# Patient Record
Sex: MALE | Race: WHITE
[De-identification: names, ages, dates, MRNs, and addresses within clinical notes are randomized per-mention and may not be internally consistent; named-entity substitution may affect disease eponyms.]

---

## 2021-03-02 DIAGNOSIS — Z78.9 OTHER SPECIFIED HEALTH STATUS: ICD-10-CM

## 2021-03-02 DIAGNOSIS — Z87.898 PERSONAL HISTORY OF OTHER SPECIFIED CONDITIONS: ICD-10-CM

## 2021-03-02 DIAGNOSIS — Z86.79 PERSONAL HISTORY OF OTHER DISEASES OF THE CIRCULATORY SYSTEM: ICD-10-CM

## 2021-03-02 DIAGNOSIS — F17.200 NICOTINE DEPENDENCE, UNSPECIFIED, UNCOMPLICATED: ICD-10-CM

## 2021-03-02 RX ORDER — ATORVASTATIN CALCIUM 20 MG/1
20 TABLET, FILM COATED ORAL
Qty: 90 | Refills: 3 | Status: ACTIVE | COMMUNITY

## 2021-03-02 RX ORDER — MECLIZINE HYDROCHLORIDE 25 MG/1
25 TABLET ORAL DAILY
Refills: 0 | Status: ACTIVE | COMMUNITY

## 2021-03-02 RX ORDER — FENOFIBRATE 200 MG/1
200 CAPSULE ORAL
Refills: 0 | Status: ACTIVE | COMMUNITY

## 2021-03-04 ENCOUNTER — NON-APPOINTMENT (OUTPATIENT)
Age: 61
End: 2021-03-04

## 2021-03-04 ENCOUNTER — APPOINTMENT (OUTPATIENT)
Dept: CARDIOLOGY | Facility: CLINIC | Age: 61
End: 2021-03-04
Payer: COMMERCIAL

## 2021-03-04 VITALS
WEIGHT: 144 LBS | BODY MASS INDEX: 21.33 KG/M2 | OXYGEN SATURATION: 97 % | HEART RATE: 74 BPM | SYSTOLIC BLOOD PRESSURE: 148 MMHG | DIASTOLIC BLOOD PRESSURE: 99 MMHG | HEIGHT: 69 IN

## 2021-03-04 DIAGNOSIS — Z82.3 FAMILY HISTORY OF STROKE: ICD-10-CM

## 2021-03-04 DIAGNOSIS — E78.5 HYPERLIPIDEMIA, UNSPECIFIED: ICD-10-CM

## 2021-03-04 DIAGNOSIS — Z82.49 FAMILY HISTORY OF ISCHEMIC HEART DISEASE AND OTHER DISEASES OF THE CIRCULATORY SYSTEM: ICD-10-CM

## 2021-03-04 DIAGNOSIS — R42 DIZZINESS AND GIDDINESS: ICD-10-CM

## 2021-03-04 DIAGNOSIS — I10 ESSENTIAL (PRIMARY) HYPERTENSION: ICD-10-CM

## 2021-03-04 PROCEDURE — 99072 ADDL SUPL MATRL&STAF TM PHE: CPT

## 2021-03-04 PROCEDURE — 99204 OFFICE O/P NEW MOD 45 MIN: CPT | Mod: 25

## 2021-03-04 PROCEDURE — 93000 ELECTROCARDIOGRAM COMPLETE: CPT

## 2021-03-04 RX ORDER — HYDROCHLOROTHIAZIDE 12.5 MG/1
12.5 TABLET ORAL
Refills: 0 | Status: DISCONTINUED | COMMUNITY
End: 2021-03-04

## 2021-03-04 RX ORDER — LISINOPRIL 40 MG/1
40 TABLET ORAL DAILY
Refills: 0 | Status: ACTIVE | COMMUNITY

## 2021-04-05 ENCOUNTER — APPOINTMENT (OUTPATIENT)
Dept: CARDIOLOGY | Facility: CLINIC | Age: 61
End: 2021-04-05

## 2021-07-08 ENCOUNTER — APPOINTMENT (OUTPATIENT)
Dept: UROLOGY | Facility: CLINIC | Age: 61
End: 2021-07-08
Payer: COMMERCIAL

## 2021-07-08 VITALS
DIASTOLIC BLOOD PRESSURE: 107 MMHG | TEMPERATURE: 98.5 F | SYSTOLIC BLOOD PRESSURE: 187 MMHG | HEART RATE: 80 BPM | OXYGEN SATURATION: 98 %

## 2021-07-08 PROCEDURE — 99072 ADDL SUPL MATRL&STAF TM PHE: CPT

## 2021-07-08 PROCEDURE — 99203 OFFICE O/P NEW LOW 30 MIN: CPT

## 2021-07-08 NOTE — HISTORY OF PRESENT ILLNESS
[FreeTextEntry1] : CC: history of prostate cancer\par \par HPI: \par Patient with history of RALP\par JAZ \par Has not been seen in ~10 years\par Basically dry; no pads and dry underwear today.  He gives a history of occasional leak with stress/strain/lifting. \par He has severe ED since surgery\par \par He would like a letter to document his current status; we will await his PSA and then document, specifically his cancer status, the fact that he has mild intermittent stress incontinence without the need for pads, and severe ED. \par \par \par

## 2021-07-21 ENCOUNTER — NON-APPOINTMENT (OUTPATIENT)
Age: 61
End: 2021-07-21

## 2021-07-21 DIAGNOSIS — C61 MALIGNANT NEOPLASM OF PROSTATE: ICD-10-CM

## 2021-07-21 LAB — PSA SERPL-MCNC: 0.94 NG/ML

## 2021-10-06 PROBLEM — I10 ESSENTIAL HYPERTENSION: Status: ACTIVE | Noted: 2021-03-04

## 2022-01-03 ENCOUNTER — APPOINTMENT (OUTPATIENT)
Dept: SURGERY | Facility: CLINIC | Age: 62
End: 2022-01-03
Payer: COMMERCIAL

## 2022-01-03 VITALS
TEMPERATURE: 97.7 F | OXYGEN SATURATION: 97 % | DIASTOLIC BLOOD PRESSURE: 108 MMHG | HEART RATE: 83 BPM | HEIGHT: 69 IN | SYSTOLIC BLOOD PRESSURE: 159 MMHG | BODY MASS INDEX: 28.14 KG/M2 | WEIGHT: 190 LBS

## 2022-01-03 DIAGNOSIS — K59.00 CONSTIPATION, UNSPECIFIED: ICD-10-CM

## 2022-01-03 PROCEDURE — 99204 OFFICE O/P NEW MOD 45 MIN: CPT

## 2022-01-31 ENCOUNTER — TRANSCRIPTION ENCOUNTER (OUTPATIENT)
Age: 62
End: 2022-01-31

## 2022-02-01 ENCOUNTER — OUTPATIENT (OUTPATIENT)
Dept: OUTPATIENT SERVICES | Facility: HOSPITAL | Age: 62
LOS: 1 days | End: 2022-02-01
Payer: COMMERCIAL

## 2022-02-01 ENCOUNTER — APPOINTMENT (OUTPATIENT)
Dept: SURGERY | Facility: HOSPITAL | Age: 62
End: 2022-02-01
Payer: COMMERCIAL

## 2022-02-01 ENCOUNTER — RESULT REVIEW (OUTPATIENT)
Age: 62
End: 2022-02-01

## 2022-02-01 DIAGNOSIS — Z86.010 PERSONAL HISTORY OF COLONIC POLYPS: ICD-10-CM

## 2022-02-01 PROCEDURE — 45380 COLONOSCOPY AND BIOPSY: CPT

## 2022-02-01 PROCEDURE — 88305 TISSUE EXAM BY PATHOLOGIST: CPT

## 2022-02-01 PROCEDURE — 88305 TISSUE EXAM BY PATHOLOGIST: CPT | Mod: 26

## 2022-02-01 DEVICE — SYR ORISE GEL SNGL PACK: Type: IMPLANTABLE DEVICE | Status: FUNCTIONAL

## 2022-02-01 DEVICE — HEMOSPRAY HEMOSTAT ENDO 7F: Type: IMPLANTABLE DEVICE | Status: FUNCTIONAL

## 2022-02-01 DEVICE — CLIP RESOLUTION 360 235CM: Type: IMPLANTABLE DEVICE | Status: FUNCTIONAL

## 2022-02-02 LAB — SURGICAL PATHOLOGY STUDY: SIGNIFICANT CHANGE UP

## 2022-03-17 ENCOUNTER — APPOINTMENT (OUTPATIENT)
Dept: SURGERY | Facility: CLINIC | Age: 62
End: 2022-03-17
Payer: COMMERCIAL

## 2022-03-17 VITALS
SYSTOLIC BLOOD PRESSURE: 184 MMHG | BODY MASS INDEX: 27.4 KG/M2 | OXYGEN SATURATION: 98 % | HEART RATE: 85 BPM | WEIGHT: 185 LBS | HEIGHT: 69 IN | DIASTOLIC BLOOD PRESSURE: 119 MMHG | TEMPERATURE: 97.3 F

## 2022-03-17 DIAGNOSIS — K92.1 MELENA: ICD-10-CM

## 2022-03-17 DIAGNOSIS — K63.5 POLYP OF COLON: ICD-10-CM

## 2022-03-17 DIAGNOSIS — Z86.010 PERSONAL HISTORY OF COLONIC POLYPS: ICD-10-CM

## 2022-03-17 PROCEDURE — 99213 OFFICE O/P EST LOW 20 MIN: CPT

## 2023-01-06 ENCOUNTER — EMERGENCY (EMERGENCY)
Facility: HOSPITAL | Age: 63
LOS: 1 days | Discharge: LEFT BEFORE DISCHARGE | End: 2023-01-06
Admitting: EMERGENCY MEDICINE
Payer: COMMERCIAL

## 2023-01-06 VITALS
OXYGEN SATURATION: 97 % | DIASTOLIC BLOOD PRESSURE: 92 MMHG | RESPIRATION RATE: 16 BRPM | SYSTOLIC BLOOD PRESSURE: 177 MMHG | TEMPERATURE: 98 F | HEART RATE: 68 BPM | WEIGHT: 179.9 LBS

## 2023-01-06 PROCEDURE — L9992: CPT

## 2023-01-06 NOTE — ED ADULT NURSE NOTE - EXPLANATION OF PATIENT'S REASON FOR LEAVING
"I feel better. My stomach pain went away. I know it was from gas. It is a waste of my time and yours for me to stay."

## 2023-01-17 ENCOUNTER — NON-APPOINTMENT (OUTPATIENT)
Age: 63
End: 2023-01-17

## 2023-01-19 ENCOUNTER — APPOINTMENT (OUTPATIENT)
Dept: SURGERY | Facility: CLINIC | Age: 63
End: 2023-01-19
Payer: COMMERCIAL

## 2023-01-19 VITALS
DIASTOLIC BLOOD PRESSURE: 101 MMHG | HEART RATE: 79 BPM | SYSTOLIC BLOOD PRESSURE: 156 MMHG | TEMPERATURE: 98.1 F | WEIGHT: 180 LBS | OXYGEN SATURATION: 97 % | HEIGHT: 69 IN | BODY MASS INDEX: 26.66 KG/M2

## 2023-01-19 DIAGNOSIS — R10.11 RIGHT UPPER QUADRANT PAIN: ICD-10-CM

## 2023-01-19 DIAGNOSIS — R10.13 EPIGASTRIC PAIN: ICD-10-CM

## 2023-01-19 DIAGNOSIS — K81.1 CHRONIC CHOLECYSTITIS: ICD-10-CM

## 2023-01-19 PROCEDURE — 99214 OFFICE O/P EST MOD 30 MIN: CPT

## 2023-02-08 ENCOUNTER — OUTPATIENT (OUTPATIENT)
Dept: OUTPATIENT SERVICES | Facility: HOSPITAL | Age: 63
LOS: 1 days | End: 2023-02-08
Payer: COMMERCIAL

## 2023-02-08 VITALS
HEIGHT: 69 IN | TEMPERATURE: 98 F | WEIGHT: 171.08 LBS | SYSTOLIC BLOOD PRESSURE: 138 MMHG | DIASTOLIC BLOOD PRESSURE: 86 MMHG | RESPIRATION RATE: 16 BRPM | OXYGEN SATURATION: 98 % | HEART RATE: 70 BPM

## 2023-02-08 DIAGNOSIS — K81.1 CHRONIC CHOLECYSTITIS: ICD-10-CM

## 2023-02-08 DIAGNOSIS — Z98.890 OTHER SPECIFIED POSTPROCEDURAL STATES: Chronic | ICD-10-CM

## 2023-02-08 DIAGNOSIS — I10 ESSENTIAL (PRIMARY) HYPERTENSION: ICD-10-CM

## 2023-02-08 DIAGNOSIS — K81.9 CHOLECYSTITIS, UNSPECIFIED: ICD-10-CM

## 2023-02-08 DIAGNOSIS — E78.5 HYPERLIPIDEMIA, UNSPECIFIED: ICD-10-CM

## 2023-02-08 DIAGNOSIS — Z01.818 ENCOUNTER FOR OTHER PREPROCEDURAL EXAMINATION: ICD-10-CM

## 2023-02-08 LAB
ALBUMIN SERPL ELPH-MCNC: 4.3 G/DL — SIGNIFICANT CHANGE UP (ref 3.3–5)
ALP SERPL-CCNC: 42 U/L — SIGNIFICANT CHANGE UP (ref 40–120)
ALT FLD-CCNC: 25 U/L — SIGNIFICANT CHANGE UP (ref 12–78)
ANION GAP SERPL CALC-SCNC: 4 MMOL/L — LOW (ref 5–17)
AST SERPL-CCNC: 26 U/L — SIGNIFICANT CHANGE UP (ref 15–37)
BILIRUB SERPL-MCNC: 0.8 MG/DL — SIGNIFICANT CHANGE UP (ref 0.2–1.2)
BUN SERPL-MCNC: 18 MG/DL — SIGNIFICANT CHANGE UP (ref 7–23)
CALCIUM SERPL-MCNC: 9.3 MG/DL — SIGNIFICANT CHANGE UP (ref 8.5–10.1)
CHLORIDE SERPL-SCNC: 111 MMOL/L — HIGH (ref 96–108)
CO2 SERPL-SCNC: 27 MMOL/L — SIGNIFICANT CHANGE UP (ref 22–31)
CREAT SERPL-MCNC: 0.9 MG/DL — SIGNIFICANT CHANGE UP (ref 0.5–1.3)
EGFR: 95 ML/MIN/1.73M2 — SIGNIFICANT CHANGE UP
GLUCOSE SERPL-MCNC: 82 MG/DL — SIGNIFICANT CHANGE UP (ref 70–99)
HCT VFR BLD CALC: 48.5 % — SIGNIFICANT CHANGE UP (ref 39–50)
HGB BLD-MCNC: 16.6 G/DL — SIGNIFICANT CHANGE UP (ref 13–17)
LIDOCAIN IGE QN: 277 U/L — SIGNIFICANT CHANGE UP (ref 73–393)
MCHC RBC-ENTMCNC: 31.3 PG — SIGNIFICANT CHANGE UP (ref 27–34)
MCHC RBC-ENTMCNC: 34.2 GM/DL — SIGNIFICANT CHANGE UP (ref 32–36)
MCV RBC AUTO: 91.3 FL — SIGNIFICANT CHANGE UP (ref 80–100)
NRBC # BLD: 0 /100 WBCS — SIGNIFICANT CHANGE UP (ref 0–0)
PLATELET # BLD AUTO: 211 K/UL — SIGNIFICANT CHANGE UP (ref 150–400)
POTASSIUM SERPL-MCNC: 4.1 MMOL/L — SIGNIFICANT CHANGE UP (ref 3.5–5.3)
POTASSIUM SERPL-SCNC: 4.1 MMOL/L — SIGNIFICANT CHANGE UP (ref 3.5–5.3)
PROT SERPL-MCNC: 7.8 G/DL — SIGNIFICANT CHANGE UP (ref 6–8.3)
RBC # BLD: 5.31 M/UL — SIGNIFICANT CHANGE UP (ref 4.2–5.8)
RBC # FLD: 12.9 % — SIGNIFICANT CHANGE UP (ref 10.3–14.5)
SODIUM SERPL-SCNC: 142 MMOL/L — SIGNIFICANT CHANGE UP (ref 135–145)
WBC # BLD: 5.38 K/UL — SIGNIFICANT CHANGE UP (ref 3.8–10.5)
WBC # FLD AUTO: 5.38 K/UL — SIGNIFICANT CHANGE UP (ref 3.8–10.5)

## 2023-02-08 PROCEDURE — 86850 RBC ANTIBODY SCREEN: CPT

## 2023-02-08 PROCEDURE — 36415 COLL VENOUS BLD VENIPUNCTURE: CPT

## 2023-02-08 PROCEDURE — 86901 BLOOD TYPING SEROLOGIC RH(D): CPT

## 2023-02-08 PROCEDURE — 83690 ASSAY OF LIPASE: CPT

## 2023-02-08 PROCEDURE — 93010 ELECTROCARDIOGRAM REPORT: CPT

## 2023-02-08 PROCEDURE — 86900 BLOOD TYPING SEROLOGIC ABO: CPT

## 2023-02-08 PROCEDURE — 93005 ELECTROCARDIOGRAM TRACING: CPT

## 2023-02-08 PROCEDURE — 80053 COMPREHEN METABOLIC PANEL: CPT

## 2023-02-08 PROCEDURE — 85027 COMPLETE CBC AUTOMATED: CPT

## 2023-02-08 PROCEDURE — G0463: CPT

## 2023-02-08 NOTE — H&P PST ADULT - PROBLEM SELECTOR PLAN 1
Laparoscopic cholecystectomy , possible open  Labs- CBC, CMP, Amylase, EKG  Pre op instructions discussed, all questions answered   Pre op PCP evaluation

## 2023-02-08 NOTE — H&P PST ADULT - NSICDXFAMILYHX_GEN_ALL_CORE_FT
FAMILY HISTORY:  Sibling  Still living? Yes, Estimated age: 61-70  FH: HTN (hypertension), Age at diagnosis: Age Unknown

## 2023-02-08 NOTE — H&P PST ADULT - HISTORY OF PRESENT ILLNESS
64 yo M with h/o HTN, HLD, prostate cancer(2009) c/o mid epigastric pain in 1/06/2023- had surgical consult- s/p abdominal sonogram revealed cholecystitis. Pt presents to PST for pre op evaluation for laparoscopic cholecystectomy , possible open on 2/22/23  **Denies any fever, chills, N/V, jaundice or sick contacts  **Covid 19 PCR to be scheduled

## 2023-02-08 NOTE — H&P PST ADULT - NSICDXPASTMEDICALHX_GEN_ALL_CORE_FT
PAST MEDICAL HISTORY:  Basal cell carcinoma     Benign essential HTN     CA of prostate     HLD (hyperlipidemia)

## 2023-02-08 NOTE — H&P PST ADULT - NSICDXPASTSURGICALHX_GEN_ALL_CORE_FT
PAST SURGICAL HISTORY:  History of basal cell carcinoma (BCC) excision     History of transurethral prostatectomy     S/P inguinal hernia repair

## 2023-02-16 PROBLEM — E78.5 HYPERLIPIDEMIA, UNSPECIFIED: Chronic | Status: ACTIVE | Noted: 2023-02-08

## 2023-02-16 PROBLEM — C44.91 BASAL CELL CARCINOMA OF SKIN, UNSPECIFIED: Chronic | Status: ACTIVE | Noted: 2023-02-08

## 2023-02-16 PROBLEM — I10 ESSENTIAL (PRIMARY) HYPERTENSION: Chronic | Status: ACTIVE | Noted: 2023-02-08

## 2023-02-16 PROBLEM — C61 MALIGNANT NEOPLASM OF PROSTATE: Chronic | Status: ACTIVE | Noted: 2023-02-08

## 2023-02-19 ENCOUNTER — OUTPATIENT (OUTPATIENT)
Dept: OUTPATIENT SERVICES | Facility: HOSPITAL | Age: 63
LOS: 1 days | End: 2023-02-19
Payer: COMMERCIAL

## 2023-02-19 DIAGNOSIS — Z20.828 CONTACT WITH AND (SUSPECTED) EXPOSURE TO OTHER VIRAL COMMUNICABLE DISEASES: ICD-10-CM

## 2023-02-19 DIAGNOSIS — Z98.890 OTHER SPECIFIED POSTPROCEDURAL STATES: Chronic | ICD-10-CM

## 2023-02-19 LAB — SARS-COV-2 RNA SPEC QL NAA+PROBE: SIGNIFICANT CHANGE UP

## 2023-02-19 PROCEDURE — U0005: CPT

## 2023-02-19 PROCEDURE — U0003: CPT

## 2023-02-21 ENCOUNTER — TRANSCRIPTION ENCOUNTER (OUTPATIENT)
Age: 63
End: 2023-02-21

## 2023-02-22 ENCOUNTER — RESULT REVIEW (OUTPATIENT)
Age: 63
End: 2023-02-22

## 2023-02-22 ENCOUNTER — APPOINTMENT (OUTPATIENT)
Dept: SURGERY | Facility: HOSPITAL | Age: 63
End: 2023-02-22

## 2023-02-22 ENCOUNTER — OUTPATIENT (OUTPATIENT)
Dept: OUTPATIENT SERVICES | Facility: HOSPITAL | Age: 63
LOS: 1 days | End: 2023-02-22
Payer: COMMERCIAL

## 2023-02-22 ENCOUNTER — TRANSCRIPTION ENCOUNTER (OUTPATIENT)
Age: 63
End: 2023-02-22

## 2023-02-22 VITALS
SYSTOLIC BLOOD PRESSURE: 167 MMHG | RESPIRATION RATE: 13 BRPM | DIASTOLIC BLOOD PRESSURE: 98 MMHG | HEART RATE: 78 BPM | OXYGEN SATURATION: 95 %

## 2023-02-22 VITALS
TEMPERATURE: 98 F | OXYGEN SATURATION: 98 % | HEIGHT: 69 IN | WEIGHT: 171.08 LBS | RESPIRATION RATE: 14 BRPM | HEART RATE: 88 BPM | DIASTOLIC BLOOD PRESSURE: 88 MMHG | SYSTOLIC BLOOD PRESSURE: 130 MMHG

## 2023-02-22 DIAGNOSIS — K81.1 CHRONIC CHOLECYSTITIS: ICD-10-CM

## 2023-02-22 DIAGNOSIS — Z01.818 ENCOUNTER FOR OTHER PREPROCEDURAL EXAMINATION: ICD-10-CM

## 2023-02-22 DIAGNOSIS — Z98.890 OTHER SPECIFIED POSTPROCEDURAL STATES: Chronic | ICD-10-CM

## 2023-02-22 LAB — ABO RH CONFIRMATION: SIGNIFICANT CHANGE UP

## 2023-02-22 PROCEDURE — 47562 LAPAROSCOPIC CHOLECYSTECTOMY: CPT

## 2023-02-22 PROCEDURE — 36415 COLL VENOUS BLD VENIPUNCTURE: CPT

## 2023-02-22 PROCEDURE — C1889: CPT

## 2023-02-22 PROCEDURE — 47562 LAPAROSCOPIC CHOLECYSTECTOMY: CPT | Mod: AS

## 2023-02-22 DEVICE — STAPLER COVIDIEN TRI-STAPLE CURVED 60MM TAN RELOAD: Type: IMPLANTABLE DEVICE | Status: FUNCTIONAL

## 2023-02-22 DEVICE — CLIP APPLIER COVIDIEN ENDOCLIP 10MM LARGE: Type: IMPLANTABLE DEVICE | Status: FUNCTIONAL

## 2023-02-22 DEVICE — CLIP APPLIER COVIDIEN ENDOCLIP II 10MM MED/LG: Type: IMPLANTABLE DEVICE | Status: FUNCTIONAL

## 2023-02-22 RX ORDER — LISINOPRIL 2.5 MG/1
1 TABLET ORAL
Qty: 0 | Refills: 0 | DISCHARGE

## 2023-02-22 RX ORDER — SODIUM CHLORIDE 9 MG/ML
1000 INJECTION, SOLUTION INTRAVENOUS
Refills: 0 | Status: DISCONTINUED | OUTPATIENT
Start: 2023-02-22 | End: 2023-02-22

## 2023-02-22 RX ORDER — ONDANSETRON 8 MG/1
4 TABLET, FILM COATED ORAL ONCE
Refills: 0 | Status: DISCONTINUED | OUTPATIENT
Start: 2023-02-22 | End: 2023-02-22

## 2023-02-22 RX ORDER — FENOFIBRATE,MICRONIZED 130 MG
1 CAPSULE ORAL
Qty: 0 | Refills: 0 | DISCHARGE

## 2023-02-22 RX ORDER — ATORVASTATIN CALCIUM 80 MG/1
1 TABLET, FILM COATED ORAL
Qty: 0 | Refills: 0 | DISCHARGE

## 2023-02-22 RX ORDER — OXYCODONE HYDROCHLORIDE 5 MG/1
5 TABLET ORAL ONCE
Refills: 0 | Status: DISCONTINUED | OUTPATIENT
Start: 2023-02-22 | End: 2023-02-22

## 2023-02-22 RX ORDER — CEFAZOLIN SODIUM 1 G
1000 VIAL (EA) INJECTION ONCE
Refills: 0 | Status: COMPLETED | OUTPATIENT
Start: 2023-02-22 | End: 2023-02-22

## 2023-02-22 RX ORDER — HYDROMORPHONE HYDROCHLORIDE 2 MG/ML
0.5 INJECTION INTRAMUSCULAR; INTRAVENOUS; SUBCUTANEOUS
Refills: 0 | Status: DISCONTINUED | OUTPATIENT
Start: 2023-02-22 | End: 2023-02-22

## 2023-02-22 RX ORDER — OXYCODONE HYDROCHLORIDE 5 MG/1
1 TABLET ORAL
Qty: 10 | Refills: 0
Start: 2023-02-22

## 2023-02-22 RX ADMIN — SODIUM CHLORIDE 75 MILLILITER(S): 9 INJECTION, SOLUTION INTRAVENOUS at 09:25

## 2023-02-22 NOTE — ASU DISCHARGE PLAN (ADULT/PEDIATRIC) - NS MD DC FALL RISK RISK
For information on Fall & Injury Prevention, visit: [email or web address] OR  [email or web address] OR  https://www.cdc.gov/steadi/patient.html

## 2023-02-22 NOTE — ASU DISCHARGE PLAN (ADULT/PEDIATRIC) - ASU DC SPECIAL INSTRUCTIONSFT
please take Motrin and or Tylenol  for pain control , can apply cold compress to the incision site, a narcotic prescription is send for additional pain control

## 2023-02-22 NOTE — ASU DISCHARGE PLAN (ADULT/PEDIATRIC) - CALL YOUR DOCTOR IF YOU HAVE ANY OF THE FOLLOWING:
100.4/Bleeding that does not stop/Fever greater than (need to indicate Fahrenheit or Celsius)/Nausea and vomiting that does not stop/Unable to urinate

## 2023-02-22 NOTE — ASU PREOP CHECKLIST - NS PREOP CHK HIBICLENS NA
#1: normal... Well appearing, awake, alert, oriented to person, place, time/situation and in no apparent distress.

## 2023-02-22 NOTE — ASU DISCHARGE PLAN (ADULT/PEDIATRIC) - CARE PROVIDER_API CALL
Ron Dougherty (DO)  Surgery  [address] [place] [address]  Phone: [phone]  Fax: [phone]  Follow Up Time: 1 week

## 2023-02-23 LAB — SURGICAL PATHOLOGY STUDY: SIGNIFICANT CHANGE UP

## 2023-03-02 ENCOUNTER — APPOINTMENT (OUTPATIENT)
Dept: SURGERY | Facility: CLINIC | Age: 63
End: 2023-03-02
Payer: COMMERCIAL

## 2023-03-02 DIAGNOSIS — Z90.49 ACQUIRED ABSENCE OF OTHER SPECIFIED PARTS OF DIGESTIVE TRACT: ICD-10-CM

## 2023-03-02 PROCEDURE — 99024 POSTOP FOLLOW-UP VISIT: CPT

## 2024-05-16 ENCOUNTER — APPOINTMENT (OUTPATIENT)
Dept: SURGERY | Facility: CLINIC | Age: 64
End: 2024-05-16
Payer: MEDICARE

## 2024-05-16 VITALS
DIASTOLIC BLOOD PRESSURE: 99 MMHG | TEMPERATURE: 98 F | SYSTOLIC BLOOD PRESSURE: 173 MMHG | HEIGHT: 69 IN | OXYGEN SATURATION: 98 % | RESPIRATION RATE: 16 BRPM | WEIGHT: 175 LBS | HEART RATE: 62 BPM | BODY MASS INDEX: 25.92 KG/M2

## 2024-05-16 DIAGNOSIS — K59.00 CONSTIPATION, UNSPECIFIED: ICD-10-CM

## 2024-05-16 DIAGNOSIS — R19.8 OTHER SPECIFIED SYMPTOMS AND SIGNS INVOLVING THE DIGESTIVE SYSTEM AND ABDOMEN: ICD-10-CM

## 2024-05-16 DIAGNOSIS — R19.4 CHANGE IN BOWEL HABIT: ICD-10-CM

## 2024-05-16 DIAGNOSIS — K64.5 PERIANAL VENOUS THROMBOSIS: ICD-10-CM

## 2024-05-16 PROCEDURE — 46600 DIAGNOSTIC ANOSCOPY SPX: CPT

## 2024-05-16 PROCEDURE — 99214 OFFICE O/P EST MOD 30 MIN: CPT | Mod: 25

## 2024-05-16 RX ORDER — HYDROCORTISONE ACETATE AND PRAMOXINE HYDROCHLORIDE 25; 10 MG/G; MG/G
2.5-1 CREAM TOPICAL
Qty: 1 | Refills: 0 | Status: ACTIVE | COMMUNITY
Start: 2024-05-16 | End: 1900-01-01

## 2024-06-13 ENCOUNTER — APPOINTMENT (OUTPATIENT)
Dept: SURGERY | Facility: CLINIC | Age: 64
End: 2024-06-13
Payer: MEDICARE

## 2024-06-13 VITALS
WEIGHT: 175 LBS | BODY MASS INDEX: 25.92 KG/M2 | HEIGHT: 69 IN | RESPIRATION RATE: 14 BRPM | HEART RATE: 66 BPM | TEMPERATURE: 98.4 F | OXYGEN SATURATION: 97 % | SYSTOLIC BLOOD PRESSURE: 143 MMHG | DIASTOLIC BLOOD PRESSURE: 89 MMHG

## 2024-06-13 DIAGNOSIS — Z86.010 PERSONAL HISTORY OF COLONIC POLYPS: ICD-10-CM

## 2024-06-13 DIAGNOSIS — K64.4 RESIDUAL HEMORRHOIDAL SKIN TAGS: ICD-10-CM

## 2024-06-13 PROCEDURE — 99213 OFFICE O/P EST LOW 20 MIN: CPT

## 2024-09-27 ENCOUNTER — NON-APPOINTMENT (OUTPATIENT)
Age: 64
End: 2024-09-27

## 2024-10-01 ENCOUNTER — APPOINTMENT (OUTPATIENT)
Dept: SURGERY | Facility: HOSPITAL | Age: 64
End: 2024-10-01

## 2024-10-09 ENCOUNTER — APPOINTMENT (OUTPATIENT)
Dept: UROLOGY | Facility: CLINIC | Age: 64
End: 2024-10-09

## 2024-10-12 ENCOUNTER — INPATIENT (INPATIENT)
Facility: HOSPITAL | Age: 64
LOS: 36 days | Discharge: NURSING, REHAB OR LONG-TERM CARE | End: 2024-11-18
Attending: INTERNAL MEDICINE | Admitting: INTERNAL MEDICINE
Payer: MEDICARE

## 2024-10-12 VITALS
HEIGHT: 68 IN | HEART RATE: 60 BPM | DIASTOLIC BLOOD PRESSURE: 89 MMHG | TEMPERATURE: 98 F | WEIGHT: 153 LBS | SYSTOLIC BLOOD PRESSURE: 180 MMHG | OXYGEN SATURATION: 95 % | RESPIRATION RATE: 16 BRPM

## 2024-10-12 DIAGNOSIS — R11.2 NAUSEA WITH VOMITING, UNSPECIFIED: ICD-10-CM

## 2024-10-12 DIAGNOSIS — I10 ESSENTIAL (PRIMARY) HYPERTENSION: ICD-10-CM

## 2024-10-12 DIAGNOSIS — Z98.890 OTHER SPECIFIED POSTPROCEDURAL STATES: Chronic | ICD-10-CM

## 2024-10-12 DIAGNOSIS — Z29.9 ENCOUNTER FOR PROPHYLACTIC MEASURES, UNSPECIFIED: ICD-10-CM

## 2024-10-12 DIAGNOSIS — D69.6 THROMBOCYTOPENIA, UNSPECIFIED: ICD-10-CM

## 2024-10-12 DIAGNOSIS — G89.3 NEOPLASM RELATED PAIN (ACUTE) (CHRONIC): ICD-10-CM

## 2024-10-12 DIAGNOSIS — C61 MALIGNANT NEOPLASM OF PROSTATE: ICD-10-CM

## 2024-10-12 DIAGNOSIS — E78.5 HYPERLIPIDEMIA, UNSPECIFIED: ICD-10-CM

## 2024-10-12 LAB
ALBUMIN SERPL ELPH-MCNC: 4.4 G/DL — SIGNIFICANT CHANGE UP (ref 3.3–5)
ALP SERPL-CCNC: 106 U/L — SIGNIFICANT CHANGE UP (ref 40–120)
ALT FLD-CCNC: 9 U/L — SIGNIFICANT CHANGE UP (ref 4–41)
ANION GAP SERPL CALC-SCNC: 13 MMOL/L — SIGNIFICANT CHANGE UP (ref 7–14)
APPEARANCE UR: ABNORMAL
APTT BLD: 29.2 SEC — SIGNIFICANT CHANGE UP (ref 24.5–35.6)
AST SERPL-CCNC: 59 U/L — HIGH (ref 4–40)
BACTERIA # UR AUTO: NEGATIVE /HPF — SIGNIFICANT CHANGE UP
BASOPHILS # BLD AUTO: 0 K/UL — SIGNIFICANT CHANGE UP (ref 0–0.2)
BASOPHILS NFR BLD AUTO: 0 % — SIGNIFICANT CHANGE UP (ref 0–2)
BILIRUB SERPL-MCNC: 1 MG/DL — SIGNIFICANT CHANGE UP (ref 0.2–1.2)
BILIRUB UR-MCNC: NEGATIVE — SIGNIFICANT CHANGE UP
BLD GP AB SCN SERPL QL: NEGATIVE — SIGNIFICANT CHANGE UP
BLOOD GAS VENOUS COMPREHENSIVE RESULT: SIGNIFICANT CHANGE UP
BUN SERPL-MCNC: 26 MG/DL — HIGH (ref 7–23)
CALCIUM SERPL-MCNC: 9.3 MG/DL — SIGNIFICANT CHANGE UP (ref 8.4–10.5)
CAST: 1 /LPF — SIGNIFICANT CHANGE UP (ref 0–4)
CHLORIDE SERPL-SCNC: 104 MMOL/L — SIGNIFICANT CHANGE UP (ref 98–107)
CO2 SERPL-SCNC: 24 MMOL/L — SIGNIFICANT CHANGE UP (ref 22–31)
COLOR SPEC: YELLOW — SIGNIFICANT CHANGE UP
CREAT SERPL-MCNC: 1.03 MG/DL — SIGNIFICANT CHANGE UP (ref 0.5–1.3)
DIFF PNL FLD: NEGATIVE — SIGNIFICANT CHANGE UP
EGFR: 81 ML/MIN/1.73M2 — SIGNIFICANT CHANGE UP
EOSINOPHIL # BLD AUTO: 0 K/UL — SIGNIFICANT CHANGE UP (ref 0–0.5)
EOSINOPHIL NFR BLD AUTO: 0 % — SIGNIFICANT CHANGE UP (ref 0–6)
GLUCOSE SERPL-MCNC: 106 MG/DL — HIGH (ref 70–99)
GLUCOSE UR QL: NEGATIVE MG/DL — SIGNIFICANT CHANGE UP
HCT VFR BLD CALC: 39 % — SIGNIFICANT CHANGE UP (ref 39–50)
HGB BLD-MCNC: 13.4 G/DL — SIGNIFICANT CHANGE UP (ref 13–17)
IANC: 4.74 K/UL — SIGNIFICANT CHANGE UP (ref 1.8–7.4)
INR BLD: 1.37 RATIO — HIGH (ref 0.85–1.16)
KETONES UR-MCNC: NEGATIVE MG/DL — SIGNIFICANT CHANGE UP
LEUKOCYTE ESTERASE UR-ACNC: NEGATIVE — SIGNIFICANT CHANGE UP
LIDOCAIN IGE QN: 44 U/L — SIGNIFICANT CHANGE UP (ref 7–60)
LYMPHOCYTES # BLD AUTO: 1.63 K/UL — SIGNIFICANT CHANGE UP (ref 1–3.3)
LYMPHOCYTES # BLD AUTO: 22.8 % — SIGNIFICANT CHANGE UP (ref 13–44)
MAGNESIUM SERPL-MCNC: 2.2 MG/DL — SIGNIFICANT CHANGE UP (ref 1.6–2.6)
MANUAL SMEAR VERIFICATION: SIGNIFICANT CHANGE UP
MCHC RBC-ENTMCNC: 30.7 PG — SIGNIFICANT CHANGE UP (ref 27–34)
MCHC RBC-ENTMCNC: 34.4 GM/DL — SIGNIFICANT CHANGE UP (ref 32–36)
MCV RBC AUTO: 89.2 FL — SIGNIFICANT CHANGE UP (ref 80–100)
MONOCYTES # BLD AUTO: 0.5 K/UL — SIGNIFICANT CHANGE UP (ref 0–0.9)
MONOCYTES NFR BLD AUTO: 7 % — SIGNIFICANT CHANGE UP (ref 2–14)
MYELOCYTES NFR BLD: 0.9 % — HIGH (ref 0–0)
NEUTROPHILS # BLD AUTO: 4.7 K/UL — SIGNIFICANT CHANGE UP (ref 1.8–7.4)
NEUTROPHILS NFR BLD AUTO: 65.8 % — SIGNIFICANT CHANGE UP (ref 43–77)
NITRITE UR-MCNC: NEGATIVE — SIGNIFICANT CHANGE UP
NRBC # BLD: 1 /100 WBCS — HIGH (ref 0–0)
OVALOCYTES BLD QL SMEAR: SLIGHT — SIGNIFICANT CHANGE UP
PH UR: 6.5 — SIGNIFICANT CHANGE UP (ref 5–8)
PHOSPHATE SERPL-MCNC: 3.1 MG/DL — SIGNIFICANT CHANGE UP (ref 2.5–4.5)
PLAT MORPH BLD: NORMAL — SIGNIFICANT CHANGE UP
PLATELET # BLD AUTO: 71 K/UL — LOW (ref 150–400)
PLATELET COUNT - ESTIMATE: ABNORMAL
POIKILOCYTOSIS BLD QL AUTO: SLIGHT — SIGNIFICANT CHANGE UP
POLYCHROMASIA BLD QL SMEAR: SLIGHT — SIGNIFICANT CHANGE UP
POTASSIUM SERPL-MCNC: 3.4 MMOL/L — LOW (ref 3.5–5.3)
POTASSIUM SERPL-SCNC: 3.4 MMOL/L — LOW (ref 3.5–5.3)
PROT SERPL-MCNC: 6.8 G/DL — SIGNIFICANT CHANGE UP (ref 6–8.3)
PROT UR-MCNC: NEGATIVE MG/DL — SIGNIFICANT CHANGE UP
PROTHROM AB SERPL-ACNC: 15.8 SEC — HIGH (ref 9.9–13.4)
RBC # BLD: 4.37 M/UL — SIGNIFICANT CHANGE UP (ref 4.2–5.8)
RBC # FLD: 14.2 % — SIGNIFICANT CHANGE UP (ref 10.3–14.5)
RBC BLD AUTO: ABNORMAL
RBC CASTS # UR COMP ASSIST: 2 /HPF — SIGNIFICANT CHANGE UP (ref 0–4)
REVIEW: SIGNIFICANT CHANGE UP
RH IG SCN BLD-IMP: POSITIVE — SIGNIFICANT CHANGE UP
SMUDGE CELLS # BLD: PRESENT — SIGNIFICANT CHANGE UP
SODIUM SERPL-SCNC: 141 MMOL/L — SIGNIFICANT CHANGE UP (ref 135–145)
SP GR SPEC: 1.01 — SIGNIFICANT CHANGE UP (ref 1–1.03)
SQUAMOUS # UR AUTO: 0 /HPF — SIGNIFICANT CHANGE UP (ref 0–5)
TROPONIN T, HIGH SENSITIVITY RESULT: 12 NG/L — SIGNIFICANT CHANGE UP
UROBILINOGEN FLD QL: 1 MG/DL — SIGNIFICANT CHANGE UP (ref 0.2–1)
VARIANT LYMPHS # BLD: 3.5 % — SIGNIFICANT CHANGE UP (ref 0–6)
WBC # BLD: 7.14 K/UL — SIGNIFICANT CHANGE UP (ref 3.8–10.5)
WBC # FLD AUTO: 7.14 K/UL — SIGNIFICANT CHANGE UP (ref 3.8–10.5)
WBC UR QL: 0 /HPF — SIGNIFICANT CHANGE UP (ref 0–5)

## 2024-10-12 PROCEDURE — 99223 1ST HOSP IP/OBS HIGH 75: CPT

## 2024-10-12 PROCEDURE — 72148 MRI LUMBAR SPINE W/O DYE: CPT | Mod: 26

## 2024-10-12 PROCEDURE — 71045 X-RAY EXAM CHEST 1 VIEW: CPT | Mod: 26

## 2024-10-12 PROCEDURE — 99285 EMERGENCY DEPT VISIT HI MDM: CPT

## 2024-10-12 RX ORDER — SENNOSIDES 8.6 MG
2 TABLET ORAL AT BEDTIME
Refills: 0 | Status: DISCONTINUED | OUTPATIENT
Start: 2024-10-12 | End: 2024-10-12

## 2024-10-12 RX ORDER — DEXAMETHASONE 1.5 MG/1
8 TABLET ORAL
Refills: 0 | Status: COMPLETED | OUTPATIENT
Start: 2024-10-12 | End: 2024-10-15

## 2024-10-12 RX ORDER — ENOXAPARIN SODIUM 30 MG/.3ML
40 INJECTION SUBCUTANEOUS EVERY 24 HOURS
Refills: 0 | Status: DISCONTINUED | OUTPATIENT
Start: 2024-10-12 | End: 2024-10-19

## 2024-10-12 RX ORDER — SODIUM CHLORIDE 9 MG/ML
2000 INJECTION, SOLUTION INTRAMUSCULAR; INTRAVENOUS; SUBCUTANEOUS ONCE
Refills: 0 | Status: DISCONTINUED | OUTPATIENT
Start: 2024-10-12 | End: 2024-10-14

## 2024-10-12 RX ORDER — ACETAMINOPHEN 500MG 500 MG/1
650 TABLET, COATED ORAL EVERY 6 HOURS
Refills: 0 | Status: DISCONTINUED | OUTPATIENT
Start: 2024-10-12 | End: 2024-11-18

## 2024-10-12 RX ORDER — SODIUM CHLORIDE 9 MG/ML
2000 INJECTION, SOLUTION INTRAMUSCULAR; INTRAVENOUS; SUBCUTANEOUS ONCE
Refills: 0 | Status: COMPLETED | OUTPATIENT
Start: 2024-10-12 | End: 2024-10-12

## 2024-10-12 RX ORDER — LISINOPRIL 20 MG/1
40 TABLET ORAL DAILY
Refills: 0 | Status: DISCONTINUED | OUTPATIENT
Start: 2024-10-12 | End: 2024-10-12

## 2024-10-12 RX ORDER — OXYCODONE HYDROCHLORIDE 30 MG/1
5 TABLET ORAL EVERY 8 HOURS
Refills: 0 | Status: DISCONTINUED | OUTPATIENT
Start: 2024-10-12 | End: 2024-10-13

## 2024-10-12 RX ORDER — ONDANSETRON HYDROCHLORIDE 4 MG/1
8 TABLET, FILM COATED ORAL ONCE
Refills: 0 | Status: COMPLETED | OUTPATIENT
Start: 2024-10-12 | End: 2024-10-12

## 2024-10-12 RX ORDER — HYDROMORPHONE HYDROCHLORIDE 2 MG/1
1 TABLET ORAL ONCE
Refills: 0 | Status: DISCONTINUED | OUTPATIENT
Start: 2024-10-12 | End: 2024-10-12

## 2024-10-12 RX ORDER — ONDANSETRON HYDROCHLORIDE 4 MG/1
4 TABLET, FILM COATED ORAL EVERY 8 HOURS
Refills: 0 | Status: DISCONTINUED | OUTPATIENT
Start: 2024-10-12 | End: 2024-10-12

## 2024-10-12 RX ORDER — FENOFIBRATE,MICRONIZED 134 MG
145 CAPSULE ORAL AT BEDTIME
Refills: 0 | Status: DISCONTINUED | OUTPATIENT
Start: 2024-10-12 | End: 2024-10-13

## 2024-10-12 RX ORDER — ONDANSETRON HYDROCHLORIDE 4 MG/1
4 TABLET, FILM COATED ORAL EVERY 8 HOURS
Refills: 0 | Status: DISCONTINUED | OUTPATIENT
Start: 2024-10-12 | End: 2024-10-26

## 2024-10-12 RX ORDER — POTASSIUM CHLORIDE 600 MG/1
40 TABLET, EXTENDED RELEASE ORAL ONCE
Refills: 0 | Status: COMPLETED | OUTPATIENT
Start: 2024-10-12 | End: 2024-10-12

## 2024-10-12 RX ORDER — HYDROMORPHONE HYDROCHLORIDE 2 MG/1
1 TABLET ORAL EVERY 8 HOURS
Refills: 0 | Status: DISCONTINUED | OUTPATIENT
Start: 2024-10-12 | End: 2024-10-12

## 2024-10-12 RX ORDER — ABIRATERONE ACETATE 250 MG/1
1000 TABLET ORAL DAILY
Refills: 0 | Status: DISCONTINUED | OUTPATIENT
Start: 2024-10-12 | End: 2024-10-12

## 2024-10-12 RX ORDER — FAMOTIDINE 20 MG/1
20 TABLET, FILM COATED ORAL ONCE
Refills: 0 | Status: COMPLETED | OUTPATIENT
Start: 2024-10-12 | End: 2024-10-12

## 2024-10-12 RX ORDER — POLYETHYLENE GLYCOL 3350 17 G/17G
17 POWDER, FOR SOLUTION ORAL DAILY
Refills: 0 | Status: DISCONTINUED | OUTPATIENT
Start: 2024-10-12 | End: 2024-10-18

## 2024-10-12 RX ORDER — LIDOCAINE 40 MG/G
1 CREAM TOPICAL EVERY 24 HOURS
Refills: 0 | Status: DISCONTINUED | OUTPATIENT
Start: 2024-10-12 | End: 2024-11-18

## 2024-10-12 RX ORDER — FENOFIBRATE,MICRONIZED 134 MG
1 CAPSULE ORAL
Refills: 0 | DISCHARGE

## 2024-10-12 RX ORDER — SENNOSIDES 8.6 MG
2 TABLET ORAL
Refills: 0 | Status: COMPLETED | OUTPATIENT
Start: 2024-10-12 | End: 2024-10-14

## 2024-10-12 RX ORDER — OXYCODONE AND ACETAMINOPHEN 5; 325 MG/1; MG/1
2 TABLET ORAL EVERY 6 HOURS
Refills: 0 | Status: DISCONTINUED | OUTPATIENT
Start: 2024-10-12 | End: 2024-10-12

## 2024-10-12 RX ORDER — MAGNESIUM, ALUMINUM HYDROXIDE 200-225/5
30 SUSPENSION, ORAL (FINAL DOSE FORM) ORAL EVERY 4 HOURS
Refills: 0 | Status: DISCONTINUED | OUTPATIENT
Start: 2024-10-12 | End: 2024-11-18

## 2024-10-12 RX ORDER — LISINOPRIL 20 MG/1
40 TABLET ORAL DAILY
Refills: 0 | Status: DISCONTINUED | OUTPATIENT
Start: 2024-10-12 | End: 2024-10-19

## 2024-10-12 RX ORDER — METOCLOPRAMIDE HYDROCHLORIDE 10 MG/1
10 TABLET ORAL ONCE
Refills: 0 | Status: COMPLETED | OUTPATIENT
Start: 2024-10-12 | End: 2024-10-12

## 2024-10-12 RX ADMIN — Medication 4 MILLIGRAM(S): at 10:24

## 2024-10-12 RX ADMIN — Medication 4 MILLIGRAM(S): at 15:07

## 2024-10-12 RX ADMIN — METOCLOPRAMIDE HYDROCHLORIDE 10 MILLIGRAM(S): 10 TABLET ORAL at 10:24

## 2024-10-12 RX ADMIN — DEXAMETHASONE 8 MILLIGRAM(S): 1.5 TABLET ORAL at 12:43

## 2024-10-12 RX ADMIN — ONDANSETRON HYDROCHLORIDE 8 MILLIGRAM(S): 4 TABLET, FILM COATED ORAL at 12:48

## 2024-10-12 RX ADMIN — HYDROMORPHONE HYDROCHLORIDE 1 MILLIGRAM(S): 2 TABLET ORAL at 12:11

## 2024-10-12 RX ADMIN — LISINOPRIL 40 MILLIGRAM(S): 20 TABLET ORAL at 15:59

## 2024-10-12 RX ADMIN — FAMOTIDINE 20 MILLIGRAM(S): 20 TABLET, FILM COATED ORAL at 10:23

## 2024-10-12 RX ADMIN — Medication 2 TABLET(S): at 18:36

## 2024-10-12 RX ADMIN — Medication 4 MILLIGRAM(S): at 18:36

## 2024-10-12 RX ADMIN — POTASSIUM CHLORIDE 40 MILLIEQUIVALENT(S): 600 TABLET, EXTENDED RELEASE ORAL at 15:59

## 2024-10-12 RX ADMIN — Medication 4 MILLIGRAM(S): at 11:25

## 2024-10-12 RX ADMIN — Medication 4 MILLIGRAM(S): at 19:06

## 2024-10-12 RX ADMIN — SODIUM CHLORIDE 2000 MILLILITER(S): 9 INJECTION, SOLUTION INTRAMUSCULAR; INTRAVENOUS; SUBCUTANEOUS at 10:29

## 2024-10-12 NOTE — ED PROVIDER NOTE - OBJECTIVE STATEMENT
64-year-old male with metastatic prostate cancer, sent in by hematologist from [facility] for uncontrolled pain, nausea, vomiting.   Patient reports diffuse pain starting 6 months ago significantly worsening for the past 2 weeks.  Currently the worst pain is located around the lower back.   No loss of bladder and bowel function, no saddle paresthesia.  Able to walk.  patient is oxycodone 5 every 4-6 hour.  Yesterday they started adding OxyContin 10.  However patient continued to have pain.  Family also reports significant nausea and vomiting, inability to tolerate p.o. for the last 2 days.  Last dose of Zofran at 7 AM this morning.   Last bowel movement 2 days ago.   No known allergy.

## 2024-10-12 NOTE — ED PROVIDER NOTE - PROGRESS NOTE DETAILS
Mckenna Goff) [facility] PGY-3: Patient vomited after receiving Dilaudid 1.  Patient tolerate morphine better, consider sticking to morphine for pain management for the rest of the hospitalization

## 2024-10-12 NOTE — H&P ADULT - NSCORESITESY/N_GEN_A_CORE_RD
Fulton State Director of Nursing to inquire if mother-in-law NEGRITO spoke to Child Life specialist, Shruti, who will be writing up suggestions and provide to NEGRITO.     ASSESSMENT:     Kim is receptive and appreciative of  phone call and support offered.     PLAN:      to connect with Kim again with suggestions provided by Child Life.       Kjerstin Rydeen, [facility]   Social Worker  Maternal Child Health   Direct: [phone]  Pager: [phone]     No

## 2024-10-12 NOTE — ED PROVIDER NOTE - ATTENDING CONTRIBUTION TO CARE
The patient is a 64y Male who has a past medical and surgery history of HTN HLD Basal cell CA/sp excision  Prostate CA/TURP S/P inguinal hernia repair PTED with  diffuse pain that started 6 months ago has significantly worsening over the past 2 weeks now localizing to lower back No "red flags".  Patient is  Able to walk.  patient is oxycodone 5 every 4-6 hour.  Yesterday they started adding OxyContin 10.    Vital Signs Last 24 Hrs  T(F): 97.8 HR: 60 BP: 180/89 RR: 16 SpO2: 95% (12 Oct 2024 09:21) (95% - 95%)  PE: as described; my additions and exceptions are noted in the chart    DATA:  EKG: pending at time of evaluation  LAB: Pending at time of evaluation    IMPRESSION/RISK:  Dx=     Consideration include  Plan The patient is a 64y Male who has a past medical and surgery history of HTN HLD Basal cell CA/sp excision  Prostate CA/TURP S/P inguinal hernia repair PTED with  diffuse pain that started 6 months ago has significantly worsening over the past 2 weeks now localizing to lower back No "red flags".  Patient is  Able to walk.  patient is oxycodone 5 every 4-6 hour.  Yesterday they started adding OxyContin 10 with little effect and patient developed N,V, inability to tolerate PO and No BM x 2 days    Vital Signs Last 24 Hrs  T(F): 97.8 HR: 60 BP: 180/89 RR: 16 SpO2: 95% (12 Oct 2024 09:21) (95% - 95%)  PE: as described; my additions and exceptions are noted in the chart    DATA:  EKG: pending at time of evaluation  LAB: Pending at time of evaluation    IMPRESSION/RISK:  Dx= Cancer pain     Consideration include Patient with recent imaging chief concern is pain control and to initiate therapy also concern is the side effects of opioids (constipation)  Plan  As above will  reach out to onc and admit

## 2024-10-12 NOTE — H&P ADULT - PROBLEM SELECTOR PLAN 3
Metastatic prostate cancer  - f/u heme/onc recommendations  - cont. Dex 8mg BID today, tomorrow and following day   - MRI L-spine to evaluate if any area for possible palliative RT. L5 lesions seen on imaging.   - Rad Onc eval   - Palliative for symptom control

## 2024-10-12 NOTE — H&P ADULT - PROBLEM SELECTOR PLAN 4
-platelet baseline ~70  -monitor, will need transfused for plt <10   - f/u heme/onc for further recommendations

## 2024-10-12 NOTE — H&P ADULT - ASSESSMENT
64-year-old male with metastatic prostate cancer, sent in by hematologist from [place] [facility] for uncontrolled pain, nausea, vomiting.

## 2024-10-12 NOTE — ED ADULT TRIAGE NOTE - CHIEF COMPLAINT QUOTE
sent by oncologist for pain medication ,nausea ,vomiting. . was to start chemo 10/14.  hx  prostate ca dx 15 yrsago- states  with stage 4 mets

## 2024-10-12 NOTE — ED PROVIDER NOTE - NS ED ROS FT
CONSTITUTIONAL: No fever or chill  HEENT: Denies changes in vision and hearing.  RESPIRATORY: Denies SOB and cough.  CV: Deneis CP.   GI: Denies abdominal pain, diarrhea. + N/V  : Denies dysuria and urinary frequency.  MSK: lower back pain  SKIN: Denies rash   NEUROLOGICAL: Denies headache and syncope.

## 2024-10-12 NOTE — ED PROVIDER NOTE - PHYSICAL EXAMINATION
GENERAL: Alert. No acute distress.   EYES: EOMI grossly normal. Anicteric.   HENT: Moist mucous membranes.   RESP: No conversation dyspnea, no resp distress, CTAB   CARDIOVASCULAR: RRR, no m/g/r. DP 2+  ABDOMEN: soft, non distended, nttp. hepatomegaly.   MSK: ROM grossly normal in all 4 extremities. No deformities  SKIN: warm and dry  NEUROLOGIC: Alert and oriented x3.   Sensation grossly intact of bilateral lower extremity.5/5   Dorsiflexion and plantarflexion.   PSYCHIATRIC: Cooperative. Appropriate mood and affect

## 2024-10-12 NOTE — CHART NOTE - NSCHARTNOTEFT_GEN_A_CORE
Paged by primary team regarding this Pt. for pain management.     Pt is a 63 YO male w/  HTN, HLD, prostate cancer(2009) w/ mets to spine, and liver. Pt follows w/ [place] [facility]. Send in by hematologist for uncontrolled lower back pain, iso nausea, vomiting resulting in inability to tolerate PO, and weakness resulting in inability to walk no FNDs.   I-stop reviewed ref # as below but no medications found. Per primary Pt is on PO oxycodone 5 every 4-6 hour. Yesterday they started adding OxyContin 10mg PO, did not experience any pain relief.  Last bowel movement 2 days ago.     I-STOP reviewed - Reference #: [id number]  Vitals/labs/Rad reviewed     Home pain regimen:   Oxycodone PO 5mg prn every 4-6 hrs.   OxyContin 10mg PO    Recommendations:     - pls hold for sedation, SBP <100, RR <10  - last BM per team was 10/10, standing bowel regimen while on opiates, senna and miralax bid, if no BM in 2 days 10mg dulcolax suppository     Thank you for allowing us to participate in your patient's care. We will continue to follow with you. In the event of worsening symptoms, please contact the Palliative Medicine team via pager (if the patient is at [facility] #[phone] or if the patient is at [facility] #[phone]) The Geriatric and Palliative Medicine service has coverage 24 hours a day/ 7 days a week to provide medical recommendations regarding symptom management needs via telephone.     Luisa Sullivan M.D.  Geriatrics and Palliative Care Fellow  Available via TEAMS Paged by primary team regarding this Pt. for pain management.     Pt is a 63 YO male w/  HTN, HLD, prostate cancer(2009) w/ mets to spine, and liver. Pt follows w/ [place] [facility]. Send in by hematologist for uncontrolled lower back pain, iso nausea, vomiting resulting in inability to tolerate PO, and weakness resulting in inability to walk no FNDs.   I-stop reviewed ref # as below but no medications found. Per primary Pt is on PO oxycodone 5 every 4-6 hour. Yesterday they started adding OxyContin 10mg PO, did not experience any pain relief.  Last bowel movement 2 days ago.     I-STOP reviewed - Reference #: [id number]  Vitals/labs/Rad reviewed     Home pain regimen:   Oxycodone PO 5mg prn every 4-6 hrs.   OxyContin 10mg PO    Recommendations:   - start IV morphine 3mg IV q4hrs prn for moderate pain, and 4mg q4hrs prn for severe pain   - Narcan PRN   - pls hold for sedation, SBP <100, RR <10  - last BM per team was 10/10, standing bowel regimen while on opiates, senna and miralax bid, if no BM in 2 days 10mg dulcolax suppository     Thank you for allowing us to participate in your patient's care. We will continue to follow with you. In the event of worsening symptoms, please contact the Palliative Medicine team via pager (if the patient is at [facility] #[phone] or if the patient is at [facility] #[phone]) The Geriatric and Palliative Medicine service has coverage 24 hours a day/ 7 days a week to provide medical recommendations regarding symptom management needs via telephone.     Luisa Sullivan M.D.  Geriatrics and Palliative Care Fellow  Available via TEAMS Paged by primary team regarding this Pt. for pain management.     Pt is a 65 YO male w/  HTN, HLD, prostate cancer(2009) w/ mets to spine, and liver. Pt follows w/ [place] [facility]. Send in by hematologist for uncontrolled lower back pain, iso nausea, vomiting resulting in inability to tolerate PO, and weakness resulting in inability to walk no FNDs.   I-stop reviewed ref # as below but no medications found. Per primary Pt is on PO oxycodone 5 every 4-6 hour. Yesterday they started adding OxyContin 10mg PO, did not experience any pain relief.  Last bowel movement 2 days ago.     I-STOP reviewed - Reference #: [id number]  Vitals/labs/Rad reviewed     Home pain regimen:   Oxycodone PO 5mg prn every 4-6 hrs.   OxyContin 10mg PO    Recommendations:   - start IV morphine 3mg IV q4hrs prn for moderate pain, and 4mg q4hrs prn for severe pain   - Narcan PRN   - pls hold for sedation, SBP <100, RR <10  - last BM per team was 10/10, standing bowel regimen while on opiates, senna and miralax bid, if no BM in 2 days 10mg dulcolax suppository     Thank you for allowing us to participate in your patient's care. We will continue to follow with you. In the event of worsening symptoms, please contact the Palliative Medicine team via pager (if the patient is at [facility] #[phone] or if the patient is at [facility] #[phone]) The Geriatric and Palliative Medicine service has coverage 24 hours a day/ 7 days a week to provide medical recommendations regarding symptom management needs via telephone.     Luisa Sullivan M.D.  Geriatrics and Palliative Care Fellow  Available via TEAMS    Note incomplete until attested by attending. Paged by primary team regarding this Pt. for pain management.     Pt is a 63 YO male w/  HTN, HLD, prostate cancer(2009) w/ mets to spine, and liver. Pt follows w/ Dr. Andrew Mendoza at [place] [facility]. Send in by hematologist for uncontrolled lower back pain, iso nausea, vomiting resulting in inability to tolerate PO, and weakness resulting in inability to walk no FNDs.   I-stop reviewed ref # as below but no medications found. Per primary Pt is on PO oxycodone 5 every 4-6 hour. Yesterday they started adding OxyContin 10mg PO, did not experience any pain relief.  Last bowel movement 2 days ago. Seen by house heme/onc recs appreciated, start steroids, chemo tomorrow, MRI spine and rad/onc referral.     I-STOP reviewed - Reference #: [id number]  Vitals/labs/Rad reviewed     #acute on chronic pain d/t metastatic malignancy   Home pain regimen:   Oxycodone PO 5mg prn every 4-6 hrs.   OxyContin 10mg PO    Recommendations:   - steroids per heme/onc  - start IV morphine 3mg IV q4hrs prn for moderate pain, and 4mg q4hrs prn for severe pain   - Narcan PRN   - pls hold for sedation, SBP <100, RR <10  - last BM per team was 10/10, standing bowel regimen while on opiates, senna and miralax bid, if no BM in 2 days 10mg dulcolax suppository     Thank you for allowing us to participate in your patient's care. We will continue to follow with you. In the event of worsening symptoms, please contact the Palliative Medicine team via pager (if the patient is at [facility] #[phone] or if the patient is at [facility] #[phone]) The Geriatric and Palliative Medicine service has coverage 24 hours a day/ 7 days a week to provide medical recommendations regarding symptom management needs via telephone.     Luisa Sullivan M.D.  Geriatrics and Palliative Care Fellow  Available via TEAMS    Note incomplete until attested by attending. Paged by primary team regarding this Pt. for pain management.     Pt is a 63 YO male w/  HTN, HLD, prostate cancer(2009) w/ mets to spine, and liver. Pt follows w/ Dr. Andrew Mendoza at [place] [facility]. Send in by hematologist for uncontrolled lower back pain, iso nausea, vomiting resulting in inability to tolerate PO, and weakness resulting in inability to walk no FNDs.   I-stop reviewed ref # as below no prescriptions found. Per primary Pt is on PO oxycodone 5mg every 4-6 hour. Yesterday started OxyContin 10mg PO, did not experience any pain relief.  Last bowel movement 2 days ago. Seen by house heme/onc recs appreciated, start steroids, chemo tomorrow, MRI spine and rad/onc referral.     I-STOP reviewed - Reference #: [id number]  Vitals/labs/Rad reviewed     #acute on chronic pain d/t metastatic malignancy   #Nausea/Vomiting    Recommendations:   - steroids per heme/onc 8mg dexamethasone bid   - start IV morphine 3mg IV q3hrs prn for moderate pain, and 4mg q3hrs prn for severe pain   - Narcan PRN   - pls hold for sedation, SBP <100, RR <10  - last BM per team was 10/10, standing bowel regimen while on opiates, senna and miralax bid if able to tolerate PO, if not 10mg dulcolax suppository prn daily   - obtain ekg if qtc <500ms ok to start zofran 4mg IV ATC q 8hrs     Thank you for allowing us to participate in your patient's care. We will continue to follow with you. In the event of worsening symptoms, please contact the Palliative Medicine team via pager (if the patient is at [facility] #[phone] or if the patient is at [facility] #[phone]) The Geriatric and Palliative Medicine service has coverage 24 hours a day/ 7 days a week to provide medical recommendations regarding symptom management needs via telephone.     Luisa Sullivan M.D.  Geriatrics and Palliative Care Fellow  Available via TEAMS    Case d/w Attending Dr. Encarnacion

## 2024-10-12 NOTE — ED ADULT NURSE NOTE - OBJECTIVE STATEMENT
pt has hx prostrate ca c/o pain allover and nausea iv started in left ac fluids hung as ordered/ meds given as ordered/ family at bedside

## 2024-10-12 NOTE — ED ADULT NURSE REASSESSMENT NOTE - NS ED NURSE REASSESS COMMENT FT1
report given to essu 1 potassium given to essu nurse  pt medicated for nausea pt transferred to essu

## 2024-10-12 NOTE — H&P ADULT - NSHPLABSRESULTS_GEN_ALL_CORE
LABS:                        13.4   7.14  )-----------( 71       ( 12 Oct 2024 10:45 )             39.0     10-12    141  |  104  |  26[H]  ----------------------------<  106[H]  3.4[L]   |  24  |  1.03    Ca    9.3      12 Oct 2024 10:45  Phos  3.1     10-12  Mg     2.20     10-12    TPro  6.8  /  Alb  4.4  /  TBili  1.0  /  DBili  x   /  AST  59[H]  /  ALT  9   /  AlkPhos  106  10-12    PT/INR - ( 12 Oct 2024 10:45 )   PT: 15.8 sec;   INR: 1.37 ratio         PTT - ( 12 Oct 2024 10:45 )  PTT:29.2 sec

## 2024-10-12 NOTE — CONSULT NOTE ADULT - SUBJECTIVE AND OBJECTIVE BOX
HPI:      PAST MEDICAL & SURGICAL HISTORY:  Benign essential HTN      HLD (hyperlipidemia)      CA of prostate      Basal cell carcinoma      History of transurethral prostatectomy      S/P inguinal hernia repair      History of basal cell carcinoma (BCC) excision          Allergies    No Known Allergies    Intolerances        MEDICATIONS  (STANDING):    MEDICATIONS  (PRN):      FAMILY HISTORY:  FH: HTN (hypertension) (Sibling)        SOCIAL HISTORY: No EtOH, no tobacco    REVIEW OF SYSTEMS:    CONSTITUTIONAL: No weakness, fevers or chills  EYES/ENT: No visual changes;  No vertigo or throat pain   NECK: No pain or stiffness  RESPIRATORY: No cough, wheezing, hemoptysis; No shortness of breath  CARDIOVASCULAR: No chest pain or palpitations  GASTROINTESTINAL: No abdominal or epigastric pain. No nausea, vomiting, or hematemesis; No diarrhea or constipation. No melena or hematochezia.  GENITOURINARY: No dysuria, frequency or hematuria  NEUROLOGICAL: No numbness or weakness  SKIN: No itching, burning, rashes, or lesions   All other review of systems is negative unless indicated above.    Height (cm): 172.7 (10-12 @ 09:21)  Weight (kg): 69.4 (10-12 @ 09:21)  BMI (kg/m2): 23.3 (10-12 @ 09:21)  BSA (m2): 1.82 (10-12 @ 09:21)    T(F): 97.8 (10-12-24 @ 09:21), Max: 97.8 (10-12-24 @ 09:21)  HR: 60 (10-12-24 @ 09:21)  BP: 180/89 (10-12-24 @ 09:21)  RR: 16 (10-12-24 @ 09:21)  SpO2: 95% (10-12-24 @ 09:21)  Wt(kg): --    GENERAL: NAD, well-developed  HEAD:  Atraumatic, Normocephalic  EYES: EOMI, PERRLA, conjunctiva and sclera clear  NECK: Supple, No JVD  CHEST/LUNG: Clear to auscultation bilaterally; No wheeze  HEART: Regular rate and rhythm; No murmurs, rubs, or gallops  ABDOMEN: Soft, Nontender, Nondistended; Bowel sounds present  EXTREMITIES:  2+ Peripheral Pulses, No clubbing, cyanosis, or edema  NEUROLOGY: non-focal  SKIN: No rashes or lesions                          13.4   x     )-----------( x        ( 12 Oct 2024 10:45 )             39.0       10-12    141  |  104  |  26[H]  ----------------------------<  106[H]  3.4[L]   |  24  |  1.03    Ca    9.3      12 Oct 2024 10:45  Phos  3.1     10-12  Mg     2.20     10-12    TPro  6.8  /  Alb  4.4  /  TBili  1.0  /  DBili  x   /  AST  59[H]  /  ALT  9   /  AlkPhos  106  10-12      Magnesium: 2.20 mg/dL (10-12 @ 10:45)  Phosphorus: 3.1 mg/dL (10-12 @ 10:45)      PT/INR - ( 12 Oct 2024 10:45 )   PT: 15.8 sec;   INR: 1.37 ratio         PTT - ( 12 Oct 2024 10:45 )  PTT:29.2 sec     HPI:  64-year-old male with metastatic prostate cancer, sent in by hematologist from [facility] for uncontrolled pain, nausea, vomiting.   Patient reports diffuse pain starting 6 months ago significantly worsening for the past 2 weeks.  Currently the worst pain is located around the lower back.   No loss of bladder and bowel function, no saddle paresthesia.  Able to walk.  patient is oxycodone 5 every 4-6 hour.  Yesterday they started adding OxyContin 10.  However patient continued to have pain.  Family also reports significant nausea and vomiting, inability to tolerate p.o. for the last 2 days.  Last dose of Zofran at 7 AM this morning.   Last bowel movement 2 days ago.   No known allergy.    Diagnosed with high risk high volume metastatic prostate cancer with bone, liver lymph node mets and presacral mass. Liver biopsy confirmed disease with . Started lupron, loly/pred with plans to initiate taxotere.     PAST MEDICAL & SURGICAL HISTORY:  Benign essential HTN      HLD (hyperlipidemia)      CA of prostate      Basal cell carcinoma      History of transurethral prostatectomy      S/P inguinal hernia repair      History of basal cell carcinoma (BCC) excision          Allergies    No Known Allergies    Intolerances        MEDICATIONS  (STANDING):    MEDICATIONS  (PRN):      FAMILY HISTORY:  FH: HTN (hypertension) (Sibling)        SOCIAL HISTORY: No EtOH, no tobacco    REVIEW OF SYSTEMS:    CONSTITUTIONAL: No weakness, fevers or chills  EYES/ENT: No visual changes;  No vertigo or throat pain   NECK: No pain or stiffness  RESPIRATORY: No cough, wheezing, hemoptysis; No shortness of breath  CARDIOVASCULAR: No chest pain or palpitations  GASTROINTESTINAL: No abdominal or epigastric pain. No nausea, vomiting, or hematemesis; No diarrhea or constipation. No melena or hematochezia.  GENITOURINARY: No dysuria, frequency or hematuria  NEUROLOGICAL: No numbness or weakness  SKIN: No itching, burning, rashes, or lesions   All other review of systems is negative unless indicated above.    Height (cm): 172.7 (10-12 @ 09:21)  Weight (kg): 69.4 (10-12 @ 09:21)  BMI (kg/m2): 23.3 (10-12 @ 09:21)  BSA (m2): 1.82 (10-12 @ 09:21)    T(F): 97.8 (10-12-24 @ 09:21), Max: 97.8 (10-12-24 @ 09:21)  HR: 60 (10-12-24 @ 09:21)  BP: 180/89 (10-12-24 @ 09:21)  RR: 16 (10-12-24 @ 09:21)  SpO2: 95% (10-12-24 @ 09:21)  Wt(kg): --    GENERAL: NAD, well-developed  HEAD:  Atraumatic, Normocephalic  EYES: EOMI, PERRLA, conjunctiva and sclera clear  NECK: Supple, No JVD  CHEST/LUNG: Clear to auscultation bilaterally; No wheeze  HEART: Regular rate and rhythm; No murmurs, rubs, or gallops  ABDOMEN: Soft, Nontender, Nondistended; Bowel sounds present  EXTREMITIES:  2+ Peripheral Pulses, No clubbing, cyanosis, or edema  NEUROLOGY: non-focal  SKIN: No rashes or lesions                          13.4   x     )-----------( x        ( 12 Oct 2024 10:45 )             39.0       10-12    141  |  104  |  26[H]  ----------------------------<  106[H]  3.4[L]   |  24  |  1.03    Ca    9.3      12 Oct 2024 10:45  Phos  3.1     10-12  Mg     2.20     10-12    TPro  6.8  /  Alb  4.4  /  TBili  1.0  /  DBili  x   /  AST  59[H]  /  ALT  9   /  AlkPhos  106  10-12      Magnesium: 2.20 mg/dL (10-12 @ 10:45)  Phosphorus: 3.1 mg/dL (10-12 @ 10:45)      PT/INR - ( 12 Oct 2024 10:45 )   PT: 15.8 sec;   INR: 1.37 ratio         PTT - ( 12 Oct 2024 10:45 )  PTT:29.2 sec

## 2024-10-12 NOTE — H&P ADULT - HISTORY OF PRESENT ILLNESS
64-year-old male with metastatic prostate cancer, sent in by hematologist from [facility] for uncontrolled pain, nausea, vomiting.   Patient reports diffuse pain starting 6 months ago significantly worsening for the past 2 weeks.  Currently the worst pain is located around the lower back.   No loss of bladder and bowel function, no saddle paresthesia.  Able to walk.  patient is oxycodone 5 every 4-6 hour.  Yesterday they started adding OxyContin 10.  However patient continued to have pain.  Family also reports significant nausea and vomiting, inability to tolerate p.o. for the last 2 days.  Last bowel movement 2 days ago.   No known allergy.    Diagnosed with high risk high volume metastatic prostate cancer with bone, liver lymph node mets and presacral mass. Liver biopsy confirmed disease with . Started lupron, loly/pred with plans to initiate taxotere.

## 2024-10-12 NOTE — ED PROVIDER NOTE - CLINICAL SUMMARY MEDICAL DECISION MAKING FREE TEXT BOX
64-year-old male with metastatic prostate cancer, sent in by hematologist from [facility] for uncontrolled pain. No recent scans show metastasis in the spine liver multiple other places.  Suspect today presentation is likely from metastatic cancer.  Patient is scheduled to start chemo on Monday.  Will sx management. will call [facility].  patient have recent scan, does not feel necessary to repeat scan at this juncture.  If symptom continues to be uncontrolled, patient would likely be admitted.

## 2024-10-12 NOTE — CONSULT NOTE ADULT - ASSESSMENT
1. Metastatic prostate cancer   - plan for taxotere 60mg/m2 to start tomorrow with emend, aloxi, benadryl and dex premeds    - please give Dex 8mg BID today, tomorrow and following day   - MRI L-spine   - Rad Onc eval   - Palliative for symptom control         Please admit to Dr Gio Valle     NOTE INCOMPLETE 1. Metastatic prostate cancer  - initially diagnosed 15 years ago with low risk prostate cancer s/p radical prostatectomy, negative LNs and did not require RT or adjuvant ADT.   - presented outpatient with imaging showing a multiloculated presacral soft tissue mass 5.1 x 3.9 x 4.1cm, RP LAD, numeour low density liver lesions c/w met disease, lucencies in several vertebral bodies, manubrium.   - PSMA 10/11/24 showing Hypermetabolic vera foci above and below the diaphragm, foci in the liver and extensive foci involving the  axial and appendicular skeleton compatible with metastatic disease  - --> 374--> 426 on 10/8/24   - Liver biopsy consistent with adenocarcinoma favoring prostate  -  he has high risk high volume disease and plan to start triplet therapy with ADT/lupron, abiaterone/prednisone with taxotere. He had back pain after Lupron likely tumor flare and instructed to take steroids. Abby/Pred should have started and can continue Abiaterone 1000mg daily with prednisone 5mg BID. While receiving Decadron for chemo as below can hold the prednisone but should resume after the 3 days of decadron    - plan for taxotere 60mg/m2 to start tomorrow with emend, aloxi, benadryl and dex premeds    - please give Dex 8mg BID today, tomorrow and following day   - MRI L-spine to evaluate if any area for possible palliative RT. L5 lesions seen on imaging.   - Rad Onc eval   - Palliative for symptom control     2. Thrombocytopenia   - possible ITP component   - baseline in the 70s  - dose reduced taxotere to 60mg/m2 given baseline thrombocytopenia   - monitor, will need transfused for plt <10   - expect further decline with chemo. Steroids may help with ITP component but also may need high dose. Nplate can also be considered       Please admit to Dr Gio Tate MD   Hematology/Oncology   [facility]   [phone] 1. Metastatic prostate cancer  - follows with Dr Andrew Mendoza at [facility]   - initially diagnosed 15 years ago with low risk prostate cancer s/p radical prostatectomy, negative LNs and did not require RT or adjuvant ADT.   - presented outpatient with imaging showing a multiloculated presacral soft tissue mass 5.1 x 3.9 x 4.1cm, RP LAD, numeour low density liver lesions c/w met disease, lucencies in several vertebral bodies, manubrium.   - PSMA 10/11/24 showing Hypermetabolic vera foci above and below the diaphragm, foci in the liver and extensive foci involving the  axial and appendicular skeleton compatible with metastatic disease  - --> 374--> 426 on 10/8/24   - Liver biopsy consistent with adenocarcinoma favoring prostate  -  he has high risk high volume disease and plan to start triplet therapy with ADT/lupron, abiaterone/prednisone with taxotere. He had back pain after Lupron likely tumor flare and instructed to take steroids. Abby/Pred should have started and can continue Abiaterone 1000mg daily with prednisone 5mg BID. While receiving Decadron for chemo as below can hold the prednisone but should resume after the 3 days of decadron    - plan for taxotere 60mg/m2 to start tomorrow with emend, aloxi, benadryl and dex premeds    - please give Dex 8mg BID today, tomorrow and following day   - MRI L-spine to evaluate if any area for possible palliative RT. L5 lesions seen on imaging.   - Rad Onc eval   - Palliative for symptom control     2. Thrombocytopenia   - possible ITP component   - baseline in the 70s  - dose reduced taxotere to 60mg/m2 given baseline thrombocytopenia   - monitor, will need transfused for plt <10   - expect further decline with chemo. Steroids may help with ITP component but also may need high dose. Nplate can also be considered       Please admit to Dr Gio Tate MD   Hematology/Oncology   [facility]   [phone]

## 2024-10-12 NOTE — PHARMACOTHERAPY INTERVENTION NOTE - COMMENTS
Medication history is incomplete. Unable to verify patient's medication list with two sources. Discrepancies noted in provider handoff. Please call spectra [phone] if you have any questions.

## 2024-10-12 NOTE — H&P ADULT - NSHPPHYSICALEXAM_GEN_ALL_CORE
Vital Signs Last 24 Hrs  T(C): 36.7 (12 Oct 2024 15:00), Max: 36.7 (12 Oct 2024 15:00)  T(F): 98 (12 Oct 2024 15:00), Max: 98 (12 Oct 2024 15:00)  HR: 72 (12 Oct 2024 15:00) (59 - 72)  BP: 168/102 (12 Oct 2024 15:00) (168/102 - 180/89)  BP(mean): --  RR: 19 (12 Oct 2024 15:00) (16 - 19)  SpO2: 98% (12 Oct 2024 15:00) (95% - 98%)    Parameters below as of 12 Oct 2024 15:00  Patient On (Oxygen Delivery Method): room air    CONSTITUTIONAL: NAD, well-groomed  EYES: PERRLA; conjunctiva and sclera clear  ENMT: Moist oral mucosa, no pharyngeal injection or exudates; normal dentition  NECK: Supple, no palpable masses; no thyromegaly  RESPIRATORY: Normal respiratory effort; lungs are clear to auscultation bilaterally  CARDIOVASCULAR: Regular rate and rhythm, normal S1 and S2, no murmur/rub/gallop; No lower extremity edema; Peripheral pulses are 2+ bilaterally  ABDOMEN: Nontender to palpation, normoactive bowel sounds, no rebound/guarding; No hepatosplenomegaly  MUSCULOSKELETAL:  Normal gait; no clubbing or cyanosis of digits; no joint swelling  PSYCH: A+O to person, place, and time; affect appropriate  NEUROLOGY: CN 2-12 are intact and symmetric; no gross sensory deficits   SKIN: No rashes; no palpable lesions

## 2024-10-12 NOTE — H&P ADULT - PROBLEM SELECTOR PLAN 1
- [facility] care recs - appreciate pall care recommendations:  - start morphine 3mg IV q3hrs prn for moderate pain  - start morphine 4mg IV q3hrs prn for severe pain

## 2024-10-12 NOTE — H&P ADULT - PROBLEM SELECTOR PLAN 2
- zofran 4mg IV q 4hrs prn  - maintenance fluids while unable to tolerate po - zofran 4mg IV q 8hrs standing  - obtain ECG, ensure QTc <500 prior to administering zofran  - maintenance fluids while unable to tolerate po

## 2024-10-13 LAB
ALBUMIN SERPL ELPH-MCNC: 4.4 G/DL — SIGNIFICANT CHANGE UP (ref 3.3–5)
ALP SERPL-CCNC: 121 U/L — HIGH (ref 40–120)
ALT FLD-CCNC: 12 U/L — SIGNIFICANT CHANGE UP (ref 4–41)
ANION GAP SERPL CALC-SCNC: 19 MMOL/L — HIGH (ref 7–14)
AST SERPL-CCNC: 67 U/L — HIGH (ref 4–40)
BASOPHILS # BLD AUTO: 0.06 K/UL — SIGNIFICANT CHANGE UP (ref 0–0.2)
BASOPHILS NFR BLD AUTO: 0.7 % — SIGNIFICANT CHANGE UP (ref 0–2)
BILIRUB SERPL-MCNC: 1.5 MG/DL — HIGH (ref 0.2–1.2)
BUN SERPL-MCNC: 23 MG/DL — SIGNIFICANT CHANGE UP (ref 7–23)
CALCIUM SERPL-MCNC: 9.2 MG/DL — SIGNIFICANT CHANGE UP (ref 8.4–10.5)
CHLORIDE SERPL-SCNC: 101 MMOL/L — SIGNIFICANT CHANGE UP (ref 98–107)
CHOLEST SERPL-MCNC: 135 MG/DL — SIGNIFICANT CHANGE UP
CO2 SERPL-SCNC: 20 MMOL/L — LOW (ref 22–31)
CREAT SERPL-MCNC: 0.89 MG/DL — SIGNIFICANT CHANGE UP (ref 0.5–1.3)
EGFR: 95 ML/MIN/1.73M2 — SIGNIFICANT CHANGE UP
EOSINOPHIL # BLD AUTO: 0 K/UL — SIGNIFICANT CHANGE UP (ref 0–0.5)
EOSINOPHIL NFR BLD AUTO: 0 % — SIGNIFICANT CHANGE UP (ref 0–6)
GLUCOSE BLDC GLUCOMTR-MCNC: 142 MG/DL — HIGH (ref 70–99)
GLUCOSE BLDC GLUCOMTR-MCNC: 168 MG/DL — HIGH (ref 70–99)
GLUCOSE SERPL-MCNC: 119 MG/DL — HIGH (ref 70–99)
HCT VFR BLD CALC: 39.8 % — SIGNIFICANT CHANGE UP (ref 39–50)
HDLC SERPL-MCNC: 39 MG/DL — LOW
HGB BLD-MCNC: 14.1 G/DL — SIGNIFICANT CHANGE UP (ref 13–17)
IANC: 5.6 K/UL — SIGNIFICANT CHANGE UP (ref 1.8–7.4)
IMM GRANULOCYTES NFR BLD AUTO: 4.4 % — HIGH (ref 0–0.9)
LIPID PNL WITH DIRECT LDL SERPL: 53 MG/DL — SIGNIFICANT CHANGE UP
LYMPHOCYTES # BLD AUTO: 1.47 K/UL — SIGNIFICANT CHANGE UP (ref 1–3.3)
LYMPHOCYTES # BLD AUTO: 18.1 % — SIGNIFICANT CHANGE UP (ref 13–44)
MAGNESIUM SERPL-MCNC: 2.1 MG/DL — SIGNIFICANT CHANGE UP (ref 1.6–2.6)
MCHC RBC-ENTMCNC: 31.2 PG — SIGNIFICANT CHANGE UP (ref 27–34)
MCHC RBC-ENTMCNC: 35.4 GM/DL — SIGNIFICANT CHANGE UP (ref 32–36)
MCV RBC AUTO: 88.1 FL — SIGNIFICANT CHANGE UP (ref 80–100)
MONOCYTES # BLD AUTO: 0.61 K/UL — SIGNIFICANT CHANGE UP (ref 0–0.9)
MONOCYTES NFR BLD AUTO: 7.5 % — SIGNIFICANT CHANGE UP (ref 2–14)
NEUTROPHILS # BLD AUTO: 5.6 K/UL — SIGNIFICANT CHANGE UP (ref 1.8–7.4)
NEUTROPHILS NFR BLD AUTO: 69.3 % — SIGNIFICANT CHANGE UP (ref 43–77)
NON HDL CHOLESTEROL: 96 MG/DL — SIGNIFICANT CHANGE UP
NRBC # BLD: 2 /100 WBCS — HIGH (ref 0–0)
NRBC # FLD: 0.14 K/UL — HIGH (ref 0–0)
PHOSPHATE SERPL-MCNC: 3.6 MG/DL — SIGNIFICANT CHANGE UP (ref 2.5–4.5)
PLATELET # BLD AUTO: 78 K/UL — LOW (ref 150–400)
POTASSIUM SERPL-MCNC: 3.5 MMOL/L — SIGNIFICANT CHANGE UP (ref 3.5–5.3)
POTASSIUM SERPL-SCNC: 3.5 MMOL/L — SIGNIFICANT CHANGE UP (ref 3.5–5.3)
PROT SERPL-MCNC: 7.2 G/DL — SIGNIFICANT CHANGE UP (ref 6–8.3)
RBC # BLD: 4.52 M/UL — SIGNIFICANT CHANGE UP (ref 4.2–5.8)
RBC # FLD: 13.8 % — SIGNIFICANT CHANGE UP (ref 10.3–14.5)
SODIUM SERPL-SCNC: 140 MMOL/L — SIGNIFICANT CHANGE UP (ref 135–145)
TRIGL SERPL-MCNC: 217 MG/DL — HIGH
TSH SERPL-MCNC: 2.62 UIU/ML — SIGNIFICANT CHANGE UP (ref 0.27–4.2)
WBC # BLD: 8.1 K/UL — SIGNIFICANT CHANGE UP (ref 3.8–10.5)
WBC # FLD AUTO: 8.1 K/UL — SIGNIFICANT CHANGE UP (ref 3.8–10.5)

## 2024-10-13 RX ORDER — HYDROMORPHONE HYDROCHLORIDE 2 MG/1
0.5 TABLET ORAL
Refills: 0 | Status: DISCONTINUED | OUTPATIENT
Start: 2024-10-13 | End: 2024-10-14

## 2024-10-13 RX ORDER — DOCETAXEL 160 MG/8ML
110 INJECTION, SOLUTION INTRAVENOUS ONCE
Refills: 0 | Status: COMPLETED | OUTPATIENT
Start: 2024-10-13 | End: 2024-10-13

## 2024-10-13 RX ORDER — OXYCODONE HYDROCHLORIDE 30 MG/1
20 TABLET ORAL ONCE
Refills: 0 | Status: DISCONTINUED | OUTPATIENT
Start: 2024-10-13 | End: 2024-10-13

## 2024-10-13 RX ORDER — HYDROMORPHONE HYDROCHLORIDE 2 MG/1
2 TABLET ORAL ONCE
Refills: 0 | Status: DISCONTINUED | OUTPATIENT
Start: 2024-10-13 | End: 2024-10-13

## 2024-10-13 RX ORDER — CHLORHEXIDINE GLUCONATE 1.2 MG/ML
1 RINSE ORAL DAILY
Refills: 0 | Status: DISCONTINUED | OUTPATIENT
Start: 2024-10-13 | End: 2024-11-18

## 2024-10-13 RX ORDER — HYDROMORPHONE HYDROCHLORIDE 2 MG/1
1 TABLET ORAL
Refills: 0 | Status: DISCONTINUED | OUTPATIENT
Start: 2024-10-13 | End: 2024-10-14

## 2024-10-13 RX ORDER — OXYCODONE HYDROCHLORIDE 30 MG/1
20 TABLET ORAL EVERY 12 HOURS
Refills: 0 | Status: DISCONTINUED | OUTPATIENT
Start: 2024-10-13 | End: 2024-10-14

## 2024-10-13 RX ORDER — PALONOSETRON HYDROCHLORIDE 0.25 MG/5ML
0.25 INJECTION INTRAVENOUS ONCE
Refills: 0 | Status: COMPLETED | OUTPATIENT
Start: 2024-10-13 | End: 2024-10-13

## 2024-10-13 RX ORDER — FOSAPREPITANT 150 MG/5ML
150 INJECTION, POWDER, LYOPHILIZED, FOR SOLUTION INTRAVENOUS ONCE
Refills: 0 | Status: COMPLETED | OUTPATIENT
Start: 2024-10-13 | End: 2024-10-13

## 2024-10-13 RX ORDER — FENOFIBRATE,MICRONIZED 134 MG
145 CAPSULE ORAL DAILY
Refills: 0 | Status: DISCONTINUED | OUTPATIENT
Start: 2024-10-14 | End: 2024-11-18

## 2024-10-13 RX ORDER — DIPHENHYDRAMINE HCL 25 MG
25 CAPSULE ORAL ONCE
Refills: 0 | Status: COMPLETED | OUTPATIENT
Start: 2024-10-13 | End: 2024-10-13

## 2024-10-13 RX ORDER — ACETAMINOPHEN, DIPHENHYDRAMINE HCL, PHENYLEPHRINE HCL 325; 25; 5 MG/1; MG/1; MG/1
3 TABLET ORAL ONCE
Refills: 0 | Status: COMPLETED | OUTPATIENT
Start: 2024-10-13 | End: 2024-10-13

## 2024-10-13 RX ORDER — DEXAMETHASONE 1.5 MG/1
20 TABLET ORAL ONCE
Refills: 0 | Status: COMPLETED | OUTPATIENT
Start: 2024-10-13 | End: 2024-10-13

## 2024-10-13 RX ADMIN — OXYCODONE HYDROCHLORIDE 20 MILLIGRAM(S): 30 TABLET ORAL at 12:03

## 2024-10-13 RX ADMIN — HYDROMORPHONE HYDROCHLORIDE 1 MILLIGRAM(S): 2 TABLET ORAL at 17:51

## 2024-10-13 RX ADMIN — Medication 2 TABLET(S): at 17:57

## 2024-10-13 RX ADMIN — ONDANSETRON HYDROCHLORIDE 4 MILLIGRAM(S): 4 TABLET, FILM COATED ORAL at 05:42

## 2024-10-13 RX ADMIN — Medication 145 MILLIGRAM(S): at 05:41

## 2024-10-13 RX ADMIN — DEXAMETHASONE 8 MILLIGRAM(S): 1.5 TABLET ORAL at 17:55

## 2024-10-13 RX ADMIN — FOSAPREPITANT 300 MILLIGRAM(S): 150 INJECTION, POWDER, LYOPHILIZED, FOR SOLUTION INTRAVENOUS at 11:51

## 2024-10-13 RX ADMIN — OXYCODONE HYDROCHLORIDE 20 MILLIGRAM(S): 30 TABLET ORAL at 22:24

## 2024-10-13 RX ADMIN — Medication 2 TABLET(S): at 05:41

## 2024-10-13 RX ADMIN — Medication 20 MILLIGRAM(S): at 05:40

## 2024-10-13 RX ADMIN — Medication 4 MILLIGRAM(S): at 10:13

## 2024-10-13 RX ADMIN — ONDANSETRON HYDROCHLORIDE 4 MILLIGRAM(S): 4 TABLET, FILM COATED ORAL at 22:24

## 2024-10-13 RX ADMIN — HYDROMORPHONE HYDROCHLORIDE 1 MILLIGRAM(S): 2 TABLET ORAL at 14:16

## 2024-10-13 RX ADMIN — Medication 25 MILLIGRAM(S): at 11:19

## 2024-10-13 RX ADMIN — DOCETAXEL 110 MILLIGRAM(S): 160 INJECTION, SOLUTION INTRAVENOUS at 12:46

## 2024-10-13 RX ADMIN — PALONOSETRON HYDROCHLORIDE 0.25 MILLIGRAM(S): 0.25 INJECTION INTRAVENOUS at 11:19

## 2024-10-13 RX ADMIN — HYDROMORPHONE HYDROCHLORIDE 1 MILLIGRAM(S): 2 TABLET ORAL at 18:21

## 2024-10-13 RX ADMIN — OXYCODONE HYDROCHLORIDE 5 MILLIGRAM(S): 30 TABLET ORAL at 05:40

## 2024-10-13 RX ADMIN — Medication 4 MILLIGRAM(S): at 03:52

## 2024-10-13 RX ADMIN — Medication 4 MILLIGRAM(S): at 04:22

## 2024-10-13 RX ADMIN — HYDROMORPHONE HYDROCHLORIDE 1 MILLIGRAM(S): 2 TABLET ORAL at 14:46

## 2024-10-13 RX ADMIN — DEXAMETHASONE 8 MILLIGRAM(S): 1.5 TABLET ORAL at 05:59

## 2024-10-13 RX ADMIN — ONDANSETRON HYDROCHLORIDE 4 MILLIGRAM(S): 4 TABLET, FILM COATED ORAL at 14:19

## 2024-10-13 RX ADMIN — OXYCODONE HYDROCHLORIDE 20 MILLIGRAM(S): 30 TABLET ORAL at 11:33

## 2024-10-13 RX ADMIN — LISINOPRIL 40 MILLIGRAM(S): 20 TABLET ORAL at 05:41

## 2024-10-13 RX ADMIN — LIDOCAINE 1 PATCH: 40 CREAM TOPICAL at 19:23

## 2024-10-13 RX ADMIN — DEXAMETHASONE 110 MILLIGRAM(S): 1.5 TABLET ORAL at 11:19

## 2024-10-13 RX ADMIN — OXYCODONE HYDROCHLORIDE 5 MILLIGRAM(S): 30 TABLET ORAL at 06:10

## 2024-10-13 RX ADMIN — CHLORHEXIDINE GLUCONATE 1 APPLICATION(S): 1.2 RINSE ORAL at 14:29

## 2024-10-13 RX ADMIN — ENOXAPARIN SODIUM 40 MILLIGRAM(S): 30 INJECTION SUBCUTANEOUS at 14:24

## 2024-10-13 NOTE — PATIENT PROFILE ADULT - NSPROHMSYMPCOND_GEN_A_NUR
[Routine On-Treatment] : a routine on-treatment visit for [Lung Cancer] : lung cancer [Family Member] : family member cancer/hematologic

## 2024-10-13 NOTE — PROVIDER CONTACT NOTE (OTHER) - SITUATION
Patient noted disoriented to location, when asked where he is states "[facility]", self aware, reports feeling confused

## 2024-10-13 NOTE — CHART NOTE - NSCHARTNOTEFT_GEN_A_CORE
Pt with uncontrolled pain with current regimen  Would recommend:  - Continue with Oxycontin 20mg PO q12hrs (increased today by primary team, appropriate increase based on PRN usage - 24mg of additional oxy based on PRNs)  - D/c all PO PRN oxycodone  - D/c IV morphine  - Rotate Morphine to IV Dilaudid: 0.5mg IV q3hrs PRN for moderate pain, 1mg IV q3hrs PRN for severe pain  - Bowel regimen to prevent opioid induced constipation  - IF pain not managed with these recommendations please page back, would consider starting PCA. (pager [phone])

## 2024-10-13 NOTE — PATIENT PROFILE ADULT - NSPROMEDSDISPOSITION_GEN_A_NUR
bedside/sent to pharmacy for id and relabeling
lab results/return to ED if symptoms worsen, persist or questions arise/need for outpatient follow-up/radiology results

## 2024-10-13 NOTE — PROGRESS NOTE ADULT - ASSESSMENT
{\rtf1\nnvlyh65595\ansi\cdvomfw3104\ftnbj\uc1\deff0  {\fonttbl{\f0 \fnil Segoe UI;}{\f1 \fnil \fcharset0 Segoe UI;}{\f2 \fnil Times New Nikhil;}}  {\colortbl ;\imm825\cehjd286\agym239 ;\red0\green0\blue0 ;\red0\green0\bgoy375 ;\red0\green0\blue0 ;}  {\stylesheet{\f0\fs20 Normal;}{\cs1 Default Paragraph Font;}{\cs2\f0\fs16 Line Number;}{\cs3\f2\fs24\ul\cf3 Hyperlink;}}  {\*\revtbl{Unknown;}}  \vjzrfc56744\audwkf40613\ouwmg5657\vvhev0047\bsnrk1898\ctflz5330\nchphnu983\nskjnur166\nogrowautofit\ltchkl902\formshade\nofeaturethrottle1\dntblnsbdb\fet4\aendnotes\aftnnrlc\pgbrdrhead\pgbrdrfoot  \sectd\qauiwz26703\yeisig12603\guttersxn0\fmhumccv3171\sminodck3625\hjfbaglp3505\anuzibsb6881\owtcsaw313\xpsibxs022\sbkpage\pgncont\pgndec  \plain\plain\f0\fs24\ql\plain\f0\fs24\plain\f1\fs16\gwpe5314\hich\f1\dbch\f1\loch\f1\cf2\fs16 64-year-old male with metastatic prostate cancer, sent in by hematologist from [facility] for uncontrolled pain, nausea, vomiting. \par  \par  \par  \plain\f1\fs16\sjhi5100\hich\f1\dbch\f1\loch\f1\cf2\fs16\b\ul{\field{\*\fldinst HYPERLINK [id number] }{\fldrslt Problem/Plan - 1:}}\plain\f1\fs16\nteg0744\hich\f1\dbch\f1\loch\f1\cf2\fs16\ql\par  \'b7  {\*\bkmkstart qt06714731021}{\*\bkmkend th99503578685}Problem: {\*\bkmkstart lz34964991864}{\*\bkmkend da91089190256}Cancer related pain. \par  \'b7  {\*\bkmkstart zd67390972180}{\*\bkmkend zg93410569322}Plan: {\*\bkmkstart yt09180325661}{\*\bkmkend lm85817448855}- appreciate pall care recommendations:\par  - pain control\par  - pain consult in am \par  - cw decadron \par  \par  \plain\f1\fs16\liuu2531\hich\f1\dbch\f1\loch\f1\cf2\fs16\b\ul{\field{\*\fldinst HYPERLINK [id number] }{\fldrslt Problem/Plan - 2:}}\plain\f1\fs16\isul9801\hich\f1\dbch\f1\loch\f1\cf2\fs16\ql\par  \'b7  {\*\bkmkstart so45048450260}{\*\bkmkend qp81573589350}Problem: {\*\bkmkstart lx85785136079}{\*\bkmkend cf88388561475}Nausea & vomiting. \par  \'b7  {\*\bkmkstart cl50482109560}{\*\bkmkend ta07266275991}Plan: {\*\bkmkstart wn56355197262}{\*\bkmkend do77506198160}- zofran 4mg IV q 8hrs standing\par  - obtain ECG, ensure QTc <500 prior to administering zofran\par  - maintenance fluids while unable to tolerate po.\plain\f1\fs16\rpct2946\hich\f1\dbch\f1\loch\f1\cf2\fs16\strike\plain\f1\fs16\rkih5098\hich\f1\dbch\f1\loch\f1\cf2\fs16\par  \par  \plain\f1\fs16\qozk0544\hich\f1\dbch\f1\loch\f1\cf2\fs16\b\ul{\field{\*\fldinst HYPERLINK [id number] }{\fldrslt Problem/Plan - 3:}}\plain\f1\fs16\ojqs9376\hich\f1\dbch\f1\loch\f1\cf2\fs16\ql\par  \'b7  {\*\bkmkstart yj68746728100}{\*\bkmkend fv48013291108}Problem: {\*\bkmkstart fb17557607187}{\*\bkmkend ei32305832787}CA of prostate. \par  \'b7  {\*\bkmkstart hc10817746852}{\*\bkmkend vo10051496800}Plan: {\*\bkmkstart wy33489245728}{\*\bkmkend jd33254863517}Metastatic prostate cancer\par  - f/u heme/onc recommendations\par  - cont. Dex 8mg BID today, tomorrow and following day \par  - MRI L-spine to evaluate if any area for possible palliative RT. L5 lesions seen on imaging. \par  - Rad Onc eval \par  - Palliative for symptom control.\par  \par  \plain\f1\fs16\dpzd7421\hich\f1\dbch\f1\loch\f1\cf2\fs16\b\ul{\field{\*\fldinst HYPERLINK [id number] }{\fldrslt Problem/Plan - 4:}}\plain\f1\fs16\wcqc4826\hich\f1\dbch\f1\loch\f1\cf2\fs16\ql\par  \'b7  {\*\bkmkstart to13152136818}{\*\bkmkend vq14496522992}Problem: {\*\bkmkstart zc95814369088}{\*\bkmkend wb27741875086}Thrombocytopenia, unspecified. \par  \'b7  {\*\bkmkstart co32925808457}{\*\bkmkend mh82843944365}Plan: {\*\bkmkstart wx69164145606}{\*\bkmkend wg70093076672}-platelet baseline ~70\par  -monitor, will need transfused for plt <10 \par  - f/u heme/onc for further recommendations.\par  \par  \plain\f1\fs16\vuew5254\hich\f1\dbch\f1\loch\f1\cf2\fs16\b\ul{\field{\*\fldinst HYPERLINK [id number] }{\fldrslt Problem/Plan - 5:}}\plain\f1\fs16\lzdl2735\hich\f1\dbch\f1\loch\f1\cf2\fs16\ql\par  \'b7  {\*\bkmkstart di94535895251}{\*\bkmkend zn29109757616}Problem: {\*\bkmkstart ew23573935963}{\*\bkmkend tb26350973801}Benign essential HTN. \par  \'b7  {\*\bkmkstart kg92473146226}{\*\bkmkend ak91419204310}Plan: {\*\bkmkstart wp32511479284}{\*\bkmkend fa71400134386}- cont. home lisinopril 40mg.\par  \par  \plain\f1\fs16\poos6341\hich\f1\dbch\f1\loch\f1\cf2\fs16\b\ul{\field{\*\fldinst HYPERLINK [id number] }{\fldrslt Problem/Plan - 6:}}\plain\f1\fs16\bzxk1201\hich\f1\dbch\f1\loch\f1\cf2\fs16\ql\par  \'b7  {\*\bkmkstart rb27787802685}{\*\bkmkend fj94412965013}Problem: {\*\bkmkstart ib39063444565}{\*\bkmkend cn72038190392}Hyperlipidemia. \par  \'b7  {\*\bkmkstart cf71027015494}{\*\bkmkend gx02393579920}Plan: {\*\bkmkstart lu77717117395}{\*\bkmkend dg50431189650}- cont. home atorvastatin 20 and fenofibrate.\par  \par  \plain\f1\fs16\qvgi3888\hich\f1\dbch\f1\loch\f1\cf2\fs16\b\ul{\field{\*\fldinst HYPERLINK [id number] }{\fldrslt Problem/Plan - 7:}}\plain\f1\fs16\ekmj3145\hich\f1\dbch\f1\loch\f1\cf2\fs16\ql\par  \'b7  {\*\bkmkstart jx79236664741}{\*\bkmkend ui69584384718}Problem: {\*\bkmkstart ny44906395186}{\*\bkmkend jf17183216716}Need for prophylactic measure. \par  \'b7  {\*\bkmkstart kz74094664413}{\*\bkmkend jr13430985416}Plan: {\*\bkmkstart pj84213529501}{\*\bkmkend cc64357910334}lovenox.\par  \plain\f0\fs20\tydp6972\hich\f0\dbch\f0\loch\f0\fs20\par  }

## 2024-10-13 NOTE — PATIENT PROFILE ADULT - FALL HARM RISK - HARM RISK INTERVENTIONS
Assistance with ambulation/Assistance OOB with selected safe patient handling equipment/Communicate Risk of Fall with Harm to all staff/Discuss with provider need for PT consult/Monitor gait and stability/Reinforce activity limits and safety measures with patient and family/Tailored Fall Risk Interventions/Visual Cue: Yellow wristband and red socks/Bed in lowest position, wheels locked, appropriate side rails in place/Call bell, personal items and telephone in reach/Instruct patient to call for assistance before getting out of bed or chair/Non-slip footwear when patient is out of bed/[place] to call system/Physically safe environment - no spills, clutter or unnecessary equipment/Purposeful Proactive Rounding/Room/bathroom lighting operational, light cord in reach

## 2024-10-13 NOTE — PROGRESS NOTE ADULT - SUBJECTIVE AND OBJECTIVE BOX
INTERVAL HPI/OVERNIGHT EVENTS:  Patient seen and examined at bedside. He has severe pain. Received morphine 4mg around 10pm last night and around 10am this morning. Pain regimen adjusted, now received oxycodone with some relief but still unable to get up or walk. Patient agitated and frustrated.     VITAL SIGNS:  T(F): 98.9 (10-13-24 @ 12:40)  HR: 86 (10-13-24 @ 12:40)  BP: 163/84 (10-13-24 @ 12:40)  RR: 18 (10-13-24 @ 12:40)  SpO2: 96% (10-13-24 @ 12:40)  Wt(kg): --    PHYSICAL EXAM:    Constitutional: agitated   Eyes: EOMI, sclera non-icteric  Neck: supple, no masses, no JVD  Respiratory: CTA b/l, good air entry b/l  Cardiovascular: RRR, no M/R/G  Gastrointestinal: soft, NTND  Extremities: no c/c/e  Neurological: AAOx3      MEDICATIONS  (STANDING):  abiraterone 500 mg tablet 2 Tablet(s) 2 Tablet(s) Oral daily  atorvastatin 20 milliGRAM(s) Oral at bedtime  chlorhexidine 2% Cloths 1 Application(s) Topical daily  dexAMETHasone  Injectable 8 milliGRAM(s) IV Push two times a day  enoxaparin Injectable 40 milliGRAM(s) SubCutaneous every 24 hours  fenofibrate Tablet 145 milliGRAM(s) Oral at bedtime  lidocaine   4% Patch 1 Patch Transdermal every 24 hours  lisinopril 40 milliGRAM(s) Oral daily  ondansetron Injectable 4 milliGRAM(s) IV Push every 8 hours  oxyCODONE  ER Tablet 20 milliGRAM(s) Oral every 12 hours  polyethylene glycol 3350 17 Gram(s) Oral daily  senna 2 Tablet(s) Oral two times a day  sodium chloride 0.9% Bolus 2000 milliLiter(s) IV Bolus once    MEDICATIONS  (PRN):  acetaminophen     Tablet .. 650 milliGRAM(s) Oral every 6 hours PRN Temp greater or equal to 38C (100.4F), Mild Pain (1 - 3), Moderate Pain (4 - 6)  aluminum hydroxide/magnesium hydroxide/simethicone Suspension 30 milliLiter(s) Oral every 4 hours PRN Dyspepsia  HYDROmorphone  Injectable 0.5 milliGRAM(s) IV Push every 3 hours PRN Moderate Pain (4 - 6)  HYDROmorphone  Injectable 1 milliGRAM(s) IV Push every 3 hours PRN Severe Pain (7 - 10)      Allergies  No Known Allergies  Intolerances        LABS:                        14.1   8.10  )-----------( 78       ( 13 Oct 2024 05:30 )             39.8     10-13    140  |  101  |  23  ----------------------------<  119[H]  3.5   |  20[L]  |  0.89    Ca    9.2      13 Oct 2024 05:30  Phos  3.6     10-13  Mg     2.10     10-13    TPro  7.2  /  Alb  4.4  /  TBili  1.5[H]  /  DBili  x   /  AST  67[H]  /  ALT  12  /  AlkPhos  121[H]  10-13    PT/INR - ( 12 Oct 2024 10:45 )   PT: 15.8 sec;   INR: 1.37 ratio         PTT - ( 12 Oct 2024 10:45 )  PTT:29.2 sec  Urinalysis Basic - ( 13 Oct 2024 05:30 )    Color: x / Appearance: x / SG: x / pH: x  Gluc: 119 mg/dL / Ketone: x  / Bili: x / Urobili: x   Blood: x / Protein: x / Nitrite: x   Leuk Esterase: x / RBC: x / WBC x   Sq Epi: x / Non Sq Epi: x / Bacteria: x      RADIOLOGY & ADDITIONAL TESTS:  Studies reviewed.

## 2024-10-13 NOTE — PROGRESS NOTE ADULT - ASSESSMENT
65 y/o M with a history of localized prostate cancer treated with radical prostatectomy (did not require ADT or RT, no LN involvement), now recently found to have recurrence with high volume metastatic disease. Here with intractable pain and fracture resulting from diffuse osseous involvement in the spine. Starting chemotherapy.     1. Metastatic prostate adenocarcinoma   - Follows with Dr Andrew Mendoza at [facility]   - [facility]MA PET 10/11/24 reviewed, shows hypermetabolic vera foci above and below the diaphragm, foci in the liver and extensive foci involving the axial and appendicular skeleton compatible with metastatic disease, including a presacral mass 4.8x2.9cm  - MRI spine here shows diffuse osseous metastases with infiltration of the spine, acute pathologic fracture at L5, possible fracture at T11  - --> 374--> 426 on 10/8/24   - High risk high volume disease, will start triplet therapy with ADT(lupron) + abiraterone/prednisone + taxotere.   - He had back pain after Lupron, likely tumor flare, and instructed to take steroids  - Continue Abiraterone 1000mg daily with prednisone 5mg BID. While receiving Decadron for chemo as below can hold the prednisone but should resume after the 3 days of decadron   - To receive taxotere 60mg/m2 today with emend, aloxi, benadryl and dex premeds    - Give Dex 8mg BID today and tomorrow    - Please obtain [facility] consult d/t MRI findings    - Recommend palliative care consult for symptom control   - Pain regimen adjusted, currently: lidocaine patch, prn dilaudid, tylenol, and oxycodone ER 20mg q12h     2. Thrombocytopenia   - Possible ITP component, but so far not improving with steroids, may be secondary to marrow infiltration from the prostate cancer   - Taxotere dose reduced to 60mg/m2 for thrombocytopenia   - Continue to monitor, will need to transfuse for plt <10   - Expect further decline with chemo. Steroids may help if ITP component but also may need high dose. Nplate can also be considered       Meera Trotter MD  Hematology/Oncology   [facility]   [phone]

## 2024-10-13 NOTE — PROGRESS NOTE ADULT - SUBJECTIVE AND OBJECTIVE BOX
Patient is a 64y old  Male who presents with a chief complaint of Pain (13 Oct 2024 13:15)    Date of servie : 10-13-24 @ 18:59  INTERVAL HPI/OVERNIGHT EVENTS:  T(C): 36.7 (10-13-24 @ 17:24), Max: 37.2 (10-13-24 @ 12:40)  HR: 64 (10-13-24 @ 17:24) (64 - 86)  BP: 172/111 (10-13-24 @ 17:24) (140/84 - 174/92)  RR: 18 (10-13-24 @ 17:24) (18 - 18)  SpO2: 95% (10-13-24 @ 17:24) (94% - 99%)  Wt(kg): --  I&O's Summary      LABS:                        14.1   8.10  )-----------( 78       ( 13 Oct 2024 05:30 )             39.8     10-13    140  |  101  |  23  ----------------------------<  119[H]  3.5   |  20[L]  |  0.89    Ca    9.2      13 Oct 2024 05:30  Phos  3.6     10-13  Mg     2.10     10-13    TPro  7.2  /  Alb  4.4  /  TBili  1.5[H]  /  DBili  x   /  AST  67[H]  /  ALT  12  /  AlkPhos  121[H]  10-13    PT/INR - ( 12 Oct 2024 10:45 )   PT: 15.8 sec;   INR: 1.37 ratio         PTT - ( 12 Oct 2024 10:45 )  PTT:29.2 sec  Urinalysis Basic - ( 13 Oct 2024 05:30 )    Color: x / Appearance: x / SG: x / pH: x  Gluc: 119 mg/dL / Ketone: x  / Bili: x / Urobili: x   Blood: x / Protein: x / Nitrite: x   Leuk Esterase: x / RBC: x / WBC x   Sq Epi: x / Non Sq Epi: x / Bacteria: x      CAPILLARY BLOOD GLUCOSE      POCT Blood Glucose.: 142 mg/dL (13 Oct 2024 18:04)        Urinalysis Basic - ( 13 Oct 2024 05:30 )    Color: x / Appearance: x / SG: x / pH: x  Gluc: 119 mg/dL / Ketone: x  / Bili: x / Urobili: x   Blood: x / Protein: x / Nitrite: x   Leuk Esterase: x / RBC: x / WBC x   Sq Epi: x / Non Sq Epi: x / Bacteria: x        MEDICATIONS  (STANDING):  abiraterone 500 mg tablet 2 Tablet(s) 2 Tablet(s) Oral daily  atorvastatin 20 milliGRAM(s) Oral at bedtime  chlorhexidine 2% Cloths 1 Application(s) Topical daily  dexAMETHasone  Injectable 8 milliGRAM(s) IV Push two times a day  enoxaparin Injectable 40 milliGRAM(s) SubCutaneous every 24 hours  fenofibrate Tablet 145 milliGRAM(s) Oral at bedtime  lidocaine   4% Patch 1 Patch Transdermal every 24 hours  lisinopril 40 milliGRAM(s) Oral daily  ondansetron Injectable 4 milliGRAM(s) IV Push every 8 hours  oxyCODONE  ER Tablet 20 milliGRAM(s) Oral every 12 hours  polyethylene glycol 3350 17 Gram(s) Oral daily  senna 2 Tablet(s) Oral two times a day  sodium chloride 0.9% Bolus 2000 milliLiter(s) IV Bolus once    MEDICATIONS  (PRN):  acetaminophen     Tablet .. 650 milliGRAM(s) Oral every 6 hours PRN Temp greater or equal to 38C (100.4F), Mild Pain (1 - 3), Moderate Pain (4 - 6)  aluminum hydroxide/magnesium hydroxide/simethicone Suspension 30 milliLiter(s) Oral every 4 hours PRN Dyspepsia  HYDROmorphone  Injectable 1 milliGRAM(s) IV Push every 3 hours PRN Severe Pain (7 - 10)  HYDROmorphone  Injectable 0.5 milliGRAM(s) IV Push every 3 hours PRN Moderate Pain (4 - 6)          PHYSICAL EXAM:  GENERAL: NAD, well-groomed, well-developed  HEAD:  Atraumatic, Normocephalic  CHEST/LUNG: Clear to percussion bilaterally; No rales, rhonchi, wheezing, or rubs  HEART: Regular rate and rhythm; No murmurs, rubs, or gallops  ABDOMEN: Soft, Nontender, Nondistended; Bowel sounds present  EXTREMITIES:  2+ Peripheral Pulses, No clubbing, cyanosis, or edema  LYMPH: No lymphadenopathy noted  SKIN: No rashes or lesions    Care Discussed with Consultants/Other Providers [ ] YES  [ ] NO

## 2024-10-13 NOTE — CONSULT NOTE ADULT - ASSESSMENT
ASSESSMENT & PLAN  64yMale w/ metastatic prostate ca, w MRI L spine non con showing T11, L1, L5 path fx.    Plan:  -WBAT  -obtain MRI C, T, L spine with and without contrast  -rec rad onc consult for possible RT  -pain control  -incentive spirometry  -PT/OT      Jayla Miles, PGY-2  Orthopedic Surgery  [id number]

## 2024-10-13 NOTE — CHART NOTE - NSCHARTNOTEFT_GEN_A_CORE
ACP Medicine Night Coverage     Notified by AM RN, patient more awake now than earlier in the day however momentarily not oriented to place.   As per PM RN, patient AOx4 at baseline as assessed 10/12 PM. Patient seen and assessed at bedside with colleague, patient sitting up in bed in NAD. AOx4. As per patient, attributes deep sleep to confusion earlier. No complaints at this time.     Will continue to monitor.     Rosa Elena Hwang PA-C  Department of Medicine   [id number]

## 2024-10-13 NOTE — CONSULT NOTE ADULT - SUBJECTIVE AND OBJECTIVE BOX
HPI  64yMale w/ metastatic prostate cx p/w 6mo lower back pain after injuring himself while knocking down a shed. Notes that that pain has acutely worsened over the past 2 weeks, and it is very painful when he tries to walk. Denies any recent trauma/injury. Notes that he has lost 40 lbs over past 8 months, and pt has was recently diagnosed with metastatic prostate ca, confirmed by liver biopsy. Notes that his lower back pain is exacerbated by movement. Does not radiate. Denies focal weakness, numbness/tingling, or radicular sxs. Denies fevers/chills, acute changes in bowel/bladder function, or saddle anesthesia. Community ambulator w/o assistive devices at baseline.    Pt is starting chemo today per primary and heme/onc teams. Has not received RT.    ROS  Negative unless otherwise specified in HPI.    PAST MEDICAL & SURGICAL Hx  PAST MEDICAL & SURGICAL HISTORY:  Benign essential HTN      HLD (hyperlipidemia)      CA of prostate      Basal cell carcinoma      History of transurethral prostatectomy      S/P inguinal hernia repair      History of basal cell carcinoma (BCC) excision          MEDICATIONS  Home Medications:  atorvastatin 20 mg oral tablet: 1 tab(s) orally once a day (12 Oct 2024 15:56)  lisinopril 40 mg oral tablet: 1 tab(s) orally once a day (12 Oct 2024 15:56)  OxyCONTIN 10 mg oral tablet, extended release: 1 tab(s) orally 2 times a day (12 Oct 2024 15:56)  TriCor 200 mg oral capsule: 1 cap(s) orally once a day (12 Oct 2024 15:56)      ALLERGIES  No Known Allergies      FAMILY Hx  FAMILY HISTORY:  FH: HTN (hypertension) (Sibling)        SOCIAL Hx  Social History:      VITALS  Vital Signs Last 24 Hrs  T(C): 36.9 (13 Oct 2024 11:12), Max: 37.1 (13 Oct 2024 05:37)  T(F): 98.5 (13 Oct 2024 11:12), Max: 98.7 (13 Oct 2024 05:37)  HR: 74 (13 Oct 2024 11:12) (59 - 78)  BP: 166/100 (13 Oct 2024 11:12) (160/87 - 178/91)  BP(mean): --  RR: 18 (13 Oct 2024 11:12) (16 - 19)  SpO2: 94% (13 Oct 2024 11:12) (94% - 99%)    Parameters below as of 13 Oct 2024 11:12  Patient On (Oxygen Delivery Method): room air        PHYSICAL EXAM  Gen: Lying in bed, NAD  Resp: No increased WOB  Spine:  Skin intact  No bony TTP or step-offs along c-, t-, l-spine, or sacrum    Motor:                   C5                C6              C7               C8           T1   R            5/5                5/5            5/5             5/5          5/5  L             5/5               5/5             5/5             5/5          5/5                L2             L3             L4               L5            S1  R         5/5           5/5          5/5             5/5           5/5  L          5/5          5/5           5/5             5/5           5/5    Sensory:            C5         C6         C7      C8       T1        (0=absent, 1=impaired, 2=normal, NT=not testable)  R         2            2           2        2         2  L          2            2           2        2         2               L2          L3         L4      L5       S1         (0=absent, 1=impaired, 2=normal, NT=not testable)  R         2            2            2        2        2  L          2            2           2        2         2    (-) Smallwood test b/l  (-) Straight leg raise test b/l  (-) Babinski sign b/l  (-) Ankle clonus b/l      LABS                        14.1   8.10  )-----------( 78       ( 13 Oct 2024 05:30 )             39.8     10-13    140  |  101  |  23  ----------------------------<  119[H]  3.5   |  20[L]  |  0.89    Ca    9.2      13 Oct 2024 05:30  Phos  3.6     10-13  Mg     2.10     10-13    TPro  7.2  /  Alb  4.4  /  TBili  1.5[H]  /  DBili  x   /  AST  67[H]  /  ALT  12  /  AlkPhos  121[H]  10-13    PT/INR - ( 12 Oct 2024 10:45 )   PT: 15.8 sec;   INR: 1.37 ratio         PTT - ( 12 Oct 2024 10:45 )  PTT:29.2 sec    IMAGING  < from: MR Lumbar Spine No Cont (10.12.24 @ 21:31) >  IMPRESSION:    Diffuse osseous metastases with infiltration of spine.Acute pathologic   fracture involves the superior endplate of L5 with loss of 20% of   vertebral body height and minimal bony retropulsion. Compression   deformity of L1, indeterminate in age with loss of 20% of vertebral body   height and minimal bony retropulsion. Decreased T1/T2 signal through the   inferior T11 vertebral body which may reflect an acute fracture. Moderate   diffuse degenerative disc disease with loss of disc height and signal   within the nucleus pulposus. Disc bulges are noted at T11-T12, L1-2   through L5-S1 which flatten the ventral thecal sac and narrow the   BILATERAL neural foramina. Mild central stenosis at L1-2. Mild to   moderate facet osteophytic hypertrophyat L5-S1.    < end of copied text >

## 2024-10-14 DIAGNOSIS — R53.81 OTHER MALAISE: ICD-10-CM

## 2024-10-14 DIAGNOSIS — Z71.89 OTHER SPECIFIED COUNSELING: ICD-10-CM

## 2024-10-14 DIAGNOSIS — Z51.5 ENCOUNTER FOR PALLIATIVE CARE: ICD-10-CM

## 2024-10-14 LAB
ALBUMIN SERPL ELPH-MCNC: 4.2 G/DL — SIGNIFICANT CHANGE UP (ref 3.3–5)
ALP SERPL-CCNC: 102 U/L — SIGNIFICANT CHANGE UP (ref 40–120)
ALT FLD-CCNC: 12 U/L — SIGNIFICANT CHANGE UP (ref 4–41)
ANION GAP SERPL CALC-SCNC: 14 MMOL/L — SIGNIFICANT CHANGE UP (ref 7–14)
AST SERPL-CCNC: 62 U/L — HIGH (ref 4–40)
BASOPHILS # BLD AUTO: 0.04 K/UL — SIGNIFICANT CHANGE UP (ref 0–0.2)
BASOPHILS NFR BLD AUTO: 0.7 % — SIGNIFICANT CHANGE UP (ref 0–2)
BILIRUB SERPL-MCNC: 1.1 MG/DL — SIGNIFICANT CHANGE UP (ref 0.2–1.2)
BUN SERPL-MCNC: 32 MG/DL — HIGH (ref 7–23)
CALCIUM SERPL-MCNC: 8.5 MG/DL — SIGNIFICANT CHANGE UP (ref 8.4–10.5)
CHLORIDE SERPL-SCNC: 104 MMOL/L — SIGNIFICANT CHANGE UP (ref 98–107)
CO2 SERPL-SCNC: 20 MMOL/L — LOW (ref 22–31)
CREAT SERPL-MCNC: 0.91 MG/DL — SIGNIFICANT CHANGE UP (ref 0.5–1.3)
EGFR: 94 ML/MIN/1.73M2 — SIGNIFICANT CHANGE UP
EOSINOPHIL # BLD AUTO: 0 K/UL — SIGNIFICANT CHANGE UP (ref 0–0.5)
EOSINOPHIL NFR BLD AUTO: 0 % — SIGNIFICANT CHANGE UP (ref 0–6)
GLUCOSE SERPL-MCNC: 142 MG/DL — HIGH (ref 70–99)
HCT VFR BLD CALC: 40.1 % — SIGNIFICANT CHANGE UP (ref 39–50)
HGB BLD-MCNC: 14.1 G/DL — SIGNIFICANT CHANGE UP (ref 13–17)
IANC: 4.4 K/UL — SIGNIFICANT CHANGE UP (ref 1.8–7.4)
IMM GRANULOCYTES NFR BLD AUTO: 5.9 % — HIGH (ref 0–0.9)
LYMPHOCYTES # BLD AUTO: 0.88 K/UL — LOW (ref 1–3.3)
LYMPHOCYTES # BLD AUTO: 15.1 % — SIGNIFICANT CHANGE UP (ref 13–44)
MAGNESIUM SERPL-MCNC: 2.5 MG/DL — SIGNIFICANT CHANGE UP (ref 1.6–2.6)
MCHC RBC-ENTMCNC: 30.8 PG — SIGNIFICANT CHANGE UP (ref 27–34)
MCHC RBC-ENTMCNC: 35.2 GM/DL — SIGNIFICANT CHANGE UP (ref 32–36)
MCV RBC AUTO: 87.6 FL — SIGNIFICANT CHANGE UP (ref 80–100)
MONOCYTES # BLD AUTO: 0.15 K/UL — SIGNIFICANT CHANGE UP (ref 0–0.9)
MONOCYTES NFR BLD AUTO: 2.6 % — SIGNIFICANT CHANGE UP (ref 2–14)
NEUTROPHILS # BLD AUTO: 4.4 K/UL — SIGNIFICANT CHANGE UP (ref 1.8–7.4)
NEUTROPHILS NFR BLD AUTO: 75.7 % — SIGNIFICANT CHANGE UP (ref 43–77)
NRBC # BLD: 1 /100 WBCS — HIGH (ref 0–0)
NRBC # FLD: 0.07 K/UL — HIGH (ref 0–0)
PHOSPHATE SERPL-MCNC: 3.5 MG/DL — SIGNIFICANT CHANGE UP (ref 2.5–4.5)
PLATELET # BLD AUTO: 89 K/UL — LOW (ref 150–400)
POTASSIUM SERPL-MCNC: 3.9 MMOL/L — SIGNIFICANT CHANGE UP (ref 3.5–5.3)
POTASSIUM SERPL-SCNC: 3.9 MMOL/L — SIGNIFICANT CHANGE UP (ref 3.5–5.3)
PROT SERPL-MCNC: 6.6 G/DL — SIGNIFICANT CHANGE UP (ref 6–8.3)
RBC # BLD: 4.58 M/UL — SIGNIFICANT CHANGE UP (ref 4.2–5.8)
RBC # FLD: 14 % — SIGNIFICANT CHANGE UP (ref 10.3–14.5)
SODIUM SERPL-SCNC: 138 MMOL/L — SIGNIFICANT CHANGE UP (ref 135–145)
WBC # BLD: 5.81 K/UL — SIGNIFICANT CHANGE UP (ref 3.8–10.5)
WBC # FLD AUTO: 5.81 K/UL — SIGNIFICANT CHANGE UP (ref 3.8–10.5)

## 2024-10-14 PROCEDURE — 99223 1ST HOSP IP/OBS HIGH 75: CPT

## 2024-10-14 PROCEDURE — 99497 ADVNCD CARE PLAN 30 MIN: CPT | Mod: 25

## 2024-10-14 RX ORDER — OXYCODONE HYDROCHLORIDE 30 MG/1
30 TABLET ORAL EVERY 12 HOURS
Refills: 0 | Status: DISCONTINUED | OUTPATIENT
Start: 2024-10-14 | End: 2024-10-16

## 2024-10-14 RX ORDER — NALOXONE HCL 0.4 MG/ML
0.1 AMPUL (ML) INJECTION
Refills: 0 | Status: DISCONTINUED | OUTPATIENT
Start: 2024-10-14 | End: 2024-11-18

## 2024-10-14 RX ORDER — HYDROMORPHONE HYDROCHLORIDE 2 MG/1
2 TABLET ORAL
Refills: 0 | Status: DISCONTINUED | OUTPATIENT
Start: 2024-10-14 | End: 2024-10-15

## 2024-10-14 RX ORDER — ACETAMINOPHEN, DIPHENHYDRAMINE HCL, PHENYLEPHRINE HCL 325; 25; 5 MG/1; MG/1; MG/1
3 TABLET ORAL ONCE
Refills: 0 | Status: COMPLETED | OUTPATIENT
Start: 2024-10-14 | End: 2024-10-14

## 2024-10-14 RX ORDER — HYDROMORPHONE HYDROCHLORIDE 2 MG/1
1 TABLET ORAL
Refills: 0 | Status: DISCONTINUED | OUTPATIENT
Start: 2024-10-14 | End: 2024-10-14

## 2024-10-14 RX ORDER — HYDROMORPHONE HYDROCHLORIDE 2 MG/1
0.5 TABLET ORAL
Refills: 0 | Status: DISCONTINUED | OUTPATIENT
Start: 2024-10-14 | End: 2024-10-14

## 2024-10-14 RX ORDER — OXYCODONE HYDROCHLORIDE 30 MG/1
10 TABLET ORAL ONCE
Refills: 0 | Status: DISCONTINUED | OUTPATIENT
Start: 2024-10-14 | End: 2024-10-14

## 2024-10-14 RX ORDER — HYDROMORPHONE HYDROCHLORIDE 2 MG/1
30 TABLET ORAL
Refills: 0 | Status: DISCONTINUED | OUTPATIENT
Start: 2024-10-14 | End: 2024-10-15

## 2024-10-14 RX ADMIN — OXYCODONE HYDROCHLORIDE 10 MILLIGRAM(S): 30 TABLET ORAL at 11:01

## 2024-10-14 RX ADMIN — LISINOPRIL 40 MILLIGRAM(S): 20 TABLET ORAL at 06:34

## 2024-10-14 RX ADMIN — HYDROMORPHONE HYDROCHLORIDE 0.5 MILLIGRAM(S): 2 TABLET ORAL at 12:53

## 2024-10-14 RX ADMIN — ONDANSETRON HYDROCHLORIDE 4 MILLIGRAM(S): 4 TABLET, FILM COATED ORAL at 13:43

## 2024-10-14 RX ADMIN — ENOXAPARIN SODIUM 40 MILLIGRAM(S): 30 INJECTION SUBCUTANEOUS at 13:43

## 2024-10-14 RX ADMIN — DEXAMETHASONE 8 MILLIGRAM(S): 1.5 TABLET ORAL at 16:40

## 2024-10-14 RX ADMIN — HYDROMORPHONE HYDROCHLORIDE 0.5 MILLIGRAM(S): 2 TABLET ORAL at 12:42

## 2024-10-14 RX ADMIN — HYDROMORPHONE HYDROCHLORIDE 30 MILLILITER(S): 2 TABLET ORAL at 16:29

## 2024-10-14 RX ADMIN — HYDROMORPHONE HYDROCHLORIDE 1 MILLIGRAM(S): 2 TABLET ORAL at 09:35

## 2024-10-14 RX ADMIN — Medication 2 TABLET(S): at 06:34

## 2024-10-14 RX ADMIN — DEXAMETHASONE 8 MILLIGRAM(S): 1.5 TABLET ORAL at 06:34

## 2024-10-14 RX ADMIN — CHLORHEXIDINE GLUCONATE 1 APPLICATION(S): 1.2 RINSE ORAL at 12:57

## 2024-10-14 RX ADMIN — POLYETHYLENE GLYCOL 3350 17 GRAM(S): 17 POWDER, FOR SOLUTION ORAL at 12:55

## 2024-10-14 RX ADMIN — ONDANSETRON HYDROCHLORIDE 4 MILLIGRAM(S): 4 TABLET, FILM COATED ORAL at 06:39

## 2024-10-14 RX ADMIN — HYDROMORPHONE HYDROCHLORIDE 30 MILLILITER(S): 2 TABLET ORAL at 20:26

## 2024-10-14 RX ADMIN — Medication 145 MILLIGRAM(S): at 12:55

## 2024-10-14 RX ADMIN — HYDROMORPHONE HYDROCHLORIDE 1 MILLIGRAM(S): 2 TABLET ORAL at 09:28

## 2024-10-14 RX ADMIN — Medication 20 MILLIGRAM(S): at 06:34

## 2024-10-14 RX ADMIN — LIDOCAINE 1 PATCH: 40 CREAM TOPICAL at 07:59

## 2024-10-14 NOTE — PROGRESS NOTE ADULT - NS ATTEND AMEND GEN_ALL_CORE FT
Agree with above  Appears more comfortable than yesterday, although still with severe pain with minimal movement   Appreciate palliative care recs, starting dilaudid PCA  Pending radonc consult

## 2024-10-14 NOTE — CONSULT NOTE ADULT - PROBLEM SELECTOR RECOMMENDATION 2
- Metastatic prostate adenocarcinoma, follows with Dr. Andrew Mendoza  - Oncology recs appreciated: High risk high volume disease, started triplet therapy with ADT(lupron) + abiraterone/prednisone + taxotere  - MRI Head, Cervical Spine, Thoracic Spine, Lumbar Spine pending  - manage per oncology team

## 2024-10-14 NOTE — CONSULT NOTE ADULT - PROBLEM SELECTOR RECOMMENDATION 6
- Symptom management as above  - Currently undergoing chemotherapy for metastatic prostate cancer  - Discussed with patient, family, primary team  - GaP team will continue to follow for symptom management

## 2024-10-14 NOTE — PROGRESS NOTE ADULT - SUBJECTIVE AND OBJECTIVE BOX
Patient is a 64y old  Male who presents with a chief complaint of Pain (14 Oct 2024 13:13)    Date of servie : 10-14-24 @ 13:43  INTERVAL HPI/OVERNIGHT EVENTS:  T(C): 36.3 (10-14-24 @ 12:35), Max: 37.2 (10-13-24 @ 13:50)  HR: 80 (10-14-24 @ 12:35) (62 - 80)  BP: 166/96 (10-14-24 @ 12:35) (138/98 - 175/86)  RR: 18 (10-14-24 @ 12:35) (17 - 18)  SpO2: 95% (10-14-24 @ 12:35) (90% - 100%)  Wt(kg): --  I&O's Summary    13 Oct 2024 07:01  -  14 Oct 2024 07:00  --------------------------------------------------------  IN: 0 mL / OUT: 0 mL / NET: 0 mL    14 Oct 2024 07:01  -  14 Oct 2024 13:43  --------------------------------------------------------  IN: 275 mL / OUT: 0 mL / NET: 275 mL        LABS:                        14.1   5.81  )-----------( 89       ( 14 Oct 2024 06:19 )             40.1     10-14    138  |  104  |  32[H]  ----------------------------<  142[H]  3.9   |  20[L]  |  0.91    Ca    8.5      14 Oct 2024 06:19  Phos  3.5     10-14  Mg     2.50     10-14    TPro  6.6  /  Alb  4.2  /  TBili  1.1  /  DBili  x   /  AST  62[H]  /  ALT  12  /  AlkPhos  102  10-14      Urinalysis Basic - ( 14 Oct 2024 06:19 )    Color: x / Appearance: x / SG: x / pH: x  Gluc: 142 mg/dL / Ketone: x  / Bili: x / Urobili: x   Blood: x / Protein: x / Nitrite: x   Leuk Esterase: x / RBC: x / WBC x   Sq Epi: x / Non Sq Epi: x / Bacteria: x      CAPILLARY BLOOD GLUCOSE      POCT Blood Glucose.: 168 mg/dL (13 Oct 2024 22:25)  POCT Blood Glucose.: 142 mg/dL (13 Oct 2024 18:04)        Urinalysis Basic - ( 14 Oct 2024 06:19 )    Color: x / Appearance: x / SG: x / pH: x  Gluc: 142 mg/dL / Ketone: x  / Bili: x / Urobili: x   Blood: x / Protein: x / Nitrite: x   Leuk Esterase: x / RBC: x / WBC x   Sq Epi: x / Non Sq Epi: x / Bacteria: x        MEDICATIONS  (STANDING):  abiraterone 500 mg tablet 2 Tablet(s) 2 Tablet(s) Oral daily  atorvastatin 20 milliGRAM(s) Oral at bedtime  chlorhexidine 2% Cloths 1 Application(s) Topical daily  dexAMETHasone  Injectable 8 milliGRAM(s) IV Push two times a day  enoxaparin Injectable 40 milliGRAM(s) SubCutaneous every 24 hours  fenofibrate Tablet 145 milliGRAM(s) Oral daily  HYDROmorphone PCA (1 mG/mL) 30 milliLiter(s) PCA Continuous PCA Continuous  lidocaine   4% Patch 1 Patch Transdermal every 24 hours  lisinopril 40 milliGRAM(s) Oral daily  ondansetron Injectable 4 milliGRAM(s) IV Push every 8 hours  oxyCODONE  ER Tablet 30 milliGRAM(s) Oral every 12 hours  polyethylene glycol 3350 17 Gram(s) Oral daily  sodium chloride 0.9% Bolus 2000 milliLiter(s) IV Bolus once    MEDICATIONS  (PRN):  acetaminophen     Tablet .. 650 milliGRAM(s) Oral every 6 hours PRN Temp greater or equal to 38C (100.4F), Mild Pain (1 - 3), Moderate Pain (4 - 6)  aluminum hydroxide/magnesium hydroxide/simethicone Suspension 30 milliLiter(s) Oral every 4 hours PRN Dyspepsia  HYDROmorphone PCA (1 mG/mL) Rescue Clinician Bolus 2 milliGRAM(s) IV Push every 15 minutes PRN Breakthrough pain  naloxone Injectable 0.1 milliGRAM(s) IV Push every 3 minutes PRN For ANY of the following changes in patient status:  A. RR LESS THAN 10 breaths per minute, B. Oxygen saturation LESS THAN 90%, C. Sedation score of 6          PHYSICAL EXAM:  GENERAL: NAD, well-groomed, well-developed  HEAD:  Atraumatic, Normocephalic  CHEST/LUNG: Clear to percussion bilaterally; No rales, rhonchi, wheezing, or rubs  HEART: Regular rate and rhythm; No murmurs, rubs, or gallops  ABDOMEN: Soft, Nontender, Nondistended; Bowel sounds present  EXTREMITIES:  2+ Peripheral Pulses, No clubbing, cyanosis, or edema  LYMPH: No lymphadenopathy noted  SKIN: No rashes or lesions    Care Discussed with Consultants/Other Providers [ ] YES  [ ] NO

## 2024-10-14 NOTE — PROGRESS NOTE ADULT - ASSESSMENT
64-year-old male with metastatic prostate cancer, sent in by hematologist from [facility] and cancer for uncontrolled pain, nausea, vomiting.       Problem/Plan - 1:  ·  Problem: Cancer related pain.   ·  Plan: - appreciate pall care recommendations:  - pain control  - pain consult   - cw decadron   - RT consult     Problem/Plan - 2:·  Problem: Nausea & vomiting.   ·  Plan: - zofran 4mg IV q 8hrs standing  - obtain ECG, ensure QTc <500 prior to administering zofran  - maintenance fluids while unable to tolerate po.    Problem/Plan - 3:  ·  Problem: CA of prostate.   ·  Plan: Metastatic prostate cancer  - f/u heme/onc recommendations  - cont. Dex 8mg BID today, tomorrow and following day   - MRI L-spine to evaluate if any area for possible palliative RT. L5 lesions seen on imaging.   - Rad Onc eval   - Palliative for symptom control.    Problem/Plan - 4:  ·  Problem: Thrombocytopenia, unspecified.   ·  Plan: -platelet baseline ~70  -monitor, will need transfused for plt <10   - f/u heme/onc for further recommendations.    Problem/Plan - 5:  ·  Problem: Benign essential HTN.   ·  Plan: - cont. home lisinopril 40mg.    Problem/Plan - 6:  ·  Problem: Hyperlipidemia.   ·  Plan: - cont. home atorvastatin 20 and fenofibrate.    Problem/Plan - 7:  ·  Problem: Need for prophylactic measure.   ·  Plan: lovenox.

## 2024-10-14 NOTE — PROGRESS NOTE ADULT - ASSESSMENT
65 y/o M with a history of localized prostate cancer treated with radical prostatectomy (did not require ADT or RT, no LN involvement), now recently found to have recurrence with high volume metastatic disease. Here with intractable pain and fracture resulting from diffuse osseous involvement in the spine. Starting chemotherapy.     1. Metastatic prostate adenocarcinoma   - Follows with Dr Andrew Mendoza at [facility]   - PSMA PET 10/11/24 reviewed, shows hypermetabolic vera foci above and below the diaphragm, foci in the liver and extensive foci involving the axial and appendicular skeleton compatible with metastatic disease, including a presacral mass 4.8x2.9cm  - MRI spine here shows diffuse osseous metastases with infiltration of the spine, acute pathologic fracture at L5, possible fracture at T11  - --> 374--> 426 on 10/8/24   - High risk high volume disease, started triplet therapy with ADT(lupron) + abiraterone/prednisone + taxotere   - He had back pain after Lupron, likely tumor flare, and instructed to take steroids  - Continue Abiraterone 1000mg daily with prednisone 5mg BID. While receiving Decadron for chemo as below can hold the prednisone but should resume after the 3 days of decadron   - s/p taxotere 60mg/m2 10/13 with emend, aloxi, benadryl and dex premeds    - Give Dex 8mg BID 10/13 and 10/14   - Rad onc consult pending for MRI findings  -  palliative care consult for symptom control is pending  - Pain regimen adjusted, currently: lidocaine patch, prn dilaudid, tylenol, and oxycodone ER 20mg q12h     2. Thrombocytopenia   - Possible ITP component, but so far not improving with steroids, may be secondary to marrow infiltration from the prostate cancer   - Taxotere dose reduced to 60mg/m2 for thrombocytopenia   - Continue to monitor, will need to transfuse for plt <10   - Expect further decline with chemo. Steroids may help if ITP component but also may need high dose. Nplate can also be considered     Umm Aponte NP  Hematology/Oncology  [facility]  [phone] (Office)  [phone] (Alt office)  Evenings and weekends please call MD on call or office   63 y/o M with a history of localized prostate cancer treated with radical prostatectomy (did not require ADT or RT, no LN involvement), now recently found to have recurrence with high volume metastatic disease. Here with intractable pain and fracture resulting from diffuse osseous involvement in the spine. Starting chemotherapy.     1. Metastatic prostate adenocarcinoma   - Follows with Dr. Andrew Mendoza at [facility]   - PSMA PET 10/11/24 reviewed, shows hypermetabolic vera foci above and below the diaphragm, foci in the liver and extensive foci involving the axial and appendicular skeleton compatible with metastatic disease, including a presacral mass 4.8x2.9cm  - MRI spine here shows diffuse osseous metastases with infiltration of the spine, acute pathologic fracture at L5, possible fracture at T11  - --> 374--> 426 on 10/8/24   - High risk high volume disease, started triplet therapy with ADT (lupron) + abiraterone/prednisone + taxotere   - He had back pain after Lupron, likely tumor flare, and instructed to take steroids  - Continue Abiraterone 1000mg daily with prednisone 5mg BID. While receiving Decadron for chemo as below can hold the prednisone but should resume after the 3 days of decadron   - s/p taxotere 60mg/m2 10/13 with emend, aloxi, benadryl and dex premeds    - Give Dex 8mg BID 10/13 and 10/14   - Rad onc consult pending for MRI findings  - Pain regimen adjusted, currently: lidocaine patch, prn dilaudid, tylenol, and oxycodone ER 20mg q12h   - Appreciate palliative care recs, starting dilaudid PCA     2. Thrombocytopenia   - Possible ITP component, but so far not improving with steroids, may be secondary to marrow infiltration from the prostate cancer   - Taxotere dose reduced to 60mg/m2 for thrombocytopenia   - Continue to monitor, will need to transfuse for plt <10   - Expect further decline with chemo. Steroids may help if ITP component but also may need high dose. Nplate can also be considered     Umm Aponte NP  Hematology/Oncology  [facility]  [phone] (Office)  [phone] (Alt office)  Evenings and weekends please call MD on call or office

## 2024-10-14 NOTE — CONSULT NOTE ADULT - NSCONSULTADDITIONALINFOA_GEN_ALL_CORE
Mikie Buenrostro MD  Hospice and Palliative Care Fellow  Geriatrics and Palliative Care Team  This note and recommendations are not finalized until signed by attending physician.

## 2024-10-14 NOTE — CONSULT NOTE ADULT - CONVERSATION DETAILS
Referral for complex decision making and symptom management in setting of advanced malignancy. Introduced role of GaP team to patient and his family at bedside. Reviewed patient's clinical course including recent diagnosis of metastatic cancer. Family seems to be aware that cancer is metastatic, however patient is asking providers "why are we talking about the pain when the cancer is causing the pain? doesn't that mean the cancer needs to be treated?". He is also asking provider "why can't the cancer be zapped or surgically removed?" Provider explained that once cancer has spread to multiple areas in the body, it is not safe to surgically resect multiple places. We also explained that palliative care works in conjunction with cancer directed treatment ordered by the oncologists and the radiation doctors. Wife states that he started chemotherapy recently. We explained that symptom medications along with the cancer treatment can be done so that he can tolerate all the interventions and imaging that he needs during hospital course. He is clearly treatment oriented. Ongoing goc discussions pending hospital course.

## 2024-10-14 NOTE — CONSULT NOTE ADULT - PROBLEM SELECTOR RECOMMENDATION 4
The patient requires nursing assistance with ADLs  - Supportive care  - Turn and position  - Continue with good skin care The patient requires nursing assistance with ADLs  - Supportive care  - Turn and position  - Continue with good skin care  - TLSO brace in place

## 2024-10-14 NOTE — PROVIDER CONTACT NOTE (OTHER) - SITUATION
Patient blood pressure is 166/96. Patient afebrile. Patient blood pressure is 166/96. Patient afebrile. Patient has no signs of SOB. Patient denies chest pain.

## 2024-10-14 NOTE — CONSULT NOTE ADULT - PROBLEM SELECTOR RECOMMENDATION 5
- Symptom management as above  - Currently undergoing chemotherapy for metastatic prostate cancer  - Discussed with patient, family, primary team  - GaP team will continue to follow for symptom management Patient is supported by his wife- Vijaya [phone]) and 3 adult sons. See [facility] note   > Patient is recently dx with metastatic prostate cancer just 1.5 weeks ago and he is treatment oriented.

## 2024-10-14 NOTE — CONSULT NOTE ADULT - SUBJECTIVE AND OBJECTIVE BOX
HPI:  64-year-old male with metastatic prostate cancer, sent in by hematologist from [facility] for uncontrolled pain, nausea, vomiting.   Patient reports diffuse pain starting 6 months ago significantly worsening for the past 2 weeks.  Currently the worst pain is located around the lower back.   No loss of bladder and bowel function, no saddle paresthesia.  Able to walk.  patient is oxycodone 5 every 4-6 hour.  Yesterday they started adding OxyContin 10.  However patient continued to have pain.  Family also reports significant nausea and vomiting, inability to tolerate p.o. for the last 2 days.  Last bowel movement 2 days ago.   No known allergy.    Diagnosed with high risk high volume metastatic prostate cancer with bone, liver lymph node mets and presacral mass. Liver biopsy confirmed disease with . Started lupron, loly/pred with plans to initiate taxotere.    (12 Oct 2024 15:40)    SUBJECTIVE:  Patient seen and examined at bedside this morning, son and wife present.  Patient appears agitated and uncomfortable, states that he has extreme, over 10/10 pain since he's been here when he tries to sit up or standing up. His pain is controlled at rest when he is lying down. Pain is located in lower back, non-radiating, sharp in nature, intermittent, improves with Dilaudid.  Prior to today patient was on oxycontin 20mg BID, decadron 8mg BID x3 days, lidocaine patch. Chart review, in 24hrs 7AM-7AM, patient used Dilaudid 1mg IV x2.    PERTINENT PM/SXH:   Benign essential HTN    HLD (hyperlipidemia)    CA of prostate    Basal cell carcinoma      History of transurethral prostatectomy    S/P inguinal hernia repair    History of basal cell carcinoma (BCC) excision      FAMILY HISTORY:  FH: HTN (hypertension) (Sibling)      Family Hx substance abuse [ ]yes [ ]no  ITEMS NOT CHECKED ARE NOT PRESENT    SOCIAL HISTORY:   Significant other/partner[x]  Children[x]  Faith/Spirituality:  Substance hx:  [ ]   Tobacco hx:  [ ]   Alcohol hx: [ ]   Home Opioid hx:  [x] (see I-STOP note)  Living Situation: [x]Home  [ ]Long term care  [ ]Rehab [ ]Other    ADVANCE DIRECTIVES:    DNR/MOLST  [ ]  Living Will  [ ]   DECISION MAKER(s):  [ ] Health Care Proxy(s)  [ ] Surrogate(s)  [ ] Guardian           Name(s): Phone Number(s):    BASELINE (I)ADL(s) (prior to admission):  [place]: [x] Total  [ ] Moderate [ ]Dependent    Allergies    No Known Allergies    Intolerances    MEDICATIONS  (STANDING):  abiraterone 500 mg tablet 2 Tablet(s) 2 Tablet(s) Oral daily  atorvastatin 20 milliGRAM(s) Oral at bedtime  chlorhexidine 2% Cloths 1 Application(s) Topical daily  dexAMETHasone  Injectable 8 milliGRAM(s) IV Push two times a day  enoxaparin Injectable 40 milliGRAM(s) SubCutaneous every 24 hours  fenofibrate Tablet 145 milliGRAM(s) Oral daily  HYDROmorphone PCA (1 mG/mL) 30 milliLiter(s) PCA Continuous PCA Continuous  lidocaine   4% Patch 1 Patch Transdermal every 24 hours  lisinopril 40 milliGRAM(s) Oral daily  ondansetron Injectable 4 milliGRAM(s) IV Push every 8 hours  oxyCODONE  ER Tablet 30 milliGRAM(s) Oral every 12 hours  polyethylene glycol 3350 17 Gram(s) Oral daily  sodium chloride 0.9% Bolus 2000 milliLiter(s) IV Bolus once    MEDICATIONS  (PRN):  acetaminophen     Tablet .. 650 milliGRAM(s) Oral every 6 hours PRN Temp greater or equal to 38C (100.4F), Mild Pain (1 - 3), Moderate Pain (4 - 6)  aluminum hydroxide/magnesium hydroxide/simethicone Suspension 30 milliLiter(s) Oral every 4 hours PRN Dyspepsia  HYDROmorphone PCA (1 mG/mL) Rescue Clinician Bolus 2 milliGRAM(s) IV Push every 15 minutes PRN Breakthrough pain  naloxone Injectable 0.1 milliGRAM(s) IV Push every 3 minutes PRN For ANY of the following changes in patient status:  A. RR LESS THAN 10 breaths per minute, B. Oxygen saturation LESS THAN 90%, C. Sedation score of 6    PRESENT SYMPTOMS: [ ]Unable to self-report see CPOT, PAINADs, RDOS  Source if other than patient:  [ ]Family   [ ]Team     Pain: [x]yes [ ]no  QOL impact - Limits mobility  Location - Lower back  Aggravating factors - Movement  Quality - Sharp  Radiation - non-radiating  Timing- Intermittent  Severity (0-10 scale): 10  Minimal acceptable level (0-10 scale): 0    PCSSQ [Palliative Care Spiritual Screening Question]   Severity (0-10):  Score of 4 or > indicate consideration of Chaplaincy referral.  Chaplaincy Referral: [ ] yes [ ] refused [ ] following    Caregiver [place]? : [ ] yes [ ] no [x] deferred:  Social work referral [ ] Patient & Family Centered Care Referral [ ]     Anticipatory Grief Present?: [ ] yes [ ] no  [x] deferred: Social work referral [ ]  Patient & Family Centered Care Referral [ ]       Other Symptoms:  [x]All other review of systems negative     PHYSICAL EXAM:  Vital Signs Last 24 Hrs  T(C): 36.3 (14 Oct 2024 12:35), Max: 37.2 (13 Oct 2024 13:50)  T(F): 97.4 (14 Oct 2024 12:35), Max: 98.9 (13 Oct 2024 13:50)  HR: 80 (14 Oct 2024 12:35) (62 - 80)  BP: 166/96 (14 Oct 2024 12:35) (138/98 - 175/86)  BP(mean): --  RR: 18 (14 Oct 2024 12:35) (17 - 18)  SpO2: 95% (14 Oct 2024 12:35) (90% - 100%)    Parameters below as of 14 Oct 2024 12:35  Patient On (Oxygen Delivery Method): room air     I&O's Summary    13 Oct 2024 07:01  -  14 Oct 2024 07:00  --------------------------------------------------------  IN: 0 mL / OUT: 0 mL / NET: 0 mL    14 Oct 2024 07:01  -  14 Oct 2024 13:14  --------------------------------------------------------  IN: 275 mL / OUT: 0 mL / NET: 275 mL      GENERAL: [ ]Cachexia    [x]Alert  [ ]Oriented x   [ ]Lethargic  [ ]Unarousable  [x]Verbal  [ ]Non-Verbal  Behavioral:   [ ] Anxiety  [ ] Delirium [x] Agitation [ ] Other  HEENT:  [ ]Normal   [ ]Dry mouth   [ ]ET Tube/Trach  [ ]Oral lesions  PULMONARY:   [x ]Clear [ ]Tachypnea  [ ]Audible excessive secretions   [ ]Rhonchi        [ ]Right [ ]Left [ ]Bilateral  [ ]Crackles        [ ]Right [ ]Left [ ]Bilateral  [ ]Wheezing     [ ]Right [ ]Left [ ]Bilateral  [ ]Diminished breath sounds [ ]right [ ]left [ ]bilateral  CARDIOVASCULAR:    [x ]Regular [ ]Irregular [ ]Tachy  [ ]Rodrick [ ]Murmur [ ]Other  GASTROINTESTINAL:  [x ]Soft  [ ]Distended   [ ]+BS  [x ]Non tender [ ]Tender  [ ]Other [ ]PEG [ ]OGT/ NGT  Last BM:  GENITOURINARY:  [x ]Normal [ ] Incontinent   [ ]Oliguria/Anuria   [ ]Farah  MUSCULOSKELETAL:   [x ]Normal   [ ]Weakness  [ ]Bed/Wheelchair bound [ ]Edema  NEUROLOGIC:   [x ]No focal deficits  [ ]Cognitive impairment  [ ]Dysphagia [ ]Dysarthria [ ]Paresis [ ]Other   SKIN:   [ ]Normal  [ ]Rash  [ ]Other  [ ]Pressure ulcer(s)       Present on admission [ ]y [ ]n    CRITICAL CARE:  [ ] Shock Present  [ ]Septic [ ]Cardiogenic [ ]Neurologic [ ]Hypovolemic  [ ]  Vasopressors [ ]  Inotropes   [ ]Respiratory failure present [ ]Mechanical ventilation [ ]Non-invasive ventilatory support [ ]High flow    [ ]Acute  [ ]Chronic [ ]Hypoxic  [ ]Hypercarbic [ ]Other  [ ]Other organ failure     LABS:                        14.1   5.81  )-----------( 89       ( 14 Oct 2024 06:19 )             40.1   10-14    138  |  104  |  32[H]  ----------------------------<  142[H]  3.9   |  20[L]  |  0.91    Ca    8.5      14 Oct 2024 06:19  Phos  3.5     10-14  Mg     2.50     10-14    TPro  6.6  /  Alb  4.2  /  TBili  1.1  /  DBili  x   /  AST  62[H]  /  ALT  12  /  AlkPhos  102  10-14      Urinalysis Basic - ( 14 Oct 2024 06:19 )    Color: x / Appearance: x / SG: x / pH: x  Gluc: 142 mg/dL / Ketone: x  / Bili: x / Urobili: x   Blood: x / Protein: x / Nitrite: x   Leuk Esterase: x / RBC: x / WBC x   Sq Epi: x / Non Sq Epi: x / Bacteria: x      RADIOLOGY & ADDITIONAL STUDIES:    < from: MR Lumbar Spine No Cont (10.12.24 @ 21:31) >  IMPRESSION:    Diffuse osseous metastases with infiltration of spine.Acute pathologic   fracture involves the superior endplate of L5 with loss of 20% of   vertebral body height and minimal bony retropulsion. Compression   deformity of L1, indeterminate in age with loss of 20% of vertebral body   height and minimal bony retropulsion. Decreased T1/T2 signal through the   inferior T11 vertebral body which may reflect an acute fracture. Moderate   diffuse degenerative disc disease with loss of disc height and signal   within the nucleus pulposus. Disc bulges are noted at T11-T12, L1-2   through L5-S1 which flatten the ventral thecal sac and narrow the   BILATERAL neural foramina. Mild central stenosis at L1-2. Mild to   moderate facet osteophytic hypertrophyat L5-S1.    --- End of Report ---    < end of copied text >    < from: Xray Chest 1 View- PORTABLE-Urgent (10.12.24 @ 10:58) >  FINDINGS:  The heart is normal in size.  The lungs are clear.  There is no pneumothorax or pleural effusion.  No free air under the diaphragm.  No acute osseous abnormalities.    IMPRESSION:  1.  Clear lungs.  2.  No pneumoperitoneum.    < end of copied text >  PROTEIN CALORIE MALNUTRITION PRESENT: [ ]mild [ ]moderate [ ]severe [ ]underweight [ ]morbid obesity  https://www.andeal.org/vault/2440/web/files/ONC/Table_Clinical%20Characteristics%20to%20Document%20Malnutrition-White%20JV%20et%20al%202012.pdf    Height (cm): 172.7 (10-12-24 @ 09:21)  Weight (kg): 69.4 (10-12-24 @ 09:21)  BMI (kg/m2): 23.3 (10-12-24 @ 09:21)    [ ]PPSV2 < or = to 30% [ ]significant weight loss  [ ]poor nutritional intake  [ ]anasarca[ ]Artificial Nutrition      Other REFERRALS:  [ ]Hospice  [ ]Child Life  [ ]Social Work  [ ]Case management [ ]Holistic Therapy     Care Coordination Assessment 201 [C. Provider] (10-14-24 @ 11:52)      Palliative Performance Scale:  http://npcrc.org/files/news/palliative_performance_scale_ppsv2.pdf  (Ctrl +  left click to view)  Respiratory Distress Observation Tool:  https://homecareinformation.net/handouts/hen/Respiratory_Distress_Observation_Scale.pdf (Ctrl +  left click to view)  PAINAD Score:  http://geriatrictoolkit.[place].[facility]/cog/painad.pdf (Ctrl +  left click to view)   HPI: 64-year-old male with metastatic prostate cancer, sent in by hematologist from [facility] for uncontrolled pain, nausea, vomiting.   Patient reports diffuse pain starting 6 months ago significantly worsening for the past 2 weeks.  Currently the worst pain is located around the lower back.   No loss of bladder and bowel function, no saddle paresthesia.  Able to walk.  patient is oxycodone 5 every 4-6 hour.  Yesterday they started adding OxyContin 10.  However patient continued to have pain.  Family also reports significant nausea and vomiting, inability to tolerate p.o. for the last 2 days.  Last bowel movement 2 days ago.   No known allergy.    Diagnosed with high risk high volume metastatic prostate cancer with bone, liver lymph node mets and presacral mass. Liver biopsy confirmed disease with . Started lupron, loly/pred with plans to initiate taxotere.    (12 Oct 2024 15:40)    SUBJECTIVE:  Patient seen and examined at bedside this morning, son and wife present.  Patient appears agitated and uncomfortable, states that he has extreme, over 10/10 pain since he's been here when he tries to sit up or standing up. His pain is controlled at rest when he is lying down. Pain is located in lower back, non-radiating, sharp in nature, intermittent, improves with Dilaudid.  Prior to today patient was on oxycontin 20mg BID, decadron 8mg BID x3 days, lidocaine patch. Chart review, in 24hrs 7AM-7AM, patient used Dilaudid 1mg IV x2.    PERTINENT PM/SXH:   Benign essential HTN  HLD (hyperlipidemia)  CA of prostate  Basal cell carcinoma  History of transurethral prostatectomy  S/P inguinal hernia repair  History of basal cell carcinoma (BCC) excision    FAMILY HISTORY:  FH: HTN (hypertension) (Sibling)    Family Hx substance abuse [ ]yes [ ]no  ITEMS NOT CHECKED ARE NOT PRESENT    SOCIAL HISTORY:   Significant other/partner[x- for 40+years]  Children[x-3 sons and 5 grandchildren]  Congregational/Spirituality:  Substance hx:  [ ]   Tobacco hx:  [ ]   Alcohol hx: [ ]   Home Opioid hx:  [x] (see I-STOP note)  Living Situation: [x]Home  [ ]Long term care  [ ]Rehab [ ]Other    ADVANCE DIRECTIVES:    DNR/MOLST  [ ]  Living Will  [ ]   DECISION MAKER(s): Patient   [ ] Health Care Proxy(s)  [ ] Surrogate(s)  [ ] Guardian           Name(s): Phone Number(s):    BASELINE (I)ADL(s) (prior to admission):  [place]: [x] Total  [ ] Moderate [ ]Dependent    Allergies    No Known Allergies    Intolerances    MEDICATIONS  (STANDING):  abiraterone 500 mg tablet 2 Tablet(s) 2 Tablet(s) Oral daily  atorvastatin 20 milliGRAM(s) Oral at bedtime  chlorhexidine 2% Cloths 1 Application(s) Topical daily  dexAMETHasone  Injectable 8 milliGRAM(s) IV Push two times a day  enoxaparin Injectable 40 milliGRAM(s) SubCutaneous every 24 hours  fenofibrate Tablet 145 milliGRAM(s) Oral daily  HYDROmorphone PCA (1 mG/mL) 30 milliLiter(s) PCA Continuous PCA Continuous  lidocaine   4% Patch 1 Patch Transdermal every 24 hours  lisinopril 40 milliGRAM(s) Oral daily  ondansetron Injectable 4 milliGRAM(s) IV Push every 8 hours  oxyCODONE  ER Tablet 30 milliGRAM(s) Oral every 12 hours  polyethylene glycol 3350 17 Gram(s) Oral daily  sodium chloride 0.9% Bolus 2000 milliLiter(s) IV Bolus once    MEDICATIONS  (PRN):  acetaminophen     Tablet .. 650 milliGRAM(s) Oral every 6 hours PRN Temp greater or equal to 38C (100.4F), Mild Pain (1 - 3), Moderate Pain (4 - 6)  aluminum hydroxide/magnesium hydroxide/simethicone Suspension 30 milliLiter(s) Oral every 4 hours PRN Dyspepsia  HYDROmorphone PCA (1 mG/mL) Rescue Clinician Bolus 2 milliGRAM(s) IV Push every 15 minutes PRN Breakthrough pain  naloxone Injectable 0.1 milliGRAM(s) IV Push every 3 minutes PRN For ANY of the following changes in patient status:  A. RR LESS THAN 10 breaths per minute, B. Oxygen saturation LESS THAN 90%, C. Sedation score of 6    PRESENT SYMPTOMS: [ ]Unable to self-report see CPOT, PAINADs, RDOS  Source if other than patient:  [ ]Family   [ ]Team     Pain: [x]yes [ ]no  QOL impact - Limits mobility  Location - Lower back  Aggravating factors - Movement  Quality - Sharp  Radiation - non-radiating  Timing- Intermittent  Severity (0-10 scale): 10  Minimal acceptable level (0-10 scale): 0    PCSSQ [Palliative Care Spiritual Screening Question]   Severity (0-10):  Score of 4 or > indicate consideration of Chaplaincy referral.  Chaplaincy Referral: [ ] yes [ ] refused [ ] following [x] deferred     Caregiver [place]? : [ ] yes [ ] no [x] deferred:  Social work referral [ ] Patient & Family Centered Care Referral [ ]     Anticipatory Grief Present?: [ ] yes [ ] no  [x] deferred: Social work referral [ ]  Patient & Family Centered Care Referral [ ]       Other Symptoms:  [x]All other review of systems negative     PHYSICAL EXAM:  Vital Signs Last 24 Hrs  T(C): 36.3 (14 Oct 2024 12:35), Max: 37.2 (13 Oct 2024 13:50)  T(F): 97.4 (14 Oct 2024 12:35), Max: 98.9 (13 Oct 2024 13:50)  HR: 80 (14 Oct 2024 12:35) (62 - 80)  BP: 166/96 (14 Oct 2024 12:35) (138/98 - 175/86)  BP(mean): --  RR: 18 (14 Oct 2024 12:35) (17 - 18)  SpO2: 95% (14 Oct 2024 12:35) (90% - 100%)    Parameters below as of 14 Oct 2024 12:35  Patient On (Oxygen Delivery Method): room air     I&O's Summary    13 Oct 2024 07:01  -  14 Oct 2024 07:00  --------------------------------------------------------  IN: 0 mL / OUT: 0 mL / NET: 0 mL    14 Oct 2024 07:01  -  14 Oct 2024 13:14  --------------------------------------------------------  IN: 275 mL / OUT: 0 mL / NET: 275 mL      GENERAL: [ ]Cachexia    [x]Alert  [ ]Oriented x   [ ]Lethargic  [ ]Unarousable  [x]Verbal  [ ]Non-Verbal  Behavioral:   [ ] Anxiety  [ ] Delirium [x] Agitation [ ] Other  HEENT:  [ ]Normal   [ ]Dry mouth   [ ]ET Tube/Trach  [ ]Oral lesions  PULMONARY:   [x ]Clear [ ]Tachypnea  [ ]Audible excessive secretions   [ ]Rhonchi        [ ]Right [ ]Left [ ]Bilateral  [ ]Crackles        [ ]Right [ ]Left [ ]Bilateral  [ ]Wheezing     [ ]Right [ ]Left [ ]Bilateral  [ ]Diminished breath sounds [ ]right [ ]left [ ]bilateral  CARDIOVASCULAR:    [x ]Regular [ ]Irregular [ ]Tachy  [ ]Rodrick [ ]Murmur [ ]Other  GASTROINTESTINAL:  [x ]Soft  [ ]Distended   [ ]+BS  [x ]Non tender [ ]Tender  [ ]Other [ ]PEG [ ]OGT/ NGT  Last BM: ?  GENITOURINARY:  [x ]Normal [ ] Incontinent   [ ]Oliguria/Anuria   [ ]Farah  MUSCULOSKELETAL: TLSO brace  [x ]Normal   [ ]Weakness  [ ]Bed/Wheelchair bound [ ]Edema  NEUROLOGIC:   [x ]No focal deficits  [ ]Cognitive impairment  [ ]Dysphagia [ ]Dysarthria [ ]Paresis [ ]Other   SKIN:   [ ]Normal  [ ]Rash  [x ]Other  [ ]Pressure ulcer(s)       Present on admission [ ]y [ ]n    CRITICAL CARE:  [ ] Shock Present  [ ]Septic [ ]Cardiogenic [ ]Neurologic [ ]Hypovolemic  [ ]  Vasopressors [ ]  Inotropes   [ ]Respiratory failure present [ ]Mechanical ventilation [ ]Non-invasive ventilatory support [ ]High flow    [ ]Acute  [ ]Chronic [ ]Hypoxic  [ ]Hypercarbic [ ]Other  [ ]Other organ failure     LABS:                        14.1   5.81  )-----------( 89       ( 14 Oct 2024 06:19 )             40.1   10-14    138  |  104  |  32[H]  ----------------------------<  142[H]  3.9   |  20[L]  |  0.91    Ca    8.5      14 Oct 2024 06:19  Phos  3.5     10-14  Mg     2.50     10-14    TPro  6.6  /  Alb  4.2  /  TBili  1.1  /  DBili  x   /  AST  62[H]  /  ALT  12  /  AlkPhos  102  10-14      Urinalysis Basic - ( 14 Oct 2024 06:19 )    Color: x / Appearance: x / SG: x / pH: x  Gluc: 142 mg/dL / Ketone: x  / Bili: x / Urobili: x   Blood: x / Protein: x / Nitrite: x   Leuk Esterase: x / RBC: x / WBC x   Sq Epi: x / Non Sq Epi: x / Bacteria: x      RADIOLOGY & ADDITIONAL STUDIES:    < from: MR Lumbar Spine No Cont (10.12.24 @ 21:31) >  IMPRESSION:    Diffuse osseous metastases with infiltration of spine.Acute pathologic   fracture involves the superior endplate of L5 with loss of 20% of   vertebral body height and minimal bony retropulsion. Compression   deformity of L1, indeterminate in age with loss of 20% of vertebral body   height and minimal bony retropulsion. Decreased T1/T2 signal through the   inferior T11 vertebral body which may reflect an acute fracture. Moderate   diffuse degenerative disc disease with loss of disc height and signal   within the nucleus pulposus. Disc bulges are noted at T11-T12, L1-2   through L5-S1 which flatten the ventral thecal sac and narrow the   BILATERAL neural foramina. Mild central stenosis at L1-2. Mild to   moderate facet osteophytic hypertrophyat L5-S1.    --- End of Report ---    < end of copied text >    < from: Xray Chest 1 View- PORTABLE-Urgent (10.12.24 @ 10:58) >  FINDINGS:  The heart is normal in size.  The lungs are clear.  There is no pneumothorax or pleural effusion.  No free air under the diaphragm.  No acute osseous abnormalities.    IMPRESSION:  1.  Clear lungs.  2.  No pneumoperitoneum.    < end of copied text >  PROTEIN CALORIE MALNUTRITION PRESENT: [ ]mild [ ]moderate [ ]severe [ ]underweight [ ]morbid obesity  https://www.andeal.org/vault/2440/web/files/ONC/Table_Clinical%20Characteristics%20to%20Document%20Malnutrition-White%20JV%20et%20al%202012.pdf    Height (cm): 172.7 (10-12-24 @ 09:21)  Weight (kg): 69.4 (10-12-24 @ 09:21)  BMI (kg/m2): 23.3 (10-12-24 @ 09:21)    [ ]PPSV2 < or = to 30% [ ]significant weight loss  [ ]poor nutritional intake  [ ]anasarca[ ]Artificial Nutrition      Other REFERRALS:  [ ]Hospice  [ ]Child Life  [ ]Social Work  [ ]Case management [ ]Holistic Therapy     Care Coordination Assessment 201 [C. Provider] (10-14-24 @ 11:52)      Palliative Performance Scale:  http://npcrc.org/files/news/palliative_performance_scale_ppsv2.pdf  (Ctrl +  left click to view)  Respiratory Distress Observation Tool:  https://homecareinformation.net/handouts/hen/Respiratory_Distress_Observation_Scale.pdf (Ctrl +  left click to view)  PAINAD Score:  http://geriatrictoolkit.[place].[facility]/cog/painad.pdf (Ctrl +  left click to view)

## 2024-10-14 NOTE — PROGRESS NOTE ADULT - SUBJECTIVE AND OBJECTIVE BOX
no new events      MEDICATIONS  (STANDING):  abiraterone 500 mg tablet 2 Tablet(s) 2 Tablet(s) Oral daily  atorvastatin 20 milliGRAM(s) Oral at bedtime  chlorhexidine 2% Cloths 1 Application(s) Topical daily  dexAMETHasone  Injectable 8 milliGRAM(s) IV Push two times a day  enoxaparin Injectable 40 milliGRAM(s) SubCutaneous every 24 hours  fenofibrate Tablet 145 milliGRAM(s) Oral daily  lidocaine   4% Patch 1 Patch Transdermal every 24 hours  lisinopril 40 milliGRAM(s) Oral daily  ondansetron Injectable 4 milliGRAM(s) IV Push every 8 hours  oxyCODONE  ER Tablet 30 milliGRAM(s) Oral every 12 hours  oxyCODONE  ER Tablet 10 milliGRAM(s) Oral once  polyethylene glycol 3350 17 Gram(s) Oral daily  sodium chloride 0.9% Bolus 2000 milliLiter(s) IV Bolus once    MEDICATIONS  (PRN):  acetaminophen     Tablet .. 650 milliGRAM(s) Oral every 6 hours PRN Temp greater or equal to 38C (100.4F), Mild Pain (1 - 3), Moderate Pain (4 - 6)  aluminum hydroxide/magnesium hydroxide/simethicone Suspension 30 milliLiter(s) Oral every 4 hours PRN Dyspepsia  HYDROmorphone  Injectable 1 milliGRAM(s) IV Push every 3 hours PRN Severe Pain (7 - 10)  HYDROmorphone  Injectable 0.5 milliGRAM(s) IV Push every 3 hours PRN Moderate Pain (4 - 6)    Vital Signs Last 24 Hrs  T(C): 36.6 (14 Oct 2024 09:21), Max: 37.2 (13 Oct 2024 12:40)  T(F): 97.8 (14 Oct 2024 09:21), Max: 98.9 (13 Oct 2024 12:40)  HR: 71 (14 Oct 2024 09:21) (62 - 86)  BP: 162/71 (14 Oct 2024 09:21) (138/98 - 172/111)  BP(mean): --  RR: 17 (14 Oct 2024 09:21) (17 - 18)  SpO2: 94% (14 Oct 2024 09:21) (94% - 100%)    Parameters below as of 14 Oct 2024 09:21  Patient On (Oxygen Delivery Method): room air        PE  NAD  Awake, alert  Anicteric, MMM  RRR  CTAB  Abd soft, NT, ND  No c/c/e  No rash grossly                            14.1   5.81  )-----------( 89       ( 14 Oct 2024 06:19 )             40.1       10-14    138  |  104  |  32[H]  ----------------------------<  142[H]  3.9   |  20[L]  |  0.91    Ca    8.5      14 Oct 2024 06:19  Phos  3.5     10-14  Mg     2.50     10-14    TPro  6.6  /  Alb  4.2  /  TBili  1.1  /  DBili  x   /  AST  62[H]  /  ALT  12  /  AlkPhos  102  10-14

## 2024-10-14 NOTE — CONSULT NOTE ADULT - PROBLEM SELECTOR RECOMMENDATION 9
MRI Lumbar Spine: (10/12)  Diffuse osseous metastases with infiltration of spine.Acute pathologic   fracture involves the superior endplate of L5 with loss of 20% of   vertebral body height and minimal bony retropulsion.    - Continue Oxycontin 30mg BID (increased 10/14)  - Continue Decadron 8mg BID for total of 3 days  - Radiation oncology consult pending  - D/c Dilaudid IV PRN  - Start Dilaudid PCA (1mg/1mL): DD 1mg, LO 30 min, 4-hr limit 8mg, CAB 2mg  - Narcan PRN  - Zofran ATC (ordered for nausea/vomiting)  - Bowel regimen while on opioids  - Continue to monitor MRI Lumbar Spine: (10/12) Diffuse osseous metastases with infiltration of spine. Acute pathologic fracture involves the superior endplate of L5 with loss of 20% of vertebral body height and minimal bony retropulsion.  - Continue Oxycontin 30mg BID (increased 10/14 by primary medical team)   - Continue Decadron 8mg BID for total of 3 days  - Radiation oncology consult pending  - D/c Dilaudid IV PRNs once PCA is started   - Start Dilaudid PCA (1mg/1mL): DD 1mg, LO 30 min, 4-hr limit 8mg, CAB 2mg  - Narcan PRN  - multimodal pain control: lidocaine patch, warm/cold packs   - Bowel regimen while on opioids  - Appreciate ortho recs- pending further MRI  - would RECOMMEND OUTPATIENT PALLIATIVE CARE REFERRAL WITH [facility] palliative care team

## 2024-10-14 NOTE — CONSULT NOTE ADULT - ASSESSMENT
64-year-old male with metastatic prostate cancer, admitted for uncontrolled lower back pain, MRI of lumbar spine sig for diffuse osseous mets with pathologic fractures. GaP team consulted for symptom management.

## 2024-10-14 NOTE — CHART NOTE - NSCHARTNOTEFT_GEN_A_CORE
Patient Demographic Information (PDI)       PDI	First Name	Last Name	Birth Date	Gender	Street Address	[facility]	Zip Code  EMMETT Martinez	1960	Male	[address] [facility]	[address]    Prescription Information      PDI Filter:    PDI	Current Rx	Drug Type	Rx Written	Rx Dispensed	Drug	Quantity	Days Supply	Prescriber Name	Prescriber ZABRINA #  A	Y	O	10/11/2024	10/11/2024	oxycontin er 10 mg tablet	60	30	Andrew Mendoza	[id number]  Payment Method Cash  Dispenser [id number]  A	Y	O	10/09/2024	10/10/2024	oxycodone hcl (ir) 5 mg tablet	30	7	Andrew Mendoza	[id number]  Payment Method Medicare  Dispenser [id number]  A	N	O	10/10/2024	10/10/2024	hydromorphone 4 mg tablet	15	3	Angeles Downey	[id number]  Payment Method Medicare  Dispenser [id number]

## 2024-10-15 PROCEDURE — 70553 MRI BRAIN STEM W/O & W/DYE: CPT | Mod: 26

## 2024-10-15 PROCEDURE — 72157 MRI CHEST SPINE W/O & W/DYE: CPT | Mod: 26

## 2024-10-15 PROCEDURE — 99223 1ST HOSP IP/OBS HIGH 75: CPT

## 2024-10-15 PROCEDURE — 99233 SBSQ HOSP IP/OBS HIGH 50: CPT

## 2024-10-15 PROCEDURE — 72156 MRI NECK SPINE W/O & W/DYE: CPT | Mod: 26

## 2024-10-15 PROCEDURE — 72158 MRI LUMBAR SPINE W/O & W/DYE: CPT | Mod: 26

## 2024-10-15 RX ORDER — DEXAMETHASONE 1.5 MG/1
8 TABLET ORAL
Refills: 0 | Status: DISCONTINUED | OUTPATIENT
Start: 2024-10-15 | End: 2024-10-15

## 2024-10-15 RX ORDER — DEXAMETHASONE 1.5 MG/1
8 TABLET ORAL
Refills: 0 | Status: COMPLETED | OUTPATIENT
Start: 2024-10-15 | End: 2024-10-18

## 2024-10-15 RX ORDER — SODIUM CHLORIDE 9 MG/ML
1000 INJECTION, SOLUTION INTRAMUSCULAR; INTRAVENOUS; SUBCUTANEOUS
Refills: 0 | Status: DISCONTINUED | OUTPATIENT
Start: 2024-10-15 | End: 2024-10-17

## 2024-10-15 RX ORDER — 0.9 % SODIUM CHLORIDE 0.9 %
1000 INTRAVENOUS SOLUTION INTRAVENOUS
Refills: 0 | Status: DISCONTINUED | OUTPATIENT
Start: 2024-10-15 | End: 2024-10-15

## 2024-10-15 RX ORDER — PANTOPRAZOLE SODIUM 40 MG/1
40 TABLET, DELAYED RELEASE ORAL DAILY
Refills: 0 | Status: DISCONTINUED | OUTPATIENT
Start: 2024-10-15 | End: 2024-11-18

## 2024-10-15 RX ORDER — ACETAMINOPHEN 500MG 500 MG/1
1000 TABLET, COATED ORAL ONCE
Refills: 0 | Status: COMPLETED | OUTPATIENT
Start: 2024-10-15 | End: 2024-10-15

## 2024-10-15 RX ADMIN — SODIUM CHLORIDE 80 MILLILITER(S): 9 INJECTION, SOLUTION INTRAMUSCULAR; INTRAVENOUS; SUBCUTANEOUS at 22:24

## 2024-10-15 RX ADMIN — LIDOCAINE 1 PATCH: 40 CREAM TOPICAL at 18:29

## 2024-10-15 RX ADMIN — Medication 145 MILLIGRAM(S): at 11:32

## 2024-10-15 RX ADMIN — Medication 30 MILLILITER(S): at 12:14

## 2024-10-15 RX ADMIN — ONDANSETRON HYDROCHLORIDE 4 MILLIGRAM(S): 4 TABLET, FILM COATED ORAL at 18:23

## 2024-10-15 RX ADMIN — ONDANSETRON HYDROCHLORIDE 4 MILLIGRAM(S): 4 TABLET, FILM COATED ORAL at 00:19

## 2024-10-15 RX ADMIN — ACETAMINOPHEN 500MG 400 MILLIGRAM(S): 500 TABLET, COATED ORAL at 00:40

## 2024-10-15 RX ADMIN — CHLORHEXIDINE GLUCONATE 1 APPLICATION(S): 1.2 RINSE ORAL at 11:29

## 2024-10-15 RX ADMIN — DEXAMETHASONE 8 MILLIGRAM(S): 1.5 TABLET ORAL at 05:41

## 2024-10-15 RX ADMIN — OXYCODONE HYDROCHLORIDE 30 MILLIGRAM(S): 30 TABLET ORAL at 18:27

## 2024-10-15 RX ADMIN — ACETAMINOPHEN, DIPHENHYDRAMINE HCL, PHENYLEPHRINE HCL 3 MILLIGRAM(S): 325; 25; 5 TABLET ORAL at 00:23

## 2024-10-15 RX ADMIN — HYDROMORPHONE HYDROCHLORIDE 30 MILLILITER(S): 2 TABLET ORAL at 09:05

## 2024-10-15 RX ADMIN — OXYCODONE HYDROCHLORIDE 30 MILLIGRAM(S): 30 TABLET ORAL at 18:57

## 2024-10-15 RX ADMIN — ONDANSETRON HYDROCHLORIDE 4 MILLIGRAM(S): 4 TABLET, FILM COATED ORAL at 09:06

## 2024-10-15 RX ADMIN — Medication 30 MILLILITER(S): at 20:47

## 2024-10-15 RX ADMIN — POLYETHYLENE GLYCOL 3350 17 GRAM(S): 17 POWDER, FOR SOLUTION ORAL at 11:29

## 2024-10-15 RX ADMIN — LIDOCAINE 1 PATCH: 40 CREAM TOPICAL at 19:09

## 2024-10-15 RX ADMIN — ENOXAPARIN SODIUM 40 MILLIGRAM(S): 30 INJECTION SUBCUTANEOUS at 11:33

## 2024-10-15 RX ADMIN — PANTOPRAZOLE SODIUM 40 MILLIGRAM(S): 40 TABLET, DELAYED RELEASE ORAL at 05:40

## 2024-10-15 RX ADMIN — Medication 50 MILLILITER(S): at 03:48

## 2024-10-15 RX ADMIN — ACETAMINOPHEN 500MG 1000 MILLIGRAM(S): 500 TABLET, COATED ORAL at 01:40

## 2024-10-15 NOTE — PROGRESS NOTE ADULT - ASSESSMENT
64-year-old male with metastatic prostate cancer, sent in by hematologist from [facility] and cancer for uncontrolled pain, nausea, vomiting.       Problem/Plan - 1:  ·  Problem: Cancer related pain.   ·  Plan: - appreciate pall care recommendations:  - pain control  - pain consult   - cw decadron   - RT consult     Problem/Plan - 2:·  Problem: Nausea & vomiting.   ·  Plan: - zofran 4mg IV q 8hrs standing  - obtain ECG, ensure QTc <500 prior to administering zofran  - maintenance fluids while unable to tolerate po.    Problem/Plan - 3:  ·  Problem: CA of prostate.   ·  Plan: Metastatic prostate cancer  - f/u heme/onc recommendations  - cont. Dex 8mg BID today, tomorrow and following day   - MRI L-spine to evaluate if any area for possible palliative RT. L5 lesions seen on imaging.   - Rad Onc eval   - Palliative for symptom control.    Problem/Plan - 4:  ·  Problem: Thrombocytopenia, unspecified.   ·  Plan: -platelet baseline ~70  -monitor, will need transfused for plt <10   - f/u heme/onc for further recommendations.    Problem/Plan - 5:  ·  Problem: Benign essential HTN.   ·  Plan: - cont. home lisinopril 40mg.    Problem/Plan - 6:  ·  Problem: Hyperlipidemia.   ·  Plan: - cont. home atorvastatin 20 and fenofibrate.    Problem/Plan - 7:  ·  Problem: Need for prophylactic measure.   ·  Plan: lovenox.

## 2024-10-15 NOTE — PROGRESS NOTE ADULT - PROBLEM SELECTOR PLAN 5
- Symptom management as above  - Currently undergoing chemotherapy for metastatic prostate cancer  - Discussed with patient, family, primary team  - [facility] team will continue to follow for symptom management. Patient is supported by his wife- Vijaya [phone]) and 3 adult sons. See [facility] note   > Patient is recently dx with metastatic prostate cancer just 1.5 weeks ago and he is treatment oriented.

## 2024-10-15 NOTE — PROGRESS NOTE ADULT - PROBLEM SELECTOR PLAN 1
MRI Lumbar Spine: (10/12) Diffuse osseous metastases with infiltration of spine. Acute pathologic fracture involves the superior endplate of L5 with loss of 20% of vertebral body height and minimal bony retropulsion.    - Continue Oxycontin 30mg BID  - completed Decadron 8mg BID for total of 3 days  - D/C Dilaudid PCA  - start Morphine PCA DD 4mg; LO 30 min; 4 hour limit: 32mg; CAB 4mg   - Narcan PRN  - multimodal pain control: lidocaine patch, warm/cold packs   - Bowel regimen while on opioids  - Appreciate ortho recs- pending further MRI  - Radiation oncology recs:  CT of T/L  spine to see fractured sites, MRI's of full spine,  biopsy to obtain pathology as prostate ca diagnosis is 15 years old, none in chart.   MRI brain, c/o headaches for weeks.   - would RECOMMEND OUTPATIENT PALLIATIVE CARE REFERRAL WITH [facility] palliative care team. MRI Lumbar Spine: (10/12) Diffuse osseous metastases with infiltration of spine. Acute pathologic fracture involves the superior endplate of L5 with loss of 20% of vertebral body height and minimal bony retropulsion.    - Continue Oxycontin 30mg BID (however patient refused last 2 doses d/t n/v)  - completed Decadron 8mg BID for total of 3 days  - D/C Dilaudid PCA as family reports patient "more agitated" with dilaudid and pain better controlled with morphine  - start Morphine PCA DD 4mg; LO 30 min; 4 hour limit: 32mg; CAB 4mg   - Narcan PRN  - multimodal pain control: lidocaine patch, warm/cold packs   - Bowel regimen while on opioids  - Appreciate ortho recs- pending further MRI  - Radiation oncology recs:  CT of T/L  spine to see fractured sites, MRI's of full spine, biopsy to obtain pathology as prostate ca diagnosis is 15 years old, none in chart. MRI brain, c/o headaches for weeks.   - would RECOMMEND OUTPATIENT PALLIATIVE CARE REFERRAL WITH [facility] palliative care team.

## 2024-10-15 NOTE — PROGRESS NOTE ADULT - PROBLEM SELECTOR PLAN 4
The patient requires nursing assistance with ADLs  - Supportive care  - Turn and position  - Continue with good skin care

## 2024-10-15 NOTE — CONSULT NOTE ADULT - SUBJECTIVE AND OBJECTIVE BOX
HPI:  64-year-old male with metastatic prostate cancer, NO path in [place], elevated PSA,  sent in by hematologist from [place] [facility] and [facility] for uncontrolled back pain, nausea, vomiting.       Patient reports diffuse pain starting 6 months ago significantly worsening for the past 2 weeks.  Currently the worst pain is located around the lower back.   No loss of bladder and bowel function, no saddle paresthesia.  Able to walk.  patient is oxycodone 5 every 4-6 hour.  Yesterday they started adding OxyContin 10.  However patient continued to have pain.  Family also reports significant nausea and vomiting, inability to tolerate p.o. for the last 2 days.  Last bowel movement 2 days ago.   No known allergy.    Diagnosed with high risk high volume metastatic prostate cancer with bone, liver lymph node mets and presacral mass. Liver biopsy confirmed disease with . Started lupron, loly/pred with plans to initiate taxotere.    (12 Oct 2024 15:40)    ORTHO:   ASSESSMENT & PLAN  64yMale w/ metastatic prostate ca, w MRI L spine non con showing T11, L1, L5 path fx.    Plan:  -WBAT  -obtain MRI C, T, L spine with and without contrast  -rec rad onc consult for possible RT  -pain control      Heme onc note;  1. Metastatic prostate adenocarcinoma   - Follows with Dr. Andrew Mendoza at [facility]   - PSMA PET 10/11/24 reviewed, shows hypermetabolic vera foci above and below the diaphragm, foci in the liver and extensive foci involving the axial and appendicular skeleton compatible with metastatic disease, including a presacral mass 4.8x2.9cm  - MRI spine here shows diffuse osseous metastases with infiltration of the spine, acute pathologic fracture at L5, possible fracture at T11  - --> 374--> 426 on 10/8/24   - High risk high volume disease, started triplet therapy with ADT (lupron) + abiraterone/prednisone + taxotere   - He had back pain after Lupron, likely tumor flare, and instructed to take steroids  - Continue Abiraterone 1000mg daily with prednisone 5mg BID. While receiving Decadron for chemo as below can hold the prednisone but should resume after the 3 days of decadron   - s/p taxotere 60mg/m2 10/13 with emend, aloxi, benadryl and dex premeds    - Give Dex 8mg BID 10/13 and 10/14   - Rad onc consult pending for MRI findings  - Pain regimen adjusted, currently: lidocaine patch, prn dilaudid, tylenol, and oxycodone ER 20mg q12h   - Appreciate palliative care recs, starting dilaudid PCA         IMPRESSION:    Diffuse osseous metastases with infiltration of spine.Acute pathologic   fracture involves the superior endplate of L5 with loss of 20% of   vertebral body height and minimal bony retropulsion. Compression   deformity of L1, indeterminate in age with loss of 20% of vertebral body   height and minimal bony retropulsion. Decreased T1/T2 signal through the   inferior T11 vertebral body which may reflect an acute fracture. Moderate   diffuse degenerative disc disease with loss of disc height and signal   within the nucleus pulposus. Disc bulges are noted at T11-T12, L1-2   through L5-S1 which flatten the ventral thecal sac and narrow the   BILATERAL neural foramina. Mild central stenosis at L1-2. Mild to   moderate facet osteophytic hypertrophy at L5-S1.        Allergies    No Known Allergies    Intolerances        ROS: [  ] Fever  [  ] Chills  [  ]Chest Pain [  ] SOB  [  ]Cough [  ] N/V  [  ] Diarrhea [  ]Constipation [  ]Other ROS:  [  ] ROS otherwise negative    PAST MEDICAL & SURGICAL HISTORY:  Benign essential HTN    HLD (hyperlipidemia)    CA of prostate    Basal cell carcinoma    History of transurethral prostatectomy    S/P inguinal hernia repair    History of basal cell carcinoma (BCC) excision        FAMILY HISTORY:  FH: HTN (hypertension) (Sibling)        MEDICATIONS  (STANDING):  abiraterone 500 mg tablet 2 Tablet(s) 2 Tablet(s) Oral daily  atorvastatin 20 milliGRAM(s) Oral at bedtime  chlorhexidine 2% Cloths 1 Application(s) Topical daily  enoxaparin Injectable 40 milliGRAM(s) SubCutaneous every 24 hours  fenofibrate Tablet 145 milliGRAM(s) Oral daily  HYDROmorphone PCA (1 mG/mL) 30 milliLiter(s) PCA Continuous PCA Continuous  lidocaine   4% Patch 1 Patch Transdermal every 24 hours  lisinopril 40 milliGRAM(s) Oral daily  ondansetron Injectable 4 milliGRAM(s) IV Push every 8 hours  oxyCODONE  ER Tablet 30 milliGRAM(s) Oral every 12 hours  pantoprazole  Injectable 40 milliGRAM(s) IV Push daily  polyethylene glycol 3350 17 Gram(s) Oral daily  sodium chloride 0.9% 1000 milliLiter(s) IV Continuous     MEDICATIONS  (PRN):  acetaminophen     Tablet .. 650 milliGRAM(s) Oral every 6 hours PRN Temp greater or equal to 38C (100.4F), Mild Pain (1 - 3), Moderate Pain (4 - 6)  aluminum hydroxide/magnesium hydroxide/simethicone Suspension 30 milliLiter(s) Oral every 4 hours PRN Dyspepsia  HYDROmorphone PCA (1 mG/mL) Rescue Clinician Bolus 2 milliGRAM(s) IV Push every 15 minutes PRN Breakthrough pain  naloxone Injectable 0.1 milliGRAM(s) IV Push every 3 minutes PRN For ANY of the following changes in patient status:  A. RR LESS THAN 10 breaths per minute, B. Oxygen saturation LESS THAN 90%, C. Sedation score of 6      PHYSICAL EXAM  Vital Signs Last 24 Hrs  T(C): 36.6 (15 Oct 2024 05:40), Max: 36.9 (14 Oct 2024 17:34)  T(F): 97.9 (15 Oct 2024 05:40), Max: 98.4 (14 Oct 2024 17:34)  HR: 89 (15 Oct 2024 05:40) (75 - 89)  BP: 147/86 (15 Oct 2024 05:40) (147/86 - 175/86)  BP(mean): --  RR: 18 (15 Oct 2024 05:40) (16 - 18)  SpO2: 98% (15 Oct 2024 05:40) (90% - 98%)    Parameters below as of 15 Oct 2024 05:40  Patient On (Oxygen Delivery Method): room air        General: appears stated age,  no acute distress  HEENT: NC/AT; EOMI, PERRL, sclera nonicteric; external ears normal; no rhinorrhea or epistaxis; mucous membranes moist; oropharynx clear and without erythema  CV: NR, RR; no appreciable r/m/g  Lungs: CTAB, no increased work of breathing  Abdomen: Bowel sounds present; soft, NTND  MSK: Vertebral spine non-tender to palpation  Neuro: AAOx3; cranial nerves II-XII intact; strength 5/5 in upper and lower extremities; sensation to light touch in tact bilaterally.  Psych: Full affect; mood congruent  Skin: no visible rashes on limited examination    IMAGING/LABS/PATHOLOGY: I have personally reviewed the relevant labs, pathology, and imaging as noted in the HPI.  In addition,                          14.1   5.81  )-----------( 89       ( 14 Oct 2024 06:19 )             40.1     10-14    138  |  104  |  32[H]  ----------------------------<  142[H]  3.9   |  20[L]  |  0.91    Ca    8.5      14 Oct 2024 06:19  Phos  3.5     10-14  Mg     2.50     10-14    TPro  6.6  /  Alb  4.2  /  TBili  1.1  /  DBili  x   /  AST  62[H]  /  ALT  12  /  AlkPhos  102  10-14        ASSESSMENT/PLAN    RORY PEACE is a 64y man with h/o prostate cancer 15 years ago, no pathology in [place] (which rad onc requires to consider any treatments), outside oncologist, plans to start chemotherapy,  came to [facility] for N/V, and lower back pains.  MRI found L5 fracture with minimal retropulsed bone. L1 compression deformity, also minimal retropulsed bone. T11 "may reflect an acute fracture."   Rad Onc asked about palliative RT for "pain control."    Will d/w Dr. Bruno. HPI:  64-year-old male with metastatic prostate cancer, NO path in [place], elevated PSA,  sent in by hematologist from [place] [facility] and [facility] for uncontrolled back pain, nausea, vomiting.       Patient reports diffuse pain starting 6 months ago significantly worsening for the past 2 weeks.  Currently the worst pain is located around the lower back.   No loss of bladder and bowel function, no saddle paresthesia.  Able to walk.  patient is oxycodone 5 every 4-6 hour.  Yesterday they started adding OxyContin 10.  However patient continued to have pain.  Family also reports significant nausea and vomiting, inability to tolerate p.o. for the last 2 days.  Last bowel movement 2 days ago.   No known allergy.    Diagnosed with high risk high volume metastatic prostate cancer with bone, liver lymph node mets and presacral mass. Liver biopsy confirmed disease with . Started lupron, loly/pred with plans to initiate taxotere.    (12 Oct 2024 15:40)    ORTHO:   ASSESSMENT & PLAN  64yMale w/ metastatic prostate ca, w MRI L spine non con showing T11, L1, L5 path fx.    Plan:  -WBAT  -obtain MRI C, T, L spine with and without contrast  -rec rad onc consult for possible RT  -pain control      Heme onc note;  1. Metastatic prostate adenocarcinoma   - Follows with Dr. Andrew Mendoza at [facility]   - PSMA PET 10/11/24 reviewed, shows hypermetabolic vera foci above and below the diaphragm, foci in the liver and extensive foci involving the axial and appendicular skeleton compatible with metastatic disease, including a presacral mass 4.8x2.9cm  - MRI spine here shows diffuse osseous metastases with infiltration of the spine, acute pathologic fracture at L5, possible fracture at T11  - --> 374--> 426 on 10/8/24   - High risk high volume disease, started triplet therapy with ADT (lupron) + abiraterone/prednisone + taxotere   - He had back pain after Lupron, likely tumor flare, and instructed to take steroids  - Continue Abiraterone 1000mg daily with prednisone 5mg BID. While receiving Decadron for chemo as below can hold the prednisone but should resume after the 3 days of decadron   - s/p taxotere 60mg/m2 10/13 with emend, aloxi, benadryl and dex premeds    - Give Dex 8mg BID 10/13 and 10/14   - Rad onc consult pending for MRI findings  - Pain regimen adjusted, currently: lidocaine patch, prn dilaudid, tylenol, and oxycodone ER 20mg q12h   - Appreciate palliative care recs, starting dilaudid PCA         MRI IMPRESSION:    Diffuse osseous metastases with infiltration of spine.Acute pathologic   fracture involves the superior endplate of L5 with loss of 20% of   vertebral body height and minimal bony retropulsion. Compression   deformity of L1, indeterminate in age with loss of 20% of vertebral body   height and minimal bony retropulsion. Decreased T1/T2 signal through the   inferior T11 vertebral body which may reflect an acute fracture. Moderate   diffuse degenerative disc disease with loss of disc height and signal   within the nucleus pulposus. Disc bulges are noted at T11-T12, L1-2   through L5-S1 which flatten the ventral thecal sac and narrow the   BILATERAL neural foramina. Mild central stenosis at L1-2. Mild to   moderate facet osteophytic hypertrophy at L5-S1.        Allergies    No Known Allergies    Intolerances        ROS: [  ] Fever  [  ] Chills  [  ]Chest Pain [  ] SOB  [  ]Cough [  ] N/V  [  ] Diarrhea [  ]Constipation [  ]Other ROS:  [  ] ROS otherwise negative    PAST MEDICAL & SURGICAL HISTORY:  Benign essential HTN    HLD (hyperlipidemia)    CA of prostate    Basal cell carcinoma    History of transurethral prostatectomy    S/P inguinal hernia repair    History of basal cell carcinoma (BCC) excision        FAMILY HISTORY:  FH: HTN (hypertension) (Sibling)        MEDICATIONS  (STANDING):  abiraterone 500 mg tablet 2 Tablet(s) 2 Tablet(s) Oral daily  atorvastatin 20 milliGRAM(s) Oral at bedtime  chlorhexidine 2% Cloths 1 Application(s) Topical daily  enoxaparin Injectable 40 milliGRAM(s) SubCutaneous every 24 hours  fenofibrate Tablet 145 milliGRAM(s) Oral daily  HYDROmorphone PCA (1 mG/mL) 30 milliLiter(s) PCA Continuous PCA Continuous  lidocaine   4% Patch 1 Patch Transdermal every 24 hours  lisinopril 40 milliGRAM(s) Oral daily  ondansetron Injectable 4 milliGRAM(s) IV Push every 8 hours  oxyCODONE  ER Tablet 30 milliGRAM(s) Oral every 12 hours  pantoprazole  Injectable 40 milliGRAM(s) IV Push daily  polyethylene glycol 3350 17 Gram(s) Oral daily  sodium chloride 0.9% 1000 milliLiter(s) IV Continuous     MEDICATIONS  (PRN):  acetaminophen     Tablet .. 650 milliGRAM(s) Oral every 6 hours PRN Temp greater or equal to 38C (100.4F), Mild Pain (1 - 3), Moderate Pain (4 - 6)  aluminum hydroxide/magnesium hydroxide/simethicone Suspension 30 milliLiter(s) Oral every 4 hours PRN Dyspepsia  HYDROmorphone PCA (1 mG/mL) Rescue Clinician Bolus 2 milliGRAM(s) IV Push every 15 minutes PRN Breakthrough pain  naloxone Injectable 0.1 milliGRAM(s) IV Push every 3 minutes PRN For ANY of the following changes in patient status:  A. RR LESS THAN 10 breaths per minute, B. Oxygen saturation LESS THAN 90%, C. Sedation score of 6      PHYSICAL EXAM  Vital Signs Last 24 Hrs  T(C): 36.6 (15 Oct 2024 05:40), Max: 36.9 (14 Oct 2024 17:34)  T(F): 97.9 (15 Oct 2024 05:40), Max: 98.4 (14 Oct 2024 17:34)  HR: 89 (15 Oct 2024 05:40) (75 - 89)  BP: 147/86 (15 Oct 2024 05:40) (147/86 - 175/86)  BP(mean): --  RR: 18 (15 Oct 2024 05:40) (16 - 18)  SpO2: 98% (15 Oct 2024 05:40) (90% - 98%)    Parameters below as of 15 Oct 2024 05:40  Patient On (Oxygen Delivery Method): room air      kps 50-  General: appears stated age,  no acute distress,  in moderate to severe pain, supine and in bed, wife/g/f bedside.   HEENT: NC/AT; EOMI, PERRL, sclera nonicteric; external ears normal; no rhinorrhea or epistaxis; mucous membranes moist; oropharynx clear and without erythema  CV: NR, RR; no appreciable r/m/g  Lungs: CTAB, no increased work of breathing  Abdomen: Bowel sounds present; soft, NTND  MSK: Vertebral spine non-tender to palpation  Neuro: AAOx3; cranial nerves II-XII intact; strength 5/5 in upper and lower extremities; sensation to light touch in tact bilaterally.  limited motions by pain    IMAGING/LABS/PATHOLOGY: I have personally reviewed the relevant labs, pathology, and imaging as noted in the HPI.  In addition,                          14.1   5.81  )-----------( 89       ( 14 Oct 2024 06:19 )             40.1     10-14    138  |  104  |  32[H]  ----------------------------<  142[H]  3.9   |  20[L]  |  0.91    Ca    8.5      14 Oct 2024 06:19  Phos  3.5     10-14  Mg     2.50     10-14    TPro  6.6  /  Alb  4.2  /  TBili  1.1  /  DBili  x   /  AST  62[H]  /  ALT  12  /  AlkPhos  102  10-14        ASSESSMENT/PLAN    RORY PEACE is a 64y man with h/o prostate cancer 15 years ago, no pathology in [place] (which rad onc requires to consider any treatments), outside oncologist, plans to start chemotherapy,  came to [facility] for N/V, and lower back pains.  MRI found L5 fracture with minimal retropulsed bone. L1 compression deformity, also minimal retropulsed bone. T11 "may reflect an acute fracture."   Rad Onc asked about palliative RT for "pain control."    Dr. Bruno requests: CT of T/L  spine to see fractured sites, MRI's of full spine,  biopsy to obtain pathology as prostate ca diagnosis is 15 years old, none in chart.   MRI brain, c/o headaches for weeks.    d/w Dr. Bruno. HPI:  64-year-old male with metastatic prostate cancer, NO path in [place], elevated PSA,  sent in by hematologist from [place] [facility] and [facility] for uncontrolled back pain, nausea, vomiting.       Patient reports diffuse pain starting 6 months ago significantly worsening for the past 2 weeks.  Currently the worst pain is located around the lower back.   No loss of bladder and bowel function, no saddle paresthesia.  Able to walk.  patient is oxycodone 5 every 4-6 hour.  Yesterday they started adding OxyContin 10.  However patient continued to have pain.  Family also reports significant nausea and vomiting, inability to tolerate p.o. for the last 2 days.  Last bowel movement 2 days ago.   No known allergy.    Diagnosed with high risk high volume metastatic prostate cancer with bone, liver lymph node mets and presacral mass. Liver biopsy confirmed disease with . Started lupron, loly/pred with plans to initiate taxotere.    (12 Oct 2024 15:40)    ORTHO:   ASSESSMENT & PLAN  64yMale w/ metastatic prostate ca, w MRI L spine non con showing T11, L1, L5 path fx.    Plan:  -WBAT  -obtain MRI C, T, L spine with and without contrast  -rec rad onc consult for possible RT  -pain control      Heme onc note;  1. Metastatic prostate adenocarcinoma   - Follows with Dr. Andrew Mendoza at [facility]   - PSMA PET 10/11/24 reviewed, shows hypermetabolic vera foci above and below the diaphragm, foci in the liver and extensive foci involving the axial and appendicular skeleton compatible with metastatic disease, including a presacral mass 4.8x2.9cm  - MRI spine here shows diffuse osseous metastases with infiltration of the spine, acute pathologic fracture at L5, possible fracture at T11  - --> 374--> 426 on 10/8/24   - High risk high volume disease, started triplet therapy with ADT (lupron) + abiraterone/prednisone + taxotere   - He had back pain after Lupron, likely tumor flare, and instructed to take steroids  - Continue Abiraterone 1000mg daily with prednisone 5mg BID. While receiving Decadron for chemo as below can hold the prednisone but should resume after the 3 days of decadron   - s/p taxotere 60mg/m2 10/13 with emend, aloxi, benadryl and dex premeds    - Give Dex 8mg BID 10/13 and 10/14   - Rad onc consult pending for MRI findings  - Pain regimen adjusted, currently: lidocaine patch, prn dilaudid, tylenol, and oxycodone ER 20mg q12h   - Appreciate palliative care recs, starting dilaudid PCA         MRI IMPRESSION:    Diffuse osseous metastases with infiltration of spine.Acute pathologic   fracture involves the superior endplate of L5 with loss of 20% of   vertebral body height and minimal bony retropulsion. Compression   deformity of L1, indeterminate in age with loss of 20% of vertebral body   height and minimal bony retropulsion. Decreased T1/T2 signal through the   inferior T11 vertebral body which may reflect an acute fracture. Moderate   diffuse degenerative disc disease with loss of disc height and signal   within the nucleus pulposus. Disc bulges are noted at T11-T12, L1-2   through L5-S1 which flatten the ventral thecal sac and narrow the   BILATERAL neural foramina. Mild central stenosis at L1-2. Mild to   moderate facet osteophytic hypertrophy at L5-S1.        Allergies    No Known Allergies    Intolerances        ROS: [  ] Fever  [  ] Chills  [  ]Chest Pain [  ] SOB  [  ]Cough [  ] N/V  [  ] Diarrhea [  ]Constipation [  ]Other ROS:  [  ] ROS otherwise negative    PAST MEDICAL & SURGICAL HISTORY:  Benign essential HTN    HLD (hyperlipidemia)    CA of prostate    Basal cell carcinoma    History of transurethral prostatectomy    S/P inguinal hernia repair    History of basal cell carcinoma (BCC) excision        FAMILY HISTORY:  FH: HTN (hypertension) (Sibling)        MEDICATIONS  (STANDING):  abiraterone 500 mg tablet 2 Tablet(s) 2 Tablet(s) Oral daily  atorvastatin 20 milliGRAM(s) Oral at bedtime  chlorhexidine 2% Cloths 1 Application(s) Topical daily  enoxaparin Injectable 40 milliGRAM(s) SubCutaneous every 24 hours  fenofibrate Tablet 145 milliGRAM(s) Oral daily  HYDROmorphone PCA (1 mG/mL) 30 milliLiter(s) PCA Continuous PCA Continuous  lidocaine   4% Patch 1 Patch Transdermal every 24 hours  lisinopril 40 milliGRAM(s) Oral daily  ondansetron Injectable 4 milliGRAM(s) IV Push every 8 hours  oxyCODONE  ER Tablet 30 milliGRAM(s) Oral every 12 hours  pantoprazole  Injectable 40 milliGRAM(s) IV Push daily  polyethylene glycol 3350 17 Gram(s) Oral daily  sodium chloride 0.9% 1000 milliLiter(s) IV Continuous     MEDICATIONS  (PRN):  acetaminophen     Tablet .. 650 milliGRAM(s) Oral every 6 hours PRN Temp greater or equal to 38C (100.4F), Mild Pain (1 - 3), Moderate Pain (4 - 6)  aluminum hydroxide/magnesium hydroxide/simethicone Suspension 30 milliLiter(s) Oral every 4 hours PRN Dyspepsia  HYDROmorphone PCA (1 mG/mL) Rescue Clinician Bolus 2 milliGRAM(s) IV Push every 15 minutes PRN Breakthrough pain  naloxone Injectable 0.1 milliGRAM(s) IV Push every 3 minutes PRN For ANY of the following changes in patient status:  A. RR LESS THAN 10 breaths per minute, B. Oxygen saturation LESS THAN 90%, C. Sedation score of 6      PHYSICAL EXAM  Vital Signs Last 24 Hrs  T(C): 36.6 (15 Oct 2024 05:40), Max: 36.9 (14 Oct 2024 17:34)  T(F): 97.9 (15 Oct 2024 05:40), Max: 98.4 (14 Oct 2024 17:34)  HR: 89 (15 Oct 2024 05:40) (75 - 89)  BP: 147/86 (15 Oct 2024 05:40) (147/86 - 175/86)  BP(mean): --  RR: 18 (15 Oct 2024 05:40) (16 - 18)  SpO2: 98% (15 Oct 2024 05:40) (90% - 98%)    Parameters below as of 15 Oct 2024 05:40  Patient On (Oxygen Delivery Method): room air      kps 50-  General: appears stated age,  no acute distress,  in moderate to severe pain, supine and in bed, wife/g/f bedside.   HEENT: NC/AT; EOMI, PERRL, sclera nonicteric; external ears normal; no rhinorrhea or epistaxis; mucous membranes moist; oropharynx clear and without erythema  CV: NR, RR; no appreciable r/m/g  Lungs: CTAB, no increased work of breathing  Abdomen: Bowel sounds present; soft, NTND  MSK: Vertebral spine non-tender to palpation  Neuro: AAOx3; cranial nerves II-XII intact; strength 5/5 in upper and lower extremities; sensation to light touch in tact bilaterally.  limited motions by pain    IMAGING/LABS/PATHOLOGY: I have personally reviewed the relevant labs, pathology, and imaging as noted in the HPI.  In addition,                          14.1   5.81  )-----------( 89       ( 14 Oct 2024 06:19 )             40.1     10-14    138  |  104  |  32[H]  ----------------------------<  142[H]  3.9   |  20[L]  |  0.91    Ca    8.5      14 Oct 2024 06:19  Phos  3.5     10-14  Mg     2.50     10-14    TPro  6.6  /  Alb  4.2  /  TBili  1.1  /  DBili  x   /  AST  62[H]  /  ALT  12  /  AlkPhos  102  10-14        ASSESSMENT/PLAN    RORY PEACE is a 64y man with h/o prostate cancer 15 years ago, no pathology in [place] (which rad onc requires to consider any treatments), outside oncologist, plans to start chemotherapy,  came to [facility] for N/V, and lower back pains.  MRI found L5 fracture with minimal retropulsed bone. L1 compression deformity, also minimal retropulsed bone. T11 "may reflect an acute fracture."   Rad Onc asked about palliative RT for "pain control."    Dr. Bruno requests: CT of T/L  spine to see fractured sites, MRI's of full spine; he may be a candidate for high dose RT to the L5 site,  and we can consider if kyphoplasty is an option for T spine sites after reviewing the CT scans we need.   Per onc: he had a liver biopsy one month ago consistent with mets prostate ca.   MRI brain, c/o headaches for weeks.    d/w Dr. Bruno and will follow after imaging is done.

## 2024-10-15 NOTE — PROGRESS NOTE ADULT - PROBLEM SELECTOR PLAN 6
- Symptom management as above  - Currently undergoing chemotherapy for metastatic prostate cancer  - Discussed with patient, family, primary team  - [facility] team will continue to follow for symptom management.

## 2024-10-15 NOTE — PROGRESS NOTE ADULT - PROBLEM SELECTOR PLAN 2
- Metastatic prostate adenocarcinoma, follows with Dr. Andrew Mendoza  - Oncology recs appreciated: High risk high volume disease, started triplet therapy with ADT(lupron) + abiraterone/prednisone + taxotere  - MRI Head, Cervical Spine, Thoracic Spine, Lumbar Spine pending  - manage per oncology team.

## 2024-10-15 NOTE — PROGRESS NOTE ADULT - SUBJECTIVE AND OBJECTIVE BOX
Patient is a 64y old  Male who presents with a chief complaint of Pain (15 Oct 2024 13:13)    Date of servie : 10-15-24 @ 14:43  INTERVAL HPI/OVERNIGHT EVENTS:  T(C): 36.7 (10-15-24 @ 10:23), Max: 36.9 (10-14-24 @ 17:34)  HR: 75 (10-15-24 @ 10:23) (75 - 89)  BP: 159/109 (10-15-24 @ 10:23) (147/86 - 173/102)  RR: 18 (10-15-24 @ 10:23) (16 - 18)  SpO2: 96% (10-15-24 @ 10:23) (96% - 98%)  Wt(kg): --  I&O's Summary    14 Oct 2024 07:01  -  15 Oct 2024 07:00  --------------------------------------------------------  IN: 815 mL / OUT: 0 mL / NET: 815 mL        LABS:                        14.1   5.81  )-----------( 89       ( 14 Oct 2024 06:19 )             40.1     10-14    138  |  104  |  32[H]  ----------------------------<  142[H]  3.9   |  20[L]  |  0.91    Ca    8.5      14 Oct 2024 06:19  Phos  3.5     10-14  Mg     2.50     10-14    TPro  6.6  /  Alb  4.2  /  TBili  1.1  /  DBili  x   /  AST  62[H]  /  ALT  12  /  AlkPhos  102  10-14      Urinalysis Basic - ( 14 Oct 2024 06:19 )    Color: x / Appearance: x / SG: x / pH: x  Gluc: 142 mg/dL / Ketone: x  / Bili: x / Urobili: x   Blood: x / Protein: x / Nitrite: x   Leuk Esterase: x / RBC: x / WBC x   Sq Epi: x / Non Sq Epi: x / Bacteria: x      CAPILLARY BLOOD GLUCOSE            Urinalysis Basic - ( 14 Oct 2024 06:19 )    Color: x / Appearance: x / SG: x / pH: x  Gluc: 142 mg/dL / Ketone: x  / Bili: x / Urobili: x   Blood: x / Protein: x / Nitrite: x   Leuk Esterase: x / RBC: x / WBC x   Sq Epi: x / Non Sq Epi: x / Bacteria: x        MEDICATIONS  (STANDING):  abiraterone 500 mg tablet 2 Tablet(s) 2 Tablet(s) Oral daily  atorvastatin 20 milliGRAM(s) Oral at bedtime  chlorhexidine 2% Cloths 1 Application(s) Topical daily  enoxaparin Injectable 40 milliGRAM(s) SubCutaneous every 24 hours  fenofibrate Tablet 145 milliGRAM(s) Oral daily  lidocaine   4% Patch 1 Patch Transdermal every 24 hours  lisinopril 40 milliGRAM(s) Oral daily  morphine PCA (5 mG/mL) 30 milliLiter(s) PCA Continuous PCA Continuous  ondansetron Injectable 4 milliGRAM(s) IV Push every 8 hours  oxyCODONE  ER Tablet 30 milliGRAM(s) Oral every 12 hours  pantoprazole  Injectable 40 milliGRAM(s) IV Push daily  polyethylene glycol 3350 17 Gram(s) Oral daily  sodium chloride 0.9%. 1000 milliLiter(s) (80 mL/Hr) IV Continuous <Continuous>    MEDICATIONS  (PRN):  acetaminophen     Tablet .. 650 milliGRAM(s) Oral every 6 hours PRN Temp greater or equal to 38C (100.4F), Mild Pain (1 - 3), Moderate Pain (4 - 6)  aluminum hydroxide/magnesium hydroxide/simethicone Suspension 30 milliLiter(s) Oral every 4 hours PRN Dyspepsia  morphine  - Injectable 4 milliGRAM(s) IV Push once PRN severe breakthrough pain while off PCA  morphine PCA (5 mG/mL) Rescue Clinician Bolus 4 milliGRAM(s) IV Push every 15 minutes PRN Breakthrough severe pain  naloxone Injectable 0.1 milliGRAM(s) IV Push every 3 minutes PRN For ANY of the following changes in patient status:  A. RR LESS THAN 10 breaths per minute, B. Oxygen saturation LESS THAN 90%, C. Sedation score of 6          PHYSICAL EXAM:  GENERAL: NAD, well-groomed, well-developed  HEAD:  Atraumatic, Normocephalic  CHEST/LUNG: Clear to percussion bilaterally; No rales, rhonchi, wheezing, or rubs  HEART: Regular rate and rhythm; No murmurs, rubs, or gallops  ABDOMEN: Soft, Nontender, Nondistended; Bowel sounds present  EXTREMITIES:  2+ Peripheral Pulses, No clubbing, cyanosis, or edema  LYMPH: No lymphadenopathy noted  SKIN: No rashes or lesions    Care Discussed with Consultants/Other Providers [ ] YES  [ ] NO

## 2024-10-15 NOTE — PROGRESS NOTE ADULT - SUBJECTIVE AND OBJECTIVE BOX
SUBJECTIVE AND OBJECTIVE:  Indication for Geriatrics and Palliative Care Services/INTERVAL HPI:  Patient seen and examined at bedside, wife and son at bedside. Pt reports that the Dilaudid PCA was not working in the evening and went multiple hours without pain medication. Dilaudid provided temporary relief when he was available, however he noticed dizziness and increased nausea with medication. Family is concerned that the Dilaudid is affecting his mood and making him more agitated.   Has not gotten MRI yet.    OVERNIGHT EVENTS:  Since start of Dilaudid PCA 1mg/mL, DD: 1mg, LO: 30min: 4 hr limit: 8mg; pt used 5 injections on 5 attempts.     DNR on chart:  Allergies    No Known Allergies    Intolerances    MEDICATIONS  (STANDING):  abiraterone 500 mg tablet 2 Tablet(s) 2 Tablet(s) Oral daily  atorvastatin 20 milliGRAM(s) Oral at bedtime  chlorhexidine 2% Cloths 1 Application(s) Topical daily  enoxaparin Injectable 40 milliGRAM(s) SubCutaneous every 24 hours  fenofibrate Tablet 145 milliGRAM(s) Oral daily  lidocaine   4% Patch 1 Patch Transdermal every 24 hours  lisinopril 40 milliGRAM(s) Oral daily  morphine PCA (5 mG/mL) 30 milliLiter(s) PCA Continuous PCA Continuous  ondansetron Injectable 4 milliGRAM(s) IV Push every 8 hours  oxyCODONE  ER Tablet 30 milliGRAM(s) Oral every 12 hours  pantoprazole  Injectable 40 milliGRAM(s) IV Push daily  polyethylene glycol 3350 17 Gram(s) Oral daily  sodium chloride 0.9%. 1000 milliLiter(s) (80 mL/Hr) IV Continuous <Continuous>    MEDICATIONS  (PRN):  acetaminophen     Tablet .. 650 milliGRAM(s) Oral every 6 hours PRN Temp greater or equal to 38C (100.4F), Mild Pain (1 - 3), Moderate Pain (4 - 6)  aluminum hydroxide/magnesium hydroxide/simethicone Suspension 30 milliLiter(s) Oral every 4 hours PRN Dyspepsia  naloxone Injectable 0.1 milliGRAM(s) IV Push every 3 minutes PRN For ANY of the following changes in patient status:  A. RR LESS THAN 10 breaths per minute, B. Oxygen saturation LESS THAN 90%, C. Sedation score of 6      ITEMS UNCHECKED ARE NOT PRESENT    PRESENT SYMPTOMS: [ ]Unable to self-report - see  CPOT, PAINADS, RDOS below  Source if other than patient:  [ ]Family   [ ]Team     Pain: [x]yes [ ]no  QOL impact - Limits mobility  Location - Lower back  Aggravating factors - Movement  Quality - Sharp  Radiation - non-radiating  Timing- Intermittent  Severity (0-10 scale): 8  Minimal acceptable level (0-10 scale): 0      PCSSQ[Palliative Care Spiritual Screening Question]   Severity (0-10):  Score of 4 or > indicate consideration of Chaplaincy referral.  Chaplaincy Referral: [ ] yes [ ] refused [ ] following    Caregiver [place]? : [ ] yes [ ] no  [x ] deferred:  Social work referral [ ] Patient & Family Centered Care Referral [ ]     Anticipatory Grief present?:  [ ] yes [ ] no  [x ] deferred: Social work referral [ ] Patient & Family Centered Care Referral [ ]      Other Symptoms:  [x ]All other review of systems negative     PHYSICAL EXAM:  Vital Signs Last 24 Hrs  T(C): 36.7 (15 Oct 2024 10:23), Max: 36.9 (14 Oct 2024 17:34)  T(F): 98 (15 Oct 2024 10:23), Max: 98.4 (14 Oct 2024 17:34)  HR: 75 (15 Oct 2024 10:23) (75 - 89)  BP: 159/109 (15 Oct 2024 10:23) (147/86 - 173/102)  BP(mean): --  RR: 18 (15 Oct 2024 10:23) (16 - 18)  SpO2: 96% (15 Oct 2024 10:23) (95% - 98%)    Parameters below as of 15 Oct 2024 10:23  Patient On (Oxygen Delivery Method): room air     I&O's Summary    14 Oct 2024 07:01  -  15 Oct 2024 07:00  --------------------------------------------------------  IN: 815 mL / OUT: 0 mL / NET: 815 mL       GENERAL: [ ]Cachexia    [x ]Alert  [ ]Oriented x   [ ]Lethargic  [ ]Unarousable  [x ]Verbal  [ ]Non-Verbal  Behavioral:   [ ]Anxiety  [ ]Delirium [ ]Agitation [ ]Other  HEENT:  [ ]Normal   [ ]Dry mouth   [ ]ET Tube/Trach  [ ]Oral lesions  PULMONARY:   [x ]Clear [ ]Tachypnea  [ ]Audible excessive secretions   [ ]Rhonchi        [ ]Right [ ]Left [ ]Bilateral  [ ]Crackles        [ ]Right [ ]Left [ ]Bilateral  [ ]Wheezing     [ ]Right [ ]Left [ ]Bilateral  [ ]Diminished BS [ ] Right [ ]Left [ ]Bilateral  CARDIOVASCULAR:    [x ]Regular [ ]Irregular [ ]Tachy  [ ]Rodrick [ ]Murmur [ ]Other  GASTROINTESTINAL:  [x ]Soft  [ ]Distended   [ ]+BS  [ ]Non tender [ ]Tender  [ ]Other [ ]PEG [ ]OGT/ NGT   Last BM:   GENITOURINARY:  [x ]Normal [ ]Incontinent   [ ]Oliguria/Anuria   [ ]Farah  MUSCULOSKELETAL:   [x ]Normal   [ ]Weakness  [ ]Bed/Wheelchair bound [ ]Edema  NEUROLOGIC:   [x ]No focal deficits  [ ] Cognitive impairment  [ ] Dysphagia [ ]Dysarthria [ ] Paresis [ ]Other   SKIN:   [ ]Normal  [ ]Rash  [ ]Other  [ ]Pressure ulcer(s) [ ]y [ ]n present on admission    CRITICAL CARE:  [ ]Shock Present  [ ]Septic [ ]Cardiogenic [ ]Neurologic [ ]Hypovolemic  [ ]Vasopressors [ ]Inotropes  [ ]Respiratory failure present [ ]Mechanical Ventilation [ ]Non-invasive ventilatory support [ ]High-Flow   [ ]Acute  [ ]Chronic [ ]Hypoxic  [ ]Hypercarbic [ ]Other  [ ]Other organ failure     LABS:                        14.1   5.81  )-----------( 89       ( 14 Oct 2024 06:19 )             40.1   10-14    138  |  104  |  32[H]  ----------------------------<  142[H]  3.9   |  20[L]  |  0.91    Ca    8.5      14 Oct 2024 06:19  Phos  3.5     10-14  Mg     2.50     10-14    TPro  6.6  /  Alb  4.2  /  TBili  1.1  /  DBili  x   /  AST  62[H]  /  ALT  12  /  AlkPhos  102  10-14      Urinalysis Basic - ( 14 Oct 2024 06:19 )    Color: x / Appearance: x / SG: x / pH: x  Gluc: 142 mg/dL / Ketone: x  / Bili: x / Urobili: x   Blood: x / Protein: x / Nitrite: x   Leuk Esterase: x / RBC: x / WBC x   Sq Epi: x / Non Sq Epi: x / Bacteria: x      RADIOLOGY & ADDITIONAL STUDIES:    Protein Calorie Malnutrition Present: [ ]mild [ ]moderate [ ]severe [ ]underweight [ ]morbid obesity  https://www.andeal.org/vault/2440/web/files/ONC/Table_Clinical%20Characteristics%20to%20Document%20Malnutrition-White%20JV%20et%20al%202012.pdf    Height (cm): 172.7 (10-12-24 @ 09:21)  Weight (kg): 69.4 (10-12-24 @ 09:21)  BMI (kg/m2): 23.3 (10-12-24 @ 09:21)    [ ]PPSV2 < or = 30%  [ ]significant weight loss [ ]poor nutritional intake [ ]anasarca[ ]Artificial Nutrition    Other REFERRALS:  [ ]Hospice  [ ]Child Life  [ ]Social Work  [ ]Case management [ ]Holistic Therapy     Palliative Performance Scale:  http://npcrc.org/files/news/palliative_performance_scale_ppsv2.pdf  (Ctrl +  left click to view)  Respiratory Distress Observation Tool:  https://homecareinformation.net/handouts/hen/Respiratory_Distress_Observation_Scale.pdf (Ctrl +  left click to view)  PAINAD Score:  http://geriatrictoolkit.[email or web address]/cog/painad.pdf (Ctrl +  left click to view)       SUBJECTIVE AND OBJECTIVE:  Indication for Geriatrics and Palliative Care Services/INTERVAL HPI:  Patient seen and examined at bedside, wife and son at bedside. Pt reports that the Dilaudid PCA was not working in the evening and went multiple hours without pain medication. Dilaudid provided temporary relief when he was available, however he noticed dizziness and increased nausea with medication. Family is concerned that the Dilaudid is affecting his mood and making him more agitated.   Has not gotten MRI yet.    OVERNIGHT EVENTS:  Since start of Dilaudid PCA 1mg/mL, DD: 1mg, LO: 30min: 4 hr limit: 8mg; pt used 5 injections on 5 attempts.     DNR on chart:  Allergies    No Known Allergies    Intolerances    MEDICATIONS  (STANDING):  abiraterone 500 mg tablet 2 Tablet(s) 2 Tablet(s) Oral daily  atorvastatin 20 milliGRAM(s) Oral at bedtime  chlorhexidine 2% Cloths 1 Application(s) Topical daily  enoxaparin Injectable 40 milliGRAM(s) SubCutaneous every 24 hours  fenofibrate Tablet 145 milliGRAM(s) Oral daily  lidocaine   4% Patch 1 Patch Transdermal every 24 hours  lisinopril 40 milliGRAM(s) Oral daily  morphine PCA (5 mG/mL) 30 milliLiter(s) PCA Continuous PCA Continuous  ondansetron Injectable 4 milliGRAM(s) IV Push every 8 hours  oxyCODONE  ER Tablet 30 milliGRAM(s) Oral every 12 hours  pantoprazole  Injectable 40 milliGRAM(s) IV Push daily  polyethylene glycol 3350 17 Gram(s) Oral daily  sodium chloride 0.9%. 1000 milliLiter(s) (80 mL/Hr) IV Continuous <Continuous>    MEDICATIONS  (PRN):  acetaminophen     Tablet .. 650 milliGRAM(s) Oral every 6 hours PRN Temp greater or equal to 38C (100.4F), Mild Pain (1 - 3), Moderate Pain (4 - 6)  aluminum hydroxide/magnesium hydroxide/simethicone Suspension 30 milliLiter(s) Oral every 4 hours PRN Dyspepsia  naloxone Injectable 0.1 milliGRAM(s) IV Push every 3 minutes PRN For ANY of the following changes in patient status:  A. RR LESS THAN 10 breaths per minute, B. Oxygen saturation LESS THAN 90%, C. Sedation score of 6      ITEMS UNCHECKED ARE NOT PRESENT    PRESENT SYMPTOMS: [ ]Unable to self-report - see  CPOT, PAINADS, RDOS below  Source if other than patient:  [ ]Family   [ ]Team     Pain: [x]yes [ ]no  QOL impact - Limits mobility  Location - Lower back  Aggravating factors - Movement  Quality - Sharp  Radiation - non-radiating  Timing- Intermittent  Severity (0-10 scale): 8  Minimal acceptable level (0-10 scale): 0      PCSSQ[Palliative Care Spiritual Screening Question]   Severity (0-10):  Score of 4 or > indicate consideration of Chaplaincy referral.  Chaplaincy Referral: [ ] yes [ ] refused [ ] following    Caregiver [place]? : [ ] yes [ ] no  [x ] deferred:  Social work referral [ ] Patient & Family Centered Care Referral [ ]     Anticipatory Grief present?:  [ ] yes [ ] no  [x ] deferred: Social work referral [ ] Patient & Family Centered Care Referral [ ]      Other Symptoms:  [x ]All other review of systems negative     PHYSICAL EXAM:  Vital Signs Last 24 Hrs  T(C): 36.7 (15 Oct 2024 10:23), Max: 36.9 (14 Oct 2024 17:34)  T(F): 98 (15 Oct 2024 10:23), Max: 98.4 (14 Oct 2024 17:34)  HR: 75 (15 Oct 2024 10:23) (75 - 89)  BP: 159/109 (15 Oct 2024 10:23) (147/86 - 173/102)  BP(mean): --  RR: 18 (15 Oct 2024 10:23) (16 - 18)  SpO2: 96% (15 Oct 2024 10:23) (95% - 98%)    Parameters below as of 15 Oct 2024 10:23  Patient On (Oxygen Delivery Method): room air     I&O's Summary    14 Oct 2024 07:01  -  15 Oct 2024 07:00  --------------------------------------------------------  IN: 815 mL / OUT: 0 mL / NET: 815 mL       GENERAL: [ ]Cachexia    [x ]Alert  [ ]Oriented x   [ ]Lethargic  [ ]Unarousable  [x ]Verbal  [ ]Non-Verbal  Behavioral:   [ ]Anxiety  [ ]Delirium [ ]Agitation [ ]Other  HEENT:  [ ]Normal   [ ]Dry mouth   [ ]ET Tube/Trach  [ ]Oral lesions  PULMONARY:   [x ]Clear [ ]Tachypnea  [ ]Audible excessive secretions   [ ]Rhonchi        [ ]Right [ ]Left [ ]Bilateral  [ ]Crackles        [ ]Right [ ]Left [ ]Bilateral  [ ]Wheezing     [ ]Right [ ]Left [ ]Bilateral  [ ]Diminished BS [ ] Right [ ]Left [ ]Bilateral  CARDIOVASCULAR:    [x ]Regular [ ]Irregular [ ]Tachy  [ ]Rodrick [ ]Murmur [ ]Other  GASTROINTESTINAL:  [x ]Soft  [ ]Distended   [ ]+BS  [ ]Non tender [ ]Tender  [ ]Other [ ]PEG [ ]OGT/ NGT   Last BM: 10/14  GENITOURINARY:  [x ]Normal [ ]Incontinent   [ ]Oliguria/Anuria   [ ]Farah  MUSCULOSKELETAL:   [x ]Normal   [ ]Weakness  [ ]Bed/Wheelchair bound [ ]Edema  NEUROLOGIC:   [x ]No focal deficits  [ ] Cognitive impairment  [ ] Dysphagia [ ]Dysarthria [ ] Paresis [ ]Other   SKIN:   [ ]Normal  [ ]Rash  [ ]Other  [ ]Pressure ulcer(s) [ ]y [ ]n present on admission    CRITICAL CARE:  [ ]Shock Present  [ ]Septic [ ]Cardiogenic [ ]Neurologic [ ]Hypovolemic  [ ]Vasopressors [ ]Inotropes  [ ]Respiratory failure present [ ]Mechanical Ventilation [ ]Non-invasive ventilatory support [ ]High-Flow   [ ]Acute  [ ]Chronic [ ]Hypoxic  [ ]Hypercarbic [ ]Other  [ ]Other organ failure     LABS:                        14.1   5.81  )-----------( 89       ( 14 Oct 2024 06:19 )             40.1   10-14    138  |  104  |  32[H]  ----------------------------<  142[H]  3.9   |  20[L]  |  0.91    Ca    8.5      14 Oct 2024 06:19  Phos  3.5     10-14  Mg     2.50     10-14    TPro  6.6  /  Alb  4.2  /  TBili  1.1  /  DBili  x   /  AST  62[H]  /  ALT  12  /  AlkPhos  102  10-14      Urinalysis Basic - ( 14 Oct 2024 06:19 )    Color: x / Appearance: x / SG: x / pH: x  Gluc: 142 mg/dL / Ketone: x  / Bili: x / Urobili: x   Blood: x / Protein: x / Nitrite: x   Leuk Esterase: x / RBC: x / WBC x   Sq Epi: x / Non Sq Epi: x / Bacteria: x      RADIOLOGY & ADDITIONAL STUDIES: no new    Protein Calorie Malnutrition Present: [ ]mild [ ]moderate [ ]severe [ ]underweight [ ]morbid obesity  https://www.andeal.org/vault/2440/web/files/ONC/Table_Clinical%20Characteristics%20to%20Document%20Malnutrition-White%20JV%20et%20al%202012.pdf    Height (cm): 172.7 (10-12-24 @ 09:21)  Weight (kg): 69.4 (10-12-24 @ 09:21)  BMI (kg/m2): 23.3 (10-12-24 @ 09:21)    [ ]PPSV2 < or = 30%  [ ]significant weight loss [ ]poor nutritional intake [ ]anasarca[ ]Artificial Nutrition    Other REFERRALS:  [ ]Hospice  [ ]Child Life  [ ]Social Work  [ ]Case management [ ]Holistic Therapy     Palliative Performance Scale:  http://npcrc.org/files/news/palliative_performance_scale_ppsv2.pdf  (Ctrl +  left click to view)  Respiratory Distress Observation Tool:  https://homecareinformation.net/handouts/hen/Respiratory_Distress_Observation_Scale.pdf (Ctrl +  left click to view)  PAINAD Score:  http://[email or web address].[email or web address]/cog/painad.pdf (Ctrl +  left click to view)

## 2024-10-15 NOTE — PROGRESS NOTE ADULT - NS ATTEND AMEND GEN_ALL_CORE FT
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

## 2024-10-15 NOTE — PROGRESS NOTE ADULT - SUBJECTIVE AND OBJECTIVE BOX
PATIENT IN SEVERE PAIN  seen by Rad onc and palliative for symptom mgmt, family at bedside      MEDICATIONS  (STANDING):  abiraterone 500 mg tablet 2 Tablet(s) 2 Tablet(s) Oral daily  atorvastatin 20 milliGRAM(s) Oral at bedtime  chlorhexidine 2% Cloths 1 Application(s) Topical daily  enoxaparin Injectable 40 milliGRAM(s) SubCutaneous every 24 hours  fenofibrate Tablet 145 milliGRAM(s) Oral daily  lidocaine   4% Patch 1 Patch Transdermal every 24 hours  lisinopril 40 milliGRAM(s) Oral daily  morphine PCA (5 mG/mL) 30 milliLiter(s) PCA Continuous PCA Continuous  ondansetron Injectable 4 milliGRAM(s) IV Push every 8 hours  oxyCODONE  ER Tablet 30 milliGRAM(s) Oral every 12 hours  pantoprazole  Injectable 40 milliGRAM(s) IV Push daily  polyethylene glycol 3350 17 Gram(s) Oral daily  sodium chloride 0.9%. 1000 milliLiter(s) (80 mL/Hr) IV Continuous <Continuous>    MEDICATIONS  (PRN):  acetaminophen     Tablet .. 650 milliGRAM(s) Oral every 6 hours PRN Temp greater or equal to 38C (100.4F), Mild Pain (1 - 3), Moderate Pain (4 - 6)  aluminum hydroxide/magnesium hydroxide/simethicone Suspension 30 milliLiter(s) Oral every 4 hours PRN Dyspepsia  morphine  - Injectable 4 milliGRAM(s) IV Push once PRN severe breakthrough pain while off PCA  morphine PCA (5 mG/mL) Rescue Clinician Bolus 4 milliGRAM(s) IV Push every 15 minutes PRN Breakthrough severe pain  naloxone Injectable 0.1 milliGRAM(s) IV Push every 3 minutes PRN For ANY of the following changes in patient status:  A. RR LESS THAN 10 breaths per minute, B. Oxygen saturation LESS THAN 90%, C. Sedation score of 6        Vital Signs Last 24 Hrs  T(C): 36.7 (15 Oct 2024 10:23), Max: 36.9 (14 Oct 2024 17:34)  T(F): 98 (15 Oct 2024 10:23), Max: 98.4 (14 Oct 2024 17:34)  HR: 75 (15 Oct 2024 10:23) (75 - 89)  BP: 159/109 (15 Oct 2024 10:23) (147/86 - 173/102)  BP(mean): --  RR: 18 (15 Oct 2024 10:23) (16 - 18)  SpO2: 96% (15 Oct 2024 10:23) (96% - 98%)    Parameters below as of 15 Oct 2024 10:23  Patient On (Oxygen Delivery Method): room air        PE  NAD  Awake, alert  Anicteric, MMM  RRR  CTAB  Abd soft, NT, ND  No c/c/e  No rash grossly  decreased ROM                          14.1   5.81  )-----------( 89       ( 14 Oct 2024 06:19 )             40.1       10-14    138  |  104  |  32[H]  ----------------------------<  142[H]  3.9   |  20[L]  |  0.91    Ca    8.5      14 Oct 2024 06:19  Phos  3.5     10-14  Mg     2.50     10-14    TPro  6.6  /  Alb  4.2  /  TBili  1.1  /  DBili  x   /  AST  62[H]  /  ALT  12  /  AlkPhos  102  10-14

## 2024-10-15 NOTE — PROGRESS NOTE ADULT - ASSESSMENT
1. Metastatic prostate cancer  - follows with Dr Andrew Mendoza at [facility]   - initially diagnosed 15 years ago with low risk prostate cancer s/p radical prostatectomy, negative LNs and did not require RT or adjuvant ADT.   - presented outpatient with imaging showing a multiloculated presacral soft tissue mass 5.1 x 3.9 x 4.1cm, RP LAD, numeour low density liver lesions c/w met disease, lucencies in several vertebral bodies, manubrium.   - PSMA 10/11/24 showing Hypermetabolic vera foci above and below the diaphragm, foci in the liver and extensive foci involving the  axial and appendicular skeleton compatible with metastatic disease  - --> 374--> 426 on 10/8/24   - Liver biopsy consistent with adenocarcinoma favoring prostate    outpatient liver biopsy 9/30  DIAGNOSIS:  LIVER (FINE NEEDLE ASPIRATION):  POSITIVE FOR MALIGNANT CELLS. ADEQUATE CELLULARITY FOR EVALUATION.  CONSISTENT WITH METASTATIC ADENOCARCINOMA FAVOR PROSTATE PRIMARY, SEE  COMMENTS.  ELECTRONICALLY SIGNED      -  he has high risk high volume disease and plan to start triplet therapy with ADT/lupron, abiaterone/prednisone with taxotere. He had back pain after Lupron likely tumor flare and instructed to take steroids. Abby/Pred should have started and can continue Abiaterone 1000mg daily with prednisone 5mg BID. While receiving Decadron for chemo as below can hold the prednisone but should resume after the 3 days of decadron    - plan for taxotere 60mg/m2 to start tomorrow with emend, aloxi, benadryl and dex premeds    - please give Dex 8mg BID today, tomorrow and following day   - MRI L-spine .L5 lesions seen on imaging. MRI Thoracic and brain pending  - Rad Onc eval   - Palliative for symptom control     2. Thrombocytopenia   - possible ITP component   - baseline in the 70s  - dose reduced taxotere to 60mg/m2 given baseline thrombocytopenia   - monitor, will need transfused for plt <10   - expect further decline with chemo. Steroids may help with ITP component but also may need high dose. Nplate can also be considered     Rad onc and palliative recs appreciated    Umm Aponte NP  Hematology/Oncology  [facility]  [phone] (Office)  [phone] (Alt office)  Evenings and weekends please call MD on call or office

## 2024-10-16 DIAGNOSIS — R13.10 DYSPHAGIA, UNSPECIFIED: ICD-10-CM

## 2024-10-16 LAB
ALBUMIN SERPL ELPH-MCNC: 3.8 G/DL — SIGNIFICANT CHANGE UP (ref 3.3–5)
ALP SERPL-CCNC: 82 U/L — SIGNIFICANT CHANGE UP (ref 40–120)
ALT FLD-CCNC: 9 U/L — SIGNIFICANT CHANGE UP (ref 4–41)
ANION GAP SERPL CALC-SCNC: 14 MMOL/L — SIGNIFICANT CHANGE UP (ref 7–14)
ANISOCYTOSIS BLD QL: SLIGHT — SIGNIFICANT CHANGE UP
ANISOCYTOSIS BLD QL: SLIGHT — SIGNIFICANT CHANGE UP
AST SERPL-CCNC: 68 U/L — HIGH (ref 4–40)
BASOPHILS # BLD AUTO: 0 K/UL — SIGNIFICANT CHANGE UP (ref 0–0.2)
BASOPHILS # BLD AUTO: 0 K/UL — SIGNIFICANT CHANGE UP (ref 0–0.2)
BASOPHILS # BLD AUTO: 0.01 K/UL — SIGNIFICANT CHANGE UP (ref 0–0.2)
BASOPHILS NFR BLD AUTO: 0 % — SIGNIFICANT CHANGE UP (ref 0–2)
BASOPHILS NFR BLD AUTO: 0 % — SIGNIFICANT CHANGE UP (ref 0–2)
BASOPHILS NFR BLD AUTO: 1.3 % — SIGNIFICANT CHANGE UP (ref 0–2)
BILIRUB SERPL-MCNC: 1.3 MG/DL — HIGH (ref 0.2–1.2)
BUN SERPL-MCNC: 43 MG/DL — HIGH (ref 7–23)
CALCIUM SERPL-MCNC: 7.9 MG/DL — LOW (ref 8.4–10.5)
CHLORIDE SERPL-SCNC: 107 MMOL/L — SIGNIFICANT CHANGE UP (ref 98–107)
CO2 SERPL-SCNC: 20 MMOL/L — LOW (ref 22–31)
CREAT SERPL-MCNC: 0.84 MG/DL — SIGNIFICANT CHANGE UP (ref 0.5–1.3)
EGFR: 97 ML/MIN/1.73M2 — SIGNIFICANT CHANGE UP
EOSINOPHIL # BLD AUTO: 0 K/UL — SIGNIFICANT CHANGE UP (ref 0–0.5)
EOSINOPHIL NFR BLD AUTO: 0 % — SIGNIFICANT CHANGE UP (ref 0–6)
GLUCOSE SERPL-MCNC: 152 MG/DL — HIGH (ref 70–99)
HCT VFR BLD CALC: 38.1 % — LOW (ref 39–50)
HCT VFR BLD CALC: 38.2 % — LOW (ref 39–50)
HGB BLD-MCNC: 13.5 G/DL — SIGNIFICANT CHANGE UP (ref 13–17)
HGB BLD-MCNC: 13.5 G/DL — SIGNIFICANT CHANGE UP (ref 13–17)
IANC: 0.39 K/UL — LOW (ref 1.8–7.4)
IANC: 0.55 K/UL — LOW (ref 1.8–7.4)
IMM GRANULOCYTES NFR BLD AUTO: 0 % — SIGNIFICANT CHANGE UP (ref 0–0.9)
LYMPHOCYTES # BLD AUTO: 0.02 K/UL — LOW (ref 1–3.3)
LYMPHOCYTES # BLD AUTO: 0.02 K/UL — LOW (ref 1–3.3)
LYMPHOCYTES # BLD AUTO: 0.21 K/UL — LOW (ref 1–3.3)
LYMPHOCYTES # BLD AUTO: 26.6 % — SIGNIFICANT CHANGE UP (ref 13–44)
LYMPHOCYTES # BLD AUTO: 4.4 % — LOW (ref 13–44)
LYMPHOCYTES # BLD AUTO: 4.4 % — LOW (ref 13–44)
MACROCYTES BLD QL: SLIGHT — SIGNIFICANT CHANGE UP
MACROCYTES BLD QL: SLIGHT — SIGNIFICANT CHANGE UP
MAGNESIUM SERPL-MCNC: 2.6 MG/DL — SIGNIFICANT CHANGE UP (ref 1.6–2.6)
MANUAL SMEAR VERIFICATION: SIGNIFICANT CHANGE UP
MANUAL SMEAR VERIFICATION: SIGNIFICANT CHANGE UP
MCHC RBC-ENTMCNC: 30.8 PG — SIGNIFICANT CHANGE UP (ref 27–34)
MCHC RBC-ENTMCNC: 30.8 PG — SIGNIFICANT CHANGE UP (ref 27–34)
MCHC RBC-ENTMCNC: 35.3 GM/DL — SIGNIFICANT CHANGE UP (ref 32–36)
MCHC RBC-ENTMCNC: 35.4 GM/DL — SIGNIFICANT CHANGE UP (ref 32–36)
MCV RBC AUTO: 87 FL — SIGNIFICANT CHANGE UP (ref 80–100)
MCV RBC AUTO: 87.2 FL — SIGNIFICANT CHANGE UP (ref 80–100)
METAMYELOCYTES # FLD: 5.3 % — HIGH (ref 0–1)
METAMYELOCYTES # FLD: 5.3 % — HIGH (ref 0–1)
MICROCYTES BLD QL: SIGNIFICANT CHANGE UP
MICROCYTES BLD QL: SIGNIFICANT CHANGE UP
MONOCYTES # BLD AUTO: 0.02 K/UL — SIGNIFICANT CHANGE UP (ref 0–0.9)
MONOCYTES # BLD AUTO: 0.03 K/UL — SIGNIFICANT CHANGE UP (ref 0–0.9)
MONOCYTES # BLD AUTO: 0.03 K/UL — SIGNIFICANT CHANGE UP (ref 0–0.9)
MONOCYTES NFR BLD AUTO: 2.5 % — SIGNIFICANT CHANGE UP (ref 2–14)
MONOCYTES NFR BLD AUTO: 5.3 % — SIGNIFICANT CHANGE UP (ref 2–14)
MONOCYTES NFR BLD AUTO: 5.3 % — SIGNIFICANT CHANGE UP (ref 2–14)
MYELOCYTES NFR BLD: 5.3 % — HIGH (ref 0–0)
MYELOCYTES NFR BLD: 5.3 % — HIGH (ref 0–0)
NEUTROPHILS # BLD AUTO: 0.35 K/UL — LOW (ref 1.8–7.4)
NEUTROPHILS # BLD AUTO: 0.35 K/UL — LOW (ref 1.8–7.4)
NEUTROPHILS # BLD AUTO: 0.55 K/UL — LOW (ref 1.8–7.4)
NEUTROPHILS NFR BLD AUTO: 41.6 % — LOW (ref 43–77)
NEUTROPHILS NFR BLD AUTO: 41.6 % — LOW (ref 43–77)
NEUTROPHILS NFR BLD AUTO: 69.6 % — SIGNIFICANT CHANGE UP (ref 43–77)
NEUTS BAND # BLD: 21.3 % — CRITICAL HIGH (ref 0–6)
NEUTS BAND # BLD: 21.3 % — CRITICAL HIGH (ref 0–6)
NRBC # BLD: 0 /100 WBCS — SIGNIFICANT CHANGE UP (ref 0–0)
NRBC # FLD: 0.02 K/UL — HIGH (ref 0–0)
OVALOCYTES BLD QL SMEAR: SLIGHT — SIGNIFICANT CHANGE UP
OVALOCYTES BLD QL SMEAR: SLIGHT — SIGNIFICANT CHANGE UP
PHOSPHATE SERPL-MCNC: 3.2 MG/DL — SIGNIFICANT CHANGE UP (ref 2.5–4.5)
PLAT MORPH BLD: NORMAL — SIGNIFICANT CHANGE UP
PLAT MORPH BLD: NORMAL — SIGNIFICANT CHANGE UP
PLATELET # BLD AUTO: 39 K/UL — LOW (ref 150–400)
PLATELET # BLD AUTO: 45 K/UL — LOW (ref 150–400)
PLATELET COUNT - ESTIMATE: ABNORMAL
PLATELET COUNT - ESTIMATE: ABNORMAL
POIKILOCYTOSIS BLD QL AUTO: SLIGHT — SIGNIFICANT CHANGE UP
POIKILOCYTOSIS BLD QL AUTO: SLIGHT — SIGNIFICANT CHANGE UP
POTASSIUM SERPL-MCNC: 4.1 MMOL/L — SIGNIFICANT CHANGE UP (ref 3.5–5.3)
POTASSIUM SERPL-SCNC: 4.1 MMOL/L — SIGNIFICANT CHANGE UP (ref 3.5–5.3)
PROT SERPL-MCNC: 5.6 G/DL — LOW (ref 6–8.3)
RBC # BLD: 4.38 M/UL — SIGNIFICANT CHANGE UP (ref 4.2–5.8)
RBC # BLD: 4.38 M/UL — SIGNIFICANT CHANGE UP (ref 4.2–5.8)
RBC # FLD: 14.1 % — SIGNIFICANT CHANGE UP (ref 10.3–14.5)
RBC # FLD: 14.1 % — SIGNIFICANT CHANGE UP (ref 10.3–14.5)
RBC BLD AUTO: NORMAL — SIGNIFICANT CHANGE UP
RBC BLD AUTO: NORMAL — SIGNIFICANT CHANGE UP
SODIUM SERPL-SCNC: 141 MMOL/L — SIGNIFICANT CHANGE UP (ref 135–145)
SPHEROCYTES BLD QL SMEAR: SLIGHT — SIGNIFICANT CHANGE UP
SPHEROCYTES BLD QL SMEAR: SLIGHT — SIGNIFICANT CHANGE UP
VARIANT LYMPHS # BLD: 16.8 % — HIGH (ref 0–6)
VARIANT LYMPHS # BLD: 16.8 % — HIGH (ref 0–6)
WBC # BLD: 0.55 K/UL — CRITICAL LOW (ref 3.8–10.5)
WBC # BLD: 0.79 K/UL — CRITICAL LOW (ref 3.8–10.5)
WBC # FLD AUTO: 0.55 K/UL — CRITICAL LOW (ref 3.8–10.5)
WBC # FLD AUTO: 0.79 K/UL — CRITICAL LOW (ref 3.8–10.5)

## 2024-10-16 PROCEDURE — 99233 SBSQ HOSP IP/OBS HIGH 50: CPT

## 2024-10-16 RX ORDER — FENTANYL 12 UG/H
1 PATCH, EXTENDED RELEASE TRANSDERMAL
Refills: 0 | Status: DISCONTINUED | OUTPATIENT
Start: 2024-10-16 | End: 2024-10-18

## 2024-10-16 RX ADMIN — ENOXAPARIN SODIUM 40 MILLIGRAM(S): 30 INJECTION SUBCUTANEOUS at 12:39

## 2024-10-16 RX ADMIN — LISINOPRIL 40 MILLIGRAM(S): 20 TABLET ORAL at 10:00

## 2024-10-16 RX ADMIN — Medication 30 MILLILITER(S): at 23:15

## 2024-10-16 RX ADMIN — DEXAMETHASONE 101.6 MILLIGRAM(S): 1.5 TABLET ORAL at 18:59

## 2024-10-16 RX ADMIN — LIDOCAINE 1 PATCH: 40 CREAM TOPICAL at 19:40

## 2024-10-16 RX ADMIN — OXYCODONE HYDROCHLORIDE 30 MILLIGRAM(S): 30 TABLET ORAL at 10:00

## 2024-10-16 RX ADMIN — FENTANYL 1 PATCH: 12 PATCH, EXTENDED RELEASE TRANSDERMAL at 19:39

## 2024-10-16 RX ADMIN — PANTOPRAZOLE SODIUM 40 MILLIGRAM(S): 40 TABLET, DELAYED RELEASE ORAL at 12:39

## 2024-10-16 RX ADMIN — DEXAMETHASONE 101.6 MILLIGRAM(S): 1.5 TABLET ORAL at 06:43

## 2024-10-16 RX ADMIN — LIDOCAINE 1 PATCH: 40 CREAM TOPICAL at 17:54

## 2024-10-16 RX ADMIN — Medication 30 MILLILITER(S): at 20:25

## 2024-10-16 RX ADMIN — ONDANSETRON HYDROCHLORIDE 4 MILLIGRAM(S): 4 TABLET, FILM COATED ORAL at 17:55

## 2024-10-16 RX ADMIN — Medication 100 GRAM(S): at 08:54

## 2024-10-16 RX ADMIN — CHLORHEXIDINE GLUCONATE 1 APPLICATION(S): 1.2 RINSE ORAL at 12:38

## 2024-10-16 RX ADMIN — ONDANSETRON HYDROCHLORIDE 4 MILLIGRAM(S): 4 TABLET, FILM COATED ORAL at 10:00

## 2024-10-16 RX ADMIN — Medication 30 MILLILITER(S): at 08:38

## 2024-10-16 RX ADMIN — FENTANYL 1 PATCH: 12 PATCH, EXTENDED RELEASE TRANSDERMAL at 19:00

## 2024-10-16 NOTE — PROGRESS NOTE ADULT - SUBJECTIVE AND OBJECTIVE BOX
patient seen today, remains in pain, educated on how to use PCA pump  Son and wife at bedside, CT TLS STAT ordered, possible RT vs Kyphoplasty      MEDICATIONS  (STANDING):  abiraterone 500 mg tablet 2 Tablet(s) 2 Tablet(s) Oral daily  atorvastatin 20 milliGRAM(s) Oral at bedtime  chlorhexidine 2% Cloths 1 Application(s) Topical daily  dexAMETHasone  IVPB 8 milliGRAM(s) IV Intermittent two times a day  enoxaparin Injectable 40 milliGRAM(s) SubCutaneous every 24 hours  fenofibrate Tablet 145 milliGRAM(s) Oral daily  fentaNYL   Patch  75 MICROgram(s)/Hr 1 Patch Transdermal every 72 hours  lidocaine   4% Patch 1 Patch Transdermal every 24 hours  lisinopril 40 milliGRAM(s) Oral daily  morphine PCA (5 mG/mL) 30 milliLiter(s) PCA Continuous PCA Continuous  ondansetron Injectable 4 milliGRAM(s) IV Push every 8 hours  pantoprazole  Injectable 40 milliGRAM(s) IV Push daily  polyethylene glycol 3350 17 Gram(s) Oral daily  sodium chloride 0.9%. 1000 milliLiter(s) (80 mL/Hr) IV Continuous <Continuous>    MEDICATIONS  (PRN):  acetaminophen     Tablet .. 650 milliGRAM(s) Oral every 6 hours PRN Temp greater or equal to 38C (100.4F), Mild Pain (1 - 3), Moderate Pain (4 - 6)  aluminum hydroxide/magnesium hydroxide/simethicone Suspension 30 milliLiter(s) Oral every 4 hours PRN Dyspepsia  morphine  - Injectable 4 milliGRAM(s) IV Push once PRN severe breakthrough pain while off PCA  morphine PCA (5 mG/mL) Rescue Clinician Bolus 4 milliGRAM(s) IV Push every 15 minutes PRN Breakthrough severe pain  naloxone Injectable 0.1 milliGRAM(s) IV Push every 3 minutes PRN For ANY of the following changes in patient status:  A. RR LESS THAN 10 breaths per minute, B. Oxygen saturation LESS THAN 90%, C. Sedation score of 6        Vital Signs Last 24 Hrs  T(C): 36.4 (16 Oct 2024 14:11), Max: 36.8 (16 Oct 2024 06:42)  T(F): 97.5 (16 Oct 2024 14:11), Max: 98.3 (16 Oct 2024 09:50)  HR: 94 (16 Oct 2024 14:11) (76 - 98)  BP: 145/96 (16 Oct 2024 14:11) (136/86 - 168/98)  BP(mean): --  RR: 17 (16 Oct 2024 14:11) (17 - 19)  SpO2: 94% (16 Oct 2024 14:11) (94% - 100%)    Parameters below as of 16 Oct 2024 14:11  Patient On (Oxygen Delivery Method): room air        PE  NAD, diffuse pain  Awake, alert  Anicteric, MMM  RRR  CTAB  Abd soft, NT, ND  No c/c/e  No rash grossly                            13.5   0.55  )-----------( 39       ( 16 Oct 2024 10:14 )             38.1       10-16    141  |  107  |  43[H]  ----------------------------<  152[H]  4.1   |  20[L]  |  0.84    Ca    7.9[L]      16 Oct 2024 06:14  Phos  3.2     10-16  Mg     2.60     10-16    TPro  5.6[L]  /  Alb  3.8  /  TBili  1.3[H]  /  DBili  x   /  AST  68[H]  /  ALT  9   /  AlkPhos  82  10-16

## 2024-10-16 NOTE — PROGRESS NOTE ADULT - ASSESSMENT
64-year-old male with metastatic prostate cancer, sent in by hematologist from [facility] and cancer for uncontrolled pain, nausea, vomiting.       Problem/Plan - 1:  ·  Problem: Cancer related pain.   ·  Plan: - appreciate pall care recommendations:  - pain control  - pain consult   - cw decadron   - RT consult     Problem/Plan - 2:·  Problem: Nausea & vomiting.   ·  Plan: - zofran 4mg IV q 8hrs standing  - obtain ECG, ensure QTc <500 prior to administering zofran  - maintenance fluids while unable to tolerate po.    Problem/Plan - 3:  ·  Problem: CA of prostate.   ·  Plan: Metastatic prostate cancer- f/u heme/onc recommendations  - cont. Dex 8mg BID today, tomorrow and following day   - MRI L-spine to evaluate if any area for possible palliative RT. L5 lesions seen on imaging.   - Rad Onc eval   - Palliative for symptom control.    Problem/Plan - 4:  ·  Problem: Thrombocytopenia, unspecified.   ·  Plan: -platelet baseline ~70  -monitor, will need transfused for plt <10   - f/u heme/onc for further recommendations.    Problem/Plan - 5:  ·  Problem: Benign essential HTN.   ·  Plan: - cont. home lisinopril 40mg.    Problem/Plan - 6:  ·  Problem: Hyperlipidemia.   ·  Plan: - cont. home atorvastatin 20 and fenofibrate.    Problem/Plan - 7:  ·  Problem: Need for prophylactic measure.   ·  Plan: lovenox.

## 2024-10-16 NOTE — PROGRESS NOTE ADULT - NS ATTEND AMEND GEN_ALL_CORE FT
Patient seen and examined with the NP during rounds  I agree with her assessment and plan     Remains with uncontrolled pain, it seems he was not pressing the PCP button at times  on dex, PCA pump, can consider muscle relaxant as well  s/p docetaxel, neutropenic , would start zarxio, monitor plts , hold lovenox if plt <35  appreciate radiation oncology input if they feel there is a benefit from radiation therapy  Will follow

## 2024-10-16 NOTE — PROGRESS NOTE ADULT - ASSESSMENT
1. Metastatic prostate cancer  - follows with Dr Andrew Mendoza at [facility]   - initially diagnosed 15 years ago with low risk prostate cancer s/p radical prostatectomy, negative LNs and did not require RT or adjuvant ADT.   - presented outpatient with imaging showing a multiloculated presacral soft tissue mass 5.1 x 3.9 x 4.1cm, RP LAD, numeour low density liver lesions c/w met disease, lucencies in several vertebral bodies, manubrium.   - PSMA 10/11/24 showing Hypermetabolic vera foci above and below the diaphragm, foci in the liver and extensive foci involving the  axial and appendicular skeleton compatible with metastatic disease  - --> 374--> 426 on 10/8/24   - Liver biopsy consistent with adenocarcinoma favoring prostate    outpatient liver biopsy 9/30  DIAGNOSIS:  LIVER (FINE NEEDLE ASPIRATION):  POSITIVE FOR MALIGNANT CELLS. ADEQUATE CELLULARITY FOR EVALUATION.  CONSISTENT WITH METASTATIC ADENOCARCINOMA FAVOR PROSTATE PRIMARY, SEE  COMMENTS.  ELECTRONICALLY SIGNED      -  he has high risk high volume disease and plan to start triplet therapy with ADT/lupron, abiaterone/prednisone with taxotere. He had back pain after Lupron likely tumor flare and instructed to take steroids. Abby/Pred should have started and can continue Abiaterone 1000mg daily with prednisone 5mg BID. While receiving Decadron for chemo as below can hold the prednisone but should resume after the 3 days of decadron    - plan for taxotere 60mg/m2 to start tomorrow with emend, aloxi, benadryl and dex premeds    - please give Dex 8mg BID today, tomorrow and following day   - MRI L-spine .L5 lesions seen on imaging.     - Rad Onc appreciated, awaiting CT Thoracic, Lumbar, sacral in order to determine if RT is appropriate  - Palliative for symptom control     2. Thrombocytopenia   - possible ITP component   - baseline in the 70s  - dose reduced taxotere to 60mg/m2 given baseline thrombocytopenia   - monitor, will need transfused for plt <10   - expect further decline with chemo. Steroids may help with ITP component but also may need high dose. Nplate can also be considered     3. Neutropenia  -s/p Docetaxol 10/13, chemotherapy Fred  -anc 400, start zarxio daily  -if fever develops, start broad spectrum antibiotics      Rad onc and palliative recs appreciated    Umm Aponte NP  Hematology/Oncology  [facility]  [phone] (Office)  [phone] (Alt office)  Evenings and weekends please call MD on call or office

## 2024-10-16 NOTE — PROGRESS NOTE ADULT - PROBLEM SELECTOR PLAN 7
Patient is supported by his wife- Vijaya [phone]) and 3 adult sons. See [facility] note   > Patient is recently dx with metastatic prostate cancer just 1.5 weeks ago and he is treatment oriented. Patient is supported by his wife- Vijaya [phone]) and 3 adult sons. See [facility] note   > Patient is recently dx with metastatic prostate cancer just 1.5 weeks ago and he is treatment oriented.  > Offered caregiver Sw referral to patient's wife- DECLINED     In the event of newly developing, evolving, or worsening symptoms, please contact the Palliative Medicine team via pager (if the patient is at [facility] #[phone] or if the patient is at [facility] #[phone]) The Geriatric and Palliative Medicine service has coverage 24 hours a day/ 7 days a week to provide medical recommendations regarding symptom management needs via telephone.

## 2024-10-16 NOTE — PROGRESS NOTE ADULT - PROBLEM SELECTOR PLAN 2
- Metastatic prostate adenocarcinoma, follows with Dr. Andrew Mendoza  - Oncology recs appreciated: High risk high volume disease, started triplet therapy with ADT(lupron) + abiraterone/prednisone + taxotere  - Per oncology notes:  outpatient liver biopsy 9/30  DIAGNOSIS:  LIVER (FINE NEEDLE ASPIRATION):  POSITIVE FOR MALIGNANT CELLS. ADEQUATE CELLULARITY FOR EVALUATION.  CONSISTENT WITH METASTATIC ADENOCARCINOMA FAVOR PROSTATE PRIMARY, SEE  COMMENTS.  - management per oncology team

## 2024-10-16 NOTE — PROGRESS NOTE ADULT - PROBLEM SELECTOR PLAN 1
MRI Lumbar Spine: (10/15) Diffuse osseous metastases. L1 and L5 pathologic fractures are associated with moderate epidural disease at L1, more mild at L5. At least mild epidural disease at the left S1-S2 neural foramen. No significant lumbar degenerative disc disease.    - Discontinue Oxycontin  - Start Fentanyl patch 75mcg/hr  - continue Decadron 8mg BID for total of 3 days  - continue Morphine PCA DD 4mg; LO 30 min; 4 hour limit: 32mg; CAB 4mg   - Narcan PRN  - multimodal pain control: lidocaine patch, warm/cold packs   - Bowel regimen while on opioids  - Appreciate ortho recs pending after yesterdays MRI results  - Radiation oncology recs pending after yesterdays MRI results, family was told they may be candidate for RT but would need CT prior to.  - would RECOMMEND OUTPATIENT PALLIATIVE CARE REFERRAL WITH [facility] palliative care team. MRI Lumbar Spine: (10/15) Diffuse osseous metastases. L1 and L5 pathologic fractures are associated with moderate epidural disease at L1, more mild at L5. At least mild epidural disease at the left S1-S2 neural foramen. No significant lumbar degenerative disc disease.  - MRI from 10/15 shows extensive metastasis in cervical, thoracic, lumbar, skull base and calvarium. Also demonstrated small cortical subacute infarctions.    - Discontinue Oxycontin  - Start Fentanyl patch 75mcg/hr  - continue Decadron 8mg BID for total of 3 days  - continue Morphine PCA DD 4mg; LO 30 min; 4 hour limit: 32mg; CAB 4mg   - Narcan PRN  - multimodal pain control: lidocaine patch, warm/cold packs   - Bowel regimen while on opioids  - Appreciate ortho recs pending after yesterdays MRI results  - Radiation oncology recs pending after yesterdays MRI results, family was told they may be candidate for RT but would need CT prior to.  - would RECOMMEND OUTPATIENT PALLIATIVE CARE REFERRAL WITH [facility] palliative care team. MRI Lumbar Spine: (10/15) Diffuse osseous metastases. L1 and L5 pathologic fractures are associated with moderate epidural disease at L1, more mild at L5. At least mild epidural disease at the left S1-S2 neural foramen. No significant lumbar degenerative disc disease.  - MRI from 10/15 shows extensive metastasis in cervical, thoracic, lumbar, skull base and calvarium. Also demonstrated small cortical subacute infarctions.    - Discontinue Oxycontin in setting of patient c/o dysphagia.   - Start Fentanyl patch 75mcg/hr (10/16)   - continue Decadron 8mg BID for total of 3 days  - continue Morphine PCA DD 4mg; LO 30 min; 4 hour limit: 32mg; CAB 4mg   - Narcan PRN  - multimodal pain control: lidocaine patch, warm/cold packs   - Bowel regimen while on opioids  - Appreciate ortho recs pending after yesterdays MRI results  - Radiation oncology recs pending after yesterdays MRI results, family was told they may be candidate for RT but would need CT prior to.  - would RECOMMEND OUTPATIENT PALLIATIVE CARE REFERRAL WITH [facility] palliative care team.

## 2024-10-16 NOTE — PROGRESS NOTE ADULT - SUBJECTIVE AND OBJECTIVE BOX
SUBJECTIVE AND OBJECTIVE:  Indication for Geriatrics and Palliative Care Services/INTERVAL HPI:  Pt seen and examined at bedside today, wife present at bedside. Pt c/o continued pain with movement, alleviated with morphine PCA pump but still feels that pain is limiting his ROM. He also c/o feeling food getting stuck in his throat and having difficult time burping to clear it.    OVERNIGHT EVENTS:  Chart review, in 24hrs 7AM-7AM, patient used Dilaudid 1mg x6 injections/9 attempts, Morphine 4mg x11 injections/27 attempts, along with standing oxycontin 30mg once (total OME: 385mg)    DNR on chart:  Allergies    No Known Allergies    Intolerances    MEDICATIONS  (STANDING):  abiraterone 500 mg tablet 2 Tablet(s) 2 Tablet(s) Oral daily  atorvastatin 20 milliGRAM(s) Oral at bedtime  chlorhexidine 2% Cloths 1 Application(s) Topical daily  dexAMETHasone  IVPB 8 milliGRAM(s) IV Intermittent two times a day  enoxaparin Injectable 40 milliGRAM(s) SubCutaneous every 24 hours  fenofibrate Tablet 145 milliGRAM(s) Oral daily  lidocaine   4% Patch 1 Patch Transdermal every 24 hours  lisinopril 40 milliGRAM(s) Oral daily  morphine PCA (5 mG/mL) 30 milliLiter(s) PCA Continuous PCA Continuous  ondansetron Injectable 4 milliGRAM(s) IV Push every 8 hours  oxyCODONE  ER Tablet 30 milliGRAM(s) Oral every 12 hours  pantoprazole  Injectable 40 milliGRAM(s) IV Push daily  polyethylene glycol 3350 17 Gram(s) Oral daily  sodium chloride 0.9%. 1000 milliLiter(s) (80 mL/Hr) IV Continuous <Continuous>    MEDICATIONS  (PRN):  acetaminophen     Tablet .. 650 milliGRAM(s) Oral every 6 hours PRN Temp greater or equal to 38C (100.4F), Mild Pain (1 - 3), Moderate Pain (4 - 6)  aluminum hydroxide/magnesium hydroxide/simethicone Suspension 30 milliLiter(s) Oral every 4 hours PRN Dyspepsia  morphine  - Injectable 4 milliGRAM(s) IV Push once PRN severe breakthrough pain while off PCA  morphine PCA (5 mG/mL) Rescue Clinician Bolus 4 milliGRAM(s) IV Push every 15 minutes PRN Breakthrough severe pain  naloxone Injectable 0.1 milliGRAM(s) IV Push every 3 minutes PRN For ANY of the following changes in patient status:  A. RR LESS THAN 10 breaths per minute, B. Oxygen saturation LESS THAN 90%, C. Sedation score of 6      ITEMS UNCHECKED ARE NOT PRESENT    PRESENT SYMPTOMS: [ ]Unable to self-report - see  CPOT, PAINADS, RDOS below  Source if other than patient:  [ ]Family   [ ]Team     Pain: [x]yes [ ]no  QOL impact - Limits mobility  Location - Lower back  Aggravating factors - Movement  Quality - Sharp  Radiation - non-radiating  Timing- Intermittent  Severity (0-10 scale): 8  Minimal acceptable level (0-10 scale): 0      PCSSQ[Palliative Care Spiritual Screening Question]   Severity (0-10):  Score of 4 or > indicate consideration of Chaplaincy referral.  Chaplaincy Referral: [ ] yes [ ] refused [ ] following    Caregiver [place]? : [ ] yes [ ] no  [x ] deferred:  Social work referral [ ] Patient & Family Centered Care Referral [ ]     Anticipatory Grief present?:  [ ] yes [ ] no  [x ] deferred: Social work referral [ ] Patient & Family Centered Care Referral [ ]      Other Symptoms:  [x ]All other review of systems negative     PHYSICAL EXAM:  Vital Signs Last 24 Hrs  T(C): 36.8 (16 Oct 2024 09:50), Max: 36.8 (16 Oct 2024 06:42)  T(F): 98.3 (16 Oct 2024 09:50), Max: 98.3 (16 Oct 2024 09:50)  HR: 94 (16 Oct 2024 09:50) (72 - 98)  BP: 168/98 (16 Oct 2024 09:50) (136/86 - 168/98)  BP(mean): --  RR: 19 (16 Oct 2024 09:50) (17 - 19)  SpO2: 96% (16 Oct 2024 09:50) (95% - 100%)    Parameters below as of 16 Oct 2024 09:50  Patient On (Oxygen Delivery Method): room air     I&O's Summary    15 Oct 2024 07:01  -  16 Oct 2024 07:00  --------------------------------------------------------  IN: 720 mL / OUT: 0 mL / NET: 720 mL       GENERAL: [ ]Cachexia    [x ]Alert  [ ]Oriented x   [ ]Lethargic  [ ]Unarousable  [x ]Verbal  [ ]Non-Verbal  Behavioral:   [ ]Anxiety  [ ]Delirium [ ]Agitation [ ]Other  HEENT:  [x ]Normal   [ ]Dry mouth   [ ]ET Tube/Trach  [ ]Oral lesions  PULMONARY:   [x ]Clear [ ]Tachypnea  [ ]Audible excessive secretions   [ ]Rhonchi        [ ]Right [ ]Left [ ]Bilateral  [ ]Crackles        [ ]Right [ ]Left [ ]Bilateral  [ ]Wheezing     [ ]Right [ ]Left [ ]Bilateral  [ ]Diminished BS [ ] Right [ ]Left [ ]Bilateral  CARDIOVASCULAR:    [x ]Regular [ ]Irregular [ ]Tachy  [ ]Rodrick [ ]Murmur [ ]Other  GASTROINTESTINAL:  [x ]Soft  [ ]Distended   [ x]+BS  [x ]Non tender [ ]Tender  [ ]Other [ ]PEG [ ]OGT/ NGT   Last BM:   GENITOURINARY:  [x ]Normal [ ]Incontinent   [ ]Oliguria/Anuria   [ ]Farah  MUSCULOSKELETAL:   [x ]Normal   [ ]Weakness  [ ]Bed/Wheelchair bound [ ]Edema  NEUROLOGIC:   [x ]No focal deficits  [ ] Cognitive impairment  [ ] Dysphagia [ ]Dysarthria [ ] Paresis [ ]Other   SKIN:   [ ]Normal  [ ]Rash  [ ]Other  [ ]Pressure ulcer(s) [ ]y [ ]n present on admission    CRITICAL CARE:  [ ]Shock Present  [ ]Septic [ ]Cardiogenic [ ]Neurologic [ ]Hypovolemic  [ ]Vasopressors [ ]Inotropes  [ ]Respiratory failure present [ ]Mechanical Ventilation [ ]Non-invasive ventilatory support [ ]High-Flow   [ ]Acute  [ ]Chronic [ ]Hypoxic  [ ]Hypercarbic [ ]Other  [ ]Other organ failure     LABS:                        13.5   0.55  )-----------( 39       ( 16 Oct 2024 10:14 )             38.1   10-16    141  |  107  |  43[H]  ----------------------------<  152[H]  4.1   |  20[L]  |  0.84    Ca    7.9[L]      16 Oct 2024 06:14  Phos  3.2     10-16  Mg     2.60     10-16    TPro  5.6[L]  /  Alb  3.8  /  TBili  1.3[H]  /  DBili  x   /  AST  68[H]  /  ALT  9   /  AlkPhos  82  10-16      Urinalysis Basic - ( 16 Oct 2024 06:14 )    Color: x / Appearance: x / SG: x / pH: x  Gluc: 152 mg/dL / Ketone: x  / Bili: x / Urobili: x   Blood: x / Protein: x / Nitrite: x   Leuk Esterase: x / RBC: x / WBC x   Sq Epi: x / Non Sq Epi: x / Bacteria: x      RADIOLOGY & ADDITIONAL STUDIES:    < from: MR Lumbar Spine w/wo IV Cont (10.15.24 @ 16:03) >  FINDINGS:    OSSEOUS STRUCTURES    Generalized marrow replacement is consistent with diffuse osseous   metastases. This process is hypointense on the T1-weighted images,   variably hyperintense on STIR images and variably enhancing with   gadolinium administration. This involves each vertebral body including   each posterior element. There is also involvement of the visualized ribs   sacrum and pelvic bones. In the cervical spine no pathologic fracture or   significant epidural disease is recognized.    In the lower thoracic spine there are mild superior and inferior endplate   deformities at multiple locations that do not result in significant   vertebral body height loss or vertebral malalignment. However, advanced   epidural disease is noted at T3 involving the left ventral canal and   deforming the left ventral cord surface. At T4 there is slightly   low-grade left ventral epidural disease, appearing to contact but not   deform the ventral cord surface. At T6 focal right ventral epidural   disease contacts but does not deform the right ventral lateral corner of   the cord. At T7 bilobed ventral canal epidural disease mildly deforms the   ventral cord surface. At T8 T9 and T11 more mild ventral epidural disease   causes deformity of the ventral thecal sac without contact of the   thoracic cord.    In the lumbar spine there are pathologic fractures of L1 and L5. These   are associated with moderate epidural disease. At L1 disc flattens the  ventral thecal sac and occupies approximately the ventral 40% of the   canal. Osseous expansion at the L1 pedicles contributes to foraminal   compromise at the dorsal anterior margin of each L1-L2 neural foramen. At   L5 this effaces ventral epidural fat and mildly deforms the ventral   thecal sac to the right of midline does not result in significant canal   stenosis. At L5 mild osseous expansion at the pedicles contributes mildly   to the foraminal compromise. At the remaining lumbar intervertebral disc   levels no significant epidural disease is found. In the sacrum there is   at least mild epidural disease involving the left S1-S2 neural foramen.    Cervical vertebral alignment is intact.  Thoracic and lumbar vertebral   alignment is preserved.    Paraspinal soft tissues appear intact.    INTERVERTEBRAL DISCS    Cervical thoracic and lumbar intervertebral discs demonstrate variable   disc degeneration with signal intensity loss on the long TR images.   Degenerative disc height loss is greatest in the mid thoracic spine.     There is no abnormal disc space enhancement.    In the cervical spine at C4-C5 right foraminal disc protrusion and   osteophytes narrow the right neural foramen. At C6-C7 shallow left   central disc protrusion mildly deforms the left ventral cord surface. At   the remaining cervical intervertebral disc levels are high-grade   degenerative central canal or foraminal compromise is noted.    In the thoracic spine no high-grade degenerative central canal or   foraminal compromise is noted.    In the lumbar spine diffuse disc bulging results in deformity of the   ventral thecal sac. Facet arthropathy and ligamentum flavum hypertrophy   mildly deform the dorsolateral thecal sac. However, no significant   degenerative central canal compromise is found. Foraminal compromise is   greatest at L5-S1 predominantly degenerative from disc bulging and facet   arthropathy with a lesser contribution by the L5 osseous expansion.    CNS STRUCTURES    The cervicomedullary junction is intact.  The cervical demonstrates   extrinsic deformity from disc pathology as noted above. Thoracic cord   maintains intact morphology.  No cord signal intensity change is seen.    No intrinsic cord lesion is found. No cord expansion or cord volume loss   is found.  No abnormal enhancement occurs within the cervical or thoracic   cord.  The conus normally positioned at T12.   Nerve roots of the cauda   equina appear intact. No pathologic enhancement occurs within the distal   thecal sac.   No nerve root nodularity, clumping or dural adherence is   recognized.    ADDITIONAL FINDINGS:   Right lower lobe pulmonary lesions are present.   Right renal cystic lesion is noted.      IMPRESSION:    1. CERVICAL SPINE:   Diffuse osseous metastases. No cervical pathologic   fracture. No significant cervical epidural disease. Mild to moderate   cervical degenerative disc disease.    2. THORACIC SPINE:   Diffuse osseous metastases. Multiple mild endplate   deformities in the lower thoracic spine are likely pathologic. Widespread   epidural disease is predominantly low grade, high-grade at T3. No   significant thoracic degenerative disc disease.    3. LUMBAR SPINE: Diffuse osseous metastases. L1 and L5 pathologic   fractures are associated with moderate epidural disease at L1, more mild   at L5. At least mild epidural disease at the left S1-S2 neural foramen.   No significant lumbar degenerative disc disease.    4. Consider body CT evaluation of body imaging findings incompletely   evaluated by the MR technique    < end of copied text >  Protein Calorie Malnutrition Present: [ ]mild [ ]moderate [ ]severe [ ]underweight [ ]morbid obesity  https://www.andeal.org/vault/2440/web/files/ONC/Table_Clinical%20Characteristics%20to%20Document%20Malnutrition-White%20JV%20et%20al%202012.pdf    Height (cm): 172.7 (10-12-24 @ 09:21)  Weight (kg): 69.4 (10-12-24 @ 09:21)  BMI (kg/m2): 23.3 (10-12-24 @ 09:21)    [ ]PPSV2 < or = 30%  [ ]significant weight loss [ ]poor nutritional intake [ ]anasarca[ ]Artificial Nutrition    Other REFERRALS:  [ ]Hospice  [ ]Child Life  [ ]Social Work  [ ]Case management [ ]Holistic Therapy     Palliative Performance Scale:  http://npcrc.org/files/news/palliative_performance_scale_ppsv2.pdf  (Ctrl +  left click to view)  Respiratory Distress Observation Tool:  https://homecareinformation.net/handouts/hen/Respiratory_Distress_Observation_Scale.pdf (Ctrl +  left click to view)  PAINAD Score:  http://geriatrictoolkit.[place].[facility]/cog/painad.pdf (Ctrl +  left click to view)       SUBJECTIVE AND OBJECTIVE:  Indication for Geriatrics and Palliative Care Services/INTERVAL HPI:  Pt seen and examined at bedside today, wife present at bedside. Pt c/o continued pain with movement, alleviated with morphine PCA pump but still feels that pain is limiting his ROM. He also c/o feeling food getting stuck in his throat and having difficult time burping to clear it.    OVERNIGHT EVENTS:  Chart review, in 24hrs 7AM-7AM, patient used Dilaudid 1mg x6 injections/9 attempts, Morphine 4mg x11 injections/27 attempts, along with standing oxycontin 30mg once (total OME: 385mg)    DNR on chart:  Allergies    No Known Allergies    Intolerances    MEDICATIONS  (STANDING):  abiraterone 500 mg tablet 2 Tablet(s) 2 Tablet(s) Oral daily  atorvastatin 20 milliGRAM(s) Oral at bedtime  chlorhexidine 2% Cloths 1 Application(s) Topical daily  dexAMETHasone  IVPB 8 milliGRAM(s) IV Intermittent two times a day  enoxaparin Injectable 40 milliGRAM(s) SubCutaneous every 24 hours  fenofibrate Tablet 145 milliGRAM(s) Oral daily  lidocaine   4% Patch 1 Patch Transdermal every 24 hours  lisinopril 40 milliGRAM(s) Oral daily  morphine PCA (5 mG/mL) 30 milliLiter(s) PCA Continuous PCA Continuous  ondansetron Injectable 4 milliGRAM(s) IV Push every 8 hours  oxyCODONE  ER Tablet 30 milliGRAM(s) Oral every 12 hours  pantoprazole  Injectable 40 milliGRAM(s) IV Push daily  polyethylene glycol 3350 17 Gram(s) Oral daily  sodium chloride 0.9%. 1000 milliLiter(s) (80 mL/Hr) IV Continuous <Continuous>    MEDICATIONS  (PRN):  acetaminophen     Tablet .. 650 milliGRAM(s) Oral every 6 hours PRN Temp greater or equal to 38C (100.4F), Mild Pain (1 - 3), Moderate Pain (4 - 6)  aluminum hydroxide/magnesium hydroxide/simethicone Suspension 30 milliLiter(s) Oral every 4 hours PRN Dyspepsia  morphine  - Injectable 4 milliGRAM(s) IV Push once PRN severe breakthrough pain while off PCA  morphine PCA (5 mG/mL) Rescue Clinician Bolus 4 milliGRAM(s) IV Push every 15 minutes PRN Breakthrough severe pain  naloxone Injectable 0.1 milliGRAM(s) IV Push every 3 minutes PRN For ANY of the following changes in patient status:  A. RR LESS THAN 10 breaths per minute, B. Oxygen saturation LESS THAN 90%, C. Sedation score of 6      ITEMS UNCHECKED ARE NOT PRESENT    PRESENT SYMPTOMS: [ ]Unable to self-report - see  CPOT, PAINADS, RDOS below  Source if other than patient:  [ ]Family   [ ]Team     Pain: [x]yes [ ]no  QOL impact - Limits mobility  Location - Lower back  Aggravating factors - Movement  Quality - Sharp  Radiation - non-radiating  Timing- Intermittent  Severity (0-10 scale): 8  Minimal acceptable level (0-10 scale): 0      PCSSQ[Palliative Care Spiritual Screening Question]   Severity (0-10):  Score of 4 or > indicate consideration of Chaplaincy referral.  Chaplaincy Referral: [ ] yes [ ] refused [ ] following    Caregiver [place]? : [ ] yes [ ] no  [x ] deferred:  Social work referral [ ] Patient & Family Centered Care Referral [ ]     Anticipatory Grief present?:  [ ] yes [ ] no  [x ] deferred: Social work referral [ ] Patient & Family Centered Care Referral [ ]      Other Symptoms:  [x ]All other review of systems negative     PHYSICAL EXAM:  Vital Signs Last 24 Hrs  T(C): 36.8 (16 Oct 2024 09:50), Max: 36.8 (16 Oct 2024 06:42)  T(F): 98.3 (16 Oct 2024 09:50), Max: 98.3 (16 Oct 2024 09:50)  HR: 94 (16 Oct 2024 09:50) (72 - 98)  BP: 168/98 (16 Oct 2024 09:50) (136/86 - 168/98)  BP(mean): --  RR: 19 (16 Oct 2024 09:50) (17 - 19)  SpO2: 96% (16 Oct 2024 09:50) (95% - 100%)    Parameters below as of 16 Oct 2024 09:50  Patient On (Oxygen Delivery Method): room air     I&O's Summary    15 Oct 2024 07:01  -  16 Oct 2024 07:00  --------------------------------------------------------  IN: 720 mL / OUT: 0 mL / NET: 720 mL       GENERAL: [ ]Cachexia    [x ]Alert  [ ]Oriented x   [ ]Lethargic  [ ]Unarousable  [x ]Verbal  [ ]Non-Verbal  Behavioral:   [ ]Anxiety  [ ]Delirium [ ]Agitation [ ]Other  HEENT:  [x ]Normal   [ ]Dry mouth   [ ]ET Tube/Trach  [ ]Oral lesions  PULMONARY:   [x ]Clear [ ]Tachypnea  [ ]Audible excessive secretions   [ ]Rhonchi        [ ]Right [ ]Left [ ]Bilateral  [ ]Crackles        [ ]Right [ ]Left [ ]Bilateral  [ ]Wheezing     [ ]Right [ ]Left [ ]Bilateral  [ ]Diminished BS [ ] Right [ ]Left [ ]Bilateral  CARDIOVASCULAR:    [x ]Regular [ ]Irregular [ ]Tachy  [ ]Rodrick [ ]Murmur [ ]Other  GASTROINTESTINAL:  [x ]Soft  [ ]Distended   [ x]+BS  [x ]Non tender [ ]Tender  [ ]Other [ ]PEG [ ]OGT/ NGT   Last BM:   GENITOURINARY:  [x ]Normal [ ]Incontinent   [ ]Oliguria/Anuria   [ ]Farah  MUSCULOSKELETAL:   [x ]Normal   [ ]Weakness  [ ]Bed/Wheelchair bound [ ]Edema  NEUROLOGIC:   [x ]No focal deficits  [ ] Cognitive impairment  [ ] Dysphagia [ ]Dysarthria [ ] Paresis [ ]Other   SKIN:   [ ]Normal  [ ]Rash  [ ]Other  [ ]Pressure ulcer(s) [ ]y [ ]n present on admission    CRITICAL CARE:  [ ]Shock Present  [ ]Septic [ ]Cardiogenic [ ]Neurologic [ ]Hypovolemic  [ ]Vasopressors [ ]Inotropes  [ ]Respiratory failure present [ ]Mechanical Ventilation [ ]Non-invasive ventilatory support [ ]High-Flow   [ ]Acute  [ ]Chronic [ ]Hypoxic  [ ]Hypercarbic [ ]Other  [ ]Other organ failure     LABS:                        13.5   0.55  )-----------( 39       ( 16 Oct 2024 10:14 )             38.1   10-16    141  |  107  |  43[H]  ----------------------------<  152[H]  4.1   |  20[L]  |  0.84    Ca    7.9[L]      16 Oct 2024 06:14  Phos  3.2     10-16  Mg     2.60     10-16    TPro  5.6[L]  /  Alb  3.8  /  TBili  1.3[H]  /  DBili  x   /  AST  68[H]  /  ALT  9   /  AlkPhos  82  10-16      Urinalysis Basic - ( 16 Oct 2024 06:14 )    Color: x / Appearance: x / SG: x / pH: x  Gluc: 152 mg/dL / Ketone: x  / Bili: x / Urobili: x   Blood: x / Protein: x / Nitrite: x   Leuk Esterase: x / RBC: x / WBC x   Sq Epi: x / Non Sq Epi: x / Bacteria: x      RADIOLOGY & ADDITIONAL STUDIES:    < from: MR Head w/wo IV Cont (10.15.24 @ 16:01) >  IMPRESSION:    1. 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 Ischemic white matter disease and atrophy typical for age    3. Skull base and calvarial osseous metastases    < end of copied text >    < from: MR Lumbar Spine w/wo IV Cont (10.15.24 @ 16:03) >  IMPRESSION:    1. CERVICAL SPINE:   Diffuse osseous metastases. No cervical pathologic   fracture. No significant cervical epidural disease. Mild to moderate   cervical degenerative disc disease.    2. THORACIC SPINE:   Diffuse osseous metastases. Multiple mild endplate   deformities in the lower thoracic spine are likely pathologic. Widespread   epidural disease is predominantly low grade, high-grade at T3. No   significant thoracic degenerative disc disease.    3. LUMBAR SPINE: Diffuse osseous metastases. L1 and L5 pathologic   fractures are associated with moderate epidural disease at L1, more mild   at L5. At least mild epidural disease at the left S1-S2 neural foramen.   No significant lumbar degenerative disc disease.    4. Consider body CT evaluation of body imaging findings incompletely   evaluated by the MR technique    --- End of Report ---    < end of copied text >    Protein Calorie Malnutrition Present: [ ]mild [ ]moderate [ ]severe [ ]underweight [ ]morbid obesity  https://www.andeal.org/vault/8010/web/files/ONC/Table_Clinical%20Characteristics%20to%20Document%20Malnutrition-White%20JV%20et%20al%202012.pdf    Height (cm): 172.7 (10-12-24 @ 09:21)  Weight (kg): 69.4 (10-12-24 @ 09:21)  BMI (kg/m2): 23.3 (10-12-24 @ 09:21)    [ ]PPSV2 < or = 30%  [ ]significant weight loss [ ]poor nutritional intake [ ]anasarca[ ]Artificial Nutrition    Other REFERRALS:  [ ]Hospice  [ ]Child Life  [ ]Social Work  [ ]Case management [ ]Holistic Therapy     Palliative Performance Scale:  http://[facility].org/files/news/palliative_performance_scale_ppsv2.pdf  (Ctrl +  left click to view)  Respiratory Distress Observation Tool:  https://homecareinformation.net/handouts/hen/Respiratory_Distress_Observation_Scale.pdf (Ctrl +  left click to view)  PAINAD Score:  http://geriatrictoolkit.[place].[facility]/cog/painad.pdf (Ctrl +  left click to view)       SUBJECTIVE AND OBJECTIVE:  Indication for Geriatrics and Palliative Care Services/INTERVAL HPI:  Pt seen and examined at bedside today, wife present at bedside. Pt c/o continued pain with movement, alleviated with morphine PCA pump but still feels that pain is limiting his ROM. He also c/o feeling food getting stuck in his throat and having difficult time burping to clear it.    OVERNIGHT EVENTS:  Chart review, in 24hrs 7AM-7AM, patient used Dilaudid 1mg x6 injections/9 attempts, Morphine 4mg x11 injections/27 attempts, along with standing oxycontin 30mg once (total OME: 385mg)    DNR on chart:  Allergies    No Known Allergies    Intolerances    MEDICATIONS  (STANDING):  abiraterone 500 mg tablet 2 Tablet(s) 2 Tablet(s) Oral daily  atorvastatin 20 milliGRAM(s) Oral at bedtime  chlorhexidine 2% Cloths 1 Application(s) Topical daily  dexAMETHasone  IVPB 8 milliGRAM(s) IV Intermittent two times a day  enoxaparin Injectable 40 milliGRAM(s) SubCutaneous every 24 hours  fenofibrate Tablet 145 milliGRAM(s) Oral daily  lidocaine   4% Patch 1 Patch Transdermal every 24 hours  lisinopril 40 milliGRAM(s) Oral daily  morphine PCA (5 mG/mL) 30 milliLiter(s) PCA Continuous PCA Continuous  ondansetron Injectable 4 milliGRAM(s) IV Push every 8 hours  oxyCODONE  ER Tablet 30 milliGRAM(s) Oral every 12 hours  pantoprazole  Injectable 40 milliGRAM(s) IV Push daily  polyethylene glycol 3350 17 Gram(s) Oral daily  sodium chloride 0.9%. 1000 milliLiter(s) (80 mL/Hr) IV Continuous <Continuous>    MEDICATIONS  (PRN):  acetaminophen     Tablet .. 650 milliGRAM(s) Oral every 6 hours PRN Temp greater or equal to 38C (100.4F), Mild Pain (1 - 3), Moderate Pain (4 - 6)  aluminum hydroxide/magnesium hydroxide/simethicone Suspension 30 milliLiter(s) Oral every 4 hours PRN Dyspepsia  morphine  - Injectable 4 milliGRAM(s) IV Push once PRN severe breakthrough pain while off PCA  morphine PCA (5 mG/mL) Rescue Clinician Bolus 4 milliGRAM(s) IV Push every 15 minutes PRN Breakthrough severe pain  naloxone Injectable 0.1 milliGRAM(s) IV Push every 3 minutes PRN For ANY of the following changes in patient status:  A. RR LESS THAN 10 breaths per minute, B. Oxygen saturation LESS THAN 90%, C. Sedation score of 6      ITEMS UNCHECKED ARE NOT PRESENT    PRESENT SYMPTOMS: [ ]Unable to self-report - see  CPOT, PAINADS, RDOS below  Source if other than patient:  [ ]Family   [ ]Team     Pain: [x]yes [ ]no  QOL impact - Limits mobility  Location - Lower back  Aggravating factors - Movement  Quality - Sharp  Radiation - non-radiating  Timing- Intermittent  Severity (0-10 scale): 8  Minimal acceptable level (0-10 scale): 0      PCSSQ[Palliative Care Spiritual Screening Question]   Severity (0-10):  Score of 4 or > indicate consideration of Chaplaincy referral.  Chaplaincy Referral: [ ] yes [ ] refused [ ] following    Caregiver [place]? : [ ] yes [ ] no  [x ] deferred:  Social work referral [ ] Patient & Family Centered Care Referral [ ]     Anticipatory Grief present?:  [ ] yes [ ] no  [x ] deferred: Social work referral [ ] Patient & Family Centered Care Referral [ ]      Other Symptoms:  [x ]All other review of systems negative     PHYSICAL EXAM:  Vital Signs Last 24 Hrs  T(C): 36.8 (16 Oct 2024 09:50), Max: 36.8 (16 Oct 2024 06:42)  T(F): 98.3 (16 Oct 2024 09:50), Max: 98.3 (16 Oct 2024 09:50)  HR: 94 (16 Oct 2024 09:50) (72 - 98)  BP: 168/98 (16 Oct 2024 09:50) (136/86 - 168/98)  BP(mean): --  RR: 19 (16 Oct 2024 09:50) (17 - 19)  SpO2: 96% (16 Oct 2024 09:50) (95% - 100%)    Parameters below as of 16 Oct 2024 09:50  Patient On (Oxygen Delivery Method): room air     I&O's Summary    15 Oct 2024 07:01  -  16 Oct 2024 07:00  --------------------------------------------------------  IN: 720 mL / OUT: 0 mL / NET: 720 mL       GENERAL: [ ]Cachexia    [x ]Alert  [ ]Oriented x   [ ]Lethargic  [ ]Unarousable  [x ]Verbal  [ ]Non-Verbal  Behavioral:   [ ]Anxiety  [ ]Delirium [ ]Agitation [ ]Other  HEENT:  [x ]Normal   [ ]Dry mouth   [ ]ET Tube/Trach  [ ]Oral lesions  PULMONARY:   [x ]Clear [ ]Tachypnea  [ ]Audible excessive secretions   [ ]Rhonchi        [ ]Right [ ]Left [ ]Bilateral  [ ]Crackles        [ ]Right [ ]Left [ ]Bilateral  [ ]Wheezing     [ ]Right [ ]Left [ ]Bilateral  [ ]Diminished BS [ ] Right [ ]Left [ ]Bilateral  CARDIOVASCULAR:    [x ]Regular [ ]Irregular [ ]Tachy  [ ]Rodrick [ ]Murmur [ ]Other  GASTROINTESTINAL:  [x ]Soft  [ ]Distended   [ x]+BS  [x ]Non tender [ ]Tender  [ ]Other [ ]PEG [ ]OGT/ NGT   Last BM: 10/14  GENITOURINARY:  [x ]Normal [ ]Incontinent   [ ]Oliguria/Anuria   [ ]Farah  MUSCULOSKELETAL:   [x ]Normal   [ ]Weakness  [ ]Bed/Wheelchair bound [ ]Edema  NEUROLOGIC:   [x ]No focal deficits  [ ] Cognitive impairment  [ ] Dysphagia [ ]Dysarthria [ ] Paresis [ ]Other   SKIN:   [ ]Normal  [ ]Rash  [ ]Other  [ ]Pressure ulcer(s) [ ]y [ ]n present on admission    CRITICAL CARE:  [ ]Shock Present  [ ]Septic [ ]Cardiogenic [ ]Neurologic [ ]Hypovolemic  [ ]Vasopressors [ ]Inotropes  [ ]Respiratory failure present [ ]Mechanical Ventilation [ ]Non-invasive ventilatory support [ ]High-Flow   [ ]Acute  [ ]Chronic [ ]Hypoxic  [ ]Hypercarbic [ ]Other  [ ]Other organ failure     LABS:                        13.5   0.55  )-----------( 39       ( 16 Oct 2024 10:14 )             38.1   10-16    141  |  107  |  43[H]  ----------------------------<  152[H]  4.1   |  20[L]  |  0.84    Ca    7.9[L]      16 Oct 2024 06:14  Phos  3.2     10-16  Mg     2.60     10-16    TPro  5.6[L]  /  Alb  3.8  /  TBili  1.3[H]  /  DBili  x   /  AST  68[H]  /  ALT  9   /  AlkPhos  82  10-16      Urinalysis Basic - ( 16 Oct 2024 06:14 )    Color: x / Appearance: x / SG: x / pH: x  Gluc: 152 mg/dL / Ketone: x  / Bili: x / Urobili: x   Blood: x / Protein: x / Nitrite: x   Leuk Esterase: x / RBC: x / WBC x   Sq Epi: x / Non Sq Epi: x / Bacteria: x      RADIOLOGY & ADDITIONAL STUDIES:    < from: MR Head w/wo IV Cont (10.15.24 @ 16:01) >  IMPRESSION:    1. 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 Ischemic white matter disease and atrophy typical for age    3. Skull base and calvarial osseous metastases    < end of copied text >    < from: MR Lumbar Spine w/wo IV Cont (10.15.24 @ 16:03) >  IMPRESSION:    1. CERVICAL SPINE:   Diffuse osseous metastases. No cervical pathologic   fracture. No significant cervical epidural disease. Mild to moderate   cervical degenerative disc disease.    2. THORACIC SPINE:   Diffuse osseous metastases. Multiple mild endplate   deformities in the lower thoracic spine are likely pathologic. Widespread   epidural disease is predominantly low grade, high-grade at T3. No   significant thoracic degenerative disc disease.    3. LUMBAR SPINE: Diffuse osseous metastases. L1 and L5 pathologic   fractures are associated with moderate epidural disease at L1, more mild   at L5. At least mild epidural disease at the left S1-S2 neural foramen.   No significant lumbar degenerative disc disease.    4. Consider body CT evaluation of body imaging findings incompletely   evaluated by the MR technique    --- End of Report ---    < end of copied text >    Protein Calorie Malnutrition Present: [ ]mild [ ]moderate [ ]severe [ ]underweight [ ]morbid obesity  https://www.andeal.org/vault/1310/web/files/ONC/Table_Clinical%20Characteristics%20to%20Document%20Malnutrition-White%20JV%20et%20al%202012.pdf    Height (cm): 172.7 (10-12-24 @ 09:21)  Weight (kg): 69.4 (10-12-24 @ 09:21)  BMI (kg/m2): 23.3 (10-12-24 @ 09:21)    [ ]PPSV2 < or = 30%  [ ]significant weight loss [ ]poor nutritional intake [ ]anasarca[ ]Artificial Nutrition    Other REFERRALS:  [ ]Hospice  [ ]Child Life  [ ]Social Work  [ ]Case management [ ]Holistic Therapy     Palliative Performance Scale:  http://[facility].org/files/news/palliative_performance_scale_ppsv2.pdf  (Ctrl +  left click to view)  Respiratory Distress Observation Tool:  https://homecareinformation.net/handouts/hen/Respiratory_Distress_Observation_Scale.pdf (Ctrl +  left click to view)  PAINAD Score:  http://geriatrictoolkit.[place].[facility]/cog/painad.pdf (Ctrl +  left click to view)

## 2024-10-16 NOTE — PROGRESS NOTE ADULT - SUBJECTIVE AND OBJECTIVE BOX
Patient is a 64y old  Male who presents with a chief complaint of Pain (16 Oct 2024 16:28)    Date of servie : 10-16-24 @ 16:52  INTERVAL HPI/OVERNIGHT EVENTS:  T(C): 36.4 (10-16-24 @ 14:11), Max: 36.8 (10-16-24 @ 06:42)  HR: 94 (10-16-24 @ 14:11) (76 - 98)  BP: 145/96 (10-16-24 @ 14:11) (136/86 - 168/98)  RR: 17 (10-16-24 @ 14:11) (17 - 19)  SpO2: 94% (10-16-24 @ 14:11) (94% - 100%)  Wt(kg): --  I&O's Summary    15 Oct 2024 07:01  -  16 Oct 2024 07:00  --------------------------------------------------------  IN: 720 mL / OUT: 0 mL / NET: 720 mL    16 Oct 2024 07:01  -  16 Oct 2024 16:52  --------------------------------------------------------  IN: 980 mL / OUT: 0 mL / NET: 980 mL        LABS:                        13.5   0.55  )-----------( 39       ( 16 Oct 2024 10:14 )             38.1     10-16    141  |  107  |  43[H]  ----------------------------<  152[H]  4.1   |  20[L]  |  0.84    Ca    7.9[L]      16 Oct 2024 06:14  Phos  3.2     10-16  Mg     2.60     10-16    TPro  5.6[L]  /  Alb  3.8  /  TBili  1.3[H]  /  DBili  x   /  AST  68[H]  /  ALT  9   /  AlkPhos  82  10-16      Urinalysis Basic - ( 16 Oct 2024 06:14 )    Color: x / Appearance: x / SG: x / pH: x  Gluc: 152 mg/dL / Ketone: x  / Bili: x / Urobili: x   Blood: x / Protein: x / Nitrite: x   Leuk Esterase: x / RBC: x / WBC x   Sq Epi: x / Non Sq Epi: x / Bacteria: x      CAPILLARY BLOOD GLUCOSE            Urinalysis Basic - ( 16 Oct 2024 06:14 )    Color: x / Appearance: x / SG: x / pH: x  Gluc: 152 mg/dL / Ketone: x  / Bili: x / Urobili: x   Blood: x / Protein: x / Nitrite: x   Leuk Esterase: x / RBC: x / WBC x   Sq Epi: x / Non Sq Epi: x / Bacteria: x        MEDICATIONS  (STANDING):  abiraterone 500 mg tablet 2 Tablet(s) 2 Tablet(s) Oral daily  atorvastatin 20 milliGRAM(s) Oral at bedtime  chlorhexidine 2% Cloths 1 Application(s) Topical daily  dexAMETHasone  IVPB 8 milliGRAM(s) IV Intermittent two times a day  enoxaparin Injectable 40 milliGRAM(s) SubCutaneous every 24 hours  fenofibrate Tablet 145 milliGRAM(s) Oral daily  fentaNYL   Patch  75 MICROgram(s)/Hr 1 Patch Transdermal every 72 hours  lidocaine   4% Patch 1 Patch Transdermal every 24 hours  lisinopril 40 milliGRAM(s) Oral daily  morphine PCA (5 mG/mL) 30 milliLiter(s) PCA Continuous PCA Continuous  ondansetron Injectable 4 milliGRAM(s) IV Push every 8 hours  pantoprazole  Injectable 40 milliGRAM(s) IV Push daily  polyethylene glycol 3350 17 Gram(s) Oral daily  sodium chloride 0.9%. 1000 milliLiter(s) (80 mL/Hr) IV Continuous <Continuous>    MEDICATIONS  (PRN):  acetaminophen     Tablet .. 650 milliGRAM(s) Oral every 6 hours PRN Temp greater or equal to 38C (100.4F), Mild Pain (1 - 3), Moderate Pain (4 - 6)  aluminum hydroxide/magnesium hydroxide/simethicone Suspension 30 milliLiter(s) Oral every 4 hours PRN Dyspepsia  morphine  - Injectable 4 milliGRAM(s) IV Push once PRN severe breakthrough pain while off PCA  morphine PCA (5 mG/mL) Rescue Clinician Bolus 4 milliGRAM(s) IV Push every 15 minutes PRN Breakthrough severe pain  naloxone Injectable 0.1 milliGRAM(s) IV Push every 3 minutes PRN For ANY of the following changes in patient status:  A. RR LESS THAN 10 breaths per minute, B. Oxygen saturation LESS THAN 90%, C. Sedation score of 6          PHYSICAL EXAM:  GENERAL: NAD, well-groomed, well-developed  HEAD:  Atraumatic, Normocephalic  CHEST/LUNG: Clear to percussion bilaterally; No rales, rhonchi, wheezing, or rubs  HEART: Regular rate and rhythm; No murmurs, rubs, or gallops  ABDOMEN: Soft, Nontender, Nondistended; Bowel sounds present  EXTREMITIES:  2+ Peripheral Pulses, No clubbing, cyanosis, or edema  LYMPH: No lymphadenopathy noted  SKIN: No rashes or lesions    Care Discussed with Consultants/Other Providers [ ] YES  [ ] NO

## 2024-10-17 ENCOUNTER — APPOINTMENT (OUTPATIENT)
Dept: SURGERY | Facility: CLINIC | Age: 64
End: 2024-10-17

## 2024-10-17 DIAGNOSIS — D61.818 OTHER PANCYTOPENIA: ICD-10-CM

## 2024-10-17 DIAGNOSIS — R00.0 TACHYCARDIA, UNSPECIFIED: ICD-10-CM

## 2024-10-17 LAB
ALBUMIN SERPL ELPH-MCNC: 3 G/DL — LOW (ref 3.3–5)
ALBUMIN SERPL ELPH-MCNC: 3.4 G/DL — SIGNIFICANT CHANGE UP (ref 3.3–5)
ALP SERPL-CCNC: 53 U/L — SIGNIFICANT CHANGE UP (ref 40–120)
ALP SERPL-CCNC: 64 U/L — SIGNIFICANT CHANGE UP (ref 40–120)
ALT FLD-CCNC: 10 U/L — SIGNIFICANT CHANGE UP (ref 4–41)
ALT FLD-CCNC: 9 U/L — SIGNIFICANT CHANGE UP (ref 4–41)
ANION GAP SERPL CALC-SCNC: 12 MMOL/L — SIGNIFICANT CHANGE UP (ref 7–14)
ANION GAP SERPL CALC-SCNC: 12 MMOL/L — SIGNIFICANT CHANGE UP (ref 7–14)
APTT BLD: 38.3 SEC — HIGH (ref 24.5–35.6)
AST SERPL-CCNC: 52 U/L — HIGH (ref 4–40)
AST SERPL-CCNC: 60 U/L — HIGH (ref 4–40)
BASOPHILS # BLD AUTO: 0.02 K/UL — SIGNIFICANT CHANGE UP (ref 0–0.2)
BASOPHILS NFR BLD AUTO: 6.5 % — HIGH (ref 0–2)
BILIRUB SERPL-MCNC: 1.1 MG/DL — SIGNIFICANT CHANGE UP (ref 0.2–1.2)
BILIRUB SERPL-MCNC: 1.3 MG/DL — HIGH (ref 0.2–1.2)
BLD GP AB SCN SERPL QL: NEGATIVE — SIGNIFICANT CHANGE UP
BUN SERPL-MCNC: 29 MG/DL — HIGH (ref 7–23)
BUN SERPL-MCNC: 35 MG/DL — HIGH (ref 7–23)
CALCIUM SERPL-MCNC: 7.9 MG/DL — LOW (ref 8.4–10.5)
CALCIUM SERPL-MCNC: 8 MG/DL — LOW (ref 8.4–10.5)
CHLORIDE SERPL-SCNC: 107 MMOL/L — SIGNIFICANT CHANGE UP (ref 98–107)
CHLORIDE SERPL-SCNC: 110 MMOL/L — HIGH (ref 98–107)
CO2 SERPL-SCNC: 22 MMOL/L — SIGNIFICANT CHANGE UP (ref 22–31)
CO2 SERPL-SCNC: 22 MMOL/L — SIGNIFICANT CHANGE UP (ref 22–31)
CREAT SERPL-MCNC: 0.74 MG/DL — SIGNIFICANT CHANGE UP (ref 0.5–1.3)
CREAT SERPL-MCNC: 0.75 MG/DL — SIGNIFICANT CHANGE UP (ref 0.5–1.3)
EGFR: 100 ML/MIN/1.73M2 — SIGNIFICANT CHANGE UP
EGFR: 101 ML/MIN/1.73M2 — SIGNIFICANT CHANGE UP
EOSINOPHIL # BLD AUTO: 0 K/UL — SIGNIFICANT CHANGE UP (ref 0–0.5)
EOSINOPHIL NFR BLD AUTO: 0 % — SIGNIFICANT CHANGE UP (ref 0–6)
GAS PNL BLDV: SIGNIFICANT CHANGE UP
GLUCOSE SERPL-MCNC: 157 MG/DL — HIGH (ref 70–99)
GLUCOSE SERPL-MCNC: 167 MG/DL — HIGH (ref 70–99)
HCT VFR BLD CALC: 35 % — LOW (ref 39–50)
HCT VFR BLD CALC: 35.5 % — LOW (ref 39–50)
HGB BLD-MCNC: 12.1 G/DL — LOW (ref 13–17)
HGB BLD-MCNC: 12.2 G/DL — LOW (ref 13–17)
IANC: 0.08 K/UL — LOW (ref 1.8–7.4)
IMM GRANULOCYTES NFR BLD AUTO: 35.5 % — HIGH (ref 0–0.9)
INR BLD: 1.74 RATIO — HIGH (ref 0.85–1.16)
LYMPHOCYTES # BLD AUTO: 0.09 K/UL — LOW (ref 1–3.3)
LYMPHOCYTES # BLD AUTO: 29 % — SIGNIFICANT CHANGE UP (ref 13–44)
MAGNESIUM SERPL-MCNC: 2.5 MG/DL — SIGNIFICANT CHANGE UP (ref 1.6–2.6)
MCHC RBC-ENTMCNC: 30.8 PG — SIGNIFICANT CHANGE UP (ref 27–34)
MCHC RBC-ENTMCNC: 31 PG — SIGNIFICANT CHANGE UP (ref 27–34)
MCHC RBC-ENTMCNC: 34.1 GM/DL — SIGNIFICANT CHANGE UP (ref 32–36)
MCHC RBC-ENTMCNC: 34.9 GM/DL — SIGNIFICANT CHANGE UP (ref 32–36)
MCV RBC AUTO: 88.8 FL — SIGNIFICANT CHANGE UP (ref 80–100)
MCV RBC AUTO: 90.3 FL — SIGNIFICANT CHANGE UP (ref 80–100)
MONOCYTES # BLD AUTO: 0.01 K/UL — SIGNIFICANT CHANGE UP (ref 0–0.9)
MONOCYTES NFR BLD AUTO: 3.2 % — SIGNIFICANT CHANGE UP (ref 2–14)
NEUTROPHILS # BLD AUTO: 0.08 K/UL — LOW (ref 1.8–7.4)
NEUTROPHILS NFR BLD AUTO: 25.8 % — LOW (ref 43–77)
NRBC # BLD: 0 /100 WBCS — SIGNIFICANT CHANGE UP (ref 0–0)
NRBC # BLD: 0 /100 WBCS — SIGNIFICANT CHANGE UP (ref 0–0)
NRBC # FLD: 0 K/UL — SIGNIFICANT CHANGE UP (ref 0–0)
NRBC # FLD: 0 K/UL — SIGNIFICANT CHANGE UP (ref 0–0)
PHOSPHATE SERPL-MCNC: 3 MG/DL — SIGNIFICANT CHANGE UP (ref 2.5–4.5)
PLATELET # BLD AUTO: 37 K/UL — LOW (ref 150–400)
PLATELET # BLD AUTO: 38 K/UL — LOW (ref 150–400)
POTASSIUM SERPL-MCNC: 4.4 MMOL/L — SIGNIFICANT CHANGE UP (ref 3.5–5.3)
POTASSIUM SERPL-MCNC: 4.4 MMOL/L — SIGNIFICANT CHANGE UP (ref 3.5–5.3)
POTASSIUM SERPL-SCNC: 4.4 MMOL/L — SIGNIFICANT CHANGE UP (ref 3.5–5.3)
POTASSIUM SERPL-SCNC: 4.4 MMOL/L — SIGNIFICANT CHANGE UP (ref 3.5–5.3)
PROT SERPL-MCNC: 5.2 G/DL — LOW (ref 6–8.3)
PROT SERPL-MCNC: 5.4 G/DL — LOW (ref 6–8.3)
PROTHROM AB SERPL-ACNC: 20.6 SEC — HIGH (ref 9.9–13.4)
RBC # BLD: 3.93 M/UL — LOW (ref 4.2–5.8)
RBC # BLD: 3.94 M/UL — LOW (ref 4.2–5.8)
RBC # FLD: 14.2 % — SIGNIFICANT CHANGE UP (ref 10.3–14.5)
RBC # FLD: 14.3 % — SIGNIFICANT CHANGE UP (ref 10.3–14.5)
RH IG SCN BLD-IMP: POSITIVE — SIGNIFICANT CHANGE UP
SODIUM SERPL-SCNC: 141 MMOL/L — SIGNIFICANT CHANGE UP (ref 135–145)
SODIUM SERPL-SCNC: 144 MMOL/L — SIGNIFICANT CHANGE UP (ref 135–145)
WBC # BLD: 0.22 K/UL — CRITICAL LOW (ref 3.8–10.5)
WBC # BLD: 0.31 K/UL — CRITICAL LOW (ref 3.8–10.5)
WBC # FLD AUTO: 0.22 K/UL — CRITICAL LOW (ref 3.8–10.5)
WBC # FLD AUTO: 0.31 K/UL — CRITICAL LOW (ref 3.8–10.5)

## 2024-10-17 PROCEDURE — 72132 CT LUMBAR SPINE W/DYE: CPT | Mod: 26

## 2024-10-17 PROCEDURE — 71045 X-RAY EXAM CHEST 1 VIEW: CPT | Mod: 26

## 2024-10-17 PROCEDURE — 99233 SBSQ HOSP IP/OBS HIGH 50: CPT

## 2024-10-17 PROCEDURE — 72129 CT CHEST SPINE W/DYE: CPT | Mod: 26

## 2024-10-17 PROCEDURE — 90792 PSYCH DIAG EVAL W/MED SRVCS: CPT

## 2024-10-17 PROCEDURE — 93010 ELECTROCARDIOGRAM REPORT: CPT

## 2024-10-17 RX ORDER — FILGRASTIM-AAFI 300 UG/.5ML
300 INJECTION, SOLUTION SUBCUTANEOUS DAILY
Refills: 0 | Status: DISCONTINUED | OUTPATIENT
Start: 2024-10-17 | End: 2024-10-18

## 2024-10-17 RX ORDER — SODIUM CHLORIDE 9 MG/ML
500 INJECTION, SOLUTION INTRAMUSCULAR; INTRAVENOUS; SUBCUTANEOUS ONCE
Refills: 0 | Status: COMPLETED | OUTPATIENT
Start: 2024-10-17 | End: 2024-10-17

## 2024-10-17 RX ORDER — SODIUM CHLORIDE 9 MG/ML
1000 INJECTION, SOLUTION INTRAMUSCULAR; INTRAVENOUS; SUBCUTANEOUS
Refills: 0 | Status: DISCONTINUED | OUTPATIENT
Start: 2024-10-17 | End: 2024-10-22

## 2024-10-17 RX ADMIN — DEXAMETHASONE 101.6 MILLIGRAM(S): 1.5 TABLET ORAL at 06:06

## 2024-10-17 RX ADMIN — LISINOPRIL 40 MILLIGRAM(S): 20 TABLET ORAL at 06:06

## 2024-10-17 RX ADMIN — DEXAMETHASONE 101.6 MILLIGRAM(S): 1.5 TABLET ORAL at 20:24

## 2024-10-17 RX ADMIN — FENTANYL 1 PATCH: 12 PATCH, EXTENDED RELEASE TRANSDERMAL at 20:30

## 2024-10-17 RX ADMIN — ONDANSETRON HYDROCHLORIDE 4 MILLIGRAM(S): 4 TABLET, FILM COATED ORAL at 01:50

## 2024-10-17 RX ADMIN — ONDANSETRON HYDROCHLORIDE 4 MILLIGRAM(S): 4 TABLET, FILM COATED ORAL at 11:16

## 2024-10-17 RX ADMIN — Medication 30 MILLILITER(S): at 08:37

## 2024-10-17 RX ADMIN — LIDOCAINE 1 PATCH: 40 CREAM TOPICAL at 07:25

## 2024-10-17 RX ADMIN — SODIUM CHLORIDE 100 MILLILITER(S): 9 INJECTION, SOLUTION INTRAMUSCULAR; INTRAVENOUS; SUBCUTANEOUS at 19:27

## 2024-10-17 RX ADMIN — Medication 30 MILLILITER(S): at 20:21

## 2024-10-17 RX ADMIN — CHLORHEXIDINE GLUCONATE 1 APPLICATION(S): 1.2 RINSE ORAL at 19:24

## 2024-10-17 RX ADMIN — PANTOPRAZOLE SODIUM 40 MILLIGRAM(S): 40 TABLET, DELAYED RELEASE ORAL at 13:00

## 2024-10-17 RX ADMIN — Medication 30 MILLILITER(S): at 13:07

## 2024-10-17 RX ADMIN — ONDANSETRON HYDROCHLORIDE 4 MILLIGRAM(S): 4 TABLET, FILM COATED ORAL at 19:23

## 2024-10-17 RX ADMIN — SODIUM CHLORIDE 500 MILLILITER(S): 9 INJECTION, SOLUTION INTRAMUSCULAR; INTRAVENOUS; SUBCUTANEOUS at 18:30

## 2024-10-17 RX ADMIN — Medication 20 MILLIGRAM(S): at 06:06

## 2024-10-17 RX ADMIN — ENOXAPARIN SODIUM 40 MILLIGRAM(S): 30 INJECTION SUBCUTANEOUS at 17:49

## 2024-10-17 RX ADMIN — FENTANYL 1 PATCH: 12 PATCH, EXTENDED RELEASE TRANSDERMAL at 07:25

## 2024-10-17 RX ADMIN — FILGRASTIM-AAFI 300 MICROGRAM(S): 300 INJECTION, SOLUTION SUBCUTANEOUS at 11:16

## 2024-10-17 NOTE — BH CONSULTATION LIAISON ASSESSMENT NOTE - CURRENT MEDICATION
MEDICATIONS  (STANDING):  abiraterone 500 mg tablet 2 Tablet(s) 2 Tablet(s) Oral daily  atorvastatin 20 milliGRAM(s) Oral at bedtime  chlorhexidine 2% Cloths 1 Application(s) Topical daily  dexAMETHasone  IVPB 8 milliGRAM(s) IV Intermittent two times a day  enoxaparin Injectable 40 milliGRAM(s) SubCutaneous every 24 hours  fenofibrate Tablet 145 milliGRAM(s) Oral daily  fentaNYL   Patch  75 MICROgram(s)/Hr 1 Patch Transdermal every 72 hours  filgrastim-sndz (ZARXIO) Injectable 300 MICROGram(s) SubCutaneous daily  lidocaine   4% Patch 1 Patch Transdermal every 24 hours  lisinopril 40 milliGRAM(s) Oral daily  morphine PCA (5 mG/mL) 30 milliLiter(s) PCA Continuous PCA Continuous  ondansetron Injectable 4 milliGRAM(s) IV Push every 8 hours  pantoprazole  Injectable 40 milliGRAM(s) IV Push daily  polyethylene glycol 3350 17 Gram(s) Oral daily  sodium chloride 0.9%. 1000 milliLiter(s) (80 mL/Hr) IV Continuous <Continuous>    MEDICATIONS  (PRN):  acetaminophen     Tablet .. 650 milliGRAM(s) Oral every 6 hours PRN Temp greater or equal to 38C (100.4F), Mild Pain (1 - 3), Moderate Pain (4 - 6)  aluminum hydroxide/magnesium hydroxide/simethicone Suspension 30 milliLiter(s) Oral every 4 hours PRN Dyspepsia  morphine  - Injectable 4 milliGRAM(s) IV Push once PRN severe breakthrough pain while off PCA  morphine PCA (5 mG/mL) Rescue Clinician Bolus 4 milliGRAM(s) IV Push every 15 minutes PRN Breakthrough severe pain  naloxone Injectable 0.1 milliGRAM(s) IV Push every 3 minutes PRN For ANY of the following changes in patient status:  A. RR LESS THAN 10 breaths per minute, B. Oxygen saturation LESS THAN 90%, C. Sedation score of 6

## 2024-10-17 NOTE — PROGRESS NOTE ADULT - SUBJECTIVE AND OBJECTIVE BOX
Patient is a 64y old  Male who presents with a chief complaint of Pain (17 Oct 2024 11:56)    Date of servie : 10-17-24 @ 16:29  INTERVAL HPI/OVERNIGHT EVENTS:  T(C): 36.6 (10-17-24 @ 15:40), Max: 37.2 (10-17-24 @ 10:23)  HR: 116 (10-17-24 @ 15:40) (80 - 116)  BP: 142/92 (10-17-24 @ 15:40) (130/88 - 150/80)  RR: 19 (10-17-24 @ 15:40) (12 - 19)  SpO2: 98% (10-17-24 @ 15:40) (93% - 98%)  Wt(kg): --  I&O's Summary    16 Oct 2024 07:01  -  17 Oct 2024 07:00  --------------------------------------------------------  IN: 1780 mL / OUT: 1000 mL / NET: 780 mL    17 Oct 2024 07:01  -  17 Oct 2024 16:29  --------------------------------------------------------  IN: 100 mL / OUT: 0 mL / NET: 100 mL        LABS:                        12.2   0.31  )-----------( 37       ( 17 Oct 2024 02:51 )             35.0     10-17    141  |  107  |  35[H]  ----------------------------<  157[H]  4.4   |  22  |  0.75    Ca    7.9[L]      17 Oct 2024 02:51  Phos  3.0     10-17  Mg     2.50     10-17    TPro  5.4[L]  /  Alb  3.4  /  TBili  1.1  /  DBili  x   /  AST  60[H]  /  ALT  9   /  AlkPhos  64  10-17      Urinalysis Basic - ( 17 Oct 2024 02:51 )    Color: x / Appearance: x / SG: x / pH: x  Gluc: 157 mg/dL / Ketone: x  / Bili: x / Urobili: x   Blood: x / Protein: x / Nitrite: x   Leuk Esterase: x / RBC: x / WBC x   Sq Epi: x / Non Sq Epi: x / Bacteria: x      CAPILLARY BLOOD GLUCOSE            Urinalysis Basic - ( 17 Oct 2024 02:51 )    Color: x / Appearance: x / SG: x / pH: x  Gluc: 157 mg/dL / Ketone: x  / Bili: x / Urobili: x   Blood: x / Protein: x / Nitrite: x   Leuk Esterase: x / RBC: x / WBC x   Sq Epi: x / Non Sq Epi: x / Bacteria: x        MEDICATIONS  (STANDING):  abiraterone 500 mg tablet 2 Tablet(s) 2 Tablet(s) Oral daily  atorvastatin 20 milliGRAM(s) Oral at bedtime  chlorhexidine 2% Cloths 1 Application(s) Topical daily  dexAMETHasone  IVPB 8 milliGRAM(s) IV Intermittent two times a day  enoxaparin Injectable 40 milliGRAM(s) SubCutaneous every 24 hours  fenofibrate Tablet 145 milliGRAM(s) Oral daily  fentaNYL   Patch  75 MICROgram(s)/Hr 1 Patch Transdermal every 72 hours  filgrastim-sndz (ZARXIO) Injectable 300 MICROGram(s) SubCutaneous daily  lidocaine   4% Patch 1 Patch Transdermal every 24 hours  lisinopril 40 milliGRAM(s) Oral daily  morphine PCA (5 mG/mL) 30 milliLiter(s) PCA Continuous PCA Continuous  ondansetron Injectable 4 milliGRAM(s) IV Push every 8 hours  pantoprazole  Injectable 40 milliGRAM(s) IV Push daily  polyethylene glycol 3350 17 Gram(s) Oral daily  sodium chloride 0.9%. 1000 milliLiter(s) (80 mL/Hr) IV Continuous <Continuous>    MEDICATIONS  (PRN):  acetaminophen     Tablet .. 650 milliGRAM(s) Oral every 6 hours PRN Temp greater or equal to 38C (100.4F), Mild Pain (1 - 3), Moderate Pain (4 - 6)  aluminum hydroxide/magnesium hydroxide/simethicone Suspension 30 milliLiter(s) Oral every 4 hours PRN Dyspepsia  morphine  - Injectable 4 milliGRAM(s) IV Push once PRN severe breakthrough pain while off PCA  morphine PCA (5 mG/mL) Rescue Clinician Bolus 4 milliGRAM(s) IV Push every 15 minutes PRN Breakthrough severe pain  naloxone Injectable 0.1 milliGRAM(s) IV Push every 3 minutes PRN For ANY of the following changes in patient status:  A. RR LESS THAN 10 breaths per minute, B. Oxygen saturation LESS THAN 90%, C. Sedation score of 6          PHYSICAL EXAM:  GENERAL: NAD, well-groomed, well-developed  HEAD:  Atraumatic, Normocephalic  CHEST/LUNG: Clear to percussion bilaterally; No rales, rhonchi, wheezing, or rubs  HEART: Regular rate and rhythm; No murmurs, rubs, or gallops  ABDOMEN: Soft, Nontender, Nondistended; Bowel sounds present  EXTREMITIES:  2+ Peripheral Pulses, No clubbing, cyanosis, or edema  LYMPH: No lymphadenopathy noted  SKIN: No rashes or lesions    Care Discussed with Consultants/Other Providers [ ] YES  [ ] NO

## 2024-10-17 NOTE — CHART NOTE - NSCHARTNOTEFT_GEN_A_CORE
[facility] MEDICINE NIGHT COVERAGE - Medicine Subsequent Hospital Care Note    CC: Hypoxia and tachycardia   HPI/Subjective: Patient is a 63 y/o male, with a PmHx of Metastatic Prostate Cancer (s/p radical prostatectomy), HLD, HTN, sent in by hematologist from [facility] and cancer for uncontrolled pain, nausea, vomiting.   Patient noted to be tachycardic and hypoxic all day. Patient seen and assessed at bedside. On exam patient appears to be in pain. Son at bedside reports that patient's pain has been uncontrolled since admission.     ROS:  Denies fever, chills, diaphoresis, malaise, night sweats, generalized weakness, headache, changes in vision, dizziness, cough, sputum production, wheezing, hemoptysis, chest pain, palpitations, shortness of breath, dyspnea on exertion, PND, and any other complaints.    Vital Signs Last 24 Hrs  T(C): 37.4 (10-18-24 @ 01:48), Max: 37.4 (10-18-24 @ 01:48)  T(F): 99.3 (10-18-24 @ 01:48), Max: 99.3 (10-18-24 @ 01:48)  HR: 120 (10-18-24 @ 01:48) (104 - 121)  BP: 119/80 (10-18-24 @ 01:48) (119/80 - 150/80)  RR: 18 (10-18-24 @ 01:48) (12 - 19)  SpO2: 93% (10-18-24 @ 01:48) (90% - 98%) on (O2)    PHYSICAL EXAM:  Constitutional: Appears to be in pain  Respiratory: Normal respiratory effort  Cardiovascular: Increased heart rate, regular rhythm  Gastrointestinal: soft, nontender, nondistended  Psychiatric: A&Ox3, appropriate mood, affect    LABS:                        12.1   0.22  )-----------( 38       ( 17 Oct 2024 22:45 )             35.5     10-17    144  |  110[H]  |  29[H]  ----------------------------<  167[H]  4.4   |  22  |  0.74    Ca    8.0[L]      17 Oct 2024 22:45  Phos  3.0     10-17  Mg     2.50     10-17    TPro  5.2[L]  /  Alb  3.0[L]  /  TBili  1.3[H]  /  DBili  x   /  AST  52[H]  /  ALT  10  /  AlkPhos  53  10-17    Serum Pro-Brain Natriuretic Peptide:   D-Dimer Assay:     Urinanalysis Basic (10-18-24 @ 02:21):  Color: -- / Appearance: -- / SG: -- / pH: --  Gluc: 167 / Ketone: -- / Bili: -- / Urobili: --  Blood: -- / Protein: -- / Nitrite: --  Leuk Esterase: -- / RBC: -- / WBC: --  Sq Epi: -- / Non Sq Epi: -- / Bacteria: --      Blood Gas Venous (10-18-24 @ 02:21):  pH: 7.38 | HCO3: 25 | pCO2: 42 | pO2: 69 | Lactate: 1.2  pH: 7.41 | HCO3: 28 | pCO2: 45 | pO2: 29 | Lactate: 1.6      I reviewed the above labs, radiology, medications, tests, telemetry, and EKG interpretation.    ASSESSMENT/PLAN: 63 y/o male, with a PmHx of Metastatic Prostate Cancer (s/p radical prostatectomy), HLD, HTN, sent in by hematologist from [facility] for uncontrolled pain, nausea, vomiting. Overnight patient tachycardic and hypoxic    Plan  -STAT EKG performed  -CBC, CMP, Type and Screen, PTT collected  -Urgent CTA ordered to r/o PE-> EXPEDITED  -    Case discussed with Dr. Valle    - Clinical findings, labs, tests, telemetry, and ekg reviewed with Dr. Valle. Will monitor patient closely.     Berna Stephens PA-C  Medicine [id number]

## 2024-10-17 NOTE — CONSULT NOTE ADULT - SUBJECTIVE AND OBJECTIVE BOX
Vascular & Interventional Radiology Consult Note    Evaluate for Procedure: Kyphoplasty    HPI: 64-year-old male with metastatic prostate cancer, admitted for uncontrolled lower back pain, MRI of lumbar spine sig for diffuse osseous mets with pathologic fractures. GaP team consulted for symptom management.    Allergies: No Known Allergies    Medications (Abx/Cardiac/Anticoagulation/Blood Products)  enoxaparin Injectable: 40 milliGRAM(s) SubCutaneous (10-16 @ 12:39)  lisinopril: 40 milliGRAM(s) Oral (10-17 @ 06:06)    Data:    T(C): 36.6  HR: 116  BP: 142/92  RR: 19  SpO2: 98%    -WBC 0.31 / HgB 12.2 / Hct 35.0 / Plt 37  -Na 141 / Cl 107 / BUN 35 / Glucose 157  -K 4.4 / CO2 22 / Cr 0.75  -ALT 9 / Alk Phos 64 / T.Bili 1.1  -INR 1.37 / PTT 29.2

## 2024-10-17 NOTE — BH CONSULTATION LIAISON ASSESSMENT NOTE - NSBHCHARTREVIEWLAB_PSY_A_CORE FT
10-17    141  |  107  |  35[H]  ----------------------------<  157[H]  4.4   |  22  |  0.75    Ca    7.9[L]      17 Oct 2024 02:51  Phos  3.0     10-17  Mg     2.50     10-17    TPro  5.4[L]  /  Alb  3.4  /  TBili  1.1  /  DBili  x   /  AST  60[H]  /  ALT  9   /  AlkPhos  64  10-17

## 2024-10-17 NOTE — PROGRESS NOTE ADULT - PROBLEM SELECTOR PLAN 2
- Metastatic prostate adenocarcinoma, follows with Dr. Andrew Mendoza  - Oncology recs appreciated: High risk high volume disease, started triplet therapy with ADT(lupron) + abiraterone/prednisone + taxotere  - Per oncology notes:  outpatient liver biopsy 9/30  DIAGNOSIS:  LIVER (FINE NEEDLE ASPIRATION):  POSITIVE FOR MALIGNANT CELLS. ADEQUATE CELLULARITY FOR EVALUATION.  CONSISTENT WITH METASTATIC ADENOCARCINOMA FAVOR PROSTATE PRIMARY, SEE  COMMENTS.  - management per oncology team - Metastatic prostate adenocarcinoma, follows with Dr. Andrew Mendoza  - Oncology recs appreciated: High risk high volume disease, started triplet therapy with ADT(lupron) + abiraterone/prednisone + taxotere  - Per oncology notes:  outpatient liver biopsy 9/30  POSITIVE FOR MALIGNANT CELLS CONSISTENT WITH METASTATIC ADENOCARCINOMA FAVOR PROSTATE PRIMARY  - management per oncology team  - Pt started on zarxio

## 2024-10-17 NOTE — BH CONSULTATION LIAISON ASSESSMENT NOTE - NSBHCHARTREVIEWVS_PSY_A_CORE FT
Vital Signs Last 24 Hrs  T(C): 37.2 (17 Oct 2024 10:23), Max: 37.2 (17 Oct 2024 10:23)  T(F): 98.9 (17 Oct 2024 10:23), Max: 98.9 (17 Oct 2024 10:23)  HR: 112 (17 Oct 2024 10:23) (80 - 112)  BP: 130/88 (17 Oct 2024 10:23) (130/88 - 150/80)  BP(mean): --  RR: 12 (17 Oct 2024 10:23) (12 - 18)  SpO2: 94% (17 Oct 2024 10:23) (93% - 97%)    Parameters below as of 17 Oct 2024 10:23  Patient On (Oxygen Delivery Method): nasal cannula  O2 Flow (L/min): 1.5

## 2024-10-17 NOTE — CONSULT NOTE ADULT - ASSESSMENT
Assessment: 64M with metastatic prostate cancer presenting with back pain.    IR consulted for kyphoplasty pre-radiation therapy.    Imaging reviewed: Diffuse osseous heterogeneity is redemonstrated, consistent with diffuse osseous metastases. Mild multilevel loss of vertebral body height may be due to pathologic fractures. Multilevel epidural metastases are noted primarily resulting in mild multilevel spinal canal stenosis. Soft tissue mass within the presacral region at the S2 and S3 levels.      Plan:   -Tentative plan for kyphoplasty with IR on 10/22/24 pending case review with Dr. Inocente Case on 10/21/24.    -Please place order for IR Procedure, approving attending Dr. Savage Torres  -Discussed with primary team  -IR will follow up on 10/21/24      Roman Chavez MD  PGY-III, Diagnostic Radiology Resident    -Available on [employer] TEAMS  -Emergent issues: [facility]-p.[phone]; [facility]-p.[phone] ([phone])  -Non-emergent consults: Please place a [place] order "IR Consult" with an appropriate callback number  -Scheduling questions: [facility]: [phone]; [facility]: [phone]  -Clinic/Outpatient booking: [facility]: [phone]; [facility]: [phone] Assessment: 64M with metastatic prostate cancer presenting with back pain.    IR consulted for kyphoplasty pre-radiation therapy.    Imaging reviewed: Diffuse osseous heterogeneity is re demonstrated, consistent with diffuse osseous metastases. Mild multilevel loss of vertebral body height may be due to pathologic fractures. Multilevel epidural metastases are noted primarily resulting in mild multilevel spinal canal stenosis. Soft tissue mass within the presacral region at the S2 and S3 levels.      Plan:   -Pending case review with Dr. Inocente Case on 10/21/24.  -Discussed with primary team  -IR will follow up on 10/21/24      Roman Chavez MD  PGY-III, Diagnostic Radiology Resident    -Available on [employer] TEAMS  -Emergent issues: [facility]-p.[phone]; [facility]-p.[phone] ([phone])  -Non-emergent consults: Please place a [place] order "IR Consult" with an appropriate callback number  -Scheduling questions: [facility]: [phone]; [facility]: [phone]  -Clinic/Outpatient booking: [facility]: [phone]; [facility]: [phone]

## 2024-10-17 NOTE — BH CONSULTATION LIAISON ASSESSMENT NOTE - HPI (INCLUDE ILLNESS QUALITY, SEVERITY, DURATION, TIMING, CONTEXT, MODIFYING FACTORS, ASSOCIATED SIGNS AND SYMPTOMS)
Mr Alyssa is a 63 yo M w history of pancreatic cancer with mets who presents on recommendation by hematologist due to uncontrolled pain, nausea, and vomiting that has significantly worsened over the past 2 weeks. Pt diagnosed with high risk, high volume metastatic prostate cancer with bone, liver, lymph mets and a presacral mass. Pt was started on chemotherapy; pending further work up for evaluation for radiation therapy. Pt was seen by palliative care team for pain management and goals of care discussions. Team recommended psychiatric evaluation for anxiety as pt has been having difficulty adjusting to progression of cancer.    Pt was seen at bedside with spouse and son present. Pt was with limited engagement in the interview, stating that he does not know what is going on. Received morphine shortly prior to my arrival. Spouse reports that pt has just started chemotherapy and is being evaluated for radiation treatment. States that their understanding of his condition and treatment is that he will get worse with the chemo but he will get better eventually to go home. States that he may have years to live. Pt has been having a difficult time adjusting to diagnosis and feels like the team needs to just treat the cancer and get rid of it. Pt has normally been an active person and gets things done; having difficulty with the change in pace that comes with treatment.    Reporting that pt has been having nausea. Difficulty swallowing; unable to keep down food and has challenges with liquids but able to hold down. Reporting low energy. Pain relatively controlled with medications. Family concerned for over medication and the abruptness of his change in condition. Wanting to avoid adding more medications that aren't going to help treat underlying issue.    Pt denies SI/HI.

## 2024-10-17 NOTE — BH CONSULTATION LIAISON ASSESSMENT NOTE - NSBHMSEGROOM_PSY_A_CORE
Can you ask Magdaleno if he can come in today at 500 for a follow up?  He should bring his contact lenses with.  Ideally he should wear his contact lenses to the appointment.   Fair

## 2024-10-17 NOTE — PROGRESS NOTE ADULT - NS ATTEND AMEND GEN_ALL_CORE FT
Agree with above assessment and plan.   Pallitive- pain control  CT imaging done- Rad Onc follow up   MRIs completed with path fx- need ortho follow up   s/p taxotere this past saturday- expected low counts- continue to support with GCSF

## 2024-10-17 NOTE — PROGRESS NOTE ADULT - PROBLEM SELECTOR PLAN 5
- Behavioral health recs appreciated: Consider Zyprexa 1.25 mg po q8h prn for anxiety if family and pt are agreeable to medication. hold med if QTC > 500  - D/w nurse to obtain new EKG  - d/w primary team

## 2024-10-17 NOTE — BH CONSULTATION LIAISON ASSESSMENT NOTE - NSBHCONSULTFOLLOWAFTERCARE_PSY_A_CORE FT
[place] [facility]  [address] [place] [address] [phone] If oncology treatments are being planned for at [facility], oncology team to refer patient to be seen by outpatient psycho-oncologist Dr Hatfield there.     [place] [facility]  [address] [place] [address] [phone]

## 2024-10-17 NOTE — PROGRESS NOTE ADULT - ASSESSMENT
64-year-old male with metastatic prostate cancer, sent in by hematologist from [facility] and cancer for uncontrolled pain, nausea, vomiting.       Problem/Plan - 1:  ·  Problem: Cancer related pain.   ·  Plan: - appreciate pall care recommendations:  - pain control as per palliative crae   - cw decadron   - RT consult     Problem/Plan - 2:·  Problem: Nausea & vomiting.   ·  Plan: - zofran 4mg IV q 8hrs standing  - maintenance fluids while unable to tolerate po.    Problem/Plan - 3:  ·  Problem: CA of prostate.   ·  Plan: Metastatic prostate cancer- f/u heme/onc recommendations  - cont. Dex 8mg BID today, tomorrow and following day   - MRI L-spine to evaluate if any area for possible palliative RT. L5 lesions seen on imaging.   - Rad Onc eval   - Palliative for symptom control.    Problem/Plan - 4:  ·  Problem: Thrombocytopenia, unspecified.   ·  Plan: -platelet baseline ~70  -monitor, will need transfused for plt <10   - f/u heme/onc for further recommendations.    Problem/Plan - 5:  ·  Problem: Benign essential HTN.   ·  Plan: - cont. home lisinopril 40mg.    Problem/Plan - 6:  ·  Problem: Hyperlipidemia. ·  Plan: - cont. home atorvastatin 20 and fenofibrate.    Problem/Plan - 7:  ·  Problem: Need for prophylactic measure.   ·  Plan: lovenox.

## 2024-10-17 NOTE — PROGRESS NOTE ADULT - PROBLEM SELECTOR PLAN 7
- possibly related to chemotherapy treatment  - hematology-oncology following: started on daily zarxio, advised to monitor for fever and start abx if necessary

## 2024-10-17 NOTE — BH CONSULTATION LIAISON ASSESSMENT NOTE - SUMMARY
Mr Alyssa is a 65 yo M w history of pancreatic cancer with mets who presents on recommendation by hematologist due to uncontrolled pain, nausea, and vomiting that has significantly worsened over the past 2 weeks. Pt diagnosed with high risk, high volume metastatic prostate cancer with bone, liver, lymph mets and a presacral mass. Pt was started on chemotherapy; pending further work up for evaluation for radiation therapy. Pt was seen by palliative care team for pain management and goals of care discussions. Team recommended psychiatric evaluation for anxiety as pt has been having difficulty adjusting to progression of cancer.  10/17: Pt difficulty accepting and adjusting to cancer progression and treatment. Reviewed anxiety and management goals regarding anxiety. Reviewed medications used to treat anxiety. Antidepressants not recommended due to low platelets and requiring long time before effect; BZDs not recommended due to concurrent use of pain medications increasing risk for oversedation, respiratory depression. Similarly, hydroxyzine not recommended due to worsening confusion. Recommended Zyprexa for treatment of anxiety with added benefit for potential assistance with nausea, sleep, and appetite. Reviewed risks, benefits, side effects of medication. Reviewed black box warning of antipsychotic medications in elderly. Family hesitant to start additional medication. Would like to talk to palliative care team regarding starting additional med. Answered questions and concerns.    Recs:  - Consider Zyprexa 1.25 mg po q8h prn for anxiety if family and pt are agreeable to medication  	- hold med if QTC > 500    - Obs: routine  - Dispo: TBD, no indication for inpt psychiatry Mr Alyssa is a 65 yo M w history of pancreatic cancer with mets who presents on recommendation by hematologist due to uncontrolled pain, nausea, and vomiting that has significantly worsened over the past 2 weeks. Pt diagnosed with high risk, high volume metastatic prostate cancer with bone, liver, lymph mets and a presacral mass. Pt was started on chemotherapy; pending further work up for evaluation for radiation therapy. Pt was seen by palliative care team for pain management and goals of care discussions. Team recommended psychiatric evaluation for anxiety as pt has been having difficulty adjusting to progression of cancer.    10/17: Pt difficulty accepting and adjusting to cancer progression and treatment. Reviewed anxiety and management goals regarding anxiety. Reviewed medications used to treat anxiety. Antidepressants not recommended due to low platelets and requiring long time before effect; BZDs not recommended due to concurrent use of pain medications increasing risk for oversedation, respiratory depression. Similarly, hydroxyzine not recommended due to worsening confusion. Recommended Zyprexa for treatment of anxiety with added benefit for potential assistance with nausea, sleep, and appetite. Reviewed risks, benefits, side effects of medication. Reviewed black box warning of antipsychotic medications in elderly. Family hesitant to start additional medication. Would like to talk to palliative care team regarding starting additional med. Answered questions and concerns.  Will need to monitor for delirium    Recs:  - Consider Zyprexa 1.25 mg PO/IM q8h prn for anxiety if family and pt are agreeable to medication  	- hold med if QTC > 500    - Obs: routine  - Dispo: TBD as per medical team, no indication for inpt psychiatry

## 2024-10-17 NOTE — PROGRESS NOTE ADULT - PROBLEM SELECTOR PLAN 9
Patient is supported by his wife- Vijaya [phone]) and 3 adult sons. See [facility] note   > Patient is recently dx with metastatic prostate cancer just 1.5 weeks ago and he is treatment oriented.  > Offered caregiver Sw referral to patient's wife- DECLINED     In the event of newly developing, evolving, or worsening symptoms, please contact the Palliative Medicine team via pager (if the patient is at [facility] #[phone] or if the patient is at [facility] #[phone]) The Geriatric and Palliative Medicine service has coverage 24 hours a day/ 7 days a week to provide medical recommendations regarding symptom management needs via telephone.

## 2024-10-17 NOTE — PROGRESS NOTE ADULT - ASSESSMENT
1. Metastatic prostate cancer  - follows with Dr Andrew Mendoza at [facility]   - initially diagnosed 15 years ago with low risk prostate cancer s/p radical prostatectomy, negative LNs and did not require RT or adjuvant ADT.   - presented outpatient with imaging showing a multiloculated presacral soft tissue mass 5.1 x 3.9 x 4.1cm, RP LAD, numeour low density liver lesions c/w met disease, lucencies in several vertebral bodies, manubrium.   - PSMA 10/11/24 showing Hypermetabolic vera foci above and below the diaphragm, foci in the liver and extensive foci involving the  axial and appendicular skeleton compatible with metastatic disease  - --> 374--> 426 on 10/8/24   - Liver biopsy consistent with adenocarcinoma favoring prostate    outpatient liver biopsy 9/30  DIAGNOSIS:  LIVER (FINE NEEDLE ASPIRATION):  POSITIVE FOR MALIGNANT CELLS. ADEQUATE CELLULARITY FOR EVALUATION.  CONSISTENT WITH METASTATIC ADENOCARCINOMA FAVOR PROSTATE PRIMARY, SEE  COMMENTS.  ELECTRONICALLY SIGNED      -  he has high risk high volume disease and plan to start triplet therapy with ADT/lupron, abiaterone/prednisone with taxotere. He had back pain after Lupron likely tumor flare and instructed to take steroids. Abby/Pred should have started and can continue Abiaterone 1000mg daily with prednisone 5mg BID. While receiving Decadron for chemo as below can hold the prednisone but should resume after the 3 days of decadron    - plan for taxotere 60mg/m2 to start tomorrow with emend, aloxi, benadryl and dex premeds    - please give Dex 8mg BID today, tomorrow and following day   - MRI L-spine .L5 lesions seen on imaging.     - Rad Onc appreciated, CT Thoracic, Lumbar, sacral completed, awaiting read,   in order to determine if RT is appropriate  - Palliative for symptom control     2. Thrombocytopenia   - possible ITP component   - baseline in the 70s  - dose reduced taxotere to 60mg/m2 given baseline thrombocytopenia   - monitor, will need transfused for plt <10   - expect further decline with chemo. Steroids may help with ITP component but also may need high dose. Nplate can also be considered     3. Neutropenia  -s/p Docetaxol 10/13, chemotherapy Fred  -daily Zarxio  -if fever develops, start broad spectrum antibiotics      Rad onc and palliative recs appreciated    Umm Aponte NP  Hematology/Oncology  [facility]  [phone] (Office)  [phone] (Alt office)  Evenings and weekends please call MD on call or office

## 2024-10-17 NOTE — PROGRESS NOTE ADULT - SUBJECTIVE AND OBJECTIVE BOX
SUBJECTIVE AND OBJECTIVE:  Indication for Geriatrics and Palliative Care Services/INTERVAL HPI:  Patient seen and examined at bedside, patient appears uncomfortable, has his eyes closed and wants us to speak to his son, who is also at bedside. He also continues to be nauseous and not tolerating PO.    OVERNIGHT EVENTS:  VS: Low SO2, tachycardic; started on 2L NC  Chart review, in 24hrs 10AM-10AM, Morphine PCA 5mg/mL DD 4mg, LO 30 minutes, 4-hr limit 32m injections, 33 attempts.  Started on Fentanyl 75mcg yesterday.  Last BM: 10/17/24    DNR on chart:  Allergies    No Known Allergies    Intolerances    MEDICATIONS  (STANDING):  abiraterone 500 mg tablet 2 Tablet(s) 2 Tablet(s) Oral daily  atorvastatin 20 milliGRAM(s) Oral at bedtime  chlorhexidine 2% Cloths 1 Application(s) Topical daily  dexAMETHasone  IVPB 8 milliGRAM(s) IV Intermittent two times a day  enoxaparin Injectable 40 milliGRAM(s) SubCutaneous every 24 hours  fenofibrate Tablet 145 milliGRAM(s) Oral daily  fentaNYL   Patch  75 MICROgram(s)/Hr 1 Patch Transdermal every 72 hours  filgrastim-sndz (ZARXIO) Injectable 300 MICROGram(s) SubCutaneous daily  lidocaine   4% Patch 1 Patch Transdermal every 24 hours  lisinopril 40 milliGRAM(s) Oral daily  morphine PCA (5 mG/mL) 30 milliLiter(s) PCA Continuous PCA Continuous  ondansetron Injectable 4 milliGRAM(s) IV Push every 8 hours  pantoprazole  Injectable 40 milliGRAM(s) IV Push daily  polyethylene glycol 3350 17 Gram(s) Oral daily  sodium chloride 0.9%. 1000 milliLiter(s) (80 mL/Hr) IV Continuous <Continuous>    MEDICATIONS  (PRN):  acetaminophen     Tablet .. 650 milliGRAM(s) Oral every 6 hours PRN Temp greater or equal to 38C (100.4F), Mild Pain (1 - 3), Moderate Pain (4 - 6)  aluminum hydroxide/magnesium hydroxide/simethicone Suspension 30 milliLiter(s) Oral every 4 hours PRN Dyspepsia  morphine  - Injectable 4 milliGRAM(s) IV Push once PRN severe breakthrough pain while off PCA  morphine PCA (5 mG/mL) Rescue Clinician Bolus 4 milliGRAM(s) IV Push every 15 minutes PRN Breakthrough severe pain  naloxone Injectable 0.1 milliGRAM(s) IV Push every 3 minutes PRN For ANY of the following changes in patient status:  A. RR LESS THAN 10 breaths per minute, B. Oxygen saturation LESS THAN 90%, C. Sedation score of 6      ITEMS UNCHECKED ARE NOT PRESENT    PRESENT SYMPTOMS: [ ]Unable to self-report - see  CPOT, PAINADS, RDOS below  Source if other than patient:  [ ]Family   [ ]Team     Pain: [x]yes [ ]no  QOL impact - Limits mobility  Location - Lower back  Aggravating factors - Movement  Quality - Sharp  Radiation - non-radiating  Timing- Intermittent  Severity (0-10 scale): 8  Minimal acceptable level (0-10 scale): 0    PCSSQ[Palliative Care Spiritual Screening Question]   Severity (0-10):  Score of 4 or > indicate consideration of Chaplaincy referral.  Chaplaincy Referral: [ ] yes [ ] refused [ ] following    Caregiver [place]? : [ ] yes [ ] no  [x] deferred:  Social work referral [ ] Patient & Family Centered Care Referral [ ]     Anticipatory Grief present?:  [ ] yes [ ] no  [x] deferred: Social work referral [ ] Patient & Family Centered Care Referral [ ]      Other Symptoms:  [x ]All other review of systems negative     PHYSICAL EXAM:  Vital Signs Last 24 Hrs  T(C): 37.2 (17 Oct 2024 10:23), Max: 37.2 (17 Oct 2024 10:23)  T(F): 98.9 (17 Oct 2024 10:23), Max: 98.9 (17 Oct 2024 10:23)  HR: 112 (17 Oct 2024 10:23) (80 - 112)  BP: 130/88 (17 Oct 2024 10:23) (130/88 - 150/80)  BP(mean): --  RR: 12 (17 Oct 2024 10:23) (12 - 18)  SpO2: 94% (17 Oct 2024 10:23) (93% - 97%)    Parameters below as of 17 Oct 2024 10:23  Patient On (Oxygen Delivery Method): nasal cannula  O2 Flow (L/min): 1.5   I&O's Summary    16 Oct 2024 07:01  -  17 Oct 2024 07:00  --------------------------------------------------------  IN: 1780 mL / OUT: 1000 mL / NET: 780 mL       GENERAL: [ ]Cachexia    [x]Alert  [ ]Oriented x   [ ]Lethargic  [ ]Unarousable  [x]Verbal  [ ]Non-Verbal  Behavioral:   [ ]Anxiety  [ ]Delirium [ ]Agitation [ ]Other  HEENT:  [ ]Normal   [ ]Dry mouth   [ ]ET Tube/Trach  [ ]Oral lesions  PULMONARY:   [x ]Clear [ ]Tachypnea  [ ]Audible excessive secretions   [ ]Rhonchi        [ ]Right [ ]Left [ ]Bilateral  [ ]Crackles        [ ]Right [ ]Left [ ]Bilateral  [ ]Wheezing     [ ]Right [ ]Left [ ]Bilateral  [ ]Diminished BS [ ] Right [ ]Left [ ]Bilateral  CARDIOVASCULAR:    [ ]Regular [ ]Irregular [x ]Tachy  [ ]Rodrick [ ]Murmur [ ]Other  GASTROINTESTINAL:  [x ]Soft  [ ]Distended   [x ]+BS  [ ]Non tender [ ]Tender  [ ]Other [ ]PEG [ ]OGT/ NGT   Last BM:   GENITOURINARY:  [x ]Normal [ ]Incontinent   [ ]Oliguria/Anuria   [ ]Farah  MUSCULOSKELETAL:   [ ]Normal   [x ]Weakness  [ ]Bed/Wheelchair bound [ ]Edema  NEUROLOGIC:   [x ]No focal deficits  [ ] Cognitive impairment  [ ] Dysphagia [ ]Dysarthria [ ] Paresis [ ]Other   SKIN:   [ ]Normal  [ ]Rash  [ ]Other  [ ]Pressure ulcer(s) [ ]y [ ]n present on admission    CRITICAL CARE:  [ ]Shock Present  [ ]Septic [ ]Cardiogenic [ ]Neurologic [ ]Hypovolemic  [ ]Vasopressors [ ]Inotropes  [ ]Respiratory failure present [ ]Mechanical Ventilation [ ]Non-invasive ventilatory support [ ]High-Flow   [ ]Acute  [ ]Chronic [ ]Hypoxic  [ ]Hypercarbic [ ]Other  [ ]Other organ failure     LABS:                        12.2   0.31  )-----------( 37       ( 17 Oct 2024 02:51 )             35.0   10-    141  |  107  |  35[H]  ----------------------------<  157[H]  4.4   |  22  |  0.75    Ca    7.9[L]      17 Oct 2024 02:51  Phos  3.0     10-17  Mg     2.50     10-17    TPro  5.4[L]  /  Alb  3.4  /  TBili  1.1  /  DBili  x   /  AST  60[H]  /  ALT  9   /  AlkPhos  64  10-17      Urinalysis Basic - ( 17 Oct 2024 02:51 )    Color: x / Appearance: x / SG: x / pH: x  Gluc: 157 mg/dL / Ketone: x  / Bili: x / Urobili: x   Blood: x / Protein: x / Nitrite: x   Leuk Esterase: x / RBC: x / WBC x   Sq Epi: x / Non Sq Epi: x / Bacteria: x      RADIOLOGY & ADDITIONAL STUDIES:    Protein Calorie Malnutrition Present: [ ]mild [ ]moderate [ ]severe [ ]underweight [ ]morbid obesity  https://www.andeal.org/vault/2440/web/files/ONC/Table_Clinical%20Characteristics%20to%20Document%20Malnutrition-White%20JV%20et%20al%2020.pdf    Height (cm): 172.7 (10-12-24 @ 09:21)  Weight (kg): 69.4 (10-12-24 @ 09:21)  BMI (kg/m2): 23.3 (10-12-24 @ 09:21)    [ ]PPSV2 < or = 30%  [ ]significant weight loss [ ]poor nutritional intake [ ]anasarca[ ]Artificial Nutrition    Other REFERRALS:  [ ]Hospice  [ ]Child Life  [ ]Social Work  [ ]Case management [ ]Holistic Therapy     Palliative Performance Scale:  http://npcrc.org/files/news/palliative_performance_scale_ppsv2.pdf  (Ctrl +  left click to view)  Respiratory Distress Observation Tool:  https://homecareinformation.net/handouts/hen/Respiratory_Distress_Observation_Scale.pdf (Ctrl +  left click to view)  PAINAD Score:  http://geriatrictoolkit.[facility]/cog/painad.pdf (Ctrl +  left click to view)       SUBJECTIVE AND OBJECTIVE:  Indication for Geriatrics and Palliative Care Services/INTERVAL HPI:  Patient seen and examined at bedside, patient appears uncomfortable, has his eyes closed and wants us to speak to his son, who is also at bedside. He also continues to be nauseous and not tolerating PO.    OVERNIGHT EVENTS:  VS: Low SO2, tachycardic; started on 2L NC  Chart review, in 24hrs 10AM-10AM, Morphine PCA 5mg/mL DD 4mg, LO 30 minutes, 4-hr limit 32m injections, 33 attempts.  Started on Fentanyl 75mcg yesterday.  Last BM: 10/17/24    DNR on chart:  Allergies    No Known Allergies    Intolerances    MEDICATIONS  (STANDING):  abiraterone 500 mg tablet 2 Tablet(s) 2 Tablet(s) Oral daily  atorvastatin 20 milliGRAM(s) Oral at bedtime  chlorhexidine 2% Cloths 1 Application(s) Topical daily  dexAMETHasone  IVPB 8 milliGRAM(s) IV Intermittent two times a day  enoxaparin Injectable 40 milliGRAM(s) SubCutaneous every 24 hours  fenofibrate Tablet 145 milliGRAM(s) Oral daily  fentaNYL   Patch  75 MICROgram(s)/Hr 1 Patch Transdermal every 72 hours  filgrastim-sndz (ZARXIO) Injectable 300 MICROGram(s) SubCutaneous daily  lidocaine   4% Patch 1 Patch Transdermal every 24 hours  lisinopril 40 milliGRAM(s) Oral daily  morphine PCA (5 mG/mL) 30 milliLiter(s) PCA Continuous PCA Continuous  ondansetron Injectable 4 milliGRAM(s) IV Push every 8 hours  pantoprazole  Injectable 40 milliGRAM(s) IV Push daily  polyethylene glycol 3350 17 Gram(s) Oral daily  sodium chloride 0.9%. 1000 milliLiter(s) (80 mL/Hr) IV Continuous <Continuous>    MEDICATIONS  (PRN):  acetaminophen     Tablet .. 650 milliGRAM(s) Oral every 6 hours PRN Temp greater or equal to 38C (100.4F), Mild Pain (1 - 3), Moderate Pain (4 - 6)  aluminum hydroxide/magnesium hydroxide/simethicone Suspension 30 milliLiter(s) Oral every 4 hours PRN Dyspepsia  morphine  - Injectable 4 milliGRAM(s) IV Push once PRN severe breakthrough pain while off PCA  morphine PCA (5 mG/mL) Rescue Clinician Bolus 4 milliGRAM(s) IV Push every 15 minutes PRN Breakthrough severe pain  naloxone Injectable 0.1 milliGRAM(s) IV Push every 3 minutes PRN For ANY of the following changes in patient status:  A. RR LESS THAN 10 breaths per minute, B. Oxygen saturation LESS THAN 90%, C. Sedation score of 6      ITEMS UNCHECKED ARE NOT PRESENT    PRESENT SYMPTOMS: [ ]Unable to self-report - see  CPOT, PAINADS, RDOS below  Source if other than patient:  [ ]Family   [ ]Team     Pain: [x]yes [ ]no  QOL impact - Limits mobility  Location - Lower back  Aggravating factors - Movement  Quality - Sharp  Radiation - non-radiating  Timing- Intermittent  Severity (0-10 scale): 8  Minimal acceptable level (0-10 scale): 0    PCSSQ[Palliative Care Spiritual Screening Question]   Severity (0-10):  Score of 4 or > indicate consideration of Chaplaincy referral.  Chaplaincy Referral: [ ] yes [ ] refused [ ] following    Caregiver [place]? : [ ] yes [ ] no  [x] deferred:  Social work referral [ ] Patient & Family Centered Care Referral [ ]     Anticipatory Grief present?:  [ ] yes [ ] no  [x] deferred: Social work referral [ ] Patient & Family Centered Care Referral [ ]      Other Symptoms:  [x ]All other review of systems negative     PHYSICAL EXAM:  Vital Signs Last 24 Hrs  T(C): 37.2 (17 Oct 2024 10:23), Max: 37.2 (17 Oct 2024 10:23)  T(F): 98.9 (17 Oct 2024 10:23), Max: 98.9 (17 Oct 2024 10:23)  HR: 112 (17 Oct 2024 10:23) (80 - 112)  BP: 130/88 (17 Oct 2024 10:23) (130/88 - 150/80)  BP(mean): --  RR: 12 (17 Oct 2024 10:23) (12 - 18)  SpO2: 94% (17 Oct 2024 10:23) (93% - 97%)    Parameters below as of 17 Oct 2024 10:23  Patient On (Oxygen Delivery Method): nasal cannula  O2 Flow (L/min): 1.5   I&O's Summary    16 Oct 2024 07:01  -  17 Oct 2024 07:00  --------------------------------------------------------  IN: 1780 mL / OUT: 1000 mL / NET: 780 mL       GENERAL: [ ]Cachexia    [x]Alert  [ ]Oriented x   [ ]Lethargic  [ ]Unarousable  [x]Verbal  [ ]Non-Verbal  Behavioral:   [ ]Anxiety  [ ]Delirium [ ]Agitation [ ]Other  HEENT:  [ ]Normal   [ ]Dry mouth   [ ]ET Tube/Trach  [ ]Oral lesions  PULMONARY:   [x ]Clear [ ]Tachypnea  [ ]Audible excessive secretions   [ ]Rhonchi        [ ]Right [ ]Left [ ]Bilateral  [ ]Crackles        [ ]Right [ ]Left [ ]Bilateral  [ ]Wheezing     [ ]Right [ ]Left [ ]Bilateral  [ ]Diminished BS [ ] Right [ ]Left [ ]Bilateral  CARDIOVASCULAR:    [ ]Regular [ ]Irregular [x ]Tachy  [ ]Rodrick [ ]Murmur [ ]Other  GASTROINTESTINAL:  [x ]Soft  [ ]Distended   [x ]+BS  [ ]Non tender [ ]Tender  [ ]Other [ ]PEG [ ]OGT/ NGT   Last BM: 10/17  GENITOURINARY:  [x ]Normal [ ]Incontinent   [ ]Oliguria/Anuria   [ ]Farah  MUSCULOSKELETAL:   [ ]Normal   [x ]Weakness  [ ]Bed/Wheelchair bound [ ]Edema  NEUROLOGIC:   [x ]No focal deficits  [ ] Cognitive impairment  [ ] Dysphagia [ ]Dysarthria [ ] Paresis [ ]Other   SKIN:   [ ]Normal  [ ]Rash  [ ]Other  [ ]Pressure ulcer(s) [ ]y [ ]n present on admission    CRITICAL CARE:  [ ]Shock Present  [ ]Septic [ ]Cardiogenic [ ]Neurologic [ ]Hypovolemic  [ ]Vasopressors [ ]Inotropes  [ ]Respiratory failure present [ ]Mechanical Ventilation [ ]Non-invasive ventilatory support [ ]High-Flow   [ ]Acute  [ ]Chronic [ ]Hypoxic  [ ]Hypercarbic [ ]Other  [ ]Other organ failure     LABS:                        12.2   0.31  )-----------( 37       ( 17 Oct 2024 02:51 )             35.0   10-17    141  |  107  |  35[H]  ----------------------------<  157[H]  4.4   |  22  |  0.75    Ca    7.9[L]      17 Oct 2024 02:51  Phos  3.0     10-17  Mg     2.50     10-17    TPro  5.4[L]  /  Alb  3.4  /  TBili  1.1  /  DBili  x   /  AST  60[H]  /  ALT  9   /  AlkPhos  64  10-17      Urinalysis Basic - ( 17 Oct 2024 02:51 )    Color: x / Appearance: x / SG: x / pH: x  Gluc: 157 mg/dL / Ketone: x  / Bili: x / Urobili: x   Blood: x / Protein: x / Nitrite: x   Leuk Esterase: x / RBC: x / WBC x   Sq Epi: x / Non Sq Epi: x / Bacteria: x      RADIOLOGY & ADDITIONAL STUDIES: f/u Ct spine     Protein Calorie Malnutrition Present: [ ]mild [ ]moderate [ ]severe [ ]underweight [ ]morbid obesity  https://www.andeal.org/vault/2440/web/files/ONC/Table_Clinical%20Characteristics%20to%20Document%20Malnutrition-White%20JV%20et%20al%2020.pdf    Height (cm): 172.7 (10-12-24 @ 09:21)  Weight (kg): 69.4 (10-12-24 @ 09:21)  BMI (kg/m2): 23.3 (10-12-24 @ 09:21)    [ ]PPSV2 < or = 30%  [ ]significant weight loss [ ]poor nutritional intake [ ]anasarca[ ]Artificial Nutrition    Other REFERRALS:  [ ]Hospice  [ ]Child Life  [ ]Social Work  [ ]Case management [ ]Holistic Therapy     Palliative Performance Scale:  http://npcrc.org/files/news/palliative_performance_scale_ppsv2.pdf  (Ctrl +  left click to view)  Respiratory Distress Observation Tool:  https://homecareinformation.net/handouts/hen/Respiratory_Distress_Observation_Scale.pdf (Ctrl +  left click to view)  PAINAD Score:  http://geriatrictoolkit.[facility]/cog/painad.pdf (Ctrl +  left click to view)

## 2024-10-17 NOTE — CHART NOTE - NSCHARTNOTEFT_GEN_A_CORE
ACP MEDICINE NIGHT COVERAGE       RN notified ACP patient oxygen saturation ranging between 84 and 90% on room air. Placed on 2L NC with improvement to 94-95%. Patient seen and evaluated at bedside. Denied any SOB, difficulty breathing, N/V. Normal respiratory effort ACP MEDICINE NIGHT COVERAGE       RN notified ACP patient oxygen saturation ranging between 84 and 90% on room air. Placed on 2L NC with improvement to 94-95%. Patient seen and evaluated at bedside. Denied any SOB, difficulty breathing, N/V, fevers, chills or chest pain.  Denied any history of sleep apnea. Patient A&Ox4 and appeared to have normal respiratory effort. Patient on IV Morphine PCA, and fentanyl patch as of yesterday. Lungs sounded clear bilaterally. CXR, RVP and VBG ordered. Continue to monitor on CP. Care ongoing.     BONNIE Bernal-BC   Medicine ACP   [id number]

## 2024-10-17 NOTE — CHART NOTE - NSCHARTNOTEFT_GEN_A_CORE
IR Pre-Procedure Note    Patient Age:   64y    Patient Gender:   Male    Procedure (including site / side if known): T3, L1 Kyphoplasty    Diagnosis / Indication: Patient is a 64y old  Male who presents with a chief complaint of Pain (17 Oct 2024 16:29) secondary to Osseous Mets & Acute pathologic fractures       Interventional Radiology Attending Physician: Dr Torres    Ordering Attending Physician: Dr Valle    PAST MEDICAL & SURGICAL HISTORY:  Benign essential HTN      HLD (hyperlipidemia)      CA of prostate      Basal cell carcinoma      History of transurethral prostatectomy      S/P inguinal hernia repair      History of basal cell carcinoma (BCC) excision           Pertinent Labs:   CBC Full  -  ( 17 Oct 2024 02:51 )  WBC Count : 0.31 K/uL  RBC Count : 3.94 M/uL  Hemoglobin : 12.2 g/dL  Hematocrit : 35.0 %  Platelet Count - Automated : 37 K/uL  Mean Cell Volume : 88.8 fL  Mean Cell Hemoglobin : 31.0 pg  Mean Cell Hemoglobin Concentration : 34.9 gm/dL  Auto Neutrophil # : 0.08 K/uL  Auto Lymphocyte # : 0.09 K/uL  Auto Monocyte # : 0.01 K/uL  Auto Eosinophil # : 0.00 K/uL  Auto Basophil # : 0.02 K/uL  Auto Neutrophil % : 25.8 %  Auto Lymphocyte % : 29.0 %  Auto Monocyte % : 3.2 %  Auto Eosinophil % : 0.0 %  Auto Basophil % : 6.5 %    10-17    141  |  107  |  35[H]  ----------------------------<  157[H]  4.4   |  22  |  0.75    Ca    7.9[L]      17 Oct 2024 02:51  Phos  3.0     10-17  Mg     2.50     10-17    TPro  5.4[L]  /  Alb  3.4  /  TBili  1.1  /  DBili  x   /  AST  60[H]  /  ALT  9   /  AlkPhos  64  10-17        Patient / Family aware of procedure:   [  ] Y   [  ] N

## 2024-10-17 NOTE — PROGRESS NOTE ADULT - SUBJECTIVE AND OBJECTIVE BOX
CT pending, remains in pain      MEDICATIONS  (STANDING):  abiraterone 500 mg tablet 2 Tablet(s) 2 Tablet(s) Oral daily  atorvastatin 20 milliGRAM(s) Oral at bedtime  chlorhexidine 2% Cloths 1 Application(s) Topical daily  dexAMETHasone  IVPB 8 milliGRAM(s) IV Intermittent two times a day  enoxaparin Injectable 40 milliGRAM(s) SubCutaneous every 24 hours  fenofibrate Tablet 145 milliGRAM(s) Oral daily  fentaNYL   Patch  75 MICROgram(s)/Hr 1 Patch Transdermal every 72 hours  filgrastim-sndz (ZARXIO) Injectable 300 MICROGram(s) SubCutaneous daily  lidocaine   4% Patch 1 Patch Transdermal every 24 hours  lisinopril 40 milliGRAM(s) Oral daily  morphine PCA (5 mG/mL) 30 milliLiter(s) PCA Continuous PCA Continuous  ondansetron Injectable 4 milliGRAM(s) IV Push every 8 hours  pantoprazole  Injectable 40 milliGRAM(s) IV Push daily  polyethylene glycol 3350 17 Gram(s) Oral daily  sodium chloride 0.9%. 1000 milliLiter(s) (80 mL/Hr) IV Continuous <Continuous>    MEDICATIONS  (PRN):  acetaminophen     Tablet .. 650 milliGRAM(s) Oral every 6 hours PRN Temp greater or equal to 38C (100.4F), Mild Pain (1 - 3), Moderate Pain (4 - 6)  aluminum hydroxide/magnesium hydroxide/simethicone Suspension 30 milliLiter(s) Oral every 4 hours PRN Dyspepsia  morphine  - Injectable 4 milliGRAM(s) IV Push once PRN severe breakthrough pain while off PCA  morphine PCA (5 mG/mL) Rescue Clinician Bolus 4 milliGRAM(s) IV Push every 15 minutes PRN Breakthrough severe pain  naloxone Injectable 0.1 milliGRAM(s) IV Push every 3 minutes PRN For ANY of the following changes in patient status:  A. RR LESS THAN 10 breaths per minute, B. Oxygen saturation LESS THAN 90%, C. Sedation score of 6        Vital Signs Last 24 Hrs  T(C): 37.2 (17 Oct 2024 10:23), Max: 37.2 (17 Oct 2024 10:23)  T(F): 98.9 (17 Oct 2024 10:23), Max: 98.9 (17 Oct 2024 10:23)  HR: 112 (17 Oct 2024 10:23) (80 - 112)  BP: 130/88 (17 Oct 2024 10:23) (130/88 - 150/80)  BP(mean): --  RR: 12 (17 Oct 2024 10:23) (12 - 18)  SpO2: 94% (17 Oct 2024 10:23) (93% - 97%)    Parameters below as of 17 Oct 2024 10:23  Patient On (Oxygen Delivery Method): nasal cannula  O2 Flow (L/min): 1.5      PE  NAD  Awake, alert  Anicteric, MMM  RRR  CTAB  Abd soft, NT, ND  No c/c/e  No rash grossly                            12.2   0.31  )-----------( 37       ( 17 Oct 2024 02:51 )             35.0       10-17    141  |  107  |  35[H]  ----------------------------<  157[H]  4.4   |  22  |  0.75    Ca    7.9[L]      17 Oct 2024 02:51  Phos  3.0     10-17  Mg     2.50     10-17    TPro  5.4[L]  /  Alb  3.4  /  TBili  1.1  /  DBili  x   /  AST  60[H]  /  ALT  9   /  AlkPhos  64  10-17

## 2024-10-17 NOTE — CHART NOTE - NSCHARTNOTEFT_GEN_A_CORE
CT scans/imaging reviewed of T/ L spine.       Dr. Bruno recommends IR consult for Kyphoplasty to T3 and L1 lesions.  Request consult for today if possible.     If there is no role for kyphoplasty, will offer RT but , we do recommend kyphoplasty  first to establish stability to the spine prior to RT which alone, may weaken it.               < from: CT Thoracic Spine w/ IV Cont (10.17.24 @ 11:18) >    4.4 x 4.9 x 3.2 cm (maximal AP x TV x CC dimensions) heterogeneously   enhancing soft tissue mass within the presacral region at the S2 and S3   levels.    IMPRESSION:    Diffuse osseous heterogeneity is redemonstrated, consistent with diffuse   osseous metastases.    Mild multilevel loss of vertebral body height may be due to pathologic   fractures.    Evaluation of the spinal canal contents is limited by CT modality.    Multilevel epidural metastases are noted primarily resulting in mild   multilevel spinal canal stenosis.    Exception is made at L1 where there is at least moderate spinal canal   stenosis.    4.4 x 4.9 x 3.2 cm (maximal AP x TV x CC dimensions) heterogeneously   enhancing soft tissue mass within the presacral region at the S2 and S3   levels, suspicious for metastatic disease.    < end of copied text >

## 2024-10-17 NOTE — PROGRESS NOTE ADULT - PROBLEM SELECTOR PLAN 3
- req NC O2 support and mildly tachycardic  - monitor resp status, consider evaluation for PE if necessary  - D/w primary team

## 2024-10-17 NOTE — PROGRESS NOTE ADULT - PROBLEM SELECTOR PLAN 1
MRI Lumbar Spine: (10/15) Diffuse osseous metastases. L1 and L5 pathologic fractures are associated with moderate epidural disease at L1, more mild at L5. At least mild epidural disease at the left S1-S2 neural foramen. No significant lumbar degenerative disc disease.  - MRI from 10/15 shows extensive metastasis in cervical, thoracic, lumbar, skull base and calvarium. Also demonstrated small cortical subacute infarctions.    - continue Morphine PCA DD 4mg; LO 30 min; 4 hour limit: 32mg; CAB 4mg  - will also have continuous 1mg/hr in Morphine PCA    - Discontinued Oxycontin in setting of patient c/o dysphagia.   - Started on Fentanyl patch 75mcg/hr (10/16)   - continue Decadron 8mg BID for total of 3 days  - Narcan PRN  - multimodal pain control: lidocaine patch, warm/cold packs   - Bowel regimen while on opioids  - Radiation oncology recs pending after yesterdays MRI results, family was told they may be candidate for RT but would need CT prior to.  - would RECOMMEND OUTPATIENT PALLIATIVE CARE REFERRAL WITH [facility] palliative care team. MRI Lumbar Spine: (10/15) Diffuse osseous metastases. L1 and L5 pathologic fractures are associated with moderate epidural disease at L1, more mild at L5. At least mild epidural disease at the left S1-S2 neural foramen. No significant lumbar degenerative disc disease.  - MRI from 10/15 shows extensive metastasis in cervical, thoracic, lumbar, skull base and calvarium. Also small cortical subacute infarctions.  - Adjust Morphine PCA: 1mg CR; DD 4mg; LO 30 min; 4 hour limit: 32mg; CAB 4mg  - Started on Fentanyl patch 75mcg/hr (10/16)   - continue Decadron 8mg BID for total of 3 days  - Narcan PRN  - multimodal pain control: lidocaine patch, warm/cold packs   - Bowel regimen while on opioids  - Radiation oncology recs  appreciated- f/u CT L-spine   - would RECOMMEND OUTPATIENT PALLIATIVE CARE REFERRAL WITH [facility] palliative care team.

## 2024-10-17 NOTE — BH CONSULTATION LIAISON ASSESSMENT NOTE - RISK ASSESSMENT
low risk for suicide, has family, goal oriented low risk for suicide, no prior psych history, cancer, pain, but has supportive family, goal oriented

## 2024-10-17 NOTE — BH CONSULTATION LIAISON ASSESSMENT NOTE - NSBHATTESTCOMMENTATTENDFT_PSY_A_CORE
Met with the patient today. Sons and spouse were not in the room. He was anxious, easily frustrated vita when orientation questions were asked ' not those questions again'. Can hold on to md hands in desperation. No si or hi, no evident psychosis. Denies any pain or physical discomfort during this evaluation.     Agree with plan.     Will f/u again  Coordinated with palliative md Dr Davies

## 2024-10-18 LAB
ANION GAP SERPL CALC-SCNC: 13 MMOL/L — SIGNIFICANT CHANGE UP (ref 7–14)
BASOPHILS # BLD AUTO: 0.01 K/UL — SIGNIFICANT CHANGE UP (ref 0–0.2)
BASOPHILS NFR BLD AUTO: 4.3 % — HIGH (ref 0–2)
BUN SERPL-MCNC: 30 MG/DL — HIGH (ref 7–23)
CALCIUM SERPL-MCNC: 8 MG/DL — LOW (ref 8.4–10.5)
CHLORIDE SERPL-SCNC: 110 MMOL/L — HIGH (ref 98–107)
CO2 SERPL-SCNC: 21 MMOL/L — LOW (ref 22–31)
CREAT SERPL-MCNC: 0.76 MG/DL — SIGNIFICANT CHANGE UP (ref 0.5–1.3)
EGFR: 100 ML/MIN/1.73M2 — SIGNIFICANT CHANGE UP
EOSINOPHIL # BLD AUTO: 0 K/UL — SIGNIFICANT CHANGE UP (ref 0–0.5)
EOSINOPHIL NFR BLD AUTO: 0 % — SIGNIFICANT CHANGE UP (ref 0–6)
GLUCOSE SERPL-MCNC: 166 MG/DL — HIGH (ref 70–99)
HCT VFR BLD CALC: 35.5 % — LOW (ref 39–50)
HGB BLD-MCNC: 12.3 G/DL — LOW (ref 13–17)
IANC: 0.02 K/UL — LOW (ref 1.8–7.4)
IMM GRANULOCYTES NFR BLD AUTO: 30.4 % — HIGH (ref 0–0.9)
LYMPHOCYTES # BLD AUTO: 0.11 K/UL — LOW (ref 1–3.3)
LYMPHOCYTES # BLD AUTO: 47.8 % — HIGH (ref 13–44)
MAGNESIUM SERPL-MCNC: 2.4 MG/DL — SIGNIFICANT CHANGE UP (ref 1.6–2.6)
MCHC RBC-ENTMCNC: 31 PG — SIGNIFICANT CHANGE UP (ref 27–34)
MCHC RBC-ENTMCNC: 34.6 GM/DL — SIGNIFICANT CHANGE UP (ref 32–36)
MCV RBC AUTO: 89.4 FL — SIGNIFICANT CHANGE UP (ref 80–100)
MONOCYTES # BLD AUTO: 0.02 K/UL — SIGNIFICANT CHANGE UP (ref 0–0.9)
MONOCYTES NFR BLD AUTO: 8.7 % — SIGNIFICANT CHANGE UP (ref 2–14)
NEUTROPHILS # BLD AUTO: 0.02 K/UL — LOW (ref 1.8–7.4)
NEUTROPHILS NFR BLD AUTO: 8.8 % — LOW (ref 43–77)
NRBC # BLD: 0 /100 WBCS — SIGNIFICANT CHANGE UP (ref 0–0)
NRBC # FLD: 0 K/UL — SIGNIFICANT CHANGE UP (ref 0–0)
PHOSPHATE SERPL-MCNC: 2.5 MG/DL — SIGNIFICANT CHANGE UP (ref 2.5–4.5)
PLATELET # BLD AUTO: 45 K/UL — LOW (ref 150–400)
POTASSIUM SERPL-MCNC: 4.5 MMOL/L — SIGNIFICANT CHANGE UP (ref 3.5–5.3)
POTASSIUM SERPL-SCNC: 4.5 MMOL/L — SIGNIFICANT CHANGE UP (ref 3.5–5.3)
RBC # BLD: 3.97 M/UL — LOW (ref 4.2–5.8)
RBC # FLD: 14.6 % — HIGH (ref 10.3–14.5)
SODIUM SERPL-SCNC: 144 MMOL/L — SIGNIFICANT CHANGE UP (ref 135–145)
WBC # BLD: 0.23 K/UL — CRITICAL LOW (ref 3.8–10.5)
WBC # FLD AUTO: 0.23 K/UL — CRITICAL LOW (ref 3.8–10.5)

## 2024-10-18 PROCEDURE — 71275 CT ANGIOGRAPHY CHEST: CPT | Mod: 26

## 2024-10-18 PROCEDURE — 74177 CT ABD & PELVIS W/CONTRAST: CPT | Mod: 26

## 2024-10-18 PROCEDURE — 99233 SBSQ HOSP IP/OBS HIGH 50: CPT

## 2024-10-18 RX ORDER — FILGRASTIM-AAFI 300 UG/.5ML
480 INJECTION, SOLUTION SUBCUTANEOUS DAILY
Refills: 0 | Status: COMPLETED | OUTPATIENT
Start: 2024-10-18 | End: 2024-10-22

## 2024-10-18 RX ORDER — OLANZAPINE 20 MG/1
1.25 TABLET ORAL EVERY 6 HOURS
Refills: 0 | Status: DISCONTINUED | OUTPATIENT
Start: 2024-10-18 | End: 2024-10-21

## 2024-10-18 RX ORDER — POLYETHYLENE GLYCOL 3350 17 G/17G
17 POWDER, FOR SOLUTION ORAL DAILY
Refills: 0 | Status: DISCONTINUED | OUTPATIENT
Start: 2024-10-18 | End: 2024-11-18

## 2024-10-18 RX ORDER — ACETAMINOPHEN 500MG 500 MG/1
1000 TABLET, COATED ORAL ONCE
Refills: 0 | Status: COMPLETED | OUTPATIENT
Start: 2024-10-18 | End: 2024-10-18

## 2024-10-18 RX ADMIN — FENTANYL 1 PATCH: 12 PATCH, EXTENDED RELEASE TRANSDERMAL at 10:30

## 2024-10-18 RX ADMIN — SODIUM CHLORIDE 100 MILLILITER(S): 9 INJECTION, SOLUTION INTRAMUSCULAR; INTRAVENOUS; SUBCUTANEOUS at 16:48

## 2024-10-18 RX ADMIN — FILGRASTIM-AAFI 480 MICROGRAM(S): 300 INJECTION, SOLUTION SUBCUTANEOUS at 13:38

## 2024-10-18 RX ADMIN — ACETAMINOPHEN 500MG 400 MILLIGRAM(S): 500 TABLET, COATED ORAL at 06:08

## 2024-10-18 RX ADMIN — Medication 30 MILLILITER(S): at 20:39

## 2024-10-18 RX ADMIN — ONDANSETRON HYDROCHLORIDE 4 MILLIGRAM(S): 4 TABLET, FILM COATED ORAL at 11:05

## 2024-10-18 RX ADMIN — ENOXAPARIN SODIUM 40 MILLIGRAM(S): 30 INJECTION SUBCUTANEOUS at 13:01

## 2024-10-18 RX ADMIN — Medication 145 MILLIGRAM(S): at 23:16

## 2024-10-18 RX ADMIN — PANTOPRAZOLE SODIUM 40 MILLIGRAM(S): 40 TABLET, DELAYED RELEASE ORAL at 13:01

## 2024-10-18 RX ADMIN — DEXAMETHASONE 101.6 MILLIGRAM(S): 1.5 TABLET ORAL at 18:49

## 2024-10-18 RX ADMIN — FENTANYL 1 PATCH: 12 PATCH, EXTENDED RELEASE TRANSDERMAL at 07:50

## 2024-10-18 RX ADMIN — CHLORHEXIDINE GLUCONATE 1 APPLICATION(S): 1.2 RINSE ORAL at 13:04

## 2024-10-18 RX ADMIN — ONDANSETRON HYDROCHLORIDE 4 MILLIGRAM(S): 4 TABLET, FILM COATED ORAL at 02:00

## 2024-10-18 RX ADMIN — Medication 30 MILLILITER(S): at 08:25

## 2024-10-18 RX ADMIN — Medication 30 MILLILITER(S): at 18:46

## 2024-10-18 RX ADMIN — ACETAMINOPHEN 500MG 1000 MILLIGRAM(S): 500 TABLET, COATED ORAL at 07:50

## 2024-10-18 RX ADMIN — DEXAMETHASONE 101.6 MILLIGRAM(S): 1.5 TABLET ORAL at 05:28

## 2024-10-18 NOTE — PROGRESS NOTE ADULT - PROBLEM SELECTOR PLAN 6
- speech and swallow evaluated recommended soft and bite sized and mildly thick liquids with aspiration precautions

## 2024-10-18 NOTE — PROGRESS NOTE ADULT - PROBLEM SELECTOR PLAN 5
- Behavioral health recs appreciated: Consider Zyprexa 1.25 mg po q8h prn for anxiety if family and pt are agreeable to medication. hold med if QTC > 500  - EKG performed QTc 436 on 1017  - Family is considering zyprexa, d/w them about risks and benefits

## 2024-10-18 NOTE — CONSULT NOTE ADULT - SUBJECTIVE AND OBJECTIVE BOX
Colorectal Surgery Consult Note  (pg [facility]: [id number])    HPI:  64-year-old male with metastatic prostate cancer, sent in by hematologist from [facility] for uncontrolled pain, nausea, vomiting.   Patient reports diffuse pain starting 6 months ago significantly worsening for the past 2 weeks.  Currently the worst pain is located around the lower back. Diagnosed with high risk high volume metastatic prostate cancer with bone, liver lymph node mets and presacral mass. Liver biopsy confirmed disease with .     Surgery was consulted at 5:00pm for diarrhea and concerns for malignant LBO. As of today the patient had large volume liquid stool concerning for C. Diff. Primary team sent a C. Diff panel. Results still pending. Today the patient also had a CTA of the chest. On the CTA of the chest showed very dilated transverse colon concerning for LBO.  Patient was seen and examined by general surgery at the bedside. Patient was found to be tachycardiac to 130s. Patient has WBC 0. Patient found to be diffusely tender, but not peritoneal. Patient rushed urgently to CT for A&P.       PAST MEDICAL & SURGICAL HISTORY:  Benign essential HTN      HLD (hyperlipidemia)      CA of prostate      Basal cell carcinoma      History of transurethral prostatectomy      S/P inguinal hernia repair      History of basal cell carcinoma (BCC) excision        Allergies    No Known Allergies    Intolerances      Home Medications:  atorvastatin 20 mg oral tablet: 1 tab(s) orally once a day (12 Oct 2024 15:56)  lisinopril 40 mg oral tablet: 1 tab(s) orally once a day (12 Oct 2024 15:56)  OxyCONTIN 10 mg oral tablet, extended release: 1 tab(s) orally 2 times a day (12 Oct 2024 15:56)  TriCor 200 mg oral capsule: 1 cap(s) orally once a day (12 Oct 2024 15:56)    MEDICATIONS  (STANDING):  abiraterone 500 mg tablet 2 Tablet(s) 2 Tablet(s) Oral daily  atorvastatin 20 milliGRAM(s) Oral at bedtime  chlorhexidine 2% Cloths 1 Application(s) Topical daily  enoxaparin Injectable 40 milliGRAM(s) SubCutaneous every 24 hours  fenofibrate Tablet 145 milliGRAM(s) Oral daily  filgrastim-sndz (ZARXIO) Injectable 480 MICROGram(s) SubCutaneous daily  lidocaine   4% Patch 1 Patch Transdermal every 24 hours  lisinopril 40 milliGRAM(s) Oral daily  morphine PCA (5 mG/mL) 30 milliLiter(s) PCA Continuous PCA Continuous  ondansetron Injectable 4 milliGRAM(s) IV Push every 8 hours  pantoprazole  Injectable 40 milliGRAM(s) IV Push daily  sodium chloride 0.9%. 1000 milliLiter(s) (100 mL/Hr) IV Continuous <Continuous>      SOCIAL HISTORY:  FAMILY HISTORY:  FH: HTN (hypertension) (Sibling)        ___________________________________________  OBJECTIVE:  Vital Signs Last 24 Hrs  T(C): 37.2 (18 Oct 2024 17:30), Max: 37.4 (18 Oct 2024 01:48)  T(F): 99 (18 Oct 2024 17:30), Max: 99.3 (18 Oct 2024 01:48)  HR: 127 (18 Oct 2024 17:30) (111 - 127)  BP: 100/61 (18 Oct 2024 17:30) (97/65 - 137/84)  BP(mean): --  RR: 19 (18 Oct 2024 17:30) (15 - 19)  SpO2: 92% (18 Oct 2024 17:30) (90% - 93%)    Parameters below as of 18 Oct 2024 17:30  Patient On (Oxygen Delivery Method): nasal cannula  O2 Flow (L/min): 4  CAPILLARY BLOOD GLUCOSE        I&O's Detail    17 Oct 2024 07:01  -  18 Oct 2024 07:00  --------------------------------------------------------  IN:    IV PiggyBack: 50 mL    Oral Fluid: 200 mL    sodium chloride 0.9%: 400 mL  Total IN: 650 mL    OUT:    Blood Loss (mL): 0 mL    Voided (mL): 650 mL  Total OUT: 650 mL    Total NET: 0 mL      18 Oct 2024 07:01  -  18 Oct 2024 19:45  --------------------------------------------------------  IN:    Oral Fluid: 100 mL  Total IN: 100 mL    OUT:    Blood Loss (mL): 0 mL    Voided (mL): 100 mL  Total OUT: 100 mL    Total NET: 0 mL        General: Well developed, well nourished, NAD  Neuro: Alert and oriented, no focal deficits, moves all extremities spontaneously  HEENT: NCAT, EOMI, anicteric, mucosa moist  Respiratory: Airway patent, respirations unlabored  CVS: Regular rate and rhythm  Abdomen: Soft, nondistended, tender to palpation in all 4 quadrants   Extremities: No edema, sensation and movement grossly intact  Skin: Warm, dry, appropriate color  ____________________________________________  LABS:  CBC Full  -  ( 18 Oct 2024 06:20 )  WBC Count : 0.23 K/uL  RBC Count : 3.97 M/uL  Hemoglobin : 12.3 g/dL  Hematocrit : 35.5 %  Platelet Count - Automated : 45 K/uL  Mean Cell Volume : 89.4 fL  Mean Cell Hemoglobin : 31.0 pg  Mean Cell Hemoglobin Concentration : 34.6 gm/dL  Auto Neutrophil # : 0.02 K/uL  Auto Lymphocyte # : 0.11 K/uL  Auto Monocyte # : 0.02 K/uL  Auto Eosinophil # : 0.00 K/uL  Auto Basophil # : 0.01 K/uL  Auto Neutrophil % : 8.8 %  Auto Lymphocyte % : 47.8 %  Auto Monocyte % : 8.7 %  Auto Eosinophil % : 0.0 %  Auto Basophil % : 4.3 %    10-18    144  |  110[H]  |  30[H]  ----------------------------<  166[H]  4.5   |  21[L]  |  0.76    Ca    8.0[L]      18 Oct 2024 06:20  Phos  2.5     10-18  Mg     2.40     10-18    TPro  5.2[L]  /  Alb  3.0[L]  /  TBili  1.3[H]  /  DBili  x   /  AST  52[H]  /  ALT  10  /  AlkPhos  53  10-17    LIVER FUNCTIONS - ( 17 Oct 2024 22:45 )  Alb: 3.0 g/dL / Pro: 5.2 g/dL / ALK PHOS: 53 U/L / ALT: 10 U/L / AST: 52 U/L / GGT: x           PT/INR - ( 17 Oct 2024 22:45 )   PT: 20.6 sec;   INR: 1.74 ratio         PTT - ( 17 Oct 2024 22:45 )  PTT:38.3 sec  Urinalysis Basic - ( 18 Oct 2024 06:20 )    Color: x / Appearance: x / SG: x / pH: x  Gluc: 166 mg/dL / Ketone: x  / Bili: x / Urobili: x   Blood: x / Protein: x / Nitrite: x   Leuk Esterase: x / RBC: x / WBC x   Sq Epi: x / Non Sq Epi: x / Bacteria: x  ____________________________________________  RADIOLOGY:  < from: CT Angio Chest PE Protocol w/ IV Cont (10.18.24 @ 03:06) >  IMPRESSION:    No pulmonary embolism to the level of the segmental branches bilaterally.   Limited evaluation of the subsegmental branches secondary to incomplete   contrast opacification.    Multilevel vertebral body lytic lesions and loss of vertebral body   height, better evaluated on CT thoracic spine 10/17/2024.        --- End of Report ---    < end of copied text >

## 2024-10-18 NOTE — CONSULT NOTE ADULT - ASSESSMENT
64-year-old male with metastatic prostate cancer, sent in by hematologist from [facility] for uncontrolled pain, nausea, vomiting.   Patient reports diffuse pain starting 6 months ago significantly worsening for the past 2 weeks.  Currently the worst pain is located around the lower back.   No loss of bladder and bowel function, no saddle paresthesia.  Able to walk.  patient is oxycodone 5 every 4-6 hour.  Yesterday they started adding OxyContin 10.  However patient continued to have pain.  Family also reports significant nausea and vomiting, inability to tolerate p.o. for the last 2 days.  Last bowel movement 2 days ago.   No known allergy.  Diagnosed with high risk high volume metastatic prostate cancer with bone, liver lymph node mets and presacral mass. Liver biopsy confirmed disease with . Started lupron, loly/pred with plans to initiate taxotere.    (12 Oct 2024 15:40)  pt seems very frustrated:   he is on 2 L of oxygen : he has no underlying lung disease:  but he is requiring 2 L of oxygen      Hypoxic resp failure  Metastatic prostate cancer  Back pain     Hypoxic resp failure  -He has no underlying lung disease:  but he is requiring 2 L of oxygen :   -CTA showed; No pulmonary embolism to the level of the segmental branches bilaterally. Limited evaluation of the subsegmental branches secondary to incomplete contrast opacification.  Multilevel vertebral body lytic lesions and loss of vertebral body height, better evaluated on CT thoracic spine 10/17/2024. Metastatic prostate cancer  -Patent central airways. Bibasilar atelectasis. No pleural effusion. MEDIASTINUM AND KATTY: No lymphadenopathy.  PULMONARY ANGIOGRAM: No pulmonary embolism to the level of the segmental   branches bilaterally. Limited evaluation of the subsegmental branches secondary to incomplete contrast opacification.    Back pain   -likely due to metastatic disease:  adm here for sever pain :  pain control per primary team   -venous ph is ok     PAGED ACP

## 2024-10-18 NOTE — SWALLOW BEDSIDE ASSESSMENT ADULT - SWALLOW EVAL: CRITERIA FOR SKILLED INTERVENTION MET
From: Lorenza Rodriguez  To: Mendoza Cardona MD  Sent: 5/6/2021 4:16 PM CDT  Subject: Non-Urgent Medical Question    Hello! I just got a message that the infusions have been authorized. I was wondering if I still need to take the prednisone.  Im kind of demonstrates skilled criteria for swallowing intervention

## 2024-10-18 NOTE — CONSULT NOTE ADULT - ASSESSMENT
64M metastatic prostate cancer, sent in by hematologist from [facility] for uncontrolled pain, nausea, vomiting, Surgery was consulted at 5:00pm for diarrhea and concerns for malignant LBO. As of today the patient had large volume liquid stool concerning for C. Diff. Primary team sent a C. Diff panel. Results still pending. Today the patient also had a CTA of the chest. On the CTA of the chest showed very dilated transverse colon concerning for LBO.  Patient was seen and examined by general surgery at the bedside. Patient was found to be tachycardiac to 130s. Patient has WBC 0. Patient found to be diffusely tender, but not peritoneal. Patient rushed urgently to CT for A&P.     P:  - Stat CT A&P    Discussed with Dr. Raleigh CHRISTINE Team Surgery

## 2024-10-18 NOTE — CHART NOTE - NSCHARTNOTEFT_GEN_A_CORE
MR reviewed with Dr. Dillon and demonstrates extensive metastatic disease in C/T/L spine including the followin. CERVICAL SPINE: Diffuse osseous metastases. No cervical pathologic fracture. No significant cervical epidural disease. Mild to moderate cervical degenerative disc disease.    2. THORACIC SPINE: Diffuse osseous metastases. Multiple mild endplate deformities in the lower thoracic spine are likely pathologic. Widespread epidural disease is predominantly low grade, high-grade at T3. No significant thoracic degenerative disc disease.    3. LUMBAR SPINE: Diffuse osseous metastases. L1 and L5 pathologic fractures are associated with moderate epidural disease at L1, more mild at L5. At least mild epidural disease at the left S1-S2 neural foramen. No significant lumbar degenerative disc disease.    Plan:  - Agree with IR consult for kyphoplasty consideration  - Appreciate radiation oncology consult  - Appreciate palliative consult  - PT/OT/OOB as tolerated  - Patient may follow up with Dr. Dillon one week following discharge. Please call [phone] to schedule an appointment  - Please page ortho [phone] with any questions

## 2024-10-18 NOTE — CHART NOTE - NSCHARTNOTEFT_GEN_A_CORE
Called by rn patient with > 10 episodes of malodorous loose stool   Patient laying in bed in NAD , wife at bedside. States patient with 10 episodes of diarrhea today with varying amounts each time . no solid stool   On Exam patient with generalized tenderness no guarding . tender to light palpation in all 4 quadrants , hyperactive bowel sounds.  97,4     111      97/65      93% on 4L   - dedicated CT abdomen and pelvis with IV contrast ordered   - GI and surgery consult called  - vitals Q 4 hrs   - will hold miralax for now ( LAst dose 10/15 )   - stool count   - C diff ordered  - repeat BP , Vitals , Temp STAT - discussed with RN   - NPO , will start fluids for now   - above plan discussed with attending Called by rn patient with > 10 episodes of malodorous loose stool   Patient laying in bed in NAD , wife at bedside. States patient with 10 episodes of diarrhea today with varying amounts each time . no solid stool   On Exam patient with generalized tenderness no guarding . tender to light palpation in all 4 quadrants , hyperactive bowel sounds.  97,4     111      97/65      93% on 4L   - dedicated CT abdomen and pelvis with IV contrast ordered   - GI and surgery consult called  - vitals Q 4 hrs   - will hold miralax for now ( LAst dose 10/15 )   - stool count   - C diff ordered  - repeat BP , Vitals , Temp STAT - discussed with RN   - NPO , will continue with IV    - above plan discussed with attending

## 2024-10-18 NOTE — PROGRESS NOTE ADULT - ASSESSMENT
64-year-old male with metastatic prostate cancer, sent in by hematologist from [facility] and cancer for uncontrolled pain, nausea, vomiting.       Problem/Plan - 1:  ·  Problem: Cancer related pain.   ·  Plan: - appreciate pall care recommendations:  - pain control as per palliative crae   - cw decadron   - RT consult     Problem/Plan - 2:·  Problem: Nausea & vomiting., Diarrhea  ·  Plan: -  CT lumbar spine reviewed  - GI and surgery consult  - will do dedicated CT abdomen     Problem/Plan - 3:  ·  Problem: CA of prostate.   ·  Plan: Metastatic prostate cancer- f/u heme/onc recommendations  - Rad Onc fu   - Palliative for symptom control.    Problem/Plan - 4:  ·  Problem: Thrombocytopenia, unspecified.   ·  Plan: -platelet baseline ~70  -monitor, will need transfused for plt <10   - f/u heme/onc for further recommendations.    Problem/Plan - 5:  ·  Problem: Benign essential HTN.   ·  Plan: - cont. home lisinopril 40mg.    Problem/Plan - 6:·  Problem: Hyperlipidemia. ·  Plan: - cont. home atorvastatin 20 and fenofibrate.    Problem/Plan - 7:  ·  Problem: Need for prophylactic measure.   ·  Plan: lovenox.

## 2024-10-18 NOTE — BH CONSULTATION LIAISON PROGRESS NOTE - NSBHASSESSMENTFT_PSY_ALL_CORE
Mr Alyssa is a 65 yo M w history of pancreatic cancer with mets who presents on recommendation by hematologist due to uncontrolled pain, nausea, and vomiting that has significantly worsened over the past 2 weeks. Pt diagnosed with high risk, high volume metastatic prostate cancer with bone, liver, lymph mets and a presacral mass. Pt was started on chemotherapy; pending further work up for evaluation for radiation therapy. Pt was seen by palliative care team for pain management and goals of care discussions. Team recommended psychiatric evaluation for anxiety as pt has been having difficulty adjusting to progression of cancer.    10/17: Pt difficulty accepting and adjusting to cancer progression and treatment. Reviewed anxiety and management goals regarding anxiety. Reviewed medications used to treat anxiety. Antidepressants not recommended due to low platelets and requiring long time before effect; BZDs not recommended due to concurrent use of pain medications increasing risk for oversedation, respiratory depression. Similarly, hydroxyzine not recommended due to worsening confusion. Recommended Zyprexa for treatment of anxiety with added benefit for potential assistance with nausea, sleep, and appetite. Reviewed risks, benefits, side effects of medication. Reviewed black box warning of antipsychotic medications in elderly. Family hesitant to start additional medication. Would like to talk to palliative care team regarding starting additional med. Answered questions and concerns. Monitor for delirium.  10/18: Spoke with family; now in agreement to start prn Zyprexa.     Recs:  - Start Zyprexa 1.25 mg PO/IM q6h prn for anxiety  	- hold med if QTC > 500    - Obs: routine  - Dispo: TBD as per medical team, no indication for inpt psychiatry

## 2024-10-18 NOTE — PROGRESS NOTE ADULT - ASSESSMENT
1. Metastatic prostate cancer  - follows with Dr Andrew Mendoza at [facility]   - initially diagnosed 15 years ago with low risk prostate cancer s/p radical prostatectomy, negative LNs and did not require RT or adjuvant ADT.   - presented outpatient with imaging showing a multiloculated presacral soft tissue mass 5.1 x 3.9 x 4.1cm, RP LAD, numeour low density liver lesions c/w met disease, lucencies in several vertebral bodies, manubrium.   - PSMA 10/11/24 showing Hypermetabolic vera foci above and below the diaphragm, foci in the liver and extensive foci involving the  axial and appendicular skeleton compatible with metastatic disease  - --> 374--> 426 on 10/8/24   - Liver biopsy consistent with adenocarcinoma favoring prostate    outpatient liver biopsy 9/30  DIAGNOSIS:  LIVER (FINE NEEDLE ASPIRATION):  POSITIVE FOR MALIGNANT CELLS. ADEQUATE CELLULARITY FOR EVALUATION.  CONSISTENT WITH METASTATIC ADENOCARCINOMA FAVOR PROSTATE PRIMARY, SEE  COMMENTS.  ELECTRONICALLY SIGNED      -  he has high risk high volume disease and plan to start triplet therapy with ADT/lupron, abiaterone/prednisone with taxotere. He had back pain after Lupron likely tumor flare and instructed to take steroids. Abby/Pred should have started and can continue Abiaterone 1000mg daily with prednisone 5mg BID. While receiving Decadron for chemo as below can hold the prednisone but should resume after the 3 days of decadron    - plan for taxotere 60mg/m2 to start tomorrow with emend, aloxi, benadryl and dex premeds    - please give Dex 8mg BID today, tomorrow and following day   - MRI L-spine .L5 lesions seen on imaging.     - Rad Onc appreciated, CT Thoracic, Lumbar, sacral completed, awaiting read,   in order to determine if RT is appropriate  - Palliative for symptom control     2. Thrombocytopenia   - possible ITP component   - baseline in the 70s  - dose reduced taxotere to 60mg/m2 given baseline thrombocytopenia   - monitor, will need transfused for plt <10   - expect further decline with chemo. Steroids may help with ITP component but also may need high dose. Nplate can also be considered     3. Neutropenia  -s/p Docetaxol 10/13, chemotherapy Fred  -daily Zarxio  -if fever develops, start broad spectrum antibiotics    CTA reviewed and shows no pulmonary embolism to the level of the segmental branches bilaterally.   Limited evaluation of the subsegmental branches secondary to incomplete   contrast opacification.    Multilevel vertebral body lytic lesions and loss of vertebral body   height, better evaluated on CT thoracic spine 10/17/2024      Rad onc and palliative recs appreciated    Umm Aponte NP  Hematology/Oncology  [facility]  [phone] (Office)  [phone] (Alt office)  Evenings and weekends please call MD on call or office

## 2024-10-18 NOTE — PROGRESS NOTE ADULT - SUBJECTIVE AND OBJECTIVE BOX
Patient is a 64y old  Male who presents with a chief complaint of Pain (18 Oct 2024 12:54)    Date of servie : 10-18-24 @ 17:41  INTERVAL HPI/OVERNIGHT EVENTS:  T(C): 37.2 (10-18-24 @ 17:30), Max: 37.4 (10-18-24 @ 01:48)  HR: 127 (10-18-24 @ 17:30) (111 - 127)  BP: 100/61 (10-18-24 @ 17:30) (97/65 - 137/84)  RR: 19 (10-18-24 @ 17:30) (15 - 19)  SpO2: 92% (10-18-24 @ 17:30) (90% - 93%)  Wt(kg): --  I&O's Summary    17 Oct 2024 07:01  -  18 Oct 2024 07:00  --------------------------------------------------------  IN: 650 mL / OUT: 650 mL / NET: 0 mL    18 Oct 2024 07:01  -  18 Oct 2024 17:41  --------------------------------------------------------  IN: 100 mL / OUT: 100 mL / NET: 0 mL        LABS:                        12.3   0.23  )-----------( 45       ( 18 Oct 2024 06:20 )             35.5     10-18    144  |  110[H]  |  30[H]  ----------------------------<  166[H]  4.5   |  21[L]  |  0.76    Ca    8.0[L]      18 Oct 2024 06:20  Phos  2.5     10-18  Mg     2.40     10-18    TPro  5.2[L]  /  Alb  3.0[L]  /  TBili  1.3[H]  /  DBili  x   /  AST  52[H]  /  ALT  10  /  AlkPhos  53  10-17    PT/INR - ( 17 Oct 2024 22:45 )   PT: 20.6 sec;   INR: 1.74 ratio         PTT - ( 17 Oct 2024 22:45 )  PTT:38.3 sec  Urinalysis Basic - ( 18 Oct 2024 06:20 )    Color: x / Appearance: x / SG: x / pH: x  Gluc: 166 mg/dL / Ketone: x  / Bili: x / Urobili: x   Blood: x / Protein: x / Nitrite: x   Leuk Esterase: x / RBC: x / WBC x   Sq Epi: x / Non Sq Epi: x / Bacteria: x      CAPILLARY BLOOD GLUCOSE            Urinalysis Basic - ( 18 Oct 2024 06:20 )    Color: x / Appearance: x / SG: x / pH: x  Gluc: 166 mg/dL / Ketone: x  / Bili: x / Urobili: x   Blood: x / Protein: x / Nitrite: x   Leuk Esterase: x / RBC: x / WBC x   Sq Epi: x / Non Sq Epi: x / Bacteria: x        MEDICATIONS  (STANDING):  abiraterone 500 mg tablet 2 Tablet(s) 2 Tablet(s) Oral daily  atorvastatin 20 milliGRAM(s) Oral at bedtime  chlorhexidine 2% Cloths 1 Application(s) Topical daily  dexAMETHasone  IVPB 8 milliGRAM(s) IV Intermittent two times a day  enoxaparin Injectable 40 milliGRAM(s) SubCutaneous every 24 hours  fenofibrate Tablet 145 milliGRAM(s) Oral daily  filgrastim-sndz (ZARXIO) Injectable 480 MICROGram(s) SubCutaneous daily  lidocaine   4% Patch 1 Patch Transdermal every 24 hours  lisinopril 40 milliGRAM(s) Oral daily  morphine PCA (5 mG/mL) 30 milliLiter(s) PCA Continuous PCA Continuous  ondansetron Injectable 4 milliGRAM(s) IV Push every 8 hours  pantoprazole  Injectable 40 milliGRAM(s) IV Push daily  sodium chloride 0.9%. 1000 milliLiter(s) (100 mL/Hr) IV Continuous <Continuous>    MEDICATIONS  (PRN):  acetaminophen     Tablet .. 650 milliGRAM(s) Oral every 6 hours PRN Temp greater or equal to 38C (100.4F), Mild Pain (1 - 3), Moderate Pain (4 - 6)  aluminum hydroxide/magnesium hydroxide/simethicone Suspension 30 milliLiter(s) Oral every 4 hours PRN Dyspepsia  morphine  - Injectable 4 milliGRAM(s) IV Push once PRN severe breakthrough pain while off PCA  morphine PCA (5 mG/mL) Rescue Clinician Bolus 4 milliGRAM(s) IV Push every 15 minutes PRN Breakthrough severe pain  naloxone Injectable 0.1 milliGRAM(s) IV Push every 3 minutes PRN For ANY of the following changes in patient status:  A. RR LESS THAN 10 breaths per minute, B. Oxygen saturation LESS THAN 90%, C. Sedation score of 6  OLANZapine 1.25 milliGRAM(s) Oral every 6 hours PRN anxiety  polyethylene glycol 3350 17 Gram(s) Oral daily PRN Constipation          PHYSICAL EXAM:  GENERAL: NAD, well-groomed, well-developed  HEAD:  Atraumatic, Normocephalic  CHEST/LUNG: Clear to percussion bilaterally; No rales, rhonchi, wheezing, or rubs  HEART: Regular rate and rhythm; No murmurs, rubs, or gallops  ABDOMEN: Soft, Nontender, Nondistended; Bowel sounds present  EXTREMITIES:  2+ Peripheral Pulses, No clubbing, cyanosis, or edema  LYMPH: No lymphadenopathy noted  SKIN: No rashes or lesions    Care Discussed with Consultants/Other Providers [ ] YES  [ ] NO

## 2024-10-18 NOTE — PROGRESS NOTE ADULT - SUBJECTIVE AND OBJECTIVE BOX
MEDICATIONS  (STANDING):  abiraterone 500 mg tablet 2 Tablet(s) 2 Tablet(s) Oral daily  atorvastatin 20 milliGRAM(s) Oral at bedtime  chlorhexidine 2% Cloths 1 Application(s) Topical daily  dexAMETHasone  IVPB 8 milliGRAM(s) IV Intermittent two times a day  enoxaparin Injectable 40 milliGRAM(s) SubCutaneous every 24 hours  fenofibrate Tablet 145 milliGRAM(s) Oral daily  filgrastim-sndz (ZARXIO) Injectable 300 MICROGram(s) SubCutaneous daily  lidocaine   4% Patch 1 Patch Transdermal every 24 hours  lisinopril 40 milliGRAM(s) Oral daily  morphine PCA (5 mG/mL) 30 milliLiter(s) PCA Continuous PCA Continuous  ondansetron Injectable 4 milliGRAM(s) IV Push every 8 hours  pantoprazole  Injectable 40 milliGRAM(s) IV Push daily  polyethylene glycol 3350 17 Gram(s) Oral daily  sodium chloride 0.9%. 1000 milliLiter(s) (100 mL/Hr) IV Continuous <Continuous>    MEDICATIONS  (PRN):  acetaminophen     Tablet .. 650 milliGRAM(s) Oral every 6 hours PRN Temp greater or equal to 38C (100.4F), Mild Pain (1 - 3), Moderate Pain (4 - 6)  aluminum hydroxide/magnesium hydroxide/simethicone Suspension 30 milliLiter(s) Oral every 4 hours PRN Dyspepsia  morphine  - Injectable 4 milliGRAM(s) IV Push once PRN severe breakthrough pain while off PCA  morphine PCA (5 mG/mL) Rescue Clinician Bolus 4 milliGRAM(s) IV Push every 15 minutes PRN Breakthrough severe pain  naloxone Injectable 0.1 milliGRAM(s) IV Push every 3 minutes PRN For ANY of the following changes in patient status:  A. RR LESS THAN 10 breaths per minute, B. Oxygen saturation LESS THAN 90%, C. Sedation score of 6        Vital Signs Last 24 Hrs  T(C): 36.7 (18 Oct 2024 05:35), Max: 37.4 (18 Oct 2024 01:48)  T(F): 98.1 (18 Oct 2024 05:35), Max: 99.3 (18 Oct 2024 01:48)  HR: 127 (18 Oct 2024 05:35) (112 - 127)  BP: 131/78 (18 Oct 2024 05:35) (119/80 - 142/92)  BP(mean): --  RR: 15 (18 Oct 2024 05:35) (12 - 19)  SpO2: 92% (18 Oct 2024 05:35) (90% - 98%)    Parameters below as of 18 Oct 2024 05:35  Patient On (Oxygen Delivery Method): nasal cannula  O2 Flow (L/min): 4      PE  NAD  Lethargic  Anicteric, MMM  RRR  CTAB  Abd soft, NT, ND  No c/c/e  No rash grossly                            12.3   0.23  )-----------( 45       ( 18 Oct 2024 06:20 )             35.5       10-18    144  |  110[H]  |  30[H]  ----------------------------<  166[H]  4.5   |  21[L]  |  0.76    Ca    8.0[L]      18 Oct 2024 06:20  Phos  2.5     10-18  Mg     2.40     10-18    TPro  5.2[L]  /  Alb  3.0[L]  /  TBili  1.3[H]  /  DBili  x   /  AST  52[H]  /  ALT  10  /  AlkPhos  53  10-17

## 2024-10-18 NOTE — BH CONSULTATION LIAISON PROGRESS NOTE - NSBHFUPINTERVALHXFT_PSY_A_CORE
Pt seen at bedside with spouse present. Pt states he's tired and not particularly interested in talking with me. Family discussed as a group the Zyprexa and they are in agreement with starting the Zyprexa as needed for anxiety. Pt and family without additional questions regarding medication. Pt requesting me to leave, so request was honored.

## 2024-10-18 NOTE — PROGRESS NOTE ADULT - PROBLEM SELECTOR PLAN 2
- Metastatic prostate adenocarcinoma, follows with Dr. Andrew Mendoza    - Oncology recs appreciated: Metastatic prostate cancer to liver now s/p C1 taxotere, continue with abiraterone. Should be on steroid with abiraterone, on dex 8mg BID (was for chemo however now per palliative to assist with pain control)- consider tapering to 4mg BID and further from there. If dexamethasone tapered off needs to be on prednisone 5mg daily, c/w zarxio 480mcg until ANC > 1500.  - management per oncology team  - Pt started on zarxio

## 2024-10-18 NOTE — PROGRESS NOTE ADULT - PROBLEM SELECTOR PLAN 1
MRI Lumbar Spine: (10/15) Diffuse osseous metastases. L1 and L5 pathologic fractures are associated with moderate epidural disease at L1, more mild at L5. At least mild epidural disease at the left S1-S2 neural foramen. No significant lumbar degenerative disc disease.  - MRI from 10/15 shows extensive metastasis in cervical, thoracic, lumbar, skull base and calvarium. Also small cortical subacute infarctions.    - Continue Morphine PCA: 1mg CR; DD 4mg; LO 30 min; 4 hour limit: 32mg; CAB 4mg  - Discontinue Fentanyl patch d/t lethargy, confusion and possible hallucinations  - Planned for kyphoplasty on 10/22 with IR  - continue Decadron 8mg BID for total of 3 days  - Narcan PRN  - multimodal pain control: lidocaine patch, warm/cold packs   - recommend changing bowel regimen to PRN  - would RECOMMEND OUTPATIENT PALLIATIVE CARE REFERRAL WITH [facility] palliative care team. MRI Lumbar Spine: (10/15) Diffuse osseous metastases. L1 and L5 pathologic fractures are associated with moderate epidural disease at L1, more mild at L5. At least mild epidural disease at the left S1-S2 neural foramen. No significant lumbar degenerative disc disease.  - MRI from 10/15 shows extensive metastasis in cervical, thoracic, lumbar, skull base and calvarium. Also small cortical subacute infarctions.  - Continue Morphine PCA: 1mg CR; DD 4mg; LO 30 min; 4 hour limit: 32mg; CAB 4mg  - Discontinue Fentanyl patch d/t lethargy, confusion and possible hallucinations  - Planned for kyphoplasty on 10/22 with IR  - continue Decadron 8mg BID for total of 3 days  - Narcan PRN  - multimodal pain control: lidocaine patch, warm/cold packs   - recommend changing bowel regimen to PRN  - would RECOMMEND OUTPATIENT PALLIATIVE CARE REFERRAL WITH [facility] palliative care team.

## 2024-10-18 NOTE — PROGRESS NOTE ADULT - PROBLEM SELECTOR PLAN 4
- Continue with Zofran 4mg q8h ATC  - speech and swallow evaluated recommended soft and bite sized and mildly thick liquids with aspiration precautions

## 2024-10-18 NOTE — SWALLOW BEDSIDE ASSESSMENT ADULT - COMMENTS
Consult received and chart reviewed. Patient seen at bedside this AM for initial assessment of swallow function, at which time he was alert, oriented x3 however some confusion noted, cooperative, and endorsed chronic pain consistent with admission findings; patient agreeable to assessment. Patient on supplemental O2 via NC, 5L.  Patient demonstrates ability to follow simple commands with some prompting. Vocal quality and speech production WFL.    Patient left as received, NAD. FULL REPORT/RECOMMENDATIONS TO FOLLOW. Consult received and chart reviewed. Patient seen at bedside this AM for initial assessment of swallow function, at which time he was alert, oriented x3 however some confusion/agitation noted and endorsed chronic pain consistent with admission findings; patient agreeable to assessment with encouragement. Patient on supplemental O2 via NC, 5L.  Patient demonstrates ability to follow simple commands with some prompting. Vocal quality and speech production WFL. Patient and patient's spouse report swallowing difficulty marked by increase mastication, swallowing feeling effortful, and notes constant "belching" or feeling of needing to belch with liquids.     Per charting, the patient is a "64-year-old male with metastatic prostate cancer, sent in by hematologist from [place] [facility] for uncontrolled pain, nausea, vomiting. "    WBC reduced.   CXR 10/18/24: "No pulmonary embolism to the level of the segmental branches bilaterally. Limited evaluation of the subsegmental branches secondary to incomplete contrast opacification."  CXR 10/17/24: "Clear lungs."  MRH 10/15/24: "1. Left occipital clustered small cortical subacute infarctions  2. Ischemic white matter disease and atrophy typical for age  3. Skull base and calvarial osseous "      Discussed results with patient/family, RN, and call out to PA.

## 2024-10-18 NOTE — CONSULT NOTE ADULT - SUBJECTIVE AND OBJECTIVE BOX
10-18-24 @ 12:55    Patient is a 64y old  Male who presents with a chief complaint of Pain (18 Oct 2024 11:17)      HPI:      64-year-old male with metastatic prostate cancer, sent in by hematologist from [facility] for uncontrolled pain, nausea, vomiting.   Patient reports diffuse pain starting 6 months ago significantly worsening for the past 2 weeks.  Currently the worst pain is located around the lower back.   No loss of bladder and bowel function, no saddle paresthesia.  Able to walk.  patient is oxycodone 5 every 4-6 hour.  Yesterday they started adding OxyContin 10.  However patient continued to have pain.  Family also reports significant nausea and vomiting, inability to tolerate p.o. for the last 2 days.  Last bowel movement 2 days ago.   No known allergy.    Diagnosed with high risk high volume metastatic prostate cancer with bone, liver lymph node mets and presacral mass. Liver biopsy confirmed disease with . Started lupron, loly/pred with plans to initiate taxotere.    (12 Oct 2024 15:40)    pt seems very frustrated:   he is on 2 L of oxygen : he has no underlying lung disease:  but he is requiring 2 L of oxygen        ?FOLLOWING PRESENT  [x ] Hx of PE/DVT, [ xHx COPD, [ x] Hx of Asthma, [y ] Hx of Hospitalization, [ x]  Hx of BiPAP/CPAP use, [x ] Hx of CHRISTINE    Allergies    No Known Allergies    Intolerances        PAST MEDICAL & SURGICAL HISTORY:  Benign essential HTN      HLD (hyperlipidemia)      CA of prostate      Basal cell carcinoma      History of transurethral prostatectomy      S/P inguinal hernia repair      History of basal cell carcinoma (BCC) excision          FAMILY HISTORY:  FH: HTN (hypertension) (Sibling)        Social History: [x  ] TOBACCO                  [ x ] ETOH                                 [  x] IVDA/DRUGS    REVIEW OF SYSTEMS      General:	x    Skin/Breast:x  	  Ophthalmologic:x  	  ENMT:	x    Respiratory and Thorax: no sob, no cough : no phlegm    	  Cardiovascular:	x    Gastrointestinal:	x    Genitourinary:	x    Musculoskeletal:	x    Neurological:	x    Psychiatric:	x    Hematology/Lymphatics:	x    Endocrine:	x    Allergic/Immunologic:	x    MEDICATIONS  (STANDING):  abiraterone 500 mg tablet 2 Tablet(s) 2 Tablet(s) Oral daily  atorvastatin 20 milliGRAM(s) Oral at bedtime  chlorhexidine 2% Cloths 1 Application(s) Topical daily  dexAMETHasone  IVPB 8 milliGRAM(s) IV Intermittent two times a day  enoxaparin Injectable 40 milliGRAM(s) SubCutaneous every 24 hours  fenofibrate Tablet 145 milliGRAM(s) Oral daily  filgrastim-sndz (ZARXIO) Injectable 480 MICROGram(s) SubCutaneous daily  lidocaine   4% Patch 1 Patch Transdermal every 24 hours  lisinopril 40 milliGRAM(s) Oral daily  morphine PCA (5 mG/mL) 30 milliLiter(s) PCA Continuous PCA Continuous  ondansetron Injectable 4 milliGRAM(s) IV Push every 8 hours  pantoprazole  Injectable 40 milliGRAM(s) IV Push daily  polyethylene glycol 3350 17 Gram(s) Oral daily  sodium chloride 0.9%. 1000 milliLiter(s) (100 mL/Hr) IV Continuous <Continuous>    MEDICATIONS  (PRN):  acetaminophen     Tablet .. 650 milliGRAM(s) Oral every 6 hours PRN Temp greater or equal to 38C (100.4F), Mild Pain (1 - 3), Moderate Pain (4 - 6)  aluminum hydroxide/magnesium hydroxide/simethicone Suspension 30 milliLiter(s) Oral every 4 hours PRN Dyspepsia  morphine  - Injectable 4 milliGRAM(s) IV Push once PRN severe breakthrough pain while off PCA  morphine PCA (5 mG/mL) Rescue Clinician Bolus 4 milliGRAM(s) IV Push every 15 minutes PRN Breakthrough severe pain  naloxone Injectable 0.1 milliGRAM(s) IV Push every 3 minutes PRN For ANY of the following changes in patient status:  A. RR LESS THAN 10 breaths per minute, B. Oxygen saturation LESS THAN 90%, C. Sedation score of 6  OLANZapine 1.25 milliGRAM(s) Oral every 6 hours PRN anxiety       Vital Signs Last 24 Hrs  T(C): 36.4 (18 Oct 2024 10:37), Max: 37.4 (18 Oct 2024 01:48)  T(F): 97.6 (18 Oct 2024 10:37), Max: 99.3 (18 Oct 2024 01:48)  HR: 111 (18 Oct 2024 10:37) (111 - 127)  BP: 105/69 (18 Oct 2024 10:37) (105/69 - 142/92)  BP(mean): --  RR: 18 (18 Oct 2024 10:37) (15 - 19)  SpO2: 93% (18 Oct 2024 10:37) (90% - 98%)    Parameters below as of 18 Oct 2024 10:37  Patient On (Oxygen Delivery Method): nasal cannula    Orthostatic VS          I&O's Summary    17 Oct 2024 07:01  -  18 Oct 2024 07:00  --------------------------------------------------------  IN: 650 mL / OUT: 650 mL / NET: 0 mL    18 Oct 2024 07:01  -  18 Oct 2024 12:55  --------------------------------------------------------  IN: 50 mL / OUT: 0 mL / NET: 50 mL        Physical Exam:   GENERAL: NAD, well-groomed, well-developed  HEENT: CHAVA/   Atraumatic, Normocephalic  ENMT: No tonsillar erythema, exudates, or enlargement; Moist mucous membranes, Good dentition, No lesions  NECK: Supple, No JVD, Normal thyroid  CHEST/LUNG: Clear to auscultation bilaterally  CVS: Regular rate and rhythm; No murmurs, rubs, or gallops  GI: : Soft, Nontender, Nondistended; Bowel sounds present  NERVOUS SYSTEM:  Alert & Oriented X3,  EXTREMITIES: - edema  LYMPH: No lymphadenopathy noted  SKIN: No rashes or lesions  ENDOCRINOLOGY: No Thyromegaly  PSYCH: Appropriate    Labs:  -0.4<42<4>>69<<7.385>>-0.4<<3><<4><<5<<699>>, 3.2<45<4>>29<<7.415>>3.2<<3><<4><<5<<299>>                            12.3   0.23  )-----------( 45       ( 18 Oct 2024 06:20 )             35.5                         12.1   0.22  )-----------( 38       ( 17 Oct 2024 22:45 )             35.5                         12.2   0.31  )-----------( 37       ( 17 Oct 2024 02:51 )             35.0                         13.5   0.55  )-----------( 39       ( 16 Oct 2024 10:14 )             38.1                         13.5   0.79  )-----------( 45       ( 16 Oct 2024 06:14 )             38.2     10-18    144  |  110[H]  |  30[H]  ----------------------------<  166[H]  4.5   |  21[L]  |  0.76  10-17    144  |  110[H]  |  29[H]  ----------------------------<  167[H]  4.4   |  22  |  0.74  10-17    141  |  107  |  35[H]  ----------------------------<  157[H]  4.4   |  22  |  0.75  10-16    141  |  107  |  43[H]  ----------------------------<  152[H]  4.1   |  20[L]  |  0.84    Ca    8.0[L]      18 Oct 2024 06:20  Ca    8.0[L]      17 Oct 2024 22:45  Ca    7.9[L]      17 Oct 2024 02:51  Phos  2.5     10-18  Phos  3.0     10-17  Mg     2.40     10-18  Mg     2.50     10-17    TPro  5.2[L]  /  Alb  3.0[L]  /  TBili  1.3[H]  /  DBili  x   /  AST  52[H]  /  ALT  10  /  AlkPhos  53  10-17  TPro  5.4[L]  /  Alb  3.4  /  TBili  1.1  /  DBili  x   /  AST  60[H]  /  ALT  9   /  AlkPhos  64  10-17  TPro  5.6[L]  /  Alb  3.8  /  TBili  1.3[H]  /  DBili  x   /  AST  68[H]  /  ALT  9   /  AlkPhos  82  10-16    CAPILLARY BLOOD GLUCOSE        LIVER FUNCTIONS - ( 17 Oct 2024 22:45 )  Alb: 3.0 g/dL / Pro: 5.2 g/dL / ALK PHOS: 53 U/L / ALT: 10 U/L / AST: 52 U/L / GGT: x           PT/INR - ( 17 Oct 2024 22:45 )   PT: 20.6 sec;   INR: 1.74 ratio         PTT - ( 17 Oct 2024 22:45 )  PTT:38.3 sec  Urinalysis Basic - ( 18 Oct 2024 06:20 )    Color: x / Appearance: x / SG: x / pH: x  Gluc: 166 mg/dL / Ketone: x  / Bili: x / Urobili: x   Blood: x / Protein: x / Nitrite: x   Leuk Esterase: x / RBC: x / WBC x   Sq Epi: x / Non Sq Epi: x / Bacteria: x      D DImer      Studies  Chest X-RAY  CT SCAN Chest   CT Abdomen  Venous Dopplers: LE:   Others  rad< from: CT Angio Chest PE Protocol w/ IV Cont (10.18.24 @ 03:06) >    CHEST WALL AND BONES: Multilevel vertebral body lytic lesions, consistent   with diffuse osseous metastasis. Redemonstrated mild multilevel loss of   vertebral body height.    IMPRESSION:    No pulmonary embolism to the level of the segmental branches bilaterally.   Limited evaluation of the subsegmental branches secondary to incomplete   contrast opacification.    Multilevel vertebral body lytic lesions and loss of vertebral body   height, better evaluated on CT thoracic spine 10/17/2024.    < end of copied text >    ct< from: CT Angio Chest PE Protocol w/ IV Cont (10.18.24 @ 03:06) >  bowel. Cholecystectomy. Ascites is noted.    CHEST WALL AND BONES: Multilevel vertebral body lytic lesions, consistent   with diffuse osseous metastasis. Redemonstrated mild multilevel loss of   vertebral body height.    IMPRESSION:    No pulmonary embolism to the level of the segmental branches bilaterally.   Limited evaluation of the subsegmental branches secondary to incomplete   contrast opacification.    Multilevel vertebral body lytic lesions and loss of vertebral body   height, better evaluated on CT thoracic spine 10/17/2024.        --- End of Report ---      < end of copied text >      ct< from: CT Angio Chest PE Protocol w/ IV Cont (10.18.24 @ 03:06) >  AIRWAYS/LUNGS/PLEURA: Patent central airways. Bibasilar atelectasis. No   pleural effusion.    MEDIASTINUM AND KATTY: No lymphadenopathy.    PULMONARY ANGIOGRAM: No pulmonary embolism to the level of the segmental   branches bilaterally. Limited evaluation of the subsegmental branches   secondary to incomplete contrast opacification.    VESSELS: Within normal limits.    HEART: Heart size is normal. No pericardial effusion.    VISUALIZED UPPER ABDOMEN: Partially visualized dilated air-filled large   bowel. Cholecystectomy. Ascites is noted.    < end of copied text >

## 2024-10-18 NOTE — SWALLOW BEDSIDE ASSESSMENT ADULT - ASR SWALLOW REFERRAL
Consider GI consult d/t belching s/p thin liquid trials GI consult d/t belching s/p thin liquid trials

## 2024-10-18 NOTE — PROGRESS NOTE ADULT - PROBLEM SELECTOR PLAN 9
Patient is supported by his wife- Vijaya [phone]) and 3 adult sons.   > Patient is recently dx with metastatic prostate cancer just 1.5 weeks ago and he is treatment oriented.  > Offered caregiver Sw referral to patient's wife- DECLINED     In the event of newly developing, evolving, or worsening symptoms, please contact the Palliative Medicine team via pager (if the patient is at [facility] #[phone] or if the patient is at [facility] #[phone]) The Geriatric and Palliative Medicine service has coverage 24 hours a day/ 7 days a week to provide medical recommendations regarding symptom management needs via telephone.

## 2024-10-18 NOTE — PROGRESS NOTE ADULT - NS ATTEND AMEND GEN_ALL_CORE FT
Agree with above assessment and plan.     Metastatic prostate cancer to liver now s/p C1 taxotere, continue with abiraterone. Should be on steroid with abiraterone, on dex 8mg BID (was for chemo however now per palliative to assist with pain control)- consider tapering to 4mg BID and further from there. If dexamethasone tapered off needs to be on prednisone 5mg daily with his loly     I would prefer to taper the decadron if palliative has no objection     CTA chest with no PE. Dilated large bowel noted- likely constipation from opiates. Consider CT A/P if pain or concerning symptoms, can send lactate.   Continue with zarxio 480mcg until ANC >1500  Kypho planned for Tuesday   Appreciate ortho, IR, rad onc and palliative involvement     Extensively discussed with family and patient

## 2024-10-18 NOTE — BH CONSULTATION LIAISON PROGRESS NOTE - NSBHCONSULTFOLLOWAFTERCARE_PSY_A_CORE FT
[place] walk in [facility] clinic  [address] [place] [address] [phone] If oncology treatments are being planned for at [facility], oncology team to refer patient to be seen by outpatient psycho-oncologist Dr Franklin Hatfield there.       [place] walk in [facility] clinic  [phone] [place] [address] [phone]

## 2024-10-18 NOTE — PROGRESS NOTE ADULT - SUBJECTIVE AND OBJECTIVE BOX
SUBJECTIVE AND OBJECTIVE:  Indication for Geriatrics and Palliative Care Services/INTERVAL HPI:  Pt seen and examined at bedside, patient appears more comfortable today but has eyes closed most of encounter. Able to answer some questions appropriately.  Family at bedside this morning, including wife and two sons. States he appears better today than prev days. However they did note that patient saw some objects in the evening that were not there. Family concerned that patient has to have bowel movements in bed, happened this morning, asking if there is alternative.    OVERNIGHT EVENTS:  CTA negative for pulmonary embolism  V/S still sig for tachycardia and req 5L NC  Review of PCA pump from 10AM-10AM: pt used 22 injections on 22 attempts: Morphine 5mg/mL: DD 4mg, LO 30 min, CR 1mg/hr, 4hr LO 32mg, CAB 4mg.    DNR on chart:  Allergies    No Known Allergies    Intolerances    MEDICATIONS  (STANDING):  abiraterone 500 mg tablet 2 Tablet(s) 2 Tablet(s) Oral daily  atorvastatin 20 milliGRAM(s) Oral at bedtime  chlorhexidine 2% Cloths 1 Application(s) Topical daily  dexAMETHasone  IVPB 8 milliGRAM(s) IV Intermittent two times a day  enoxaparin Injectable 40 milliGRAM(s) SubCutaneous every 24 hours  fenofibrate Tablet 145 milliGRAM(s) Oral daily  filgrastim-sndz (ZARXIO) Injectable 480 MICROGram(s) SubCutaneous daily  lidocaine   4% Patch 1 Patch Transdermal every 24 hours  lisinopril 40 milliGRAM(s) Oral daily  morphine PCA (5 mG/mL) 30 milliLiter(s) PCA Continuous PCA Continuous  ondansetron Injectable 4 milliGRAM(s) IV Push every 8 hours  pantoprazole  Injectable 40 milliGRAM(s) IV Push daily  polyethylene glycol 3350 17 Gram(s) Oral daily  sodium chloride 0.9%. 1000 milliLiter(s) (100 mL/Hr) IV Continuous <Continuous>    MEDICATIONS  (PRN):  acetaminophen     Tablet .. 650 milliGRAM(s) Oral every 6 hours PRN Temp greater or equal to 38C (100.4F), Mild Pain (1 - 3), Moderate Pain (4 - 6)  aluminum hydroxide/magnesium hydroxide/simethicone Suspension 30 milliLiter(s) Oral every 4 hours PRN Dyspepsia  morphine  - Injectable 4 milliGRAM(s) IV Push once PRN severe breakthrough pain while off PCA  morphine PCA (5 mG/mL) Rescue Clinician Bolus 4 milliGRAM(s) IV Push every 15 minutes PRN Breakthrough severe pain  naloxone Injectable 0.1 milliGRAM(s) IV Push every 3 minutes PRN For ANY of the following changes in patient status:  A. RR LESS THAN 10 breaths per minute, B. Oxygen saturation LESS THAN 90%, C. Sedation score of 6      ITEMS UNCHECKED ARE NOT PRESENT    PRESENT SYMPTOMS: [ ]Unable to self-report - see  CPOT, PAINADS, RDOS below  Source if other than patient:  [ ]Family   [ ]Team     Pain: [x]yes [ ]no  QOL impact - Limits mobility  Location - Lower back  Aggravating factors - Movement  Quality - Sharp  Radiation - non-radiating  Timing- Intermittent  Severity (0-10 scale): 8  Minimal acceptable level (0-10 scale): 0      PCSSQ[Palliative Care Spiritual Screening Question]   Severity (0-10):  Score of 4 or > indicate consideration of Chaplaincy referral.  Chaplaincy Referral: [ ] yes [ ] refused [ ] following    Caregiver [place]? : [ ] yes [ ] no  [x ] deferred:  Social work referral [ ] Patient & Family Centered Care Referral [ ]     Anticipatory Grief present?:  [ ] yes [ ] no  [x ] deferred: Social work referral [ ] Patient & Family Centered Care Referral [ ]      Other Symptoms:  [x ]All other review of systems negative     PHYSICAL EXAM:  Vital Signs Last 24 Hrs  T(C): 36.4 (18 Oct 2024 10:37), Max: 37.4 (18 Oct 2024 01:48)  T(F): 97.6 (18 Oct 2024 10:37), Max: 99.3 (18 Oct 2024 01:48)  HR: 111 (18 Oct 2024 10:37) (111 - 127)  BP: 105/69 (18 Oct 2024 10:37) (105/69 - 142/92)  BP(mean): --  RR: 18 (18 Oct 2024 10:37) (15 - 19)  SpO2: 93% (18 Oct 2024 10:37) (90% - 98%)    Parameters below as of 18 Oct 2024 10:37  Patient On (Oxygen Delivery Method): nasal cannula     I&O's Summary    17 Oct 2024 07:01  -  18 Oct 2024 07:00  --------------------------------------------------------  IN: 650 mL / OUT: 650 mL / NET: 0 mL       GENERAL: [ ]Cachexia    [x ]Alert  [ ]Oriented x   [ ]Lethargic  [ ]Unarousable  [x ]Verbal  [ ]Non-Verbal  Behavioral:   [ ]Anxiety  [ ]Delirium [ ]Agitation [ ]Other  HEENT:  [x ]Normal   [ ]Dry mouth   [ ]ET Tube/Trach  [ ]Oral lesions  PULMONARY:   [x ]Clear [ ]Tachypnea  [ ]Audible excessive secretions   [ ]Rhonchi        [ ]Right [ ]Left [ ]Bilateral  [ ]Crackles        [ ]Right [ ]Left [ ]Bilateral  [ ]Wheezing     [ ]Right [ ]Left [ ]Bilateral  [ ]Diminished BS [ ] Right [ ]Left [ ]Bilateral  CARDIOVASCULAR:    [ ]Regular [ ]Irregular [x ]Tachy  [ ]Rodrick [ ]Murmur [ ]Other  GASTROINTESTINAL:  [x ]Soft  [ ]Distended   [x ]+BS  [ ]Non tender [ ]Tender  [ ]Other [ ]PEG [ ]OGT/ NGT   Last BM:   GENITOURINARY:  [x ]Normal [ ]Incontinent   [ ]Oliguria/Anuria   [ ]Farah  MUSCULOSKELETAL:   [x ]Normal   [ ]Weakness  [ ]Bed/Wheelchair bound [ ]Edema  NEUROLOGIC:   [ x]No focal deficits  [ ] Cognitive impairment  [ ] Dysphagia [ ]Dysarthria [ ] Paresis [ ]Other   SKIN:   [ ]Normal  [ ]Rash  [ ]Other  [ ]Pressure ulcer(s) [ ]y [ ]n present on admission    CRITICAL CARE:  [ ]Shock Present  [ ]Septic [ ]Cardiogenic [ ]Neurologic [ ]Hypovolemic  [ ]Vasopressors [ ]Inotropes  [ ]Respiratory failure present [ ]Mechanical Ventilation [ ]Non-invasive ventilatory support [ ]High-Flow   [ ]Acute  [ ]Chronic [ ]Hypoxic  [ ]Hypercarbic [ ]Other  [ ]Other organ failure     LABS:                        12.3   0.23  )-----------( 45       ( 18 Oct 2024 06:20 )             35.5   10-18    144  |  110[H]  |  30[H]  ----------------------------<  166[H]  4.5   |  21[L]  |  0.76    Ca    8.0[L]      18 Oct 2024 06:20  Phos  2.5     10-18  Mg     2.40     10-18    TPro  5.2[L]  /  Alb  3.0[L]  /  TBili  1.3[H]  /  DBili  x   /  AST  52[H]  /  ALT  10  /  AlkPhos  53  10-17  PT/INR - ( 17 Oct 2024 22:45 )   PT: 20.6 sec;   INR: 1.74 ratio         PTT - ( 17 Oct 2024 22:45 )  PTT:38.3 sec    Urinalysis Basic - ( 18 Oct 2024 06:20 )    Color: x / Appearance: x / SG: x / pH: x  Gluc: 166 mg/dL / Ketone: x  / Bili: x / Urobili: x   Blood: x / Protein: x / Nitrite: x   Leuk Esterase: x / RBC: x / WBC x   Sq Epi: x / Non Sq Epi: x / Bacteria: x      RADIOLOGY & ADDITIONAL STUDIES:    Protein Calorie Malnutrition Present: [ ]mild [ ]moderate [ ]severe [ ]underweight [ ]morbid obesity  https://www.andeal.org/vault/2440/web/files/ONC/Table_Clinical%20Characteristics%20to%20Document%20Malnutrition-White%20JV%20et%20al%202012.pdf    Height (cm): 172.7 (10-12-24 @ 09:21)  Weight (kg): 69.4 (10-12-24 @ 09:21)  BMI (kg/m2): 23.3 (10-12-24 @ 09:21)    [ ]PPSV2 < or = 30%  [ ]significant weight loss [ ]poor nutritional intake [ ]anasarca[ ]Artificial Nutrition    Other REFERRALS:  [ ]Hospice  [ ]Child Life  [ ]Social Work  [ ]Case management [ ]Holistic Therapy     Palliative Performance Scale:  http://npcrc.org/files/news/palliative_performance_scale_ppsv2.pdf  (Ctrl +  left click to view)  Respiratory Distress Observation Tool:  https://homecareinformation.net/handouts/hen/Respiratory_Distress_Observation_Scale.pdf (Ctrl +  left click to view)  PAINAD Score:  http://geriatrictoolkit.[facility]/cog/painad.pdf (Ctrl +  left click to view)       SUBJECTIVE AND OBJECTIVE:  Indication for Geriatrics and Palliative Care Services/INTERVAL HPI:  Pt seen and examined at bedside, patient appears more comfortable today but has eyes closed most of encounter. Able to answer some questions appropriately.  Family at bedside this morning, including wife and two sons. States he appears better today than prev days. However they did note that patient saw some objects in the evening that were not there. Family concerned that patient has to have bowel movements in bed, happened this morning, asking if there is alternative.    OVERNIGHT EVENTS:  CTA negative for pulmonary embolism  V/S still sig for tachycardia and req 5L NC  Review of PCA pump from 10AM-10AM: pt used 22 injections on 22 attempts: Morphine 5mg/mL: DD 4mg, LO 30 min, CR 1mg/hr, 4hr LO 32mg, CAB 4mg.    DNR on chart:  Allergies    No Known Allergies    Intolerances    MEDICATIONS  (STANDING):  abiraterone 500 mg tablet 2 Tablet(s) 2 Tablet(s) Oral daily  atorvastatin 20 milliGRAM(s) Oral at bedtime  chlorhexidine 2% Cloths 1 Application(s) Topical daily  dexAMETHasone  IVPB 8 milliGRAM(s) IV Intermittent two times a day  enoxaparin Injectable 40 milliGRAM(s) SubCutaneous every 24 hours  fenofibrate Tablet 145 milliGRAM(s) Oral daily  filgrastim-sndz (ZARXIO) Injectable 480 MICROGram(s) SubCutaneous daily  lidocaine   4% Patch 1 Patch Transdermal every 24 hours  lisinopril 40 milliGRAM(s) Oral daily  morphine PCA (5 mG/mL) 30 milliLiter(s) PCA Continuous PCA Continuous  ondansetron Injectable 4 milliGRAM(s) IV Push every 8 hours  pantoprazole  Injectable 40 milliGRAM(s) IV Push daily  polyethylene glycol 3350 17 Gram(s) Oral daily  sodium chloride 0.9%. 1000 milliLiter(s) (100 mL/Hr) IV Continuous <Continuous>    MEDICATIONS  (PRN):  acetaminophen     Tablet .. 650 milliGRAM(s) Oral every 6 hours PRN Temp greater or equal to 38C (100.4F), Mild Pain (1 - 3), Moderate Pain (4 - 6)  aluminum hydroxide/magnesium hydroxide/simethicone Suspension 30 milliLiter(s) Oral every 4 hours PRN Dyspepsia  morphine  - Injectable 4 milliGRAM(s) IV Push once PRN severe breakthrough pain while off PCA  morphine PCA (5 mG/mL) Rescue Clinician Bolus 4 milliGRAM(s) IV Push every 15 minutes PRN Breakthrough severe pain  naloxone Injectable 0.1 milliGRAM(s) IV Push every 3 minutes PRN For ANY of the following changes in patient status:  A. RR LESS THAN 10 breaths per minute, B. Oxygen saturation LESS THAN 90%, C. Sedation score of 6      ITEMS UNCHECKED ARE NOT PRESENT    PRESENT SYMPTOMS: [ ]Unable to self-report - see  CPOT, PAINADS, RDOS below  Source if other than patient:  [ ]Family   [ ]Team     Pain: [x]yes [ ]no  QOL impact - Limits mobility  Location - Lower back  Aggravating factors - Movement  Quality - Sharp  Radiation - non-radiating  Timing- Intermittent  Severity (0-10 scale): 8  Minimal acceptable level (0-10 scale): 0      PCSSQ[Palliative Care Spiritual Screening Question]   Severity (0-10):  Score of 4 or > indicate consideration of Chaplaincy referral.  Chaplaincy Referral: [ ] yes [ ] refused [ ] following    Caregiver [place]? : [ ] yes [ ] no  [x ] deferred:  Social work referral [ ] Patient & Family Centered Care Referral [ ]     Anticipatory Grief present?:  [ ] yes [ ] no  [x ] deferred: Social work referral [ ] Patient & Family Centered Care Referral [ ]      Other Symptoms:  [x ]All other review of systems negative     PHYSICAL EXAM:  Vital Signs Last 24 Hrs  T(C): 36.4 (18 Oct 2024 10:37), Max: 37.4 (18 Oct 2024 01:48)  T(F): 97.6 (18 Oct 2024 10:37), Max: 99.3 (18 Oct 2024 01:48)  HR: 111 (18 Oct 2024 10:37) (111 - 127)  BP: 105/69 (18 Oct 2024 10:37) (105/69 - 142/92)  BP(mean): --  RR: 18 (18 Oct 2024 10:37) (15 - 19)  SpO2: 93% (18 Oct 2024 10:37) (90% - 98%)    Parameters below as of 18 Oct 2024 10:37  Patient On (Oxygen Delivery Method): nasal cannula     I&O's Summary    17 Oct 2024 07:01  -  18 Oct 2024 07:00  --------------------------------------------------------  IN: 650 mL / OUT: 650 mL / NET: 0 mL       GENERAL: [ ]Cachexia    [x ]Alert  [ ]Oriented x   [ ]Lethargic  [ ]Unarousable  [x ]Verbal  [ ]Non-Verbal  Behavioral:   [ ]Anxiety  [ ]Delirium [ ]Agitation [ ]Other  HEENT:  [x ]Normal   [ ]Dry mouth   [ ]ET Tube/Trach  [ ]Oral lesions  PULMONARY:   [x ]Clear [ ]Tachypnea  [ ]Audible excessive secretions   [ ]Rhonchi        [ ]Right [ ]Left [ ]Bilateral  [ ]Crackles        [ ]Right [ ]Left [ ]Bilateral  [ ]Wheezing     [ ]Right [ ]Left [ ]Bilateral  [ ]Diminished BS [ ] Right [ ]Left [ ]Bilateral  CARDIOVASCULAR:    [ ]Regular [ ]Irregular [x ]Tachy  [ ]Rodrick [ ]Murmur [ ]Other  GASTROINTESTINAL:  [x ]Soft  [ ]Distended   [x ]+BS  [ ]Non tender [ ]Tender  [ ]Other [ ]PEG [ ]OGT/ NGT   Last BM: 10/18  GENITOURINARY:  [x ]Normal [ ]Incontinent   [ ]Oliguria/Anuria   [ ]Farah  MUSCULOSKELETAL:   [x ]Normal   [ ]Weakness  [ ]Bed/Wheelchair bound [ ]Edema  NEUROLOGIC:   [ x]No focal deficits  [ ] Cognitive impairment  [ ] Dysphagia [ ]Dysarthria [ ] Paresis [ ]Other   SKIN:   [ ]Normal  [ ]Rash  [ ]Other  [ ]Pressure ulcer(s) [ ]y [ ]n present on admission    CRITICAL CARE:  [ ]Shock Present  [ ]Septic [ ]Cardiogenic [ ]Neurologic [ ]Hypovolemic  [ ]Vasopressors [ ]Inotropes  [ ]Respiratory failure present [ ]Mechanical Ventilation [ ]Non-invasive ventilatory support [ ]High-Flow   [ ]Acute  [ ]Chronic [ ]Hypoxic  [ ]Hypercarbic [ ]Other  [ ]Other organ failure     LABS:                        12.3   0.23  )-----------( 45       ( 18 Oct 2024 06:20 )             35.5   10-18    144  |  110[H]  |  30[H]  ----------------------------<  166[H]  4.5   |  21[L]  |  0.76    Ca    8.0[L]      18 Oct 2024 06:20  Phos  2.5     10-18  Mg     2.40     10-18    TPro  5.2[L]  /  Alb  3.0[L]  /  TBili  1.3[H]  /  DBili  x   /  AST  52[H]  /  ALT  10  /  AlkPhos  53  10-17  PT/INR - ( 17 Oct 2024 22:45 )   PT: 20.6 sec;   INR: 1.74 ratio         PTT - ( 17 Oct 2024 22:45 )  PTT:38.3 sec    Urinalysis Basic - ( 18 Oct 2024 06:20 )    Color: x / Appearance: x / SG: x / pH: x  Gluc: 166 mg/dL / Ketone: x  / Bili: x / Urobili: x   Blood: x / Protein: x / Nitrite: x   Leuk Esterase: x / RBC: x / WBC x   Sq Epi: x / Non Sq Epi: x / Bacteria: x      RADIOLOGY & ADDITIONAL STUDIES: < from: CT Angio Chest PE Protocol w/ IV Cont (10.18.24 @ 03:06) >  IMPRESSION:    No pulmonary embolism to the level of the segmental branches bilaterally.   Limited evaluation of the subsegmental branches secondary to incomplete   contrast opacification.    Multilevel vertebral body lytic lesions and loss of vertebral body   height, better evaluated on CT thoracic spine 10/17/2024.    < end of copied text >      Protein Calorie Malnutrition Present: [ ]mild [ ]moderate [ ]severe [ ]underweight [ ]morbid obesity  https://www.andeal.org/vault/2440/web/files/ONC/Table_Clinical%20Characteristics%20to%20Document%20Malnutrition-White%20JV%20et%20al%202012.pdf    Height (cm): 172.7 (10-12-24 @ 09:21)  Weight (kg): 69.4 (10-12-24 @ 09:21)  BMI (kg/m2): 23.3 (10-12-24 @ 09:21)    [ ]PPSV2 < or = 30%  [ ]significant weight loss [ ]poor nutritional intake [ ]anasarca[ ]Artificial Nutrition    Other REFERRALS:  [ ]Hospice  [ ]Child Life  [ ]Social Work  [ ]Case management [ ]Holistic Therapy     Palliative Performance Scale:  http://npcrc.org/files/news/palliative_performance_scale_ppsv2.pdf  (Ctrl +  left click to view)  Respiratory Distress Observation Tool:  https://NetScalercareinformation.net/handouts/hen/Respiratory_Distress_Observation_Scale.pdf (Ctrl +  left click to view)  PAINAD Score:  http://geriatrictoolkit.[facility]/cog/painad.pdf (Ctrl +  left click to view)

## 2024-10-19 LAB
ALBUMIN SERPL ELPH-MCNC: 2.2 G/DL — LOW (ref 3.3–5)
ALP SERPL-CCNC: 52 U/L — SIGNIFICANT CHANGE UP (ref 40–120)
ALT FLD-CCNC: 11 U/L — SIGNIFICANT CHANGE UP (ref 4–41)
ANION GAP SERPL CALC-SCNC: 14 MMOL/L — SIGNIFICANT CHANGE UP (ref 7–14)
ANION GAP SERPL CALC-SCNC: 14 MMOL/L — SIGNIFICANT CHANGE UP (ref 7–14)
ANISOCYTOSIS BLD QL: SLIGHT — SIGNIFICANT CHANGE UP
APPEARANCE UR: ABNORMAL
APTT BLD: 47.8 SEC — HIGH (ref 24.5–35.6)
APTT BLD: 51.2 SEC — HIGH (ref 24.5–35.6)
AST SERPL-CCNC: 63 U/L — HIGH (ref 4–40)
BASOPHILS # BLD AUTO: 0 K/UL — SIGNIFICANT CHANGE UP (ref 0–0.2)
BASOPHILS # BLD AUTO: 0.01 K/UL — SIGNIFICANT CHANGE UP (ref 0–0.2)
BASOPHILS NFR BLD AUTO: 0 % — SIGNIFICANT CHANGE UP (ref 0–2)
BASOPHILS NFR BLD AUTO: 0.8 % — SIGNIFICANT CHANGE UP (ref 0–2)
BILIRUB SERPL-MCNC: 2.2 MG/DL — HIGH (ref 0.2–1.2)
BILIRUB UR-MCNC: ABNORMAL
BLD GP AB SCN SERPL QL: NEGATIVE — SIGNIFICANT CHANGE UP
BUN SERPL-MCNC: 57 MG/DL — HIGH (ref 7–23)
BUN SERPL-MCNC: 57 MG/DL — HIGH (ref 7–23)
C DIFF GDH STL QL: NEGATIVE — SIGNIFICANT CHANGE UP
C DIFF GDH STL QL: SIGNIFICANT CHANGE UP
CALCIUM SERPL-MCNC: 7.7 MG/DL — LOW (ref 8.4–10.5)
CALCIUM SERPL-MCNC: 7.7 MG/DL — LOW (ref 8.4–10.5)
CHLORIDE SERPL-SCNC: 115 MMOL/L — HIGH (ref 98–107)
CHLORIDE SERPL-SCNC: 115 MMOL/L — HIGH (ref 98–107)
CO2 SERPL-SCNC: 18 MMOL/L — LOW (ref 22–31)
CO2 SERPL-SCNC: 18 MMOL/L — LOW (ref 22–31)
COLOR SPEC: ABNORMAL
CREAT SERPL-MCNC: 1.15 MG/DL — SIGNIFICANT CHANGE UP (ref 0.5–1.3)
CREAT SERPL-MCNC: 1.15 MG/DL — SIGNIFICANT CHANGE UP (ref 0.5–1.3)
DACRYOCYTES BLD QL SMEAR: SLIGHT — SIGNIFICANT CHANGE UP
DIFF PNL FLD: ABNORMAL
E COLI DNA BLD POS QL NAA+NON-PROBE: SIGNIFICANT CHANGE UP
EGFR: 71 ML/MIN/1.73M2 — SIGNIFICANT CHANGE UP
EGFR: 71 ML/MIN/1.73M2 — SIGNIFICANT CHANGE UP
EOSINOPHIL # BLD AUTO: 0 K/UL — SIGNIFICANT CHANGE UP (ref 0–0.5)
EOSINOPHIL # BLD AUTO: 0 K/UL — SIGNIFICANT CHANGE UP (ref 0–0.5)
EOSINOPHIL NFR BLD AUTO: 0 % — SIGNIFICANT CHANGE UP (ref 0–6)
EOSINOPHIL NFR BLD AUTO: 0 % — SIGNIFICANT CHANGE UP (ref 0–6)
FLUAV AG NPH QL: SIGNIFICANT CHANGE UP
FLUBV AG NPH QL: SIGNIFICANT CHANGE UP
GAS PNL BLDV: SIGNIFICANT CHANGE UP
GIANT PLATELETS BLD QL SMEAR: PRESENT — SIGNIFICANT CHANGE UP
GLUCOSE SERPL-MCNC: 114 MG/DL — HIGH (ref 70–99)
GLUCOSE SERPL-MCNC: 114 MG/DL — HIGH (ref 70–99)
GLUCOSE UR QL: NEGATIVE MG/DL — SIGNIFICANT CHANGE UP
GRAM STN FLD: ABNORMAL
HAEM INFLU DNA BLD POS QL NAA+NON-PROBE: SIGNIFICANT CHANGE UP
HCT VFR BLD CALC: 28.3 % — LOW (ref 39–50)
HCT VFR BLD CALC: 29.3 % — LOW (ref 39–50)
HCT VFR BLD CALC: 31.7 % — LOW (ref 39–50)
HCT VFR BLD CALC: 31.7 % — LOW (ref 39–50)
HGB BLD-MCNC: 10.2 G/DL — LOW (ref 13–17)
HGB BLD-MCNC: 11 G/DL — LOW (ref 13–17)
HGB BLD-MCNC: 11 G/DL — LOW (ref 13–17)
HGB BLD-MCNC: 9.6 G/DL — LOW (ref 13–17)
IANC: 0.33 K/UL — LOW (ref 1.8–7.4)
IANC: 0.92 K/UL — LOW (ref 1.8–7.4)
IMM GRANULOCYTES NFR BLD AUTO: 1.7 % — HIGH (ref 0–0.9)
INR BLD: 2.61 RATIO — HIGH (ref 0.85–1.16)
INR BLD: 2.66 RATIO — HIGH (ref 0.85–1.16)
KETONES UR-MCNC: NEGATIVE MG/DL — SIGNIFICANT CHANGE UP
LACTATE BLDV-MCNC: 2.1 MMOL/L — HIGH (ref 0.5–2)
LACTATE SERPL-SCNC: 1.9 MMOL/L — SIGNIFICANT CHANGE UP (ref 0.5–2)
LACTATE SERPL-SCNC: 2.5 MMOL/L — HIGH (ref 0.5–2)
LEUKOCYTE ESTERASE UR-ACNC: ABNORMAL
LYMPHOCYTES # BLD AUTO: 0.14 K/UL — LOW (ref 1–3.3)
LYMPHOCYTES # BLD AUTO: 0.16 K/UL — LOW (ref 1–3.3)
LYMPHOCYTES # BLD AUTO: 11.9 % — LOW (ref 13–44)
LYMPHOCYTES # BLD AUTO: 29.5 % — SIGNIFICANT CHANGE UP (ref 13–44)
MACROCYTES BLD QL: SLIGHT — SIGNIFICANT CHANGE UP
MAGNESIUM SERPL-MCNC: 2.5 MG/DL — SIGNIFICANT CHANGE UP (ref 1.6–2.6)
MANUAL SMEAR VERIFICATION: SIGNIFICANT CHANGE UP
MCHC RBC-ENTMCNC: 30.5 PG — SIGNIFICANT CHANGE UP (ref 27–34)
MCHC RBC-ENTMCNC: 30.8 PG — SIGNIFICANT CHANGE UP (ref 27–34)
MCHC RBC-ENTMCNC: 30.8 PG — SIGNIFICANT CHANGE UP (ref 27–34)
MCHC RBC-ENTMCNC: 31.3 PG — SIGNIFICANT CHANGE UP (ref 27–34)
MCHC RBC-ENTMCNC: 33.9 GM/DL — SIGNIFICANT CHANGE UP (ref 32–36)
MCHC RBC-ENTMCNC: 34.7 GM/DL — SIGNIFICANT CHANGE UP (ref 32–36)
MCHC RBC-ENTMCNC: 34.7 GM/DL — SIGNIFICANT CHANGE UP (ref 32–36)
MCHC RBC-ENTMCNC: 34.8 GM/DL — SIGNIFICANT CHANGE UP (ref 32–36)
MCV RBC AUTO: 88.8 FL — SIGNIFICANT CHANGE UP (ref 80–100)
MCV RBC AUTO: 88.8 FL — SIGNIFICANT CHANGE UP (ref 80–100)
MCV RBC AUTO: 89.8 FL — SIGNIFICANT CHANGE UP (ref 80–100)
MCV RBC AUTO: 89.9 FL — SIGNIFICANT CHANGE UP (ref 80–100)
METHOD TYPE: SIGNIFICANT CHANGE UP
MONOCYTES # BLD AUTO: 0.09 K/UL — SIGNIFICANT CHANGE UP (ref 0–0.9)
MONOCYTES # BLD AUTO: 0.25 K/UL — SIGNIFICANT CHANGE UP (ref 0–0.9)
MONOCYTES NFR BLD AUTO: 45.5 % — HIGH (ref 2–14)
MONOCYTES NFR BLD AUTO: 7.6 % — SIGNIFICANT CHANGE UP (ref 2–14)
MYELOCYTES NFR BLD: 4.5 % — HIGH (ref 0–0)
NEUTROPHILS # BLD AUTO: 0.09 K/UL — LOW (ref 1.8–7.4)
NEUTROPHILS # BLD AUTO: 0.92 K/UL — LOW (ref 1.8–7.4)
NEUTROPHILS NFR BLD AUTO: 78 % — HIGH (ref 43–77)
NEUTROPHILS NFR BLD AUTO: 9.1 % — LOW (ref 43–77)
NEUTS BAND # BLD: 6.8 % — HIGH (ref 0–6)
NITRITE UR-MCNC: POSITIVE
NRBC # BLD: 1 /100 WBCS — HIGH (ref 0–0)
NRBC # BLD: 2 /100 WBCS — HIGH (ref 0–0)
NRBC # FLD: 0.02 K/UL — HIGH (ref 0–0)
NRBC # FLD: 0.03 K/UL — HIGH (ref 0–0)
OVALOCYTES BLD QL SMEAR: SLIGHT — SIGNIFICANT CHANGE UP
PH UR: 5.5 — SIGNIFICANT CHANGE UP (ref 5–8)
PHOSPHATE SERPL-MCNC: 3.3 MG/DL — SIGNIFICANT CHANGE UP (ref 2.5–4.5)
PLAT MORPH BLD: NORMAL — SIGNIFICANT CHANGE UP
PLATELET # BLD AUTO: 25 K/UL — LOW (ref 150–400)
PLATELET # BLD AUTO: 32 K/UL — LOW (ref 150–400)
PLATELET # BLD AUTO: 32 K/UL — LOW (ref 150–400)
PLATELET COUNT - ESTIMATE: ABNORMAL
POIKILOCYTOSIS BLD QL AUTO: SLIGHT — SIGNIFICANT CHANGE UP
POLYCHROMASIA BLD QL SMEAR: SLIGHT — SIGNIFICANT CHANGE UP
POTASSIUM SERPL-MCNC: 4.5 MMOL/L — SIGNIFICANT CHANGE UP (ref 3.5–5.3)
POTASSIUM SERPL-MCNC: 4.5 MMOL/L — SIGNIFICANT CHANGE UP (ref 3.5–5.3)
POTASSIUM SERPL-SCNC: 4.5 MMOL/L — SIGNIFICANT CHANGE UP (ref 3.5–5.3)
POTASSIUM SERPL-SCNC: 4.5 MMOL/L — SIGNIFICANT CHANGE UP (ref 3.5–5.3)
PROCALCITONIN SERPL-MCNC: 14.25 NG/ML — HIGH (ref 0.02–0.1)
PROT SERPL-MCNC: 4.7 G/DL — LOW (ref 6–8.3)
PROT UR-MCNC: 100 MG/DL
PROTHROM AB SERPL-ACNC: 30.8 SEC — HIGH (ref 9.9–13.4)
PROTHROM AB SERPL-ACNC: 31.4 SEC — HIGH (ref 9.9–13.4)
RBC # BLD: 3.15 M/UL — LOW (ref 4.2–5.8)
RBC # BLD: 3.26 M/UL — LOW (ref 4.2–5.8)
RBC # BLD: 3.57 M/UL — LOW (ref 4.2–5.8)
RBC # BLD: 3.57 M/UL — LOW (ref 4.2–5.8)
RBC # FLD: 14.9 % — HIGH (ref 10.3–14.5)
RBC # FLD: 14.9 % — HIGH (ref 10.3–14.5)
RBC # FLD: 15.3 % — HIGH (ref 10.3–14.5)
RBC # FLD: 16.1 % — HIGH (ref 10.3–14.5)
RBC BLD AUTO: ABNORMAL
RH IG SCN BLD-IMP: POSITIVE — SIGNIFICANT CHANGE UP
RSV RNA NPH QL NAA+NON-PROBE: SIGNIFICANT CHANGE UP
SARS-COV-2 RNA SPEC QL NAA+PROBE: SIGNIFICANT CHANGE UP
SMUDGE CELLS # BLD: PRESENT — SIGNIFICANT CHANGE UP
SODIUM SERPL-SCNC: 147 MMOL/L — HIGH (ref 135–145)
SODIUM SERPL-SCNC: 147 MMOL/L — HIGH (ref 135–145)
SP GR SPEC: 1.06 — HIGH (ref 1–1.03)
SPECIMEN SOURCE: SIGNIFICANT CHANGE UP
SPECIMEN SOURCE: SIGNIFICANT CHANGE UP
UROBILINOGEN FLD QL: 2 MG/DL (ref 0.2–1)
VARIANT LYMPHS # BLD: 4.6 % — SIGNIFICANT CHANGE UP (ref 0–6)
WBC # BLD: 0.54 K/UL — CRITICAL LOW (ref 3.8–10.5)
WBC # BLD: 0.54 K/UL — CRITICAL LOW (ref 3.8–10.5)
WBC # BLD: 1.18 K/UL — LOW (ref 3.8–10.5)
WBC # BLD: 2.29 K/UL — LOW (ref 3.8–10.5)
WBC # FLD AUTO: 0.54 K/UL — CRITICAL LOW (ref 3.8–10.5)
WBC # FLD AUTO: 0.54 K/UL — CRITICAL LOW (ref 3.8–10.5)
WBC # FLD AUTO: 1.18 K/UL — LOW (ref 3.8–10.5)
WBC # FLD AUTO: 2.29 K/UL — LOW (ref 3.8–10.5)

## 2024-10-19 PROCEDURE — 74174 CTA ABD&PLVS W/CONTRAST: CPT | Mod: 26

## 2024-10-19 PROCEDURE — 99223 1ST HOSP IP/OBS HIGH 75: CPT | Mod: GC

## 2024-10-19 PROCEDURE — 99233 SBSQ HOSP IP/OBS HIGH 50: CPT | Mod: GC

## 2024-10-19 RX ORDER — VANCOMYCIN HCL 900 MCG/MG
125 POWDER (GRAM) MISCELLANEOUS EVERY 6 HOURS
Refills: 0 | Status: DISCONTINUED | OUTPATIENT
Start: 2024-10-19 | End: 2024-10-19

## 2024-10-19 RX ORDER — PIPERACILLIN SODIUM AND TAZOBACTAM SODIUM 4; .5 G/20ML; G/20ML
3.38 INJECTION, POWDER, LYOPHILIZED, FOR SOLUTION INTRAVENOUS EVERY 8 HOURS
Refills: 0 | Status: DISCONTINUED | OUTPATIENT
Start: 2024-10-19 | End: 2024-10-25

## 2024-10-19 RX ORDER — METRONIDAZOLE 500 MG/1
500 TABLET ORAL EVERY 8 HOURS
Refills: 0 | Status: DISCONTINUED | OUTPATIENT
Start: 2024-10-19 | End: 2024-10-19

## 2024-10-19 RX ORDER — DEXAMETHASONE 1.5 MG/1
4 TABLET ORAL
Refills: 0 | Status: COMPLETED | OUTPATIENT
Start: 2024-10-19 | End: 2024-10-22

## 2024-10-19 RX ORDER — ACETAMINOPHEN 500MG 500 MG/1
1000 TABLET, COATED ORAL ONCE
Refills: 0 | Status: COMPLETED | OUTPATIENT
Start: 2024-10-19 | End: 2024-10-19

## 2024-10-19 RX ORDER — PIPERACILLIN SODIUM AND TAZOBACTAM SODIUM 4; .5 G/20ML; G/20ML
3.38 INJECTION, POWDER, LYOPHILIZED, FOR SOLUTION INTRAVENOUS ONCE
Refills: 0 | Status: COMPLETED | OUTPATIENT
Start: 2024-10-19 | End: 2024-10-19

## 2024-10-19 RX ORDER — VANCOMYCIN HCL 900 MCG/MG
1000 POWDER (GRAM) MISCELLANEOUS ONCE
Refills: 0 | Status: COMPLETED | OUTPATIENT
Start: 2024-10-19 | End: 2024-10-19

## 2024-10-19 RX ORDER — SODIUM CHLORIDE 9 MG/ML
1000 INJECTION, SOLUTION INTRAMUSCULAR; INTRAVENOUS; SUBCUTANEOUS ONCE
Refills: 0 | Status: COMPLETED | OUTPATIENT
Start: 2024-10-19 | End: 2024-10-19

## 2024-10-19 RX ADMIN — SODIUM CHLORIDE 1000 MILLILITER(S): 9 INJECTION, SOLUTION INTRAMUSCULAR; INTRAVENOUS; SUBCUTANEOUS at 15:10

## 2024-10-19 RX ADMIN — PIPERACILLIN SODIUM AND TAZOBACTAM SODIUM 200 GRAM(S): 4; .5 INJECTION, POWDER, LYOPHILIZED, FOR SOLUTION INTRAVENOUS at 05:41

## 2024-10-19 RX ADMIN — DEXAMETHASONE 4 MILLIGRAM(S): 1.5 TABLET ORAL at 17:37

## 2024-10-19 RX ADMIN — FILGRASTIM-AAFI 480 MICROGRAM(S): 300 INJECTION, SOLUTION SUBCUTANEOUS at 12:56

## 2024-10-19 RX ADMIN — CHLORHEXIDINE GLUCONATE 1 APPLICATION(S): 1.2 RINSE ORAL at 12:56

## 2024-10-19 RX ADMIN — ACETAMINOPHEN 500MG 400 MILLIGRAM(S): 500 TABLET, COATED ORAL at 04:50

## 2024-10-19 RX ADMIN — Medication 30 MILLILITER(S): at 20:38

## 2024-10-19 RX ADMIN — PIPERACILLIN SODIUM AND TAZOBACTAM SODIUM 25 GRAM(S): 4; .5 INJECTION, POWDER, LYOPHILIZED, FOR SOLUTION INTRAVENOUS at 06:00

## 2024-10-19 RX ADMIN — Medication 250 MILLIGRAM(S): at 05:43

## 2024-10-19 RX ADMIN — PANTOPRAZOLE SODIUM 40 MILLIGRAM(S): 40 TABLET, DELAYED RELEASE ORAL at 12:57

## 2024-10-19 RX ADMIN — SODIUM CHLORIDE 100 MILLILITER(S): 9 INJECTION, SOLUTION INTRAMUSCULAR; INTRAVENOUS; SUBCUTANEOUS at 16:47

## 2024-10-19 RX ADMIN — Medication 20 MILLIGRAM(S): at 05:41

## 2024-10-19 RX ADMIN — Medication 30 MILLILITER(S): at 08:20

## 2024-10-19 RX ADMIN — ENOXAPARIN SODIUM 40 MILLIGRAM(S): 30 INJECTION SUBCUTANEOUS at 16:46

## 2024-10-19 RX ADMIN — PIPERACILLIN SODIUM AND TAZOBACTAM SODIUM 25 GRAM(S): 4; .5 INJECTION, POWDER, LYOPHILIZED, FOR SOLUTION INTRAVENOUS at 20:39

## 2024-10-19 NOTE — CONSULT NOTE ADULT - SUBJECTIVE AND OBJECTIVE BOX
Surgery Consult Note  Attending: MD Ana  Service: Vascular surgery    HPI: 64-year-old male with metastatic prostate cancer, sent in by hematologist from [facility] for uncontrolled pain, nausea, vomiting. 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 Differential includes neutropenic enterocolitis vs. ischemic colitis. Vascular surgery consulted for SMA dissection.       PAST MEDICAL HISTORY:  PAST MEDICAL & SURGICAL HISTORY:  Benign essential HTN      HLD (hyperlipidemia)      CA of prostate      Basal cell carcinoma      History of transurethral prostatectomy      S/P inguinal hernia repair      History of basal cell carcinoma (BCC) excision          ALLERGIES:  Allergies    No Known Allergies    Intolerances        SOCIAL HISTORY: Negative for tobacco, etoh, or drug use    FAMILY HISTORY:  FAMILY HISTORY:  FH: HTN (hypertension) (Sibling)    PHYSICAL EXAM  GEN: No acute distress   CV: RRR, no JVD  LUNGS: Respirations unlabored, no use of accessory muscles  ABD: Abdomen soft, ND, tender to palpation diffusely   EXT: No peripheral edema. Regular range of motion.     VITAL SIGNS:  Vital Signs Last 24 Hrs  T(C): 36.8 (19 Oct 2024 10:42), Max: 38.9 (19 Oct 2024 04:20)  T(F): 98.2 (19 Oct 2024 10:42), Max: 102.1 (19 Oct 2024 04:20)  HR: 120 (19 Oct 2024 10:42) (118 - 136)  BP: 120/69 (19 Oct 2024 10:42) (98/66 - 120/69)  BP(mean): --  RR: 18 (19 Oct 2024 10:42) (17 - 19)  SpO2: 96% (19 Oct 2024 10:42) (91% - 96%)    Parameters below as of 19 Oct 2024 10:42  Patient On (Oxygen Delivery Method): nasal cannula        I&O's Summary    18 Oct 2024 07:01  -  19 Oct 2024 07:00  --------------------------------------------------------  IN: 200 mL / OUT: 200 mL / NET: 0 mL    19 Oct 2024 07:01  -  19 Oct 2024 13:53  --------------------------------------------------------  IN: 0 mL / OUT: 100 mL / NET: -100 mL        LABS:                        11.0   0.54  )-----------( 32       ( 19 Oct 2024 04:55 )             31.7     10-19    147[H]  |  115[H]  |  57[H]  ----------------------------<  114[H]  4.5   |  18[L]  |  1.15    Ca    7.7[L]      19 Oct 2024 05:31  Phos  3.3     10-19  Mg     2.50     10-19    TPro  4.7[L]  /  Alb  2.2[L]  /  TBili  2.2[H]  /  DBili  x   /  AST  63[H]  /  ALT  11  /  AlkPhos  52  10-19    PT/INR - ( 19 Oct 2024 04:55 )   PT: 31.4 sec;   INR: 2.66 ratio         PTT - ( 19 Oct 2024 04:55 )  PTT:47.8 sec  Urinalysis Basic - ( 19 Oct 2024 05:31 )    Color: x / Appearance: x / SG: x / pH: x  Gluc: 114 mg/dL / Ketone: x  / Bili: x / Urobili: x   Blood: x / Protein: x / Nitrite: x   Leuk Esterase: x / RBC: x / WBC x   Sq Epi: x / Non Sq Epi: x / Bacteria: x      CAPILLARY BLOOD GLUCOSE        LIVER FUNCTIONS - ( 19 Oct 2024 05:31 )  Alb: 2.2 g/dL / Pro: 4.7 g/dL / ALK PHOS: 52 U/L / ALT: 11 U/L / AST: 63 U/L / GGT: x             CULTURES:      RADIOLOGY & ADDITIONAL STUDIES:

## 2024-10-19 NOTE — DIETITIAN INITIAL EVALUATION ADULT - WEIGHT (LBS)
Pt left a message on the machine  He was in the ER was told to make an appt with Garfield Flores in 1 week  Pt waiting a call back [phone]  153

## 2024-10-19 NOTE — DIETITIAN INITIAL EVALUATION ADULT - PERTINENT MEDS FT
MEDICATIONS  (STANDING):  abiraterone 500 mg tablet 2 Tablet(s) 2 Tablet(s) Oral daily  atorvastatin 20 milliGRAM(s) Oral at bedtime  chlorhexidine 2% Cloths 1 Application(s) Topical daily  enoxaparin Injectable 40 milliGRAM(s) SubCutaneous every 24 hours  fenofibrate Tablet 145 milliGRAM(s) Oral daily  filgrastim-sndz (ZARXIO) Injectable 480 MICROGram(s) SubCutaneous daily  lactobacillus acidophilus 1 Tablet(s) Oral daily  lidocaine   4% Patch 1 Patch Transdermal every 24 hours  lisinopril 40 milliGRAM(s) Oral daily  morphine PCA (5 mG/mL) 30 milliLiter(s) PCA Continuous PCA Continuous  ondansetron Injectable 4 milliGRAM(s) IV Push every 8 hours  pantoprazole  Injectable 40 milliGRAM(s) IV Push daily  piperacillin/tazobactam IVPB.. 3.375 Gram(s) IV Intermittent every 8 hours  sodium chloride 0.9%. 1000 milliLiter(s) (100 mL/Hr) IV Continuous <Continuous>    MEDICATIONS  (PRN):  acetaminophen     Tablet .. 650 milliGRAM(s) Oral every 6 hours PRN Temp greater or equal to 38C (100.4F), Mild Pain (1 - 3), Moderate Pain (4 - 6)  aluminum hydroxide/magnesium hydroxide/simethicone Suspension 30 milliLiter(s) Oral every 4 hours PRN Dyspepsia  morphine  - Injectable 4 milliGRAM(s) IV Push once PRN severe breakthrough pain while off PCA  morphine PCA (5 mG/mL) Rescue Clinician Bolus 4 milliGRAM(s) IV Push every 15 minutes PRN Breakthrough severe pain  naloxone Injectable 0.1 milliGRAM(s) IV Push every 3 minutes PRN For ANY of the following changes in patient status:  A. RR LESS THAN 10 breaths per minute, B. Oxygen saturation LESS THAN 90%, C. Sedation score of 6  OLANZapine 1.25 milliGRAM(s) Oral every 6 hours PRN anxiety  polyethylene glycol 3350 17 Gram(s) Oral daily PRN Constipation

## 2024-10-19 NOTE — CHART NOTE - NSCHARTNOTEFT_GEN_A_CORE
In the event of newly developing, evolving, or worsening symptoms, please contact the Palliative Medicine team via pager (if the patient is at [facility] #[phone] or if the patient is at [facility] #[phone]) The Geriatric and Palliative Medicine service has coverage 24 hours a day/ 7 days a week to provide medical recommendations regarding symptom management needs via telephone. Chart reviewed.   Overnight chart note reviewed in which ACP was called overnight due to concerns for tachycardia and hypoxia.   PCA pump usage reviewed (6AM-6AM) - Injections : 6; Attempts- 9. CAB doses - 3x     Spoke with bedside RN Milad. Per discussion, pain is controlled with current PCA pump settings and patient although intermittently confused able to continue to understand how to use PCA pump.     Spoke with primary team MEJIA Hull. Per discussion, patient febrile this AM and CT Abdomen overnight completed indicating "Focal proximal SMA dissection, with findings concerning for ischemic colitis of the right colon. High density luminal contents in the cecum. Differential includes GI bleed versus ingested material."   Colorectal surgery was consulted for further recommendations.     > Continue IV Morphine PCA pump with the following settings: CR-1mg/hr, Demand dose 4mg, Lock Out- 30minutes. CAB Doses of 4mg.   > Rest of Multimodal pain management as per palliative care note from 10/18   > Narcan PRN   > Discussed with primary team and bedside RN that should patient become unable to participate with PCA pump due to altered mental status to page palliative care for further recommendations     In the event of newly developing, evolving, or worsening symptoms, please contact the Palliative Medicine team via pager (if the patient is at [facility] #[phone] or if the patient is at [facility] #[phone]) The Geriatric and Palliative Medicine service has coverage 24 hours a day/ 7 days a week to provide medical recommendations regarding symptom management needs via telephone. 81

## 2024-10-19 NOTE — CHART NOTE - NSCHARTNOTEFT_GEN_A_CORE
ACP MEDICINE COVERAGE - Medicine Subsequent Hospital Care Note    CC: Tachycardia and hypoxia, per RN  HPI/Subjective: 63 y/o male, with a PmHx of Metastatic Prostate Cancer (s/p radical prostatectomy), HLD, HTN, sent in by heme/onc for uncontrolled pain, nausea, vomiting. Received notification from RN for O2Sat 90% on 5L and tachycardia to 130s. Upon entering room, patient noted to be restless, rolling back and forth in bed. Patient with c/o pain. Morphine PCA pump noted at bedside, but PCA button not in reach for patient to administer morphine. PCA remote given to patient, patient administered Morphine. Patient's O2 Sat and tachycardia improved after administration of morphine. Patient with pancolitis on CT scan A/P obtained earlier in shift, official read pending. +diarrhea, negative for C. diff.     ROS: Unable to obtain due to restlessness.    Vital Signs  T(F): 102.1  HR: 132   BP: 101/56   RR: 22   SpO2: 92% on 5L NC     PHYSICAL EXAM:  Constitutional: Restless, writhing, rolling back and forth in bed.   HEENT: NC/AT. Normal conjunctiva, EOMI, bilaterally. FROM. No JVD.   Respiratory: Tachypneic 22. No accessory muscle use. CTA, bilaterally. No wheezes, rales or rhonchi.   Cardiovascular: Tachy rate 120s and regular rhythm. Clear S1 and S2. No murmurs, rubs or gallops, no edema, 2+ peripheral pulses  Gastrointestinal: Soft, diffusely tender, nondistended, +bowel sounds  Skin: warm, dry, no rash  Ext: No c/c/e of UEs or LEs, bilat.  Neurologic: Alert. Oriented x 3. No focal deficits.     MEDICATIONS  (STANDING):  abiraterone 500 mg tablet 2 Tablet(s) 2 Tablet(s) Oral daily  atorvastatin 20 milliGRAM(s) Oral at bedtime  chlorhexidine 2% Cloths 1 Application(s) Topical daily  enoxaparin Injectable 40 milliGRAM(s) SubCutaneous every 24 hours  fenofibrate Tablet 145 milliGRAM(s) Oral daily  filgrastim-sndz (ZARXIO) Injectable 480 MICROGram(s) SubCutaneous daily  lactobacillus acidophilus 1 Tablet(s) Oral daily  lidocaine   4% Patch 1 Patch Transdermal every 24 hours  lisinopril 40 milliGRAM(s) Oral daily  morphine PCA (5 mG/mL) 30 milliLiter(s) PCA Continuous PCA Continuous  ondansetron Injectable 4 milliGRAM(s) IV Push every 8 hours  pantoprazole  Injectable 40 milliGRAM(s) IV Push daily  piperacillin/tazobactam IVPB. 3.375 Gram(s) IV Intermittent once  piperacillin/tazobactam IVPB.. 3.375 Gram(s) IV Intermittent every 8 hours  sodium chloride 0.9%. 1000 milliLiter(s) (100 mL/Hr) IV Continuous <Continuous>  vancomycin  IVPB 1000 milliGRAM(s) IV Intermittent once    MEDICATIONS  (PRN):  acetaminophen     Tablet .. 650 milliGRAM(s) Oral every 6 hours PRN Temp greater or equal to 38C (100.4F), Mild Pain (1 - 3), Moderate Pain (4 - 6)  aluminum hydroxide/magnesium hydroxide/simethicone Suspension 30 milliLiter(s) Oral every 4 hours PRN Dyspepsia  morphine  - Injectable 4 milliGRAM(s) IV Push once PRN severe breakthrough pain while off PCA  morphine PCA (5 mG/mL) Rescue Clinician Bolus 4 milliGRAM(s) IV Push every 15 minutes PRN Breakthrough severe pain  naloxone Injectable 0.1 milliGRAM(s) IV Push every 3 minutes PRN For ANY of the following changes in patient status:  A. RR LESS THAN 10 breaths per minute, B. Oxygen saturation LESS THAN 90%, C. Sedation score of 6  OLANZapine 1.25 milliGRAM(s) Oral every 6 hours PRN anxiety  polyethylene glycol 3350 17 Gram(s) Oral daily PRN Constipation      CTA Chest - No PE. Bilateral atelectasis  CT A/P (based on GenSurg note): Pancolitis    ASSESSMENT/PLAN:  Sepsis likely 2/2 pancolitis  - IV Tylenol STAT  - Sepsis w/u - UA, UCx, Lactate, Procalcitonin, RVP, BCx x 2, CBC with diff, CMP, Coags  - Vanco 1gm x 1 & Zosyn q8h STAT - to start after cultures obtained  - Currently NPO, c/w IVF  - Strict Is and Os   - ID c/s in AM  - Will sign out to day team for follow up

## 2024-10-19 NOTE — CHART NOTE - NSCHARTNOTEFT_GEN_A_CORE
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 Vascular surgery's recommendation remains to start anticoagulation therapy, but per hematology, patient's thrombocytopenia is prohibitive. Surgical intervention would require anticoagulation therapy and post-operative anti-platelet therapy, which the patient is not a candidate for at this time.     Appreciate Colorectal Surgery and GI input. Recommend continuing to have GOC conversations. Patient's primary team member involved.     C Team  [id number]

## 2024-10-19 NOTE — DIETITIAN INITIAL EVALUATION ADULT - PERTINENT LABORATORY DATA
10-19    147[H]  |  115[H]  |  57[H]  ----------------------------<  114[H]  4.5   |  18[L]  |  1.15    Ca    7.7[L]      19 Oct 2024 05:31  Phos  3.3     10-19  Mg     2.50     10-19    TPro  4.7[L]  /  Alb  2.2[L]  /  TBili  2.2[H]  /  DBili  x   /  AST  63[H]  /  ALT  11  /  AlkPhos  52  10-19

## 2024-10-19 NOTE — CHART NOTE - NSCHARTNOTEFT_GEN_A_CORE
This is 65 y/o male with pmh  history of localized prostate cancer treated with radical prostatectomy, now recently found to have recurrence with extensive metastatic disease involving spine , bone, liver admitted for intractable cancer related pain ( on PCA pump)  started on chemotherapy by [facility]. Patient with leukopenia, ,thrombocytopenia , was seen in am by surgical team ( colorectal surgery), had  abdominal tenderness, , pt had fever overnight, possible likely 2/2 pancolitis, U/A, lactate RVP , CBC CMP coags , procalcitonin was sent , pt NPO on IV fluids , Vanco , Zosyn started, pt was taken for A/ P CT with IV contrast - Final CT read with proximal SMA dissection and possible ischemic changes to the right colon, vascular sx , GI , Attending dr. Valle made aware, platelets - 32, as per Vascular recommendations to start pt's specific heparin gtt he/ Onc , Dr. Trotter was not in favor of starting heparin gtt, dex 4 mg IV BID reordered, , contacted Vascular sx again, heparin gtt pt specific on hold ( PTT- 98)  ,CTA abdomen / pelvis with IV contrast to assess for SMA dissection ordered, blood , T and S , coags draw before CT , endorse to night ACP to f/u on results .    Attending requested MICU consult ( IVF,  Incentive spirometry , pain regimen, Management of potential SMA dissection per vascular surgery team, Management of anti-coagulation per primary team/[facility] heme/onc, Management of ischemic colitis per primary team and surgical team)  Patient meantime had ID consult ( to continue Zosyn , send GI PCR and send Blood cx) pt on 4 L NC with tachycardia, at 14: 39 needed NS 1L bolus for 93/61 BP , HR- 115,)Patient went for CTA , endorse to night ACP to f/u on results.     Vital Signs Last 24 Hrs  T(C): 36.9 (19 Oct 2024 20:58), Max: 38.9 (19 Oct 2024 04:20)  T(F): 98.4 (19 Oct 2024 20:58), Max: 102.1 (19 Oct 2024 04:20)  HR: 129 (19 Oct 2024 20:58) (119 - 136)  BP: 100/60 (19 Oct 2024 20:58) (93/61 - 120/69)  BP(mean): --  RR: 18 (19 Oct 2024 20:58) (17 - 19)  SpO2: 93% (19 Oct 2024 20:58) (91% - 96%)    Parameters below as of 19 Oct 2024 20:58  Patient On (Oxygen Delivery Method): nasal cannula      Della Cooney NP-C  Medicine Department   [facility]   [id number]

## 2024-10-19 NOTE — CONSULT NOTE ADULT - ASSESSMENT
64M with history of localized prostate cancer treated with radical prostatectomy, now recently found to have recurrence with extensive metastatic disease involving spine , bone, liver admitted for intractible cancer related pain started on chemotherapy by [facility]. Course c/b neutropenia, neutropenic vs ischemic colitis, and c/f SMA dissection pending CTA. 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 BP measured during examination 101/75  - Patient NPO w/ significant watery diarrhea per RN and has not been on maintenance IV fluids  - Would start maintenance IVF  - Incentive spirometry to assist with atelectasis NC requirement likely iso poor recruitment   - c/w Pain regimen per palliative/primary teams  - Management of potential SMA dissection per vascular surgery team. f/u CTA  - [facility]  - Management of ischemic colitis per primary team and surgical team   - Patient has no indications for MICU admission at this time. Re-consult if there is a change in clinical status.     - Alba Gasca, PGY-3 64M with history of localized prostate cancer treated with radical prostatectomy, now recently found to have recurrence with extensive metastatic disease involving spine , bone, liver admitted for intractible cancer related pain started on chemotherapy by [facility]. Course c/b neutropenia, neutropenic vs ischemic colitis, and c/f SMA dissection pending CTA. 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 BP measured during examination 101/75  - Patient NPO w/ significant watery diarrhea per RN and has not been on maintenance IV fluids  - Would start maintenance IVF  - Incentive spirometry to assist with atelectasis NC requirement likely iso poor recruitment   - c/w Pain regimen per palliative/primary teams  - Management of potential SMA dissection per vascular surgery team. f/u CTA  - [facility]  - Management of ischemic colitis per primary team and surgical team   - Ongoing GoC  - Patient has no indications for MICU admission at this time. Re-consult if there is a change in clinical status.     - Alba Gasca, PGY-3 4

## 2024-10-19 NOTE — CONSULT NOTE ADULT - ASSESSMENT
Patient is a 64 year old male with PMH of metastatic prostate cancer now on ADT (lupron) + abiraterone/prednisone + taxotere while inpatient,  sent in by hematologist for cancer related pain. Hypertensive on admissions. Labs notable for thrombocytopenia on admission and MRI L spine imaging showing multiple vertebral fractures and spinal mets.   Radiation oncology consulted with plan for kyphoplasty on 10/22. Hospital course complicated by pancytopenia with neutropenia with intiation of chemotherapy. On 10/18, patient noted to have over 10 episodes of diarrhea. Infectious workup initiated with CT abdomen pelvis revealing "Focal proximal SMA dissection, with findings concerning for ischemic colitis of the right colon and Retroperitoneal adenopathy." C. diff PCR was negative. Colorectal surgery consulted - concern for neutropenic enterocolitis. Recommended GI consult and antifungal coverage. GI consulted-  Suspect presentation related to neutropenic enterocolitis and ischemic colitis 2/2 SMA dissection; in the absence of overt GI bleeding and relatively stable Hgb with severe neutropenia and SMA dissection, risk of diagnostic colonoscopy likely outweigh benefits. Recommended vascular surgery consult. Vacular surgery consulted- recommending heparin drip and CTA to better assess vasculature. Patient has been tachycardic and febrile with diffuse abdominal tenderness on exam.     Recommendations:  Patient is a 64 year old male with PMH of metastatic prostate cancer now on ADT (lupron) + abiraterone/prednisone + taxotere while inpatient,  sent in by hematologist for cancer related pain. Hypertensive on admissions. Labs notable for thrombocytopenia on admission and MRI L spine imaging showing multiple vertebral fractures and spinal mets. Radiation oncology consulted with plan for kyphoplasty on 10/22. Hospital course complicated by pancytopenia with neutropenia with intiation of chemotherapy. On 10/18, patient noted to have over 10 episodes of diarrhea. Infectious workup initiated with CT abdomen pelvis revealing "Focal proximal SMA dissection, with findings concerning for ischemic colitis of the right colon and Retroperitoneal adenopathy." C. diff PCR was negative. Colorectal surgery consulted - concern for neutropenic enterocolitis. Recommended GI consult and antifungal coverage. GI consulted-  Suspect presentation related to neutropenic enterocolitis and ischemic colitis 2/2 SMA dissection; in the absence of overt GI bleeding and relatively stable Hgb with severe neutropenia and SMA dissection, risk of diagnostic colonoscopy likely outweigh benefits. Recommended vascular surgery consult. Vacular surgery consulted- recommending heparin drip and CTA to better assess vasculature. Patient has been tachycardic and febrile with diffuse abdominal tenderness on exam.     #neutropenic fever   #ischemic colitis   -continue with Zosyn 3.375 gm IVPB q8h   -f/u blood cultures  -monitor WBC and fever curve    Case seen and discussed with Dr. Ruiz who agrees with assessment and plan. Note not final until attending addendum.

## 2024-10-19 NOTE — PROGRESS NOTE ADULT - SUBJECTIVE AND OBJECTIVE BOX
Date of Service: 10-19-24 @ 11:50    Patient is a 64y old  Male who presents with a chief complaint of Pain (19 Oct 2024 10:08)      Any change in ROS: looks tired and wants to sleep   on 4 L of oxygen : he is febrile too      MEDICATIONS  (STANDING):  abiraterone 500 mg tablet 2 Tablet(s) 2 Tablet(s) Oral daily  atorvastatin 20 milliGRAM(s) Oral at bedtime  chlorhexidine 2% Cloths 1 Application(s) Topical daily  enoxaparin Injectable 40 milliGRAM(s) SubCutaneous every 24 hours  fenofibrate Tablet 145 milliGRAM(s) Oral daily  filgrastim-sndz (ZARXIO) Injectable 480 MICROGram(s) SubCutaneous daily  lactobacillus acidophilus 1 Tablet(s) Oral daily  lidocaine   4% Patch 1 Patch Transdermal every 24 hours  lisinopril 40 milliGRAM(s) Oral daily  morphine PCA (5 mG/mL) 30 milliLiter(s) PCA Continuous PCA Continuous  ondansetron Injectable 4 milliGRAM(s) IV Push every 8 hours  pantoprazole  Injectable 40 milliGRAM(s) IV Push daily  piperacillin/tazobactam IVPB.. 3.375 Gram(s) IV Intermittent every 8 hours  sodium chloride 0.9%. 1000 milliLiter(s) (100 mL/Hr) IV Continuous <Continuous>    MEDICATIONS  (PRN):  acetaminophen     Tablet .. 650 milliGRAM(s) Oral every 6 hours PRN Temp greater or equal to 38C (100.4F), Mild Pain (1 - 3), Moderate Pain (4 - 6)  aluminum hydroxide/magnesium hydroxide/simethicone Suspension 30 milliLiter(s) Oral every 4 hours PRN Dyspepsia  morphine  - Injectable 4 milliGRAM(s) IV Push once PRN severe breakthrough pain while off PCA  morphine PCA (5 mG/mL) Rescue Clinician Bolus 4 milliGRAM(s) IV Push every 15 minutes PRN Breakthrough severe pain  naloxone Injectable 0.1 milliGRAM(s) IV Push every 3 minutes PRN For ANY of the following changes in patient status:  A. RR LESS THAN 10 breaths per minute, B. Oxygen saturation LESS THAN 90%, C. Sedation score of 6  OLANZapine 1.25 milliGRAM(s) Oral every 6 hours PRN anxiety  polyethylene glycol 3350 17 Gram(s) Oral daily PRN Constipation    Vital Signs Last 24 Hrs  T(C): 36.8 (19 Oct 2024 10:42), Max: 38.9 (19 Oct 2024 04:20)  T(F): 98.2 (19 Oct 2024 10:42), Max: 102.1 (19 Oct 2024 04:20)  HR: 120 (19 Oct 2024 10:42) (111 - 136)  BP: 120/69 (19 Oct 2024 10:42) (97/65 - 120/69)  BP(mean): --  RR: 18 (19 Oct 2024 10:42) (17 - 19)  SpO2: 96% (19 Oct 2024 10:42) (91% - 96%)    Parameters below as of 19 Oct 2024 10:42  Patient On (Oxygen Delivery Method): nasal cannula        I&O's Summary    18 Oct 2024 07:01  -  19 Oct 2024 07:00  --------------------------------------------------------  IN: 200 mL / OUT: 200 mL / NET: 0 mL    19 Oct 2024 07:01  -  19 Oct 2024 11:50  --------------------------------------------------------  IN: 0 mL / OUT: 100 mL / NET: -100 mL          Physical Exam:   GENERAL: NAD, well-groomed, well-developed  HEENT: CHAVA/   Atraumatic, Normocephalic  ENMT: No tonsillar erythema, exudates, or enlargement; Moist mucous membranes, Good dentition, No lesions  NECK: Supple, No JVD, Normal thyroid  CHEST/LUNG: Clear to auscultaion  CVS: Regular rate and rhythm; No murmurs, rubs, or gallops  GI: : Soft, Nontender, Nondistended; Bowel sounds present  NERVOUS SYSTEM:  Alert & Oriented X3  EXTREMITIES: -edema  LYMPH: No lymphadenopathy noted  SKIN: No rashes or lesions  ENDOCRINOLOGY: No Thyromegaly  PSYCH: Appropriate    Labs:  25                            11.0   0.54  )-----------( 32       ( 19 Oct 2024 04:55 )             31.7                         12.3   0.23  )-----------( 45       ( 18 Oct 2024 06:20 )             35.5                         12.1   0.22  )-----------( 38       ( 17 Oct 2024 22:45 )             35.5                         12.2   0.31  )-----------( 37       ( 17 Oct 2024 02:51 )             35.0                         13.5   0.55  )-----------( 39       ( 16 Oct 2024 10:14 )             38.1                         13.5   0.79  )-----------( 45       ( 16 Oct 2024 06:14 )             38.2     10-19    147[H]  |  115[H]  |  57[H]  ----------------------------<  114[H]  4.5   |  18[L]  |  1.15  10-18    144  |  110[H]  |  30[H]  ----------------------------<  166[H]  4.5   |  21[L]  |  0.76  10-17    144  |  110[H]  |  29[H]  ----------------------------<  167[H]  4.4   |  22  |  0.74  10-17    141  |  107  |  35[H]  ----------------------------<  157[H]  4.4   |  22  |  0.75  10-16    141  |  107  |  43[H]  ----------------------------<  152[H]  4.1   |  20[L]  |  0.84    Ca    7.7[L]      19 Oct 2024 05:31  Ca    8.0[L]      18 Oct 2024 06:20  Ca    8.0[L]      17 Oct 2024 22:45  Phos  3.3     10-19  Phos  2.5     10-18  Mg     2.50     10-19  Mg     2.40     10-18    TPro  4.7[L]  /  Alb  2.2[L]  /  TBili  2.2[H]  /  DBili  x   /  AST  63[H]  /  ALT  11  /  AlkPhos  52  10-19  TPro  5.2[L]  /  Alb  3.0[L]  /  TBili  1.3[H]  /  DBili  x   /  AST  52[H]  /  ALT  10  /  AlkPhos  53  10-17  TPro  5.4[L]  /  Alb  3.4  /  TBili  1.1  /  DBili  x   /  AST  60[H]  /  ALT  9   /  AlkPhos  64  10-17  TPro  5.6[L]  /  Alb  3.8  /  TBili  1.3[H]  /  DBili  x   /  AST  68[H]  /  ALT  9   /  AlkPhos  82  10-16    CAPILLARY BLOOD GLUCOSE          LIVER FUNCTIONS - ( 19 Oct 2024 05:31 )  Alb: 2.2 g/dL / Pro: 4.7 g/dL / ALK PHOS: 52 U/L / ALT: 11 U/L / AST: 63 U/L / GGT: x           PT/INR - ( 19 Oct 2024 04:55 )   PT: 31.4 sec;   INR: 2.66 ratio         PTT - ( 19 Oct 2024 04:55 )  PTT:47.8 sec  Urinalysis Basic - ( 19 Oct 2024 05:31 )    Color: x / Appearance: x / SG: x / pH: x  Gluc: 114 mg/dL / Ketone: x  / Bili: x / Urobili: x   Blood: x / Protein: x / Nitrite: x   Leuk Esterase: x / RBC: x / WBC x   Sq Epi: x / Non Sq Epi: x / Bacteria: x      Procalcitonin: 14.25 ng/mL (10-19 @ 05:31)  Lactate, Blood: 2.5 mmol/L (10-19 @ 08:34)        RECENT CULTURES:        RESPIRATORY CULTURES:      Clostridium difficile [facility] Toxins A&amp;B, EIA:   Negative (10-18 @ 23:53)      Studies  Chest X-RAY  CT SCAN Chest   Venous Dopplers: LE:   CT Abdomen  Others    rad< from: CT Angio Chest PE Protocol w/ IV Cont (10.18.24 @ 03:06) >    CHEST WALL AND BONES: Multilevel vertebral body lytic lesions, consistent   with diffuse osseous metastasis. Redemonstrated mild multilevel loss of   vertebral body height.    IMPRESSION:    No pulmonary embolism to the level of the segmental branches bilaterally.   Limited evaluation of the subsegmental branches secondary to incomplete   contrast opacification.    Multilevel vertebral body lytic lesions and loss of vertebral body   height, better evaluated on CT thoracic spine 10/17/2024.        --- End of Report ---    < end of copied text >

## 2024-10-19 NOTE — CONSULT NOTE ADULT - SUBJECTIVE AND OBJECTIVE BOX
CHIEF COMPLAINT:    HPI:  64 year old male with PMH of metastatic prostate cancer now on ADT (lupron) + abiraterone/prednisone + taxotere while inpatient,  sent in by hematologist from [facility] for cancer related pain.     In the ER:   Vitals: hypertensive to 180s.   Labs: Admission labs without leukocytosis, platelet count 71 K. CMP with creatinine 1.03. U/A without pyuria.   CXR: 1.  Clear lungs. 2.  No pneumoperitoneum.  MRI L spine: Diffuse osseous metastases with infiltration of spine.Acute pathologic  fracture involves the superior endplate of L5 with loss of 20% of vertebral body height and minimal bony retropulsion. Compression deformity of L1, indeterminate in age with loss of 20% of vertebral body height and minimal bony retropulsion. Decreased T1/T2 signal through the inferior T11 vertebral body which may reflect an acute fracture. Moderate diffuse degenerative disc disease with loss of disc height and signal within the nucleus pulposus. Disc bulges are noted at T11-T12, L1-2 through L5-S1 which flatten the ventral thecal sac and narrow the BILATERAL neural foramina. Mild central stenosis at L1-2. Mild to moderate facet osteophytic hypertrophy at L5-S1.    Patient admitted for management of cancer related pain. Radiation oncology consulted- based on review of follow up imaging. , recomending IR consult for evaluation for kyphoplasty with plan for procedure on 10/22.     Hospital course complicated by pancytopenia with neutropenia with intiation of chemotherapy. Patient with intermittent hypoxia and tachycardia.     On 10/18, patient noted to have over 10 episodes of diarrhea. Infectious workup initiated with CT abdomen pelvis revealing "Focal proximal SMA dissection, with findings concerning for ischemic colitis of the right colon and Retroperitoneal adenopathy." C. diff PCR was negative.     Colorectal surgery consulted - concern for neutropenic enterocolitis. Recommended GI consult and antifungal coverage.     GI consulted-  Suspect presentation related to neutropenic enterocolitis and ischemic colitis 2/2 SMA dissection; in the absence of overt GI bleeding and relatively stable Hgb with severe neutropenia and SMA dissection, risk of diagnostic colonoscopy likely outweigh benefits. Recommended vascular surgery consult.     Vacular surgery consulted- recommending heparin drip and CTA to better assess vasculature.     As per wife at bedside, diarrhea improved but still present. Patient reports whole body pain and abdominal pain but denies nausea or vomiting. Has been with decreased PO intake.     PAST MEDICAL & SURGICAL HISTORY:  Benign essential HTN  HLD (hyperlipidemia)  CA of prostate  Basal cell carcinoma  History of transurethral prostatectomy  S/P inguinal hernia repair  History of basal cell carcinoma (BCC) excision    FAMILY HISTORY:  FH: HTN (hypertension) (Sibling)    SOCIAL HISTORY:    Allergies  No Known Allergies    Intolerances        HOME MEDICATIONS:    REVIEW OF SYSTEMS:    CONSTITUTIONAL: +weakness, No fevers or chills  EYES/ENT: No visual changes;  No vertigo or throat pain   NECK: No pain or stiffness  RESPIRATORY: No cough, wheezing, hemoptysis; No shortness of breath (while on 3-4L NC)  CARDIOVASCULAR: No chest pain or palpitations  GASTROINTESTINAL: +watery diarrhea. No hematochezia. No melena. +Diffuse abdominal pain  GENITOURINARY: No dysuria, frequency or hematuria  NEUROLOGICAL: No numbness or weakness  SKIN: No itching, rashes      OBJECTIVE:  ICU Vital Signs Last 24 Hrs  T(C): 36.7 (19 Oct 2024 14:10), Max: 38.9 (19 Oct 2024 04:20)  T(F): 98.1 (19 Oct 2024 14:10), Max: 102.1 (19 Oct 2024 04:20)  HR: 119 (19 Oct 2024 14:10) (118 - 136)  BP: 93/61 (19 Oct 2024 14:10) (93/61 - 120/69)  BP(mean): --  ABP: --  ABP(mean): --  RR: 19 (19 Oct 2024 14:10) (17 - 19)  SpO2: 94% (19 Oct 2024 14:10) (91% - 96%)    O2 Parameters below as of 19 Oct 2024 14:10  Patient On (Oxygen Delivery Method): nasal cannula  O2 Flow (L/min): 4            10-18 @ 07:01  -  10-19 @ 07:00  --------------------------------------------------------  IN: 200 mL / OUT: 200 mL / NET: 0 mL    10-19 @ 07:01  -  10-19 @ 16:29  --------------------------------------------------------  IN: 0 mL / OUT: 100 mL / NET: -100 mL      CAPILLARY BLOOD GLUCOSE        PHYSICAL EXAM:  GENERAL: Patient appears uncomfortable in pain. Otherwise awake and alert  HEAD:  Atraumatic, Normocephalic  EYES: EOMI, PERRLA, conjunctiva and sclera clear  ENMT: +Dry mucous membranes Pupils equal and reactive.   NECK: Supple, No JVD, Normal thyroid  HEART: +tachycardia. Regular rhythm.   RESPIRATORY: CTA B/L, No W/R/R  ABDOMEN: Non-distended. +diffuse tenderness to palpation.   NEUROLOGY: A&Ox3, nonfocal, moving all extremities  EXTREMITIES:  2+ Peripheral Pulses, No clubbing, cyanosis, or edema  SKIN: warm, dry, normal color, no rash or abnormal lesions        HOSPITAL MEDICATIONS:  MEDICATIONS  (STANDING):  abiraterone 500 mg tablet 2 Tablet(s) 2 Tablet(s) Oral daily  atorvastatin 20 milliGRAM(s) Oral at bedtime  chlorhexidine 2% Cloths 1 Application(s) Topical daily  enoxaparin Injectable 40 milliGRAM(s) SubCutaneous every 24 hours  fenofibrate Tablet 145 milliGRAM(s) Oral daily  filgrastim-sndz (ZARXIO) Injectable 480 MICROGram(s) SubCutaneous daily  lactobacillus acidophilus 1 Tablet(s) Oral daily  lidocaine   4% Patch 1 Patch Transdermal every 24 hours  lisinopril 40 milliGRAM(s) Oral daily  morphine PCA (5 mG/mL) 30 milliLiter(s) PCA Continuous PCA Continuous  ondansetron Injectable 4 milliGRAM(s) IV Push every 8 hours  pantoprazole  Injectable 40 milliGRAM(s) IV Push daily  piperacillin/tazobactam IVPB.. 3.375 Gram(s) IV Intermittent every 8 hours  sodium chloride 0.9%. 1000 milliLiter(s) (100 mL/Hr) IV Continuous <Continuous>    MEDICATIONS  (PRN):  acetaminophen     Tablet .. 650 milliGRAM(s) Oral every 6 hours PRN Temp greater or equal to 38C (100.4F), Mild Pain (1 - 3), Moderate Pain (4 - 6)  aluminum hydroxide/magnesium hydroxide/simethicone Suspension 30 milliLiter(s) Oral every 4 hours PRN Dyspepsia  morphine  - Injectable 4 milliGRAM(s) IV Push once PRN severe breakthrough pain while off PCA  morphine PCA (5 mG/mL) Rescue Clinician Bolus 4 milliGRAM(s) IV Push every 15 minutes PRN Breakthrough severe pain  naloxone Injectable 0.1 milliGRAM(s) IV Push every 3 minutes PRN For ANY of the following changes in patient status:  A. RR LESS THAN 10 breaths per minute, B. Oxygen saturation LESS THAN 90%, C. Sedation score of 6  OLANZapine 1.25 milliGRAM(s) Oral every 6 hours PRN anxiety  polyethylene glycol 3350 17 Gram(s) Oral daily PRN Constipation      LABS:                        11.0   0.54  )-----------( 32       ( 19 Oct 2024 04:55 )             31.7     10-19    147[H]  |  115[H]  |  57[H]  ----------------------------<  114[H]  4.5   |  18[L]  |  1.15    Ca    7.7[L]      19 Oct 2024 05:31  Phos  3.3     10-19  Mg     2.50     10-19    TPro  4.7[L]  /  Alb  2.2[L]  /  TBili  2.2[H]  /  DBili  x   /  AST  63[H]  /  ALT  11  /  AlkPhos  52  10-19    PT/INR - ( 19 Oct 2024 04:55 )   PT: 31.4 sec;   INR: 2.66 ratio         PTT - ( 19 Oct 2024 04:55 )  PTT:47.8 sec  Urinalysis Basic - ( 19 Oct 2024 05:31 )    Color: x / Appearance: x / SG: x / pH: x  Gluc: 114 mg/dL / Ketone: x  / Bili: x / Urobili: x   Blood: x / Protein: x / Nitrite: x   Leuk Esterase: x / RBC: x / WBC x   Sq Epi: x / Non Sq Epi: x / Bacteria: x        Venous Blood Gas:  10-19 @ 04:55  --/--/--/--/--  VBG Lactate: 2.1      MICROBIOLOGY:     RADIOLOGY:  [ ] Reviewed and interpreted by me    EKG:

## 2024-10-19 NOTE — PROGRESS NOTE ADULT - ASSESSMENT
64-year-old male with metastatic prostate cancer, sent in by hematologist from [facility] for uncontrolled pain, nausea, vomiting.       Problem/Plan - 1:  ·  Problem: Cancer related pain.   ·  Plan: - appreciate pall care recommendations:  - pain control as per palliative crae   - cw decadron   - RT consult     Problem/Plan - 2:·  Problem: Nausea & vomiting., Diarrhea, colitis   ·  Plan: -  vascular fu appreciated  - start  AC as per vascular but heme not clearing for AC  - fu official CTA results   - GI and surgery consult  - ID fu for ro infectious colitis  - ICU consukt      Problem/Plan - 3:  ·  Problem: CA of prostate.   ·  Plan: Metastatic prostate cancer- f/u heme/onc recommendations  - Rad Onc fu   - Palliative for symptom control.    Problem/Plan - 4:  ·  Problem: Thrombocytopenia, unspecified.   ·  Plan: -platelet baseline ~70  -monitor, will need transfused for plt <10   - f/u heme/onc for further recommendations.    Problem/Plan - 5:  ·  Problem: Benign essential HTN.   ·  Plan: - cont. home lisinopril 40mg.  Problem/Plan - 6:·  Problem: Hyperlipidemia. ·  Plan: - cont. home atorvastatin 20 and fenofibrate.    Problem/Plan - 7:  ·  Problem: Need for prophylactic measure.   ·  Plan: lovenox.    Prognosis guarded  dw pts wife

## 2024-10-19 NOTE — PROGRESS NOTE ADULT - ASSESSMENT
No care due was identified.  [facility] Embedded Care Due Messages. Reference number: [id number].   9/19/2024 7:33:31 AM CDT   64-year-old male with metastatic prostate cancer, sent in by hematologist from [facility] for uncontrolled pain, nausea, vomiting.   Patient reports diffuse pain starting 6 months ago significantly worsening for the past 2 weeks.  Currently the worst pain is located around the lower back.   No loss of bladder and bowel function, no saddle paresthesia.  Able to walk.  patient is oxycodone 5 every 4-6 hour.  Yesterday they started adding OxyContin 10.  However patient continued to have pain.  Family also reports significant nausea and vomiting, inability to tolerate p.o. for the last 2 days.  Last bowel movement 2 days ago.   No known allergy.  Diagnosed with high risk high volume metastatic prostate cancer with bone, liver lymph node mets and presacral mass. Liver biopsy confirmed disease with . Started lupron, loly/pred with plans to initiate taxotere.    (12 Oct 2024 15:40)  pt seems very frustrated:   he is on 2 L of oxygen : he has no underlying lung disease:  but he is requiring 2 L of oxygen      Hypoxic resp failure  Metastatic prostate cancer  Back pain     Hypoxic resp failure  -He has no underlying lung disease:  but he is requiring 2 L of oxygen :   -CTA showed; No pulmonary embolism to the level of the segmental branches bilaterally. Limited evaluation of the subsegmental branches secondary to incomplete contrast opacification.  Multilevel vertebral body lytic lesions and loss of vertebral body height, better evaluated on CT thoracic spine 10/17/2024. Metastatic prostate cancer  -Patent central airways. Bibasilar atelectasis. No pleural effusion. MEDIASTINUM AND KATTY: No lymphadenopathy.  PULMONARY ANGIOGRAM: No pulmonary embolism to the level of the segmental   branches bilaterally. Limited evaluation of the subsegmental branches secondary to incomplete contrast opacification.    10/19:  -seems pretty tired;  on 2 L of oxygen :  -cta chest showed: No pulmonary embolism to the level of the segmental branches bilaterally. Limited evaluation of the subsegmental branches secondary to incomplete   contrast opacification. Multilevel vertebral body lytic lesions and loss of vertebral body height, better evaluated on CT thoracic spine 10/17/2024.  -still with fever:  no pe  on zosyn  and leucopenic: hemonc following;  has metastatic prostate ca:   -VBG seems OK:       Back pain   -likely due to metastatic disease:  adm here for sever pain :  pain control per primary team   -venous ph is ok     PAGETONYA grewal

## 2024-10-19 NOTE — PROGRESS NOTE ADULT - SUBJECTIVE AND OBJECTIVE BOX
INTERVAL HPI/OVERNIGHT EVENTS:  Patient S&E at bedside. Oriented but moments of confusion. Uncomfortable appearing. Tachycardic. Febrile. Overnight had hypoxia, tachycardia, diarrhea. Seen by surgery team. CTA ruled out PE but shows pancolitis. NPO for now. C. diff pending.     VITAL SIGNS:  T(F): 98.7 (10-19-24 @ 06:20)  HR: 123 (10-19-24 @ 06:20)  BP: 112/68 (10-19-24 @ 06:20)  RR: 18 (10-19-24 @ 06:20)  SpO2: 95% (10-19-24 @ 06:20)  Wt(kg): --    PHYSICAL EXAM:    Constitutional: uncomfortable  Eyes: EOMI, sclera non-icteric  Neck: supple, no masses, no JVD  Respiratory: symmetric chest expansion   Cardiovascular: tachycardic   Gastrointestinal: soft, NTND, no masses palpable  Extremities: no c/c/e  Neurological: AAOx3      MEDICATIONS  (STANDING):  abiraterone 500 mg tablet 2 Tablet(s) 2 Tablet(s) Oral daily  atorvastatin 20 milliGRAM(s) Oral at bedtime  chlorhexidine 2% Cloths 1 Application(s) Topical daily  enoxaparin Injectable 40 milliGRAM(s) SubCutaneous every 24 hours  fenofibrate Tablet 145 milliGRAM(s) Oral daily  filgrastim-sndz (ZARXIO) Injectable 480 MICROGram(s) SubCutaneous daily  lactobacillus acidophilus 1 Tablet(s) Oral daily  lidocaine   4% Patch 1 Patch Transdermal every 24 hours  lisinopril 40 milliGRAM(s) Oral daily  morphine PCA (5 mG/mL) 30 milliLiter(s) PCA Continuous PCA Continuous  ondansetron Injectable 4 milliGRAM(s) IV Push every 8 hours  pantoprazole  Injectable 40 milliGRAM(s) IV Push daily  piperacillin/tazobactam IVPB.. 3.375 Gram(s) IV Intermittent every 8 hours  sodium chloride 0.9%. 1000 milliLiter(s) (100 mL/Hr) IV Continuous <Continuous>    MEDICATIONS  (PRN):  acetaminophen     Tablet .. 650 milliGRAM(s) Oral every 6 hours PRN Temp greater or equal to 38C (100.4F), Mild Pain (1 - 3), Moderate Pain (4 - 6)  aluminum hydroxide/magnesium hydroxide/simethicone Suspension 30 milliLiter(s) Oral every 4 hours PRN Dyspepsia  morphine  - Injectable 4 milliGRAM(s) IV Push once PRN severe breakthrough pain while off PCA  morphine PCA (5 mG/mL) Rescue Clinician Bolus 4 milliGRAM(s) IV Push every 15 minutes PRN Breakthrough severe pain  naloxone Injectable 0.1 milliGRAM(s) IV Push every 3 minutes PRN For ANY of the following changes in patient status:  A. RR LESS THAN 10 breaths per minute, B. Oxygen saturation LESS THAN 90%, C. Sedation score of 6  OLANZapine 1.25 milliGRAM(s) Oral every 6 hours PRN anxiety  polyethylene glycol 3350 17 Gram(s) Oral daily PRN Constipation      Allergies  No Known Allergies  Intolerances        LABS:                        11.0   0.54  )-----------( 32       ( 19 Oct 2024 04:55 )             31.7     10-19    147[H]  |  115[H]  |  57[H]  ----------------------------<  114[H]  4.5   |  18[L]  |  1.15    Ca    7.7[L]      19 Oct 2024 05:31  Phos  3.3     10-19  Mg     2.50     10-19    TPro  4.7[L]  /  Alb  2.2[L]  /  TBili  2.2[H]  /  DBili  x   /  AST  63[H]  /  ALT  11  /  AlkPhos  52  10-19    PT/INR - ( 19 Oct 2024 04:55 )   PT: 31.4 sec;   INR: 2.66 ratio         PTT - ( 19 Oct 2024 04:55 )  PTT:47.8 sec  Urinalysis Basic - ( 19 Oct 2024 05:31 )    Color: x / Appearance: x / SG: x / pH: x  Gluc: 114 mg/dL / Ketone: x  / Bili: x / Urobili: x   Blood: x / Protein: x / Nitrite: x   Leuk Esterase: x / RBC: x / WBC x   Sq Epi: x / Non Sq Epi: x / Bacteria: x        RADIOLOGY & ADDITIONAL TESTS:  Studies reviewed.

## 2024-10-19 NOTE — PROGRESS NOTE ADULT - ASSESSMENT
64M metastatic prostate cancer, sent in by hematologist from [facility] for uncontrolled pain, nausea, vomiting, Surgery was consulted at 5:00pm for diarrhea and concerns for malignant LBO. As of today the patient had large volume liquid stool concerning for C. Diff. Primary team sent a C. Diff panel. Results still pending. Today the patient also had a CTA of the chest. On the CTA of the chest showed very dilated transverse colon concerning for LBO.  Patient was seen and examined by general surgery at the bedside. Patient was found to be tachycardiac to 130s. Patient has WBC 0. Patient found to be diffusely tender, but not peritoneal. Patient rushed urgently to CT for A&P.     Plan:   - C.diff negative   - Likely diagnosis of neutropenic enterocolitis   - Would add antifungal coverage   - Goals of care conversation  - No acute plan for surgical intervention   - GI consult   - Please keep NPO     Colorectal Surgery   [id number]

## 2024-10-19 NOTE — DIETITIAN INITIAL EVALUATION ADULT - ADD RECOMMEND
1. Monitor PO intake/tolerance, labs, weights, BMs, and skin integrity  2. Please document % PO intake in nursing flowsheets to assess nutritional intake/monitor need for further intervention(s).   3. Please obtain updated wt/weekly wt  4. Provide feeding assistance prn   5. Encourage PO intake at meal times & supplements   6. Consider ordering oral nutrition supplement ([employer] Standard 1.0 BID) once diet advancement medically feasible

## 2024-10-19 NOTE — CONSULT NOTE ADULT - ASSESSMENT
64-year-old male with metastatic prostate cancer, sent in by hematologist from [facility] for uncontrolled pain, nausea, vomiting. 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 Differential includes neutropenic enterocolitis vs. ischemic colitis. Vascular surgery consulted for SMA dissection.     Plan:   - Ischemic colitis vs. neutropenic enterocolitis   - Continue NPO, hydration   - Trend lactate, would repeat Q6 until normalizes   - Would start therapeutic heparin gtt for likely SMA dissection. Low concern for GI bleed due to absence of bloody bm's and stable H/H.    - Please obtain dedicated CTA to better assess vasculature. CT with IV contrast shows likely dissection, but flow through SMA distally.   - Vascular to follow     Discussed with fellow on call, Ron Holley     Vascular Surgery   [id number]

## 2024-10-19 NOTE — PROGRESS NOTE ADULT - SUBJECTIVE AND OBJECTIVE BOX
Patient is a 64y old  Male who presents with a chief complaint of Pain (19 Oct 2024 16:28)    Date of servie : 10-19-24 @ 19:01  INTERVAL HPI/OVERNIGHT EVENTS:  T(C): 36.7 (10-19-24 @ 14:10), Max: 38.9 (10-19-24 @ 04:20)  HR: 119 (10-19-24 @ 14:10) (118 - 136)  BP: 93/61 (10-19-24 @ 14:10) (93/61 - 120/69)  RR: 19 (10-19-24 @ 14:10) (17 - 19)  SpO2: 94% (10-19-24 @ 14:10) (91% - 96%)  Wt(kg): --  I&O's Summary    18 Oct 2024 07:01  -  19 Oct 2024 07:00  --------------------------------------------------------  IN: 200 mL / OUT: 200 mL / NET: 0 mL    19 Oct 2024 07:01  -  19 Oct 2024 19:01  --------------------------------------------------------  IN: 0 mL / OUT: 100 mL / NET: -100 mL        LABS:                        10.2   1.18  )-----------( 25       ( 19 Oct 2024 15:40 )             29.3     10-19    147[H]  |  115[H]  |  57[H]  ----------------------------<  114[H]  4.5   |  18[L]  |  1.15    Ca    7.7[L]      19 Oct 2024 05:31  Phos  3.3     10-19  Mg     2.50     10-19    TPro  4.7[L]  /  Alb  2.2[L]  /  TBili  2.2[H]  /  DBili  x   /  AST  63[H]  /  ALT  11  /  AlkPhos  52  10-19    PT/INR - ( 19 Oct 2024 15:40 )   PT: 30.8 sec;   INR: 2.61 ratio         PTT - ( 19 Oct 2024 15:40 )  PTT:51.2 sec  Urinalysis Basic - ( 19 Oct 2024 05:31 )    Color: x / Appearance: x / SG: x / pH: x  Gluc: 114 mg/dL / Ketone: x  / Bili: x / Urobili: x   Blood: x / Protein: x / Nitrite: x   Leuk Esterase: x / RBC: x / WBC x   Sq Epi: x / Non Sq Epi: x / Bacteria: x      CAPILLARY BLOOD GLUCOSE            Urinalysis Basic - ( 19 Oct 2024 05:31 )    Color: x / Appearance: x / SG: x / pH: x  Gluc: 114 mg/dL / Ketone: x  / Bili: x / Urobili: x   Blood: x / Protein: x / Nitrite: x   Leuk Esterase: x / RBC: x / WBC x   Sq Epi: x / Non Sq Epi: x / Bacteria: x        MEDICATIONS  (STANDING):  abiraterone 500 mg tablet 2 Tablet(s) 2 Tablet(s) Oral daily  atorvastatin 20 milliGRAM(s) Oral at bedtime  chlorhexidine 2% Cloths 1 Application(s) Topical daily  dexAMETHasone  Injectable 4 milliGRAM(s) IV Push two times a day  enoxaparin Injectable 40 milliGRAM(s) SubCutaneous every 24 hours  fenofibrate Tablet 145 milliGRAM(s) Oral daily  filgrastim-sndz (ZARXIO) Injectable 480 MICROGram(s) SubCutaneous daily  lactobacillus acidophilus 1 Tablet(s) Oral daily  lidocaine   4% Patch 1 Patch Transdermal every 24 hours  lisinopril 40 milliGRAM(s) Oral daily  morphine PCA (5 mG/mL) 30 milliLiter(s) PCA Continuous PCA Continuous  ondansetron Injectable 4 milliGRAM(s) IV Push every 8 hours  pantoprazole  Injectable 40 milliGRAM(s) IV Push daily  piperacillin/tazobactam IVPB.. 3.375 Gram(s) IV Intermittent every 8 hours  sodium chloride 0.9%. 1000 milliLiter(s) (100 mL/Hr) IV Continuous <Continuous>    MEDICATIONS  (PRN):  acetaminophen     Tablet .. 650 milliGRAM(s) Oral every 6 hours PRN Temp greater or equal to 38C (100.4F), Mild Pain (1 - 3), Moderate Pain (4 - 6)  aluminum hydroxide/magnesium hydroxide/simethicone Suspension 30 milliLiter(s) Oral every 4 hours PRN Dyspepsia  morphine  - Injectable 4 milliGRAM(s) IV Push once PRN severe breakthrough pain while off PCA  morphine PCA (5 mG/mL) Rescue Clinician Bolus 4 milliGRAM(s) IV Push every 15 minutes PRN Breakthrough severe pain  naloxone Injectable 0.1 milliGRAM(s) IV Push every 3 minutes PRN For ANY of the following changes in patient status:  A. RR LESS THAN 10 breaths per minute, B. Oxygen saturation LESS THAN 90%, C. Sedation score of 6  OLANZapine 1.25 milliGRAM(s) Oral every 6 hours PRN anxiety  polyethylene glycol 3350 17 Gram(s) Oral daily PRN Constipation          PHYSICAL EXAM:  GENERAL: NAD, well-groomed, well-developed  HEAD:  Atraumatic, Normocephalic  CHEST/LUNG: Clear to percussion bilaterally; No rales, rhonchi, wheezing, or rubs  HEART: Regular rate and rhythm; No murmurs, rubs, or gallops  ABDOMEN: Soft, Nontender, Nondistended; Bowel sounds present  EXTREMITIES:  2+ Peripheral Pulses, No clubbing, cyanosis, or edema  LYMPH: No lymphadenopathy noted  SKIN: No rashes or lesions    Care Discussed with Consultants/Other Providers [ ] YES  [ ] NO

## 2024-10-19 NOTE — CONSULT NOTE ADULT - SUBJECTIVE AND OBJECTIVE BOX
Chief Complaint:  Patient is a 64y old  Male who presents with a chief complaint of Pain (19 Oct 2024 11:50)    HPI:  64M with history of localized prostate cancer treated with radical prostatectomy (did not require ADT or RT, no LN involvement), now recently found to have recurrence with extensive metastatic disease involving spine , bone, liver presenting with intractable pain and fracture from diffuse osseous metastases. Course now notable for acute diarrhea associated with abdominal pain that started yesterday with 15 episodes of loose, non bloody BM over last 24 hours; no change in abdominal pain.    Allergies:  No Known Allergies      Home Medications:    Hospital Medications:  abiraterone 500 mg tablet 2 Tablet(s) 2 Tablet(s) Oral daily  acetaminophen     Tablet .. 650 milliGRAM(s) Oral every 6 hours PRN  aluminum hydroxide/magnesium hydroxide/simethicone Suspension 30 milliLiter(s) Oral every 4 hours PRN  atorvastatin 20 milliGRAM(s) Oral at bedtime  chlorhexidine 2% Cloths 1 Application(s) Topical daily  enoxaparin Injectable 40 milliGRAM(s) SubCutaneous every 24 hours  fenofibrate Tablet 145 milliGRAM(s) Oral daily  filgrastim-sndz (ZARXIO) Injectable 480 MICROGram(s) SubCutaneous daily  lactobacillus acidophilus 1 Tablet(s) Oral daily  lidocaine   4% Patch 1 Patch Transdermal every 24 hours  lisinopril 40 milliGRAM(s) Oral daily  morphine  - Injectable 4 milliGRAM(s) IV Push once PRN  morphine PCA (5 mG/mL) 30 milliLiter(s) PCA Continuous PCA Continuous  morphine PCA (5 mG/mL) Rescue Clinician Bolus 4 milliGRAM(s) IV Push every 15 minutes PRN  naloxone Injectable 0.1 milliGRAM(s) IV Push every 3 minutes PRN  OLANZapine 1.25 milliGRAM(s) Oral every 6 hours PRN  ondansetron Injectable 4 milliGRAM(s) IV Push every 8 hours  pantoprazole  Injectable 40 milliGRAM(s) IV Push daily  piperacillin/tazobactam IVPB.. 3.375 Gram(s) IV Intermittent every 8 hours  polyethylene glycol 3350 17 Gram(s) Oral daily PRN  sodium chloride 0.9%. 1000 milliLiter(s) IV Continuous <Continuous>      PMHX/PSHX:  Benign essential HTN    HLD (hyperlipidemia)    CA of prostate    Basal cell carcinoma    History of transurethral prostatectomy    S/P inguinal hernia repair    History of basal cell carcinoma (BCC) excision        Family history:  FH: HTN (hypertension) (Sibling)    ROS:  General:  +fevers  CV:  No pain, palpitations  Pulm:  No dyspnea, cough  GI:  No pain, No nausea, No vomiting, +diarrhea, No constipation, No rectal bleeding, No tarry stools, No dysphagia  Muscle:  No pain, weakness  Neuro:  No weakness  Heme:  No ecchymosis  Skin:  No rash    PHYSICAL EXAM:   GENERAL:  No acute distress  HEENT:  no scleral icterus  CHEST:  no accessory muscle use  HEART:  Regular rate and rhythm  ABDOMEN:  Soft, diffuse tenderness, non-distended  EXTREMITIES: No edema  SKIN:  No rash/ecchymoses  NEURO:  Alert and oriented x 3    Vital Signs:  Vital Signs Last 24 Hrs  T(C): 36.8 (19 Oct 2024 10:42), Max: 38.9 (19 Oct 2024 04:20)  T(F): 98.2 (19 Oct 2024 10:42), Max: 102.1 (19 Oct 2024 04:20)  HR: 120 (19 Oct 2024 10:42) (111 - 136)  BP: 120/69 (19 Oct 2024 10:42) (97/65 - 120/69)  BP(mean): --  RR: 18 (19 Oct 2024 10:42) (17 - 19)  SpO2: 96% (19 Oct 2024 10:42) (91% - 96%)    Parameters below as of 19 Oct 2024 10:42  Patient On (Oxygen Delivery Method): nasal cannula      Daily     Daily     LABS:                        11.0   0.54  )-----------( 32       ( 19 Oct 2024 04:55 )             31.7     Mean Cell Volume: 88.8 fL (10-19-24 @ 04:55)    10-19    147[H]  |  115[H]  |  57[H]  ----------------------------<  114[H]  4.5   |  18[L]  |  1.15    Ca    7.7[L]      19 Oct 2024 05:31  Phos  3.3     10-19  Mg     2.50     10-19    TPro  4.7[L]  /  Alb  2.2[L]  /  TBili  2.2[H]  /  DBili  x   /  AST  63[H]  /  ALT  11  /  AlkPhos  52  10-19    LIVER FUNCTIONS - ( 19 Oct 2024 05:31 )  Alb: 2.2 g/dL / Pro: 4.7 g/dL / ALK PHOS: 52 U/L / ALT: 11 U/L / AST: 63 U/L / GGT: x           PT/INR - ( 19 Oct 2024 04:55 )   PT: 31.4 sec;   INR: 2.66 ratio         PTT - ( 19 Oct 2024 04:55 )  PTT:47.8 sec  Urinalysis Basic - ( 19 Oct 2024 05:31 )    Color: x / Appearance: x / SG: x / pH: x  Gluc: 114 mg/dL / Ketone: x  / Bili: x / Urobili: x   Blood: x / Protein: x / Nitrite: x   Leuk Esterase: x / RBC: x / WBC x   Sq Epi: x / Non Sq Epi: x / Bacteria: x                              11.0   0.54  )-----------( 32       ( 19 Oct 2024 04:55 )             31.7                         12.3   0.23  )-----------( 45       ( 18 Oct 2024 06:20 )             35.5                         12.1   0.22  )-----------( 38       ( 17 Oct 2024 22:45 )             35.5                         12.2   0.31  )-----------( 37       ( 17 Oct 2024 02:51 )             35.0       Imaging:    < from: CT Abdomen and Pelvis w/ IV Cont (10.18.24 @ 20:01) >  FINDINGS:  LOWER CHEST: Bilateral subsegmental atelectasis.    LIVER: Within normal limits.  BILE DUCTS: Normal caliber.  GALLBLADDER: Cholecystectomy.  SPLEEN: Within normal limits.  PANCREAS: Within normal limits.  ADRENALS: Within normal limits.  KIDNEYS/URETERS: No hydronephrosis. Likely contrast excretion in the   renal calyces. Right renal cyst and hypodense focus too small to   catheterize. Duplicated right kidney.    BLADDER: Within normal limits.  REPRODUCTIVE ORGANS: Prostatectomy.    BOWEL: No bowel obstruction. Appendix is normal. Mural thickening of the   ascending colon, proximal transverse colon, and splenic flexure.   Dilatation of the transverse colon measuring up to 9.0 cm, without loss   of haustra. High density material in the cecum, which was also seen on CT   10/17/2024.  PERITONEUM/RETROPERITONEUM: 4.9 x 3.9 cm mass in the presacral space   adjacent to the rectosigmoid junction. Small volume ascites.  VESSELS: Focal proximal SMA dissection (2-67, 3-89). Atherosclerotic   changes.  LYMPH NODES: Enlarged retroperitoneal lymph nodes. For example:  *  Left para-aortic node, measuring 1.8 x 1.1 cm (2-70)  *  Aortocaval node (series 2, image 90), measuring 1.3 x 1.2 cm  ABDOMINAL WALL: Small fat-containing left inguinal hernia. A 2.5 x 1.5 cm   (2-153) hypodense mass anterior to the right urinary bladder, possibly   right inguinal hernia plug.  BONES: Diffuse lytic osseous metastases with probable pathologic   fractures at L1 and L5.    IMPRESSION:    Focal proximal SMA dissection, with findings concerning for ischemic   colitis of the right colon.    High density luminal contents in the cecum. Differential includes GI   bleed versus ingested material.    No bowel obstruction.    Lytic osseous metastases with probable pathologic fractures at L1 and L5.    Retroperitoneal adenopathy.    < end of copied text >

## 2024-10-19 NOTE — CONSULT NOTE ADULT - ASSESSMENT
64M with history of localized prostate cancer treated with radical prostatectomy (did not require ADT or RT, no LN involvement), now recently found to have recurrence with extensive metastatic disease involving spine , bone, liver presenting with intractable pain and fracture from diffuse osseous metastases    Impression  #diarrhea, non-bloody  #abdominal pain  #colitis  #SMA dissection  #metastatic prostate cancer  #neutropenic fevers  #ischemic colitis  Patient initially presenting with intractable back pain in setting of osseous metastases with pathological fractures requiring morphine PCA for pain control. Course notable for abdominal pain associated with non-bloody diarrhea with labs showing Hgb 11 slightly downtrending from baseline ~13-15 and leukopenia/neutropenia. C. Diff negative. CT A/P showing SMA dissection with right-sided ischemic colitis; also noted with colitis involving ascending colon through splenic flexure as well as hyperdense material within the cecum concerning for possible GI bleed vs. ingested material. Suspect presentation related to neutropenic enterocolitis and ischemic colitis 2/2 SMA dissection; in the absence of overt GI bleeding and relatively stable Hgb with severe neutropenia and SMA dissection, risk of diagnostic colonoscopy likely outweigh benefits.    Recommendations  - no role for colonoscopy at this time  - agree with antibiotics  - neutropenia management per oncology  - GI PCR  - consider vascular surgery input  - [facility] discussion    Note and recommendations are incomplete until reviewed and attested by attending.    Job Gasca MD  GI/Hepatology Fellow, PGY5  [place] Pager [phone] or [facility] Pager [phone]  Teams preferred (7AM to 5PM); after 5PM, call GI fellow on call    On Weekends/Holidays (All Day) and Weekdays after 5PM to 8AM  For non-urgent consults, please email [email or web address] and [email or web address]  For urgent consults, please contact on call GI team. See Amion schedule ([facility]), [employer] paging system ([facility]), or call hospital  ([facility])

## 2024-10-19 NOTE — CONSULT NOTE ADULT - SUBJECTIVE AND OBJECTIVE BOX
Patient is a 64 year old male with PMH of metastatic prostate cancer now on ADT (lupron) + abiraterone/prednisone + taxotere while inpatient,  sent in by hematologist from [facility] for uncontrolled pain, nausea, vomiting.   Patient reports diffuse pain starting 6 months ago significantly worsening for the past 2 weeks.  Currently the worst pain is located around the lower back.   No loss of bladder and bowel function, no saddle paresthesia.  Able to walk.  patient is oxycodone 5 every 4-6 hour.  Yesterday they started adding OxyContin 10.  However patient continued to have pain.  Family also reports significant nausea and vomiting, inability to tolerate p.o. for the last 2 days.  Last bowel movement 2 days ago.   No known allergy.    Diagnosed with high risk high volume metastatic prostate cancer with bone, liver lymph node mets and presacral mass. Liver biopsy confirmed disease with . Started lupron, loly/pred with plans to initiate taxotere.    (12 Oct 2024 15:40)    In the ER:   Vitals: hypertensive to 180s.   Labs: Admission labs without leukocytosis, platelet count 71 K. CMP with creatinine 1.03. U/A without pyuria.   CXR: 1.  Clear lungs. 2.  No pneumoperitoneum.  MRI L spine: Diffuse osseous metastases with infiltration of spine.Acute pathologic  fracture involves the superior endplate of L5 with loss of 20% of vertebral body height and minimal bony retropulsion. Compression deformity of L1, indeterminate in age with loss of 20% of vertebral body height and minimal bony retropulsion. Decreased T1/T2 signal through the inferior T11 vertebral body which may reflect an acute fracture. Moderate diffuse degenerative disc disease with loss of disc height and signal within the nucleus pulposus. Disc bulges are noted at T11-T12, L1-2 through L5-S1 which flatten the ventral thecal sac and narrow the BILATERAL neural foramina. Mild central stenosis at L1-2. Mild to moderate facet osteophytic hypertrophy at L5-S1.    Patient admitted for management of cancer related pain. Radiation oncology consulted- based on review of follow up imaging. , recomending IR consult for evaluation for kyphoplasty with plan for procedure on 10/22.     Hospital course complicated by pancytopenia with neutropenia with intiation of chemotherapy. Patient with intermittent hypoxia and tachycardia.     On 10/18, patient noted to have over 10 episodes of diarrhea. Infectious workup initiated with CT abdomen pelvis revealing "Focal proximal SMA dissection, with findings concerning for ischemic colitis of the right colon and Retroperitoneal adenopathy." C. diff PCR was negative.     Colorectal surgery consulted - concern for neutropenic enterocolitis. Recommended GI consult and antifungal coverage.     GI consulted-  Suspect presentation related to neutropenic enterocolitis and ischemic colitis 2/2 SMA dissection; in the absence of overt GI bleeding and relatively stable Hgb with severe neutropenia and SMA dissection, risk of diagnostic colonoscopy likely outweigh benefits. Recommended vascular surgery consult.     Abx:   Zosyn ( 10/19-)   Vancomycin (10/19-)     REVIEW OF SYSTEMS  pending full examination    prior hospital charts reviewed [V]  primary team notes reviewed [V]  other consultant notes reviewed [V]    PAST MEDICAL & SURGICAL HISTORY:  Benign essential HTN      HLD (hyperlipidemia)      CA of prostate      Basal cell carcinoma      History of transurethral prostatectomy      S/P inguinal hernia repair      History of basal cell carcinoma (BCC) excision          SOCIAL HISTORY:  Denied smoking/vaping/alcohol/recreational drug use    FAMILY HISTORY:  FH: HTN (hypertension) (Sibling)        Allergies  No Known Allergies        ANTIMICROBIALS:  piperacillin/tazobactam IVPB.. 3.375 every 8 hours      ANTIMICROBIALS (past 90 days):  MEDICATIONS  (STANDING):    piperacillin/tazobactam IVPB.   200 mL/Hr IV Intermittent (10-19-24 @ 05:41)    piperacillin/tazobactam IVPB..   25 mL/Hr IV Intermittent (10-19-24 @ 06:00)    vancomycin  IVPB   250 mL/Hr IV Intermittent (10-19-24 @ 05:43)        OTHER MEDS:   MEDICATIONS  (STANDING):  acetaminophen     Tablet .. 650 every 6 hours PRN  aluminum hydroxide/magnesium hydroxide/simethicone Suspension 30 every 4 hours PRN  atorvastatin 20 at bedtime  enoxaparin Injectable 40 every 24 hours  fenofibrate Tablet 145 daily  filgrastim-sndz (ZARXIO) Injectable 480 daily  lisinopril 40 daily  morphine  - Injectable 4 once PRN  morphine PCA (5 mG/mL) 30 PCA Continuous  morphine PCA (5 mG/mL) Rescue Clinician Bolus 4 every 15 minutes PRN  OLANZapine 1.25 every 6 hours PRN  ondansetron Injectable 4 every 8 hours  pantoprazole  Injectable 40 daily  polyethylene glycol 3350 17 daily PRN      VITALS:  Vital Signs Last 24 Hrs  T(F): 98.2 (10-19-24 @ 10:42), Max: 102.1 (10-19-24 @ 04:20)    Vital Signs Last 24 Hrs  HR: 120 (10-19-24 @ 10:42) (118 - 136)  BP: 120/69 (10-19-24 @ 10:42) (98/66 - 120/69)  RR: 18 (10-19-24 @ 10:42)  SpO2: 96% (10-19-24 @ 10:42) (91% - 96%)  Wt(kg): --    EXAM:  pending full examination      Labs:                        11.0   0.54  )-----------( 32       ( 19 Oct 2024 04:55 )             31.7     10-19    147[H]  |  115[H]  |  57[H]  ----------------------------<  114[H]  4.5   |  18[L]  |  1.15    Ca    7.7[L]      19 Oct 2024 05:31  Phos  3.3     10-19  Mg     2.50     10-19    TPro  4.7[L]  /  Alb  2.2[L]  /  TBili  2.2[H]  /  DBili  x   /  AST  63[H]  /  ALT  11  /  AlkPhos  52  10-19      WBC Trend:  WBC Count: 0.54 (10-19-24 @ 04:55)  WBC Count: 0.54 (10-19-24 @ 04:55)  WBC Count: 0.23 (10-18-24 @ 06:20)  WBC Count: 0.22 (10-17-24 @ 22:45)      Auto Neutrophil #: 0.09 K/uL (10-19-24 @ 04:55)  Band Neutrophils %: 6.8 % (10-19-24 @ 04:55)  Auto Neutrophil #: 0.02 K/uL (10-18-24 @ 06:20)  Auto Neutrophil #: 0.08 K/uL (10-17-24 @ 02:51)  Auto Neutrophil #: 0.35 K/uL (10-16-24 @ 10:14)      Creatine Trend:  Creatinine: 1.15 (10-19)  Creatinine: 1.15 (10-19)  Creatinine: 0.76 (10-18)  Creatinine: 0.74 (10-17)      Liver Biochemical Testing Trend:  Alanine Aminotransferase (ALT/SGPT): 11 (10-19)  Alanine Aminotransferase (ALT/SGPT): 10 (10-17)  Alanine Aminotransferase (ALT/SGPT): 9 (10-17)  Alanine Aminotransferase (ALT/SGPT): 9 (10-16)  Alanine Aminotransferase (ALT/SGPT): 12 (10-14)  Aspartate Aminotransferase (AST/SGOT): 63 (10-19-24 @ 05:31)  Aspartate Aminotransferase (AST/SGOT): 52 (10-17-24 @ 22:45)  Aspartate Aminotransferase (AST/SGOT): 60 (10-17-24 @ 02:51)  Aspartate Aminotransferase (AST/SGOT): 68 (10-16-24 @ 06:14)  Aspartate Aminotransferase (AST/SGOT): 62 (10-14-24 @ 06:19)  Bilirubin Total: 2.2 (10-19)  Bilirubin Total: 1.3 (10-17)  Bilirubin Total: 1.1 (10-17)  Bilirubin Total: 1.3 (10-16)  Bilirubin Total: 1.1 (10-14)      Trend LDH      Auto Eosinophil %: 0.0 % (10-19-24 @ 04:55)  Auto Eosinophil %: 0.0 % (10-18-24 @ 06:20)  Auto Eosinophil %: 0.0 % (10-17-24 @ 02:51)      MICROBIOLOGY:        Urinalysis with Rflx Culture (collected 19 Oct 2024 04:55)                                Procalcitonin: 14.25 (10-19)                Lactate, Blood: 2.5 (10-19 @ 08:34)  Blood Gas Venous - Lactate: 2.1 (10-19 @ 04:55)  Blood Gas Venous - Lactate: 1.2 (10-17 @ 02:55)        RADIOLOGY:  imaging below personally reviewed   Patient is a 64 year old male with PMH of metastatic prostate cancer now on ADT (lupron) + abiraterone/prednisone + taxotere while inpatient,  sent in by hematologist from [facility] for cancer related pain.     In the ER:   Vitals: hypertensive to 180s.   Labs: Admission labs without leukocytosis, platelet count 71 K. CMP with creatinine 1.03. U/A without pyuria.   CXR: 1.  Clear lungs. 2.  No pneumoperitoneum.  MRI L spine: Diffuse osseous metastases with infiltration of spine.Acute pathologic  fracture involves the superior endplate of L5 with loss of 20% of vertebral body height and minimal bony retropulsion. Compression deformity of L1, indeterminate in age with loss of 20% of vertebral body height and minimal bony retropulsion. Decreased T1/T2 signal through the inferior T11 vertebral body which may reflect an acute fracture. Moderate diffuse degenerative disc disease with loss of disc height and signal within the nucleus pulposus. Disc bulges are noted at T11-T12, L1-2 through L5-S1 which flatten the ventral thecal sac and narrow the BILATERAL neural foramina. Mild central stenosis at L1-2. Mild to moderate facet osteophytic hypertrophy at L5-S1.    Patient admitted for management of cancer related pain. Radiation oncology consulted- based on review of follow up imaging. , recomending IR consult for evaluation for kyphoplasty with plan for procedure on 10/22.     Hospital course complicated by pancytopenia with neutropenia with intiation of chemotherapy. Patient with intermittent hypoxia and tachycardia.     On 10/18, patient noted to have over 10 episodes of diarrhea. Infectious workup initiated with CT abdomen pelvis revealing "Focal proximal SMA dissection, with findings concerning for ischemic colitis of the right colon and Retroperitoneal adenopathy." C. diff PCR was negative.     Colorectal surgery consulted - concern for neutropenic enterocolitis. Recommended GI consult and antifungal coverage.     GI consulted-  Suspect presentation related to neutropenic enterocolitis and ischemic colitis 2/2 SMA dissection; in the absence of overt GI bleeding and relatively stable Hgb with severe neutropenia and SMA dissection, risk of diagnostic colonoscopy likely outweigh benefits. Recommended vascular surgery consult.     Vacular surgery consulted- recommending heparin drip and CTA to better assess vasculature.     As per wife at bedside, diarrhea improved but still present. Patient reports whole body pain and abdominal pain but denies nausea or vomiting. Has been with decreased PO intake.     Abx:   Zosyn ( 10/19-)   Vancomycin (10/19-)     REVIEW OF SYSTEMS  ROS limited at this time     prior hospital charts reviewed [V]  primary team notes reviewed [V]  other consultant notes reviewed [V]    PAST MEDICAL & SURGICAL HISTORY:  Benign essential HTN      HLD (hyperlipidemia)      CA of prostate      Basal cell carcinoma      History of transurethral prostatectomy      S/P inguinal hernia repair      History of basal cell carcinoma (BCC) excision          SOCIAL HISTORY:  Denied smoking/vaping/alcohol/recreational drug use    FAMILY HISTORY:  FH: HTN (hypertension) (Sibling)        Allergies  No Known Allergies        ANTIMICROBIALS:  piperacillin/tazobactam IVPB.. 3.375 every 8 hours      ANTIMICROBIALS (past 90 days):  MEDICATIONS  (STANDING):    piperacillin/tazobactam IVPB.   200 mL/Hr IV Intermittent (10-19-24 @ 05:41)    piperacillin/tazobactam IVPB..   25 mL/Hr IV Intermittent (10-19-24 @ 06:00)    vancomycin  IVPB   250 mL/Hr IV Intermittent (10-19-24 @ 05:43)        OTHER MEDS:   MEDICATIONS  (STANDING):  acetaminophen     Tablet .. 650 every 6 hours PRN  aluminum hydroxide/magnesium hydroxide/simethicone Suspension 30 every 4 hours PRN  atorvastatin 20 at bedtime  enoxaparin Injectable 40 every 24 hours  fenofibrate Tablet 145 daily  filgrastim-sndz (ZARXIO) Injectable 480 daily  lisinopril 40 daily  morphine  - Injectable 4 once PRN  morphine PCA (5 mG/mL) 30 PCA Continuous  morphine PCA (5 mG/mL) Rescue Clinician Bolus 4 every 15 minutes PRN  OLANZapine 1.25 every 6 hours PRN  ondansetron Injectable 4 every 8 hours  pantoprazole  Injectable 40 daily  polyethylene glycol 3350 17 daily PRN      VITALS:  Vital Signs Last 24 Hrs  T(F): 98.2 (10-19-24 @ 10:42), Max: 102.1 (10-19-24 @ 04:20)    Vital Signs Last 24 Hrs  HR: 120 (10-19-24 @ 10:42) (118 - 136)  BP: 120/69 (10-19-24 @ 10:42) (98/66 - 120/69)  RR: 18 (10-19-24 @ 10:42)  SpO2: 96% (10-19-24 @ 10:42) (91% - 96%)  Wt(kg): --    EXAM:  General: Patient appears comfortable, no acute distress  HEENT: NCAT, PERRL, anicteric sclera, mucous membranes moist and intact  CV: +tachycardic   Lungs: No respiratory distress, CTA b/l, no wheezing, rales or rhonchi  Abd:  +tenderness to palpation throughout. BS4+  Neuro:  No focal deficits noted.   Ext: No cyanosis, no edema  Msk: freely moving upper and lower extremities  Skin: +scattered ecchymosis noted.       Labs:                        11.0   0.54  )-----------( 32       ( 19 Oct 2024 04:55 )             31.7     10-19    147[H]  |  115[H]  |  57[H]  ----------------------------<  114[H]  4.5   |  18[L]  |  1.15    Ca    7.7[L]      19 Oct 2024 05:31  Phos  3.3     10-19  Mg     2.50     10-19    TPro  4.7[L]  /  Alb  2.2[L]  /  TBili  2.2[H]  /  DBili  x   /  AST  63[H]  /  ALT  11  /  AlkPhos  52  10-19      WBC Trend:  WBC Count: 0.54 (10-19-24 @ 04:55)  WBC Count: 0.54 (10-19-24 @ 04:55)  WBC Count: 0.23 (10-18-24 @ 06:20)  WBC Count: 0.22 (10-17-24 @ 22:45)      Auto Neutrophil #: 0.09 K/uL (10-19-24 @ 04:55)  Band Neutrophils %: 6.8 % (10-19-24 @ 04:55)  Auto Neutrophil #: 0.02 K/uL (10-18-24 @ 06:20)  Auto Neutrophil #: 0.08 K/uL (10-17-24 @ 02:51)  Auto Neutrophil #: 0.35 K/uL (10-16-24 @ 10:14)      Creatine Trend:  Creatinine: 1.15 (10-19)  Creatinine: 1.15 (10-19)  Creatinine: 0.76 (10-18)  Creatinine: 0.74 (10-17)      Liver Biochemical Testing Trend:  Alanine Aminotransferase (ALT/SGPT): 11 (10-19)  Alanine Aminotransferase (ALT/SGPT): 10 (10-17)  Alanine Aminotransferase (ALT/SGPT): 9 (10-17)  Alanine Aminotransferase (ALT/SGPT): 9 (10-16)  Alanine Aminotransferase (ALT/SGPT): 12 (10-14)  Aspartate Aminotransferase (AST/SGOT): 63 (10-19-24 @ 05:31)  Aspartate Aminotransferase (AST/SGOT): 52 (10-17-24 @ 22:45)  Aspartate Aminotransferase (AST/SGOT): 60 (10-17-24 @ 02:51)  Aspartate Aminotransferase (AST/SGOT): 68 (10-16-24 @ 06:14)  Aspartate Aminotransferase (AST/SGOT): 62 (10-14-24 @ 06:19)  Bilirubin Total: 2.2 (10-19)  Bilirubin Total: 1.3 (10-17)  Bilirubin Total: 1.1 (10-17)  Bilirubin Total: 1.3 (10-16)  Bilirubin Total: 1.1 (10-14)      Trend LDH      Auto Eosinophil %: 0.0 % (10-19-24 @ 04:55)  Auto Eosinophil %: 0.0 % (10-18-24 @ 06:20)  Auto Eosinophil %: 0.0 % (10-17-24 @ 02:51)      MICROBIOLOGY:        Urinalysis with Rflx Culture (collected 19 Oct 2024 04:55)                                Procalcitonin: 14.25 (10-19)                Lactate, Blood: 2.5 (10-19 @ 08:34)  Blood Gas Venous - Lactate: 2.1 (10-19 @ 04:55)  Blood Gas Venous - Lactate: 1.2 (10-17 @ 02:55)        RADIOLOGY:    ACC: [id number] EXAM:  CT ABDOMEN AND PELVIS IC   ORDERED BY: YIFAN ROE     PROCEDURE DATE:  10/18/2024          INTERPRETATION:  CLINICAL INFORMATION: Severe diarrhea.    COMPARISON: CT abdomen and pelvis from March 6, 2009. Correlation is made   to CT lumbar spine 10/17/2024.    CONTRAST/COMPLICATIONS:  IV Contrast: Omnipaque 350  90 cc administered   10 cc discarded  Oral Contrast: NONE  Complications: None reported at time of study completion    PROCEDURE:  CT of the Abdomen and Pelvis wasperformed.  Sagittal and coronal reformats were performed.    FINDINGS:  LOWER CHEST: Bilateral subsegmental atelectasis.    LIVER: Within normal limits.  BILE DUCTS: Normal caliber.  GALLBLADDER: Cholecystectomy.  SPLEEN: Within normal limits.  PANCREAS: Within normal limits.  ADRENALS: Within normal limits.  KIDNEYS/URETERS: No hydronephrosis. Likely contrast excretion in the   renal calyces. Right renal cyst and hypodense focus too small to   catheterize. Duplicated right kidney.    BLADDER: Within normal limits.  REPRODUCTIVE ORGANS: Prostatectomy.    BOWEL: No bowel obstruction. Appendix is normal. Mural thickening of the   ascending colon, proximal transverse colon, and splenic flexure.   Dilatation of the transverse colon measuring up to 9.0 cm, without loss   of haustra. High density material in the cecum, which was also seen on CT   10/17/2024.  PERITONEUM/RETROPERITONEUM: 4.9 x 3.9 cm mass in the presacral space   adjacent to the rectosigmoid junction. Small volume ascites.  VESSELS: Focal proximal SMA dissection (2-67, 3-89). Atherosclerotic   changes.  LYMPH NODES: Enlarged retroperitoneal lymph nodes. For example:  *  Left para-aortic node, measuring 1.8 x 1.1 cm (2-70)  *  Aortocaval node (series 2, image 90), measuring 1.3 x 1.2 cm  ABDOMINAL WALL: Small fat-containing left inguinal hernia. A 2.5 x 1.5 cm   (2-153) hypodense mass anterior to the right urinary bladder, possibly   right inguinal hernia plug.  BONES: Diffuse lytic osseous metastases with probable pathologic   fractures at L1 and L5.    IMPRESSION:    Focal proximal SMA dissection, with findings concerning for ischemic   colitis of the right colon.    High density luminal contents in the cecum. Differential includes GI   bleed versus ingested material.    No bowel obstruction.    Lytic osseous metastases with probable pathologic fractures at L1 and L5.    Retroperitoneal adenopathy.    Findings discussed with JEREMIAS Cooney NP  by Dr. Ambrocio on 10/19/2024 9:52   AM with read back confirmation.    --- End of Report ---

## 2024-10-19 NOTE — PROGRESS NOTE ADULT - SUBJECTIVE AND OBJECTIVE BOX
GENERAL SURGERY PROGRESS NOTE    Patient: RORY PEACE , 64y ([birth year])Male   MRN: [id number]  Location: [place] [address] A  Visit: 10-12-24 Inpatient  Date: 10-19-24 @ 09:06    Subjective: Patient appears ill, lying in bed uncomfortable.     PAST MEDICAL & SURGICAL HISTORY:  Benign essential HTN      HLD (hyperlipidemia)      CA of prostate      Basal cell carcinoma      History of transurethral prostatectomy      S/P inguinal hernia repair      History of basal cell carcinoma (BCC) excision          Vitals: T(F): 98.7 (10-19-24 @ 06:20), Max: 102.1 (10-19-24 @ 04:20)  HR: 123 (10-19-24 @ 06:20)  BP: 112/68 (10-19-24 @ 06:20)  RR: 18 (10-19-24 @ 06:20)  SpO2: 95% (10-19-24 @ 06:20)      Diet, NPO:   Except Medications      10-18-24 @ 07:01  -  10-19-24 @ 07:00  --------------------------------------------------------  IN:    Oral Fluid: 200 mL  Total IN: 200 mL    OUT:    Blood Loss (mL): 0 mL    Voided (mL): 200 mL  Total OUT: 200 mL    Total NET: 0 mL          PHYSICAL EXAM  GEN: No acute distress   CV: RRR, no JVD  LUNGS: Respirations unlabored, no use of accessory muscles  ABD: Abdomen soft, ND, tender to palpation diffusely   EXT: No peripheral edema. Regular range of motion.     MEDICATIONS  (STANDING):  abiraterone 500 mg tablet 2 Tablet(s) 2 Tablet(s) Oral daily  atorvastatin 20 milliGRAM(s) Oral at bedtime  chlorhexidine 2% Cloths 1 Application(s) Topical daily  enoxaparin Injectable 40 milliGRAM(s) SubCutaneous every 24 hours  fenofibrate Tablet 145 milliGRAM(s) Oral daily  filgrastim-sndz (ZARXIO) Injectable 480 MICROGram(s) SubCutaneous daily  lactobacillus acidophilus 1 Tablet(s) Oral daily  lidocaine   4% Patch 1 Patch Transdermal every 24 hours  lisinopril 40 milliGRAM(s) Oral daily  morphine PCA (5 mG/mL) 30 milliLiter(s) PCA Continuous PCA Continuous  ondansetron Injectable 4 milliGRAM(s) IV Push every 8 hours  pantoprazole  Injectable 40 milliGRAM(s) IV Push daily  piperacillin/tazobactam IVPB.. 3.375 Gram(s) IV Intermittent every 8 hours  sodium chloride 0.9%. 1000 milliLiter(s) (100 mL/Hr) IV Continuous <Continuous>    MEDICATIONS  (PRN):  acetaminophen     Tablet .. 650 milliGRAM(s) Oral every 6 hours PRN Temp greater or equal to 38C (100.4F), Mild Pain (1 - 3), Moderate Pain (4 - 6)  aluminum hydroxide/magnesium hydroxide/simethicone Suspension 30 milliLiter(s) Oral every 4 hours PRN Dyspepsia  morphine  - Injectable 4 milliGRAM(s) IV Push once PRN severe breakthrough pain while off PCA  morphine PCA (5 mG/mL) Rescue Clinician Bolus 4 milliGRAM(s) IV Push every 15 minutes PRN Breakthrough severe pain  naloxone Injectable 0.1 milliGRAM(s) IV Push every 3 minutes PRN For ANY of the following changes in patient status:  A. RR LESS THAN 10 breaths per minute, B. Oxygen saturation LESS THAN 90%, C. Sedation score of 6  OLANZapine 1.25 milliGRAM(s) Oral every 6 hours PRN anxiety  polyethylene glycol 3350 17 Gram(s) Oral daily PRN Constipation      DVT PROPHYLAXIS: SCDs, enoxaparin Injectable 40 milliGRAM(s) SubCutaneous every 24 hours    GI PROPHYLAXIS: pantoprazole  Injectable 40 milliGRAM(s) IV Push daily    ANTICOAGULATION:   ANTIBIOTICS: piperacillin/tazobactam IVPB.. 3.375 Gram(s)        LAB/STUDIES:  CAPILLARY BLOOD GLUCOSE                              11.0   0.54  )-----------( 32       ( 19 Oct 2024 04:55 )             31.7     10-19    147[H]  |  115[H]  |  57[H]  ----------------------------<  114[H]  4.5   |  18[L]  |  1.15    Ca    7.7[L]      19 Oct 2024 05:31  Phos  3.3     10-19  Mg     2.50     10-19    TPro  4.7[L]  /  Alb  2.2[L]  /  TBili  2.2[H]  /  DBili  x   /  AST  63[H]  /  ALT  11  /  AlkPhos  52  10-19               4.7  | 2.2  | 63       ------------------[52      ( 19 Oct 2024 05:31 )  2.2  | x    | 11          Lipase:x      Amylase:x        PT/INR - ( 19 Oct 2024 04:55 )   PT: 31.4 sec;   INR: 2.66 ratio         PTT - ( 19 Oct 2024 04:55 )  PTT:47.8 sec  Urinalysis Basic - ( 19 Oct 2024 05:31 )    Color: x / Appearance: x / SG: x / pH: x  Gluc: 114 mg/dL / Ketone: x  / Bili: x / Urobili: x   Blood: x / Protein: x / Nitrite: x   Leuk Esterase: x / RBC: x / WBC x   Sq Epi: x / Non Sq Epi: x / Bacteria: x              Urinalysis with Rflx Culture (collected 19 Oct 2024 04:55)

## 2024-10-19 NOTE — CHART NOTE - NSCHARTNOTEFT_GEN_A_CORE
Patient is s/p Ct angiogram of Abdomen and pelvis . 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 Moderate atherosclerotic change of the aorta. Large calcified atheroma with overlying soft plaque deposit     64M with history of localized prostate cancer treated with radical prostatectomy, now recently found to have recurrence with extensive metastatic disease involving spine , bone, liver admitted for intractible cancer related pain started on chemotherapy by [facility]. Course c/b neutropenia, neutropenic vs ischemic colitis, now with SMA dissection on CT angio.    SMA dissection   Result discussed with attending Dr. Valle and vascular surgery.  Awaiting recommendation from vascular surgery.  Will monitor CBC Q6 hours.    Will monitor v/s Q4 hours Patient is s/p Ct angiogram of Abdomen and pelvis . 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 Moderate atherosclerotic change of the aorta. Large calcified atheroma with overlying soft plaque deposit     64M with history of localized prostate cancer treated with radical prostatectomy, now recently found to have recurrence with extensive metastatic disease involving spine , bone, liver admitted for intractible cancer related pain started on chemotherapy by [facility]. Course c/b neutropenia, neutropenic vs ischemic colitis, now with SMA dissection on CT angio.    SMA dissection   Result discussed with attending Dr. Valle and vascular surgery.  Awaiting recommendation from vascular surgery.  Will monitor CBC Q6 hours.    Will monitor v/s Q4 hours    Addendum: 2330: Vascular  surgery recommended anticoagulations therapy for SMA dissection. As per hem onc, anticoagulation is contraindicated at this given low platelet count. Attending Dr. Valle aware regarding the above.   Patient seen and assessed with son present t bedside. Patient is lethargic but arousable  to verbal and tactile stimuli.  Patient is currently on morphine PCA pump. As per son , he pushes the pump for patient as patient does not do himself. Educated son  not to push PCA pump as patient as patient is already receiving continuos morphine 1mg / hour. Son verbalized understanding. Plan of care updated to son who wishes to speak to vascular surgery team. Team spoke to son. Patient is s/p Ct angiogram of Abdomen and pelvis . 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 Moderate atherosclerotic change of the aorta. Large calcified atheroma with overlying soft plaque deposit     64M with history of localized prostate cancer treated with radical prostatectomy, now recently found to have recurrence with extensive metastatic disease involving spine , bone, liver admitted for intractable cancer related pain started on chemotherapy by [place]. Course c/b neutropenia, neutropenic vs ischemic colitis, now with SMA dissection on CT angio.    SMA dissection   Result discussed with attending Dr. Park and vascular surgery.  Awaiting recommendation from vascular surgery.  Will monitor CBC Q6 hours.    Will monitor v/s Q4 hours    Addendum: 2330: Vascular  surgery recommended anticoagulations therapy for SMA dissection. As per hem onc, anticoagulation is contraindicated at this given low platelet count. Attending Dr. Park aware regarding the above.   Patient seen and assessed with son present t bedside. Patient is lethargic but arousable  to verbal and tactile stimuli.  Patient is currently on morphine PCA pump. As per son , he pushes the pump for patient as patient does not do himself. Educated son  not to push PCA pump as patient as patient is already receiving continuos morphine 1mg / hour. Son verbalized understanding. Plan of care updated to son who wishes to speak to vascular surgery team. Team spoke to son.  Addendum: 0147: Positive blood culture result - Blood culture done on 10/19 grew gram negative rods and gram negative coccobacilli( PCR - E Coli and Hemophilus influenzae). Patient is already on Zosyn.   Critical platelet count, 96616. Platelet noted to be trending down.  Result discussed with attending Dr. park who recommended to transfuse 1 unit of platelet transfusion.  Patient transferred to  for tele  monitoring.   Patient seen and assessed with son present at bedside.  Patient is more awake than earlier. Patient is alert and oriented x3 with periods of confusion.  Transfusion consent obtained from patient's son after explanation of the risk and benefits.  Sign out given to covering provider .

## 2024-10-19 NOTE — PROGRESS NOTE ADULT - ASSESSMENT
65 y/o M with high volume metastatic prostate cancer, here with back pain, started on chemotherapy.     1. Metastatic prostate cancer  - Follows with Dr Andrew Mendoza at [facility]   - Dx 14 y/o with low risk prostate cancer s/p radical prostatectomy, negative LNs -> recently found to have multiloculated presacral soft tissue mass 5.1 x 3.9 x 4.1cm, RP LAD, low density liver lesions, lucencies in several vertebral bodies, manubrium; PSMA 10/11/24 showed hypermetabolic vera foci above and below the diaphragm, foci in the liver and extensive foci involving the axial and appendicular skeleton compatible with metastatic disease  - --> 374--> 426 on 10/8/24   - Liver biopsy 9/30/24 consistent with adenocarcinoma favoring prostate   - High risk high volume disease -> started on triplet therapy w/ ADT/lupron, abiraterone/prednisone + taxotere. (back pain after Lupron likely tumor flare).  - Continue abiraterone 1000mg daily  - Should be on steroid w/abiraterone; on dex 8mg BID (was for chemo, now per palliative for pain control) - consider tapering to 4mg BID and further from there, if tapered off then needs to be on prednisone 5mg daily w/loly - NOT CURRENTLY ORDERED, PLEASE RE-ORDER STEROIDS   - Received taxotere 60mg/m2 10/13/24     - Neutropenic, continue zarxio 480mcg until ANC > 1500   - Kyphoplasty w/IR to T3 and L1 lesions planned for 10/22/24    - Palliative following for pain control     2. Hypoxia | tachycardia | diarrhea + pancolitis | Fever  - No PE on CTA chest, found to have pancolitis  - C. diff stool test pending   - On zosyn     3. Thrombocytopenia   - Possible ITP component   - Baseline in the 70s  - Taxotere dose reduced to 60mg/m2 given baseline thrombocytopenia   - Transfuse for plt <10, <15 if febrile, <20 if bleeding    - Steroids may help with ITP component but also may need high dose. Nplate can also be considered     4. Neutropenia 2/2 chemotherapy   - Continue daily Zarxio until ANC > 1500   - If fever develops, start broad spectrum antibiotics    Will continue to follow closely     Meera Trotter MD  Hematology/Oncology  [facility]  [phone] (Office)  [phone] (Alt office)  Evenings and weekends please call MD on call or office

## 2024-10-19 NOTE — DIETITIAN INITIAL EVALUATION ADULT - OTHER INFO
64M PMH metastatic prostate ca, HLD, HTN, admitted for neoplasm related pain.     Patient visit attempted 2x today however, patient was busy with another provider. Writer attempted to call documented phone numbers for spouse and son. Son, Tr, directed writer to call pt's other son [phone] as he is more familiar with pt's case. Call attempted but went to voicemail 2x. Assessment deferred to chart review. Patient with poor intake per flowsheet (0-25% intake x 6 between 10/15-10/17). Patient with 2 incidences of 51-75% intake. Patient was on regular -> soft and bite size -> now NPO. Patient with reports of diarrhea on 10/18. Contact precautions ongoing to r/o c.diff. Patient appears with weight loss per HIE (79.4 kg 6/13/24 -> 69.4 kg 10/12/24, -12.6% x 4 months, if accurate). No known food allergies noted.

## 2024-10-20 LAB
ALBUMIN SERPL ELPH-MCNC: 1.9 G/DL — LOW (ref 3.3–5)
ALP SERPL-CCNC: 53 U/L — SIGNIFICANT CHANGE UP (ref 40–120)
ALT FLD-CCNC: 17 U/L — SIGNIFICANT CHANGE UP (ref 4–41)
ANION GAP SERPL CALC-SCNC: 16 MMOL/L — HIGH (ref 7–14)
ANISOCYTOSIS BLD QL: SLIGHT — SIGNIFICANT CHANGE UP
AST SERPL-CCNC: 77 U/L — HIGH (ref 4–40)
BASOPHILS # BLD AUTO: 0 K/UL — SIGNIFICANT CHANGE UP (ref 0–0.2)
BASOPHILS NFR BLD AUTO: 0 % — SIGNIFICANT CHANGE UP (ref 0–2)
BILIRUB SERPL-MCNC: 1.7 MG/DL — HIGH (ref 0.2–1.2)
BUN SERPL-MCNC: 64 MG/DL — HIGH (ref 7–23)
BURR CELLS BLD QL SMEAR: PRESENT — SIGNIFICANT CHANGE UP
CALCIUM SERPL-MCNC: 7.2 MG/DL — LOW (ref 8.4–10.5)
CHLORIDE SERPL-SCNC: 121 MMOL/L — HIGH (ref 98–107)
CO2 SERPL-SCNC: 15 MMOL/L — LOW (ref 22–31)
CREAT SERPL-MCNC: 1.04 MG/DL — SIGNIFICANT CHANGE UP (ref 0.5–1.3)
EGFR: 80 ML/MIN/1.73M2 — SIGNIFICANT CHANGE UP
EOSINOPHIL # BLD AUTO: 0 K/UL — SIGNIFICANT CHANGE UP (ref 0–0.5)
EOSINOPHIL NFR BLD AUTO: 0 % — SIGNIFICANT CHANGE UP (ref 0–6)
FIBRINOGEN PPP-MCNC: 914 MG/DL — HIGH (ref 200–465)
GAS PNL BLDV: SIGNIFICANT CHANGE UP
GIANT PLATELETS BLD QL SMEAR: PRESENT — SIGNIFICANT CHANGE UP
GLUCOSE SERPL-MCNC: 112 MG/DL — HIGH (ref 70–99)
GRAM STN FLD: ABNORMAL
HCT VFR BLD CALC: 27.5 % — LOW (ref 39–50)
HCT VFR BLD CALC: 28.6 % — LOW (ref 39–50)
HGB BLD-MCNC: 9.3 G/DL — LOW (ref 13–17)
HGB BLD-MCNC: 9.6 G/DL — LOW (ref 13–17)
IANC: 6.94 K/UL — SIGNIFICANT CHANGE UP (ref 1.8–7.4)
LDH SERPL L TO P-CCNC: 862 U/L — HIGH (ref 135–225)
LYMPHOCYTES # BLD AUTO: 0.31 K/UL — LOW (ref 1–3.3)
LYMPHOCYTES # BLD AUTO: 3.9 % — LOW (ref 13–44)
MACROCYTES BLD QL: SLIGHT — SIGNIFICANT CHANGE UP
MAGNESIUM SERPL-MCNC: 2.9 MG/DL — HIGH (ref 1.6–2.6)
MANUAL SMEAR VERIFICATION: SIGNIFICANT CHANGE UP
MCHC RBC-ENTMCNC: 30.2 PG — SIGNIFICANT CHANGE UP (ref 27–34)
MCHC RBC-ENTMCNC: 30.6 PG — SIGNIFICANT CHANGE UP (ref 27–34)
MCHC RBC-ENTMCNC: 33.6 GM/DL — SIGNIFICANT CHANGE UP (ref 32–36)
MCHC RBC-ENTMCNC: 33.8 GM/DL — SIGNIFICANT CHANGE UP (ref 32–36)
MCV RBC AUTO: 89.9 FL — SIGNIFICANT CHANGE UP (ref 80–100)
MCV RBC AUTO: 90.5 FL — SIGNIFICANT CHANGE UP (ref 80–100)
METAMYELOCYTES # FLD: 6.8 % — HIGH (ref 0–1)
MONOCYTES # BLD AUTO: 0.46 K/UL — SIGNIFICANT CHANGE UP (ref 0–0.9)
MONOCYTES NFR BLD AUTO: 5.8 % — SIGNIFICANT CHANGE UP (ref 2–14)
MYELOCYTES NFR BLD: 1 % — HIGH (ref 0–0)
NEUTROPHILS # BLD AUTO: 6.52 K/UL — SIGNIFICANT CHANGE UP (ref 1.8–7.4)
NEUTROPHILS NFR BLD AUTO: 72.8 % — SIGNIFICANT CHANGE UP (ref 43–77)
NEUTS BAND # BLD: 9.7 % — HIGH (ref 0–6)
NRBC # BLD: 0 /100 WBCS — SIGNIFICANT CHANGE UP (ref 0–0)
NRBC # FLD: 0 K/UL — SIGNIFICANT CHANGE UP (ref 0–0)
PHOSPHATE SERPL-MCNC: 3.9 MG/DL — SIGNIFICANT CHANGE UP (ref 2.5–4.5)
PLAT MORPH BLD: NORMAL — SIGNIFICANT CHANGE UP
PLATELET # BLD AUTO: 16 K/UL — CRITICAL LOW (ref 150–400)
PLATELET # BLD AUTO: 17 K/UL — CRITICAL LOW (ref 150–400)
PLATELET # BLD AUTO: 21 K/UL — LOW (ref 150–400)
PLATELET COUNT - ESTIMATE: ABNORMAL
POIKILOCYTOSIS BLD QL AUTO: SIGNIFICANT CHANGE UP
POTASSIUM SERPL-MCNC: 4.4 MMOL/L — SIGNIFICANT CHANGE UP (ref 3.5–5.3)
POTASSIUM SERPL-SCNC: 4.4 MMOL/L — SIGNIFICANT CHANGE UP (ref 3.5–5.3)
PROT SERPL-MCNC: 4.6 G/DL — LOW (ref 6–8.3)
RBC # BLD: 3.04 M/UL — LOW (ref 4.2–5.8)
RBC # BLD: 3.18 M/UL — LOW (ref 4.2–5.8)
RBC # FLD: 15.9 % — HIGH (ref 10.3–14.5)
RBC # FLD: 16.8 % — HIGH (ref 10.3–14.5)
RBC BLD AUTO: ABNORMAL
SMUDGE CELLS # BLD: PRESENT — SIGNIFICANT CHANGE UP
SODIUM SERPL-SCNC: 152 MMOL/L — HIGH (ref 135–145)
SPECIMEN SOURCE: SIGNIFICANT CHANGE UP
WBC # BLD: 2.29 K/UL — LOW (ref 3.8–10.5)
WBC # BLD: 7.9 K/UL — SIGNIFICANT CHANGE UP (ref 3.8–10.5)
WBC # FLD AUTO: 2.29 K/UL — LOW (ref 3.8–10.5)
WBC # FLD AUTO: 7.9 K/UL — SIGNIFICANT CHANGE UP (ref 3.8–10.5)

## 2024-10-20 PROCEDURE — 71045 X-RAY EXAM CHEST 1 VIEW: CPT | Mod: 26

## 2024-10-20 PROCEDURE — 99233 SBSQ HOSP IP/OBS HIGH 50: CPT

## 2024-10-20 RX ORDER — 0.9 % SODIUM CHLORIDE 0.9 %
1000 INTRAVENOUS SOLUTION INTRAVENOUS
Refills: 0 | Status: DISCONTINUED | OUTPATIENT
Start: 2024-10-20 | End: 2024-10-22

## 2024-10-20 RX ORDER — POVIDONE, POLYVINYL ALCOHOL 20; 27 G/1000ML; G/1000ML
1 SOLUTION OPHTHALMIC
Refills: 0 | Status: DISCONTINUED | OUTPATIENT
Start: 2024-10-20 | End: 2024-11-18

## 2024-10-20 RX ORDER — ACETAMINOPHEN 500MG 500 MG/1
1000 TABLET, COATED ORAL ONCE
Refills: 0 | Status: COMPLETED | OUTPATIENT
Start: 2024-10-20 | End: 2024-10-21

## 2024-10-20 RX ORDER — INFLUENZA VIRUS VACCINE 15; 15; 15; 15 UG/.5ML; UG/.5ML; UG/.5ML; UG/.5ML
0.5 SUSPENSION INTRAMUSCULAR ONCE
Refills: 0 | Status: DISCONTINUED | OUTPATIENT
Start: 2024-10-20 | End: 2024-11-18

## 2024-10-20 RX ORDER — SODIUM CHLORIDE 9 MG/ML
1000 INJECTION, SOLUTION INTRAMUSCULAR; INTRAVENOUS; SUBCUTANEOUS
Refills: 0 | Status: DISCONTINUED | OUTPATIENT
Start: 2024-10-20 | End: 2024-10-20

## 2024-10-20 RX ORDER — ACETAMINOPHEN 500MG 500 MG/1
1000 TABLET, COATED ORAL ONCE
Refills: 0 | Status: COMPLETED | OUTPATIENT
Start: 2024-10-20 | End: 2024-10-20

## 2024-10-20 RX ADMIN — ACETAMINOPHEN 500MG 400 MILLIGRAM(S): 500 TABLET, COATED ORAL at 03:39

## 2024-10-20 RX ADMIN — Medication 4 MILLIGRAM(S): at 18:01

## 2024-10-20 RX ADMIN — Medication 30 MILLILITER(S): at 07:31

## 2024-10-20 RX ADMIN — FILGRASTIM-AAFI 480 MICROGRAM(S): 300 INJECTION, SOLUTION SUBCUTANEOUS at 17:45

## 2024-10-20 RX ADMIN — CHLORHEXIDINE GLUCONATE 1 APPLICATION(S): 1.2 RINSE ORAL at 18:18

## 2024-10-20 RX ADMIN — SODIUM CHLORIDE 25 MILLILITER(S): 9 INJECTION, SOLUTION INTRAMUSCULAR; INTRAVENOUS; SUBCUTANEOUS at 19:37

## 2024-10-20 RX ADMIN — DEXAMETHASONE 4 MILLIGRAM(S): 1.5 TABLET ORAL at 17:41

## 2024-10-20 RX ADMIN — PIPERACILLIN SODIUM AND TAZOBACTAM SODIUM 25 GRAM(S): 4; .5 INJECTION, POWDER, LYOPHILIZED, FOR SOLUTION INTRAVENOUS at 17:44

## 2024-10-20 RX ADMIN — Medication 4 MILLIGRAM(S): at 18:20

## 2024-10-20 RX ADMIN — ACETAMINOPHEN 500MG 1000 MILLIGRAM(S): 500 TABLET, COATED ORAL at 03:35

## 2024-10-20 RX ADMIN — ONDANSETRON HYDROCHLORIDE 4 MILLIGRAM(S): 4 TABLET, FILM COATED ORAL at 17:44

## 2024-10-20 RX ADMIN — ONDANSETRON HYDROCHLORIDE 4 MILLIGRAM(S): 4 TABLET, FILM COATED ORAL at 09:55

## 2024-10-20 RX ADMIN — DEXAMETHASONE 4 MILLIGRAM(S): 1.5 TABLET ORAL at 05:10

## 2024-10-20 RX ADMIN — Medication 100 MILLILITER(S): at 21:37

## 2024-10-20 RX ADMIN — PIPERACILLIN SODIUM AND TAZOBACTAM SODIUM 25 GRAM(S): 4; .5 INJECTION, POWDER, LYOPHILIZED, FOR SOLUTION INTRAVENOUS at 05:10

## 2024-10-20 RX ADMIN — Medication 1 MG/HR: at 19:35

## 2024-10-20 RX ADMIN — Medication 30 MILLILITER(S): at 01:12

## 2024-10-20 RX ADMIN — PANTOPRAZOLE SODIUM 40 MILLIGRAM(S): 40 TABLET, DELAYED RELEASE ORAL at 17:47

## 2024-10-20 RX ADMIN — Medication 1 MG/HR: at 17:59

## 2024-10-20 NOTE — PROGRESS NOTE ADULT - ASSESSMENT
64-year-old male with metastatic prostate cancer, sent in by hematologist from [facility] for uncontrolled pain, nausea, vomiting.   Patient reports diffuse pain starting 6 months ago significantly worsening for the past 2 weeks.  Currently the worst pain is located around the lower back.   No loss of bladder and bowel function, no saddle paresthesia.  Able to walk.  patient is oxycodone 5 every 4-6 hour.  Yesterday they started adding OxyContin 10.  However patient continued to have pain.  Family also reports significant nausea and vomiting, inability to tolerate p.o. for the last 2 days.  Last bowel movement 2 days ago.   No known allergy.  Diagnosed with high risk high volume metastatic prostate cancer with bone, liver lymph node mets and presacral mass. Liver biopsy confirmed disease with . Started lupron, loly/pred with plans to initiate taxotere.    (12 Oct 2024 15:40)  pt seems very frustrated:   he is on 2 L of oxygen : he has no underlying lung disease:  but he is requiring 2 L of oxygen      Hypoxic resp failure  Metastatic prostate cancer  Back pain     Hypoxic resp failure  -He has no underlying lung disease:  but he is requiring 2 L of oxygen :   -CTA showed; No pulmonary embolism to the level of the segmental branches bilaterally. Limited evaluation of the subsegmental branches secondary to incomplete contrast opacification.  Multilevel vertebral body lytic lesions and loss of vertebral body height, better evaluated on CT thoracic spine 10/17/2024. Metastatic prostate cancer  -Patent central airways. Bibasilar atelectasis. No pleural effusion. MEDIASTINUM AND KATTY: No lymphadenopathy.  PULMONARY ANGIOGRAM: No pulmonary embolism to the level of the segmental   branches bilaterally. Limited evaluation of the subsegmental branches secondary to incomplete contrast opacification.    10/19:  -seems pretty tired;  on 2 L of oxygen :  -cta chest showed: No pulmonary embolism to the level of the segmental branches bilaterally. Limited evaluation of the subsegmental branches secondary to incomplete   contrast opacification. Multilevel vertebral body lytic lesions and loss of vertebral body height, better evaluated on CT thoracic spine 10/17/2024.  -still with fever:  no pe  on zosyn  and leukopenic hemonc following;  has metastatic prostate ca:   -VBG seems OK:     10/20:  pulm wise he seems to be stable:   -using 2 Lof oxygen    -yesterdays VBG with minimal met acidosis  cta again noted    New finding:  SMA dissection :  Diagnosed yesterday on ct abd:  defer to surgery and primary team:  probably no intervention:       Back pain   -likely due to metastatic disease:  adm here for sever pain :  pain control per primary team   -venous ph is ok     dior pmd and family

## 2024-10-20 NOTE — PROGRESS NOTE ADULT - SUBJECTIVE AND OBJECTIVE BOX
Date of Service: 10-20-24 @ 11:23    Patient is a 64y old  Male who presents with a chief complaint of Pain (20 Oct 2024 09:58)      Any change in ROS: seems to be doing  ok : no sob:  family at bedside : on 2 L:   no sob:  no cough : no phlegm      MEDICATIONS  (STANDING):  abiraterone 500 mg tablet 2 Tablet(s) 2 Tablet(s) Oral daily  atorvastatin 20 milliGRAM(s) Oral at bedtime  chlorhexidine 2% Cloths 1 Application(s) Topical daily  dexAMETHasone  Injectable 4 milliGRAM(s) IV Push two times a day  fenofibrate Tablet 145 milliGRAM(s) Oral daily  filgrastim-sndz (ZARXIO) Injectable 480 MICROGram(s) SubCutaneous daily  lactobacillus acidophilus 1 Tablet(s) Oral daily  lidocaine   4% Patch 1 Patch Transdermal every 24 hours  morphine PCA (5 mG/mL) 30 milliLiter(s) PCA Continuous PCA Continuous  ondansetron Injectable 4 milliGRAM(s) IV Push every 8 hours  pantoprazole  Injectable 40 milliGRAM(s) IV Push daily  piperacillin/tazobactam IVPB.. 3.375 Gram(s) IV Intermittent every 8 hours  sodium chloride 0.9%. 1000 milliLiter(s) (100 mL/Hr) IV Continuous <Continuous>    MEDICATIONS  (PRN):  acetaminophen     Tablet .. 650 milliGRAM(s) Oral every 6 hours PRN Temp greater or equal to 38C (100.4F), Mild Pain (1 - 3), Moderate Pain (4 - 6)  aluminum hydroxide/magnesium hydroxide/simethicone Suspension 30 milliLiter(s) Oral every 4 hours PRN Dyspepsia  morphine  - Injectable 4 milliGRAM(s) IV Push once PRN severe breakthrough pain while off PCA  morphine PCA (5 mG/mL) Rescue Clinician Bolus 4 milliGRAM(s) IV Push every 15 minutes PRN Breakthrough severe pain  naloxone Injectable 0.1 milliGRAM(s) IV Push every 3 minutes PRN For ANY of the following changes in patient status:  A. RR LESS THAN 10 breaths per minute, B. Oxygen saturation LESS THAN 90%, C. Sedation score of 6  OLANZapine 1.25 milliGRAM(s) Oral every 6 hours PRN anxiety  polyethylene glycol 3350 17 Gram(s) Oral daily PRN Constipation    Vital Signs Last 24 Hrs  T(C): 36.8 (20 Oct 2024 07:30), Max: 37.5 (20 Oct 2024 03:30)  T(F): 98.3 (20 Oct 2024 07:30), Max: 99.5 (20 Oct 2024 03:30)  HR: 120 (20 Oct 2024 07:30) (115 - 129)  BP: 108/65 (20 Oct 2024 07:30) (93/61 - 110/60)  BP(mean): --  RR: 18 (20 Oct 2024 07:30) (18 - 19)  SpO2: 97% (20 Oct 2024 07:30) (93% - 98%)    Parameters below as of 20 Oct 2024 07:30  Patient On (Oxygen Delivery Method): nasal cannula  O2 Flow (L/min): 4      I&O's Summary    19 Oct 2024 07:01  -  20 Oct 2024 07:00  --------------------------------------------------------  IN: 0 mL / OUT: 400 mL / NET: -400 mL          Physical Exam:   GENERAL: NAD, well-groomed, well-developed  HEENT: CHAVA/   Atraumatic, Normocephalic  ENMT: No tonsillar erythema, exudates, or enlargement; Moist mucous membranes, Good dentition, No lesions  NECK: Supple, No JVD, Normal thyroid  CHEST/LUNG: Clear to auscultaion  CVS: Regular rate and rhythm; No murmurs, rubs, or gallops  GI: : Soft, Nontender, Nondistended; Bowel sounds present  NERVOUS SYSTEM:  Alert & Oriented X3  EXTREMITIES:  - edema  LYMPH: No lymphadenopathy noted  SKIN: No rashes or lesions  ENDOCRINOLOGY: No Thyromegaly  PSYCH: Appropriate    Labs:  18, 25                            9.6    2.29  )-----------( 16       ( 19 Oct 2024 23:23 )             28.6                         10.2   1.18  )-----------( 25       ( 19 Oct 2024 15:40 )             29.3                         11.0   0.54  )-----------( 32       ( 19 Oct 2024 04:55 )             31.7                         12.3   0.23  )-----------( 45       ( 18 Oct 2024 06:20 )             35.5                         12.1   0.22  )-----------( 38       ( 17 Oct 2024 22:45 )             35.5                         12.2   0.31  )-----------( 37       ( 17 Oct 2024 02:51 )             35.0     10-19    147[H]  |  115[H]  |  57[H]  ----------------------------<  114[H]  4.5   |  18[L]  |  1.15  10-18    144  |  110[H]  |  30[H]  ----------------------------<  166[H]  4.5   |  21[L]  |  0.76  10-17    144  |  110[H]  |  29[H]  ----------------------------<  167[H]  4.4   |  22  |  0.74  10-17    141  |  107  |  35[H]  ----------------------------<  157[H]  4.4   |  22  |  0.75    Ca    7.7[L]      19 Oct 2024 05:31  Phos  3.3     10-19  Mg     2.50     10-19    TPro  4.7[L]  /  Alb  2.2[L]  /  TBili  2.2[H]  /  DBili  x   /  AST  63[H]  /  ALT  11  /  AlkPhos  52  10-19  TPro  5.2[L]  /  Alb  3.0[L]  /  TBili  1.3[H]  /  DBili  x   /  AST  52[H]  /  ALT  10  /  AlkPhos  53  10-17  TPro  5.4[L]  /  Alb  3.4  /  TBili  1.1  /  DBili  x   /  AST  60[H]  /  ALT  9   /  AlkPhos  64  10-17    CAPILLARY BLOOD GLUCOSE          LIVER FUNCTIONS - ( 19 Oct 2024 05:31 )  Alb: 2.2 g/dL / Pro: 4.7 g/dL / ALK PHOS: 52 U/L / ALT: 11 U/L / AST: 63 U/L / GGT: x           PT/INR - ( 19 Oct 2024 15:40 )   PT: 30.8 sec;   INR: 2.61 ratio         PTT - ( 19 Oct 2024 15:40 )  PTT:51.2 sec  Urinalysis Basic - ( 19 Oct 2024 05:31 )    Color: x / Appearance: x / SG: x / pH: x  Gluc: 114 mg/dL / Ketone: x  / Bili: x / Urobili: x   Blood: x / Protein: x / Nitrite: x   Leuk Esterase: x / RBC: x / WBC x   Sq Epi: x / Non Sq Epi: x / Bacteria: x      Procalcitonin: 14.25 ng/mL (10-19 @ 05:31)  Lactate, Blood: 1.9 mmol/L (10-19 @ 13:17)  Lactate, Blood: 2.5 mmol/L (10-19 @ 08:34)        RECENT CULTURES:  10-19 @ 05:26 .Blood BLOOD       Growth in aerobic and anaerobic bottles: Gram Negative Rods and Gram  Negative Coccobacilli           Growth in aerobic and anaerobic bottles: Gram Negative Rods and Gram  Negative Coccobacilli    10-19 @ 04:55 .Blood BLOOD   PCR    Growth in aerobic and anaerobic bottles: Gram Negative Rods    Blood Culture PCR  Blood Culture PCR     Growth in aerobic and anaerobic bottles: Gram Negative Rods  Direct identification is available within approximately 3-5  hours either by Blood Panel Multiplexed PCR or Direct  MALDI-TOF. Details: [email or web address]          RESPIRATORY CULTURES:      Clostridium difficile GDH Toxins A&amp;B, EIA:   Negative (10-18 @ 23:53)      Studies  Chest X-RAY  CT SCAN Chest   Venous Dopplers: LE:   CT Abdomen  Others    ra< from: CT Angio Abdomen and Pelvis w/ IV Cont (10.19.24 @ 18:39) >  IMPRESSION:  Dissection of the SMA as above.    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    Findings were discussed with Albino Matthews NP by Joe Carey M.D. on     < end of copied text >  < from: CT Angio Chest PE Protocol w/ IV Cont (10.18.24 @ 03:06) >  vertebral body height.    IMPRESSION:    No pulmonary embolism to the level of the segmental branches bilaterally.   Limited evaluation of the subsegmental branches secondary to incomplete   contrast opacification.    Multilevel vertebral body lytic lesions and loss of vertebral body   height, better evaluated on CT thoracic spine 10/17/2024.        --- End of Report ---      < end of copied text >

## 2024-10-20 NOTE — PROGRESS NOTE ADULT - SUBJECTIVE AND OBJECTIVE BOX
Patient is a 64y old  Male who presents with a chief complaint of Pain (20 Oct 2024 11:23)    Date of servie : 10-20-24 @ 14:14  INTERVAL HPI/OVERNIGHT EVENTS:  T(C): 36.8 (10-20-24 @ 07:30), Max: 37.5 (10-20-24 @ 03:30)  HR: 120 (10-20-24 @ 07:30) (115 - 129)  BP: 108/65 (10-20-24 @ 07:30) (97/65 - 110/60)  RR: 18 (10-20-24 @ 07:30) (18 - 19)  SpO2: 97% (10-20-24 @ 07:30) (93% - 98%)  Wt(kg): --  I&O's Summary    19 Oct 2024 07:01  -  20 Oct 2024 07:00  --------------------------------------------------------  IN: 0 mL / OUT: 400 mL / NET: -400 mL        LABS:                        9.6    2.29  )-----------( 16       ( 19 Oct 2024 23:23 )             28.6     10-19    147[H]  |  115[H]  |  57[H]  ----------------------------<  114[H]  4.5   |  18[L]  |  1.15    Ca    7.7[L]      19 Oct 2024 05:31  Phos  3.3     10-19  Mg     2.50     10-19    TPro  4.7[L]  /  Alb  2.2[L]  /  TBili  2.2[H]  /  DBili  x   /  AST  63[H]  /  ALT  11  /  AlkPhos  52  10-19    PT/INR - ( 19 Oct 2024 15:40 )   PT: 30.8 sec;   INR: 2.61 ratio         PTT - ( 19 Oct 2024 15:40 )  PTT:51.2 sec  Urinalysis Basic - ( 19 Oct 2024 05:31 )    Color: x / Appearance: x / SG: x / pH: x  Gluc: 114 mg/dL / Ketone: x  / Bili: x / Urobili: x   Blood: x / Protein: x / Nitrite: x   Leuk Esterase: x / RBC: x / WBC x   Sq Epi: x / Non Sq Epi: x / Bacteria: x      CAPILLARY BLOOD GLUCOSE            Urinalysis Basic - ( 19 Oct 2024 05:31 )    Color: x / Appearance: x / SG: x / pH: x  Gluc: 114 mg/dL / Ketone: x  / Bili: x / Urobili: x   Blood: x / Protein: x / Nitrite: x   Leuk Esterase: x / RBC: x / WBC x   Sq Epi: x / Non Sq Epi: x / Bacteria: x        MEDICATIONS  (STANDING):  abiraterone 500 mg tablet 2 Tablet(s) 2 Tablet(s) Oral daily  atorvastatin 20 milliGRAM(s) Oral at bedtime  chlorhexidine 2% Cloths 1 Application(s) Topical daily  dexAMETHasone  Injectable 4 milliGRAM(s) IV Push two times a day  fenofibrate Tablet 145 milliGRAM(s) Oral daily  filgrastim-sndz (ZARXIO) Injectable 480 MICROGram(s) SubCutaneous daily  lactobacillus acidophilus 1 Tablet(s) Oral daily  lidocaine   4% Patch 1 Patch Transdermal every 24 hours  morphine  Infusion. 1 mG/Hr (1 mL/Hr) IV Continuous <Continuous>  ondansetron Injectable 4 milliGRAM(s) IV Push every 8 hours  pantoprazole  Injectable 40 milliGRAM(s) IV Push daily  piperacillin/tazobactam IVPB.. 3.375 Gram(s) IV Intermittent every 8 hours  sodium chloride 0.9%. 1000 milliLiter(s) (100 mL/Hr) IV Continuous <Continuous>    MEDICATIONS  (PRN):  acetaminophen     Tablet .. 650 milliGRAM(s) Oral every 6 hours PRN Temp greater or equal to 38C (100.4F), Mild Pain (1 - 3), Moderate Pain (4 - 6)  aluminum hydroxide/magnesium hydroxide/simethicone Suspension 30 milliLiter(s) Oral every 4 hours PRN Dyspepsia  morphine  - Injectable 4 milliGRAM(s) IV Push every 3 hours PRN Severe Pain (7 - 10)  naloxone Injectable 0.1 milliGRAM(s) IV Push every 3 minutes PRN For ANY of the following changes in patient status:  A. RR LESS THAN 10 breaths per minute, B. Oxygen saturation LESS THAN 90%, C. Sedation score of 6  OLANZapine 1.25 milliGRAM(s) Oral every 6 hours PRN anxiety  polyethylene glycol 3350 17 Gram(s) Oral daily PRN Constipation          PHYSICAL EXAM:  GENERAL: NAD, well-groomed, well-developed  HEAD:  Atraumatic, Normocephalic  CHEST/LUNG: Clear to percussion bilaterally; No rales, rhonchi, wheezing, or rubs  HEART: Regular rate and rhythm; No murmurs, rubs, or gallops  ABDOMEN: Soft, Nontender, Nondistended; Bowel sounds present  EXTREMITIES:  2+ Peripheral Pulses, No clubbing, cyanosis, or edema  LYMPH: No lymphadenopathy noted  SKIN: No rashes or lesions    Care Discussed with Consultants/Other Providers [ ] YES  [ ] NO

## 2024-10-20 NOTE — PROGRESS NOTE ADULT - ASSESSMENT
64-year-old male with metastatic prostate cancer, sent in by hematologist from [facility] for uncontrolled pain, nausea, vomiting.       Problem/Plan - 1:  ·  Problem: Cancer related pain.   ·  Plan: - appreciate pall care recommendations:  - pain control as per palliative crae   - cw decadron   - RT consult     Problem/Plan - 2:·  Problem: Nausea & vomiting., Diarrhea, colitis   ·  Plan: -  vascular fu appreciated  - start  AC as per vascular but heme not clearing for AC  - fu official CTA results   - GI and surgery consult  - ID fu for ro infectious colitis  - ICU consult appreciated     Problem/Plan - 3:  ·  Problem: CA of prostate.   ·  Plan: Metastatic prostate cancer- f/u heme/onc recommendations  - Rad Onc fu   - Palliative for symptom control.    Problem/Plan - 4:  ·  Problem: Thrombocytopenia, unspecified.   ·  Plan: -platelet baseline ~70  -monitor, will need transfused for plt <10   - f/u heme/onc for further recommendations.    Problem/Plan - 5:  ·  Problem: Benign essential HTN.   ·  Plan: - cont. home lisinopril 40mg.  Problem/Plan - 6:·  Problem: Hyperlipidemia. ·  Plan: - cont. home atorvastatin 20 and fenofibrate.    Problem/Plan - 7:  ·  Problem: Need for prophylactic measure.   ·  Plan: lovenox.  Prognosis guarded  dw pts wife

## 2024-10-20 NOTE — PROGRESS NOTE ADULT - SUBJECTIVE AND OBJECTIVE BOX
GENERAL SURGERY PROGRESS NOTE    Patient: RORY PEACE , 64y ([birth year])Male   MRN: [id number]  Location: [place] [address] A  Visit: 10-12-24 Inpatient  Date: 10-20-24 @ 07:28    Subjective: Patient appears Ill lying in bed. Poorly responsive.     PAST MEDICAL & SURGICAL HISTORY:  Benign essential HTN      HLD (hyperlipidemia)      CA of prostate      Basal cell carcinoma      History of transurethral prostatectomy      S/P inguinal hernia repair      History of basal cell carcinoma (BCC) excision          Vitals: T(F): 98.6 (10-20-24 @ 04:55), Max: 99.5 (10-20-24 @ 03:30)  HR: 125 (10-20-24 @ 04:55)  BP: 109/68 (10-20-24 @ 04:55)  RR: 18 (10-20-24 @ 04:55)  SpO2: 97% (10-20-24 @ 04:55)      Diet, NPO:   Except Medications      10-19-24 @ 07:01  -  10-20-24 @ 07:00  --------------------------------------------------------  IN:  Total IN: 0 mL    OUT:    Blood Loss (mL): 0 mL    Voided (mL): 400 mL  Total OUT: 400 mL    Total NET: -400 mL    PHYSICAL EXAM  GEN: Appears Ill,   CV: Sinus tachycardia, no JVD  LUNGS: Respirations labored but stable in NC  ABD: Abdomen soft, ND, tender to palpation diffusely   EXT: No peripheral edema.     MEDICATIONS  (STANDING):  abiraterone 500 mg tablet 2 Tablet(s) 2 Tablet(s) Oral daily  atorvastatin 20 milliGRAM(s) Oral at bedtime  chlorhexidine 2% Cloths 1 Application(s) Topical daily  dexAMETHasone  Injectable 4 milliGRAM(s) IV Push two times a day  fenofibrate Tablet 145 milliGRAM(s) Oral daily  filgrastim-sndz (ZARXIO) Injectable 480 MICROGram(s) SubCutaneous daily  lactobacillus acidophilus 1 Tablet(s) Oral daily  lidocaine   4% Patch 1 Patch Transdermal every 24 hours  morphine PCA (5 mG/mL) 30 milliLiter(s) PCA Continuous PCA Continuous  ondansetron Injectable 4 milliGRAM(s) IV Push every 8 hours  pantoprazole  Injectable 40 milliGRAM(s) IV Push daily  piperacillin/tazobactam IVPB.. 3.375 Gram(s) IV Intermittent every 8 hours  sodium chloride 0.9%. 1000 milliLiter(s) (100 mL/Hr) IV Continuous <Continuous>    MEDICATIONS  (PRN):  acetaminophen     Tablet .. 650 milliGRAM(s) Oral every 6 hours PRN Temp greater or equal to 38C (100.4F), Mild Pain (1 - 3), Moderate Pain (4 - 6)  aluminum hydroxide/magnesium hydroxide/simethicone Suspension 30 milliLiter(s) Oral every 4 hours PRN Dyspepsia  morphine  - Injectable 4 milliGRAM(s) IV Push once PRN severe breakthrough pain while off PCA  morphine PCA (5 mG/mL) Rescue Clinician Bolus 4 milliGRAM(s) IV Push every 15 minutes PRN Breakthrough severe pain  naloxone Injectable 0.1 milliGRAM(s) IV Push every 3 minutes PRN For ANY of the following changes in patient status:  A. RR LESS THAN 10 breaths per minute, B. Oxygen saturation LESS THAN 90%, C. Sedation score of 6  OLANZapine 1.25 milliGRAM(s) Oral every 6 hours PRN anxiety  polyethylene glycol 3350 17 Gram(s) Oral daily PRN Constipation      DVT PROPHYLAXIS: SCDs,   GI PROPHYLAXIS: pantoprazole  Injectable 40 milliGRAM(s) IV Push daily    ANTICOAGULATION:   ANTIBIOTICS: piperacillin/tazobactam IVPB.. 3.375 Gram(s)        LAB/STUDIES:  CAPILLARY BLOOD GLUCOSE                              9.6    2.29  )-----------( 16       ( 19 Oct 2024 23:23 )             28.6     10-19    147[H]  |  115[H]  |  57[H]  ----------------------------<  114[H]  4.5   |  18[L]  |  1.15    Ca    7.7[L]      19 Oct 2024 05:31  Phos  3.3     10-19  Mg     2.50     10-19    TPro  4.7[L]  /  Alb  2.2[L]  /  TBili  2.2[H]  /  DBili  x   /  AST  63[H]  /  ALT  11  /  AlkPhos  52  10-19               4.7  | 2.2  | 63       ------------------[52      ( 19 Oct 2024 05:31 )  2.2  | x    | 11          Lipase:x      Amylase:x        PT/INR - ( 19 Oct 2024 15:40 )   PT: 30.8 sec;   INR: 2.61 ratio         PTT - ( 19 Oct 2024 15:40 )  PTT:51.2 sec  Urinalysis Basic - ( 19 Oct 2024 05:31 )    Color: x / Appearance: x / SG: x / pH: x  Gluc: 114 mg/dL / Ketone: x  / Bili: x / Urobili: x   Blood: x / Protein: x / Nitrite: x   Leuk Esterase: x / RBC: x / WBC x   Sq Epi: x / Non Sq Epi: x / Bacteria: x              Culture - Blood (collected 19 Oct 2024 05:26)  Source: .Blood BLOOD  Gram Stain (19 Oct 2024 21:50):    Growth in aerobic and anaerobic bottles: Gram Negative Rods and Gram    Negative Coccobacilli  Preliminary Report (19 Oct 2024 21:51):    Growth in aerobic and anaerobic bottles: Gram Negative Rods and Gram    Negative Coccobacilli    Culture - Blood (collected 19 Oct 2024 04:55)  Source: .Blood BLOOD  Gram Stain (19 Oct 2024 20:35):    Growth in aerobic and anaerobic bottles: Gram Negative Rods  Preliminary Report (19 Oct 2024 20:35):    Growth in aerobic and anaerobic bottles: Gram Negative Rods    Direct identification is available within approximately 3-5    hours either by Blood Panel Multiplexed PCR or Direct    MALDI-TOF. Details: [email or web address]  Organism: Blood Culture PCR (19 Oct 2024 22:05)  Organism: Blood Culture PCR (19 Oct 2024 22:05)    Urinalysis with Rflx Culture (collected 19 Oct 2024 04:55)

## 2024-10-20 NOTE — PROGRESS NOTE ADULT - SUBJECTIVE AND OBJECTIVE BOX
Infectious Diseases Follow Up:    Patient is a 64y old  Male who presents with a chief complaint of Pain (20 Oct 2024 07:27)      Interval History/ROS:  BCx w/ H influenza, E Coli  Pt resting comfortably, son at bedside. Only 1 episode of diarrhea ON.  Elevated temperature during platelet transfusion     Allergies  No Known Allergies        ANTIMICROBIALS:  piperacillin/tazobactam IVPB.. 3.375 every 8 hours      Current Abx:     Previous Abx     OTHER MEDS:  MEDICATIONS  (STANDING):  acetaminophen     Tablet .. 650 every 6 hours PRN  aluminum hydroxide/magnesium hydroxide/simethicone Suspension 30 every 4 hours PRN  atorvastatin 20 at bedtime  dexAMETHasone  Injectable 4 two times a day  fenofibrate Tablet 145 daily  filgrastim-sndz (ZARXIO) Injectable 480 daily  morphine  - Injectable 4 once PRN  morphine PCA (5 mG/mL) 30 PCA Continuous  morphine PCA (5 mG/mL) Rescue Clinician Bolus 4 every 15 minutes PRN  OLANZapine 1.25 every 6 hours PRN  ondansetron Injectable 4 every 8 hours  pantoprazole  Injectable 40 daily  polyethylene glycol 3350 17 daily PRN      Vital Signs Last 24 Hrs  T(C): 37 (20 Oct 2024 04:55), Max: 37.5 (20 Oct 2024 03:30)  T(F): 98.6 (20 Oct 2024 04:55), Max: 99.5 (20 Oct 2024 03:30)  HR: 125 (20 Oct 2024 04:55) (115 - 129)  BP: 109/68 (20 Oct 2024 04:55) (93/61 - 120/69)  BP(mean): --  RR: 18 (20 Oct 2024 04:55) (18 - 19)  SpO2: 97% (20 Oct 2024 04:55) (93% - 98%)    Parameters below as of 20 Oct 2024 04:55  Patient On (Oxygen Delivery Method): nasal cannula  O2 Flow (L/min): 4      PHYSICAL EXAM:  GENERAL: NAD, well-developed  HEAD:  Atraumatic, Normocephalic  EYES: EOMI, conjunctiva and sclera clear  CHEST/LUNG: Clear to auscultation bilaterally; No wheeze  HEART: Regular rate and rhythm; No murmurs, rubs, or gallops  ABDOMEN: Soft, ND, some abdominal tenderness, not acute   PSYCH: AAOx0                        9.6    2.29  )-----------( 16       ( 19 Oct 2024 23:23 )             28.6       10-19    147[H]  |  115[H]  |  57[H]  ----------------------------<  114[H]  4.5   |  18[L]  |  1.15    Ca    7.7[L]      19 Oct 2024 05:31  Phos  3.3     10-19  Mg     2.50     10-19    TPro  4.7[L]  /  Alb  2.2[L]  /  TBili  2.2[H]  /  DBili  x   /  AST  63[H]  /  ALT  11  /  AlkPhos  52  10-19      Urinalysis Basic - ( 19 Oct 2024 05:31 )    Color: x / Appearance: x / SG: x / pH: x  Gluc: 114 mg/dL / Ketone: x  / Bili: x / Urobili: x   Blood: x / Protein: x / Nitrite: x   Leuk Esterase: x / RBC: x / WBC x   Sq Epi: x / Non Sq Epi: x / Bacteria: x        MICROBIOLOGY:  v  .Blood BLOOD  10-19-24   Growth in aerobic and anaerobic bottles: Gram Negative Rods and Gram  Negative Coccobacilli  --    Growth in aerobic and anaerobic bottles: Gram Negative Rods and Gram  Negative Coccobacilli      .Blood BLOOD  10-19-24   Growth in aerobic and anaerobic bottles: Gram Negative Rods  Direct identification is available within approximately 3-5  hours either by Blood Panel Multiplexed PCR or Direct  MALDI-TOF. Details: [email or web address]  --  Blood Culture PCR            Clostridium difficile GDH Toxins A&amp;B, EIA:   Negative (10-18-24 @ 23:53)  Clostridium difficile GDH Interpretation: Negative for toxigenic C. Difficile.  This specimen is negative for C.  Difficile glutamate dehydrogenase (GDH) antigen and negative for C.  Difficile Toxins A & B, by EIA.  GDH is a highly sensitive screening  marker for C. Difficile that is produced in large amounts by all C.  Difficile strains, both toxigenic and nontoxigenic.  This assay has not  been validated as a test of cure.  Repeat testing during the same episode  of diarrhea is of limited value and is discouraged.  The results of this  assay should always be interpreted in conjunction with patient's clinical  history. (10-18-24 @ 23:53)      RADIOLOGY:  < from: CT Angio Abdomen and Pelvis w/ IV Cont (10.19.24 @ 18:39) >  IMPRESSION:  Dissection of the SMA as above.    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    < end of copied text >

## 2024-10-20 NOTE — PROGRESS NOTE ADULT - ASSESSMENT
64M metastatic prostate cancer, sent in by hematologist from [facility] for uncontrolled pain, nausea, vomiting, Surgery was consulted at 5:00pm for diarrhea and concerns for malignant LBO. As of today the patient had large volume liquid stool concerning for C. Diff. Primary team sent a C. Diff panel. Results still pending. Today the patient also had a CTA of the chest. On the CTA of the chest showed very dilated transverse colon concerning for LBO.  Patient was seen and examined by general surgery at the bedside. Patient was found to be tachycardiac to 130s. Patient has WBC 0. Patient found to be diffusely tender, but not peritoneal. Patient with CTA showing colitis and SMA dissection.     Plan:   - C.diff negative   - Likely diagnosis of neutropenic enterocolitis vs. Ischemic colitis  - FU ID recs    - Goals of care conversation  - No acute plan for surgical intervention at this time  - Please keep NPO     Colorectal Surgery   [id number]

## 2024-10-20 NOTE — PROGRESS NOTE ADULT - ASSESSMENT
65 y/o M with high volume metastatic prostate cancer, here with back pain, started on chemotherapy.     1. Metastatic prostate cancer  - Follows with Dr Andrew Mendoza at [facility]   - Dx 14 y/o with low risk prostate cancer s/p radical prostatectomy, negative LNs -> recently found to have multiloculated presacral soft tissue mass 5.1 x 3.9 x 4.1cm, RP LAD, low density liver lesions, lucencies in several vertebral bodies, manubrium; PSMA 10/11/24 showed hypermetabolic vera foci above and below the diaphragm, foci in the liver and extensive foci involving the axial and appendicular skeleton compatible with metastatic disease  - --> 374--> 426 on 10/8/24   - Liver biopsy 9/30/24 consistent with adenocarcinoma favoring prostate   - High risk high volume disease -> started on triplet therapy w/ ADT/lupron, abiraterone/prednisone + taxotere. (back pain after Lupron likely tumor flare).  - Continue abiraterone 1000mg daily  - Should be on steroid w/abiraterone; on dex 4mg BID (was for chemo, now per palliative for pain control) - consider tapering to 2mg BID and further from there, if tapered off then needs to be on prednisone 5mg daily w/loly  - Received taxotere 60mg/m2 10/13/24     - Neutropenic, continue zarxio 480mcg until ANC > 1500   - Kyphoplasty w/IR to T3 and L1 lesions planned for 10/22/24    - Palliative following for pain control and given hospital course would have further GOC discussions. Treatment will be offered but he has metastatic disease with visceral involvement and complications as outlined below.     2. Hypoxia | tachycardia | diarrhea + pancolitis | Fever l Sepsis with Bacteremia   Bacteremia- GNR and Gram negative coccobaccili   - No PE on CTA chest, found to have pancolitis  - C. diff negative  - On zosyn   - ID and GI follow up     3. Thrombocytopenia   - Possible ITP component now with chemotherapy induced   - Baseline in the 70s  - Taxotere dose reduced to 60mg/m2 given baseline thrombocytopenia   - Transfuse for plt <10, <15 if febrile, <20 if bleeding    - Steroids may help with ITP component but also may need high dose. Nplate can also be considered   - defer to vascular regarding SMA dissection or ischemic colitis management- if they feel strongly that heparin is needed will need plt transfusion and to maintain >50K for AC     4. Neutropenia 2/2 chemotherapy   - Continue daily Zarxio until ANC > 1500   - bacteremia as above, ID follow up and c/w abx       5. Ischemic Colitis vs Infectious Colitis   - possible combination of both   - SMA dissection also noted  - colorectal and vascular surgery recs appreciated- no acute surgical intervention  - heparin per vascular but as above would transfuse if they feel there is an ischemic component and heparin is necessary    Will continue to follow closely    Agree with GOC and palliative discussions. Have discussed with family at length guarded prognosis and he has metastatic disease which is not curable. Recent complications such as SMA dissection with possible ischemic colitis- bactermia, high risk of further decline despite optimal therapy. Continue GI, ID, colorectal surgery, vascular surgery follow up. Continue to support his counts with transfusions as needed and GCSF support. Appreciate primary team care.   Agreed with ICU eval- not a candidate at this time. Monitor closely and may need ICU re-eval if further decompensates     Marlon Tate MD  Hematology/Oncology  [facility]  [phone] (Office)  [phone] (Alt office)  Evenings and weekends please call MD on call or office

## 2024-10-20 NOTE — CHART NOTE - NSCHARTNOTEFT_GEN_A_CORE
Patient with intermittent confusion. Labs this afternoon, patient hypernatremic 152 from 147, hyperchloremic 121 from 115, anion gap 16 from 14, and BUN 64 from 57. Case reviewed with Attending, IV D5 1/2NS initiated at 100ml/hr. Will monitor q6 h BMP.

## 2024-10-20 NOTE — CHART NOTE - NSCHARTNOTEFT_GEN_A_CORE
Chart Reviewed.   24 PCA pump reviewed : Injections 11, Attempts - 13, CAB Doses 0.   In past 24 hours, received total of IV Morphine 65.5mg (based on reservoir volume from PCA).     Spoke with ACP Mandie regarding patient's clinical status. Per discussion, patient awake but confused and restless such as reaching to face towards nasal canula and reaching at lau. Patient had also stated he didn't know how he was feeling when asked if he was having pain.   Per discussion, family also has been pressing PCA button on behalf of patient.     Discussed with ACP Mandie that will discontinue PCA pump as unsafe to continue with patient's confusion and switch for RN to provide PRN doses instead.     > Discontinue Morphine PCA Pump (CR-1mg/hr, Demand dose 4mg, Lock Out- 30minutes. CAB Doses of 4mg)  > Start IV Morphine gtt @ 1mg/hr   > Start IV Morphine 4mg q3 PRN severe pain (hold for hypotension, oversedation, respiratory depression)  > Narcan PRN   > Primary team to update family regarding discontinuation of PCA pump    In the event of newly developing, evolving, or worsening symptoms, please contact the Palliative Medicine team via pager (if the patient is at [facility] #[phone] or if the patient is at [facility] #[phone]) The Geriatric and Palliative Medicine service has coverage 24 hours a day/ 7 days a week to provide medical recommendations regarding symptom management needs via telephone.

## 2024-10-20 NOTE — CHART NOTE - NSCHARTNOTEFT_GEN_A_CORE
Pt noted with temp 99.5 axilary prior to the platelet transfusion.  Pt had a temperature yesterday - pan cultures - pending.      Vital Signs Last 24 Hrs  T(C): 36.4 (20 Oct 2024 01:30), Max: 38.9 (19 Oct 2024 04:20)  T(F): 97.6 (20 Oct 2024 01:30), Max: 102.1 (19 Oct 2024 04:20)  HR: 127 (20 Oct 2024 01:30) (119 - 136)  BP: 102/72 (20 Oct 2024 01:30) (93/61 - 120/69)  BP(mean): --  RR: 18 (20 Oct 2024 01:30) (18 - 19)  SpO2: 95% (20 Oct 2024 01:30) (91% - 96%)    Parameters below as of 20 Oct 2024 01:30  Patient On (Oxygen Delivery Method): nasal cannula  O2 Flow (L/min): 4    -tylenol to be given prior to platelet transfusion  -vital signs as per blood transfusion routine.

## 2024-10-20 NOTE — PROGRESS NOTE ADULT - ASSESSMENT
This is a 63 y/o M w/ prostate CA s/p radical prostatectomy now metastatic, HTN, HLD, admitted to 10/12 for pain, N/V in setting of osseous mets. S/p docetaxel on 10/13, abiraterone 10/12. Given neutropenia started on Zarxio.  C/b hypoxia, tachycardia. CTA w/o PE. Pt w/ 10 episodes of diarrhea on 10/18. C diff negative,   Pt now febrile to 102, CT A/P with pancolitis. 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   Fever likely 2/2 to neutropenic enterocolitis, pt w/ ANC 90, at high risk of bacteremia.   Bcx now w/ E Coli and H influenzae bacteremia, E Coli likely from colitis, however unclear source of H influenza, typically from PNA, septic arthritis, consider meningitis, per family denies neck pain/HA.     Recommendations:   1. C/w Zosyn 3.375 g q8  2. Repeat BCx x 2   3. Obtain CXR, consider CT chest non-contrast   4. Appreciate surgery/GI recs      Poor prognosis overall, recommend [facility]    Thank you for this consult. Inpatient ID team will follow.    Orlando Ruiz M.D.  Attending Physician  Division of Infectious Diseases  Department of Medicine

## 2024-10-20 NOTE — PROGRESS NOTE ADULT - SUBJECTIVE AND OBJECTIVE BOX
INTERVAL HPI/OVERNIGHT EVENTS:  Patient S&E at bedside. Febrile on 10/19 4am. No fever since, remains tachycardic, has pain. Imaging noted, GI, ID, vascular and colorectal teams following.     VITAL SIGNS:  T(F): 98.6 (10-20-24 @ 04:55)  HR: 125 (10-20-24 @ 04:55)  BP: 109/68 (10-20-24 @ 04:55)  RR: 18 (10-20-24 @ 04:55)  SpO2: 97% (10-20-24 @ 04:55)  Wt(kg): --    PHYSICAL EXAM:    Constitutional: uncomfortable  Eyes: EOMI, sclera non-icteric  Neck: supple, no masses, no JVD  Respiratory: symmetric chest expansion   Cardiovascular: tachycardic   Gastrointestinal: soft, tenderness to palpation   Extremities: no c/c/e  Neurological: AAOx3      MEDICATIONS  (STANDING):  abiraterone 500 mg tablet 2 Tablet(s) 2 Tablet(s) Oral daily  atorvastatin 20 milliGRAM(s) Oral at bedtime  chlorhexidine 2% Cloths 1 Application(s) Topical daily  dexAMETHasone  Injectable 4 milliGRAM(s) IV Push two times a day  fenofibrate Tablet 145 milliGRAM(s) Oral daily  filgrastim-sndz (ZARXIO) Injectable 480 MICROGram(s) SubCutaneous daily  lactobacillus acidophilus 1 Tablet(s) Oral daily  lidocaine   4% Patch 1 Patch Transdermal every 24 hours  morphine PCA (5 mG/mL) 30 milliLiter(s) PCA Continuous PCA Continuous  ondansetron Injectable 4 milliGRAM(s) IV Push every 8 hours  pantoprazole  Injectable 40 milliGRAM(s) IV Push daily  piperacillin/tazobactam IVPB.. 3.375 Gram(s) IV Intermittent every 8 hours  sodium chloride 0.9%. 1000 milliLiter(s) (100 mL/Hr) IV Continuous <Continuous>    MEDICATIONS  (PRN):  acetaminophen     Tablet .. 650 milliGRAM(s) Oral every 6 hours PRN Temp greater or equal to 38C (100.4F), Mild Pain (1 - 3), Moderate Pain (4 - 6)  aluminum hydroxide/magnesium hydroxide/simethicone Suspension 30 milliLiter(s) Oral every 4 hours PRN Dyspepsia  morphine  - Injectable 4 milliGRAM(s) IV Push once PRN severe breakthrough pain while off PCA  morphine PCA (5 mG/mL) Rescue Clinician Bolus 4 milliGRAM(s) IV Push every 15 minutes PRN Breakthrough severe pain  naloxone Injectable 0.1 milliGRAM(s) IV Push every 3 minutes PRN For ANY of the following changes in patient status:  A. RR LESS THAN 10 breaths per minute, B. Oxygen saturation LESS THAN 90%, C. Sedation score of 6  OLANZapine 1.25 milliGRAM(s) Oral every 6 hours PRN anxiety  polyethylene glycol 3350 17 Gram(s) Oral daily PRN Constipation      Allergies    No Known Allergies    Intolerances        LABS:                        9.6    2.29  )-----------( 16       ( 19 Oct 2024 23:23 )             28.6     10-19    147[H]  |  115[H]  |  57[H]  ----------------------------<  114[H]  4.5   |  18[L]  |  1.15    Ca    7.7[L]      19 Oct 2024 05:31  Phos  3.3     10-19  Mg     2.50     10-19    TPro  4.7[L]  /  Alb  2.2[L]  /  TBili  2.2[H]  /  DBili  x   /  AST  63[H]  /  ALT  11  /  AlkPhos  52  10-19    PT/INR - ( 19 Oct 2024 15:40 )   PT: 30.8 sec;   INR: 2.61 ratio         PTT - ( 19 Oct 2024 15:40 )  PTT:51.2 sec  Urinalysis Basic - ( 19 Oct 2024 05:31 )    Color: x / Appearance: x / SG: x / pH: x  Gluc: 114 mg/dL / Ketone: x  / Bili: x / Urobili: x   Blood: x / Protein: x / Nitrite: x   Leuk Esterase: x / RBC: x / WBC x   Sq Epi: x / Non Sq Epi: x / Bacteria: x        RADIOLOGY & ADDITIONAL TESTS:  Studies reviewed.

## 2024-10-21 DIAGNOSIS — R19.7 DIARRHEA, UNSPECIFIED: ICD-10-CM

## 2024-10-21 DIAGNOSIS — Z71.89 OTHER SPECIFIED COUNSELING: ICD-10-CM

## 2024-10-21 DIAGNOSIS — K55.9 VASCULAR DISORDER OF INTESTINE, UNSPECIFIED: ICD-10-CM

## 2024-10-21 LAB
-  AMPICILLIN/SULBACTAM: SIGNIFICANT CHANGE UP
-  AMPICILLIN: SIGNIFICANT CHANGE UP
-  AZTREONAM: SIGNIFICANT CHANGE UP
-  CEFAZOLIN: SIGNIFICANT CHANGE UP
-  CEFEPIME: SIGNIFICANT CHANGE UP
-  CEFOXITIN: SIGNIFICANT CHANGE UP
-  CEFTRIAXONE: SIGNIFICANT CHANGE UP
-  CIPROFLOXACIN: SIGNIFICANT CHANGE UP
-  ERTAPENEM: SIGNIFICANT CHANGE UP
-  GENTAMICIN: SIGNIFICANT CHANGE UP
-  IMIPENEM: SIGNIFICANT CHANGE UP
-  LEVOFLOXACIN: SIGNIFICANT CHANGE UP
-  MEROPENEM: SIGNIFICANT CHANGE UP
-  PIPERACILLIN/TAZOBACTAM: SIGNIFICANT CHANGE UP
-  TOBRAMYCIN: SIGNIFICANT CHANGE UP
-  TRIMETHOPRIM/SULFAMETHOXAZOLE: SIGNIFICANT CHANGE UP
ANION GAP SERPL CALC-SCNC: 10 MMOL/L — SIGNIFICANT CHANGE UP (ref 7–14)
ANION GAP SERPL CALC-SCNC: 12 MMOL/L — SIGNIFICANT CHANGE UP (ref 7–14)
ANION GAP SERPL CALC-SCNC: 14 MMOL/L — SIGNIFICANT CHANGE UP (ref 7–14)
BLOOD GAS VENOUS COMPREHENSIVE RESULT: SIGNIFICANT CHANGE UP
BUN SERPL-MCNC: 57 MG/DL — HIGH (ref 7–23)
BUN SERPL-MCNC: 60 MG/DL — HIGH (ref 7–23)
BUN SERPL-MCNC: 61 MG/DL — HIGH (ref 7–23)
CALCIUM SERPL-MCNC: 6.8 MG/DL — LOW (ref 8.4–10.5)
CALCIUM SERPL-MCNC: 7.2 MG/DL — LOW (ref 8.4–10.5)
CALCIUM SERPL-MCNC: 7.2 MG/DL — LOW (ref 8.4–10.5)
CHLORIDE SERPL-SCNC: 121 MMOL/L — HIGH (ref 98–107)
CHLORIDE SERPL-SCNC: 123 MMOL/L — HIGH (ref 98–107)
CHLORIDE SERPL-SCNC: 124 MMOL/L — HIGH (ref 98–107)
CO2 SERPL-SCNC: 17 MMOL/L — LOW (ref 22–31)
CO2 SERPL-SCNC: 18 MMOL/L — LOW (ref 22–31)
CO2 SERPL-SCNC: 18 MMOL/L — LOW (ref 22–31)
CREAT SERPL-MCNC: 0.85 MG/DL — SIGNIFICANT CHANGE UP (ref 0.5–1.3)
CREAT SERPL-MCNC: 0.96 MG/DL — SIGNIFICANT CHANGE UP (ref 0.5–1.3)
CREAT SERPL-MCNC: 0.97 MG/DL — SIGNIFICANT CHANGE UP (ref 0.5–1.3)
CULTURE RESULTS: ABNORMAL
EGFR: 87 ML/MIN/1.73M2 — SIGNIFICANT CHANGE UP
EGFR: 88 ML/MIN/1.73M2 — SIGNIFICANT CHANGE UP
EGFR: 96 ML/MIN/1.73M2 — SIGNIFICANT CHANGE UP
GLUCOSE BLDC GLUCOMTR-MCNC: 290 MG/DL — HIGH (ref 70–99)
GLUCOSE SERPL-MCNC: 176 MG/DL — HIGH (ref 70–99)
GLUCOSE SERPL-MCNC: 262 MG/DL — HIGH (ref 70–99)
GLUCOSE SERPL-MCNC: 459 MG/DL — CRITICAL HIGH (ref 70–99)
GRAM STN FLD: ABNORMAL
GRAM STN FLD: ABNORMAL
HCT VFR BLD CALC: 21.9 % — LOW (ref 39–50)
HCT VFR BLD CALC: 26.7 % — LOW (ref 39–50)
HCT VFR BLD CALC: 26.7 % — LOW (ref 39–50)
HGB BLD-MCNC: 7.4 G/DL — LOW (ref 13–17)
HGB BLD-MCNC: 9 G/DL — LOW (ref 13–17)
HGB BLD-MCNC: 9.1 G/DL — LOW (ref 13–17)
LDH SERPL L TO P-CCNC: 776 U/L — HIGH (ref 135–225)
MAGNESIUM SERPL-MCNC: 2.9 MG/DL — HIGH (ref 1.6–2.6)
MAGNESIUM SERPL-MCNC: 2.9 MG/DL — HIGH (ref 1.6–2.6)
MAGNESIUM SERPL-MCNC: 3 MG/DL — HIGH (ref 1.6–2.6)
MCHC RBC-ENTMCNC: 30.8 PG — SIGNIFICANT CHANGE UP (ref 27–34)
MCHC RBC-ENTMCNC: 31.1 PG — SIGNIFICANT CHANGE UP (ref 27–34)
MCHC RBC-ENTMCNC: 31.6 PG — SIGNIFICANT CHANGE UP (ref 27–34)
MCHC RBC-ENTMCNC: 33.7 GM/DL — SIGNIFICANT CHANGE UP (ref 32–36)
MCHC RBC-ENTMCNC: 33.8 GM/DL — SIGNIFICANT CHANGE UP (ref 32–36)
MCHC RBC-ENTMCNC: 34.1 GM/DL — SIGNIFICANT CHANGE UP (ref 32–36)
MCV RBC AUTO: 91.1 FL — SIGNIFICANT CHANGE UP (ref 80–100)
MCV RBC AUTO: 91.4 FL — SIGNIFICANT CHANGE UP (ref 80–100)
MCV RBC AUTO: 93.6 FL — SIGNIFICANT CHANGE UP (ref 80–100)
METHOD TYPE: SIGNIFICANT CHANGE UP
NRBC # BLD: 1 /100 WBCS — HIGH (ref 0–0)
NRBC # BLD: 1 /100 WBCS — HIGH (ref 0–0)
NRBC # BLD: 3 /100 WBCS — HIGH (ref 0–0)
NRBC # FLD: 0.13 K/UL — HIGH (ref 0–0)
NRBC # FLD: 0.15 K/UL — HIGH (ref 0–0)
NRBC # FLD: 0.35 K/UL — HIGH (ref 0–0)
PHOSPHATE SERPL-MCNC: 1.8 MG/DL — LOW (ref 2.5–4.5)
PHOSPHATE SERPL-MCNC: 2.4 MG/DL — LOW (ref 2.5–4.5)
PHOSPHATE SERPL-MCNC: 3.1 MG/DL — SIGNIFICANT CHANGE UP (ref 2.5–4.5)
PLATELET # BLD AUTO: 15 K/UL — CRITICAL LOW (ref 150–400)
PLATELET # BLD AUTO: 17 K/UL — CRITICAL LOW (ref 150–400)
PLATELET # BLD AUTO: 28 K/UL — LOW (ref 150–400)
POTASSIUM SERPL-MCNC: 4.2 MMOL/L — SIGNIFICANT CHANGE UP (ref 3.5–5.3)
POTASSIUM SERPL-MCNC: 4.2 MMOL/L — SIGNIFICANT CHANGE UP (ref 3.5–5.3)
POTASSIUM SERPL-MCNC: 4.6 MMOL/L — SIGNIFICANT CHANGE UP (ref 3.5–5.3)
POTASSIUM SERPL-SCNC: 4.2 MMOL/L — SIGNIFICANT CHANGE UP (ref 3.5–5.3)
POTASSIUM SERPL-SCNC: 4.2 MMOL/L — SIGNIFICANT CHANGE UP (ref 3.5–5.3)
POTASSIUM SERPL-SCNC: 4.6 MMOL/L — SIGNIFICANT CHANGE UP (ref 3.5–5.3)
PSA FLD-MCNC: 658 NG/ML — HIGH (ref 0–4)
RBC # BLD: 2.34 M/UL — LOW (ref 4.2–5.8)
RBC # BLD: 2.92 M/UL — LOW (ref 4.2–5.8)
RBC # BLD: 2.93 M/UL — LOW (ref 4.2–5.8)
RBC # FLD: 16.6 % — HIGH (ref 10.3–14.5)
RBC # FLD: 16.8 % — HIGH (ref 10.3–14.5)
RBC # FLD: 17.3 % — HIGH (ref 10.3–14.5)
SODIUM SERPL-SCNC: 151 MMOL/L — HIGH (ref 135–145)
SODIUM SERPL-SCNC: 152 MMOL/L — HIGH (ref 135–145)
SODIUM SERPL-SCNC: 154 MMOL/L — HIGH (ref 135–145)
SPECIMEN SOURCE: SIGNIFICANT CHANGE UP
SPECIMEN SOURCE: SIGNIFICANT CHANGE UP
URATE SERPL-MCNC: 4.8 MG/DL — SIGNIFICANT CHANGE UP (ref 3.4–8.8)
WBC # BLD: 10.09 K/UL — SIGNIFICANT CHANGE UP (ref 3.8–10.5)
WBC # BLD: 11.63 K/UL — HIGH (ref 3.8–10.5)
WBC # BLD: 12.46 K/UL — HIGH (ref 3.8–10.5)
WBC # FLD AUTO: 10.09 K/UL — SIGNIFICANT CHANGE UP (ref 3.8–10.5)
WBC # FLD AUTO: 11.63 K/UL — HIGH (ref 3.8–10.5)
WBC # FLD AUTO: 12.46 K/UL — HIGH (ref 3.8–10.5)

## 2024-10-21 PROCEDURE — 99223 1ST HOSP IP/OBS HIGH 75: CPT | Mod: 25

## 2024-10-21 PROCEDURE — 77307 TELETHX ISODOSE PLAN CPLX: CPT | Mod: 26

## 2024-10-21 PROCEDURE — 99233 SBSQ HOSP IP/OBS HIGH 50: CPT

## 2024-10-21 PROCEDURE — 77263 THER RADIOLOGY TX PLNG CPLX: CPT

## 2024-10-21 PROCEDURE — 77290 THER RAD SIMULAJ FIELD CPLX: CPT | Mod: 26

## 2024-10-21 PROCEDURE — 53600 DILATE URETHRA STRICTURE: CPT

## 2024-10-21 PROCEDURE — 77334 RADIATION TREATMENT AID(S): CPT | Mod: 26

## 2024-10-21 RX ORDER — GLUCAGON INJECTION, SOLUTION 0.5 MG/.1ML
1 INJECTION, SOLUTION SUBCUTANEOUS ONCE
Refills: 0 | Status: DISCONTINUED | OUTPATIENT
Start: 2024-10-21 | End: 2024-11-18

## 2024-10-21 RX ORDER — 0.9 % SODIUM CHLORIDE 0.9 %
1000 INTRAVENOUS SOLUTION INTRAVENOUS
Refills: 0 | Status: DISCONTINUED | OUTPATIENT
Start: 2024-10-21 | End: 2024-11-18

## 2024-10-21 RX ORDER — 0.9 % SODIUM CHLORIDE 0.9 %
1000 INTRAVENOUS SOLUTION INTRAVENOUS
Refills: 0 | Status: DISCONTINUED | OUTPATIENT
Start: 2024-10-22 | End: 2024-10-22

## 2024-10-21 RX ORDER — ACETAMINOPHEN 500MG 500 MG/1
1000 TABLET, COATED ORAL ONCE
Refills: 0 | Status: COMPLETED | OUTPATIENT
Start: 2024-10-21 | End: 2024-10-21

## 2024-10-21 RX ORDER — OLANZAPINE 20 MG/1
1.25 TABLET ORAL EVERY 12 HOURS
Refills: 0 | Status: DISCONTINUED | OUTPATIENT
Start: 2024-10-21 | End: 2024-10-21

## 2024-10-21 RX ORDER — OLANZAPINE 20 MG/1
1.25 TABLET ORAL EVERY 8 HOURS
Refills: 0 | Status: DISCONTINUED | OUTPATIENT
Start: 2024-10-21 | End: 2024-10-22

## 2024-10-21 RX ADMIN — ACETAMINOPHEN 500MG 400 MILLIGRAM(S): 500 TABLET, COATED ORAL at 23:24

## 2024-10-21 RX ADMIN — DEXAMETHASONE 4 MILLIGRAM(S): 1.5 TABLET ORAL at 04:58

## 2024-10-21 RX ADMIN — ONDANSETRON HYDROCHLORIDE 4 MILLIGRAM(S): 4 TABLET, FILM COATED ORAL at 12:33

## 2024-10-21 RX ADMIN — Medication 100 MILLILITER(S): at 12:31

## 2024-10-21 RX ADMIN — ACETAMINOPHEN 500MG 1000 MILLIGRAM(S): 500 TABLET, COATED ORAL at 23:59

## 2024-10-21 RX ADMIN — OLANZAPINE 1.25 MILLIGRAM(S): 20 TABLET ORAL at 11:50

## 2024-10-21 RX ADMIN — ONDANSETRON HYDROCHLORIDE 4 MILLIGRAM(S): 4 TABLET, FILM COATED ORAL at 18:28

## 2024-10-21 RX ADMIN — ACETAMINOPHEN 500MG 400 MILLIGRAM(S): 500 TABLET, COATED ORAL at 00:01

## 2024-10-21 RX ADMIN — PANTOPRAZOLE SODIUM 40 MILLIGRAM(S): 40 TABLET, DELAYED RELEASE ORAL at 12:33

## 2024-10-21 RX ADMIN — ACETAMINOPHEN 500MG 1000 MILLIGRAM(S): 500 TABLET, COATED ORAL at 01:00

## 2024-10-21 RX ADMIN — PIPERACILLIN SODIUM AND TAZOBACTAM SODIUM 25 GRAM(S): 4; .5 INJECTION, POWDER, LYOPHILIZED, FOR SOLUTION INTRAVENOUS at 02:30

## 2024-10-21 RX ADMIN — PIPERACILLIN SODIUM AND TAZOBACTAM SODIUM 25 GRAM(S): 4; .5 INJECTION, POWDER, LYOPHILIZED, FOR SOLUTION INTRAVENOUS at 12:31

## 2024-10-21 RX ADMIN — PIPERACILLIN SODIUM AND TAZOBACTAM SODIUM 25 GRAM(S): 4; .5 INJECTION, POWDER, LYOPHILIZED, FOR SOLUTION INTRAVENOUS at 20:26

## 2024-10-21 RX ADMIN — FILGRASTIM-AAFI 480 MICROGRAM(S): 300 INJECTION, SOLUTION SUBCUTANEOUS at 12:40

## 2024-10-21 RX ADMIN — ONDANSETRON HYDROCHLORIDE 4 MILLIGRAM(S): 4 TABLET, FILM COATED ORAL at 02:31

## 2024-10-21 RX ADMIN — DEXAMETHASONE 4 MILLIGRAM(S): 1.5 TABLET ORAL at 18:26

## 2024-10-21 RX ADMIN — CHLORHEXIDINE GLUCONATE 1 APPLICATION(S): 1.2 RINSE ORAL at 12:34

## 2024-10-21 NOTE — PROGRESS NOTE ADULT - ASSESSMENT
64-year-old male with metastatic prostate cancer, sent in by hematologist from [facility] cancer for uncontrolled pain, nausea, vomiting. 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 Differential includes neutropenic enterocolitis vs. ischemic colitis. Vascular surgery consulted for SMA dissection.     recs:   - Ischemic colitis vs. neutropenic enterocolitis; BCx growing GNR  - Lactate cleared  - No vascular surgical intervention  - Continue NPO, IVF  - heparin gtt if not otherwise contraindicated; primary team holding gtt given profound thrombocytopenia  - Please call back with any additional questions/concerns    Vascular Surgery   [id number]

## 2024-10-21 NOTE — PROGRESS NOTE ADULT - SUBJECTIVE AND OBJECTIVE BOX
Infectious Diseases Follow Up:    Patient is a 64y old  Male who presents with a chief complaint of Pain (21 Oct 2024 11:08)      Interval History/ROS:  Pt remains confused, +abdominal pain  Afebrile ON    Allergies  No Known Allergies        ANTIMICROBIALS:  piperacillin/tazobactam IVPB.. 3.375 every 8 hours      Current Abx:     Previous Abx     OTHER MEDS:  MEDICATIONS  (STANDING):  acetaminophen     Tablet .. 650 every 6 hours PRN  aluminum hydroxide/magnesium hydroxide/simethicone Suspension 30 every 4 hours PRN  atorvastatin 20 at bedtime  dexAMETHasone  Injectable 4 two times a day  fenofibrate Tablet 145 daily  filgrastim-sndz (ZARXIO) Injectable 480 daily  influenza  Vaccine (HIGH DOSE) 0.5 once  morphine  - Injectable 4 every 3 hours PRN  morphine  Infusion. 1 <Continuous>  OLANZapine 1.25 every 6 hours PRN  ondansetron Injectable 4 every 8 hours  pantoprazole  Injectable 40 daily  polyethylene glycol 3350 17 daily PRN      Vital Signs Last 24 Hrs  T(C): 36.3 (21 Oct 2024 06:00), Max: 36.8 (20 Oct 2024 15:30)  T(F): 97.3 (21 Oct 2024 06:00), Max: 98.3 (20 Oct 2024 15:30)  HR: 111 (21 Oct 2024 06:00) (110 - 125)  BP: 125/83 (21 Oct 2024 06:00) (105/70 - 125/83)  BP(mean): --  RR: 19 (21 Oct 2024 06:00) (18 - 19)  SpO2: 95% (21 Oct 2024 06:00) (95% - 98%)    Parameters below as of 21 Oct 2024 06:00  Patient On (Oxygen Delivery Method): nasal cannula  O2 Flow (L/min): 4      PHYSICAL EXAM:  GENERAL: NAD, well-developed  HEAD:  Atraumatic, Normocephalic  EYES: EOMI, conjunctiva and sclera clear  CHEST/LUNG: Clear to auscultation bilaterally; No wheeze  HEART: Regular rate and rhythm; No murmurs, rubs, or gallops  ABDOMEN: Soft, ND, some abdominal tenderness, not acute   PSYCH: AAOx0                                     9.0    11.63 )-----------( 15       ( 21 Oct 2024 08:07 )             26.7       10-21    152[H]  |  124[H]  |  60[H]  ----------------------------<  262[H]  4.2   |  18[L]  |  0.96    Ca    7.2[L]      21 Oct 2024 08:07  Phos  2.4     10-21  Mg     3.00     10-21    TPro  4.6[L]  /  Alb  1.9[L]  /  TBili  1.7[H]  /  DBili  x   /  AST  77[H]  /  ALT  17  /  AlkPhos  53  10-20      Urinalysis Basic - ( 21 Oct 2024 08:07 )    Color: x / Appearance: x / SG: x / pH: x  Gluc: 262 mg/dL / Ketone: x  / Bili: x / Urobili: x   Blood: x / Protein: x / Nitrite: x   Leuk Esterase: x / RBC: x / WBC x   Sq Epi: x / Non Sq Epi: x / Bacteria: x        MICROBIOLOGY:  v  .Blood BLOOD  10-19-24   Growth in aerobic bottle: Gram Negative Rods  --    Growth in aerobic bottle: Gram Negative Rods      .Blood BLOOD  10-19-24   Growth in anaerobic bottle: Gram Negative Rods  --    Growth in anaerobic bottle: Gram Negative Rods      .Blood BLOOD  10-19-24   Growth in aerobic and anaerobic bottles: Escherichia coli  See previous culture [id number]  --    Growth in aerobic and anaerobic bottles: Gram Negative Rods and Gram  Negative Coccobacilli      .Blood BLOOD  10-19-24   Growth in aerobic and anaerobic bottles: Escherichia coli  Direct identification is available within approximately 3-5  hours either by Blood Panel Multiplexed PCR or Direct  MALDI-TOF. Details: [email or web address]  --  Blood Culture PCR            Clostridium difficile GDH Toxins A&amp;B, EIA:   Negative (10-18-24 @ 23:53)  Clostridium difficile GDH Interpretation: Negative for toxigenic C. Difficile.  This specimen is negative for C.  Difficile glutamate dehydrogenase (GDH) antigen and negative for C.  Difficile Toxins A & B, by EIA.  GDH is a highly sensitive screening  marker for C. Difficile that is produced in large amounts by all C.  Difficile strains, both toxigenic and nontoxigenic.  This assay has not  been validated as a test of cure.  Repeat testing during the same episode  of diarrhea is of limited value and is discouraged.  The results of this  assay should always be interpreted in conjunction with patient's clinical  history. (10-18-24 @ 23:53)      RADIOLOGY:

## 2024-10-21 NOTE — PROGRESS NOTE ADULT - NS ATTEND AMEND GEN_ALL_CORE FT
Bacteremic, on antibiotics  Possible ischemic colitis   No heparin gtt for now given thrombocytopenia  Kyphoplasty on hold given other acute issues

## 2024-10-21 NOTE — CHART NOTE - NSCHARTNOTEFT_GEN_A_CORE
Noted hmg drop to 7.4 in the event of bloody bowel movement earlier. Discussed with Dr. Valle give 1 unit of RBC.   Signed out pending re-eval.

## 2024-10-21 NOTE — PROGRESS NOTE ADULT - PROBLEM SELECTOR PLAN 5
- Behavioral health recs appreciated: Consider Zyprexa 1.25 mg po q8h prn for anxiety if family and pt are agreeable to medication. hold med if QTC > 500  - EKG performed QTc 436 on 1017  - Family is considering zyprexa, d/w them about risks and benefits - Continue with Zofran 4mg q8h ATC  - speech and swallow evaluated recommended soft and bite sized and mildly thick liquids with aspiration precautions - CTA negative for PE  - encouraged incentisve spirometer use  - D/w primary team

## 2024-10-21 NOTE — PROGRESS NOTE ADULT - PROBLEM SELECTOR PLAN 9
Patient is supported by his wife- Vijaya [phone]) and 3 adult sons.   > Patient is recently dx with metastatic prostate cancer just 1.5 weeks ago and he is treatment oriented.  > Offered caregiver Sw referral to patient's wife- DECLINED     In the event of newly developing, evolving, or worsening symptoms, please contact the Palliative Medicine team via pager (if the patient is at [facility] #[phone] or if the patient is at [facility] #[phone]) The Geriatric and Palliative Medicine service has coverage 24 hours a day/ 7 days a week to provide medical recommendations regarding symptom management needs via telephone. The patient requires nursing assistance with ADLs  - Supportive care  - Turn and position  - Continue with good skin care

## 2024-10-21 NOTE — CHART NOTE - NSCHARTNOTEFT_GEN_A_CORE
Notified PLT are 15 . Discussed with patient son regarding PLT transfusion. Per Rn patient had a questionable bloody bowel movement.   Given heme/onc rec will give 1 unit of PLT.

## 2024-10-21 NOTE — PROGRESS NOTE ADULT - PROBLEM SELECTOR PLAN 7
- possibly related to chemotherapy treatment  - hematology-oncology following: started on daily zarxio, advised to monitor for fever and start abx if necessary - Patient continues to be very restless and difficult to console   - Behavioral health recs appreciated: Zyprexa 1.25 mg IM q8h prn for anxiety if family and pt are agreeable to medication. hold med if QTC > 500  - EKG performed QTc 436 on 1017

## 2024-10-21 NOTE — PROGRESS NOTE ADULT - PROBLEM SELECTOR PLAN 4
- Continue with Zofran 4mg q8h ATC  - speech and swallow evaluated recommended soft and bite sized and mildly thick liquids with aspiration precautions - CTA negative for PE  - encouraged incentisve spirometer use  - D/w primary team - possibly related to chemotherapy treatment  - hematology-oncology following: on daily zarxio  - on IV abx   - patient getting transfused

## 2024-10-21 NOTE — PROVIDER CONTACT NOTE (OTHER) - SITUATION
patient with previous concern for retention; now s/p BM patient with some incontinence; bladder scan reassessed showing 844CC.

## 2024-10-21 NOTE — PROVIDER CONTACT NOTE (OTHER) - ACTION/TREATMENT ORDERED:
to follow up with team and place orders as necessary.  continue to monitor output and encourage urination.

## 2024-10-21 NOTE — PROGRESS NOTE ADULT - SUBJECTIVE AND OBJECTIVE BOX
Blood cultures with GM neg bacteremia, Tachycardic, family at bedside    MEDICATIONS  (STANDING):  abiraterone 500 mg tablet 2 Tablet(s) 2 Tablet(s) Oral daily  atorvastatin 20 milliGRAM(s) Oral at bedtime  chlorhexidine 2% Cloths 1 Application(s) Topical daily  dexAMETHasone  Injectable 4 milliGRAM(s) IV Push two times a day  dextrose 5% + sodium chloride 0.45%. 1000 milliLiter(s) (100 mL/Hr) IV Continuous <Continuous>  fenofibrate Tablet 145 milliGRAM(s) Oral daily  filgrastim-sndz (ZARXIO) Injectable 480 MICROGram(s) SubCutaneous daily  influenza  Vaccine (HIGH DOSE) 0.5 milliLiter(s) IntraMuscular once  lactobacillus acidophilus 1 Tablet(s) Oral daily  lidocaine   4% Patch 1 Patch Transdermal every 24 hours  morphine  Infusion. 1 mG/Hr (1 mL/Hr) IV Continuous <Continuous>  ondansetron Injectable 4 milliGRAM(s) IV Push every 8 hours  pantoprazole  Injectable 40 milliGRAM(s) IV Push daily  piperacillin/tazobactam IVPB.. 3.375 Gram(s) IV Intermittent every 8 hours  sodium chloride 0.9%. 1000 milliLiter(s) (100 mL/Hr) IV Continuous <Continuous>    MEDICATIONS  (PRN):  acetaminophen     Tablet .. 650 milliGRAM(s) Oral every 6 hours PRN Temp greater or equal to 38C (100.4F), Mild Pain (1 - 3), Moderate Pain (4 - 6)  aluminum hydroxide/magnesium hydroxide/simethicone Suspension 30 milliLiter(s) Oral every 4 hours PRN Dyspepsia  artificial tears (preservative free) Ophthalmic Solution 1 Drop(s) Both EYES four times a day PRN Dry Eyes  morphine  - Injectable 4 milliGRAM(s) IV Push every 3 hours PRN Severe Pain (7 - 10)  naloxone Injectable 0.1 milliGRAM(s) IV Push every 3 minutes PRN For ANY of the following changes in patient status:  A. RR LESS THAN 10 breaths per minute, B. Oxygen saturation LESS THAN 90%, C. Sedation score of 6  OLANZapine 1.25 milliGRAM(s) Oral every 6 hours PRN anxiety  polyethylene glycol 3350 17 Gram(s) Oral daily PRN Constipation      Vital Signs Last 24 Hrs  T(C): 36.3 (21 Oct 2024 06:00), Max: 36.8 (20 Oct 2024 15:30)  T(F): 97.3 (21 Oct 2024 06:00), Max: 98.3 (20 Oct 2024 15:30)  HR: 111 (21 Oct 2024 06:00) (110 - 125)  BP: 125/83 (21 Oct 2024 06:00) (105/70 - 125/83)  BP(mean): --  RR: 19 (21 Oct 2024 06:00) (18 - 19)  SpO2: 95% (21 Oct 2024 06:00) (95% - 98%)    Parameters below as of 21 Oct 2024 06:00  Patient On (Oxygen Delivery Method): nasal cannula  O2 Flow (L/min): 4      PE  resting in bed  Anicteric, MMM  RRR  CTAB  Abd soft, NT, ND  No c/c/e  No rash grossly  decreased ROM                          9.1    10.09 )-----------( 17       ( 21 Oct 2024 00:01 )             26.7       10-21    154[H]  |  123[H]  |  61[H]  ----------------------------<  176[H]  4.6   |  17[L]  |  0.97    Ca    7.2[L]      21 Oct 2024 00:01  Phos  3.1     10-21  Mg     2.90     10-21    TPro  4.6[L]  /  Alb  1.9[L]  /  TBili  1.7[H]  /  DBili  x   /  AST  77[H]  /  ALT  17  /  AlkPhos  53  10-20

## 2024-10-21 NOTE — PROGRESS NOTE ADULT - ASSESSMENT
This is a 63 y/o M w/ prostate CA s/p radical prostatectomy now metastatic, HTN, HLD, admitted to 10/12 for pain, N/V in setting of osseous mets. S/p docetaxel on 10/13, abiraterone 10/12. Given neutropenia started on Zarxio.  C/b hypoxia, tachycardia. CTA w/o PE. Pt w/ 10 episodes of diarrhea on 10/18. C diff negative,   Pt now febrile to 102, CT A/P with pancolitis. 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   Fever likely 2/2 to neutropenic enterocolitis, pt w/ ANC 90, at high risk of bacteremia.   Bcx now w/ E Coli and H influenzae bacteremia, E Coli likely from colitis, however unclear source of H influenza, typically from PNA, septic arthritis, consider meningitis, per family denies neck pain/HA.     Recommendations:   1. C/w Zosyn 3.375 g q8  2. Repeat BCx x 2   3. Appreciate surgery/GI recs     Poor prognosis overall, recommend [facility]    Thank you for this consult. Inpatient ID team will follow.    Orlando Ruiz M.D.  Attending Physician  Division of Infectious Diseases  Department of Medicine

## 2024-10-21 NOTE — PROGRESS NOTE ADULT - PROBLEM SELECTOR PLAN 1
MRI Lumbar Spine: (10/15) Diffuse osseous metastases. L1 and L5 pathologic fractures are associated with moderate epidural disease at L1, more mild at L5. At least mild epidural disease at the left S1-S2 neural foramen. No significant lumbar degenerative disc disease.  - MRI from 10/15 shows extensive metastasis in cervical, thoracic, lumbar, skull base and calvarium. Also small cortical subacute infarctions.  - Continue Morphine PCA: 1mg CR; DD 4mg; LO 30 min; 4 hour limit: 32mg; CAB 4mg  - Discontinue Fentanyl patch d/t lethargy, confusion and possible hallucinations  - Planned for kyphoplasty on 10/22 with IR  - continue Decadron 8mg BID for total of 3 days  - Narcan PRN  - multimodal pain control: lidocaine patch, warm/cold packs   - recommend changing bowel regimen to PRN  - would RECOMMEND OUTPATIENT PALLIATIVE CARE REFERRAL WITH [facility] palliative care team. MRI Lumbar Spine: (10/15) Diffuse osseous metastases. L1 and L5 pathologic fractures are associated with moderate epidural disease at L1, more mild at L5. At least mild epidural disease at the left S1-S2 neural foramen. No significant lumbar degenerative disc disease.  - MRI from 10/15 shows extensive metastasis in cervical, thoracic, lumbar, skull base and calvarium. Also small cortical subacute infarctions.  - Continue Morphine gtt @1mg/hr, IV morphine   - Discontinue Fentanyl patch d/t lethargy, confusion and possible hallucinations  - IR evaluation for kyphoplasty on hold in setting of c/f SMA dissection and bowel ischemia   - On decadron 4mg IV bic x3 days   - Narcan PRN  - multimodal pain control: lidocaine patch, warm/cold packs   - recommend changing bowel regimen to PRN  - would RECOMMEND OUTPATIENT PALLIATIVE CARE REFERRAL WITH [facility] palliative care team. - Metastatic prostate adenocarcinoma, follows with Dr. Andrew Mendoza  - Oncology recs appreciated: Metastatic prostate cancer to liver now s/p C1 taxotere, on abiraterone qd and dexamethasone, s/p zarxio 480mcg until ANC > 1500.  - management per oncology team  - IR consult for kyphoplasty on hold

## 2024-10-21 NOTE — CONSULT NOTE ADULT - SUBJECTIVE AND OBJECTIVE BOX
HPI  64M w/ hx metastatic prostate cancer s/p RALP (>15 years ago) on ADT currently admitted to management of worsening pain.   Pt was found to be in urinary retention w/ 800cc on bladder scan. Urology consulted to evaluate possible meatal stenosis after primary unsuccessfully attempted to place lau.    Pt seen and examined at bedside. Interview limited by pt mental status.     PAST MEDICAL & SURGICAL HISTORY:  Benign essential HTN      HLD (hyperlipidemia)      CA of prostate      Basal cell carcinoma      History of transurethral prostatectomy      S/P inguinal hernia repair      History of basal cell carcinoma (BCC) excision          MEDICATIONS  (STANDING):  abiraterone 500 mg tablet 2 Tablet(s) 2 Tablet(s) Oral daily  acetaminophen   IVPB .. 1000 milliGRAM(s) IV Intermittent once  atorvastatin 20 milliGRAM(s) Oral at bedtime  chlorhexidine 2% Cloths 1 Application(s) Topical daily  dexAMETHasone  Injectable 4 milliGRAM(s) IV Push two times a day  dextrose 5% + sodium chloride 0.45%. 1000 milliLiter(s) (100 mL/Hr) IV Continuous <Continuous>  dextrose 5%. 1000 milliLiter(s) (50 mL/Hr) IV Continuous <Continuous>  dextrose 5%. 1000 milliLiter(s) (100 mL/Hr) IV Continuous <Continuous>  dextrose 50% Injectable 12.5 Gram(s) IV Push once  dextrose 50% Injectable 25 Gram(s) IV Push once  dextrose 50% Injectable 25 Gram(s) IV Push once  fenofibrate Tablet 145 milliGRAM(s) Oral daily  filgrastim-sndz (ZARXIO) Injectable 480 MICROGram(s) SubCutaneous daily  glucagon  Injectable 1 milliGRAM(s) IntraMuscular once  influenza  Vaccine (HIGH DOSE) 0.5 milliLiter(s) IntraMuscular once  insulin lispro (ADMELOG) corrective regimen sliding scale   SubCutaneous every 6 hours  lactobacillus acidophilus 1 Tablet(s) Oral daily  lidocaine   4% Patch 1 Patch Transdermal every 24 hours  morphine  Infusion. 1 mG/Hr (1 mL/Hr) IV Continuous <Continuous>  ondansetron Injectable 4 milliGRAM(s) IV Push every 8 hours  pantoprazole  Injectable 40 milliGRAM(s) IV Push daily  piperacillin/tazobactam IVPB.. 3.375 Gram(s) IV Intermittent every 8 hours  sodium chloride 0.9%. 1000 milliLiter(s) (100 mL/Hr) IV Continuous <Continuous>    MEDICATIONS  (PRN):  acetaminophen     Tablet .. 650 milliGRAM(s) Oral every 6 hours PRN Temp greater or equal to 38C (100.4F), Mild Pain (1 - 3), Moderate Pain (4 - 6)  aluminum hydroxide/magnesium hydroxide/simethicone Suspension 30 milliLiter(s) Oral every 4 hours PRN Dyspepsia  artificial tears (preservative free) Ophthalmic Solution 1 Drop(s) Both EYES four times a day PRN Dry Eyes  dextrose Oral Gel 15 Gram(s) Oral once PRN Blood Glucose LESS THAN 70 milliGRAM(s)/deciliter  morphine  - Injectable 4 milliGRAM(s) IV Push every 3 hours PRN Severe Pain (7 - 10)  naloxone Injectable 0.1 milliGRAM(s) IV Push every 3 minutes PRN For ANY of the following changes in patient status:  A. RR LESS THAN 10 breaths per minute, B. Oxygen saturation LESS THAN 90%, C. Sedation score of 6  OLANZapine Injectable 1.25 milliGRAM(s) IntraMuscular every 8 hours PRN severe aggitation  polyethylene glycol 3350 17 Gram(s) Oral daily PRN Constipation      FAMILY HISTORY:  FH: HTN (hypertension) (Sibling)        Allergies    No Known Allergies    Intolerances        SOCIAL HISTORY:    REVIEW OF SYSTEMS: Otherwise negative as stated in HPI    Physical Exam  Vital signs  T(C): 37.3 (10-21-24 @ 20:29), Max: 37.6 (10-21-24 @ 15:40)  HR: 108 (10-21-24 @ 20:29)  BP: 123/90 (10-21-24 @ 20:29)  SpO2: 97% (10-21-24 @ 20:29)  Wt(kg): --    Output    OUT:    Voided (mL): 800 mL  Total OUT: 800 mL    Total NET: -800 mL      OUT:    Voided (mL): 600 mL  Total OUT: 600 mL    Total NET: -600 mL          Gen: appeared confused  Pulm: No respiratory distress	  CV: RRR  GI: S/ND/NT      LABS:      10-21 @ 17:15    WBC 12.46 / Hct 21.9  / SCr 0.85     10-21 @ 08:07    WBC 11.63 / Hct 26.7  / SCr 0.96     10-21    151[H]  |  121[H]  |  57[H]  ----------------------------<  459[HH]  4.2   |  18[L]  |  0.85    Ca    6.8[L]      21 Oct 2024 17:15  Phos  1.8     10-21  Mg     2.90     10-21    TPro  4.6[L]  /  Alb  1.9[L]  /  TBili  1.7[H]  /  DBili  x   /  AST  77[H]  /  ALT  17  /  AlkPhos  53  10-20      Urinalysis Basic - ( 21 Oct 2024 17:15 )    Color: x / Appearance: x / SG: x / pH: x  Gluc: 459 mg/dL / Ketone: x  / Bili: x / Urobili: x   Blood: x / Protein: x / Nitrite: x   Leuk Esterase: x / RBC: x / WBC x   Sq Epi: x / Non Sq Epi: x / Bacteria: x

## 2024-10-21 NOTE — PROGRESS NOTE ADULT - SUBJECTIVE AND OBJECTIVE BOX
[facility] Geriatric[facility] and Palliative Care  Melissa Davies Palliative Care Attending  Contact Info: Page [phone] (including Nights/Weekends), message on Microsoft Teams (Melissa Davies), or leave  at Palliative Office [phone] (non-urgent)   Date of [id number] @ 13:35    SUBJECTIVE AND OBJECTIVE:    Indication for Geriatrics and Palliative Care Services/INTERVAL HPI:    OVERNIGHT EVENTS:    DNR on chart:  Allergies    No Known Allergies    Intolerances    MEDICATIONS  (STANDING):  abiraterone 500 mg tablet 2 Tablet(s) 2 Tablet(s) Oral daily  atorvastatin 20 milliGRAM(s) Oral at bedtime  chlorhexidine 2% Cloths 1 Application(s) Topical daily  dexAMETHasone  Injectable 4 milliGRAM(s) IV Push two times a day  dextrose 5% + sodium chloride 0.45%. 1000 milliLiter(s) (100 mL/Hr) IV Continuous <Continuous>  fenofibrate Tablet 145 milliGRAM(s) Oral daily  filgrastim-sndz (ZARXIO) Injectable 480 MICROGram(s) SubCutaneous daily  influenza  Vaccine (HIGH DOSE) 0.5 milliLiter(s) IntraMuscular once  lactobacillus acidophilus 1 Tablet(s) Oral daily  lidocaine   4% Patch 1 Patch Transdermal every 24 hours  morphine  Infusion. 1 mG/Hr (1 mL/Hr) IV Continuous <Continuous>  ondansetron Injectable 4 milliGRAM(s) IV Push every 8 hours  pantoprazole  Injectable 40 milliGRAM(s) IV Push daily  piperacillin/tazobactam IVPB.. 3.375 Gram(s) IV Intermittent every 8 hours  sodium chloride 0.9%. 1000 milliLiter(s) (100 mL/Hr) IV Continuous <Continuous>    MEDICATIONS  (PRN):  acetaminophen     Tablet .. 650 milliGRAM(s) Oral every 6 hours PRN Temp greater or equal to 38C (100.4F), Mild Pain (1 - 3), Moderate Pain (4 - 6)  aluminum hydroxide/magnesium hydroxide/simethicone Suspension 30 milliLiter(s) Oral every 4 hours PRN Dyspepsia  artificial tears (preservative free) Ophthalmic Solution 1 Drop(s) Both EYES four times a day PRN Dry Eyes  morphine  - Injectable 4 milliGRAM(s) IV Push every 3 hours PRN Severe Pain (7 - 10)  naloxone Injectable 0.1 milliGRAM(s) IV Push every 3 minutes PRN For ANY of the following changes in patient status:  A. RR LESS THAN 10 breaths per minute, B. Oxygen saturation LESS THAN 90%, C. Sedation score of 6  OLANZapine Injectable 1.25 milliGRAM(s) IntraMuscular every 12 hours PRN severe aggitation  polyethylene glycol 3350 17 Gram(s) Oral daily PRN Constipation      ITEMS UNCHECKED ARE NOT PRESENT    PRESENT SYMPTOMS: [ ]Unable to self-report - see [ ] CPOT [ ] PAINADS [ ] RDOS  Source if other than patient:  [ ]Family   [ ]Team     Pain:  [ ]yes [ ]no  QOL impact -   Location -                    Aggravating factors -  Quality -  Radiation -  Timing-  Severity (0-10 scale):  Minimal acceptable level/ pain goal (0-10 scale):     CPOT:    https://www.[facility].org/getattachment/[email or web address]/Critical-Care-Pain-Observation-Tool-(CPOT)    Dyspnea:                           [ ]Mild [ ]Moderate [ ]Severe  Anxiety:                             [ ]Mild [ ]Moderate [ ]Severe  Fatigue:                             [ ]Mild [ ]Moderate [ ]Severe  Nausea:                             [ ]Mild [ ]Moderate [ ]Severe  Loss of appetite:              [ ]Mild [ ]Moderate [ ]Severe  Constipation:                    [ ]Mild [ ]Moderate [ ]Severe  Other Symptoms:  [ ]All other review of systems negative     PCSSQ[Palliative Care Spiritual Screening Question]   Severity (0-10):  Score of 4 or > indicate consideration of Chaplaincy referral.  Chaplaincy Referral: [ ] yes [ ] refused [ ] following [ ] deferred    Caregiver [place]? : [ ] yes [ ] no [ ] Deferred [ ] Declined             Social work referral [ ] Patient & Family Centered Care Referral [ ]  Anticipatory Grief present?:  [ ] yes [ ] no  [ ] Deferred                  Social work referral [ ] Patient & Family Centered Care Referral [ ]      PHYSICAL EXAM:  Vital Signs Last 24 Hrs  T(C): 36.6 (21 Oct 2024 10:00), Max: 36.8 (20 Oct 2024 15:30)  T(F): 97.9 (21 Oct 2024 10:00), Max: 98.3 (20 Oct 2024 15:30)  HR: 115 (21 Oct 2024 10:00) (110 - 125)  BP: 117/82 (21 Oct 2024 10:00) (105/70 - 125/83)  BP(mean): --  RR: 19 (21 Oct 2024 10:00) (18 - 19)  SpO2: 97% (21 Oct 2024 10:00) (95% - 98%)    Parameters below as of 21 Oct 2024 10:00  Patient On (Oxygen Delivery Method): nasal cannula  O2 Flow (L/min): 4   I&O's Summary    20 Oct 2024 07:01  -  21 Oct 2024 07:00  --------------------------------------------------------  IN: 0 mL / OUT: 800 mL / NET: -800 mL       GENERAL: [ ]Cachexia    [ ]Alert  [ ]Oriented x   [ ]Lethargic  [ ]Unarousable  [ ]Verbal  [ ]Non-Verbal  Behavioral:   [ ]Anxiety  [ ]Delirium [ ]Agitation [ ]Other  HEENT:  [ ]Normal   [ ]Dry mouth   [ ]ET Tube/Trach  [ ]Oral lesions  PULMONARY:   [ ]Clear [ ]Tachypnea  [ ]Audible excessive secretions   [ ]Rhonchi        [ ]Right [ ]Left [ ]Bilateral  [ ]Crackles        [ ]Right [ ]Left [ ]Bilateral  [ ]Wheezing     [ ]Right [ ]Left [ ]Bilateral  [ ]Diminished BS [ ] Right [ ]Left [ ]Bilateral  CARDIOVASCULAR:    [ ]Regular [ ]Irregular [ ]Tachy  [ ]Rodrick [ ]Murmur [ ]Other  GASTROINTESTINAL:  [ ]Soft  [ ]Distended   [ ]+BS  [ ]Non tender [ ]Tender  [ ]Other [ ]PEG [ ]OGT/ NGT   Last BM:   GENITOURINARY:  [ ]Normal [ ]Incontinent   [ ]Oliguria/Anuria   [ ]Farah  MUSCULOSKELETAL:   [ ]Normal   [ ]Weakness  [ ]Bed/Wheelchair bound [ ]Edema  NEUROLOGIC:   [ ]No focal deficits  [ ] Cognitive impairment  [ ] Dysphagia [ ]Dysarthria [ ] Paresis [ ]Other   SKIN: Please see flowsheets   [ ]Normal  [ ]Rash  [ ]Other  [ ]Pressure ulcer(s) [ ]y [ ]n present on admission    CRITICAL CARE:  [ ]Shock Present  [ ]Septic [ ]Cardiogenic [ ]Neurologic [ ]Hypovolemic  [ ]Vasopressors [ ]Inotropes  [ ]Respiratory failure present [ ]Mechanical Ventilation [ ]Non-invasive ventilatory support [ ]High-Flow   [ ]Acute  [ ]Chronic [ ]Hypoxic  [ ]Hypercarbic [ ]Other  [ ]Other organ failure     LABS:                        9.0    11.63 )-----------( 15       ( 21 Oct 2024 08:07 )             26.7   10-21    152[H]  |  124[H]  |  60[H]  ----------------------------<  262[H]  4.2   |  18[L]  |  0.96    Ca    7.2[L]      21 Oct 2024 08:07  Phos  2.4     10-21  Mg     3.00     10-21    TPro  4.6[L]  /  Alb  1.9[L]  /  TBili  1.7[H]  /  DBili  x   /  AST  77[H]  /  ALT  17  /  AlkPhos  53  10-20  PT/INR - ( 19 Oct 2024 15:40 )   PT: 30.8 sec;   INR: 2.61 ratio         PTT - ( 19 Oct 2024 15:40 )  PTT:51.2 sec    Urinalysis Basic - ( 21 Oct 2024 08:07 )    Color: x / Appearance: x / SG: x / pH: x  Gluc: 262 mg/dL / Ketone: x  / Bili: x / Urobili: x   Blood: x / Protein: x / Nitrite: x   Leuk Esterase: x / RBC: x / WBC x   Sq Epi: x / Non Sq Epi: x / Bacteria: x      RADIOLOGY & ADDITIONAL STUDIES:    Protein Calorie Malnutrition Present: [ ]mild [ ]moderate [ ]severe [ ]underweight [ ]morbid obesity  https://www.andeal.org/vault/2440/web/files/ONC/Table_Clinical%20Characteristics%20to%20Document%20Malnutrition-White%20JV%20et%20al%202012.pdf    Height (cm): 172.7 (10-12-24 @ 09:21)  Weight (kg): 69.4 (10-12-24 @ 09:21)  BMI (kg/m2): 23.3 (10-12-24 @ 09:21)    [ ]PPSV2 < or = 30%  [ ]significant weight loss [ ]poor nutritional intake [ ]anasarca[ ]Artificial Nutrition    Other REFERRALS:  [ ]Hospice  [ ]Child Life  [ ]Social Work  [ ]Case management [ ]Holistic Therapy     Goals of Care Document: [facility] Geriatrics and Palliative Care  Melissa Davies Palliative Care Attending  Contact Info: Page [phone] (including Nights/Weekends), message on Microsoft Teams (Melissa Davies), or leave  at Palliative Office [phone] (non-urgent)   Date of [id number] @ 13:35    SUBJECTIVE AND OBJECTIVE: Patient seen this AM with son Freddy at bedside. Patient asking to leave the hospital and is very restless. Son states that his mom stayed overnight and patient only slept for 2 hours. His mental status changed 2 days ago. Freddy states that patient has c/o "seeing someone spray water outside". Pain seems to be controlled on continuous pain medication. Pt continues to have diarrhea.     Indication for Geriatrics and Palliative Care Services/INTERVAL HPI: sx management and goc in setting of advanced malignancy     OVERNIGHT EVENTS:   > 10/21: Weekend events reviewed. Over the past 24 hours, patient was transitioned off pca pump. He required 4mg IV morphine x1.     DNR on chart:  Allergies    No Known Allergies    Intolerances    MEDICATIONS  (STANDING):  abiraterone 500 mg tablet 2 Tablet(s) 2 Tablet(s) Oral daily  atorvastatin 20 milliGRAM(s) Oral at bedtime  chlorhexidine 2% Cloths 1 Application(s) Topical daily  dexAMETHasone  Injectable 4 milliGRAM(s) IV Push two times a day  dextrose 5% + sodium chloride 0.45%. 1000 milliLiter(s) (100 mL/Hr) IV Continuous <Continuous>  fenofibrate Tablet 145 milliGRAM(s) Oral daily  filgrastim-sndz (ZARXIO) Injectable 480 MICROGram(s) SubCutaneous daily  influenza  Vaccine (HIGH DOSE) 0.5 milliLiter(s) IntraMuscular once  lactobacillus acidophilus 1 Tablet(s) Oral daily  lidocaine   4% Patch 1 Patch Transdermal every 24 hours  morphine  Infusion. 1 mG/Hr (1 mL/Hr) IV Continuous <Continuous>  ondansetron Injectable 4 milliGRAM(s) IV Push every 8 hours  pantoprazole  Injectable 40 milliGRAM(s) IV Push daily  piperacillin/tazobactam IVPB.. 3.375 Gram(s) IV Intermittent every 8 hours  sodium chloride 0.9%. 1000 milliLiter(s) (100 mL/Hr) IV Continuous <Continuous>    MEDICATIONS  (PRN):  acetaminophen     Tablet .. 650 milliGRAM(s) Oral every 6 hours PRN Temp greater or equal to 38C (100.4F), Mild Pain (1 - 3), Moderate Pain (4 - 6)  aluminum hydroxide/magnesium hydroxide/simethicone Suspension 30 milliLiter(s) Oral every 4 hours PRN Dyspepsia  artificial tears (preservative free) Ophthalmic Solution 1 Drop(s) Both EYES four times a day PRN Dry Eyes  morphine  - Injectable 4 milliGRAM(s) IV Push every 3 hours PRN Severe Pain (7 - 10)  naloxone Injectable 0.1 milliGRAM(s) IV Push every 3 minutes PRN For ANY of the following changes in patient status:  A. RR LESS THAN 10 breaths per minute, B. Oxygen saturation LESS THAN 90%, C. Sedation score of 6  OLANZapine Injectable 1.25 milliGRAM(s) IntraMuscular every 12 hours PRN severe aggitation  polyethylene glycol 3350 17 Gram(s) Oral daily PRN Constipation      ITEMS UNCHECKED ARE NOT PRESENT    PRESENT SYMPTOMS: [x ]Unable to self-report - see [ ] CPOT [x ] PAINADS [x ] RDOS  Source if other than patient:  [x ]Family   [ ]Team     Pain:  [ ]yes [ ]no  QOL impact -   Location -                    Aggravating factors -  Quality -  Radiation -  Timing-  Severity (0-10 scale):  Minimal acceptable level/ pain goal (0-10 scale):     CPOT:    https://www.[facility].org/getattachment/[email or web address]-[email or web address]-[email or web address]/Critical-Care-Pain-Observation-Tool-(CPOT)    Dyspnea:                           [ ]Mild [ ]Moderate [ ]Severe  Anxiety:                             [ ]Mild [ ]Moderate [ ]Severe  Fatigue:                             [ ]Mild [ ]Moderate [ ]Severe  Nausea:                             [ ]Mild [ ]Moderate [ ]Severe  Loss of appetite:              [ ]Mild [ ]Moderate [ ]Severe  Constipation:                    [ ]Mild [ ]Moderate [ ]Severe  Other Symptoms:  [ ]All other review of systems negative     PCSSQ[Palliative Care Spiritual Screening Question]   Severity (0-10):  Score of 4 or > indicate consideration of Chaplaincy referral.  Chaplaincy Referral: [ ] yes [ ] refused [ ] following [x ] deferred    Caregiver [place]? : [ ] yes [ ] no [x ] Deferred [ ] Declined             Social work referral [ ] Patient & Family Centered Care Referral [ ]  Anticipatory Grief present?:  [ ] yes [ ] no  [x ] Deferred                  Social work referral [ ] Patient & Family Centered Care Referral [ ]      PHYSICAL EXAM:  Vital Signs Last 24 Hrs  T(C): 36.6 (21 Oct 2024 10:00), Max: 36.8 (20 Oct 2024 15:30)  T(F): 97.9 (21 Oct 2024 10:00), Max: 98.3 (20 Oct 2024 15:30)  HR: 115 (21 Oct 2024 10:00) (110 - 125)  BP: 117/82 (21 Oct 2024 10:00) (105/70 - 125/83)  BP(mean): --  RR: 19 (21 Oct 2024 10:00) (18 - 19)  SpO2: 97% (21 Oct 2024 10:00) (95% - 98%)    Parameters below as of 21 Oct 2024 10:00  Patient On (Oxygen Delivery Method): nasal cannula  O2 Flow (L/min): 4   I&O's Summary    20 Oct 2024 07:01  -  21 Oct 2024 07:00  --------------------------------------------------------  IN: 0 mL / OUT: 800 mL / NET: -800 mL       GENERAL: [ ]Cachexia    [x ]Alert  [ ]Oriented x   [ ]Lethargic  [ ]Unarousable  [x ]Verbal  [ ]Non-Verbal  Behavioral:   [ ]Anxiety  [ ]Delirium [ ]Agitation [ ]Other  HEENT:  [x ]Normal   [ ]Dry mouth   [ ]ET Tube/Trach  [ ]Oral lesions  PULMONARY:   [x ]Clear [ ]Tachypnea  [ ]Audible excessive secretions   [ ]Rhonchi        [ ]Right [ ]Left [ ]Bilateral  [ ]Crackles        [ ]Right [ ]Left [ ]Bilateral  [ ]Wheezing     [ ]Right [ ]Left [ ]Bilateral  [ ]Diminished BS [ ] Right [ ]Left [ ]Bilateral  CARDIOVASCULAR:    [ ]Regular [ ]Irregular [x ]Tachy  [ ]Rodrick [ ]Murmur [ ]Other  GASTROINTESTINAL:  [x ]Soft  [ ]Distended   [x ]+BS  [ ]Non tender [ ]Tender  [ ]Other [ ]PEG [ ]OGT/ NGT   Last BM: 10/21  GENITOURINARY:  [x ]Normal [ ]Incontinent   [ ]Oliguria/Anuria   [ ]Farah  MUSCULOSKELETAL:   [x ]Normal   [ ]Weakness  [ ]Bed/Wheelchair bound [ ]Edema  NEUROLOGIC:   [ x]No focal deficits  [ ] Cognitive impairment  [ ] Dysphagia [ ]Dysarthria [ ] Paresis [ ]Other   SKIN:   [ ]Normal  [ ]Rash  [ ]Other  [ ]Pressure ulcer(s) [ ]y [ ]n present on admission    CRITICAL CARE:  [ ]Shock Present  [ ]Septic [ ]Cardiogenic [ ]Neurologic [ ]Hypovolemic  [ ]Vasopressors [ ]Inotropes  [ ]Respiratory failure present [ ]Mechanical Ventilation [ ]Non-invasive ventilatory support [ ]High-Flow   [ ]Acute  [ ]Chronic [ ]Hypoxic  [ ]Hypercarbic [ ]Other  [ ]Other organ failure     LABS:                        9.0    11.63 )-----------( 15       ( 21 Oct 2024 08:07 )             26.7   10-21    152[H]  |  124[H]  |  60[H]  ----------------------------<  262[H]  4.2   |  18[L]  |  0.96    Ca    7.2[L]      21 Oct 2024 08:07  Phos  2.4     10-21  Mg     3.00     10-21    TPro  4.6[L]  /  Alb  1.9[L]  /  TBili  1.7[H]  /  DBili  x   /  AST  77[H]  /  ALT  17  /  AlkPhos  53  10-20  PT/INR - ( 19 Oct 2024 15:40 )   PT: 30.8 sec;   INR: 2.61 ratio         PTT - ( 19 Oct 2024 15:40 )  PTT:51.2 sec    Urinalysis Basic - ( 21 Oct 2024 08:07 )    Color: x / Appearance: x / SG: x / pH: x  Gluc: 262 mg/dL / Ketone: x  / Bili: x / Urobili: x   Blood: x / Protein: x / Nitrite: x   Leuk Esterase: x / RBC: x / WBC x   Sq Epi: x / Non Sq Epi: x / Bacteria: x      RADIOLOGY & ADDITIONAL STUDIES: < from: CT Angio Abdomen and Pelvis w/ IV Cont (10.19.24 @ 18:39) >  IMPRESSION:  Dissection of the SMA as above.    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    Findings were discussed with Albino Matthews NP by Joe Carey M.D. on     < end of copied text >      Protein Calorie Malnutrition Present: [ ]mild [ ]moderate [ ]severe [ ]underweight [ ]morbid obesity  https://www.andeal.org/vault/2440/web/files/ONC/Table_Clinical%20Characteristics%20to%20Document%20Malnutrition-White%20JV%20et%20al%202012.pdf    Height (cm): 172.7 (10-12-24 @ 09:21)  Weight (kg): 69.4 (10-12-24 @ 09:21)  BMI (kg/m2): 23.3 (10-12-24 @ 09:21)    [ ]PPSV2 < or = 30%  [ ]significant weight loss [ ]poor nutritional intake [ ]anasarca[ ]Artificial Nutrition    Other REFERRALS:  [ ]Hospice  [ ]Child Life  [ ]Social Work  [ ]Case management [ ]Holistic Therapy

## 2024-10-21 NOTE — PROGRESS NOTE ADULT - ATTENDING COMMENTS
Final CT read with proximal SMA dissection and possible ischemic changes to the right colon  Patient more awake today, having diarrhea  Abd soft, left and right sided abdominal tenderness, distended  Tachycardic and febrile  Worsening leukopenia and thrombocytopenia, coagulopathic  Vascular consult for the focal SMA dissection. Continue NPO, IV antibiotics, and IV resuscitation.     I had a long discussion with his wife and son regarding the morbidities of any aggressive surgical intervention I would do for the colon. I explained that he is at high risk for all complications after any surgical intervention and it will not change his overall prognosis.
Restless, opens eyes and answers some questions appropriately. Reports that abdominal pain is better  Having diarrhea  Abd soft, distended, nontender currently  No surgical intervention for the colitis as patient is unable to be treated for the SMA dissection  Continue IV fluids and IV antibiotics
63 y/o M with a history of localized prostate cancer treated with radical prostatectomy (did not require ADT or RT, no LN involvement), now recently found to have recurrence with high volume metastatic disease. Here with intractable pain and fracture resulting from diffuse osseous involvement in the spine. Palliative consulted for symptom management in setting of advanced malignancy.     Patient seen with sons Mateus, and Benji and pt's wife Vijaya at bedside and Palliative Fellow, Dr. Buenrostro and SANDEEP Mcintyre.   > Pain: Discontinue Fentanyl patch 75mcg q72 hours. Patient was reporting "seeing something fly over providers head".  Continue morphine pca: 1mg CR; 4mg [id number] DD, 4mg CAB. Multimodal pain control as above. Monitor mental status.   > bowel regimen while on opioids   > narcan prn   > n/v- zofran ATC. EKG with QTc 436ms. on 10/17.   > Appreciate ortho recs- appreciate recs.  Pt has lumbar brace. f/u Dr. Dillon   >Appreciate rad/onc recs- CT L-spine recs reviewed.   > Appreciate IR recs- plan for potential kyphoplasty on 10/22   > appreciate heme/onc recs- started on dmt on 10/13 .  started on zarxio. Monitor blood counts   > Appreciate S&S- soft and bite sized, mildly thick liquids. GI consult   > Appreciate BH consult- zyprexa 1.25mg q8hr prn anxiety .
63 y/o M with a history of localized prostate cancer treated with radical prostatectomy (did not require ADT or RT, no LN involvement), now recently found to have recurrence with high volume metastatic disease. Here with intractable pain and fracture resulting from diffuse osseous involvement in the spine. Palliative consulted for symptom management in setting of advanced malignancy.     Patient seen with family at bedside and Palliative Fellow, Dr. Buenrostro.   > Pain: oxycontin 30mg bid (adjusted by primary medical team 10/14 however patient missed last 2 doses due to n/v). Rotate dilaudid pca to morphine pca- 4mg [id number] DD, 4mg CAB. Multimodal pain control.   > bowel regimen while on opioids   > narcan prn   > n/v- zofran ATC   > Appreciate ortho recs- pending MRI. Pt has lumbar brace.   >Appreciate rad/onc recs- potential RT vs. kyphoplasty after reviewing imaging   > appreciate heme/onc recs- started on dmt on 10/13 .
63 y/o M with a history of localized prostate cancer treated with radical prostatectomy (did not require ADT or RT, no LN involvement), now recently found to have recurrence with high volume metastatic disease. Here with intractable pain and fracture resulting from diffuse osseous involvement in the spine. Palliative consulted for symptom management in setting of advanced malignancy.     Patient seen with family at bedside and Palliative Fellow, Dr. Buenrostro and Palliative SW.   > Pain: Discontinue oxycontin 30mg as patient c/o dysphagia and has refused intermittent doses due to n/v. Start Fentanyl patch 75mcg q72 hours. Continue morphine pca- 4mg [id number] DD, 4mg CAB. Multimodal pain control as above   > bowel regimen while on opioids   > narcan prn   > n/v- zofran ATC   > Appreciate ortho recs- f/u final recs.  Pt has lumbar brace.   >Appreciate rad/onc recs- potential RT vs. kyphoplasty after reviewing imaging   > appreciate heme/onc recs- started on dmt on 10/13 .    > Recommend S&s as patient c/o dysphagia   > Recommend BH consult as patient does seem to have adjustment disorder with new dx of metastatic cancer.
63 y/o M with a history of localized prostate cancer treated with radical prostatectomy (did not require ADT or RT, no LN involvement), now recently found to have recurrence with high volume metastatic disease. Here with intractable pain and fracture resulting from diffuse osseous involvement in the spine. Palliative consulted for symptom management in setting of advanced malignancy.     Patient seen with son Mateus, at bedside and Palliative Fellow, Dr. Buenrostro.   > Pain: Continue Fentanyl patch 75mcg q72 hours. Adjust morphine pca: 1mg CR; 4mg [id number] DD, 4mg CAB. Multimodal pain control as above   > bowel regimen while on opioids   > narcan prn   > n/v- zofran ATC. Please obtain EKG to monitor QTc   > Appreciate ortho recs- f/u final recs.  Pt has lumbar brace.   >Appreciate rad/onc recs- f/u CT L-spine   > appreciate heme/onc recs- started on dmt on 10/13 .  started on zarxio. Monitor blood counts   > Recommend S&s as patient c/o dysphagia   > Appreciate  consult- zyprexa 1.25mg q8hr prn anxiety
63 y/o M with a history of localized prostate cancer treated with radical prostatectomy (did not require ADT or RT, no LN involvement), now recently found to have recurrence with high volume metastatic disease. Here with intractable pain and fracture resulting from diffuse osseous involvement in the spine. Palliative consulted for symptom management in setting of advanced malignancy.     Patient seen with sons Mateus, and Benji and pt's wife Vijaya at bedside and Palliative Fellow, Dr. Buenrostro and SANDEEP Mcintyre.   > Pain: Discontinue Fentanyl patch 75mcg q72 hours. Patient was reporting "seeing something fly over providers head".  Continue morphine pca: 1mg CR; 4mg [id number] DD, 4mg CAB. Multimodal pain control as above. Monitor mental status.   > bowel regimen while on opioids   > narcan prn   > n/v- zofran ATC. EKG with QTc 436ms. on 10/17.   > Appreciate ortho recs- appreciate recs.  Pt has lumbar brace. f/u Dr. Dillon   >Appreciate rad/onc recs- CT L-spine recs reviewed.   > Appreciate IR recs- plan for potential kyphoplasty on 10/22   > appreciate heme/onc recs- started on dmt on 10/13 .  started on zarxio. Monitor blood counts   > Appreciate S&S- soft and bite sized, mildly thick liquids. GI consult   > Appreciate BH consult- zyprexa 1.25mg q8hr prn anxiety .

## 2024-10-21 NOTE — PROGRESS NOTE ADULT - PROBLEM SELECTOR PLAN 3
- CTA negative for PE  - encouraged incentisve spirometer use  - D/w primary team - possibly related to chemotherapy treatment  - hematology-oncology following: started on daily zarxio, advised to monitor for fever and start abx if necessary CTa/p: Dissection of the SMA. Presacral mass with periaortic lymphadenopathy and lytic vertebral metastases with pathologic fracture at L1 and L5 are again seen.  > Vascular surgery recs- No vascular surgery interventions   > Unable to start heparin as patient with blood in stool and with thrombocytopenia   > Appreciate ID recs- pt found to have E.coli bacteremia and H.influenza bacteremia. On zosyn- f/u repeat blood cx

## 2024-10-21 NOTE — PROGRESS NOTE ADULT - SUBJECTIVE AND OBJECTIVE BOX
Patient is a 64y old  Male who presents with a chief complaint of Pain (21 Oct 2024 13:35)    Having bloody BM today, disoriented  Date of servie : 10-21-24 @ 16:38  INTERVAL HPI/OVERNIGHT EVENTS:  T(C): 36.6 (10-21-24 @ 10:00), Max: 36.8 (10-20-24 @ 18:00)  HR: 115 (10-21-24 @ 10:00) (110 - 125)  BP: 117/82 (10-21-24 @ 10:00) (105/70 - 125/83)  RR: 19 (10-21-24 @ 10:00) (18 - 19)  SpO2: 97% (10-21-24 @ 10:00) (95% - 98%)  Wt(kg): --  I&O's Summary    20 Oct 2024 07:01  -  21 Oct 2024 07:00  --------------------------------------------------------  IN: 0 mL / OUT: 800 mL / NET: -800 mL        LABS:                        9.0    11.63 )-----------( 15       ( 21 Oct 2024 08:07 )             26.7     10-21    152[H]  |  124[H]  |  60[H]  ----------------------------<  262[H]  4.2   |  18[L]  |  0.96    Ca    7.2[L]      21 Oct 2024 08:07  Phos  2.4     10-21  Mg     3.00     10-21    TPro  4.6[L]  /  Alb  1.9[L]  /  TBili  1.7[H]  /  DBili  x   /  AST  77[H]  /  ALT  17  /  AlkPhos  53  10-20      Urinalysis Basic - ( 21 Oct 2024 08:07 )    Color: x / Appearance: x / SG: x / pH: x  Gluc: 262 mg/dL / Ketone: x  / Bili: x / Urobili: x   Blood: x / Protein: x / Nitrite: x   Leuk Esterase: x / RBC: x / WBC x   Sq Epi: x / Non Sq Epi: x / Bacteria: x      CAPILLARY BLOOD GLUCOSE            Urinalysis Basic - ( 21 Oct 2024 08:07 )    Color: x / Appearance: x / SG: x / pH: x  Gluc: 262 mg/dL / Ketone: x  / Bili: x / Urobili: x   Blood: x / Protein: x / Nitrite: x   Leuk Esterase: x / RBC: x / WBC x   Sq Epi: x / Non Sq Epi: x / Bacteria: x        MEDICATIONS  (STANDING):  abiraterone 500 mg tablet 2 Tablet(s) 2 Tablet(s) Oral daily  atorvastatin 20 milliGRAM(s) Oral at bedtime  chlorhexidine 2% Cloths 1 Application(s) Topical daily  dexAMETHasone  Injectable 4 milliGRAM(s) IV Push two times a day  dextrose 5% + sodium chloride 0.45%. 1000 milliLiter(s) (100 mL/Hr) IV Continuous <Continuous>  fenofibrate Tablet 145 milliGRAM(s) Oral daily  filgrastim-sndz (ZARXIO) Injectable 480 MICROGram(s) SubCutaneous daily  influenza  Vaccine (HIGH DOSE) 0.5 milliLiter(s) IntraMuscular once  lactobacillus acidophilus 1 Tablet(s) Oral daily  lidocaine   4% Patch 1 Patch Transdermal every 24 hours  morphine  Infusion. 1 mG/Hr (1 mL/Hr) IV Continuous <Continuous>  ondansetron Injectable 4 milliGRAM(s) IV Push every 8 hours  pantoprazole  Injectable 40 milliGRAM(s) IV Push daily  piperacillin/tazobactam IVPB.. 3.375 Gram(s) IV Intermittent every 8 hours  sodium chloride 0.9%. 1000 milliLiter(s) (100 mL/Hr) IV Continuous <Continuous>    MEDICATIONS  (PRN):  acetaminophen     Tablet .. 650 milliGRAM(s) Oral every 6 hours PRN Temp greater or equal to 38C (100.4F), Mild Pain (1 - 3), Moderate Pain (4 - 6)  aluminum hydroxide/magnesium hydroxide/simethicone Suspension 30 milliLiter(s) Oral every 4 hours PRN Dyspepsia  artificial tears (preservative free) Ophthalmic Solution 1 Drop(s) Both EYES four times a day PRN Dry Eyes  morphine  - Injectable 4 milliGRAM(s) IV Push every 3 hours PRN Severe Pain (7 - 10)  naloxone Injectable 0.1 milliGRAM(s) IV Push every 3 minutes PRN For ANY of the following changes in patient status:  A. RR LESS THAN 10 breaths per minute, B. Oxygen saturation LESS THAN 90%, C. Sedation score of 6  OLANZapine Injectable 1.25 milliGRAM(s) IntraMuscular every 8 hours PRN severe aggitation  polyethylene glycol 3350 17 Gram(s) Oral daily PRN Constipation          PHYSICAL EXAM:  GENERAL: confused   CHEST/LUNG: Clear to percussion bilaterally; No rales, rhonchi, wheezing, or rubs  HEART: Regular rate and rhythm; No murmurs, rubs, or gallops  ABDOMEN: Soft, Nontender, Nondistended; Bowel sounds present  EXTREMITIES:  edema +    Care Discussed with Consultants/Other Providers [ x] YES  [ ] NO

## 2024-10-21 NOTE — CONSULT NOTE ADULT - ASSESSMENT
64M w/ metastatic prostate cancer s/p RALP on ADT was found to be in urinary retention requiring urologic intervention. urethral meatus was dilated at the bedside, and a 14fr lau was inserted w/ return of 530cc CYU.     Recommendations   - Please send urine for culture  - Maintain lau was at least 48 hours   - Can expect hematuria secondary decompression of bladder.   - trial of void [must void three times with post void residuals <200]  - Minimize benzo's opioids, antihistamines and anticholinergics as tolerated.   - Minimize constipation  - Follow up with Dr. Manuel Ludwig for Cystoscopy void trial     [facility] [address] [address] [phone]     -Urology, [phone]

## 2024-10-21 NOTE — PROGRESS NOTE ADULT - PROBLEM SELECTOR PLAN 8
The patient requires nursing assistance with ADLs  - Supportive care  - Turn and position  - Continue with good skin care Patient being treated for ischemic colitis   - On IV zosyn   - monitor BMs  - bedside commode if possible

## 2024-10-21 NOTE — PROGRESS NOTE ADULT - ASSESSMENT
64-year-old male with metastatic prostate cancer, sent in by hematologist from [place] [facility] for uncontrolled pain, nausea, vomiting.       Problem/Plan - 1:  ·  Problem: Cancer related pain.   ·  Plan: - appreciate pall care recommendations:  - pain control as per palliative crae   - cw decadron   - RT consult     Problem/Plan - 2:·  Problem: Nausea & vomiting., Diarrhea, colitis , SMA dissection   ·  Plan: -  vascular fu appreciated  - start  AC as per vascular but heme not clearing for AC  - Now pt with bloody BM   - GI and surgery consult  - ID fu  Problem/Plan - 3:  ·  Problem: CA of prostate.   ·  Plan: Metastatic prostate cancer- f/u heme/onc recommendations  - Rad Onc fu   - Palliative for symptom control.    Problem/Plan - 4:  ·  Problem: Thrombocytopenia, unspecified.   ·  Plan: -platelet baseline ~70  transfuse PRN    Problem/Plan - 5:  ·  Problem: Benign essential HTN.   ·  Plan: - cont. home lisinopril 40mg.  Problem/Plan - 6:·  Problem: Hyperlipidemia. ·  Plan: - cont. home atorvastatin 20 and fenofibrate.        [facility]

## 2024-10-21 NOTE — PROGRESS NOTE ADULT - PROBLEM SELECTOR PLAN 2
- Metastatic prostate adenocarcinoma, follows with Dr. Andrew Mendoza    - Oncology recs appreciated: Metastatic prostate cancer to liver now s/p C1 taxotere, continue with abiraterone. Should be on steroid with abiraterone, on dex 8mg BID (was for chemo however now per palliative to assist with pain control)- consider tapering to 4mg BID and further from there. If dexamethasone tapered off needs to be on prednisone 5mg daily, c/w zarxio 480mcg until ANC > 1500.  - management per oncology team  - Pt started on zarxio - Metastatic prostate adenocarcinoma, follows with Dr. Andrew Mendoza  - Oncology recs appreciated: Metastatic prostate cancer to liver now s/p C1 taxotere, on abiraterone qd and dexamethasone, s/p zarxio 480mcg until ANC > 1500.  - management per oncology team  - Pt started on zarxio MRI Lumbar Spine: (10/15) Diffuse osseous metastases. L1 and L5 pathologic fractures are associated with moderate epidural disease at L1, more mild at L5. At least mild epidural disease at the left S1-S2 neural foramen. No significant lumbar degenerative disc disease.  - MRI from 10/15 shows extensive metastasis in cervical, thoracic, lumbar, skull base and calvarium. Also small cortical subacute infarctions.  - Continue Morphine gtt @1mg/hr, IV morphine   - Discontinue Fentanyl patch d/t lethargy, confusion and possible hallucinations  - IR evaluation for kyphoplasty on hold in setting of c/f SMA dissection and bowel ischemia   - On decadron 4mg IV bic x3 days   - Narcan PRN  - multimodal pain control: lidocaine patch, warm/cold packs   - recommend changing bowel regimen to PRN  - would RECOMMEND OUTPATIENT PALLIATIVE CARE REFERRAL WITH [facility] palliative care team.

## 2024-10-21 NOTE — PROGRESS NOTE ADULT - SUBJECTIVE AND OBJECTIVE BOX
VASCULAR SURGERY DAILY PROGRESS NOTE    SUBJECTIVE: Resting comfortably, NAD. Overnight patient with intermittent AMS. Bx growing GNR      OBJECTIVE:  Vital Signs Last 24 Hrs  T(C): 36.3 (21 Oct 2024 06:00), Max: 36.8 (20 Oct 2024 15:30)  T(F): 97.3 (21 Oct 2024 06:00), Max: 98.3 (20 Oct 2024 15:30)  HR: 111 (21 Oct 2024 06:00) (110 - 125)  BP: 125/83 (21 Oct 2024 06:00) (105/70 - 125/83)  BP(mean): --  RR: 19 (21 Oct 2024 06:00) (18 - 19)  SpO2: 95% (21 Oct 2024 06:00) (95% - 98%)    Parameters below as of 21 Oct 2024 06:00  Patient On (Oxygen Delivery Method): nasal cannula  O2 Flow (L/min): 4      I&O's Summary    20 Oct 2024 07:01  -  21 Oct 2024 07:00  --------------------------------------------------------  IN: 0 mL / OUT: 600 mL / NET: -600 mL        Physical Exam:  General Appearance: NAD  Chest: Nonlabored breathing on NC  CV: Hemodynamically stable  Extremities: Grossly symmetric, [facility]    LABS:                        9.1    10.09 )-----------( 17       ( 21 Oct 2024 00:01 )             26.7     10-21    154[H]  |  123[H]  |  61[H]  ----------------------------<  176[H]  4.6   |  17[L]  |  0.97    Ca    7.2[L]      21 Oct 2024 00:01  Phos  3.1     10-21  Mg     2.90     10-21    TPro  4.6[L]  /  Alb  1.9[L]  /  TBili  1.7[H]  /  DBili  x   /  AST  77[H]  /  ALT  17  /  AlkPhos  53  10-20    PT/INR - ( 19 Oct 2024 15:40 )   PT: 30.8 sec;   INR: 2.61 ratio         PTT - ( 19 Oct 2024 15:40 )  PTT:51.2 sec  Urinalysis Basic - ( 21 Oct 2024 00:01 )    Color: x / Appearance: x / SG: x / pH: x  Gluc: 176 mg/dL / Ketone: x  / Bili: x / Urobili: x   Blood: x / Protein: x / Nitrite: x   Leuk Esterase: x / RBC: x / WBC x   Sq Epi: x / Non Sq Epi: x / Bacteria: x        RADIOLOGY & ADDITIONAL STUDIES: VASCULAR SURGERY DAILY PROGRESS NOTE    SUBJECTIVE: Resting comfortably, NAD. Overnight patient with intermittent AMS. Bx growing GNR      OBJECTIVE:  Vital Signs Last 24 Hrs  T(C): 36.3 (21 Oct 2024 06:00), Max: 36.8 (20 Oct 2024 15:30)  T(F): 97.3 (21 Oct 2024 06:00), Max: 98.3 (20 Oct 2024 15:30)  HR: 111 (21 Oct 2024 06:00) (110 - 125)  BP: 125/83 (21 Oct 2024 06:00) (105/70 - 125/83)  BP(mean): --  RR: 19 (21 Oct 2024 06:00) (18 - 19)  SpO2: 95% (21 Oct 2024 06:00) (95% - 98%)    Parameters below as of 21 Oct 2024 06:00  Patient On (Oxygen Delivery Method): nasal cannula  O2 Flow (L/min): 4      I&O's Summary    20 Oct 2024 07:01  -  21 Oct 2024 07:00  --------------------------------------------------------  IN: 0 mL / OUT: 600 mL / NET: -600 mL        Physical Exam:  General Appearance: NAD  Chest: Nonlabored breathing on NC  CV: tachycardic  Abd: Soft, nondistended, mild diffuse TTP  Extremities: Grossly symmetric, WWP    LABS:                        9.1    10.09 )-----------( 17       ( 21 Oct 2024 00:01 )             26.7     10-21    154[H]  |  123[H]  |  61[H]  ----------------------------<  176[H]  4.6   |  17[L]  |  0.97    Ca    7.2[L]      21 Oct 2024 00:01  Phos  3.1     10-21  Mg     2.90     10-21    TPro  4.6[L]  /  Alb  1.9[L]  /  TBili  1.7[H]  /  DBili  x   /  AST  77[H]  /  ALT  17  /  AlkPhos  53  10-20    PT/INR - ( 19 Oct 2024 15:40 )   PT: 30.8 sec;   INR: 2.61 ratio         PTT - ( 19 Oct 2024 15:40 )  PTT:51.2 sec  Urinalysis Basic - ( 21 Oct 2024 00:01 )    Color: x / Appearance: x / SG: x / pH: x  Gluc: 176 mg/dL / Ketone: x  / Bili: x / Urobili: x   Blood: x / Protein: x / Nitrite: x   Leuk Esterase: x / RBC: x / WBC x   Sq Epi: x / Non Sq Epi: x / Bacteria: x        RADIOLOGY & ADDITIONAL STUDIES:

## 2024-10-21 NOTE — PROGRESS NOTE ADULT - SUBJECTIVE AND OBJECTIVE BOX
Date of Service: 10-21-24 @ 11:08    Patient is a 64y old  Male who presents with a chief complaint of Pain (21 Oct 2024 07:52)      Any change in ROS: seems to be doing  ok : feels very weak : 4 L of oxygen      MEDICATIONS  (STANDING):  abiraterone 500 mg tablet 2 Tablet(s) 2 Tablet(s) Oral daily  atorvastatin 20 milliGRAM(s) Oral at bedtime  chlorhexidine 2% Cloths 1 Application(s) Topical daily  dexAMETHasone  Injectable 4 milliGRAM(s) IV Push two times a day  dextrose 5% + sodium chloride 0.45%. 1000 milliLiter(s) (100 mL/Hr) IV Continuous <Continuous>  fenofibrate Tablet 145 milliGRAM(s) Oral daily  filgrastim-sndz (ZARXIO) Injectable 480 MICROGram(s) SubCutaneous daily  influenza  Vaccine (HIGH DOSE) 0.5 milliLiter(s) IntraMuscular once  lactobacillus acidophilus 1 Tablet(s) Oral daily  lidocaine   4% Patch 1 Patch Transdermal every 24 hours  morphine  Infusion. 1 mG/Hr (1 mL/Hr) IV Continuous <Continuous>  ondansetron Injectable 4 milliGRAM(s) IV Push every 8 hours  pantoprazole  Injectable 40 milliGRAM(s) IV Push daily  piperacillin/tazobactam IVPB.. 3.375 Gram(s) IV Intermittent every 8 hours  sodium chloride 0.9%. 1000 milliLiter(s) (100 mL/Hr) IV Continuous <Continuous>    MEDICATIONS  (PRN):  acetaminophen     Tablet .. 650 milliGRAM(s) Oral every 6 hours PRN Temp greater or equal to 38C (100.4F), Mild Pain (1 - 3), Moderate Pain (4 - 6)  aluminum hydroxide/magnesium hydroxide/simethicone Suspension 30 milliLiter(s) Oral every 4 hours PRN Dyspepsia  artificial tears (preservative free) Ophthalmic Solution 1 Drop(s) Both EYES four times a day PRN Dry Eyes  morphine  - Injectable 4 milliGRAM(s) IV Push every 3 hours PRN Severe Pain (7 - 10)  naloxone Injectable 0.1 milliGRAM(s) IV Push every 3 minutes PRN For ANY of the following changes in patient status:  A. RR LESS THAN 10 breaths per minute, B. Oxygen saturation LESS THAN 90%, C. Sedation score of 6  OLANZapine 1.25 milliGRAM(s) Oral every 6 hours PRN anxiety  polyethylene glycol 3350 17 Gram(s) Oral daily PRN Constipation    Vital Signs Last 24 Hrs  T(C): 36.3 (21 Oct 2024 06:00), Max: 36.8 (20 Oct 2024 15:30)  T(F): 97.3 (21 Oct 2024 06:00), Max: 98.3 (20 Oct 2024 15:30)  HR: 111 (21 Oct 2024 06:00) (110 - 125)  BP: 125/83 (21 Oct 2024 06:00) (105/70 - 125/83)  BP(mean): --  RR: 19 (21 Oct 2024 06:00) (18 - 19)  SpO2: 95% (21 Oct 2024 06:00) (95% - 98%)    Parameters below as of 21 Oct 2024 06:00  Patient On (Oxygen Delivery Method): nasal cannula  O2 Flow (L/min): 4      I&O's Summary    20 Oct 2024 07:01  -  21 Oct 2024 07:00  --------------------------------------------------------  IN: 0 mL / OUT: 800 mL / NET: -800 mL          Physical Exam:   GENERAL: NAD, well-groomed, well-developed  HEENT: CHAVA/   Atraumatic, Normocephalic  ENMT: No tonsillar erythema, exudates, or enlargement; Moist mucous membranes, Good dentition, No lesions  NECK: Supple, No JVD, Normal thyroid  CHEST/LUNG: Clear to auscultaion- no wheezing  CVS: Regular rate and rhythm; No murmurs, rubs, or gallops  GI: : Soft, Nontender, Nondistended; Bowel sounds present  NERVOUS SYSTEM:  Alert & Oriented X3  EXTREMITIES:  - edema  LYMPH: No lymphadenopathy noted  SKIN: No rashes or lesions  ENDOCRINOLOGY: No Thyromegaly  PSYCH: calm     Labs:  18, 16, 18, 25                            9.0    11.63 )-----------( 15       ( 21 Oct 2024 08:07 )             26.7                         9.1    10.09 )-----------( 17       ( 21 Oct 2024 00:01 )             26.7                         9.3    7.90  )-----------( 17       ( 20 Oct 2024 15:40 )             27.5                         9.6    2.29  )-----------( 16       ( 19 Oct 2024 23:23 )             28.6                         10.2   1.18  )-----------( 25       ( 19 Oct 2024 15:40 )             29.3                         11.0   0.54  )-----------( 32       ( 19 Oct 2024 04:55 )             31.7                         12.3   0.23  )-----------( 45       ( 18 Oct 2024 06:20 )             35.5                         12.1   0.22  )-----------( 38       ( 17 Oct 2024 22:45 )             35.5     10-21    152[H]  |  124[H]  |  60[H]  ----------------------------<  262[H]  4.2   |  18[L]  |  0.96  10-21    154[H]  |  123[H]  |  61[H]  ----------------------------<  176[H]  4.6   |  17[L]  |  0.97  10-20    152[H]  |  121[H]  |  64[H]  ----------------------------<  112[H]  4.4   |  15[L]  |  1.04  10-19    147[H]  |  115[H]  |  57[H]  ----------------------------<  114[H]  4.5   |  18[L]  |  1.15  10-18    144  |  110[H]  |  30[H]  ----------------------------<  166[H]  4.5   |  21[L]  |  0.76  10-17    144  |  110[H]  |  29[H]  ----------------------------<  167[H]  4.4   |  22  |  0.74    Ca    7.2[L]      21 Oct 2024 08:07  Ca    7.2[L]      21 Oct 2024 00:01  Ca    7.2[L]      20 Oct 2024 15:40  Phos  2.4     10-21  Phos  3.1     10-21  Phos  3.9     10-20  Mg     3.00     10-21  Mg     2.90     10-21  Mg     2.90     10-20    TPro  4.6[L]  /  Alb  1.9[L]  /  TBili  1.7[H]  /  DBili  x   /  AST  77[H]  /  ALT  17  /  AlkPhos  53  10-20  TPro  4.7[L]  /  Alb  2.2[L]  /  TBili  2.2[H]  /  DBili  x   /  AST  63[H]  /  ALT  11  /  AlkPhos  52  10-19  TPro  5.2[L]  /  Alb  3.0[L]  /  TBili  1.3[H]  /  DBili  x   /  AST  52[H]  /  ALT  10  /  AlkPhos  53  10-17    CAPILLARY BLOOD GLUCOSE          LIVER FUNCTIONS - ( 20 Oct 2024 15:40 )  Alb: 1.9 g/dL / Pro: 4.6 g/dL / ALK PHOS: 53 U/L / ALT: 17 U/L / AST: 77 U/L / GGT: x           PT/INR - ( 19 Oct 2024 15:40 )   PT: 30.8 sec;   INR: 2.61 ratio         PTT - ( 19 Oct 2024 15:40 )  PTT:51.2 sec  Urinalysis Basic - ( 21 Oct 2024 08:07 )    Color: x / Appearance: x / SG: x / pH: x  Gluc: 262 mg/dL / Ketone: x  / Bili: x / Urobili: x   Blood: x / Protein: x / Nitrite: x   Leuk Esterase: x / RBC: x / WBC x   Sq Epi: x / Non Sq Epi: x / Bacteria: x      Procalcitonin: 14.25 ng/mL (10-19 @ 05:31)  Lactate, Blood: 1.9 mmol/L (10-19 @ 13:17)  Lactate, Blood: 2.5 mmol/L (10-19 @ 08:34)        RECENT CULTURES:  10-19 @ 20:05 .Blood BLOOD       Growth in aerobic bottle: Gram Negative Rods           Growth in aerobic bottle: Gram Negative Rods    10-19 @ 19:50 .Blood BLOOD       Growth in anaerobic bottle: Gram Negative Rods           Growth in anaerobic bottle: Gram Negative Rods    10-19 @ 05:26 .Blood BLOOD       Growth in aerobic and anaerobic bottles: Gram Negative Rods and Gram  Negative Coccobacilli           Growth in aerobic and anaerobic bottles: Escherichia coli  See previous culture [id number]    10-19 @ 04:55 .Blood BLOOD   PCR    Growth in aerobic and anaerobic bottles: Gram Negative Rods    Blood Culture PCR  Blood Culture PCR     Growth in aerobic and anaerobic bottles: Escherichia coli  Direct identification is available within approximately 3-5  hours either by Blood Panel Multiplexed PCR or Direct  MALDI-TOF. Details: [email or web address]    rad< from: Xray Chest 1 View- PORTABLE-Urgent (Xray Chest 1 View- PORTABLE-Urgent .) (10.20.24 @ 11:02) >    PROCEDURE DATE:  10/20/2024          INTERPRETATION:  EXAMINATION: XR CHEST URGENT    CLINICAL INDICATION: Bacteremia. Evaluate for pneumonia.    TECHNIQUE: Single frontal view of the chest was obtained.    COMPARISON: Chest x-ray 10/17/2024.    FINDINGS:  No focal consolidation, large pleural effusion or pneumothorax.  The heart size cannot be accurately assessed on this projection.  No acute osseous abnormalities.      IMPRESSION:  No focal consolidations.    --- End of Report ---          PRABHA MONET MD; Resident Radiologist  This document has been electronically signed.  LEA COLE MD; Attending Interventional Radiologist    < end of copied text >  < from: CT Angio Abdomen and Pelvis w/ IV Cont (10.19.24 @ 18:39) >  BONES: Demonstrated multilevel lytic metastases with likely pathologic   fractures at L1 and L5 also seen in previous study.    IMPRESSION:  Dissection of the SMA as above.    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    Findings were discussed with Albino Matthews NP by Joe Carey M.D. on     < end of copied text >  < from: CT Angio Chest PE Protocol w/ IV Cont (10.18.24 @ 03:06) >  bowel. Cholecystectomy. Ascites is noted.    CHEST WALL AND BONES: Multilevel vertebral body lytic lesions, consistent   with diffuse osseous metastasis. Redemonstrated mild multilevel loss of   vertebral body height.    IMPRESSION:    No pulmonary embolism to the level of the segmental branches bilaterally.   Limited evaluation of the subsegmental branches secondary to incomplete   contrast opacification.    Multilevel vertebral body lytic lesions and loss of vertebral body   height, better evaluated on CT thoracic spine 10/17/2024.        --- End of Report ---    < end of copied text >        RESPIRATORY CULTURES:      Clostridium difficile [facility] Toxins A&amp;B, EIA:   Negative (10-18 @ 23:53)      Studies  Chest X-RAY  CT SCAN Chest   Venous Dopplers: LE:   CT Abdomen  Others        rad< from: Xray Chest 1 View- PORTABLE-Urgent (Xray Chest 1 View- PORTABLE-Urgent .) (10.20.24 @ 11:02) >    INTERPRETATION:  EXAMINATION: XR CHEST URGENT    CLINICAL INDICATION: Bacteremia. Evaluate for pneumonia.    TECHNIQUE: Single frontal view of the chest was obtained.    COMPARISON: Chest x-ray 10/17/2024.    FINDINGS:  No focal consolidation, large pleural effusion or pneumothorax.  The heart size cannot be accurately assessed on this projection.  No acute osseous abnormalities.      IMPRESSION:  No focal consolidations.    --- End of Report ---          PRABHA MONET MD; Resident Radiologist  This document has been electronically signed.  LEA COLE MD; Attending Interventional Radiologist  This document has been electronically signed. Oct 20 2024 11:53AM    < end of copied text >

## 2024-10-21 NOTE — PROGRESS NOTE ADULT - ASSESSMENT
65 y/o M with high volume metastatic prostate cancer, here with back pain, started on chemotherapy.     1. Metastatic prostate cancer  - Follows with Dr Andrew Mendoza at [facility]   - Dx 14 y/o with low risk prostate cancer s/p radical prostatectomy, negative LNs -> recently found to have multiloculated presacral soft tissue mass 5.1 x 3.9 x 4.1cm, RP LAD, low density liver lesions, lucencies in several vertebral bodies, manubrium; PSMA 10/11/24 showed hypermetabolic vera foci above and below the diaphragm, foci in the liver and extensive foci involving the axial and appendicular skeleton compatible with metastatic disease  - --> 374--> 426 on 10/8/24   - Liver biopsy 9/30/24 consistent with adenocarcinoma favoring prostate   - High risk high volume disease -> started on triplet therapy w/ ADT/lupron, abiraterone/prednisone + taxotere. (back pain after Lupron likely tumor flare).  - Continue abiraterone 1000mg daily  - Should be on steroid w/abiraterone; on dex 4mg BID (was for chemo, now per palliative for pain control) - consider tapering to 2mg BID and further from there, if tapered off then needs to be on prednisone 5mg daily w/loly  - Received taxotere 60mg/m2 10/13/24     - Neutropenic, continue zarxio 480mcg until ANC > 1500   - Kyphoplasty w/IR to T3 and L1 lesions planned for 10/22/24    - Palliative following for pain control and given hospital course would have further GOC discussions. Treatment will be offered but he has metastatic disease with visceral involvement and complications as outlined below.     2. Hypoxia | tachycardia | diarrhea + pancolitis | Fever l Sepsis with Bacteremia   Bacteremia- GNR and Gram negative coccobaccili   - No PE on CTA chest, found to have pancolitis  - C. diff negative  - On zosyn   - ID and GI follow up     3. Thrombocytopenia   - Possible ITP component now with chemotherapy induced   - Baseline in the 70s  - Taxotere dose reduced to 60mg/m2 given baseline thrombocytopenia   - Transfuse for plt <10, <15 if febrile, <20 if bleeding    - Steroids may help with ITP component but also may need high dose. Nplate can also be considered   - defer to vascular regarding SMA dissection or ischemic colitis management- if they feel strongly that heparin is needed will need plt transfusion and to maintain >50K for AC     4. Neutropenia 2/2 chemotherapy   - Continue daily Zarxio until ANC > 1500 , now d/c'd  - bacteremia as above, ID follow up and c/w abx       5. 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 Continue GI, ID, colorectal surgery, vascular surgery follow up. Continue to support his counts with transfusions as needed and GCSF support. Appreciate primary team care.   Per notes, not an ICU  candidate at this time. Monitor closely and may need ICU re-eval if further decompensates     Umm Aponte NP  Hematology/Oncology  [facility]  [phone] (Office)  [phone] (Alt office)  Evenings and weekends please call MD on call or office         63 y/o M with high volume metastatic prostate cancer, here with back pain, started on chemotherapy.     1. Metastatic prostate cancer  - Follows with Dr Andrew Mendoza at [facility]   - Dx 14 y/o with low risk prostate cancer s/p radical prostatectomy, negative LNs -> recently found to have multiloculated presacral soft tissue mass 5.1 x 3.9 x 4.1cm, RP LAD, low density liver lesions, lucencies in several vertebral bodies, manubrium; PSMA 10/11/24 showed hypermetabolic vera foci above and below the diaphragm, foci in the liver and extensive foci involving the axial and appendicular skeleton compatible with metastatic disease  - --> 374--> 426 on 10/8/24   - Liver biopsy 9/30/24 consistent with adenocarcinoma favoring prostate   - High risk high volume disease -> started on triplet therapy w/ ADT/lupron, abiraterone/prednisone + taxotere. (back pain after Lupron likely tumor flare).  - Continue abiraterone 1000mg daily  - Should be on steroid w/abiraterone; on dex 4mg BID (was for chemo, now per palliative for pain control) - consider tapering to 2mg BID and further from there, if tapered off then needs to be on prednisone 5mg daily w/loly  - Received taxotere 60mg/m2 10/13/24, theoretically can plan for next dose on 11/4 if clinically improves    - Kyphoplasty w/IR to T3 and L1 lesions planned for 10/22/24 - HOLD due to other acute issues   - Palliative following for pain control and given hospital course would have further GOC discussions. Treatment will be offered but he has metastatic disease with visceral involvement and complications as outlined below.     2. Hypoxia | tachycardia | diarrhea + pancolitis | Fever l Sepsis with Bacteremia   Bacteremia- GNR and Gram negative coccobaccili   - No PE on CTA chest, found to have pancolitis  - C. diff negative  - On zosyn   - ID and GI follow up     3. Thrombocytopenia   - Possible ITP component now with chemotherapy induced   - Baseline in the 70s  - Taxotere dose reduced to 60mg/m2 given baseline thrombocytopenia   - Transfuse for plt <10, <15 if febrile, <20 if bleeding    - Steroids may help with ITP component but also may need high dose. Nplate can also be considered   - defer to vascular regarding SMA dissection or ischemic colitis management- if they feel strongly that heparin is needed will need plt transfusion and to maintain >50K for AC     4. Neutropenia 2/2 chemotherapy   - ANC recovered, no need for further growth factor at this time  - bacteremia as above, ID follow up and c/w abx     5. 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 Continue GI, ID, colorectal surgery, vascular surgery follow up. Continue to support his counts with transfusions as needed and GCSF support. Appreciate primary team care.   Per notes, not an ICU  candidate at this time. Monitor closely and may need ICU re-eval if further decompensates     Umm Aponte NP  Hematology/Oncology  [facility]  [phone] (Office)  [phone] (Alt office)  Evenings and weekends please call MD on call or office

## 2024-10-21 NOTE — PROGRESS NOTE ADULT - PROBLEM SELECTOR PLAN 10
Patient is supported by his wife- Vijaya [phone]) and 3 adult sons.   > Patient is recently dx with metastatic prostate cancer just 1.5 weeks ago and he is treatment oriented.  > Offered caregiver Sw referral to patient's wife- DECLINED     In the event of newly developing, evolving, or worsening symptoms, please contact the Palliative Medicine team via pager (if the patient is at [facility] #[phone] or if the patient is at [facility] #[phone]) The Geriatric and Palliative Medicine service has coverage 24 hours a day/ 7 days a week to provide medical recommendations regarding symptom management needs via telephone. Patient is supported by his wife- Vijaya [phone]) and 3 adult sons- Magen, Mateus and Freddy   > Patient is recently dx with metastatic prostate cancer just 1.5 weeks ago and he is treatment oriented.  > Offered caregiver Sw referral to patient's wife- DECLINED   > 10/21: Began advanced care planning discussions with patient's son, Freddy. Pt's wife stayed overnight so is sleeping. Will plan to speak to his wife regarding further goc.

## 2024-10-21 NOTE — PROGRESS NOTE ADULT - PROBLEM SELECTOR PLAN 6
- speech and swallow evaluated recommended soft and bite sized and mildly thick liquids with aspiration precautions - Behavioral health recs appreciated: Zyprexa 1.25 mg IM q8h prn for anxiety if family and pt are agreeable to medication. hold med if QTC > 500  - EKG performed QTc 436 on 1017  - Family is considering zyprexa, d/w them about risks and benefits - Continue with Zofran 4mg q8h ATC  - speech and swallow evaluated recommended soft and bite sized and mildly thick liquids with aspiration precautions

## 2024-10-21 NOTE — PROGRESS NOTE ADULT - ASSESSMENT
64-year-old male with metastatic prostate cancer, sent in by hematologist from [facility] for uncontrolled pain, nausea, vomiting.   Patient reports diffuse pain starting 6 months ago significantly worsening for the past 2 weeks.  Currently the worst pain is located around the lower back.   No loss of bladder and bowel function, no saddle paresthesia.  Able to walk.  patient is oxycodone 5 every 4-6 hour.  Yesterday they started adding OxyContin 10.  However patient continued to have pain.  Family also reports significant nausea and vomiting, inability to tolerate p.o. for the last 2 days.  Last bowel movement 2 days ago.   No known allergy.  Diagnosed with high risk high volume metastatic prostate cancer with bone, liver lymph node mets and presacral mass. Liver biopsy confirmed disease with . Started lupron, loly/pred with plans to initiate taxotere.    (12 Oct 2024 15:40)  pt seems very frustrated:   he is on 2 L of oxygen : he has no underlying lung disease:  but he is requiring 2 L of oxygen      Hypoxic resp failure  Metastatic prostate cancer  Back pain     Hypoxic resp failure  -He has no underlying lung disease:  but he is requiring 2 L of oxygen :   -CTA showed; No pulmonary embolism to the level of the segmental branches bilaterally. Limited evaluation of the subsegmental branches secondary to incomplete contrast opacification.  Multilevel vertebral body lytic lesions and loss of vertebral body height, better evaluated on CT thoracic spine 10/17/2024. Metastatic prostate cancer  -Patent central airways. Bibasilar atelectasis. No pleural effusion. MEDIASTINUM AND KATTY: No lymphadenopathy.  PULMONARY ANGIOGRAM: No pulmonary embolism to the level of the segmental   branches bilaterally. Limited evaluation of the subsegmental branches secondary to incomplete contrast opacification.    10/19:  -seems pretty tired;  on 2 L of oxygen :  -cta chest showed: No pulmonary embolism to the level of the segmental branches bilaterally. Limited evaluation of the subsegmental branches secondary to incomplete   contrast opacification. Multilevel vertebral body lytic lesions and loss of vertebral body height, better evaluated on CT thoracic spine 10/17/2024.  -still with fever:  no pe  on zosyn  and leukopenic hemonc following;  has metastatic prostate ca:   -VBG seems OK:     10/20:  pulm wise he seems to be stable:   -using 2 L of oxygen    -yesterdays VBG with minimal met acidosis  -cta again noted    10/21:  on 4 L of oxygen  today    cxr is size    e coli sepsis: Gram Negative Rods and Gram Negative Coccobacilli    New finding:  SMA dissection : neutropenic colitis/ #E Coli and H influenzae bacteremia/ #Neutropenic fever  -/f ischemic colitis on R colon   Diagnosed  on ct abd:  defer to surgery and primary team:  probably no intervention:   -cont antibiotics  -not doing very well with above new findings        Back pain   -likely due to metastatic disease:  adm here for sever pain :  pain control per primary team   -venous ph is ok     dw pmd and family

## 2024-10-22 DIAGNOSIS — E87.0 HYPEROSMOLALITY AND HYPERNATREMIA: ICD-10-CM

## 2024-10-22 DIAGNOSIS — R45.1 RESTLESSNESS AND AGITATION: ICD-10-CM

## 2024-10-22 LAB
ANION GAP SERPL CALC-SCNC: 11 MMOL/L — SIGNIFICANT CHANGE UP (ref 7–14)
ANION GAP SERPL CALC-SCNC: 12 MMOL/L — SIGNIFICANT CHANGE UP (ref 7–14)
ANION GAP SERPL CALC-SCNC: 12 MMOL/L — SIGNIFICANT CHANGE UP (ref 7–14)
ANION GAP SERPL CALC-SCNC: 15 MMOL/L — HIGH (ref 7–14)
APPEARANCE UR: ABNORMAL
BACTERIA # UR AUTO: NEGATIVE /HPF — SIGNIFICANT CHANGE UP
BILIRUB UR-MCNC: NEGATIVE — SIGNIFICANT CHANGE UP
BUN SERPL-MCNC: 52 MG/DL — HIGH (ref 7–23)
BUN SERPL-MCNC: 54 MG/DL — HIGH (ref 7–23)
BUN SERPL-MCNC: 57 MG/DL — HIGH (ref 7–23)
BUN SERPL-MCNC: 59 MG/DL — HIGH (ref 7–23)
CALCIUM SERPL-MCNC: 6.8 MG/DL — LOW (ref 8.4–10.5)
CALCIUM SERPL-MCNC: 6.9 MG/DL — LOW (ref 8.4–10.5)
CALCIUM SERPL-MCNC: 7 MG/DL — LOW (ref 8.4–10.5)
CALCIUM SERPL-MCNC: 7.1 MG/DL — LOW (ref 8.4–10.5)
CAST: 13 /LPF — HIGH (ref 0–4)
CHLORIDE SERPL-SCNC: 124 MMOL/L — HIGH (ref 98–107)
CHLORIDE SERPL-SCNC: 126 MMOL/L — HIGH (ref 98–107)
CHLORIDE SERPL-SCNC: 127 MMOL/L — HIGH (ref 98–107)
CHLORIDE SERPL-SCNC: 128 MMOL/L — HIGH (ref 98–107)
CO2 SERPL-SCNC: 18 MMOL/L — LOW (ref 22–31)
CO2 SERPL-SCNC: 18 MMOL/L — LOW (ref 22–31)
CO2 SERPL-SCNC: 19 MMOL/L — LOW (ref 22–31)
CO2 SERPL-SCNC: 19 MMOL/L — LOW (ref 22–31)
COLOR SPEC: YELLOW — SIGNIFICANT CHANGE UP
CREAT SERPL-MCNC: 0.81 MG/DL — SIGNIFICANT CHANGE UP (ref 0.5–1.3)
CREAT SERPL-MCNC: 0.93 MG/DL — SIGNIFICANT CHANGE UP (ref 0.5–1.3)
CREAT SERPL-MCNC: 0.94 MG/DL — SIGNIFICANT CHANGE UP (ref 0.5–1.3)
CREAT SERPL-MCNC: 0.94 MG/DL — SIGNIFICANT CHANGE UP (ref 0.5–1.3)
CULTURE RESULTS: ABNORMAL
DIFF PNL FLD: ABNORMAL
EGFR: 90 ML/MIN/1.73M2 — SIGNIFICANT CHANGE UP
EGFR: 90 ML/MIN/1.73M2 — SIGNIFICANT CHANGE UP
EGFR: 91 ML/MIN/1.73M2 — SIGNIFICANT CHANGE UP
EGFR: 98 ML/MIN/1.73M2 — SIGNIFICANT CHANGE UP
GLUCOSE BLDC GLUCOMTR-MCNC: 263 MG/DL — HIGH (ref 70–99)
GLUCOSE BLDC GLUCOMTR-MCNC: 316 MG/DL — HIGH (ref 70–99)
GLUCOSE BLDC GLUCOMTR-MCNC: 329 MG/DL — HIGH (ref 70–99)
GLUCOSE BLDC GLUCOMTR-MCNC: 333 MG/DL — HIGH (ref 70–99)
GLUCOSE SERPL-MCNC: 269 MG/DL — HIGH (ref 70–99)
GLUCOSE SERPL-MCNC: 291 MG/DL — HIGH (ref 70–99)
GLUCOSE SERPL-MCNC: 328 MG/DL — HIGH (ref 70–99)
GLUCOSE SERPL-MCNC: 333 MG/DL — HIGH (ref 70–99)
GLUCOSE UR QL: 500 MG/DL
GRAM STN FLD: ABNORMAL
GRAM STN FLD: ABNORMAL
HCT VFR BLD CALC: 27.2 % — LOW (ref 39–50)
HCT VFR BLD CALC: 28 % — LOW (ref 39–50)
HCT VFR BLD CALC: 29.4 % — LOW (ref 39–50)
HGB BLD-MCNC: 9.1 G/DL — LOW (ref 13–17)
HGB BLD-MCNC: 9.1 G/DL — LOW (ref 13–17)
HGB BLD-MCNC: 9.7 G/DL — LOW (ref 13–17)
KETONES UR-MCNC: NEGATIVE MG/DL — SIGNIFICANT CHANGE UP
LEUKOCYTE ESTERASE UR-ACNC: NEGATIVE — SIGNIFICANT CHANGE UP
MAGNESIUM SERPL-MCNC: 3 MG/DL — HIGH (ref 1.6–2.6)
MAGNESIUM SERPL-MCNC: 3.1 MG/DL — HIGH (ref 1.6–2.6)
MAGNESIUM SERPL-MCNC: 3.2 MG/DL — HIGH (ref 1.6–2.6)
MCHC RBC-ENTMCNC: 30 PG — SIGNIFICANT CHANGE UP (ref 27–34)
MCHC RBC-ENTMCNC: 30.3 PG — SIGNIFICANT CHANGE UP (ref 27–34)
MCHC RBC-ENTMCNC: 31.1 PG — SIGNIFICANT CHANGE UP (ref 27–34)
MCHC RBC-ENTMCNC: 32.5 GM/DL — SIGNIFICANT CHANGE UP (ref 32–36)
MCHC RBC-ENTMCNC: 33 GM/DL — SIGNIFICANT CHANGE UP (ref 32–36)
MCHC RBC-ENTMCNC: 33.5 GM/DL — SIGNIFICANT CHANGE UP (ref 32–36)
MCV RBC AUTO: 90.7 FL — SIGNIFICANT CHANGE UP (ref 80–100)
MCV RBC AUTO: 91 FL — SIGNIFICANT CHANGE UP (ref 80–100)
MCV RBC AUTO: 95.6 FL — SIGNIFICANT CHANGE UP (ref 80–100)
MRSA PCR RESULT.: SIGNIFICANT CHANGE UP
NITRITE UR-MCNC: NEGATIVE — SIGNIFICANT CHANGE UP
NRBC # BLD: 3 /100 WBCS — HIGH (ref 0–0)
NRBC # BLD: 4 /100 WBCS — HIGH (ref 0–0)
NRBC # BLD: 4 /100 WBCS — HIGH (ref 0–0)
NRBC # FLD: 0.54 K/UL — HIGH (ref 0–0)
NRBC # FLD: 0.64 K/UL — HIGH (ref 0–0)
NRBC # FLD: 0.72 K/UL — HIGH (ref 0–0)
ORGANISM # SPEC MICROSCOPIC CNT: ABNORMAL
PH UR: 5.5 — SIGNIFICANT CHANGE UP (ref 5–8)
PHOSPHATE SERPL-MCNC: 1.6 MG/DL — LOW (ref 2.5–4.5)
PHOSPHATE SERPL-MCNC: 1.7 MG/DL — LOW (ref 2.5–4.5)
PHOSPHATE SERPL-MCNC: 2.2 MG/DL — LOW (ref 2.5–4.5)
PLATELET # BLD AUTO: 23 K/UL — LOW (ref 150–400)
PLATELET # BLD AUTO: 23 K/UL — LOW (ref 150–400)
PLATELET # BLD AUTO: 30 K/UL — LOW (ref 150–400)
POTASSIUM SERPL-MCNC: 4.5 MMOL/L — SIGNIFICANT CHANGE UP (ref 3.5–5.3)
POTASSIUM SERPL-MCNC: 4.7 MMOL/L — SIGNIFICANT CHANGE UP (ref 3.5–5.3)
POTASSIUM SERPL-MCNC: 5 MMOL/L — SIGNIFICANT CHANGE UP (ref 3.5–5.3)
POTASSIUM SERPL-MCNC: 5.6 MMOL/L — HIGH (ref 3.5–5.3)
POTASSIUM SERPL-SCNC: 4.5 MMOL/L — SIGNIFICANT CHANGE UP (ref 3.5–5.3)
POTASSIUM SERPL-SCNC: 4.7 MMOL/L — SIGNIFICANT CHANGE UP (ref 3.5–5.3)
POTASSIUM SERPL-SCNC: 5 MMOL/L — SIGNIFICANT CHANGE UP (ref 3.5–5.3)
POTASSIUM SERPL-SCNC: 5.6 MMOL/L — HIGH (ref 3.5–5.3)
PROT UR-MCNC: 30 MG/DL
RBC # BLD: 2.93 M/UL — LOW (ref 4.2–5.8)
RBC # BLD: 3 M/UL — LOW (ref 4.2–5.8)
RBC # BLD: 3.23 M/UL — LOW (ref 4.2–5.8)
RBC # FLD: 17.5 % — HIGH (ref 10.3–14.5)
RBC # FLD: 18.3 % — HIGH (ref 10.3–14.5)
RBC # FLD: 18.7 % — HIGH (ref 10.3–14.5)
RBC CASTS # UR COMP ASSIST: 402 /HPF — HIGH (ref 0–4)
REVIEW: SIGNIFICANT CHANGE UP
S AUREUS DNA NOSE QL NAA+PROBE: SIGNIFICANT CHANGE UP
SODIUM SERPL-SCNC: 153 MMOL/L — HIGH (ref 135–145)
SODIUM SERPL-SCNC: 158 MMOL/L — HIGH (ref 135–145)
SODIUM SERPL-SCNC: 159 MMOL/L — HIGH (ref 135–145)
SODIUM SERPL-SCNC: 159 MMOL/L — HIGH (ref 135–145)
SP GR SPEC: 1.03 — SIGNIFICANT CHANGE UP (ref 1–1.03)
SPECIMEN SOURCE: SIGNIFICANT CHANGE UP
SQUAMOUS # UR AUTO: 2 /HPF — SIGNIFICANT CHANGE UP (ref 0–5)
UROBILINOGEN FLD QL: 0.2 MG/DL — SIGNIFICANT CHANGE UP (ref 0.2–1)
WBC # BLD: 14.94 K/UL — HIGH (ref 3.8–10.5)
WBC # BLD: 15.73 K/UL — HIGH (ref 3.8–10.5)
WBC # BLD: 18.56 K/UL — HIGH (ref 3.8–10.5)
WBC # FLD AUTO: 14.94 K/UL — HIGH (ref 3.8–10.5)
WBC # FLD AUTO: 15.73 K/UL — HIGH (ref 3.8–10.5)
WBC # FLD AUTO: 18.56 K/UL — HIGH (ref 3.8–10.5)
WBC UR QL: 3 /HPF — SIGNIFICANT CHANGE UP (ref 0–5)

## 2024-10-22 PROCEDURE — 99233 SBSQ HOSP IP/OBS HIGH 50: CPT

## 2024-10-22 PROCEDURE — 99232 SBSQ HOSP IP/OBS MODERATE 35: CPT

## 2024-10-22 PROCEDURE — 99223 1ST HOSP IP/OBS HIGH 75: CPT | Mod: FS,GC

## 2024-10-22 PROCEDURE — 99232 SBSQ HOSP IP/OBS MODERATE 35: CPT | Mod: GC

## 2024-10-22 RX ORDER — 0.9 % SODIUM CHLORIDE 0.9 %
1000 INTRAVENOUS SOLUTION INTRAVENOUS
Refills: 0 | Status: DISCONTINUED | OUTPATIENT
Start: 2024-10-22 | End: 2024-10-28

## 2024-10-22 RX ORDER — ACETAMINOPHEN 500MG 500 MG/1
500 TABLET, COATED ORAL ONCE
Refills: 0 | Status: COMPLETED | OUTPATIENT
Start: 2024-10-22 | End: 2024-10-22

## 2024-10-22 RX ORDER — POTASSIUM PHOSPHATE, MONOBASIC POTASSIUM PHOSPHATE, DIBASIC INJECTION, 236; 224 MG/ML; MG/ML
15 SOLUTION, CONCENTRATE INTRAVENOUS ONCE
Refills: 0 | Status: COMPLETED | OUTPATIENT
Start: 2024-10-22 | End: 2024-10-22

## 2024-10-22 RX ORDER — 0.9 % SODIUM CHLORIDE 0.9 %
1000 INTRAVENOUS SOLUTION INTRAVENOUS
Refills: 0 | Status: DISCONTINUED | OUTPATIENT
Start: 2024-10-22 | End: 2024-10-22

## 2024-10-22 RX ORDER — OLANZAPINE 20 MG/1
2.5 TABLET ORAL EVERY 6 HOURS
Refills: 0 | Status: DISCONTINUED | OUTPATIENT
Start: 2024-10-22 | End: 2024-10-22

## 2024-10-22 RX ADMIN — Medication 2: at 12:57

## 2024-10-22 RX ADMIN — ONDANSETRON HYDROCHLORIDE 4 MILLIGRAM(S): 4 TABLET, FILM COATED ORAL at 18:13

## 2024-10-22 RX ADMIN — Medication 130 MILLILITER(S): at 19:57

## 2024-10-22 RX ADMIN — CHLORHEXIDINE GLUCONATE 1 APPLICATION(S): 1.2 RINSE ORAL at 12:54

## 2024-10-22 RX ADMIN — Medication 1 MG/HR: at 19:56

## 2024-10-22 RX ADMIN — FILGRASTIM-AAFI 480 MICROGRAM(S): 300 INJECTION, SOLUTION SUBCUTANEOUS at 14:44

## 2024-10-22 RX ADMIN — Medication 125 MILLILITER(S): at 14:42

## 2024-10-22 RX ADMIN — PIPERACILLIN SODIUM AND TAZOBACTAM SODIUM 25 GRAM(S): 4; .5 INJECTION, POWDER, LYOPHILIZED, FOR SOLUTION INTRAVENOUS at 07:05

## 2024-10-22 RX ADMIN — PIPERACILLIN SODIUM AND TAZOBACTAM SODIUM 25 GRAM(S): 4; .5 INJECTION, POWDER, LYOPHILIZED, FOR SOLUTION INTRAVENOUS at 12:54

## 2024-10-22 RX ADMIN — DEXAMETHASONE 4 MILLIGRAM(S): 1.5 TABLET ORAL at 04:27

## 2024-10-22 RX ADMIN — OLANZAPINE 1.25 MILLIGRAM(S): 20 TABLET ORAL at 02:18

## 2024-10-22 RX ADMIN — Medication 2: at 01:15

## 2024-10-22 RX ADMIN — Medication 1: at 08:37

## 2024-10-22 RX ADMIN — Medication 75 MILLILITER(S): at 09:09

## 2024-10-22 RX ADMIN — PANTOPRAZOLE SODIUM 40 MILLIGRAM(S): 40 TABLET, DELAYED RELEASE ORAL at 12:55

## 2024-10-22 RX ADMIN — PIPERACILLIN SODIUM AND TAZOBACTAM SODIUM 25 GRAM(S): 4; .5 INJECTION, POWDER, LYOPHILIZED, FOR SOLUTION INTRAVENOUS at 19:57

## 2024-10-22 RX ADMIN — POTASSIUM PHOSPHATE, MONOBASIC POTASSIUM PHOSPHATE, DIBASIC INJECTION, 62.5 MILLIMOLE(S): 236; 224 SOLUTION, CONCENTRATE INTRAVENOUS at 14:47

## 2024-10-22 RX ADMIN — ACETAMINOPHEN 500MG 200 MILLIGRAM(S): 500 TABLET, COATED ORAL at 04:20

## 2024-10-22 RX ADMIN — Medication 130 MILLILITER(S): at 15:32

## 2024-10-22 RX ADMIN — Medication 2: at 18:13

## 2024-10-22 RX ADMIN — ONDANSETRON HYDROCHLORIDE 4 MILLIGRAM(S): 4 TABLET, FILM COATED ORAL at 10:44

## 2024-10-22 NOTE — CONSULT NOTE ADULT - PROBLEM SELECTOR RECOMMENDATION 9
Pt. with hypernatremia in setting of NPO, poor po intake/dehydration. On review of [facility] HIE/[place], pt SNa has been between 147-154 from 10/18-10/21. Pt. started on D51/2NS per primary team on 10/20/24. Repeat SNa today is worsening to 159. Recommend stopping D5 1/2NS and starting D5W 100 cc/hr. Monitor SNa q12 hours.       If you have any questions, please feel free to contact me:  Migdalia Wilson MD PGY-4  Nephrology Fellow  Pager [phone] / [employer] Teams (Preferred)  (After 5pm or on weekends please page the on-call fellow)

## 2024-10-22 NOTE — PROGRESS NOTE ADULT - PROBLEM SELECTOR PLAN 3
- Patient continues to be very restless and agitated   - Behavioral health recs appreciated: Zyprexa 1.25 mg IM q8h prn for anxiety if family and pt are agreeable to medication. hold med if QTC > 500  - EKG performed QTc 436 on 1017  - Patient requiring restraints now

## 2024-10-22 NOTE — CHART NOTE - NSCHARTNOTEFT_GEN_A_CORE
Follow UP:     Kyphoplasty was held due to possible safety risks of procedure as below.    As a result, we will proceed with radiation as:   5 fractions/20gy to  T1-T5, and also L1-L5    course begins on 10/22 and to complete on 10/28    IR was consulted for vertebral augmentation of T3-T5 prior to XRT.   Patient was seen bedside. Initially, patient kept requesting no exam and was not willing to participate in the discussion. Son was bedside at time of conversation.   Per discussion with the medical attending, given the concern for SMA dissection and concern for bowel ischemia, will hold off on vertebral augmentation evaluation at this time.   Please reach out to IR when patient is medically stable for vertebral augmentation. Follow UP:     Kyphoplasty was held due to possible safety risks of procedure as below.    As a result, we will proceed with radiation as:   5 fractions/20gy to  T1-T5, and also L1-L5    course begins on 10/22 and to complete on 10/28  will be unable to treat if he poses a safety issue, presently psychotic, hallucinating and has been aggressive with some staff.   in [facility].         IR was consulted for vertebral augmentation of T3-T5 prior to XRT.   Patient was seen bedside. Initially, patient kept requesting no exam and was not willing to participate in the discussion. Son was bedside at time of conversation.   Per discussion with the medical attending, given the concern for SMA dissection and concern for bowel ischemia, will hold off on vertebral augmentation evaluation at this time.   Please reach out to IR when patient is medically stable for vertebral augmentation. Follow UP:     Kyphoplasty was held due to possible safety risks of procedure as below.    As a result, we will proceed with radiation as:   5 fractions/20gy to  T1-T5, and also L1-L5    ** Addendum: per RN on floor, and family, there are concerns for sepsis, agitation, and he has AMS at present.  We will hold RT as he is unsafe to treat.  patient is confused and agitated in mittons/restraints.   Please assess for sepsis? and advise per oncology if we are to Hold RT.   d/w Dr. Kiana THOMASON was consulted for vertebral augmentation of T3-T5 prior to XRT.   Patient was seen bedside. Initially, patient kept requesting no exam and was not willing to participate in the discussion. Son was bedside at time of conversation.   Per discussion with the medical attending, given the concern for SMA dissection and concern for bowel ischemia, will hold off on vertebral augmentation evaluation at this time.   Please reach out to IR when patient is medically stable for vertebral augmentation.

## 2024-10-22 NOTE — PROGRESS NOTE ADULT - ASSESSMENT
64-year-old male with metastatic prostate cancer, sent in by hematologist from [facility] for uncontrolled pain, nausea, vomiting.   Patient reports diffuse pain starting 6 months ago significantly worsening for the past 2 weeks.  Currently the worst pain is located around the lower back.   No loss of bladder and bowel function, no saddle paresthesia.  Able to walk.  patient is oxycodone 5 every 4-6 hour.  Yesterday they started adding OxyContin 10.  However patient continued to have pain.  Family also reports significant nausea and vomiting, inability to tolerate p.o. for the last 2 days.  Last bowel movement 2 days ago.   No known allergy.  Diagnosed with high risk high volume metastatic prostate cancer with bone, liver lymph node mets and presacral mass. Liver biopsy confirmed disease with . Started lupron, loly/pred with plans to initiate taxotere.    (12 Oct 2024 15:40)  pt seems very frustrated:   he is on 2 L of oxygen : he has no underlying lung disease:  but he is requiring 2 L of oxygen      Hypoxic resp failure  Metastatic prostate cancer  Back pain     Hypoxic resp failure  -He has no underlying lung disease:  but he is requiring 2 L of oxygen :   -CTA showed; No pulmonary embolism to the level of the segmental branches bilaterally. Limited evaluation of the subsegmental branches secondary to incomplete contrast opacification.  Multilevel vertebral body lytic lesions and loss of vertebral body height, better evaluated on CT thoracic spine 10/17/2024. Metastatic prostate cancer  -Patent central airways. Bibasilar atelectasis. No pleural effusion. MEDIASTINUM AND KATTY: No lymphadenopathy.  PULMONARY ANGIOGRAM: No pulmonary embolism to the level of the segmental   branches bilaterally. Limited evaluation of the subsegmental branches secondary to incomplete contrast opacification.    10/19:  -seems pretty tired;  on 2 L of oxygen :  -cta chest showed: No pulmonary embolism to the level of the segmental branches bilaterally. Limited evaluation of the subsegmental branches secondary to incomplete   contrast opacification. Multilevel vertebral body lytic lesions and loss of vertebral body height, better evaluated on CT thoracic spine 10/17/2024.  -still with fever:  no pe  on zosyn  and leukopenic hemonc following;  has metastatic prostate ca:   -VBG seems OK:     10/20:  pulm wise he seems to be stable:   -using 2 L of oxygen    -yesterdays VBG with minimal met acidosis  -cta again noted    10/21:  on 4 L of oxygen  today    cxr is size    e coli sepsis: Gram Negative Rods and Gram Negative Coccobacilli    10/22: heis doing poorly today  : he is very agitated and uncomfortable  on mittens: ? sun downing     New finding:  SMA dissection : neutropenic colitis/ #E Coli and H influenzae bacteremia/ #Neutropenic fever  -/f ischemic colitis on R colon   Diagnosed  on ct abd:  defer to surgery and primary team:  probably no intervention:   -cont antibiotics  -not doing very well with above new findings    10/22; no intervention per surgery    -cont antibiotics  -has fever:  ID following :  -Last chest xray on 20th  ; normal     id following     Back pain   -likely due to metastatic disease:  adm here for sever pain :  pain control per primary team   -venous ph is ok     dior pmd and family

## 2024-10-22 NOTE — PROGRESS NOTE ADULT - ASSESSMENT
This is a 65 y/o M w/ prostate CA s/p radical prostatectomy now metastatic, HTN, HLD, admitted to 10/12 for pain, N/V in setting of osseous mets. S/p docetaxel on 10/13, abiraterone 10/12. Given neutropenia started on Zarxio.  C/b hypoxia, tachycardia. CTA w/o PE. Pt w/ 10 episodes of diarrhea on 10/18. C diff negative,   Pt now febrile to 102, CT A/P with pancolitis. 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   Fever likely 2/2 to neutropenic enterocolitis, pt w/ ANC 90, at high risk of bacteremia.   Bcx now w/ E Coli and H influenzae bacteremia, E Coli likely from colitis, however unclear source of H influenza, typically from PNA, septic arthritis, consider meningitis, per family denies neck pain/HA.   Pt w/ pressured speech, paranoid thoughts. Worsening hypernatremia     Recommendations:   1. C/w Zosyn 3.375 g q8  2. F/u BCx x 2   3. Appreciate surgery/GI recs   4. Consider psychiatry consult     Poor prognosis overall, recommend [facility]    Thank you for this consult. Inpatient ID team will follow.    Orlando Ruiz M.D.  Attending Physician  Division of Infectious Diseases  Department of Medicine

## 2024-10-22 NOTE — BH CONSULTATION LIAISON PROGRESS NOTE - NSICDXBHPRIMARYDX_PSY_ALL_CORE
Interval History: MARIBEL, DAWNSAF. Patient evaluated at bedside. Endorsed persistent generalized abdominal pain and suprapubic tenderness. Asked for gas-x and PRN cough medication. Denied any other urinary symptoms. Stated she feels better with IV diuresis. Patient is a poor historian, but is pleasant and able to be re-directed. Troponin trended up to 0.069, repeat trop pending, will follow. DHEERAJ improved, with Cr 1.4 today.  Continuing with IV diuresis and IV Ceftriaxone. Will discharge with PCP f/u when medically ready.    Review of Systems   Constitutional:  Negative for chills and fever.   Respiratory:  Positive for cough and shortness of breath. Negative for chest tightness.    Cardiovascular:  Positive for leg swelling. Negative for chest pain.   Gastrointestinal:  Positive for abdominal pain. Negative for nausea.   Neurological:  Negative for dizziness and weakness.   Objective:     Vital Signs (Most Recent):  Temp: 98 °F (36.7 °C) (03/29/23 0853)  Pulse: 72 (03/29/23 0853)  Resp: 19 (03/29/23 0853)  BP: (!) 151/71 (03/29/23 0853)  SpO2: 100 % (03/29/23 0853)   Vital Signs (24h Range):  Temp:  [97.8 °F (36.6 °C)-99 °F (37.2 °C)] 98 °F (36.7 °C)  Pulse:  [65-80] 72  Resp:  [14-32] 19  SpO2:  [95 %-100 %] 100 %  BP: (121-196)/(61-98) 151/71     Weight: 94.3 kg (208 lb)  Body mass index is 35.7 kg/m².    Intake/Output Summary (Last 24 hours) at 3/29/2023 1028  Last data filed at 3/29/2023 0952  Gross per 24 hour   Intake 3500 ml   Output 1375 ml   Net 2125 ml      Physical Exam  Vitals and nursing note reviewed.   Constitutional:       Appearance: She is well-developed. She is obese. She is not ill-appearing.   Eyes:      Pupils: Pupils are equal, round, and reactive to light.   Cardiovascular:      Rate and Rhythm: Normal rate and regular rhythm.   Pulmonary:      Effort: Pulmonary effort is normal.      Breath sounds: Normal breath sounds.   Abdominal:      Palpations: Abdomen is soft.      Tenderness: There is  abdominal tenderness (Suprapubic).   Musculoskeletal:         General: No tenderness.      Right lower leg: Edema present.      Left lower leg: Edema present.   Skin:     General: Skin is warm and dry.   Neurological:      Mental Status: She is alert and oriented to person, place, and time.   Psychiatric:         Behavior: Behavior normal.       Significant Labs: All pertinent labs within the past 24 hours have been reviewed.  BMP:   Recent Labs   Lab 03/29/23  0330   *      K 4.4   CL 99   CO2 28   BUN 24*   CREATININE 1.4   CALCIUM 9.6   MG 2.5     CBC:   Recent Labs   Lab 03/28/23  1459 03/28/23  1508 03/29/23  0330   WBC 6.81  --  6.63   HGB 13.7  --  13.4   HCT 43.0 42 40.8     --  192     Magnesium:   Recent Labs   Lab 03/29/23  0330   MG 2.5     Troponin:   Recent Labs   Lab 03/28/23  1706 03/28/23  2327 03/29/23  0330   TROPONINI 0.049* 0.069* 0.067*       Significant Imaging: I have reviewed all pertinent imaging results/findings within the past 24 hours.   Anxiety   F41.9   Delirium due to another medical condition   F05

## 2024-10-22 NOTE — CONSULT NOTE ADULT - SUBJECTIVE AND OBJECTIVE BOX
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 Pt. now hypernatremic. Nephrology consulted for hypernatremia.     On review of [facility]/Kacy, pt SNa has been between 147-154 from 10/18-10/21. Pt. started on D51/2NS per primary team on 10/20/24. Repeat SNa today is worsening to 159.     Pt. seen and examined at bedside earlier today with son present. Pt. confused at bedside with colt on repeatedly asking for Javan.  Per pt's son, pt.'s last meal was like a week a go and has not been able to eat since due to abdominal pain and NPO due to SMA dissection.         PAST HISTORY  --------------------------------------------------------------------------------  PAST MEDICAL & SURGICAL HISTORY:  Benign essential HTN  HLD (hyperlipidemia)  CA of prostate  Basal cell carcinoma  History of transurethral prostatectomy  S/P inguinal hernia repair  History of basal cell carcinoma (BCC) excision    FAMILY HISTORY:  FH: HTN (hypertension) (Sibling)      PAST SOCIAL HISTORY:    ALLERGIES & MEDICATIONS  --------------------------------------------------------------------------------  Allergies    No Known Allergies    Intolerances      Standing Inpatient Medications  abiraterone 500 mg tablet 2 Tablet(s) 2 Tablet(s) Oral daily  atorvastatin 20 milliGRAM(s) Oral at bedtime  chlorhexidine 2% Cloths 1 Application(s) Topical daily  dextrose 5%. 1000 milliLiter(s) IV Continuous <Continuous>  dextrose 5%. 1000 milliLiter(s) IV Continuous <Continuous>  dextrose 5%. 1000 milliLiter(s) IV Continuous <Continuous>  dextrose 5%. 1000 milliLiter(s) IV Continuous <Continuous>  dextrose 50% Injectable 25 Gram(s) IV Push once  dextrose 50% Injectable 12.5 Gram(s) IV Push once  dextrose 50% Injectable 25 Gram(s) IV Push once  fenofibrate Tablet 145 milliGRAM(s) Oral daily  filgrastim-sndz (ZARXIO) Injectable 480 MICROGram(s) SubCutaneous daily  glucagon  Injectable 1 milliGRAM(s) IntraMuscular once  influenza  Vaccine (HIGH DOSE) 0.5 milliLiter(s) IntraMuscular once  insulin lispro (ADMELOG) corrective regimen sliding scale   SubCutaneous every 6 hours  lactobacillus acidophilus 1 Tablet(s) Oral daily  lidocaine   4% Patch 1 Patch Transdermal every 24 hours  morphine  Infusion. 1 mG/Hr IV Continuous <Continuous>  ondansetron Injectable 4 milliGRAM(s) IV Push every 8 hours  pantoprazole  Injectable 40 milliGRAM(s) IV Push daily  piperacillin/tazobactam IVPB.. 3.375 Gram(s) IV Intermittent every 8 hours  potassium phosphate IVPB 15 milliMole(s) IV Intermittent once    PRN Inpatient Medications  acetaminophen     Tablet .. 650 milliGRAM(s) Oral every 6 hours PRN  aluminum hydroxide/magnesium hydroxide/simethicone Suspension 30 milliLiter(s) Oral every 4 hours PRN  artificial tears (preservative free) Ophthalmic Solution 1 Drop(s) Both EYES four times a day PRN  dextrose Oral Gel 15 Gram(s) Oral once PRN  morphine  - Injectable 4 milliGRAM(s) IV Push every 3 hours PRN  naloxone Injectable 0.1 milliGRAM(s) IV Push every 3 minutes PRN  OLANZapine Injectable 1.25 milliGRAM(s) IntraMuscular every 8 hours PRN  polyethylene glycol 3350 17 Gram(s) Oral daily PRN      REVIEW OF SYSTEMS  --------------------------------------------------------------------------------  Unable to obtain ROS due to current clinical status.     VITALS/PHYSICAL EXAM  --------------------------------------------------------------------------------  T(C): 38.7 (10-22-24 @ 10:56), Max: 38.7 (10-22-24 @ 10:56)  HR: 119 (10-22-24 @ 10:56) (108 - 140)  BP: 153/88 (10-22-24 @ 10:56) (110/83 - 153/89)  RR: 19 (10-22-24 @ 10:56) (18 - 19)  SpO2: 97% (10-22-24 @ 10:56) (93% - 98%)  Wt(kg): --        10-21-24 @ 07:01  -  10-22-24 @ 07:00  --------------------------------------------------------  IN: 1200 mL / OUT: 600 mL / NET: 600 mL    10-22-24 @ 07:01  -  10-22-24 @ 13:19  --------------------------------------------------------  IN: 0 mL / OUT: 640 mL / NET: -640 mL        Physical Exam:  Gen: NAD, resting  Pulm: CTA B/L  CV: RRR, S1S2;  Abd: +BS,   : No suprapubic tenderness  Extremities: no bilateral LE edema  Neuro: Awake, alert, not oriented.   Skin: Warm      LABS/STUDIES  --------------------------------------------------------------------------------              9.7    14.94 >-----------<  23       [10-22-24 @ 11:10]              29.4     159  |  126  |  57  ----------------------------<  291      [10-22-24 @ 11:10]  5.0   |  18  |  0.93        Ca     7.1     [10-22-24 @ 11:10]      Mg     3.20     [10-22-24 @ 11:10]      Phos  1.7     [10-22-24 @ 11:10]    TPro  4.6  /  Alb  1.9  /  TBili  1.7  /  DBili  x   /  AST  77  /  ALT  17  /  AlkPhos  53  [10-20-24 @ 15:40]      Uric acid 4.8      [10-21-24 @ 08:07]        [10-21-24 @ 08:07]    Creatinine Trend:  SCr 0.93 [10-22 @ 11:10]  SCr 0.94 [10-22 @ 06:09]  SCr 0.85 [10-21 @ 17:15]  SCr 0.96 [10-21 @ 08:07]  SCr 0.97 [10-21 @ 00:01]    Urinalysis - [10-22-24 @ 12:22]      Color Yellow / Appearance Cloudy / SG 1.027 / pH 5.5      Gluc 500 / Ketone Negative  / Bili Negative / Urobili 0.2       Blood Large / Protein 30 / Leuk Est Negative / Nitrite Negative       / WBC 3 / Hyaline  / Gran  / Sq Epi  / Non Sq Epi 2 / Bacteria Negative      TSH 2.62      [10-13-24 @ 05:30]  Lipid: chol 135, , HDL 39, LDL --      [10-13-24 @ 05:30]         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 Pt. now hypernatremic. Nephrology consulted for hypernatremia.     On review of [facility]/Kacy, pt SNa has been between 147-154 from 10/18-10/21. Pt. started on D51/2NS per primary team on 10/20/24. Repeat SNa today is worsening to 159.     Pt. seen and examined at bedside earlier today with son present. Pt. confused at bedside with colt on repeatedly asking for Javan.  Per pt's son, pt.'s last meal was like a week a go and has not been able to eat since due to abdominal pain and NPO due to SMA dissection.         PAST HISTORY  --------------------------------------------------------------------------------  PAST MEDICAL & SURGICAL HISTORY:  Benign essential HTN  HLD (hyperlipidemia)  CA of prostate  Basal cell carcinoma  History of transurethral prostatectomy  S/P inguinal hernia repair  History of basal cell carcinoma (BCC) excision    FAMILY HISTORY:  FH: HTN (hypertension) (Sibling)      PAST SOCIAL HISTORY: No smoking, drugs or alcohol use    ALLERGIES & MEDICATIONS  --------------------------------------------------------------------------------  Allergies    No Known Allergies    Intolerances      Standing Inpatient Medications  abiraterone 500 mg tablet 2 Tablet(s) 2 Tablet(s) Oral daily  atorvastatin 20 milliGRAM(s) Oral at bedtime  chlorhexidine 2% Cloths 1 Application(s) Topical daily  dextrose 5%. 1000 milliLiter(s) IV Continuous <Continuous>  dextrose 5%. 1000 milliLiter(s) IV Continuous <Continuous>  dextrose 5%. 1000 milliLiter(s) IV Continuous <Continuous>  dextrose 5%. 1000 milliLiter(s) IV Continuous <Continuous>  dextrose 50% Injectable 25 Gram(s) IV Push once  dextrose 50% Injectable 12.5 Gram(s) IV Push once  dextrose 50% Injectable 25 Gram(s) IV Push once  fenofibrate Tablet 145 milliGRAM(s) Oral daily  filgrastim-sndz (ZARXIO) Injectable 480 MICROGram(s) SubCutaneous daily  glucagon  Injectable 1 milliGRAM(s) IntraMuscular once  influenza  Vaccine (HIGH DOSE) 0.5 milliLiter(s) IntraMuscular once  insulin lispro (ADMELOG) corrective regimen sliding scale   SubCutaneous every 6 hours  lactobacillus acidophilus 1 Tablet(s) Oral daily  lidocaine   4% Patch 1 Patch Transdermal every 24 hours  morphine  Infusion. 1 mG/Hr IV Continuous <Continuous>  ondansetron Injectable 4 milliGRAM(s) IV Push every 8 hours  pantoprazole  Injectable 40 milliGRAM(s) IV Push daily  piperacillin/tazobactam IVPB.. 3.375 Gram(s) IV Intermittent every 8 hours  potassium phosphate IVPB 15 milliMole(s) IV Intermittent once    PRN Inpatient Medications  acetaminophen     Tablet .. 650 milliGRAM(s) Oral every 6 hours PRN  aluminum hydroxide/magnesium hydroxide/simethicone Suspension 30 milliLiter(s) Oral every 4 hours PRN  artificial tears (preservative free) Ophthalmic Solution 1 Drop(s) Both EYES four times a day PRN  dextrose Oral Gel 15 Gram(s) Oral once PRN  morphine  - Injectable 4 milliGRAM(s) IV Push every 3 hours PRN  naloxone Injectable 0.1 milliGRAM(s) IV Push every 3 minutes PRN  OLANZapine Injectable 1.25 milliGRAM(s) IntraMuscular every 8 hours PRN  polyethylene glycol 3350 17 Gram(s) Oral daily PRN      REVIEW OF SYSTEMS  --------------------------------------------------------------------------------  Unable to obtain ROS due to current clinical status.     VITALS/PHYSICAL EXAM  --------------------------------------------------------------------------------  T(C): 38.7 (10-22-24 @ 10:56), Max: 38.7 (10-22-24 @ 10:56)  HR: 119 (10-22-24 @ 10:56) (108 - 140)  BP: 153/88 (10-22-24 @ 10:56) (110/83 - 153/89)  RR: 19 (10-22-24 @ 10:56) (18 - 19)  SpO2: 97% (10-22-24 @ 10:56) (93% - 98%)  Wt(kg): --        10-21-24 @ 07:01  -  10-22-24 @ 07:00  --------------------------------------------------------  IN: 1200 mL / OUT: 600 mL / NET: 600 mL    10-22-24 @ 07:01  -  10-22-24 @ 13:19  --------------------------------------------------------  IN: 0 mL / OUT: 640 mL / NET: -640 mL        Physical Exam:  Gen: NAD, resting  Pulm: CTA B/L  CV: RRR, S1S2;  Abd: +BS,   : No suprapubic tenderness  Extremities: no bilateral LE edema  Neuro: Awake, alert, not oriented.   Skin: Warm      LABS/STUDIES  --------------------------------------------------------------------------------              9.7    14.94 >-----------<  23       [10-22-24 @ 11:10]              29.4     159  |  126  |  57  ----------------------------<  291      [10-22-24 @ 11:10]  5.0   |  18  |  0.93        Ca     7.1     [10-22-24 @ 11:10]      Mg     3.20     [10-22-24 @ 11:10]      Phos  1.7     [10-22-24 @ 11:10]    TPro  4.6  /  Alb  1.9  /  TBili  1.7  /  DBili  x   /  AST  77  /  ALT  17  /  AlkPhos  53  [10-20-24 @ 15:40]      Uric acid 4.8      [10-21-24 @ 08:07]        [10-21-24 @ 08:07]    Creatinine Trend:  SCr 0.93 [10-22 @ 11:10]  SCr 0.94 [10-22 @ 06:09]  SCr 0.85 [10-21 @ 17:15]  SCr 0.96 [10-21 @ 08:07]  SCr 0.97 [10-21 @ 00:01]    Urinalysis - [10-22-24 @ 12:22]      Color Yellow / Appearance Cloudy / SG 1.027 / pH 5.5      Gluc 500 / Ketone Negative  / Bili Negative / Urobili 0.2       Blood Large / Protein 30 / Leuk Est Negative / Nitrite Negative       / WBC 3 / Hyaline  / Gran  / Sq Epi  / Non Sq Epi 2 / Bacteria Negative      TSH 2.62      [10-13-24 @ 05:30]  Lipid: chol 135, , HDL 39, LDL --      [10-13-24 @ 05:30]

## 2024-10-22 NOTE — PROGRESS NOTE ADULT - SUBJECTIVE AND OBJECTIVE BOX
[facility] Geriatrics and Palliative Care  Melissa Davies Palliative Care Attending  Contact Info: Page [phone] (including Nights/Weekends), message on Microsoft Teams (Melissa Davies), or leave  at Palliative Office [phone] (non-urgent)   Date of [id number] @ 13:55    SUBJECTIVE AND OBJECTIVE: Patient seen this AM in restraints asking provider to untie his restraints. Patient is not answering questions regarding his pain. He remains delirious and agitated. He has lau in place.     Indication for Geriatrics and Palliative Care Services/INTERVAL HPI: sx management in setting of advanced malignancy     OVERNIGHT EVENTS:  > 10/21: Weekend events reviewed. Over the past 24 hours, patient was transitioned off pca pump. He required 4mg IV morphine x1.   > 10/22: Over the past 24 hours, patient did not require IV morphine PRNs. He did require IM Zyprexa x2. He is febrile.     DNR on chart:  Allergies    No Known Allergies    Intolerances    MEDICATIONS  (STANDING):  abiraterone 500 mg tablet 2 Tablet(s) 2 Tablet(s) Oral daily  atorvastatin 20 milliGRAM(s) Oral at bedtime  chlorhexidine 2% Cloths 1 Application(s) Topical daily  dextrose 5%. 1000 milliLiter(s) (100 mL/Hr) IV Continuous <Continuous>  dextrose 5%. 1000 milliLiter(s) (50 mL/Hr) IV Continuous <Continuous>  dextrose 50% Injectable 25 Gram(s) IV Push once  dextrose 50% Injectable 25 Gram(s) IV Push once  dextrose 50% Injectable 12.5 Gram(s) IV Push once  fenofibrate Tablet 145 milliGRAM(s) Oral daily  filgrastim-sndz (ZARXIO) Injectable 480 MICROGram(s) SubCutaneous daily  glucagon  Injectable 1 milliGRAM(s) IntraMuscular once  influenza  Vaccine (HIGH DOSE) 0.5 milliLiter(s) IntraMuscular once  insulin lispro (ADMELOG) corrective regimen sliding scale   SubCutaneous every 6 hours  lactobacillus acidophilus 1 Tablet(s) Oral daily  lidocaine   4% Patch 1 Patch Transdermal every 24 hours  morphine  Infusion. 1 mG/Hr (1 mL/Hr) IV Continuous <Continuous>  ondansetron Injectable 4 milliGRAM(s) IV Push every 8 hours  pantoprazole  Injectable 40 milliGRAM(s) IV Push daily  piperacillin/tazobactam IVPB.. 3.375 Gram(s) IV Intermittent every 8 hours  potassium phosphate IVPB 15 milliMole(s) IV Intermittent once  sodium chloride 0.45%. 1000 milliLiter(s) (125 mL/Hr) IV Continuous <Continuous>    MEDICATIONS  (PRN):  acetaminophen     Tablet .. 650 milliGRAM(s) Oral every 6 hours PRN Temp greater or equal to 38C (100.4F), Mild Pain (1 - 3), Moderate Pain (4 - 6)  aluminum hydroxide/magnesium hydroxide/simethicone Suspension 30 milliLiter(s) Oral every 4 hours PRN Dyspepsia  artificial tears (preservative free) Ophthalmic Solution 1 Drop(s) Both EYES four times a day PRN Dry Eyes  dextrose Oral Gel 15 Gram(s) Oral once PRN Blood Glucose LESS THAN 70 milliGRAM(s)/deciliter  morphine  - Injectable 4 milliGRAM(s) IV Push every 3 hours PRN Severe Pain (7 - 10)  naloxone Injectable 0.1 milliGRAM(s) IV Push every 3 minutes PRN For ANY of the following changes in patient status:  A. RR LESS THAN 10 breaths per minute, B. Oxygen saturation LESS THAN 90%, C. Sedation score of 6  OLANZapine Injectable 1.25 milliGRAM(s) IntraMuscular every 8 hours PRN severe aggitation  polyethylene glycol 3350 17 Gram(s) Oral daily PRN Constipation      ITEMS UNCHECKED ARE NOT PRESENT    PRESENT SYMPTOMS: [x ]Unable to self-report - see [ ] CPOT [ x] PAINADS [x ] RDOS  Source if other than patient:  [ ]Family   [x ]Team     Pain:  [ ]yes [ ]no  QOL impact -   Location -                    Aggravating factors -  Quality -  Radiation -  Timing-  Severity (0-10 scale):  Minimal acceptable level/ pain goal (0-10 scale):     CPOT:    https://www.sccm.org/getattachment/mme53y92-8s3z-9y0r-7i3j-7764c3833p8y/Critical-Care-Pain-Observation-Tool-(CPOT)    Dyspnea:                           [ ]Mild [ ]Moderate [ ]Severe  Anxiety:                             [ ]Mild [ ]Moderate [ ]Severe  Fatigue:                             [ ]Mild [ ]Moderate [ ]Severe  Nausea:                             [ ]Mild [ ]Moderate [ ]Severe  Loss of appetite:              [ ]Mild [ ]Moderate [ ]Severe  Constipation:                    [ ]Mild [ ]Moderate [ ]Severe  Other Symptoms:  [ ]All other review of systems negative     PCSSQ[Palliative Care Spiritual Screening Question]   Severity (0-10):  Score of 4 or > indicate consideration of Chaplaincy referral.  Chaplaincy Referral: [ ] yes [ ] refused [ ] following [ x] deferred    Caregiver Girard? : [ ] yes [ ] no [x ] Deferred [ ] Declined             Social work referral [ ] Patient & Family Centered Care Referral [ ]  Anticipatory Grief present?:  [ ] yes [ ] no  [ x] Deferred                  Social work referral [ ] Patient & Family Centered Care Referral [ ]      PHYSICAL EXAM:  Vital Signs Last 24 Hrs  T(C): 38.7 (22 Oct 2024 10:56), Max: 38.7 (22 Oct 2024 10:56)  T(F): 101.6 (22 Oct 2024 10:56), Max: 101.6 (22 Oct 2024 10:56)  HR: 119 (22 Oct 2024 10:56) (108 - 140)  BP: 153/88 (22 Oct 2024 10:56) (110/83 - 153/89)  BP(mean): --  RR: 19 (22 Oct 2024 10:56) (18 - 19)  SpO2: 97% (22 Oct 2024 10:56) (93% - 98%)    Parameters below as of 22 Oct 2024 10:56  Patient On (Oxygen Delivery Method): room air     I&O's Summary    21 Oct 2024 07:  -  22 Oct 2024 07:00  --------------------------------------------------------  IN: 1200 mL / OUT: 600 mL / NET: 600 mL    22 Oct 2024 07:01  -  22 Oct 2024 13:55  --------------------------------------------------------  IN: 0 mL / OUT: 640 mL / NET: -640 mL       GENERAL: [ ]Cachexia    [x ]Alert  [ ]Oriented x   [ ]Lethargic  [ ]Unarousable  [x ]Verbal  [ ]Non-Verbal  Behavioral:   [ ]Anxiety  [ ]Delirium [ ]Agitation [ ]Other  HEENT:  [x ]Normal   [ ]Dry mouth   [ ]ET Tube/Trach  [ ]Oral lesions  PULMONARY:   [x ]Clear [ ]Tachypnea  [ ]Audible excessive secretions   [ ]Rhonchi        [ ]Right [ ]Left [ ]Bilateral  [ ]Crackles        [ ]Right [ ]Left [ ]Bilateral  [ ]Wheezing     [ ]Right [ ]Left [ ]Bilateral  [ ]Diminished BS [ ] Right [ ]Left [ ]Bilateral  CARDIOVASCULAR:    [ ]Regular [ ]Irregular [x ]Tachy  [ ]Rodrick [ ]Murmur [ ]Other  GASTROINTESTINAL:  [x ]Soft  [ ]Distended   [x ]+BS  [ ]Non tender [ ]Tender  [ ]Other [ ]PEG [ ]OGT/ NGT   Last BM: 10/22  GENITOURINARY:  [x ]Normal [ ]Incontinent   [ ]Oliguria/Anuria   [ ]Lau  MUSCULOSKELETAL:   [x ]Normal   [ ]Weakness  [ ]Bed/Wheelchair bound [ ]Edema  NEUROLOGIC:   [ x]No focal deficits  [ ] Cognitive impairment  [ ] Dysphagia [ ]Dysarthria [ ] Paresis [ ]Other   SKIN:   [ ]Normal  [ ]Rash  [ ]Other  [ ]Pressure ulcer(s) [ ]y [ ]n present on admission    CRITICAL CARE:  [ ]Shock Present  [ ]Septic [ ]Cardiogenic [ ]Neurologic [ ]Hypovolemic  [ ]Vasopressors [ ]Inotropes  [ ]Respiratory failure present [ ]Mechanical Ventilation [ ]Non-invasive ventilatory support [ ]High-Flow   [ ]Acute  [ ]Chronic [ ]Hypoxic  [ ]Hypercarbic [ ]Other  [ ]Other organ failure     LABS:                        9.7    14.94 )-----------(        ( 22 Oct 2024 11:10 )             29.4   10    159[H]  |  126[H]  |  57[H]  ----------------------------<  291[H]  5.0   |  18[L]  |  0.93    Ca    7.1[L]      22 Oct 2024 11:10  Phos  1.7     10  Mg     3.20     10-22    TPro  4.6[L]  /  Alb  1.9[L]  /  TBili  1.7[H]  /  DBili  x   /  AST  77[H]  /  ALT  17  /  AlkPhos  53  10-20      Urinalysis Basic - ( 22 Oct 2024 12:22 )    Color: Yellow / Appearance: Cloudy / S.027 / pH: x  Gluc: x / Ketone: Negative mg/dL  / Bili: Negative / Urobili: 0.2 mg/dL   Blood: x / Protein: 30 mg/dL / Nitrite: Negative   Leuk Esterase: Negative / RBC: 402 /HPF / WBC 3 /HPF   Sq Epi: x / Non Sq Epi: 2 /HPF / Bacteria: Negative /HPF      RADIOLOGY & ADDITIONAL STUDIES: no new     Protein Calorie Malnutrition Present: [ ]mild [ ]moderate [ ]severe [ ]underweight [ ]morbid obesity  https://www.andeal.org/vault/2440/web/files/ONC/Table_Clinical%20Characteristics%20to%20Document%20Malnutrition-White%20JV%20et%20al%051933.pdf    Height (cm): 172.7 (10-12-24 @ 09:21)  Weight (kg): 69.4 (10-12-24 @ 09:21)  BMI (kg/m2): 23.3 (10-12-24 @ 09:21)    [ ]PPSV2 < or = 30%  [ ]significant weight loss [ ]poor nutritional intake [ ]anasarca[ ]Artificial Nutrition    Other REFERRALS:  [ ]Hospice  [ ]Child Life  [ ]Social Work  [ ]Case management [ ]Holistic Therapy

## 2024-10-22 NOTE — PROGRESS NOTE ADULT - SUBJECTIVE AND OBJECTIVE BOX
Infectious Diseases Follow Up:    Patient is a 64y old  Male who presents with a chief complaint of Pain (21 Oct 2024 22:41)      Interval History/ROS:  Tm 100.3, pt AAO x 3, but pressured speech, paranoid thoughts     Allergies  No Known Allergies        ANTIMICROBIALS:  piperacillin/tazobactam IVPB.. 3.375 every 8 hours      Current Abx:     Previous Abx     OTHER MEDS:  MEDICATIONS  (STANDING):  acetaminophen     Tablet .. 650 every 6 hours PRN  aluminum hydroxide/magnesium hydroxide/simethicone Suspension 30 every 4 hours PRN  atorvastatin 20 at bedtime  dextrose 50% Injectable 25 once  dextrose 50% Injectable 25 once  dextrose 50% Injectable 12.5 once  dextrose Oral Gel 15 once PRN  fenofibrate Tablet 145 daily  filgrastim-sndz (ZARXIO) Injectable 480 daily  glucagon  Injectable 1 once  influenza  Vaccine (HIGH DOSE) 0.5 once  insulin lispro (ADMELOG) corrective regimen sliding scale  every 6 hours  morphine  - Injectable 4 every 3 hours PRN  morphine  Infusion. 1 <Continuous>  OLANZapine Injectable 1.25 every 8 hours PRN  ondansetron Injectable 4 every 8 hours  pantoprazole  Injectable 40 daily  polyethylene glycol 3350 17 daily PRN      Vital Signs Last 24 Hrs  T(C): 37.9 (22 Oct 2024 06:43), Max: 37.9 (22 Oct 2024 04:02)  T(F): 100.3 (22 Oct 2024 06:43), Max: 100.3 (22 Oct 2024 06:43)  HR: 110 (22 Oct 2024 06:43) (108 - 140)  BP: 153/89 (22 Oct 2024 06:43) (110/83 - 153/89)  BP(mean): --  RR: 19 (22 Oct 2024 06:43) (18 - 19)  SpO2: 93% (22 Oct 2024 06:43) (93% - 98%)    Parameters below as of 22 Oct 2024 06:43  Patient On (Oxygen Delivery Method): room air        PHYSICAL EXAM:  GENERAL: NAD, well-developed  HEAD:  Atraumatic, Normocephalic  EYES: EOMI, conjunctiva and sclera clear  CHEST/LUNG: Clear to auscultation bilaterally; No wheeze  HEART: Regular rate and rhythm; No murmurs, rubs, or gallops  ABDOMEN: Soft, Nontender, Nondistended; Bowel sounds present  PSYCH: AAOx3, pressured speech, paranoid thoughts                           9.1    15.73 )-----------( 23       ( 22 Oct 2024 06:09 )             27.2       10-22    159[H]  |  128[H]  |  59[H]  ----------------------------<  269[H]  4.5   |  19[L]  |  0.94    Ca    7.0[L]      22 Oct 2024 06:09  Phos  1.6     10-22  Mg     3.10     10-22    TPro  4.6[L]  /  Alb  1.9[L]  /  TBili  1.7[H]  /  DBili  x   /  AST  77[H]  /  ALT  17  /  AlkPhos  53  10-20      Urinalysis Basic - ( 22 Oct 2024 06:09 )    Color: x / Appearance: x / SG: x / pH: x  Gluc: 269 mg/dL / Ketone: x  / Bili: x / Urobili: x   Blood: x / Protein: x / Nitrite: x   Leuk Esterase: x / RBC: x / WBC x   Sq Epi: x / Non Sq Epi: x / Bacteria: x        MICROBIOLOGY:  v  .Blood BLOOD  10-19-24   Growth in aerobic bottle: Escherichia coli  Growth in anaerobic bottle: Gram Negative Rods  See previous culture [id number]  --    Growth in aerobic bottle: Gram Negative Rods  Growth in anaerobic bottle: Gram Negative Rods      .Blood BLOOD  10-19-24   Growth in aerobic and anaerobic bottles: Escherichia coli  See previous culture [id number]  --    Growth in anaerobic bottle: Gram Negative Rods  Growth in aerobic bottle: Gram Negative Rods      .Blood BLOOD  10-19-24   Growth in aerobic and anaerobic bottles: Escherichia coli  See previous culture [id number]  --    Growth in aerobic and anaerobic bottles: Gram Negative Rods and Gram  Negative Coccobacilli      .Blood BLOOD  10-19-24   Growth in aerobic and anaerobic bottles: Escherichia coli  Direct identification is available within approximately 3-5  hours either by Blood Panel Multiplexed PCR or Direct  MALDI-TOF. Details: [email or web address]  --  Blood Culture PCR  Escherichia coli            Clostridium difficile GDH Toxins A&amp;B, EIA:   Negative (10-18-24 @ 23:53)  Clostridium difficile GDH Interpretation: Negative for toxigenic C. Difficile.  This specimen is negative for C.  Difficile glutamate dehydrogenase (GDH) antigen and negative for C.  Difficile Toxins A & B, by EIA.  GDH is a highly sensitive screening  marker for C. Difficile that is produced in large amounts by all C.  Difficile strains, both toxigenic and nontoxigenic.  This assay has not  been validated as a test of cure.  Repeat testing during the same episode  of diarrhea is of limited value and is discouraged.  The results of this  assay should always be interpreted in conjunction with patient's clinical  history. (10-18-24 @ 23:53)      RADIOLOGY:

## 2024-10-22 NOTE — PROGRESS NOTE ADULT - PROBLEM SELECTOR PLAN 7
Patient NPO   - Continue with Zofran 4mg q8h ATC  - speech and swallow evaluated recommended soft and bite sized and mildly thick liquids with aspiration precautions

## 2024-10-22 NOTE — PROGRESS NOTE ADULT - PROBLEM SELECTOR PLAN 8
Patient being treated for ischemic colitis   - On IV zosyn   - monitor BMs- Per I's and O's 4 BMs in 24 hours

## 2024-10-22 NOTE — PROGRESS NOTE ADULT - SUBJECTIVE AND OBJECTIVE BOX
Patient is a 64y old  Male who presents with a chief complaint of Pain (22 Oct 2024 14:20)    Date of servie : 10-22-24 @ 16:33  INTERVAL HPI/OVERNIGHT EVENTS:  T(C): 37.2 (10-22-24 @ 15:11), Max: 38.7 (10-22-24 @ 10:56)  HR: 113 (10-22-24 @ 15:11) (108 - 119)  BP: 119/76 (10-22-24 @ 15:11) (110/83 - 153/89)  RR: 20 (10-22-24 @ 15:11) (18 - 20)  SpO2: 95% (10-22-24 @ 15:11) (93% - 98%)  Wt(kg): --  I&O's Summary    21 Oct 2024 07:  -  22 Oct 2024 07:00  --------------------------------------------------------  IN: 1200 mL / OUT: 600 mL / NET: 600 mL    22 Oct 2024 07:01  -  22 Oct 2024 16:33  --------------------------------------------------------  IN: 0 mL / OUT: 640 mL / NET: -640 mL        LABS:                        9.7    14.94 )-----------( 23       ( 22 Oct 2024 11:10 )             29.4     10-22    159[H]  |  126[H]  |  57[H]  ----------------------------<  291[H]  5.0   |  18[L]  |  0.93    Ca    7.1[L]      22 Oct 2024 11:10  Phos  1.7     10-22  Mg     3.20     10-22        Urinalysis Basic - ( 22 Oct 2024 12:22 )    Color: Yellow / Appearance: Cloudy / S.027 / pH: x  Gluc: x / Ketone: Negative mg/dL  / Bili: Negative / Urobili: 0.2 mg/dL   Blood: x / Protein: 30 mg/dL / Nitrite: Negative   Leuk Esterase: Negative / RBC: 402 /HPF / WBC 3 /HPF   Sq Epi: x / Non Sq Epi: 2 /HPF / Bacteria: Negative /HPF      CAPILLARY BLOOD GLUCOSE      POCT Blood Glucose.: 316 mg/dL (22 Oct 2024 12:42)  POCT Blood Glucose.: 263 mg/dL (22 Oct 2024 07:02)  POCT Blood Glucose.: 333 mg/dL (22 Oct 2024 01:14)  POCT Blood Glucose.: 290 mg/dL (21 Oct 2024 18:25)        Urinalysis Basic - ( 22 Oct 2024 12:22 )    Color: Yellow / Appearance: Cloudy / S.027 / pH: x  Gluc: x / Ketone: Negative mg/dL  / Bili: Negative / Urobili: 0.2 mg/dL   Blood: x / Protein: 30 mg/dL / Nitrite: Negative   Leuk Esterase: Negative / RBC: 402 /HPF / WBC 3 /HPF   Sq Epi: x / Non Sq Epi: 2 /HPF / Bacteria: Negative /HPF        MEDICATIONS  (STANDING):  abiraterone 500 mg tablet 2 Tablet(s) 2 Tablet(s) Oral daily  atorvastatin 20 milliGRAM(s) Oral at bedtime  chlorhexidine 2% Cloths 1 Application(s) Topical daily  dextrose 5%. 1000 milliLiter(s) (50 mL/Hr) IV Continuous <Continuous>  dextrose 5%. 1000 milliLiter(s) (130 mL/Hr) IV Continuous <Continuous>  dextrose 5%. 1000 milliLiter(s) (100 mL/Hr) IV Continuous <Continuous>  dextrose 50% Injectable 25 Gram(s) IV Push once  dextrose 50% Injectable 25 Gram(s) IV Push once  dextrose 50% Injectable 12.5 Gram(s) IV Push once  fenofibrate Tablet 145 milliGRAM(s) Oral daily  glucagon  Injectable 1 milliGRAM(s) IntraMuscular once  influenza  Vaccine (HIGH DOSE) 0.5 milliLiter(s) IntraMuscular once  insulin lispro (ADMELOG) corrective regimen sliding scale   SubCutaneous every 6 hours  lactobacillus acidophilus 1 Tablet(s) Oral daily  lidocaine   4% Patch 1 Patch Transdermal every 24 hours  morphine  Infusion. 1 mG/Hr (1 mL/Hr) IV Continuous <Continuous>  ondansetron Injectable 4 milliGRAM(s) IV Push every 8 hours  pantoprazole  Injectable 40 milliGRAM(s) IV Push daily  piperacillin/tazobactam IVPB.. 3.375 Gram(s) IV Intermittent every 8 hours    MEDICATIONS  (PRN):  acetaminophen     Tablet .. 650 milliGRAM(s) Oral every 6 hours PRN Temp greater or equal to 38C (100.4F), Mild Pain (1 - 3), Moderate Pain (4 - 6)  aluminum hydroxide/magnesium hydroxide/simethicone Suspension 30 milliLiter(s) Oral every 4 hours PRN Dyspepsia  artificial tears (preservative free) Ophthalmic Solution 1 Drop(s) Both EYES four times a day PRN Dry Eyes  dextrose Oral Gel 15 Gram(s) Oral once PRN Blood Glucose LESS THAN 70 milliGRAM(s)/deciliter  morphine  - Injectable 4 milliGRAM(s) IV Push every 3 hours PRN Severe Pain (7 - 10)  naloxone Injectable 0.1 milliGRAM(s) IV Push every 3 minutes PRN For ANY of the following changes in patient status:  A. RR LESS THAN 10 breaths per minute, B. Oxygen saturation LESS THAN 90%, C. Sedation score of 6  OLANZapine Injectable 1.25 milliGRAM(s) IntraMuscular every 8 hours PRN severe aggitation  polyethylene glycol 3350 17 Gram(s) Oral daily PRN Constipation          PHYSICAL EXAM:  GENERAL:  frail, restless   CHEST/LUNG: Clear to percussion bilaterally; No rales, rhonchi, wheezing, or rubs  HEART: Regular rate and rhythm; No murmurs, rubs, or gallops  ABDOMEN: Soft, Nontender, Nondistended; Bowel sounds present  EXTREMITIES: edema +    Care Discussed with Consultants/Other Providers [ ] YES  [ ] NO

## 2024-10-22 NOTE — PROGRESS NOTE ADULT - PROBLEM SELECTOR PLAN 1
- Metastatic prostate adenocarcinoma, follows with Dr. Andrew Mendoza  - Oncology recs appreciated: Metastatic prostate cancer to liver now s/p C1 taxotere, on abiraterone qd and dexamethasone, s/p zarxio 480mcg until ANC > 1500.  - management per oncology team  - IR consult for kyphoplasty on hold  - Appreciate rad/onc recs- on hold until mental status improves. Plan is for 5 fractions/20gy to T1-T5, and also L1-L5

## 2024-10-22 NOTE — PROGRESS NOTE ADULT - PROBLEM SELECTOR PLAN 4
MRI Lumbar Spine: (10/15) Diffuse osseous metastases. L1 and L5 pathologic fractures are associated with moderate epidural disease at L1, more mild at L5. At least mild epidural disease at the left S1-S2 neural foramen. No significant lumbar degenerative disc disease.  - MRI from 10/15 shows extensive metastasis in cervical, thoracic, lumbar, skull base and calvarium. Also small cortical subacute infarctions.  - Continue Morphine gtt @1mg/hr, IV morphine 4mg PRNs (has not required PRNs in 24 hours)   - IR evaluation for kyphoplasty on hold in setting of c/f SMA dissection and bowel ischemia   - s/p decadron 4mg IV bid x3 days   - Narcan PRN  - multimodal pain control: lidocaine patch, warm/cold packs   - recommend changing bowel regimen to PRN  - would RECOMMEND OUTPATIENT PALLIATIVE CARE REFERRAL WITH [facility] palliative care team.

## 2024-10-22 NOTE — BH CONSULTATION LIAISON PROGRESS NOTE - NSBHFUPINTERVALHXFT_PSY_A_CORE
Pt seen at bedside with son present. Pt in restraints, seen attempting to bite at staff. Received a dose of Zyprexa prn overnight.     Pt was confused. Stating that things were up people's anuses but otherwise speaking incoherently. Son reports that pt has been like this for the past several days. Has been banging his head, pulling at lines before being put in restraints. Reports that pt's spouse was wanting to stop Zyprexa because she felt like it wasn't helping.

## 2024-10-22 NOTE — CONSULT NOTE ADULT - TIME BILLING
Reviewing the chart, interpreting lab data, discussing case with fellow, pt's wife, surgery, interview and examination of patient, and documentation. Pt is at high risk of mortality due to his disease.
chart and data review, clinical assessment, and coordination of care. This excludes any time spent on separate procedures or teaching.
Time spent for extensive review of the physical chart, electronic medical record, and documentation to obtain collateral information including but not limited to:  [x ] Inpatient records (ED, H&P, primary team, and consultants if applicable, care coordination)  [x ] Inpatient values/results (biomarkers, immunoassays, imaging, and microbiology results)  [x ] Current or proposed treatment plans  [x  ] Discussion with the primary team  [x ] Discussion with the patient, surrogate decision maker, or family  [x] 30 mins spent discussing goc separately     Time spent: >105 min

## 2024-10-22 NOTE — PROGRESS NOTE ADULT - PROBLEM SELECTOR PLAN 10
Patient is supported by his wife- Vijaya [phone]) and 3 adult sons- Magen, Mateus and Freddy   > Patient is recently dx with metastatic prostate cancer just 1.5 weeks ago and he is treatment oriented.  > Offered caregiver Sw referral to patient's wife- DECLINED   > 10/21: Began advanced care planning discussions with patient's son, Freddy. Pt's wife stayed overnight so is sleeping. Will plan to speak to his wife regarding further goc.

## 2024-10-22 NOTE — BH CONSULTATION LIAISON PROGRESS NOTE - NSBHASSESSMENTFT_PSY_ALL_CORE
Mr Alyssa is a 63 yo M w history of pancreatic cancer with mets who presents on recommendation by hematologist due to uncontrolled pain, nausea, and vomiting that has significantly worsened over the past 2 weeks. Pt diagnosed with high risk, high volume metastatic prostate cancer with bone, liver, lymph mets and a presacral mass. Pt was started on chemotherapy; pending further work up for evaluation for radiation therapy. Pt was seen by palliative care team for pain management and goals of care discussions. Team recommended psychiatric evaluation for anxiety as pt has been having difficulty adjusting to progression of cancer.    10/17: Pt difficulty accepting and adjusting to cancer progression and treatment. Reviewed anxiety and management goals regarding anxiety. Reviewed medications used to treat anxiety. Antidepressants not recommended due to low platelets and requiring long time before effect; BZDs not recommended due to concurrent use of pain medications increasing risk for oversedation, respiratory depression. Similarly, hydroxyzine not recommended due to worsening confusion. Recommended Zyprexa for treatment of anxiety with added benefit for potential assistance with nausea, sleep, and appetite. Reviewed risks, benefits, side effects of medication. Reviewed black box warning of antipsychotic medications in elderly. Family hesitant to start additional medication. Would like to talk to palliative care team regarding starting additional med. Answered questions and concerns. Monitor for delirium.  10/18: Spoke with family; now in agreement to start prn Zyprexa.   10/22: Pt alerted, has infection, worsening hypernatremia. Discussed with family recommendations of either increasing prn Zyprexa or adding standing Zyprexa given worsening agitation with goal of keeping pt safe due to worsening mentation. Family would like to discuss recommendations amongst themselves before making a decision as spouse was wanting to stop Zyprexa.    Recs:  - Increase Zyprexa to 2.5 mg PO/IM q6h prn for anxiety and agitation  - Add Zyprexa 2.5 mg po/IM qHS  	- hold med if QTC > 500  	- Only make changes if family agrees to make adjustments to meds    - Obs: defer to primary team  - Dispo: TBD as per medical team, no indication for inpt psychiatry Mr Alyssa is a 63 yo M w history of pancreatic cancer with mets who presents on recommendation by hematologist due to uncontrolled pain, nausea, and vomiting that has significantly worsened over the past 2 weeks. Pt diagnosed with high risk, high volume metastatic prostate cancer with bone, liver, lymph mets and a presacral mass. Pt was started on chemotherapy; pending further work up for evaluation for radiation therapy. Pt was seen by palliative care team for pain management and goals of care discussions. Team recommended psychiatric evaluation for anxiety as pt has been having difficulty adjusting to progression of cancer.    10/17: Pt difficulty accepting and adjusting to cancer progression and treatment. Reviewed anxiety and management goals regarding anxiety. Reviewed medications used to treat anxiety. Antidepressants not recommended due to low platelets and requiring long time before effect; BZDs not recommended due to concurrent use of pain medications increasing risk for oversedation, respiratory depression. Similarly, hydroxyzine not recommended due to worsening confusion. Recommended Zyprexa for treatment of anxiety with added benefit for potential assistance with nausea, sleep, and appetite. Reviewed risks, benefits, side effects of medication. Reviewed black box warning of antipsychotic medications in elderly. Family hesitant to start additional medication. Would like to talk to palliative care team regarding starting additional med. Answered questions and concerns. Monitor for delirium.  10/18: Spoke with family; now in agreement to start prn Zyprexa.   10/22: Pt alert, worsening hypernatremia. Confused/delirious. Discussed with family recommendations of either increasing prn Zyprexa or adding standing Zyprexa given worsening agitation with goal of keeping pt safe due to worsening mentation. Family would like to discuss recommendations amongst themselves before making a decision as spouse was wanting to stop Zyprexa.    Recs:  - Increase Zyprexa to 2.5 mg PO/IM q6h prn for agitation  - Add Zyprexa 2.5 mg po/IM qHS ONLY if SAFE from medical/respiratory perspective   	- hold med if QTC > 500  	- Only make changes if family agrees to make adjustments to meds    - Obs: defer to primary team  - Dispo: TBD as per medical team, no indication for inpt psychiatry

## 2024-10-22 NOTE — CONSULT NOTE ADULT - ASSESSMENT
Patient is a 63 y/o M hx recently dx metastatic prostate ca. 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 MICU consulted for hypernatremia iso diarrheal illness, NPO. Free water deficit 2.1L, unable to provide enteric free water given ischemic bowel. Manage with IV D5 for now, euvolemic on exam, correction rate 130cc/h for 0.5meq increments. Given complicated course and critical illness iso advanced prostate ca., encourage continued GOC regarding care plan.     Plan:   Encourage GOC, given critical illness and guarded prognosis. Patient is no longer a candidate for RT or kyphoplasty given AMS/sepsis.   Start D5W, 130cc/h can increase rate to goal correction 10-12/d   F/u surgery reccs regarding enteral nutrition   Pain mgmt per primary team   Patient is not a MICU candidate at this time, however may reconsult as needed     Case BONY Becker  Patient is a 63 y/o M hx recently dx metastatic prostate ca. 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 MICU consulted for hypernatremia iso diarrheal illness, NPO. Free water deficit 2.1L, unable to provide enteric free water given ischemic bowel. Manage with IV D5 for now, euvolemic on exam, correction rate 130cc/h for 0.5meq increments. Given complicated course and critical illness iso advanced prostate ca., encourage continued GOC regarding care plan.     Plan:   Encourage GOC, given critical illness and guarded prognosis. Patient is no longer a candidate for RT or kyphoplasty given AMS/sepsis.   Start D5W, 130cc/h can increase rate to goal correction 10-12/d  BMP q4h, neuro checks    F/u surgery reccs regarding enteral nutrition   Pain mgmt per primary team   Patient is not a MICU candidate at this time, however may reconsult as needed     Case BONY Becker  64 year old male with metastatic prostate cancer admitted for intractable pain. 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 ICU consulted for hypernatremia. Patient is NPO. He is more combative and confused. Patient has bacteremia. Blood cultures growing e. coli. On Zosyn.    Confusion multifactorial - bacteremia, dehydration, advanced illness, delirium  Started on D5W infusion. Sodium 159. Continue for now. Monitor serial BMP.  Monitor glucose. May require augmentation of insulin therapy.  No need for ICU at this time

## 2024-10-22 NOTE — BH CONSULTATION LIAISON PROGRESS NOTE - NSBHCONSULTFOLLOWAFTERCARE_PSY_A_CORE FT
If oncology treatments are being planned for at [facility], oncology team to refer patient to be seen by outpatient psycho-oncologist Dr Franklin Hatfield there.     [place] [facility] in [facility]  [phone] [place] [id number] [phone]

## 2024-10-22 NOTE — PROGRESS NOTE ADULT - ASSESSMENT
64-year-old male with metastatic prostate cancer, sent in by hematologist from [place] [facility] for uncontrolled pain, nausea, vomiting.       Problem/Plan - 1:  ·  Problem: Cancer related pain.   ·  Plan: - appreciate pall care recommendations:  - pain control as per palliative crae   - RT consult     Problem/Plan - 2:·  Problem: Nausea & vomiting., Diarrhea, colitis , SMA dissection   ·  Plan: -  vascular fu appreciated  - start  AC as per vascular but heme not clearing for AC  - Now pt with bloody BM   - GI and surgery consult  - ID fu    Problem/Plan - 3:  ·  Problem: CA of prostate.   ·  Plan: Metastatic prostate cancer- f/u heme/onc recommendations  - Rad Onc fu   - Palliative for symptom control.    Problem/Plan - 4:  ·  Problem: Thrombocytopenia, unspecified.   ·  Plan: -platelet baseline ~70  transfuse PRN    Problem/Plan - 5:  ·  Problem: hypernatremia  ·  Plan: - cw ivf  - renal fu     ICU consult  dw ICU attending

## 2024-10-22 NOTE — PROGRESS NOTE ADULT - SUBJECTIVE AND OBJECTIVE BOX
Patient seen today, altered, delirious and agitated, Wife at bedside, reports he hasnt slept in 2 days  , Tmax 101.6, remains on Morphine, plts 23K, Kyphoplasty and Radiation cancelled due to gram neg bacteremia  SMA dissection      MEDICATIONS  (STANDING):  abiraterone 500 mg tablet 2 Tablet(s) 2 Tablet(s) Oral daily  atorvastatin 20 milliGRAM(s) Oral at bedtime  chlorhexidine 2% Cloths 1 Application(s) Topical daily  dextrose 5%. 1000 milliLiter(s) (100 mL/Hr) IV Continuous <Continuous>  dextrose 5%. 1000 milliLiter(s) (50 mL/Hr) IV Continuous <Continuous>  dextrose 50% Injectable 25 Gram(s) IV Push once  dextrose 50% Injectable 25 Gram(s) IV Push once  dextrose 50% Injectable 12.5 Gram(s) IV Push once  fenofibrate Tablet 145 milliGRAM(s) Oral daily  filgrastim-sndz (ZARXIO) Injectable 480 MICROGram(s) SubCutaneous daily  glucagon  Injectable 1 milliGRAM(s) IntraMuscular once  influenza  Vaccine (HIGH DOSE) 0.5 milliLiter(s) IntraMuscular once  insulin lispro (ADMELOG) corrective regimen sliding scale   SubCutaneous every 6 hours  lactobacillus acidophilus 1 Tablet(s) Oral daily  lidocaine   4% Patch 1 Patch Transdermal every 24 hours  morphine  Infusion. 1 mG/Hr (1 mL/Hr) IV Continuous <Continuous>  ondansetron Injectable 4 milliGRAM(s) IV Push every 8 hours  pantoprazole  Injectable 40 milliGRAM(s) IV Push daily  piperacillin/tazobactam IVPB.. 3.375 Gram(s) IV Intermittent every 8 hours  potassium phosphate IVPB 15 milliMole(s) IV Intermittent once  sodium chloride 0.45%. 1000 milliLiter(s) (125 mL/Hr) IV Continuous <Continuous>    MEDICATIONS  (PRN):  acetaminophen     Tablet .. 650 milliGRAM(s) Oral every 6 hours PRN Temp greater or equal to 38C (100.4F), Mild Pain (1 - 3), Moderate Pain (4 - 6)  aluminum hydroxide/magnesium hydroxide/simethicone Suspension 30 milliLiter(s) Oral every 4 hours PRN Dyspepsia  artificial tears (preservative free) Ophthalmic Solution 1 Drop(s) Both EYES four times a day PRN Dry Eyes  dextrose Oral Gel 15 Gram(s) Oral once PRN Blood Glucose LESS THAN 70 milliGRAM(s)/deciliter  morphine  - Injectable 4 milliGRAM(s) IV Push every 3 hours PRN Severe Pain (7 - 10)  naloxone Injectable 0.1 milliGRAM(s) IV Push every 3 minutes PRN For ANY of the following changes in patient status:  A. RR LESS THAN 10 breaths per minute, B. Oxygen saturation LESS THAN 90%, C. Sedation score of 6  OLANZapine Injectable 1.25 milliGRAM(s) IntraMuscular every 8 hours PRN severe aggitation  polyethylene glycol 3350 17 Gram(s) Oral daily PRN Constipation          Vital Signs Last 24 Hrs  T(C): 38.7 (22 Oct 2024 10:56), Max: 38.7 (22 Oct 2024 10:56)  T(F): 101.6 (22 Oct 2024 10:56), Max: 101.6 (22 Oct 2024 10:56)  HR: 119 (22 Oct 2024 10:56) (108 - 140)  BP: 153/88 (22 Oct 2024 10:56) (110/83 - 153/89)  BP(mean): --  RR: 19 (22 Oct 2024 10:56) (18 - 19)  SpO2: 97% (22 Oct 2024 10:56) (93% - 98%)    Parameters below as of 22 Oct 2024 10:56  Patient On (Oxygen Delivery Method): room air        PE  delirious, mittens in place  Anicteric, MMM  No c/c/e                              9.7    14.94 )-----------( 23       ( 22 Oct 2024 11:10 )             29.4       10-22    159[H]  |  126[H]  |  57[H]  ----------------------------<  291[H]  5.0   |  18[L]  |  0.93    Ca    7.1[L]      22 Oct 2024 11:10  Phos  1.7     10-22  Mg     3.20     10-22    TPro  4.6[L]  /  Alb  1.9[L]  /  TBili  1.7[H]  /  DBili  x   /  AST  77[H]  /  ALT  17  /  AlkPhos  53  10-20

## 2024-10-22 NOTE — BH CONSULTATION LIAISON PROGRESS NOTE - NSBHATTESTCOMMENTATTENDFT_PSY_A_CORE
Chart reviewed, pt. seen/evaluated with Dr. Marshall, I agree with above assessment/recs. Patient alert, though confused/inattentive and delirious, making illogical statements, unable to engage in any meaningful interview due to current mental status. Care coordinated with pt.'s son at bedside. Plan as above.

## 2024-10-22 NOTE — PROGRESS NOTE ADULT - PROBLEM SELECTOR PLAN 5
CTa/p: Dissection of the SMA. Presacral mass with periaortic lymphadenopathy and lytic vertebral metastases with pathologic fracture at L1 and L5 are again seen.  > Vascular surgery recs- No vascular surgery interventions   > Unable to start heparin as patient with blood in stool and with thrombocytopenia   > Appreciate ID recs- pt found to have E.coli bacteremia and H.influenza bacteremia. On zosyn- f/u repeat blood cx

## 2024-10-22 NOTE — PROGRESS NOTE ADULT - NS ATTEND AMEND GEN_ALL_CORE FT
Patient seen and examined with NP during rounds   I agree with her assessment and plan    Altered today and combative  Continue aggressive supportive care  ICU consult given hypernatremia /sepsis / and AMS  Not due for chemotherapy now, on hormonal therapy, kyphoplasty / RT deferred until clinically stable  monitoring counts closely and transfusion per guidelines  appreciate input from nephrology and other teams given complexity of case

## 2024-10-22 NOTE — PROGRESS NOTE ADULT - PROBLEM SELECTOR PLAN 6
- possibly related to chemotherapy treatment  - hematology-oncology following: on daily zarxio  - on IV abx   - patient getting transfused  - Patient febrile - tylenol prn

## 2024-10-22 NOTE — CONSULT NOTE ADULT - SUBJECTIVE AND OBJECTIVE BOX
Patient:  RORY PEACE  [id number]    CHIEF COMPLAINT:    HPI:  64 year old male with PMH of metastatic prostate cancer to liver and spine now on ADT (lupron) + abiraterone/prednisone + taxotere while inpatient, sent in by hematologist from [facility] for cancer related pain. Hypertensive on arrival, platelet count 71 K. CMP with creatinine 1.03. U/A without pyuria.     MRI L spine: Diffuse osseous metastases with infiltration of spine. Acute pathologic  fracture involves the superior endplate of L5 with loss of 20% of vertebral body height and minimal bony retropulsion. Compression deformity of L1, indeterminate in age with loss of 20% of vertebral body height and minimal bony retropulsion. Decreased T1/T2 signal through the inferior T11 vertebral body which may reflect an acute fracture. Moderate diffuse degenerative disc disease with loss of disc height and signal within the nucleus pulposus. Disc bulges are noted at T11-T12, L1-2 through L5-S1 which flatten the ventral thecal sac and narrow the BILATERAL neural foramina. Mild central stenosis at L1-2. Mild to moderate facet osteophytic hypertrophy at L5-S1.    Patient admitted for management of cancer related pain. Radiation oncology consulted- based on review of follow up imaging. , recommending IR consult for evaluation for kyphoplasty with plan for procedure on 10/22.     Hospital course complicated by pancytopenia with neutropenia with intiation of chemotherapy. Patient with intermittent hypoxia and tachycardia.     On 10/18, patient noted to have over 10 episodes of diarrhea. Infectious workup initiated with CT abdomen pelvis revealing "Focal proximal SMA dissection, with findings concerning for ischemic colitis of the right colon and Retroperitoneal adenopathy." C. diff PCR was negative.     Colorectal surgery consulted - concern for neutropenic enterocolitis. Recommended GI consult and antifungal coverage.     GI consulted-  Suspect presentation related to neutropenic enterocolitis and ischemic colitis 2/2 SMA dissection; in the absence of overt GI bleeding and relatively stable Hgb with severe neutropenia and SMA dissection, risk of diagnostic colonoscopy likely outweigh benefits. Recommended vascular surgery consult.     Vacular surgery consulted- recommending heparin drip and CTA to better assess vasculature. No surgical intervention advised. Patient NPO due to ischemic colitis.     10/22 MICU consulted for hypernatremia in the setting of diarrhea illness, NPO, and critical illness. Currently on 1/2 NS 50cc/h.      Seen at bedside, altered, agitated speech. Son at bedside stated that AMS occurred 2 days prior, has progressively deteriorated. Patient has been incontinent of stool, with multiple diarrheal episodes daily. Patient and family have been following with palliative care for pain management. Son at bedside states that when father was lucid, endorsed 10/10 pain despite uptitrating pain regimen. Currently on morphine drip for pain control.  Discussed that hospice care had been recently addressed but family had not considered their options yet.     PAST MEDICAL & SURGICAL HISTORY:  Benign essential HTN  HLD (hyperlipidemia)  CA of prostate  Basal cell carcinoma  History of transurethral prostatectomy  S/P inguinal hernia repair  History of basal cell carcinoma (BCC) excision    FAMILY HISTORY:  FH: HTN (hypertension) (Sibling)      SOCIAL HISTORY:  Allergies  No Known Allergies  Intolerances      HOME MEDICATIONS:    REVIEW OF SYSTEMS:  [X] Unable to assess ROS because AMS   [ ] Negative except as stated in HPI      OBJECTIVE:  T(F): 101.6 (10-22-24 @ 10:56), Max: 101.6 (10-22-24 @ 10:56)  HR: 119 (10-22-24 @ 10:56) (108 - 140)  BP: 153/88 (10-22-24 @ 10:56) (110/83 - 153/89)  BP(mean): --  ABP: --  ABP(mean): --  RR: 19 (10-22-24 @ 10:56) (18 - 19)  SpO2: 97% (10-22-24 @ 10:56) (93% - 98%)  CVP(mm Hg): --    I/O Summary 24H    IN: 1200 mL / OUT: 600 mL / NET: 600 mL      CAPILLARY BLOOD GLUCOSE      POCT Blood Glucose.: 316 mg/dL (22 Oct 2024 12:42)      PHYSICAL EXAM:  GENERAL: Agitated appearing in secured mittens   HEAD:  Atraumatic, Normocephalic. Scattered excoriations of face   EYES: EOMI, PERRLA, conjunctiva and sclera clear  ENT: Moist mucous membranes  NECK: Supple, No JVD  CHEST/LUNG: Clear to auscultation bilaterally; No rales, rhonchi, wheezing, or rubs. Unlabored respirations  HEART: Regular rate and rhythm; No murmurs, rubs, or gallops  ABDOMEN: Soft, Nontender, Nondistended.   EXTREMITIES:  2+ Peripheral Pulses, brisk capillary refill. No clubbing, cyanosis, or edema  NERVOUS SYSTEM:  Alert & Oriented X1, oriented to name. Tangential   MSK: FROM all 4 extremities, full and equal strength  SKIN: No rashes or lesions    HOSPITAL MEDICATIONS:  MEDICATIONS  (STANDING):  abiraterone 500 mg tablet 2 Tablet(s) 2 Tablet(s) Oral daily  atorvastatin 20 milliGRAM(s) Oral at bedtime  chlorhexidine 2% Cloths 1 Application(s) Topical daily  dextrose 5%. 1000 milliLiter(s) (50 mL/Hr) IV Continuous <Continuous>  dextrose 5%. 1000 milliLiter(s) (100 mL/Hr) IV Continuous <Continuous>  dextrose 50% Injectable 12.5 Gram(s) IV Push once  dextrose 50% Injectable 25 Gram(s) IV Push once  dextrose 50% Injectable 25 Gram(s) IV Push once  fenofibrate Tablet 145 milliGRAM(s) Oral daily  filgrastim-sndz (ZARXIO) Injectable 480 MICROGram(s) SubCutaneous daily  glucagon  Injectable 1 milliGRAM(s) IntraMuscular once  influenza  Vaccine (HIGH DOSE) 0.5 milliLiter(s) IntraMuscular once  insulin lispro (ADMELOG) corrective regimen sliding scale   SubCutaneous every 6 hours  lactobacillus acidophilus 1 Tablet(s) Oral daily  lidocaine   4% Patch 1 Patch Transdermal every 24 hours  morphine  Infusion. 1 mG/Hr (1 mL/Hr) IV Continuous <Continuous>  ondansetron Injectable 4 milliGRAM(s) IV Push every 8 hours  pantoprazole  Injectable 40 milliGRAM(s) IV Push daily  piperacillin/tazobactam IVPB.. 3.375 Gram(s) IV Intermittent every 8 hours  potassium phosphate IVPB 15 milliMole(s) IV Intermittent once  sodium chloride 0.45%. 1000 milliLiter(s) (125 mL/Hr) IV Continuous <Continuous>    MEDICATIONS  (PRN):  acetaminophen     Tablet .. 650 milliGRAM(s) Oral every 6 hours PRN Temp greater or equal to 38C (100.4F), Mild Pain (1 - 3), Moderate Pain (4 - 6)  aluminum hydroxide/magnesium hydroxide/simethicone Suspension 30 milliLiter(s) Oral every 4 hours PRN Dyspepsia  artificial tears (preservative free) Ophthalmic Solution 1 Drop(s) Both EYES four times a day PRN Dry Eyes  dextrose Oral Gel 15 Gram(s) Oral once PRN Blood Glucose LESS THAN 70 milliGRAM(s)/deciliter  morphine  - Injectable 4 milliGRAM(s) IV Push every 3 hours PRN Severe Pain (7 - 10)  naloxone Injectable 0.1 milliGRAM(s) IV Push every 3 minutes PRN For ANY of the following changes in patient status:  A. RR LESS THAN 10 breaths per minute, B. Oxygen saturation LESS THAN 90%, C. Sedation score of 6  OLANZapine Injectable 1.25 milliGRAM(s) IntraMuscular every 8 hours PRN severe aggitation  polyethylene glycol 3350 17 Gram(s) Oral daily PRN Constipation      LABS:  CBC 10-22-24 @ 11:10                        9.7    14.94 )-----------( 23                    29.4     Hgb trend: 9.7 <-- , 9.1 <-- , 7.4 <-- , 9.0 <-- , 9.1 <-- , 9.3 <-- , 9.6 <-- , 10.2 <--   WBC trend: 14.94 <-- , 15.73 <-- , 12.46 <-- , 11.63 <-- , 10.09 <-- , 7.90 <-- , 2.29 <-- , 1.18 <--     CMP 10-22-24 @ 11:10    159[H]  |  126[H]  |  57[H]  ----------------------------<  291[H]  5.0   |  18[L]  |  0.93    Ca    7.1[L]      10-22-24 @ 11:10  Phos  1.7     10-22  Mg     3.20     10-22    TPro  4.6[L]  /  Alb  1.9[L]  /  TBili  1.7[H]  /  DBili  x   /  AST  77[H]  /  ALT  17  /  AlkPhos  53     10-20    Serum Cr (eGFR) trend: 0.93 (91) <-- , 0.94 (90) <-- , 0.85 (96) <-- , 0.96 (88) <-- , 0.97 (87) <-- , 1.04 (80) <--         ABG Trend:     VBG Trend:       MICROBIOLOGY:       RADIOLOGY:  [X] Reviewed and interpreted by me    EKG

## 2024-10-22 NOTE — PROGRESS NOTE ADULT - SUBJECTIVE AND OBJECTIVE BOX
Date of Service: 10-22-24 @ 11:19    Patient is a 64y old  Male who presents with a chief complaint of Pain (22 Oct 2024 10:31)      Any change in ROS: seems to be doing  ok :   but very confused and agitated today   no sob:  no cough : no phlegm      MEDICATIONS  (STANDING):  abiraterone 500 mg tablet 2 Tablet(s) 2 Tablet(s) Oral daily  atorvastatin 20 milliGRAM(s) Oral at bedtime  chlorhexidine 2% Cloths 1 Application(s) Topical daily  dextrose 5%. 1000 milliLiter(s) (100 mL/Hr) IV Continuous <Continuous>  dextrose 5%. 1000 milliLiter(s) (100 mL/Hr) IV Continuous <Continuous>  dextrose 5%. 1000 milliLiter(s) (50 mL/Hr) IV Continuous <Continuous>  dextrose 5%. 1000 milliLiter(s) (75 mL/Hr) IV Continuous <Continuous>  dextrose 50% Injectable 25 Gram(s) IV Push once  dextrose 50% Injectable 25 Gram(s) IV Push once  dextrose 50% Injectable 12.5 Gram(s) IV Push once  fenofibrate Tablet 145 milliGRAM(s) Oral daily  filgrastim-sndz (ZARXIO) Injectable 480 MICROGram(s) SubCutaneous daily  glucagon  Injectable 1 milliGRAM(s) IntraMuscular once  influenza  Vaccine (HIGH DOSE) 0.5 milliLiter(s) IntraMuscular once  insulin lispro (ADMELOG) corrective regimen sliding scale   SubCutaneous every 6 hours  lactobacillus acidophilus 1 Tablet(s) Oral daily  lidocaine   4% Patch 1 Patch Transdermal every 24 hours  morphine  Infusion. 1 mG/Hr (1 mL/Hr) IV Continuous <Continuous>  ondansetron Injectable 4 milliGRAM(s) IV Push every 8 hours  pantoprazole  Injectable 40 milliGRAM(s) IV Push daily  piperacillin/tazobactam IVPB.. 3.375 Gram(s) IV Intermittent every 8 hours    MEDICATIONS  (PRN):  acetaminophen     Tablet .. 650 milliGRAM(s) Oral every 6 hours PRN Temp greater or equal to 38C (100.4F), Mild Pain (1 - 3), Moderate Pain (4 - 6)  aluminum hydroxide/magnesium hydroxide/simethicone Suspension 30 milliLiter(s) Oral every 4 hours PRN Dyspepsia  artificial tears (preservative free) Ophthalmic Solution 1 Drop(s) Both EYES four times a day PRN Dry Eyes  dextrose Oral Gel 15 Gram(s) Oral once PRN Blood Glucose LESS THAN 70 milliGRAM(s)/deciliter  morphine  - Injectable 4 milliGRAM(s) IV Push every 3 hours PRN Severe Pain (7 - 10)  naloxone Injectable 0.1 milliGRAM(s) IV Push every 3 minutes PRN For ANY of the following changes in patient status:  A. RR LESS THAN 10 breaths per minute, B. Oxygen saturation LESS THAN 90%, C. Sedation score of 6  OLANZapine Injectable 1.25 milliGRAM(s) IntraMuscular every 8 hours PRN severe aggitation  polyethylene glycol 3350 17 Gram(s) Oral daily PRN Constipation    Vital Signs Last 24 Hrs  T(C): 38.7 (22 Oct 2024 10:56), Max: 38.7 (22 Oct 2024 10:56)  T(F): 101.6 (22 Oct 2024 10:56), Max: 101.6 (22 Oct 2024 10:56)  HR: 119 (22 Oct 2024 10:56) (108 - 140)  BP: 153/88 (22 Oct 2024 10:56) (110/83 - 153/89)  BP(mean): --  RR: 19 (22 Oct 2024 10:56) (18 - 19)  SpO2: 97% (22 Oct 2024 10:56) (93% - 98%)    Parameters below as of 22 Oct 2024 10:56  Patient On (Oxygen Delivery Method): room air        I&O's Summary    21 Oct 2024 07:01  -  22 Oct 2024 07:00  --------------------------------------------------------  IN: 1200 mL / OUT: 600 mL / NET: 600 mL          Physical Exam:   GENERAL: NAD, well-groomed, well-developed  HEENT: CHAVA/   Atraumatic, Normocephalic  ENMT: No tonsillar erythema, exudates, or enlargement; Moist mucous membranes, Good dentition, No lesions  NECK: Supple, No JVD, Normal thyroid  CHEST/LUNG: Clear to auscultaion-no wheezing  CVS: Regular rate and rhythm; No murmurs, rubs, or gallops  GI: : Soft, Nontender, Nondistended; Bowel sounds present  NERVOUS SYSTEM:  Alert & Oriented X0  EXTREMITIES:  - edema  LYMPH: No lymphadenopathy noted  SKIN: No rashes or lesions  ENDOCRINOLOGY: No Thyromegaly  PSYCH: calm     Labs:  18, 16, 18, 25                            9.1    15.73 )-----------( 23       ( 22 Oct 2024 06:09 )             27.2                         7.4    12.46 )-----------( 28       ( 21 Oct 2024 17:15 )             21.9                         9.0    11.63 )-----------( 15       ( 21 Oct 2024 08:07 )             26.7                         9.1    10.09 )-----------( 17       ( 21 Oct 2024 00:01 )             26.7                         9.3    7.90  )-----------( 17       ( 20 Oct 2024 15:40 )             27.5                         9.6    2.29  )-----------( 16       ( 19 Oct 2024 23:23 )             28.6                         10.2   1.18  )-----------( 25       ( 19 Oct 2024 15:40 )             29.3                         11.0   0.54  )-----------( 32       ( 19 Oct 2024 04:55 )             31.7     10-22    159[H]  |  128[H]  |  59[H]  ----------------------------<  269[H]  4.5   |  19[L]  |  0.94  10-21    151[H]  |  121[H]  |  57[H]  ----------------------------<  459[HH]  4.2   |  18[L]  |  0.85  10-21    152[H]  |  124[H]  |  60[H]  ----------------------------<  262[H]  4.2   |  18[L]  |  0.96  10-21    154[H]  |  123[H]  |  61[H]  ----------------------------<  176[H]  4.6   |  17[L]  |  0.97  10-20    152[H]  |  121[H]  |  64[H]  ----------------------------<  112[H]  4.4   |  15[L]  |  1.04  10-19    147[H]  |  115[H]  |  57[H]  ----------------------------<  114[H]  4.5   |  18[L]  |  1.15    Ca    7.0[L]      22 Oct 2024 06:09  Ca    6.8[L]      21 Oct 2024 17:15  Ca    7.2[L]      21 Oct 2024 08:07  Ca    7.2[L]      21 Oct 2024 00:01  Ca    7.2[L]      20 Oct 2024 15:40  Phos  1.6     10-22  Phos  1.8     10-21  Phos  2.4     10-21  Phos  3.1     10-21  Phos  3.9     10-20  Mg     3.10     10-22  Mg     2.90     10-21  Mg     3.00     10-21  Mg     2.90     10-21  Mg     2.90     10-20    TPro  4.6[L]  /  Alb  1.9[L]  /  TBili  1.7[H]  /  DBili  x   /  AST  77[H]  /  ALT  17  /  AlkPhos  53  10-20  TPro  4.7[L]  /  Alb  2.2[L]  /  TBili  2.2[H]  /  DBili  x   /  AST  63[H]  /  ALT  11  /  AlkPhos  52  10-19    CAPILLARY BLOOD GLUCOSE      POCT Blood Glucose.: 263 mg/dL (22 Oct 2024 07:02)  POCT Blood Glucose.: 333 mg/dL (22 Oct 2024 01:14)  POCT Blood Glucose.: 290 mg/dL (21 Oct 2024 18:25)      LIVER FUNCTIONS - ( 20 Oct 2024 15:40 )  Alb: 1.9 g/dL / Pro: 4.6 g/dL / ALK PHOS: 53 U/L / ALT: 17 U/L / AST: 77 U/L / GGT: x             Urinalysis Basic - ( 22 Oct 2024 06:09 )    Color: x / Appearance: x / SG: x / pH: x  Gluc: 269 mg/dL / Ketone: x  / Bili: x / Urobili: x   Blood: x / Protein: x / Nitrite: x   Leuk Esterase: x / RBC: x / WBC x   Sq Epi: x / Non Sq Epi: x / Bacteria: x      Procalcitonin: 14.25 ng/mL (10-19 @ 05:31)  Lactate, Blood: 1.9 mmol/L (10-19 @ 13:17)  Lactate, Blood: 2.5 mmol/L (10-19 @ 08:34)        RECENT CULTURES:  10-19 @ 20:05 .Blood BLOOD       Growth in aerobic bottle: Gram Negative Rods  Growth in anaerobic bottle: Gram Negative Rods           Growth in aerobic bottle: Escherichia coli  Growth in anaerobic bottle: Gram Negative Rods  See previous culture [id number]    10-19 @ 19:50 .Blood BLOOD       Growth in anaerobic bottle: Gram Negative Rods  Growth in aerobic bottle: Gram Negative Rods           Growth in aerobic and anaerobic bottles: Escherichia coli  See previous culture [id number]    10-19 @ 05:26 .Blood BLOOD       Growth in aerobic and anaerobic bottles: Gram Negative Rods and Gram  Negative Coccobacilli           Growth in aerobic and anaerobic bottles: Escherichia coli  See previous culture [id number]    10-19 @ 04:55 .Blood BLOOD   PCR    Growth in aerobic and anaerobic bottles: Gram Negative Rods    Blood Culture PCR  Escherichia coli  Blood Culture PCR     Growth in aerobic and anaerobic bottles: Escherichia coli  Direct identification is available within approximately 3-5  hours either by Blood Panel Multiplexed PCR or Direct  MALDI-TOF. Details: [email or web address]          RESPIRATORY CULTURES:      Clostridium difficile GDH Toxins A&amp;B, EIA:   Negative (10-18 @ 23:53)      Studies  Chest X-RAY  CT SCAN Chest   Venous Dopplers: LE:   CT Abdomen  Others      rad< from: Xray Chest 1 View- PORTABLE-Urgent (Xray Chest 1 View- PORTABLE-Urgent .) (10.20.24 @ 11:02) >    ACC: [id number] EXAM:  XR CHEST PORTABLE URGENT 1V   ORDERED BY: AYANNA CEDENO     PROCEDURE DATE:  10/20/2024          INTERPRETATION:  EXAMINATION: XR CHEST URGENT    CLINICAL INDICATION: Bacteremia. Evaluate for pneumonia.    TECHNIQUE: Single frontal view of the chest was obtained.    COMPARISON: Chest x-ray 10/17/2024.    FINDINGS:  No focal consolidation, large pleural effusion or pneumothorax.  The heart size cannot be accurately assessed on this projection.  No acute osseous abnormalities.      IMPRESSION:  No focal consolidations.    --- End of Report ---          PRABHA MONET MD; Resident Radiologist  This document has been electronically signed.  LEA COLE MD; Attending Interventional Radiologist  This document has been electronically signed. Oct 20 2024 11:53AM    < end of copied text >  T1-T5, and also L1-L5    ** Addendum: per RN on floor, and family, there are concerns for sepsis, agitation, and he has AMS at present.  We will hold RT as he is unsafe to treat.  patient is confused and agitated in mittons/restraints.   Please assess for sepsis? and advise per oncology if we are to Hold RT.   d/w Dr. Bruno< from: Xray Chest 1 View- PORTABLE-Urgent (Xray Chest 1 View- PORTABLE-Urgent .) (10.20.24 @ 11:02) >  TECHNIQUE: Single frontal view of the chest was obtained.    COMPARISON: Chest x-ray 10/17/2024.    FINDINGS:  No focal consolidation, large pleural effusion or pneumothorax.  The heart size cannot be accurately assessed on this projection.  No acute osseous abnormalities.      IMPRESSION:  No focal consolidations.    --- End of Report ---          PRABHA MONET MD; Resident Radiologist  This document has been electronically signed.  LEA COLE MD; Attending Interventional Radiologist  This document has been electronically signed. Oct 20 2024 11:53AM    < end of copied text >

## 2024-10-22 NOTE — PROGRESS NOTE ADULT - ASSESSMENT
65 y/o M with high volume metastatic prostate cancer, here with back pain, started on chemotherapy.     1. Metastatic prostate cancer  - Follows with Dr Andrew Mendoza at [facility]   - Dx 14 y/o with low risk prostate cancer s/p radical prostatectomy, negative LNs -> recently found to have multiloculated presacral soft tissue mass 5.1 x 3.9 x 4.1cm, RP LAD, low density liver lesions, lucencies in several vertebral bodies, manubrium; PSMA 10/11/24 showed hypermetabolic vera foci above and below the diaphragm, foci in the liver and extensive foci involving the axial and appendicular skeleton compatible with metastatic disease  - --> 374--> 426 on 10/8/24   - Liver biopsy 9/30/24 consistent with adenocarcinoma favoring prostate   - High risk high volume disease -> started on triplet therapy w/ ADT/lupron, abiraterone/prednisone + taxotere. (back pain after Lupron likely tumor flare).  - Continue abiraterone 1000mg daily  - Should be on steroid w/abiraterone; on dex 4mg BID (was for chemo, now per palliative for pain control) - consider tapering to 2mg BID and further from there, if tapered off then needs to be on prednisone 5mg daily w/loly  - Received taxotere 60mg/m2 10/13/24, theoretically can plan for next dose on 11/4 if clinically improves    - Kyphoplasty w/IR to T3 and L1 lesions planned for 10/22/24 - on HOLD due to other acute issues   - Palliative following for pain control and given hospital course would have further GOC discussions. Treatment will be offered but he has metastatic disease with visceral involvement and complications as outlined below.     2. Hypoxia | tachycardia | diarrhea + pancolitis | Fever l Sepsis with Bacteremia   Bacteremia- GNR and Gram negative coccobaccili   - No PE on CTA chest, found to have pancolitis  - C. diff negative  - On zosyn , febrile 101.6, Tachycardic 119  - ID and GI follow up    3. Thrombocytopenia   - Possible ITP component now with chemotherapy induced   - Baseline in the 70s  - Taxotere dose reduced to 60mg/m2 given baseline thrombocytopenia   - Transfuse for plt <10, <15 if febrile, <20 if bleeding    - Steroids may help with ITP component but also may need high dose. Nplate can also be considered   - defer to vascular regarding SMA dissection or ischemic colitis management- if they feel strongly that heparin is needed will need plt transfusion and to maintain >50K for AC     4. Neutropenia 2/2 chemotherapy   - ANC recovered, no need for further growth factor at this time  - bacteremia as above, ID follow up and c/w abx     5. Ischemic Colitis vs Infectious Colitis   - possible combination of both   - SMA dissection also noted  - colorectal and vascular surgery recs appreciated- no acute surgical intervention  - heparin per vascular but as above would transfuse if they feel there is an ischemic component and heparin is necessary    6. Hypernatremia  -Na 159   -Repeat SNa today is worsening to 159. 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 Continue GI, ID, colorectal surgery, vascular surgery follow up. Continue to support his counts with transfusions as needed and GCSF support. Appreciate primary team care.   Per notes, not an ICU  candidate at this time. Monitor closely and may need ICU re-eval if further decompensates     Umm Aponte NP  Hematology/Oncology  [facility]  [phone] (Office)  [phone] (Alt office)  Evenings and weekends please call MD on call or office

## 2024-10-23 DIAGNOSIS — M81.0 AGE-RELATED OSTEOPOROSIS WITHOUT CURRENT PATHOLOGICAL FRACTURE: ICD-10-CM

## 2024-10-23 DIAGNOSIS — R73.9 HYPERGLYCEMIA, UNSPECIFIED: ICD-10-CM

## 2024-10-23 LAB
A1C WITH ESTIMATED AVERAGE GLUCOSE RESULT: 6.2 % — HIGH (ref 4–5.6)
ADD ON TEST-SPECIMEN IN LAB: SIGNIFICANT CHANGE UP
ALBUMIN SERPL ELPH-MCNC: 2.2 G/DL — LOW (ref 3.3–5)
ALP SERPL-CCNC: 107 U/L — SIGNIFICANT CHANGE UP (ref 40–120)
ALT FLD-CCNC: 15 U/L — SIGNIFICANT CHANGE UP (ref 4–41)
ANION GAP SERPL CALC-SCNC: 11 MMOL/L — SIGNIFICANT CHANGE UP (ref 7–14)
ANION GAP SERPL CALC-SCNC: 12 MMOL/L — SIGNIFICANT CHANGE UP (ref 7–14)
AST SERPL-CCNC: 44 U/L — HIGH (ref 4–40)
BILIRUB SERPL-MCNC: 0.9 MG/DL — SIGNIFICANT CHANGE UP (ref 0.2–1.2)
BUN SERPL-MCNC: 37 MG/DL — HIGH (ref 7–23)
BUN SERPL-MCNC: 45 MG/DL — HIGH (ref 7–23)
CALCIUM SERPL-MCNC: 6.7 MG/DL — LOW (ref 8.4–10.5)
CALCIUM SERPL-MCNC: 6.9 MG/DL — LOW (ref 8.4–10.5)
CHLORIDE SERPL-SCNC: 119 MMOL/L — HIGH (ref 98–107)
CHLORIDE SERPL-SCNC: 122 MMOL/L — HIGH (ref 98–107)
CO2 SERPL-SCNC: 20 MMOL/L — LOW (ref 22–31)
CO2 SERPL-SCNC: 21 MMOL/L — LOW (ref 22–31)
CREAT SERPL-MCNC: 0.64 MG/DL — SIGNIFICANT CHANGE UP (ref 0.5–1.3)
CREAT SERPL-MCNC: 0.71 MG/DL — SIGNIFICANT CHANGE UP (ref 0.5–1.3)
CULTURE RESULTS: ABNORMAL
CULTURE RESULTS: SIGNIFICANT CHANGE UP
EGFR: 102 ML/MIN/1.73M2 — SIGNIFICANT CHANGE UP
EGFR: 105 ML/MIN/1.73M2 — SIGNIFICANT CHANGE UP
ESTIMATED AVERAGE GLUCOSE: 131 — SIGNIFICANT CHANGE UP
GLUCOSE BLDC GLUCOMTR-MCNC: 193 MG/DL — HIGH (ref 70–99)
GLUCOSE BLDC GLUCOMTR-MCNC: 233 MG/DL — HIGH (ref 70–99)
GLUCOSE BLDC GLUCOMTR-MCNC: 280 MG/DL — HIGH (ref 70–99)
GLUCOSE BLDC GLUCOMTR-MCNC: 322 MG/DL — HIGH (ref 70–99)
GLUCOSE SERPL-MCNC: 180 MG/DL — HIGH (ref 70–99)
GLUCOSE SERPL-MCNC: 282 MG/DL — HIGH (ref 70–99)
HCT VFR BLD CALC: 25.1 % — LOW (ref 39–50)
HCT VFR BLD CALC: 25.9 % — LOW (ref 39–50)
HGB BLD-MCNC: 8.3 G/DL — LOW (ref 13–17)
HGB BLD-MCNC: 8.6 G/DL — LOW (ref 13–17)
MAGNESIUM SERPL-MCNC: 2.7 MG/DL — HIGH (ref 1.6–2.6)
MAGNESIUM SERPL-MCNC: 2.9 MG/DL — HIGH (ref 1.6–2.6)
MCHC RBC-ENTMCNC: 30.3 PG — SIGNIFICANT CHANGE UP (ref 27–34)
MCHC RBC-ENTMCNC: 30.8 PG — SIGNIFICANT CHANGE UP (ref 27–34)
MCHC RBC-ENTMCNC: 33.1 GM/DL — SIGNIFICANT CHANGE UP (ref 32–36)
MCHC RBC-ENTMCNC: 33.2 GM/DL — SIGNIFICANT CHANGE UP (ref 32–36)
MCV RBC AUTO: 91.6 FL — SIGNIFICANT CHANGE UP (ref 80–100)
MCV RBC AUTO: 92.8 FL — SIGNIFICANT CHANGE UP (ref 80–100)
NRBC # BLD: 5 /100 WBCS — HIGH (ref 0–0)
NRBC # BLD: 6 /100 WBCS — HIGH (ref 0–0)
NRBC # FLD: 0.79 K/UL — HIGH (ref 0–0)
NRBC # FLD: 0.88 K/UL — HIGH (ref 0–0)
PHOSPHATE SERPL-MCNC: 1.9 MG/DL — LOW (ref 2.5–4.5)
PLATELET # BLD AUTO: 44 K/UL — LOW (ref 150–400)
PLATELET # BLD AUTO: 52 K/UL — LOW (ref 150–400)
POTASSIUM SERPL-MCNC: 4.2 MMOL/L — SIGNIFICANT CHANGE UP (ref 3.5–5.3)
POTASSIUM SERPL-MCNC: 4.7 MMOL/L — SIGNIFICANT CHANGE UP (ref 3.5–5.3)
POTASSIUM SERPL-SCNC: 4.2 MMOL/L — SIGNIFICANT CHANGE UP (ref 3.5–5.3)
POTASSIUM SERPL-SCNC: 4.7 MMOL/L — SIGNIFICANT CHANGE UP (ref 3.5–5.3)
PROT SERPL-MCNC: 4.2 G/DL — LOW (ref 6–8.3)
RBC # BLD: 2.74 M/UL — LOW (ref 4.2–5.8)
RBC # BLD: 2.79 M/UL — LOW (ref 4.2–5.8)
RBC # FLD: 18.3 % — HIGH (ref 10.3–14.5)
RBC # FLD: 18.4 % — HIGH (ref 10.3–14.5)
SODIUM SERPL-SCNC: 151 MMOL/L — HIGH (ref 135–145)
SODIUM SERPL-SCNC: 154 MMOL/L — HIGH (ref 135–145)
SPECIMEN SOURCE: SIGNIFICANT CHANGE UP
SPECIMEN SOURCE: SIGNIFICANT CHANGE UP
WBC # BLD: 13.21 K/UL — HIGH (ref 3.8–10.5)
WBC # BLD: 18.56 K/UL — HIGH (ref 3.8–10.5)
WBC # FLD AUTO: 13.21 K/UL — HIGH (ref 3.8–10.5)
WBC # FLD AUTO: 18.56 K/UL — HIGH (ref 3.8–10.5)

## 2024-10-23 PROCEDURE — 99233 SBSQ HOSP IP/OBS HIGH 50: CPT

## 2024-10-23 PROCEDURE — 99497 ADVNCD CARE PLAN 30 MIN: CPT

## 2024-10-23 PROCEDURE — 99232 SBSQ HOSP IP/OBS MODERATE 35: CPT

## 2024-10-23 PROCEDURE — 99222 1ST HOSP IP/OBS MODERATE 55: CPT | Mod: GC

## 2024-10-23 RX ORDER — 0.9 % SODIUM CHLORIDE 0.9 %
1000 INTRAVENOUS SOLUTION INTRAVENOUS
Refills: 0 | Status: DISCONTINUED | OUTPATIENT
Start: 2024-10-23 | End: 2024-10-23

## 2024-10-23 RX ORDER — OLANZAPINE 20 MG/1
1.25 TABLET ORAL ONCE
Refills: 0 | Status: DISCONTINUED | OUTPATIENT
Start: 2024-10-23 | End: 2024-10-23

## 2024-10-23 RX ORDER — SODIUM CHLORIDE 9 MG/ML
1000 INJECTION, SOLUTION INTRAMUSCULAR; INTRAVENOUS; SUBCUTANEOUS
Refills: 0 | Status: DISCONTINUED | OUTPATIENT
Start: 2024-10-23 | End: 2024-10-24

## 2024-10-23 RX ORDER — HALOPERIDOL 2 MG
2.5 TABLET ORAL ONCE
Refills: 0 | Status: DISCONTINUED | OUTPATIENT
Start: 2024-10-23 | End: 2024-10-24

## 2024-10-23 RX ORDER — HALOPERIDOL 2 MG
2.5 TABLET ORAL ONCE
Refills: 0 | Status: COMPLETED | OUTPATIENT
Start: 2024-10-23 | End: 2024-10-23

## 2024-10-23 RX ORDER — POTASSIUM PHOSPHATE, MONOBASIC POTASSIUM PHOSPHATE, DIBASIC INJECTION, 236; 224 MG/ML; MG/ML
15 SOLUTION, CONCENTRATE INTRAVENOUS ONCE
Refills: 0 | Status: COMPLETED | OUTPATIENT
Start: 2024-10-23 | End: 2024-10-23

## 2024-10-23 RX ADMIN — PIPERACILLIN SODIUM AND TAZOBACTAM SODIUM 25 GRAM(S): 4; .5 INJECTION, POWDER, LYOPHILIZED, FOR SOLUTION INTRAVENOUS at 04:44

## 2024-10-23 RX ADMIN — Medication 1 MG/HR: at 19:41

## 2024-10-23 RX ADMIN — Medication 1 MG/HR: at 09:42

## 2024-10-23 RX ADMIN — PANTOPRAZOLE SODIUM 40 MILLIGRAM(S): 40 TABLET, DELAYED RELEASE ORAL at 13:54

## 2024-10-23 RX ADMIN — ONDANSETRON HYDROCHLORIDE 4 MILLIGRAM(S): 4 TABLET, FILM COATED ORAL at 17:10

## 2024-10-23 RX ADMIN — ONDANSETRON HYDROCHLORIDE 4 MILLIGRAM(S): 4 TABLET, FILM COATED ORAL at 02:25

## 2024-10-23 RX ADMIN — Medication 75 MILLILITER(S): at 09:42

## 2024-10-23 RX ADMIN — Medication 2.5 MILLIGRAM(S): at 22:00

## 2024-10-23 RX ADMIN — Medication 1: at 07:36

## 2024-10-23 RX ADMIN — ONDANSETRON HYDROCHLORIDE 4 MILLIGRAM(S): 4 TABLET, FILM COATED ORAL at 09:43

## 2024-10-23 RX ADMIN — Medication 2: at 01:59

## 2024-10-23 RX ADMIN — PIPERACILLIN SODIUM AND TAZOBACTAM SODIUM 25 GRAM(S): 4; .5 INJECTION, POWDER, LYOPHILIZED, FOR SOLUTION INTRAVENOUS at 13:54

## 2024-10-23 RX ADMIN — SODIUM CHLORIDE 75 MILLILITER(S): 9 INJECTION, SOLUTION INTRAMUSCULAR; INTRAVENOUS; SUBCUTANEOUS at 14:44

## 2024-10-23 RX ADMIN — Medication 75 MILLILITER(S): at 03:08

## 2024-10-23 RX ADMIN — POTASSIUM PHOSPHATE, MONOBASIC POTASSIUM PHOSPHATE, DIBASIC INJECTION, 62.5 MILLIMOLE(S): 236; 224 SOLUTION, CONCENTRATE INTRAVENOUS at 09:43

## 2024-10-23 RX ADMIN — CHLORHEXIDINE GLUCONATE 1 APPLICATION(S): 1.2 RINSE ORAL at 13:45

## 2024-10-23 NOTE — BH CONSULTATION LIAISON PROGRESS NOTE - NSBHCHARTREVIEWLAB_PSY_A_CORE FT
10-23    154[H]  |  122[H]  |  45[H]  ----------------------------<  282[H]  4.7   |  20[L]  |  0.71    Ca    6.9[L]      23 Oct 2024 06:00  Phos  1.9     10-23  Mg     2.90     10-23    
10-22    159[H]  |  126[H]  |  57[H]  ----------------------------<  291[H]  5.0   |  18[L]  |  0.93    Ca    7.1[L]      22 Oct 2024 11:10  Phos  1.7     10-22  Mg     3.20     10-22    TPro  4.6[L]  /  Alb  1.9[L]  /  TBili  1.7[H]  /  DBili  x   /  AST  77[H]  /  ALT  17  /  AlkPhos  53  10-20  
10-18    144  |  110[H]  |  30[H]  ----------------------------<  166[H]  4.5   |  21[L]  |  0.76    Ca    8.0[L]      18 Oct 2024 06:20  Phos  2.5     10-18  Mg     2.40     10-18    TPro  5.2[L]  /  Alb  3.0[L]  /  TBili  1.3[H]  /  DBili  x   /  AST  52[H]  /  ALT  10  /  AlkPhos  53  10-17

## 2024-10-23 NOTE — BH CONSULTATION LIAISON PROGRESS NOTE - NSBHFUPINTERVALHXFT_PSY_A_CORE
Pt seen at bedside. Zyprexa discontinued due to family request. Pt in restraints. No prn meds given over night.    Pt seen sleeping in restraints. Not in acute distress. Spouse requesting that pt not be woken up. Reports pt has not been sleeping and just got to sleep. Wants to hold the meds because he is doing better now and thinks he was worse because of the medication. Explained that team will recommend medications if pt is presenting as a safety risk to himself or others and she expressed understanding Pt seen at bedside. Zyprexa discontinued due to family request. Pt in restraints. No prn meds given over night.    Pt seen sleeping in restraints. Not in acute distress. Spouse requesting that pt not be woken up. Reports pt has not been sleeping and just got to sleep. Wants to hold the meds because he is doing better now and thinks he was worse because of the medication. Explained that team will recommend PRN medications only if pt is presenting as a safety risk to himself or others and she expressed understanding.

## 2024-10-23 NOTE — CHART NOTE - NSCHARTNOTEFT_GEN_A_CORE
Repeat BMP reviewed. Repeat Na noted to be 151, down from 154. Patient started on Sodium Chloride 0.225%. 1000 milliLiter(s) (75 mL/Hr) x12 hrs earlier today.    Discussed results with nephrologist, Dr. Horner. Recommends to continue the IVF as patient had appropriate improvement of sodium.  Will continue to monitor patient and repeat levels in the morning. Repeat BMP reviewed. Repeat Na noted to be 151, down from 154. Patient started on Sodium Chloride 0.225%. 1000 milliLiter(s) (75 mL/Hr) x12 hrs earlier today.    Discussed results with nephrologist, Dr. Horner. Recommends to continue the IVF as patient had appropriate improvement of sodium.  Will continue to monitor patient and repeat levels in the morning.    Addendum 0500  - AM Sodium levels noted to be 151. Patient ripped out IV earlier and did not complete full duration of IVF. Reordered additional 6 hours that patient had pending.  - Will endorse to day team to repeat BMP post completion.

## 2024-10-23 NOTE — PROGRESS NOTE ADULT - CONVERSATION DETAILS
Referral for complex decision making and symptom management in setting of advanced malignancy. Met with patient's wife in family unge with Medical team. Medical PA provided updates to Vijaya regarding clinical updates. Emotional support provided and Vijaya expressed appreciate for the medical care her  is receiving. Palliative attending introduced the concept of advance directives, specifically preferences for cpr and mechanical ventilation. Vijaya shared that her and her  have a living will and her  has shared multiple times that he would not want introduction of CPR or mechanical ventilation. She shared different stories of family members who had attempts at life support and shared that Simeon had told her he would never want that for himself. Vijaya stated that her sons are all aware of Simeon's wishes but she will communicate to them our conversation before establishing hospital directives. She was advised to notify the medical team when family is ready to complete MOLST form.

## 2024-10-23 NOTE — PROGRESS NOTE ADULT - ASSESSMENT
65 y/o M with high volume metastatic prostate cancer, here with back pain, started on chemotherapy.     1. Metastatic prostate cancer  - Follows with Dr Andrew Mendoza at [facility]   - Dx 14 y/o with low risk prostate cancer s/p radical prostatectomy, negative LNs -> recently found to have multiloculated presacral soft tissue mass 5.1 x 3.9 x 4.1cm, RP LAD, low density liver lesions, lucencies in several vertebral bodies, manubrium; PSMA 10/11/24 showed hypermetabolic vera foci above and below the diaphragm, foci in the liver and extensive foci involving the axial and appendicular skeleton compatible with metastatic disease  - --> 374--> 426 on 10/8/24   - Liver biopsy 9/30/24 consistent with adenocarcinoma favoring prostate   - High risk high volume disease -> started on triplet therapy w/ ADT/lupron, abiraterone/prednisone + taxotere. (back pain after Lupron likely tumor flare).  - Continue abiraterone 1000mg daily  - Should be on steroid w/abiraterone; on dex 4mg BID (was for chemo, now per palliative for pain control) - consider tapering to 2mg BID and further from there, if tapered off then needs to be on prednisone 5mg daily w/loly  - Received taxotere 60mg/m2 10/13/24, theoretically can plan for next dose on 11/4 if clinically improves    - Kyphoplasty w/IR to T3 and L1 lesions planned for 10/22/24 - on HOLD due to other acute issues   - Palliative following for pain control and given hospital course would have further GOC discussions. Treatment will be offered but he has metastatic disease with visceral involvement and complications as outlined below.     2. Hypoxia | tachycardia | diarrhea + pancolitis | Fever l Sepsis with Bacteremia   Bacteremia- GNR and Gram negative coccobaccili   - No PE on CTA chest, found to have pancolitis  - C. diff negative  - On zosyn ,afebrile  - ID and GI follow up    3. Thrombocytopenia   - Possible ITP component now with chemotherapy induced   - Baseline in the 70s  - Taxotere dose reduced to 60mg/m2 given baseline thrombocytopenia   - Transfuse for plt <10, <15 if febrile, <20 if bleeding    - Steroids may help with ITP component but also may need high dose. Nplate can also be considered   - defer to vascular regarding SMA dissection or ischemic colitis management- if they feel strongly that heparin is needed will need plt transfusion and to maintain >50K for AC     4. Neutropenia 2/2 chemotherapy   - ANC recovered, no need for further growth factor at this time  - bacteremia as above, ID follow up and c/w abx     5. Ischemic Colitis vs Infectious Colitis   - possible combination of both   - SMA dissection also noted  - colorectal and vascular surgery recs appreciated- no acute surgical intervention  - heparin per vascular but as above would transfuse if they feel there is an ischemic component and heparin is necessary    6. 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 Continue GI, ID, colorectal surgery, vascular surgery follow up. Continue to support his counts with transfusions as needed and GCSF support. Appreciate primary team care.   Per notes, not an ICU  candidate at this time. Monitor closely and may need ICU re-eval if further decompensates     Umm Aponte NP  Hematology/Oncology  [facility]  [phone] (Office)  [phone] (Alt office)  Evenings and weekends please call MD on call or office         63 y/o M with high volume metastatic prostate cancer, here with back pain, started on chemotherapy.     1. Metastatic prostate cancer  - Follows with Dr Andrew Mendoza at [facility]   - Dx 14 y/o with low risk prostate cancer s/p radical prostatectomy, negative LNs -> recently found to have multiloculated presacral soft tissue mass 5.1 x 3.9 x 4.1cm, RP LAD, low density liver lesions, lucencies in several vertebral bodies, manubrium; PSMA 10/11/24 showed hypermetabolic vera foci above and below the diaphragm, foci in the liver and extensive foci involving the axial and appendicular skeleton compatible with metastatic disease  - --> 374--> 426 on 10/8/24   - Liver biopsy 9/30/24 consistent with adenocarcinoma favoring prostate   - High risk high volume disease -> started on triplet therapy w/ ADT/lupron, abiraterone/prednisone + taxotere. (back pain after Lupron likely tumor flare).  - Continue abiraterone 1000mg daily  - Should be on steroid w/abiraterone; on dex 4mg BID (was for chemo, now per palliative for pain control) - consider tapering to 2mg BID and further from there, if tapered off then needs to be on prednisone 5mg daily w/loly  - Received taxotere 60mg/m2 10/13/24, theoretically can plan for next dose on 11/4 if clinically improves    - Kyphoplasty w/IR to T3 and L1 lesions planned for 10/22/24 - on HOLD due to other acute issues   - Palliative following for pain control and given hospital course would have further GOC discussions. Treatment will be offered but he has metastatic disease with visceral involvement and complications as outlined below.     2. Hypoxia | tachycardia | diarrhea + pancolitis | Fever l Sepsis with Bacteremia   Bacteremia- GNR and Gram negative coccobaccili   - No PE on CTA chest, found to have pancolitis  - C. diff negative  - On zosyn ,afebrile  - ID and GI follow up    3. Thrombocytopenia   - Possible ITP component now with chemotherapy induced   - Baseline in the 70s  - Taxotere dose reduced to 60mg/m2 given baseline thrombocytopenia   - Transfuse for plt <10, <15 if febrile, <20 if bleeding    - Steroids may help with ITP component but also may need high dose. Nplate can also be considered   - defer to vascular regarding SMA dissection or ischemic colitis management- if they feel strongly that heparin is needed will need plt transfusion and to maintain >50K for AC     4. Neutropenia 2/2 chemotherapy   - ANC recovered, no need for further growth factor at this time  - bacteremia as above, ID follow up and c/w abx     5. Ischemic Colitis vs Infectious Colitis   - possible combination of both   - SMA dissection also noted  - colorectal and vascular surgery recs appreciated- no acute surgical intervention  - heparin per vascular but as above would transfuse if they feel there is an ischemic component and heparin is necessary    6. 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 Continue GI, ID, colorectal surgery, vascular surgery follow up. Continue to support his counts with transfusions as needed and GCSF support. Appreciate primary team care.   Per notes, not an ICU  candidate at this time. Monitor closely and may need ICU re-eval if further decompensates     Umm Aponte NP  Hematology/Oncology  [facility]  [phone] (Office)  [phone] (Alt office)  Evenings and weekends please call MD on call or office

## 2024-10-23 NOTE — CHART NOTE - NSCHARTNOTESELECT_GEN_ALL_CORE
Message  Spoke with patient  Last seen more than 1 year ago  He had called for refill but was reminded that he needs to follow-up in the office to continue to receive treatment  Refill given previously and then patient did not follow-up  We had previously discussed that he needs to be seen in office or I would be happy to try to coordinate with PCP to see if they would be able to continue and refer back as needed but pt states he wants to return for follow-up  Reports he is doing well and propranolol was refilled by his PCP recently  Will request staff try again to contact patient to schedule  They report he has not answered or returned prior calls        Signatures   Electronically signed by : Donnice Mcburney, MD; Jan 12 2017  5:42PM EST                       (Author) Event Note Sodium levels/Event Note

## 2024-10-23 NOTE — BH CONSULTATION LIAISON PROGRESS NOTE - ATTENDING COMMENTS
Chart reviewed, case d/w Dr. Marshall, I agree with above recs/plan. 
Handoff received from Dr. English, chart reviewed, case d/w Dr. Marshall, I agree with above assessment/plan.

## 2024-10-23 NOTE — PROGRESS NOTE ADULT - SUBJECTIVE AND OBJECTIVE BOX
{\rtf1\hrdamt24349\ansi\ugciymi3105\ftnbj\uc1\deff0  {\fonttbl{\f0 \fnil Segoe UI;}{\f1 \fnil \fcharset0 Segoe UI;}{\f2 \fnil Times New Nikhil;}}  {\colortbl ;\wzn933\kcivk249\tlmp614 ;\red0\green0\blue0 ;\red0\green0\cygp348 ;\red0\green0\blue0 ;}  {\stylesheet{\f0\fs20 Normal;}{\cs1 Default Paragraph Font;}{\cs2\f0\fs16 Line Number;}{\cs3\f2\fs24\ul\cf3 Hyperlink;}}  {\*\revtbl{Unknown;}}  \nyaxsc17461\ulofww03337\lfeas5030\ggqui4689\nwgmd7808\yumxe0623\cryjpgv278\pyvhqbw490\nogrowautofit\rfecel530\formshade\nofeaturethrottle1\dntblnsbdb\fet4\aendnotes\aftnnrlc\pgbrdrhead\pgbrdrfoot  \sectd\sktpnw56030\ybpxxy73621\guttersxn0\wdwubkkl4463\djgoneew6824\xeubageg2996\gjyxytjb0500\xduevfu120\rxqsris139\sbkpage\pgncont\pgndec  \plain\plain\f0\fs24\pard\plain\f0\fs24\plain\f0\fs20\ojtn8103\hich\f0\dbch\f0\loch\f0\fs20 Date of Service: 10-23-24 @ 12:59\par  \par  Patient is a 64y old  Male who presents with a chief complaint of Pain (23 Oct 2024 12:35)\par  \par  \par  Any change in ROS:he is comfortable and sleeping today  : yesterday he was very agitated and confused: \par  \par  \par  MEDICATIONS  (STANDING):\par  abiraterone 500 mg tablet 2 Tablet(s) 2 Tablet(s) Oral daily\par  atorvastatin 20 milliGRAM(s) Oral at bedtime\par  chlorhexidine 2% Cloths 1 Application(s) Topical daily\par  dextrose 5%. 1000 milliLiter(s) (100 mL/Hr) IV Continuous <Continuous>\par  dextrose 5%. 1000 milliLiter(s) (130 mL/Hr) IV Continuous <Continuous>\par  dextrose 5%. 1000 milliLiter(s) (50 mL/Hr) IV Continuous <Continuous>\par  dextrose 50% Injectable 25 Gram(s) IV Push once\par  dextrose 50% Injectable 12.5 Gram(s) IV Push once\par  dextrose 50% Injectable 25 Gram(s) IV Push once\par  fenofibrate Tablet 145 milliGRAM(s) Oral daily\par  glucagon  Injectable 1 milliGRAM(s) IntraMuscular once\par  influenza  Vaccine (HIGH DOSE) 0.5 milliLiter(s) IntraMuscular once\par  insulin lispro (ADMELOG) corrective regimen sliding scale   SubCutaneous every 6 hours\par  lactobacillus acidophilus 1 Tablet(s) Oral daily\par  lidocaine   4% Patch 1 Patch Transdermal every 24 hours\par  morphine  Infusion. 1 mG/Hr (1 mL/Hr) IV Continuous <Continuous>\par  ondansetron Injectable 4 milliGRAM(s) IV Push every 8 hours\par  pantoprazole  Injectable 40 milliGRAM(s) IV Push daily\par  piperacillin/tazobactam IVPB.. 3.375 Gram(s) IV Intermittent every 8 hours\par  sodium chloride 0.225%. 1000 milliLiter(s) (75 mL/Hr) IV Continuous <Continuous>\par  \par  MEDICATIONS  (PRN):\par  acetaminophen     Tablet .. 650 milliGRAM(s) Oral every 6 hours PRN Temp greater or equal to 38C (100.4F), Mild Pain (1 - 3), Moderate Pain (4 - 6)\par  aluminum hydroxide/magnesium hydroxide/simethicone Suspension 30 milliLiter(s) Oral every 4 hours PRN Dyspepsia\par  artificial tears (preservative free) Ophthalmic Solution 1 Drop(s) Both EYES four times a day PRN Dry Eyes\par  dextrose Oral Gel 15 Gram(s) Oral once PRN Blood Glucose LESS THAN 70 milliGRAM(s)/deciliter\par  morphine  - Injectable 4 milliGRAM(s) IV Push every 3 hours PRN Severe Pain (7 - 10)\par  naloxone Injectable 0.1 milliGRAM(s) IV Push every 3 minutes PRN For ANY of the following changes in patient status:  A. RR LESS THAN 10 breaths per minute, B. Oxygen saturation LESS THAN 90%, C. Sedation score of 6\par  polyethylene glycol 3350 17 Gram(s) Oral daily PRN Constipation\par  \par  Vital Signs Last 24 Hrs\par  T(C): 36.9 (23 Oct 2024 12:18), Max: 37.2 (22 Oct 2024 15:11)\par  T(F): 98.4 (23 Oct 2024 12:18), Max: 98.9 (22 Oct 2024 15:11)\par  HR: 86 (23 Oct 2024 12:18) (86 - 113)\par  BP: 113/76 (23 Oct 2024 12:18) (102/69 - 142/76)\par  BP(mean): --\par  RR: 18 (23 Oct 2024 12:18) (17 - 20)\par  SpO2: 95% (23 Oct 2024 12:18) (93% - 96%)\par  \par  Parameters below as of 23 Oct 2024 09:01\par  Patient On (Oxygen Delivery Method): room air\par  \par  \par  \par  I&O's Summary\par  \par  22 Oct 2024 07:01  -  23 Oct 2024 07:00\par  --------------------------------------------------------\par  IN: 0 mL / OUT: 2020 mL / NET: -2020 mL\par  \par  23 Oct 2024 07:01  -  23 Oct 2024 12:59\par  --------------------------------------------------------\par  IN: 250 mL / OUT: 0 mL / NET: 250 mL\par  \par  \par  \par  \par  Physical Exam: \par  GENERAL: NAD, well-groomed, well-developed\par  HEENT: CHAVA/   Atraumatic, Normocephalic\par  ENMT: No tonsillar erythema, exudates, or enlargement; Moist mucous membranes, Good dentition, No lesions\par  NECK: Supple, No JVD, Normal thyroid\par  CHEST/LUNG: Clear to auscultaion\par  CVS: Regular rate and rhythm; No murmurs, rubs, or gallops\par  GI: : Soft, Nontender, Nondistended; Bowel sounds present\par  NERVOUS SYSTEM:  sleepy\par  EXTREMITIES: - edema\par  LYMPH: No lymphadenopathy noted\par  SKIN: No rashes or lesions\par  ENDOCRINOLOGY: No Thyromegaly\par  PSYCH: calm \par  \par  Labs:\par  18, 16, 18, 25\par  \par  \par           \par             8.6  \par  18.56 )-----------( 52       ( 23 Oct 2024 06:00 )\par             25.9 \par           \par             9.1  \par  18.56 )-----------( 30       ( 22 Oct 2024 21:22 )\par             28.0 \par           \par             9.7  \par  14.94 )-----------( 23       ( 22 Oct 2024 11:10 )\par             29.4 \par           \par             9.1  \par  15.73 )-----------( 23       ( 22 Oct 2024 06:09 )\par             27.2 \par           \par             7.4  \par  12.46 )-----------( 28       ( 21 Oct 2024 17:15 )\par             21.9 \par           \par             9.0  \par  11.63 )-----------( 15       ( 21 Oct 2024 08:07 )\par             26.7 \par           \par             9.1  \par  10.09 )-----------( 17       ( 21 Oct 2024 00:01 )\par             26.7 \par           \par             9.3  \par  7.90  )-----------( 17       ( 20 Oct 2024 15:40 )\par             27.5 \par  \par  10-23\par  \par  154[H]  |  122[H]  |  45[H]\par  ----------------------------<  282[H]\par  4.7   |  20[L]  |  0.71\par  10-22\par  \par  153[H]  |  124[H]  |  52[H]\par  ----------------------------<  333[H]\par  5.6[H]   |  18[L]  |  0.81\par  10-22\par  \par  158[H]  |  127[H]  |  54[H]\par  ----------------------------<  328[H]\par  4.7   |  19[L]  |  0.94\par  10-22\par  \par  159[H]  |  126[H]  |  57[H]\par  ----------------------------<  291[H]\par  5.0   |  18[L]  |  0.93\par  10-22\par  \par  159[H]  |  128[H]  |  59[H]\par  ----------------------------<  269[H]\par  4.5   |  19[L]  |  0.94\par  10-21\par  \par  151[H]  |  121[H]  |  57[H]\par  ----------------------------<  459[HH]\par  4.2   |  18[L]  |  0.85\par  10-21\par  \par  152[H]  |  124[H]  |  60[H]\par  ----------------------------<  262[H]\par  4.2   |  18[L]  |  0.96\par  10-21\par  \par  154[H]  |  123[H]  |  61[H]\par  ----------------------------<  176[H]\par  4.6   |  17[L]  |  0.97\par  10-20\par  \par  152[H]  |  121[H]  |  64[H]\par  ----------------------------<  112[H]\par  4.4   |  15[L]  |  1.04\par  \par  Ca    6.9[L]      23 Oct 2024 06:00\par  Ca    6.8[L]      22 Oct 2024 21:22\par  Ca    6.9[L]      22 Oct 2024 16:21\par  Ca    7.1[L]      22 Oct 2024 11:10\par  Ca    7.0[L]      22 Oct 2024 06:09\par  Phos  1.9     10-23\par  Phos  2.2     10-22\par  Phos  1.7     10-22\par  Phos  1.6     10-22\par  Phos  1.8     10-21\par  Mg     2.90     10-23\par  Mg     3.00     10-22\par  Mg     3.20     10-22\par  Mg     3.10     10-22\par  Mg     2.90     10-21\par  \par  TPro  4.6[L]  /  Alb  1.9[L]  /  TBili  1.7[H]  /  DBili  x   /  AST  77[H]  /  ALT  17  /  AlkPhos  53  10-20\par  \par  CAPILLARY BLOOD GLUCOSE\par  \par  \par  POCT Blood Glucose.: 280 mg/dL (23 Oct 2024 07:29)\par  POCT Blood Glucose.: 322 mg/dL (23 Oct 2024 01:41)\par  POCT Blood Glucose.: 329 mg/dL (22 Oct 2024 18:02)\par  \par  \par  \par  \par  Urinalysis Basic - ( 23 Oct 2024 06:00 )\par  \par  Color: x / Appearance: x / SG: x / pH: x\par  Gluc: 282 mg/dL / Ketone: x  / Bili: x / Urobili: x \par  Blood: x / Protein: x / Nitrite: x \par  Leuk Esterase: x / RBC: x / WBC x \par  Sq Epi: x / Non Sq Epi: x / Bacteria: x\par  \par  \par  Lactate, Blood: 1.9 mmol/L (10-19 @ 13:17)\par  \par  \par  \par  RECENT CULTURES:\par  10-22 @ 06:05 .Blood BLOOD \par  \par  \par  Growth in aerobic bottle: Gram Negative Rods\par  \par  \par   \par  \par  Growth in aerobic bottle: Gram Negative Rods\par  \par  10-21 @ 20:05 .Blood BLOOD \par  \par  \par  \ql\plain\f0\fs24\plain\f0\fs20\wnad2466\hich\f0\dbch\f0\loch\f0\fs20 rad\par  IR was consulted for vertebral augmentation of T3-T5 prior to XRT. \par  Patient was seen bedside. Initially, patient kept requesting no exam and was not willing to participate in the discussion. Son was bedside at time of conversation. 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 \par  Please reach out to IR when patient is medically stable for vertebral augmentation.\plain\f1\fs20\dfcq4795\hich\f1\dbch\f1\loch\f1\cf2\fs20\strike\pard\plain\f0\fs24\plain\f1\fs20\khnw0741\hich\f1\dbch\f1\loch\f1\cf2\fs20\strike\plain\f1\fs20\naoi8630\hich\f1\dbch\f1\loch\f1\cf2\fs20\plain\f0\fs20\pkqf2532\hich\f0\dbch\f0\loch\f0\fs20\par  \par   \par  \par  No growth at 24 hours\par  \par  10-21 @ 17:33 .Blood BLOOD \par  \par  \par  \par  \par   \par  \par  No growth at 24 hours\par  \par  10-19 @ 20:05 .Blood BLOOD \par  \par  \par  Growth in aerobic bottle: Gram Negative Rods\par  Growth in anaerobic bottle: Gram Negative Rods\par  \par  \par   \par  \par  Growth in aerobic and anaerobic bottles: Escherichia coli\par  See previous culture [id number]\par  \par  10-19 @ 19:50 .Blood BLOOD \par  \par  \par  Growth in anaerobic bottle: Gram Negative Rods\par  Growth in aerobic bottle: Gram Negative Rods\par  \par  \par   \par  \par  Growth in aerobic and anaerobic bottles: Escherichia coli\par  See previous culture [id number]\par  \par  10-19 @ 05:26 .Blood BLOOD \par  \par  \par  Growth in aerobic and anaerobic bottles: Gram Negative Rods and Gram\par  Negative Coccobacilli\par  \par  \par   \par  \par  Growth in aerobic and anaerobic bottles: Escherichia coli\par  See previous culture [id number]\par  \par  10-19 @ 04:55 .Blood BLOOD \par  PCR\par  \par  Growth in aerobic and anaerobic bottles: Gram Negative Rods\par  \par  Blood Culture PCR\par  Escherichia coli\par  Blood Culture PCR \par  \par  Growth in aerobic and anaerobic bottles: Escherichia coli\par  Direct identification is available within approximately 3-5\par  hours either by Blood Panel Multiplexed PCR or Direct\par  MALDI-TOF. Details: [email or web address]\par  \par  \par  \par  \par  RESPIRATORY CULTURES:\par  \par  \par  Clostridium difficile GDH Toxins A&amp;B, EIA: \par  Negative (10-18 @ 23:53)\par  \par  \par  Studies\par  Chest X-RAY\par  CT SCAN Chest \par  Venous Dopplers: LE: \par  CT Abdomen\par  Others\par  \par  \par  \par  \par  \par  \par  \ql\plain\f0\fs24\plain\f0\fs20\[id number]\hich\f0\dbch\f0\loch\f0\fs20\par  }

## 2024-10-23 NOTE — CONSULT NOTE ADULT - SUBJECTIVE AND OBJECTIVE BOX
[facility] [place]   MD MARKO EASTMAN MD ANGELA WONG, PA        TEL:  OFFICE: [phone]  From 5pm-7am answering service [phone]    --- INITIAL RENAL CONSULT NOTE ---date of service 10-23-24 @ 12:09    HPI:  history from chart and family at bedside. pt asleep, family request not to wake him  64-year-old male with metastatic prostate cancer, sent in by hematologist from [facility] for uncontrolled pain, nausea, vomiting.   Patient reports diffuse pain starting 6 months ago significantly worsening for the past 2 weeks.  Currently the worst pain is located around the lower back.   No loss of bladder and bowel function, no saddle paresthesia.  Able to walk.  patient is oxycodone 5 every 4-6 hour.  Yesterday they started adding OxyContin 10.  However patient continued to have pain.  Family also reports significant nausea and vomiting, inability to tolerate p.o. for the last 2 days.  Last bowel movement 2 days ago.   No known allergy.    Diagnosed with high risk high volume metastatic prostate cancer with bone, liver lymph node mets and presacral mass. Liver biopsy confirmed disease with . Started lupron, loly/pred with plans to initiate taxotere.   nephrology consulted for hypernatremia    Allergies:  No Known Allergies      PAST MEDICAL & SURGICAL HISTORY:  Benign essential HTN      HLD (hyperlipidemia)      CA of prostate      Basal cell carcinoma      History of transurethral prostatectomy      S/P inguinal hernia repair      History of basal cell carcinoma (BCC) excision          Home Medications Reviewed    Hospital Medications:   MEDICATIONS  (STANDING):  abiraterone 500 mg tablet 2 Tablet(s) 2 Tablet(s) Oral daily  atorvastatin 20 milliGRAM(s) Oral at bedtime  chlorhexidine 2% Cloths 1 Application(s) Topical daily  dextrose 5%. 1000 milliLiter(s) (130 mL/Hr) IV Continuous <Continuous>  dextrose 5%. 1000 milliLiter(s) (100 mL/Hr) IV Continuous <Continuous>  dextrose 5%. 1000 milliLiter(s) (50 mL/Hr) IV Continuous <Continuous>  dextrose 50% Injectable 25 Gram(s) IV Push once  dextrose 50% Injectable 25 Gram(s) IV Push once  dextrose 50% Injectable 12.5 Gram(s) IV Push once  fenofibrate Tablet 145 milliGRAM(s) Oral daily  glucagon  Injectable 1 milliGRAM(s) IntraMuscular once  influenza  Vaccine (HIGH DOSE) 0.5 milliLiter(s) IntraMuscular once  insulin lispro (ADMELOG) corrective regimen sliding scale   SubCutaneous every 6 hours  lactobacillus acidophilus 1 Tablet(s) Oral daily  lidocaine   4% Patch 1 Patch Transdermal every 24 hours  morphine  Infusion. 1 mG/Hr (1 mL/Hr) IV Continuous <Continuous>  ondansetron Injectable 4 milliGRAM(s) IV Push every 8 hours  pantoprazole  Injectable 40 milliGRAM(s) IV Push daily  piperacillin/tazobactam IVPB.. 3.375 Gram(s) IV Intermittent every 8 hours  sodium chloride 0.225%. 1000 milliLiter(s) (75 mL/Hr) IV Continuous <Continuous>      SOCIAL HISTORY:  Denies ETOh, Smoking,     FAMILY HISTORY:  FH: HTN (hypertension) (Sibling)        REVIEW OF SYSTEMS:  unable to assess     VITALS:  T(F): 98.7 (10-23-24 @ 09:01), Max: 98.9 (10-22-24 @ 15:11)  HR: 91 (10-23-24 @ 09:01)  BP: 108/72 (10-23-24 @ 09:01)  RR: 18 (10-23-24 @ 09:01)  SpO2: 93% (10-23-24 @ 09:01)  Wt(kg): --    10-22 @ 07:01  -  10-23 @ 07:00  --------------------------------------------------------  IN: 0 mL / OUT: 2020 mL / NET: -2020 mL    10-23 @ 07:01  -  10-23 @ 12:09  --------------------------------------------------------  IN: 250 mL / OUT: 0 mL / NET: 250 mL          PHYSICAL EXAM:  General: asleep  Neck: No JVD  Respiratory: CTAB, no wheezes, rales or rhonchi  Cardiovascular: S1, S2, RRR  Gastrointestinal: BS+, soft, NT/ND  Extremities: No cyanosis or clubbing. No peripheral edema  Neurological: unable to assess  Psychiatric: unable to assess  : + lau.   Skin: No rashes  Vascular Access: none    LABS:  10-23    154[H]  |  122[H]  |  45[H]  ----------------------------<  282[H]  4.7   |  20[L]  |  0.71    Ca    6.9[L]      23 Oct 2024 06:00  Phos  1.9     10-23  Mg     2.90     10-23      Creatinine Trend: 0.71 <--, 0.81 <--, 0.94 <--, 0.93 <--, 0.94 <--, 0.85 <--, 0.96 <--, 0.97 <--, 1.04 <--, 1.15 <--, 0.76 <--, 0.74 <--, 0.75 <--                        8.6    18.56 )-----------( 52       ( 23 Oct 2024 06:00 )             25.9     Urine Studies:  Urinalysis Basic - ( 23 Oct 2024 06:00 )    Color:  / Appearance:  / SG:  / pH:   Gluc: 282 mg/dL / Ketone:   / Bili:  / Urobili:    Blood:  / Protein:  / Nitrite:    Leuk Esterase:  / RBC:  / WBC    Sq Epi:  / Non Sq Epi:  / Bacteria:           RADIOLOGY & ADDITIONAL STUDIES:

## 2024-10-23 NOTE — BH CONSULTATION LIAISON PROGRESS NOTE - NSBHATTESTTYPEVISIT_PSY_A_CORE
Resident/Fellow with telephonic supervision
Attending with Resident/Fellow/Student
Resident/Fellow with telephonic supervision

## 2024-10-23 NOTE — PROGRESS NOTE ADULT - PROBLEM SELECTOR PLAN 4
MRI Lumbar Spine: (10/15) Diffuse osseous metastases. L1 and L5 pathologic fractures are associated with moderate epidural disease at L1, more mild at L5. At least mild epidural disease at the left S1-S2 neural foramen. No significant lumbar degenerative disc disease.  - MRI from 10/15 shows extensive metastasis in cervical, thoracic, lumbar, skull base and calvarium. Also small cortical subacute infarctions.  - Continue Morphine gtt @1mg/hr, IV morphine 4mg PRNs (has not required PRNs in 24 hours)   - IR evaluation for kyphoplasty on hold in setting of c/f SMA dissection and bowel ischemia   - s/p decadron 4mg IV bid x3 days   - Narcan PRN  - multimodal pain control: lidocaine patch, warm/cold packs   - recommend changing bowel regimen to PRN

## 2024-10-23 NOTE — BH CONSULTATION LIAISON PROGRESS NOTE - NSBHASSESSMENTFT_PSY_ALL_CORE
Mr Alyssa is a 63 yo M w history of pancreatic cancer with mets who presents on recommendation by hematologist due to uncontrolled pain, nausea, and vomiting that has significantly worsened over the past 2 weeks. Pt diagnosed with high risk, high volume metastatic prostate cancer with bone, liver, lymph mets and a presacral mass. Pt was started on chemotherapy; pending further work up for evaluation for radiation therapy. Pt was seen by palliative care team for pain management and goals of care discussions. Team recommended psychiatric evaluation for anxiety as pt has been having difficulty adjusting to progression of cancer.    10/17: Pt difficulty accepting and adjusting to cancer progression and treatment. Reviewed anxiety and management goals regarding anxiety. Reviewed medications used to treat anxiety. Antidepressants not recommended due to low platelets and requiring long time before effect; BZDs not recommended due to concurrent use of pain medications increasing risk for oversedation, respiratory depression. Similarly, hydroxyzine not recommended due to worsening confusion. Recommended Zyprexa for treatment of anxiety with added benefit for potential assistance with nausea, sleep, and appetite. Reviewed risks, benefits, side effects of medication. Reviewed black box warning of antipsychotic medications in elderly. Family hesitant to start additional medication. Would like to talk to palliative care team regarding starting additional med. Answered questions and concerns. Monitor for delirium.  10/18: Spoke with family; now in agreement to start prn Zyprexa.   10/22: Pt alert, worsening hypernatremia. Confused/delirious. Discussed with family recommendations of either increasing prn Zyprexa or adding standing Zyprexa given worsening agitation with goal of keeping pt safe due to worsening mentation. Family would like to discuss recommendations amongst themselves before making a decision as spouse was wanting to stop Zyprexa.  10/23: Zyprexa stopped per family request. Coordinated care with family, educated that will recommend medications only if pt presenting safety risk and they expressed understanding. Rec Haldol for agitation.    Recs:  - Consider Haldol 2.5 mg po/IM for acute agitation, may give additional 2.5 mg 30 min later if not responding  	- hold med if QTC > 500    - Obs: defer to primary team  - Dispo: TBD as per medical team, no indication for inpt psychiatry Mr Alyssa is a 63 yo M w history of pancreatic cancer with mets who presents on recommendation by hematologist due to uncontrolled pain, nausea, and vomiting that has significantly worsened over the past 2 weeks. Pt diagnosed with high risk, high volume metastatic prostate cancer with bone, liver, lymph mets and a presacral mass. Pt was started on chemotherapy; pending further work up for evaluation for radiation therapy. Pt was seen by palliative care team for pain management and goals of care discussions. Team recommended psychiatric evaluation for anxiety as pt has been having difficulty adjusting to progression of cancer.    10/17: Pt difficulty accepting and adjusting to cancer progression and treatment. Reviewed anxiety and management goals regarding anxiety. Reviewed medications used to treat anxiety. Antidepressants not recommended due to low platelets and requiring long time before effect; BZDs not recommended due to concurrent use of pain medications increasing risk for oversedation, respiratory depression. Similarly, hydroxyzine not recommended due to worsening confusion. Recommended Zyprexa for treatment of anxiety with added benefit for potential assistance with nausea, sleep, and appetite. Reviewed risks, benefits, side effects of medication. Reviewed black box warning of antipsychotic medications in elderly. Family hesitant to start additional medication. Would like to talk to palliative care team regarding starting additional med. Answered questions and concerns. Monitor for delirium.  10/18: Spoke with family; now in agreement to start prn Zyprexa.   10/22: Pt alert, worsening hypernatremia. Confused/delirious. Discussed with family recommendations of either increasing prn Zyprexa or adding standing Zyprexa given worsening agitation with goal of keeping pt safe due to worsening mentation. Family would like to discuss recommendations amongst themselves before making a decision as spouse was wanting to stop Zyprexa.  10/23: Zyprexa stopped per family request. Coordinated care with family, educated that will recommend PRN medications only if pt presenting safety risk and they expressed understanding. Rec PRN Haldol for agitation.    Recs:  - Consider PRN Haldol 2.5 mg PO/IM ONLY for acute/severe agitation, may give additional 2.5 mg 30 min later if not responding   	- hold med if QTC > 500    - Obs: defer to primary team  - Dispo: TBD as per medical team, no indication for inpt psychiatry

## 2024-10-23 NOTE — PROGRESS NOTE ADULT - SUBJECTIVE AND OBJECTIVE BOX
Infectious Diseases Follow Up:    Patient is a 64y old  Male who presents with a chief complaint of Pain (23 Oct 2024 12:09)      Interval History/ROS:  Afebrile On, still w/ persistent bacteremia     Allergies  No Known Allergies        ANTIMICROBIALS:  piperacillin/tazobactam IVPB.. 3.375 every 8 hours      Current Abx:     Previous Abx     OTHER MEDS:  MEDICATIONS  (STANDING):  acetaminophen     Tablet .. 650 every 6 hours PRN  aluminum hydroxide/magnesium hydroxide/simethicone Suspension 30 every 4 hours PRN  atorvastatin 20 at bedtime  dextrose 50% Injectable 25 once  dextrose 50% Injectable 25 once  dextrose 50% Injectable 12.5 once  dextrose Oral Gel 15 once PRN  fenofibrate Tablet 145 daily  glucagon  Injectable 1 once  influenza  Vaccine (HIGH DOSE) 0.5 once  insulin lispro (ADMELOG) corrective regimen sliding scale  every 6 hours  morphine  - Injectable 4 every 3 hours PRN  morphine  Infusion. 1 <Continuous>  ondansetron Injectable 4 every 8 hours  pantoprazole  Injectable 40 daily  polyethylene glycol 3350 17 daily PRN      Vital Signs Last 24 Hrs  T(C): 36.9 (23 Oct 2024 12:18), Max: 37.2 (22 Oct 2024 15:11)  T(F): 98.4 (23 Oct 2024 12:18), Max: 98.9 (22 Oct 2024 15:11)  HR: 86 (23 Oct 2024 12:18) (86 - 113)  BP: 113/76 (23 Oct 2024 12:18) (102/69 - 142/76)  BP(mean): --  RR: 18 (23 Oct 2024 12:18) (17 - 20)  SpO2: 95% (23 Oct 2024 12:18) (93% - 96%)    Parameters below as of 23 Oct 2024 09:01  Patient On (Oxygen Delivery Method): room air        PHYSICAL EXAM:  GENERAL: NAD, well-developed  HEAD:  Atraumatic, Normocephalic  EYES: EOMI, PERRLA, conjunctiva and sclera clear  NECK: Supple, No JVD  CHEST/LUNG: Clear to auscultation bilaterally; No wheeze  HEART: Regular rate and rhythm; No murmurs, rubs, or gallops  ABDOMEN: Soft, Nontender, Nondistended; Bowel sounds present  EXTREMITIES:  2+ Peripheral Pulses, No clubbing, cyanosis, or edema  PSYCH: AAOx3  NEUROLOGY: non-focal  SKIN: No rashes or lesions                          8.6    18.56 )-----------( 52       ( 23 Oct 2024 06:00 )             25.9       10-23    154[H]  |  122[H]  |  45[H]  ----------------------------<  282[H]  4.7   |  20[L]  |  0.71    Ca    6.9[L]      23 Oct 2024 06:00  Phos  1.9     10-23  Mg     2.90     10-23        Urinalysis Basic - ( 23 Oct 2024 06:00 )    Color: x / Appearance: x / SG: x / pH: x  Gluc: 282 mg/dL / Ketone: x  / Bili: x / Urobili: x   Blood: x / Protein: x / Nitrite: x   Leuk Esterase: x / RBC: x / WBC x   Sq Epi: x / Non Sq Epi: x / Bacteria: x        MICROBIOLOGY:  v  .Blood BLOOD  10-22-24   Growth in aerobic bottle: Gram Negative Rods  --    Growth in aerobic bottle: Gram Negative Rods      .Blood BLOOD  10-21-24   No growth at 24 hours  --  --      .Blood BLOOD  10-21-24   No growth at 24 hours  --  --      .Blood BLOOD  10-19-24   Growth in aerobic and anaerobic bottles: Escherichia coli  See previous culture [id number]  --    Growth in aerobic bottle: Gram Negative Rods  Growth in anaerobic bottle: Gram Negative Rods      .Blood BLOOD  10-19-24   Growth in aerobic and anaerobic bottles: Escherichia coli  See previous culture [id number]  --    Growth in anaerobic bottle: Gram Negative Rods  Growth in aerobic bottle: Gram Negative Rods      .Blood BLOOD  10-19-24   Growth in aerobic and anaerobic bottles: Escherichia coli  See previous culture [id number]  --    Growth in aerobic and anaerobic bottles: Gram Negative Rods and Gram  Negative Coccobacilli      .Blood BLOOD  10-19-24   Growth in aerobic and anaerobic bottles: Escherichia coli  Direct identification is available within approximately 3-5  hours either by Blood Panel Multiplexed PCR or Direct  MALDI-TOF. Details: [email or web address]  --  Blood Culture PCR  Escherichia coli            Clostridium difficile GDH Toxins A&amp;B, EIA:   Negative (10-18-24 @ 23:53)  Clostridium difficile GDH Interpretation: Negative for toxigenic C. Difficile.  This specimen is negative for C.  Difficile glutamate dehydrogenase (GDH) antigen and negative for C.  Difficile Toxins A & B, by EIA.  GDH is a highly sensitive screening  marker for C. Difficile that is produced in large amounts by all C.  Difficile strains, both toxigenic and nontoxigenic.  This assay has not  been validated as a test of cure.  Repeat testing during the same episode  of diarrhea is of limited value and is discouraged.  The results of this  assay should always be interpreted in conjunction with patient's clinical  history. (10-18-24 @ 23:53)      RADIOLOGY:       Infectious Diseases Follow Up:    Patient is a 64y old  Male who presents with a chief complaint of Pain (23 Oct 2024 12:09)      Interval History/ROS:  Afebrile On, still w/ persistent bacteremia.  Pt asking for water, wife at bedside     Allergies  No Known Allergies        ANTIMICROBIALS:  piperacillin/tazobactam IVPB.. 3.375 every 8 hours      Current Abx:     Previous Abx     OTHER MEDS:  MEDICATIONS  (STANDING):  acetaminophen     Tablet .. 650 every 6 hours PRN  aluminum hydroxide/magnesium hydroxide/simethicone Suspension 30 every 4 hours PRN  atorvastatin 20 at bedtime  dextrose 50% Injectable 25 once  dextrose 50% Injectable 25 once  dextrose 50% Injectable 12.5 once  dextrose Oral Gel 15 once PRN  fenofibrate Tablet 145 daily  glucagon  Injectable 1 once  influenza  Vaccine (HIGH DOSE) 0.5 once  insulin lispro (ADMELOG) corrective regimen sliding scale  every 6 hours  morphine  - Injectable 4 every 3 hours PRN  morphine  Infusion. 1 <Continuous>  ondansetron Injectable 4 every 8 hours  pantoprazole  Injectable 40 daily  polyethylene glycol 3350 17 daily PRN      Vital Signs Last 24 Hrs  T(C): 36.9 (23 Oct 2024 12:18), Max: 37.2 (22 Oct 2024 15:11)  T(F): 98.4 (23 Oct 2024 12:18), Max: 98.9 (22 Oct 2024 15:11)  HR: 86 (23 Oct 2024 12:18) (86 - 113)  BP: 113/76 (23 Oct 2024 12:18) (102/69 - 142/76)  BP(mean): --  RR: 18 (23 Oct 2024 12:18) (17 - 20)  SpO2: 95% (23 Oct 2024 12:18) (93% - 96%)    Parameters below as of 23 Oct 2024 09:01  Patient On (Oxygen Delivery Method): room air          PHYSICAL EXAM:  GENERAL: NAD, well-developed  HEAD:  Atraumatic, Normocephalic  EYES: EOMI, conjunctiva and sclera clear  CHEST/LUNG: Clear to auscultation bilaterally; No wheeze  HEART: Regular rate and rhythm; No murmurs, rubs, or gallops  ABDOMEN: Soft, Nontender, Nondistended; Bowel sounds present  PSYCH: More calm today                         8.6    18.56 )-----------( 52       ( 23 Oct 2024 06:00 )             25.9       10-23    154[H]  |  122[H]  |  45[H]  ----------------------------<  282[H]  4.7   |  20[L]  |  0.71    Ca    6.9[L]      23 Oct 2024 06:00  Phos  1.9     10-23  Mg     2.90     10-23        Urinalysis Basic - ( 23 Oct 2024 06:00 )    Color: x / Appearance: x / SG: x / pH: x  Gluc: 282 mg/dL / Ketone: x  / Bili: x / Urobili: x   Blood: x / Protein: x / Nitrite: x   Leuk Esterase: x / RBC: x / WBC x   Sq Epi: x / Non Sq Epi: x / Bacteria: x        MICROBIOLOGY:  v  .Blood BLOOD  10-22-24   Growth in aerobic bottle: Gram Negative Rods  --    Growth in aerobic bottle: Gram Negative Rods      .Blood BLOOD  10-21-24   No growth at 24 hours  --  --      .Blood BLOOD  10-21-24   No growth at 24 hours  --  --      .Blood BLOOD  10-19-24   Growth in aerobic and anaerobic bottles: Escherichia coli  See previous culture [id number]  --    Growth in aerobic bottle: Gram Negative Rods  Growth in anaerobic bottle: Gram Negative Rods      .Blood BLOOD  10-19-24   Growth in aerobic and anaerobic bottles: Escherichia coli  See previous culture [id number]  --    Growth in anaerobic bottle: Gram Negative Rods  Growth in aerobic bottle: Gram Negative Rods      .Blood BLOOD  10-19-24   Growth in aerobic and anaerobic bottles: Escherichia coli  See previous culture [id number]  --    Growth in aerobic and anaerobic bottles: Gram Negative Rods and Gram  Negative Coccobacilli      .Blood BLOOD  10-19-24   Growth in aerobic and anaerobic bottles: Escherichia coli  Direct identification is available within approximately 3-5  hours either by Blood Panel Multiplexed PCR or Direct  MALDI-TOF. Details: [email or web address]  --  Blood Culture PCR  Escherichia coli            Clostridium difficile GDH Toxins A&amp;B, EIA:   Negative (10-18-24 @ 23:53)  Clostridium difficile GDH Interpretation: Negative for toxigenic C. Difficile.  This specimen is negative for C.  Difficile glutamate dehydrogenase (GDH) antigen and negative for C.  Difficile Toxins A & B, by EIA.  GDH is a highly sensitive screening  marker for C. Difficile that is produced in large amounts by all C.  Difficile strains, both toxigenic and nontoxigenic.  This assay has not  been validated as a test of cure.  Repeat testing during the same episode  of diarrhea is of limited value and is discouraged.  The results of this  assay should always be interpreted in conjunction with patient's clinical  history. (10-18-24 @ 23:53)      RADIOLOGY:

## 2024-10-23 NOTE — PROGRESS NOTE ADULT - NS ATTEND AMEND GEN_ALL_CORE FT
Na improving  PLT better after transfusion  Continues on antibiotics   Hemodynamically stable   Hold off on further chemo for now until acute issues resolved

## 2024-10-23 NOTE — PROGRESS NOTE ADULT - SUBJECTIVE AND OBJECTIVE BOX
asleep, counts improving, wife at bedside, has remained afebrile overnight    MEDICATIONS  (STANDING):  abiraterone 500 mg tablet 2 Tablet(s) 2 Tablet(s) Oral daily  atorvastatin 20 milliGRAM(s) Oral at bedtime  chlorhexidine 2% Cloths 1 Application(s) Topical daily  dextrose 5%. 1000 milliLiter(s) (50 mL/Hr) IV Continuous <Continuous>  dextrose 5%. 1000 milliLiter(s) (100 mL/Hr) IV Continuous <Continuous>  dextrose 5%. 1000 milliLiter(s) (130 mL/Hr) IV Continuous <Continuous>  dextrose 50% Injectable 25 Gram(s) IV Push once  dextrose 50% Injectable 12.5 Gram(s) IV Push once  dextrose 50% Injectable 25 Gram(s) IV Push once  fenofibrate Tablet 145 milliGRAM(s) Oral daily  glucagon  Injectable 1 milliGRAM(s) IntraMuscular once  influenza  Vaccine (HIGH DOSE) 0.5 milliLiter(s) IntraMuscular once  insulin lispro (ADMELOG) corrective regimen sliding scale   SubCutaneous every 6 hours  lactobacillus acidophilus 1 Tablet(s) Oral daily  lidocaine   4% Patch 1 Patch Transdermal every 24 hours  morphine  Infusion. 1 mG/Hr (1 mL/Hr) IV Continuous <Continuous>  ondansetron Injectable 4 milliGRAM(s) IV Push every 8 hours  pantoprazole  Injectable 40 milliGRAM(s) IV Push daily  piperacillin/tazobactam IVPB.. 3.375 Gram(s) IV Intermittent every 8 hours  sodium chloride 0.225%. 1000 milliLiter(s) (75 mL/Hr) IV Continuous <Continuous>    MEDICATIONS  (PRN):  acetaminophen     Tablet .. 650 milliGRAM(s) Oral every 6 hours PRN Temp greater or equal to 38C (100.4F), Mild Pain (1 - 3), Moderate Pain (4 - 6)  aluminum hydroxide/magnesium hydroxide/simethicone Suspension 30 milliLiter(s) Oral every 4 hours PRN Dyspepsia  artificial tears (preservative free) Ophthalmic Solution 1 Drop(s) Both EYES four times a day PRN Dry Eyes  dextrose Oral Gel 15 Gram(s) Oral once PRN Blood Glucose LESS THAN 70 milliGRAM(s)/deciliter  morphine  - Injectable 4 milliGRAM(s) IV Push every 3 hours PRN Severe Pain (7 - 10)  naloxone Injectable 0.1 milliGRAM(s) IV Push every 3 minutes PRN For ANY of the following changes in patient status:  A. RR LESS THAN 10 breaths per minute, B. Oxygen saturation LESS THAN 90%, C. Sedation score of 6  polyethylene glycol 3350 17 Gram(s) Oral daily PRN Constipation        Vital Signs Last 24 Hrs  T(C): 36.9 (23 Oct 2024 12:18), Max: 37.2 (22 Oct 2024 15:11)  T(F): 98.4 (23 Oct 2024 12:18), Max: 98.9 (22 Oct 2024 15:11)  HR: 86 (23 Oct 2024 12:18) (86 - 113)  BP: 113/76 (23 Oct 2024 12:18) (102/69 - 142/76)  BP(mean): --  RR: 18 (23 Oct 2024 12:18) (17 - 20)  SpO2: 95% (23 Oct 2024 12:18) (93% - 96%)    Parameters below as of 23 Oct 2024 09:01  Patient On (Oxygen Delivery Method): room air        PE  NAD  asleep  Anicteric, MMM  RRR  CTAB  Abd soft, NT, ND  No c/c/e  No rash grossly                            8.6    18.56 )-----------( 52       ( 23 Oct 2024 06:00 )             25.9       10-23    154[H]  |  122[H]  |  45[H]  ----------------------------<  282[H]  4.7   |  20[L]  |  0.71    Ca    6.9[L]      23 Oct 2024 06:00  Phos  1.9     10-23  Mg     2.90     10-23

## 2024-10-23 NOTE — PROGRESS NOTE ADULT - ASSESSMENT
64-year-old male with metastatic prostate cancer, sent in by hematologist from [facility] for uncontrolled pain, nausea, vomiting.   Patient reports diffuse pain starting 6 months ago significantly worsening for the past 2 weeks.  Currently the worst pain is located around the lower back.   No loss of bladder and bowel function, no saddle paresthesia.  Able to walk.  patient is oxycodone 5 every 4-6 hour.  Yesterday they started adding OxyContin 10.  However patient continued to have pain.  Family also reports significant nausea and vomiting, inability to tolerate p.o. for the last 2 days.  Last bowel movement 2 days ago.   No known allergy.  Diagnosed with high risk high volume metastatic prostate cancer with bone, liver lymph node mets and presacral mass. Liver biopsy confirmed disease with . Started lupron, loly/pred with plans to initiate taxotere.    (12 Oct 2024 15:40)  pt seems very frustrated:   he is on 2 L of oxygen : he has no underlying lung disease:  but he is requiring 2 L of oxygen      Hypoxic resp failure  Metastatic prostate cancer  Back pain     Hypoxic resp failure  -He has no underlying lung disease:  but he is requiring 2 L of oxygen :   -CTA showed; No pulmonary embolism to the level of the segmental branches bilaterally. Limited evaluation of the subsegmental branches secondary to incomplete contrast opacification.  Multilevel vertebral body lytic lesions and loss of vertebral body height, better evaluated on CT thoracic spine 10/17/2024. Metastatic prostate cancer  -Patent central airways. Bibasilar atelectasis. No pleural effusion. MEDIASTINUM AND KATTY: No lymphadenopathy.  PULMONARY ANGIOGRAM: No pulmonary embolism to the level of the segmental   branches bilaterally. Limited evaluation of the subsegmental branches secondary to incomplete contrast opacification.    10/19:  -seems pretty tired;  on 2 L of oxygen :  -cta chest showed: No pulmonary embolism to the level of the segmental branches bilaterally. Limited evaluation of the subsegmental branches secondary to incomplete   contrast opacification. Multilevel vertebral body lytic lesions and loss of vertebral body height, better evaluated on CT thoracic spine 10/17/2024.  -still with fever:  no pe  on zosyn  and leukopenic hemonc following;  has metastatic prostate ca:   -VBG seems OK:     10/20:  pulm wise he seems to be stable:   -using 2 L of oxygen    -yesterdays VBG with minimal met acidosis  -cta again noted    10/21:  on 4 L of oxygen  today    cxr is size    e coli sepsis: Gram Negative Rods and Gram Negative Coccobacilli    10/22: heis doing poorly today  : he is very agitated and uncomfortable  on mittens: ? sun downing   10/23: quiet today  : sleeping:  oxygen requirement has not increased: on rooo ha 95% as documented    WBC is slowly increasing:   no fever:   -on antibiotics     New finding:  SMA dissection : neutropenic colitis/ #E Coli and H influenzae bacteremia/ #Neutropenic fever  -/f ischemic colitis on R colon   Diagnosed  on ct abd:  defer to surgery and primary team:  probably no intervention:   -cont antibiotics  -not doing very well with above new findings    10/22; no intervention per surgery    -cont antibiotics  -has fever:  ID following :  -Last chest xray on 20th  ; normal     id following   10/23  IR was consulted for vertebral augmentation of T3-T5 prior to XRT.   Patient was seen bedside. Initially, patient kept requesting no exam and was not willing to participate in the discussion. Son was bedside at time of conversation.   Per discussion with the medical attending, given the concern for SMA dissection and concern for bowel ischemia, will hold off on vertebral augmentation evaluation at this time.   Please reach out to IR when patient is medically stable for vertebral augmentation.  Back pain   -likely due to metastatic disease:  adm here for sever pain :  pain control per primary team   -venous ph is ok     dw acp & family

## 2024-10-23 NOTE — CONSULT NOTE ADULT - ATTENDING COMMENTS
pt requesting removal of restraints    nad with soft restrains/mittens meatal stenosis  alcantara yellow urine after cath placement.    dilate at bedside and 14 fr cath placed with careful manipulation to further dilate path.  > 500 cc drained  on placement.      Metastatic CaP recurrence tamsulosin if there are no contraindications  and minimize meds with anticholinergic, anti histaminergic benzodiazepine and opoid properties as is tolerated.    F/u 10-14 days for cysto void trial  at [facility] or with long term urologist
Consult for colonic distension in the setting of pancytopenia and watery diarrhea  Patient's medical history detailed above  On exam, he does not open his eye, not interactive  Tachycardic. Abd soft, distended, diffusely tender  CT A/P reviewed - no large bowel obstruction, pancolitis  Worsening leukopenia and thrombocytopenia   C diff results pending, but likely C diff colitis   Continue IV fluids and keep NPO for now   If C diff positive, treat with vancomycin PO and enema and consult ID if he is a candidate for bezlotuxumab   I discussed all of the above with his son and encouraged him to discuss goals of care with his family. I explained a surgery in his situation if he worsens would entail a total abdominal colectomy and end ileostomy. He is a high risk surgical candidate and surgery will not improve his overall prognosis and may offer minimal benefit in the short term given his cancer
Seen and examined, agree with above with following notes. In brief, 64M with widely metastatic prostate cancer on chemotherapy p/w sudden onset abdominal pain (though this is less severe than back pain) and voluminous watery diarrhea over last 2 days. CT scan initially with pancolitis, then read amended to above (SMA dissection with right-sided ischemic colitis; also noted with colitis involving ascending colon through splenic flexure as well as hyperdense material within the cecum concerning for possible GI bleed). Profoundly neutropenic with ANC 90 likely due to chemotherapeutic agents. Thrombocytopenic to 30s. Hgb stable. No overt bleeding. Abd mildly ttp but soft. Febrile to 102 this am, tachy 110s-130s, SBPs 90s. C diff negative.    #Neutropenic enterocolitis  #Right sided ischemic colitis  #SMA dissection    - no utility to luminal evaluation and at very high risk of perforation and bleeding  - management of neutropenic enterocolitis is supportive care with IV hydration, bowel rest, broad spectrum abx  - patient already receiving filgrastim  - appreciate surgical input  - can check GI PCR (though already receiving broad spectrum abx)
pt is a   63 yo male with hx metastatic prostate ca who was admitted for  watery diarrhea  , on chemo who  presents with abd pain, on ct scan  noted to have possible ischemic colitis vs sma dissection.  bp 101/56 rr 18  PE as above.    labs and ct scan reviewed   wbc 0.5 hgb 11 hct 31     bicarb 18 cr 1.15      -A/AP 63 yo male with metastatic prostate ca now with bone marrow  dysfunction post chemo with thrombocytopenia plts 35  -suggest hold bp meds,   -ivf maintenance and follow urine output  -heme/onc follow up regarding plts  -check vbg and lactate   -monitor bp closely for shock,   -pt does not require icu level care  -please reconsult if condition changes.
Hypernatremia    Spoke with family and nurse, patient started on D5W  Will need to monitor labs and Is/Os  Cont free water
This is a 63 y/o M w/ prostate CA s/p radical prostatectomy now metastatic, HTN, HLD, admitted to 10/12 for pain, N/V in setting of osseous mets. S/p docetaxel on 10/13, abiraterone 10/12. Given neutropenia started on Zarxio.  C/b hypoxia, tachycardia. CTA w/o PE. Pt w/ 10 episodes of diarrhea on 10/18. C diff negative,   Pt now febrile to 102, CT A/P with pancolitis. 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   Fever likely 2/2 to neutropenic enterocolitis, pt w/ ANC 90, at high risk of bacteremia.     Recommendations:   1. C/w Zosyn 3.375 g q8  2. BCx x 2   3. GI PCR   4. Appreciate surgery/GI recs      Poor prognosis overall, recommend [facility]    Thank you for this consult. Inpatient ID team will follow.    Orlando Ruiz M.D.  Attending Physician  Division of Infectious Diseases  Department of Medicine    Please contact through [employer].  Office: [phone] (after 5 PM or weekend)
hyperglycemia due to dextrose IVF  please change IVF to 1/2 NS or 1/4 NS   once dextrose is removed monitor FSBG off dextrose  low dose ISS for now   suspect glucose to improve once dextrose is removed
63 y/o M with a history of localized prostate cancer treated with radical prostatectomy (did not require ADT or RT, no LN involvement), now recently found to have recurrence with high volume metastatic disease. Here with intractable pain and fracture resulting from diffuse osseous involvement in the spine. Palliative consulted for symptom management in setting of advanced malignancy.     Patient seen with family at bedside and Palliative Fellow, Dr. Buenrostro.   > Pain: oxycontin 30mg bid (adjusted by primary medical team 10/14). Start IV dilaudid pca for opioid dose finding. Multimodal pain control.   > bowel regimen while on opioids   > narcan prn   > Appreciate ortho recs- pending MRI   > Consider rad/onc recs as patient reports he was being evaluated by rad/onc with [facility]   > appreciate heme/onc recs- started on dmt on 10/13

## 2024-10-23 NOTE — PROGRESS NOTE ADULT - ASSESSMENT
This is a 65 y/o M w/ prostate CA s/p radical prostatectomy now metastatic, HTN, HLD, admitted to 10/12 for pain, N/V in setting of osseous mets. S/p docetaxel on 10/13, abiraterone 10/12. Given neutropenia started on Zarxio.  C/b hypoxia, tachycardia. CTA w/o PE. Pt w/ 10 episodes of diarrhea on 10/18. C diff negative,   Pt now febrile to 102, CT A/P with pancolitis. 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   Fever likely 2/2 to neutropenic enterocolitis, pt w/ ANC 90, at high risk of bacteremia.   Bcx now w/ E Coli and H influenzae bacteremia, E Coli likely from colitis, however unclear source of H influenza, typically from PNA, septic arthritis, consider meningitis, per family denies neck pain/HA.   Pt w/ pressured speech, paranoid thoughts. Worsening hypernatremia   BCx from 10/22 still w/ E Coli.     Recommendations:   1. C/w Zosyn 3.375 g q8  2. F/u BCx x 2   3. Appreciate surgery/GI recs   4. Consider repeat CT A/P given persistent bacteremia   5. WBC worsening in the setting of Zarxio     Poor prognosis overall, recommend GOC    NOTE IS INCOMPLETE  This is a 65 y/o M w/ prostate CA s/p radical prostatectomy now metastatic, HTN, HLD, admitted to 10/12 for pain, N/V in setting of osseous mets. S/p docetaxel on 10/13, abiraterone 10/12. Given neutropenia started on Zarxio.  C/b hypoxia, tachycardia. CTA w/o PE. Pt w/ 10 episodes of diarrhea on 10/18. C diff negative,   Pt now febrile to 102, CT A/P with pancolitis. 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   Fever likely 2/2 to neutropenic enterocolitis, pt w/ ANC 90, at high risk of bacteremia.   Bcx now w/ E Coli and H influenzae bacteremia, E Coli likely from colitis, however unclear source of H influenza, typically from PNA, septic arthritis, consider meningitis, per family denies neck pain/HA.   Pt w/ pressured speech, paranoid thoughts. Worsening hypernatremia   BCx from 10/22 still w/ E Coli.     Recommendations:   1. C/w Zosyn 3.375 g q8  2. Repeat BCx in the AM x 2 (ordered).   3. Appreciate surgery/GI recs   4. If BCx from 10/24 are positive, would repeat CT A/P w/ IV contrast and obtain TTE   5. WBC worsening in the setting of Zarxio     Poor prognosis overall, recommend [facility]    Thank you for this consult. Inpatient ID team will follow.    Orlando Ruiz M.D.  Attending Physician  Division of Infectious Diseases  Department of Medicine    Please contact through MS Teams message.  Office: [phone] (after 5 PM or weekend)

## 2024-10-23 NOTE — BH CONSULTATION LIAISON PROGRESS NOTE - CURRENT MEDICATION
MEDICATIONS  (STANDING):  abiraterone 500 mg tablet 2 Tablet(s) 2 Tablet(s) Oral daily  atorvastatin 20 milliGRAM(s) Oral at bedtime  chlorhexidine 2% Cloths 1 Application(s) Topical daily  dexAMETHasone  IVPB 8 milliGRAM(s) IV Intermittent two times a day  enoxaparin Injectable 40 milliGRAM(s) SubCutaneous every 24 hours  fenofibrate Tablet 145 milliGRAM(s) Oral daily  filgrastim-sndz (ZARXIO) Injectable 480 MICROGram(s) SubCutaneous daily  lidocaine   4% Patch 1 Patch Transdermal every 24 hours  lisinopril 40 milliGRAM(s) Oral daily  morphine PCA (5 mG/mL) 30 milliLiter(s) PCA Continuous PCA Continuous  ondansetron Injectable 4 milliGRAM(s) IV Push every 8 hours  pantoprazole  Injectable 40 milliGRAM(s) IV Push daily  polyethylene glycol 3350 17 Gram(s) Oral daily  sodium chloride 0.9%. 1000 milliLiter(s) (100 mL/Hr) IV Continuous <Continuous>    MEDICATIONS  (PRN):  acetaminophen     Tablet .. 650 milliGRAM(s) Oral every 6 hours PRN Temp greater or equal to 38C (100.4F), Mild Pain (1 - 3), Moderate Pain (4 - 6)  aluminum hydroxide/magnesium hydroxide/simethicone Suspension 30 milliLiter(s) Oral every 4 hours PRN Dyspepsia  morphine  - Injectable 4 milliGRAM(s) IV Push once PRN severe breakthrough pain while off PCA  morphine PCA (5 mG/mL) Rescue Clinician Bolus 4 milliGRAM(s) IV Push every 15 minutes PRN Breakthrough severe pain  naloxone Injectable 0.1 milliGRAM(s) IV Push every 3 minutes PRN For ANY of the following changes in patient status:  A. RR LESS THAN 10 breaths per minute, B. Oxygen saturation LESS THAN 90%, C. Sedation score of 6  
MEDICATIONS  (STANDING):  abiraterone 500 mg tablet 2 Tablet(s) 2 Tablet(s) Oral daily  atorvastatin 20 milliGRAM(s) Oral at bedtime  chlorhexidine 2% Cloths 1 Application(s) Topical daily  dextrose 5%. 1000 milliLiter(s) (75 mL/Hr) IV Continuous <Continuous>  dextrose 5%. 1000 milliLiter(s) (100 mL/Hr) IV Continuous <Continuous>  dextrose 5%. 1000 milliLiter(s) (50 mL/Hr) IV Continuous <Continuous>  dextrose 5%. 1000 milliLiter(s) (130 mL/Hr) IV Continuous <Continuous>  dextrose 50% Injectable 25 Gram(s) IV Push once  dextrose 50% Injectable 12.5 Gram(s) IV Push once  dextrose 50% Injectable 25 Gram(s) IV Push once  fenofibrate Tablet 145 milliGRAM(s) Oral daily  glucagon  Injectable 1 milliGRAM(s) IntraMuscular once  influenza  Vaccine (HIGH DOSE) 0.5 milliLiter(s) IntraMuscular once  insulin lispro (ADMELOG) corrective regimen sliding scale   SubCutaneous every 6 hours  lactobacillus acidophilus 1 Tablet(s) Oral daily  lidocaine   4% Patch 1 Patch Transdermal every 24 hours  morphine  Infusion. 1 mG/Hr (1 mL/Hr) IV Continuous <Continuous>  ondansetron Injectable 4 milliGRAM(s) IV Push every 8 hours  pantoprazole  Injectable 40 milliGRAM(s) IV Push daily  piperacillin/tazobactam IVPB.. 3.375 Gram(s) IV Intermittent every 8 hours  potassium phosphate IVPB 15 milliMole(s) IV Intermittent once    MEDICATIONS  (PRN):  acetaminophen     Tablet .. 650 milliGRAM(s) Oral every 6 hours PRN Temp greater or equal to 38C (100.4F), Mild Pain (1 - 3), Moderate Pain (4 - 6)  aluminum hydroxide/magnesium hydroxide/simethicone Suspension 30 milliLiter(s) Oral every 4 hours PRN Dyspepsia  artificial tears (preservative free) Ophthalmic Solution 1 Drop(s) Both EYES four times a day PRN Dry Eyes  dextrose Oral Gel 15 Gram(s) Oral once PRN Blood Glucose LESS THAN 70 milliGRAM(s)/deciliter  morphine  - Injectable 4 milliGRAM(s) IV Push every 3 hours PRN Severe Pain (7 - 10)  naloxone Injectable 0.1 milliGRAM(s) IV Push every 3 minutes PRN For ANY of the following changes in patient status:  A. RR LESS THAN 10 breaths per minute, B. Oxygen saturation LESS THAN 90%, C. Sedation score of 6  polyethylene glycol 3350 17 Gram(s) Oral daily PRN Constipation  
MEDICATIONS  (STANDING):  abiraterone 500 mg tablet 2 Tablet(s) 2 Tablet(s) Oral daily  atorvastatin 20 milliGRAM(s) Oral at bedtime  chlorhexidine 2% Cloths 1 Application(s) Topical daily  dextrose 5%. 1000 milliLiter(s) (100 mL/Hr) IV Continuous <Continuous>  dextrose 5%. 1000 milliLiter(s) (50 mL/Hr) IV Continuous <Continuous>  dextrose 50% Injectable 25 Gram(s) IV Push once  dextrose 50% Injectable 25 Gram(s) IV Push once  dextrose 50% Injectable 12.5 Gram(s) IV Push once  fenofibrate Tablet 145 milliGRAM(s) Oral daily  filgrastim-sndz (ZARXIO) Injectable 480 MICROGram(s) SubCutaneous daily  glucagon  Injectable 1 milliGRAM(s) IntraMuscular once  influenza  Vaccine (HIGH DOSE) 0.5 milliLiter(s) IntraMuscular once  insulin lispro (ADMELOG) corrective regimen sliding scale   SubCutaneous every 6 hours  lactobacillus acidophilus 1 Tablet(s) Oral daily  lidocaine   4% Patch 1 Patch Transdermal every 24 hours  morphine  Infusion. 1 mG/Hr (1 mL/Hr) IV Continuous <Continuous>  ondansetron Injectable 4 milliGRAM(s) IV Push every 8 hours  pantoprazole  Injectable 40 milliGRAM(s) IV Push daily  piperacillin/tazobactam IVPB.. 3.375 Gram(s) IV Intermittent every 8 hours  potassium phosphate IVPB 15 milliMole(s) IV Intermittent once  sodium chloride 0.45%. 1000 milliLiter(s) (125 mL/Hr) IV Continuous <Continuous>    MEDICATIONS  (PRN):  acetaminophen     Tablet .. 650 milliGRAM(s) Oral every 6 hours PRN Temp greater or equal to 38C (100.4F), Mild Pain (1 - 3), Moderate Pain (4 - 6)  aluminum hydroxide/magnesium hydroxide/simethicone Suspension 30 milliLiter(s) Oral every 4 hours PRN Dyspepsia  artificial tears (preservative free) Ophthalmic Solution 1 Drop(s) Both EYES four times a day PRN Dry Eyes  dextrose Oral Gel 15 Gram(s) Oral once PRN Blood Glucose LESS THAN 70 milliGRAM(s)/deciliter  morphine  - Injectable 4 milliGRAM(s) IV Push every 3 hours PRN Severe Pain (7 - 10)  naloxone Injectable 0.1 milliGRAM(s) IV Push every 3 minutes PRN For ANY of the following changes in patient status:  A. RR LESS THAN 10 breaths per minute, B. Oxygen saturation LESS THAN 90%, C. Sedation score of 6  OLANZapine Injectable 1.25 milliGRAM(s) IntraMuscular every 8 hours PRN severe aggitation  polyethylene glycol 3350 17 Gram(s) Oral daily PRN Constipation

## 2024-10-23 NOTE — CONSULT NOTE ADULT - CONSULT REASON
HYPOXIC RESP FAILURE
diarrhea
Hypernatremia
Kyphoplasty
hypotension
spine mets
hypernatremia
prostate ca (15 yrs ago), L5 fx, retropulsed bone
Hyperglycemia
metastatic prostate cancer
Meatal stenosis
SMA Dissection
abx management
abnormal CT
hypernatremia
Symptom management

## 2024-10-23 NOTE — PROGRESS NOTE ADULT - SUBJECTIVE AND OBJECTIVE BOX
64-year-old male with metastatic prostate cancer, sent in by hematologist from [place] [facility] for uncontrolled pain, nausea, vomiting.       Problem/Plan - 1:  ·  Problem: Cancer related pain.   ·  Plan: - appreciate pall care recommendations:  - pain control as per palliative crae   - RT consult appreciated     Problem/Plan - 2:·  Problem: Nausea & vomiting., Diarrhea, colitis , SMA dissection   ·  Plan: -  vascular fu appreciated  - start  AC as per vascular but heme not clearing for AC  - Now pt with bloody BM   - GI and surgery consult  - ID fu    Problem/Plan - 3:  ·  Problem: CA of prostate.   ·  Plan: Metastatic prostate cancer- f/u heme/onc recommendations  - Rad Onc fu   - Palliative for symptom control.    Problem/Plan - 4:  ·  Problem: Thrombocytopenia, unspecified.   ·  Plan: -monitor cbc   transfuse PRN    Problem/Plan - 5:  ·  Problem: hypernatremia  ·  Plan: - cw ivf  - renal fu     case dw wife at bedside   dw oncology attending

## 2024-10-23 NOTE — CONSULT NOTE ADULT - CONSULT REQUESTED BY NAME
Dr. Ganesh Valle
Dr. Valle
ED
Ganesh Valle
Primary team
Dr. Valle
Ganesh Valle
Medicine
Ganesh Valle
Medicine
Primary team
medicine
Medicine
Primary
Primary team

## 2024-10-23 NOTE — PROGRESS NOTE ADULT - SUBJECTIVE AND OBJECTIVE BOX
[facility]iatric[facility] and Palliative [facility]  Melissa Davies Palliative Care Attending  Contact Info: Page [phone] (including Nights/Weekends), message on Microsoft Teams (Melissa Davies), or leave  at Palliative Office [phone] (non-urgent)   Date of [id number] @ 14:43    SUBJECTIVE AND OBJECTIVE: Patient seen this AM with wife at bedside. Patient resting comfortably and wife asked if physical exam can be deferred as patient has not rested for the past 2 days. He remains on restraints but does not appear to be in acute distress.     Indication for Geriatrics and Palliative Care Services/INTERVAL HPI:  sx management in setting of advanced malignancy     OVERNIGHT EVENTS:  > 10/21: Weekend events reviewed. Over the past 24 hours, patient was transitioned off pca pump. He required 4mg IV morphine x1.   > 10/22: Over the past 24 hours, patient did not require IV morphine PRNs. He did require IM Zyprexa x2. He is febrile.   > 10/23: Over the past 24 hours, patient did not required PRNs.     DNR on chart:  Allergies    No Known Allergies    Intolerances    MEDICATIONS  (STANDING):  abiraterone 500 mg tablet 2 Tablet(s) 2 Tablet(s) Oral daily  atorvastatin 20 milliGRAM(s) Oral at bedtime  chlorhexidine 2% Cloths 1 Application(s) Topical daily  dextrose 5%. 1000 milliLiter(s) (50 mL/Hr) IV Continuous <Continuous>  dextrose 5%. 1000 milliLiter(s) (130 mL/Hr) IV Continuous <Continuous>  dextrose 5%. 1000 milliLiter(s) (100 mL/Hr) IV Continuous <Continuous>  dextrose 50% Injectable 12.5 Gram(s) IV Push once  dextrose 50% Injectable 25 Gram(s) IV Push once  dextrose 50% Injectable 25 Gram(s) IV Push once  fenofibrate Tablet 145 milliGRAM(s) Oral daily  glucagon  Injectable 1 milliGRAM(s) IntraMuscular once  influenza  Vaccine (HIGH DOSE) 0.5 milliLiter(s) IntraMuscular once  insulin lispro (ADMELOG) corrective regimen sliding scale   SubCutaneous every 6 hours  lactobacillus acidophilus 1 Tablet(s) Oral daily  lidocaine   4% Patch 1 Patch Transdermal every 24 hours  morphine  Infusion. 1 mG/Hr (1 mL/Hr) IV Continuous <Continuous>  ondansetron Injectable 4 milliGRAM(s) IV Push every 8 hours  pantoprazole  Injectable 40 milliGRAM(s) IV Push daily  piperacillin/tazobactam IVPB.. 3.375 Gram(s) IV Intermittent every 8 hours  sodium chloride 0.225%. 1000 milliLiter(s) (75 mL/Hr) IV Continuous <Continuous>    MEDICATIONS  (PRN):  acetaminophen     Tablet .. 650 milliGRAM(s) Oral every 6 hours PRN Temp greater or equal to 38C (100.4F), Mild Pain (1 - 3), Moderate Pain (4 - 6)  aluminum hydroxide/magnesium hydroxide/simethicone Suspension 30 milliLiter(s) Oral every 4 hours PRN Dyspepsia  artificial tears (preservative free) Ophthalmic Solution 1 Drop(s) Both EYES four times a day PRN Dry Eyes  dextrose Oral Gel 15 Gram(s) Oral once PRN Blood Glucose LESS THAN 70 milliGRAM(s)/deciliter  morphine  - Injectable 4 milliGRAM(s) IV Push every 3 hours PRN Severe Pain (7 - 10)  naloxone Injectable 0.1 milliGRAM(s) IV Push every 3 minutes PRN For ANY of the following changes in patient status:  A. RR LESS THAN 10 breaths per minute, B. Oxygen saturation LESS THAN 90%, C. Sedation score of 6  polyethylene glycol 3350 17 Gram(s) Oral daily PRN Constipation      ITEMS UNCHECKED ARE NOT PRESENT    PRESENT SYMPTOMS: [x ]Unable to self-report - see [ ] CPOT [ x] PAINADS [ x] RDOS  Source if other than patient:  [x ]Family   [ ]Team     Pain:  [ ]yes [ ]no  QOL impact -   Location -                    Aggravating factors -  Quality -  Radiation -  Timing-  Severity (0-10 scale):  Minimal acceptable level/ pain goal (0-10 scale):     CPOT:    https://www.sccm.org/getattachment/nwt50e89-6y0r-1x3n-5k1c-1439u2329l1t/Critical-Care-Pain-Observation-Tool-(CPOT)    Dyspnea:                           [ ]Mild [ ]Moderate [ ]Severe  Anxiety:                             [ ]Mild [ ]Moderate [ ]Severe  Fatigue:                             [ ]Mild [ ]Moderate [ ]Severe  Nausea:                             [ ]Mild [ ]Moderate [ ]Severe  Loss of appetite:              [ ]Mild [ ]Moderate [ ]Severe  Constipation:                    [ ]Mild [ ]Moderate [ ]Severe  Other Symptoms:  [ ]All other review of systems negative     PCSSQ[Palliative Care Spiritual Screening Question]   Severity (0-10):  Score of 4 or > indicate consideration of Chaplaincy referral.  Chaplaincy Referral: [ ] yes [ ] refused [ ] following [x ] deferred    Caregiver [place]? : [ ] yes [x ] no [ ] Deferred [ ] Declined             Social work referral [ ] Patient & Family Centered Care Referral [ ]  Anticipatory Grief present?:  [ ] yes [x ] no  [ ] Deferred                  Social work referral [ ] Patient & Family Centered Care Referral [ ]      PHYSICAL EXAM:  Vital Signs Last 24 Hrs  T(C): 36.9 (23 Oct 2024 12:18), Max: 37.2 (22 Oct 2024 15:11)  T(F): 98.4 (23 Oct 2024 12:18), Max: 98.9 (22 Oct 2024 15:11)  HR: 86 (23 Oct 2024 12:18) (86 - 113)  BP: 113/76 (23 Oct 2024 12:18) (102/69 - 142/76)  BP(mean): --  RR: 18 (23 Oct 2024 12:18) (17 - 20)  SpO2: 95% (23 Oct 2024 12:18) (93% - 96%)    Parameters below as of 23 Oct 2024 09:01  Patient On (Oxygen Delivery Method): room air     I&O's Summary    22 Oct 2024 07:01  -  23 Oct 2024 07:00  --------------------------------------------------------  IN: 0 mL / OUT: 2020 mL / NET: -2020 mL    23 Oct 2024 07:01  -  23 Oct 2024 14:43  --------------------------------------------------------  IN: 350 mL / OUT: 340 mL / NET: 10 mL       GENERAL: [ ]Cachexia   PHysical exam deferred per wife's wishes   [ ]Alert  [ ]Oriented x   [ ]Lethargic  [ ]Unarousable  [ ]Verbal  [ ]Non-Verbal  Behavioral:   [ ]Anxiety  [ ]Delirium [ ]Agitation [ ]Other  HEENT:  [ ]Normal   [ ]Dry mouth   [ ]ET Tube/Trach  [ ]Oral lesions  PULMONARY:   [ ]Clear [ ]Tachypnea  [ ]Audible excessive secretions   [ ]Rhonchi        [ ]Right [ ]Left [ ]Bilateral  [ ]Crackles        [ ]Right [ ]Left [ ]Bilateral  [ ]Wheezing     [ ]Right [ ]Left [ ]Bilateral  [ ]Diminished BS [ ] Right [ ]Left [ ]Bilateral  CARDIOVASCULAR:    [ ]Regular [ ]Irregular [ ]Tachy  [ ]Rodrick [ ]Murmur [ ]Other  GASTROINTESTINAL:  [ ]Soft  [ ]Distended   [ ]+BS  [ ]Non tender [ ]Tender  [ ]Other [ ]PEG [ ]OGT/ NGT   Last BM:   GENITOURINARY:  [ ]Normal [ ]Incontinent   [ ]Oliguria/Anuria   [ ]Farah  MUSCULOSKELETAL:   [ ]Normal   [ ]Weakness  [ ]Bed/Wheelchair bound [ ]Edema  NEUROLOGIC:   [ ]No focal deficits  [ ] Cognitive impairment  [ ] Dysphagia [ ]Dysarthria [ ] Paresis [ ]Other   SKIN: Please see flowsheets   [ ]Normal  [ ]Rash  [ ]Other  [ ]Pressure ulcer(s) [ ]y [ ]n present on admission    CRITICAL CARE:  [ ]Shock Present  [ ]Septic [ ]Cardiogenic [ ]Neurologic [ ]Hypovolemic  [ ]Vasopressors [ ]Inotropes  [ ]Respiratory failure present [ ]Mechanical Ventilation [ ]Non-invasive ventilatory support [ ]High-Flow   [ ]Acute  [ ]Chronic [ ]Hypoxic  [ ]Hypercarbic [ ]Other  [ ]Other organ failure     LABS:                        8.6    18.56 )-----------( 52       ( 23 Oct 2024 06:00 )             25.9   10-23    154[H]  |  122[H]  |  45[H]  ----------------------------<  282[H]  4.7   |  20[L]  |  0.71    Ca    6.9[L]      23 Oct 2024 06:00  Phos  1.9     10-23  Mg     2.90     10-23        Urinalysis Basic - ( 23 Oct 2024 06:00 )    Color: x / Appearance: x / SG: x / pH: x  Gluc: 282 mg/dL / Ketone: x  / Bili: x / Urobili: x   Blood: x / Protein: x / Nitrite: x   Leuk Esterase: x / RBC: x / WBC x   Sq Epi: x / Non Sq Epi: x / Bacteria: x      RADIOLOGY & ADDITIONAL STUDIES: no new     Protein Calorie Malnutrition Present: [ ]mild [ ]moderate [ ]severe [ ]underweight [ ]morbid obesity  https://www.andeal.org/vault/2440/web/files/ONC/Table_Clinical%20Characteristics%20to%20Document%20Malnutrition-White%20JV%20et%20al%795096.pdf    Height (cm): 172.7 (10-12-24 @ 09:21)  Weight (kg): 69.4 (10-12-24 @ 09:21)  BMI (kg/m2): 23.3 (10-12-24 @ 09:21)    [ ]PPSV2 < or = 30%  [ ]significant weight loss [ ]poor nutritional intake [ ]anasarca[ ]Artificial Nutrition    Other REFERRALS:  [ ]Hospice  [ ]Child Life  [ ]Social Work  [ ]Case management [ ]Holistic Therapy

## 2024-10-23 NOTE — BH CONSULTATION LIAISON PROGRESS NOTE - NSBHCHARTREVIEWVS_PSY_A_CORE FT
Vital Signs Last 24 Hrs  T(C): 37.1 (23 Oct 2024 09:01), Max: 38.7 (22 Oct 2024 10:56)  T(F): 98.7 (23 Oct 2024 09:01), Max: 101.6 (22 Oct 2024 10:56)  HR: 91 (23 Oct 2024 09:01) (88 - 119)  BP: 108/72 (23 Oct 2024 09:01) (102/69 - 153/88)  BP(mean): --  RR: 18 (23 Oct 2024 09:01) (17 - 20)  SpO2: 93% (23 Oct 2024 09:01) (93% - 97%)    Parameters below as of 23 Oct 2024 09:01  Patient On (Oxygen Delivery Method): room air    
Vital Signs Last 24 Hrs  T(C): 38.7 (22 Oct 2024 10:56), Max: 38.7 (22 Oct 2024 10:56)  T(F): 101.6 (22 Oct 2024 10:56), Max: 101.6 (22 Oct 2024 10:56)  HR: 119 (22 Oct 2024 10:56) (108 - 140)  BP: 153/88 (22 Oct 2024 10:56) (110/83 - 153/89)  BP(mean): --  RR: 19 (22 Oct 2024 10:56) (18 - 19)  SpO2: 97% (22 Oct 2024 10:56) (93% - 98%)    Parameters below as of 22 Oct 2024 10:56  Patient On (Oxygen Delivery Method): room air    
Vital Signs Last 24 Hrs  T(C): 36.4 (18 Oct 2024 10:37), Max: 37.4 (18 Oct 2024 01:48)  T(F): 97.6 (18 Oct 2024 10:37), Max: 99.3 (18 Oct 2024 01:48)  HR: 111 (18 Oct 2024 10:37) (111 - 127)  BP: 105/69 (18 Oct 2024 10:37) (105/69 - 142/92)  BP(mean): --  RR: 18 (18 Oct 2024 10:37) (15 - 19)  SpO2: 93% (18 Oct 2024 10:37) (90% - 98%)    Parameters below as of 18 Oct 2024 10:37  Patient On (Oxygen Delivery Method): nasal cannula

## 2024-10-23 NOTE — CONSULT NOTE ADULT - PROVIDER SPECIALTY LIST ADULT
Nephrology
Urology
Nephrology
Rad Onc
Surgery
Endocrinology
Infectious Disease
Pulmonology
Intervent Radiology
MICU
Orthopedics
Heme/Onc
Gastroenterology
MICU
Vascular Surgery
Palliative Care

## 2024-10-23 NOTE — CHART NOTE - NSCHARTNOTEFT_GEN_A_CORE
Quinton De Los Santos PA-C  Department of Medicine - Night Coverage  [facility]   In House Pager [phone] VANESA called for patient by RN for agitation and violence. As per RN patient repeatedly refusing medications and wanting to go home. Patient does not understand why he is in the hospital.     Patient pulled out IV shortly after and became agitated.   Given Haldol 2/5 mg IM x2. Will consider Ativan if still agitated.  Placed back on 2L bilateral restraints.      Will continue to monitor and assess patient overnight.     Quinton De Los Santos PA-C  Department of Medicine - Night Coverage  [facility]   In House Pager [phone] VANESA called for patient by RN for agitation and violence. As per RN patient repeatedly refusing medications and wanting to go home. Patient does not understand why he is in the hospital.     Patient pulled out IV shortly after and became agitated.   Given Haldol 2/5 mg IM x2. Will consider Ativan if still agitated.  Placed back on 2L bilateral restraints.    Patient's son at bedside and in agreement with plan.    Will continue to monitor and assess patient overnight.     Quinton De Los Santos PA-C  Department of Medicine - Night Coverage  [facility]   In House Pager [phone] VANESA called for patient by RN for agitation and violence. As per RN patient repeatedly refusing medications and wanting to go home. Patient does not understand why he is in the hospital.     Patient pulled out IV shortly after and became agitated.   Given Haldol 2.5 mg IM x2. Will consider Ativan if still agitated.  Placed back on bilateral wrist restraints.    Patient's son at bedside and in agreement with plan.    Will continue to monitor and assess patient overnight.     Quinton De Los Santos PA-C  Department of Medicine - Night Coverage  [facility]   In House Pager [phone] VANESA called for patient by RN for agitation and violence. As per RN patient repeatedly refusing medications and wanting to go home. Patient does not understand why he is in the hospital.     Patient pulled out IV shortly after and became agitated. Did not complete full 12 hours of IVF and had remaining 6 hours left.   Given Haldol 2.5 mg IM x2. Will consider Ativan if still agitated.  Placed back on bilateral wrist restraints.    Patient's son at bedside and in agreement with plan.    Will continue to monitor and assess patient overnight.     Quinton De Los Santos PA-C  Department of Medicine - Night Coverage  [facility]   In House Pager [phone]

## 2024-10-23 NOTE — CONSULT NOTE ADULT - SUBJECTIVE AND OBJECTIVE BOX
NOTE INCOMPLETE/ IN PROGRESS  *Please wait for attending attestation for official recommendations.     HPI:  65 y/o M w/ prostate CA s/p radical prostatectomy now metastatic, HTN, HLD admitted for pain control, N/V, osseous mets, c/b fever, diarrhea, CT A/P w/ findings of likely neutropenic enterocolitis, SMA dissection, c/f ischemic colitis on R colon.  Course c/b hypernatremia, on D5W, and now with hyperglycemia.       Consulted for: Hyperglycemia    Endocrine history:  (Obtained from wife at bedside as patient has recently been confused and agitated, and was sleeping at this time)  Wife reports patient has always had routine physicals and has no known history of DM/preDM, no issues with hyperglycemia previously.  A1c was 6.2% 10/23/24  Started on D5W on 10/20 9pm. Became hyperglycemia up to 200-400s since D5W started.  Also was on dexamethasone as part of chemotherapy, stopped 10/22. No hx of immunotherapy.        PAST MEDICAL & SURGICAL HISTORY:  Benign essential HTN      HLD (hyperlipidemia)      CA of prostate      Basal cell carcinoma      History of transurethral prostatectomy      S/P inguinal hernia repair      History of basal cell carcinoma (BCC) excision          FAMILY HISTORY:  FH: HTN (hypertension) (Sibling)        Social History:    Outpatient Medications:    MEDICATIONS  (STANDING):  abiraterone 500 mg tablet 2 Tablet(s) 2 Tablet(s) Oral daily  atorvastatin 20 milliGRAM(s) Oral at bedtime  chlorhexidine 2% Cloths 1 Application(s) Topical daily  dextrose 5%. 1000 milliLiter(s) (50 mL/Hr) IV Continuous <Continuous>  dextrose 5%. 1000 milliLiter(s) (100 mL/Hr) IV Continuous <Continuous>  dextrose 5%. 1000 milliLiter(s) (130 mL/Hr) IV Continuous <Continuous>  dextrose 50% Injectable 25 Gram(s) IV Push once  dextrose 50% Injectable 12.5 Gram(s) IV Push once  dextrose 50% Injectable 25 Gram(s) IV Push once  fenofibrate Tablet 145 milliGRAM(s) Oral daily  glucagon  Injectable 1 milliGRAM(s) IntraMuscular once  influenza  Vaccine (HIGH DOSE) 0.5 milliLiter(s) IntraMuscular once  insulin lispro (ADMELOG) corrective regimen sliding scale   SubCutaneous every 6 hours  lactobacillus acidophilus 1 Tablet(s) Oral daily  lidocaine   4% Patch 1 Patch Transdermal every 24 hours  morphine  Infusion. 1 mG/Hr (1 mL/Hr) IV Continuous <Continuous>  ondansetron Injectable 4 milliGRAM(s) IV Push every 8 hours  pantoprazole  Injectable 40 milliGRAM(s) IV Push daily  piperacillin/tazobactam IVPB.. 3.375 Gram(s) IV Intermittent every 8 hours  sodium chloride 0.225%. 1000 milliLiter(s) (75 mL/Hr) IV Continuous <Continuous>    MEDICATIONS  (PRN):  acetaminophen     Tablet .. 650 milliGRAM(s) Oral every 6 hours PRN Temp greater or equal to 38C (100.4F), Mild Pain (1 - 3), Moderate Pain (4 - 6)  aluminum hydroxide/magnesium hydroxide/simethicone Suspension 30 milliLiter(s) Oral every 4 hours PRN Dyspepsia  artificial tears (preservative free) Ophthalmic Solution 1 Drop(s) Both EYES four times a day PRN Dry Eyes  dextrose Oral Gel 15 Gram(s) Oral once PRN Blood Glucose LESS THAN 70 milliGRAM(s)/deciliter  morphine  - Injectable 4 milliGRAM(s) IV Push every 3 hours PRN Severe Pain (7 - 10)  naloxone Injectable 0.1 milliGRAM(s) IV Push every 3 minutes PRN For ANY of the following changes in patient status:  A. RR LESS THAN 10 breaths per minute, B. Oxygen saturation LESS THAN 90%, C. Sedation score of 6  polyethylene glycol 3350 17 Gram(s) Oral daily PRN Constipation      Allergies    No Known Allergies    Intolerances      Review of Systems:  Constitutional: No fever  Eyes: No blurry vision  Neuro: No tremors  HEENT: No pain  Cardiovascular: No chest pain, palpitations  Respiratory: No SOB, no cough  GI: No nausea, vomiting, abdominal pain  : No dysuria  Skin: no rash  Psych: no depression  Endocrine: no polyuria, polydipsia  Hem/lymph: no swelling  Osteoporosis: no fractures    ALL OTHER SYSTEMS REVIEWED AND NEGATIVE    UNABLE TO OBTAIN    PHYSICAL EXAM:  VITALS: T(C): 37.1 (10-23-24 @ 09:01)  T(F): 98.7 (10-23-24 @ 09:01), Max: 98.9 (10-22-24 @ 15:11)  HR: 91 (10-23-24 @ 09:01) (88 - 113)  BP: 108/72 (10-23-24 @ 09:01) (102/69 - 142/76)  RR:  (17 - 20)  SpO2:  (93% - 96%)  Wt(kg): --  GENERAL: NAD, well-groomed, well-developed  EYES: No proptosis, no lid lag, anicteric  HEENT:  Atraumatic, Normocephalic, moist mucous membranes  THYROID: Normal size, no palpable nodules  RESPIRATORY: Clear to auscultation bilaterally; No rales, rhonchi, wheezing  CARDIOVASCULAR: Regular rate and rhythm; No murmurs; no peripheral edema  GI: Soft, nontender, non distended, normal bowel sounds  SKIN: Dry, intact, No rashes or lesions  MUSCULOSKELETAL: Full range of motion, normal strength  NEURO: sensation intact, extraocular movements intact, no tremor  PSYCH: Alert and oriented x 3, normal affect, normal mood  CUSHING'S SIGNS: no striae      CAPILLARY BLOOD GLUCOSE      POCT Blood Glucose.: 280 mg/dL (23 Oct 2024 07:29)  POCT Blood Glucose.: 322 mg/dL (23 Oct 2024 01:41)  POCT Blood Glucose.: 329 mg/dL (22 Oct 2024 18:02)  POCT Blood Glucose.: 316 mg/dL (22 Oct 2024 12:42)                            8.6    18.56 )-----------( 52       ( 23 Oct 2024 06:00 )             25.9       10-23    154[H]  |  122[H]  |  45[H]  ----------------------------<  282[H]  4.7   |  20[L]  |  0.71    eGFR: 102    Ca    6.9[L]      10-23  Mg     2.90     10-23  Phos  1.9     10-23    TPro  4.6[L]  /  Alb  1.9[L]  /  TBili  1.7[H]  /  DBili  x   /  AST  77[H]  /  ALT  17  /  AlkPhos  53  10-20      Thyroid Function Tests:  10-13 @ 05:30 TSH 2.62 FreeT4 -- T3 -- Anti TPO -- Anti Thyroglobulin Ab -- TSI --      A1C with Estimated Average Glucose Result: 6.2 % (10-23-24 @ 06:00)      10-13 Chol 135 Direct LDL -- LDL calculated 53 HDL 39[L] Trig 217[H]    Radiology:              NOTE INCOMPLETE/ IN PROGRESS  *Please wait for attending attestation for official recommendations.     HPI:  65 y/o M w/ prostate CA s/p radical prostatectomy now metastatic, HTN, HLD admitted for pain control, N/V, osseous mets, c/b fever, diarrhea, CT A/P w/ findings of likely neutropenic enterocolitis, SMA dissection, c/f ischemic colitis on R colon.  Course c/b hypernatremia, on D5W, and now with hyperglycemia.       Consulted for: Hyperglycemia    Endocrine history:  (Obtained from wife at bedside as patient has recently been confused and agitated, and was sleeping at this time)  Wife reports patient has always had routine physicals and has no known history of DM/preDM, no issues with hyperglycemia previously.  A1c was 6.2% 10/23/24  Started on D5W on 10/20 9pm. Became hyperglycemia up to 200-400s since D5W started.  Also was on dexamethasone as part of chemotherapy, stopped 10/22. No hx of immunotherapy.        PAST MEDICAL & SURGICAL HISTORY:  Benign essential HTN      HLD (hyperlipidemia)      CA of prostate      Basal cell carcinoma      History of transurethral prostatectomy      S/P inguinal hernia repair      History of basal cell carcinoma (BCC) excision          FAMILY HISTORY:  FH: HTN (hypertension) (Sibling)  Brother, mother had DM      Social History:  +smoking  +social alcohol    Outpatient Medications:  Home Medications:  atorvastatin 20 mg oral tablet: 1 tab(s) orally once a day (12 Oct 2024 15:56)  lisinopril 40 mg oral tablet: 1 tab(s) orally once a day (12 Oct 2024 15:56)  OxyCONTIN 10 mg oral tablet, extended release: 1 tab(s) orally 2 times a day (12 Oct 2024 15:56)  TriCor 200 mg oral capsule: 1 cap(s) orally once a day (12 Oct 2024 15:56)    MEDICATIONS  (STANDING):  abiraterone 500 mg tablet 2 Tablet(s) 2 Tablet(s) Oral daily  atorvastatin 20 milliGRAM(s) Oral at bedtime  chlorhexidine 2% Cloths 1 Application(s) Topical daily  dextrose 5%. 1000 milliLiter(s) (50 mL/Hr) IV Continuous <Continuous>  dextrose 5%. 1000 milliLiter(s) (100 mL/Hr) IV Continuous <Continuous>  dextrose 5%. 1000 milliLiter(s) (130 mL/Hr) IV Continuous <Continuous>  dextrose 50% Injectable 25 Gram(s) IV Push once  dextrose 50% Injectable 12.5 Gram(s) IV Push once  dextrose 50% Injectable 25 Gram(s) IV Push once  fenofibrate Tablet 145 milliGRAM(s) Oral daily  glucagon  Injectable 1 milliGRAM(s) IntraMuscular once  influenza  Vaccine (HIGH DOSE) 0.5 milliLiter(s) IntraMuscular once  insulin lispro (ADMELOG) corrective regimen sliding scale   SubCutaneous every 6 hours  lactobacillus acidophilus 1 Tablet(s) Oral daily  lidocaine   4% Patch 1 Patch Transdermal every 24 hours  morphine  Infusion. 1 mG/Hr (1 mL/Hr) IV Continuous <Continuous>  ondansetron Injectable 4 milliGRAM(s) IV Push every 8 hours  pantoprazole  Injectable 40 milliGRAM(s) IV Push daily  piperacillin/tazobactam IVPB.. 3.375 Gram(s) IV Intermittent every 8 hours  sodium chloride 0.225%. 1000 milliLiter(s) (75 mL/Hr) IV Continuous <Continuous>    MEDICATIONS  (PRN):  acetaminophen     Tablet .. 650 milliGRAM(s) Oral every 6 hours PRN Temp greater or equal to 38C (100.4F), Mild Pain (1 - 3), Moderate Pain (4 - 6)  aluminum hydroxide/magnesium hydroxide/simethicone Suspension 30 milliLiter(s) Oral every 4 hours PRN Dyspepsia  artificial tears (preservative free) Ophthalmic Solution 1 Drop(s) Both EYES four times a day PRN Dry Eyes  dextrose Oral Gel 15 Gram(s) Oral once PRN Blood Glucose LESS THAN 70 milliGRAM(s)/deciliter  morphine  - Injectable 4 milliGRAM(s) IV Push every 3 hours PRN Severe Pain (7 - 10)  naloxone Injectable 0.1 milliGRAM(s) IV Push every 3 minutes PRN For ANY of the following changes in patient status:  A. RR LESS THAN 10 breaths per minute, B. Oxygen saturation LESS THAN 90%, C. Sedation score of 6  polyethylene glycol 3350 17 Gram(s) Oral daily PRN Constipation      Allergies    No Known Allergies    Intolerances      Review of Systems:  Constitutional: No fever  Eyes: No blurry vision  Neuro: No tremors  HEENT: No pain  Cardiovascular: No chest pain, palpitations  Respiratory: No SOB, no cough  GI: No nausea, vomiting, abdominal pain  : No dysuria  Skin: no rash  Psych: no depression  Endocrine: no polyuria, polydipsia  Hem/lymph: no swelling  Osteoporosis: no fractures    ALL OTHER SYSTEMS REVIEWED AND NEGATIVE    UNABLE TO OBTAIN    PHYSICAL EXAM:  VITALS: T(C): 37.1 (10-23-24 @ 09:01)  T(F): 98.7 (10-23-24 @ 09:01), Max: 98.9 (10-22-24 @ 15:11)  HR: 91 (10-23-24 @ 09:01) (88 - 113)  BP: 108/72 (10-23-24 @ 09:01) (102/69 - 142/76)  RR:  (17 - 20)  SpO2:  (93% - 96%)  Wt(kg): --  GENERAL: NAD, well-groomed, well-developed  EYES: No proptosis, no lid lag, anicteric  HEENT:  Atraumatic, Normocephalic, moist mucous membranes  THYROID: Normal size, no palpable nodules  RESPIRATORY: Clear to auscultation bilaterally; No rales, rhonchi, wheezing  CARDIOVASCULAR: Regular rate and rhythm; No murmurs; no peripheral edema  GI: Soft, nontender, non distended, normal bowel sounds  SKIN: Dry, intact, No rashes or lesions  MUSCULOSKELETAL: Full range of motion, normal strength  NEURO: sensation intact, extraocular movements intact, no tremor  PSYCH: Alert and oriented x 3, normal affect, normal mood  CUSHING'S SIGNS: no striae      CAPILLARY BLOOD GLUCOSE      POCT Blood Glucose.: 280 mg/dL (23 Oct 2024 07:29)  POCT Blood Glucose.: 322 mg/dL (23 Oct 2024 01:41)  POCT Blood Glucose.: 329 mg/dL (22 Oct 2024 18:02)  POCT Blood Glucose.: 316 mg/dL (22 Oct 2024 12:42)                            8.6    18.56 )-----------( 52       ( 23 Oct 2024 06:00 )             25.9       10-23    154[H]  |  122[H]  |  45[H]  ----------------------------<  282[H]  4.7   |  20[L]  |  0.71    eGFR: 102    Ca    6.9[L]      10-23  Mg     2.90     10-23  Phos  1.9     10-23    TPro  4.6[L]  /  Alb  1.9[L]  /  TBili  1.7[H]  /  DBili  x   /  AST  77[H]  /  ALT  17  /  AlkPhos  53  10-20      Thyroid Function Tests:  10-13 @ 05:30 TSH 2.62 FreeT4 -- T3 -- Anti TPO -- Anti Thyroglobulin Ab -- TSI --      A1C with Estimated Average Glucose Result: 6.2 % (10-23-24 @ 06:00)      10-13 Chol 135 Direct LDL -- LDL calculated 53 HDL 39[L] Trig 217[H]    Radiology:              NOTE INCOMPLETE/ IN PROGRESS  *Please wait for attending attestation for official recommendations.     HPI:  63 y/o M w/ prostate CA s/p radical prostatectomy now metastatic, HTN, HLD admitted for pain control, N/V, osseous mets, c/b fever, diarrhea, CT A/P w/ findings of likely neutropenic enterocolitis, SMA dissection, c/f ischemic colitis on R colon.  Course c/b hypernatremia, on D5W, and now with hyperglycemia.     Consulted for: Hyperglycemia    Endocrine history:  (Obtained from wife at bedside as patient has recently been confused and agitated, and was sleeping at this time)  Wife reports patient has always had routine physicals and has no known history of DM/preDM, no issues with hyperglycemia previously.  A1c was 6.2% 10/23/24  Started on D5W on 10/20 9pm. Became hyperglycemia up to 200-400s since D5W started.  Also was on dexamethasone as part of chemotherapy, stopped 10/22. No hx of immunotherapy.        PAST MEDICAL & SURGICAL HISTORY:  Benign essential HTN      HLD (hyperlipidemia)      CA of prostate      Basal cell carcinoma      History of transurethral prostatectomy      S/P inguinal hernia repair      History of basal cell carcinoma (BCC) excision          FAMILY HISTORY:  FH: HTN (hypertension) (Sibling)  Brother, mother had DM      Social History:  +smoking  +social alcohol    Outpatient Medications:  Home Medications:  atorvastatin 20 mg oral tablet: 1 tab(s) orally once a day (12 Oct 2024 15:56)  lisinopril 40 mg oral tablet: 1 tab(s) orally once a day (12 Oct 2024 15:56)  OxyCONTIN 10 mg oral tablet, extended release: 1 tab(s) orally 2 times a day (12 Oct 2024 15:56)  TriCor 200 mg oral capsule: 1 cap(s) orally once a day (12 Oct 2024 15:56)    MEDICATIONS  (STANDING):  abiraterone 500 mg tablet 2 Tablet(s) 2 Tablet(s) Oral daily  atorvastatin 20 milliGRAM(s) Oral at bedtime  chlorhexidine 2% Cloths 1 Application(s) Topical daily  dextrose 5%. 1000 milliLiter(s) (50 mL/Hr) IV Continuous <Continuous>  dextrose 5%. 1000 milliLiter(s) (100 mL/Hr) IV Continuous <Continuous>  dextrose 5%. 1000 milliLiter(s) (130 mL/Hr) IV Continuous <Continuous>  dextrose 50% Injectable 25 Gram(s) IV Push once  dextrose 50% Injectable 12.5 Gram(s) IV Push once  dextrose 50% Injectable 25 Gram(s) IV Push once  fenofibrate Tablet 145 milliGRAM(s) Oral daily  glucagon  Injectable 1 milliGRAM(s) IntraMuscular once  influenza  Vaccine (HIGH DOSE) 0.5 milliLiter(s) IntraMuscular once  insulin lispro (ADMELOG) corrective regimen sliding scale   SubCutaneous every 6 hours  lactobacillus acidophilus 1 Tablet(s) Oral daily  lidocaine   4% Patch 1 Patch Transdermal every 24 hours  morphine  Infusion. 1 mG/Hr (1 mL/Hr) IV Continuous <Continuous>  ondansetron Injectable 4 milliGRAM(s) IV Push every 8 hours  pantoprazole  Injectable 40 milliGRAM(s) IV Push daily  piperacillin/tazobactam IVPB.. 3.375 Gram(s) IV Intermittent every 8 hours  sodium chloride 0.225%. 1000 milliLiter(s) (75 mL/Hr) IV Continuous <Continuous>    MEDICATIONS  (PRN):  acetaminophen     Tablet .. 650 milliGRAM(s) Oral every 6 hours PRN Temp greater or equal to 38C (100.4F), Mild Pain (1 - 3), Moderate Pain (4 - 6)  aluminum hydroxide/magnesium hydroxide/simethicone Suspension 30 milliLiter(s) Oral every 4 hours PRN Dyspepsia  artificial tears (preservative free) Ophthalmic Solution 1 Drop(s) Both EYES four times a day PRN Dry Eyes  dextrose Oral Gel 15 Gram(s) Oral once PRN Blood Glucose LESS THAN 70 milliGRAM(s)/deciliter  morphine  - Injectable 4 milliGRAM(s) IV Push every 3 hours PRN Severe Pain (7 - 10)  naloxone Injectable 0.1 milliGRAM(s) IV Push every 3 minutes PRN For ANY of the following changes in patient status:  A. RR LESS THAN 10 breaths per minute, B. Oxygen saturation LESS THAN 90%, C. Sedation score of 6  polyethylene glycol 3350 17 Gram(s) Oral daily PRN Constipation      Allergies    No Known Allergies    Intolerances      Review of Systems:  UNABLE TO OBTAIN DUE TO LETHARGY    PHYSICAL EXAM:  VITALS: T(C): 37.1 (10-23-24 @ 09:01)  T(F): 98.7 (10-23-24 @ 09:01), Max: 98.9 (10-22-24 @ 15:11)  HR: 91 (10-23-24 @ 09:01) (88 - 113)  BP: 108/72 (10-23-24 @ 09:01) (102/69 - 142/76)  RR:  (17 - 20)  SpO2:  (93% - 96%)  Wt(kg): --  GENERAL: elderly male, thin appearing, resting comfortably  EYES: No proptosis, no lid lag, anicteric  HEENT:  Atraumatic, Normocephalic, moist mucous membranes  RESPIRATORY: No respiratory distress, normal effort  CARDIOVASCULAR: Regular rate and rhythm; no peripheral edema  GI: Soft, nontender, non distended, normal bowel sounds  SKIN: Dry, intact, No rashes or lesions  MUSCULOSKELETAL: moves all limbs spontaneously  PSYCH: lethargic  CUSHING'S SIGNS: no striae      CAPILLARY BLOOD GLUCOSE      POCT Blood Glucose.: 280 mg/dL (23 Oct 2024 07:29)  POCT Blood Glucose.: 322 mg/dL (23 Oct 2024 01:41)  POCT Blood Glucose.: 329 mg/dL (22 Oct 2024 18:02)  POCT Blood Glucose.: 316 mg/dL (22 Oct 2024 12:42)                            8.6    18.56 )-----------( 52       ( 23 Oct 2024 06:00 )             25.9       10-23    154[H]  |  122[H]  |  45[H]  ----------------------------<  282[H]  4.7   |  20[L]  |  0.71    eGFR: 102    Ca    6.9[L]      10-23  Mg     2.90     10-23  Phos  1.9     10-23    TPro  4.6[L]  /  Alb  1.9[L]  /  TBili  1.7[H]  /  DBili  x   /  AST  77[H]  /  ALT  17  /  AlkPhos  53  10-20      Thyroid Function Tests:  10-13 @ 05:30 TSH 2.62 FreeT4 -- T3 -- Anti TPO -- Anti Thyroglobulin Ab -- TSI --      A1C with Estimated Average Glucose Result: 6.2 % (10-23-24 @ 06:00)      10-13 Chol 135 Direct LDL -- LDL calculated 53 HDL 39[L] Trig 217[H]    Radiology:

## 2024-10-23 NOTE — PROGRESS NOTE ADULT - PROBLEM SELECTOR PLAN 3
- Patient calm today   - Behavioral health recs appreciated: Zyprexa discontinued per son's request. Haldol prn per psych recs. hold med if QTC > 500  - EKG performed QTc 436 on 1017  - Patient requiring restraints now

## 2024-10-23 NOTE — CONSULT NOTE ADULT - ASSESSMENT
65 y/o M w/ prostate CA s/p radical prostatectomy now metastatic, HTN, HLD admitted for pain control, N/V, osseous mets, c/b fever, diarrhea, CT A/P w/ findings of likely neutropenic enterocolitis, SMA dissection, c/f ischemic colitis on R colon.  Course c/b hypernatremia, on D5W, and now with hyperglycemia.       Consulted for: Hyperglycemia    Endocrine history:  (Obtained from wife at bedside as patient has recently been confused and agitated, and was sleeping at this time)  Wife reports patient has always had routine physicals and has no known history of DM/preDM, no issues with hyperglycemia previously.  A1c was 6.2% 10/23/24  Started on D5W on 10/20 9pm. Became hyperglycemia up to 200-400s since D5W started.  Also was on dexamethasone as part of chemotherapy, stopped 10/22. No hx of immunotherapy.     65 y/o M w/ prostate CA s/p radical prostatectomy now metastatic, HTN, HLD admitted for pain control, N/V, osseous mets, c/b fever, diarrhea, CT A/P w/ findings of likely neutropenic enterocolitis, SMA dissection, c/f ischemic colitis on R colon.  Course c/b hypernatremia, on D5W, and now with hyperglycemia.  Endocrine consulted for: Hyperglycemia    #Hyperglycemia  Likely due to D5W (started 10/20 9pm) that was started for hypernatremia and steroids (was on dexamethasone as part of chemotherapy, stopped 10/22)  A1c was 6.2% 10/23/24 - possibly preDM or steroid-induced from recent dexamethasone with chemo    Recommendations:  - Nephrology has changed D5W to 1/4NS for hypernatremia. Expect hyperglycemia to improve with this change.  - Continue low dose admelog scale q6h while NPO  - May continue to have hyperglycemia if steroids are continued - per heme/onc note, possibly taper to prednisone 5mg daily    #Risk for osteoporosis  At risk for osteoporosis with prostate cancer tx lupron, abiraterone and steroids  - Outpatient DEXA scan if aligns with goals of care    #HLD  - continue home atorvastatin 20mg qhs  - repeat lipid profile outpatient      Discussed with primary team       Roberth Hathaway MD  Endocrine Fellow  Can be reached via teams. For follow up questions, discharge recommendations, or new consults, please call answering service at [phone] (weekdays); [phone] (nights/weekends).

## 2024-10-23 NOTE — CHART NOTE - NSCHARTNOTEFT_GEN_A_CORE
Wife request patient to be DNR/DNI with trail of NIV. MOLST form completed with wife beside. All questions answered.

## 2024-10-23 NOTE — PROGRESS NOTE ADULT - PROBLEM SELECTOR PLAN 10
Patient is supported by his wife- Vijaya [phone]) and 3 adult sons- Magen, Mateus and Freddy   > Patient is recently dx with metastatic prostate cancer just 1.5 weeks ago and he is treatment oriented.  > Offered caregiver Sw referral to patient's wife- DECLINED   > 10/21: Began advanced care planning discussions with patient's son, Freddy. Pt's wife stayed overnight so is sleeping. Will plan to speak to his wife regarding further goc.  > 10/23: See GOC note above.

## 2024-10-23 NOTE — BH CONSULTATION LIAISON PROGRESS NOTE - NSBHCONSULTFOLLOWAFTERCARE_PSY_A_CORE FT
If oncology treatments are being planned for at [facility], oncology team to refer patient to be seen by outpatient psycho-oncologist Dr Franklin Hatfield there.     [place] walk [facility]  [phone] [place] [id number] [phone]

## 2024-10-23 NOTE — CONSULT NOTE ADULT - CONSULT REQUESTED DATE/TIME
19-Oct-2024 16:00
12-Oct-2024 11:41
21-Oct-2024 22:42
22-Oct-2024 14:17
19-Oct-2024 12:55
19-Oct-2024 13:31
19-Oct-2024 13:53
23-Oct-2024 12:09
23-Oct-2024 12:09
13-Oct-2024 11:54
15-Oct-2024 10:15
18-Oct-2024 12:55
18-Oct-2024 19:44
17-Oct-2024 16:29
22-Oct-2024 13:19
14-Oct-2024 13:14

## 2024-10-23 NOTE — CONSULT NOTE ADULT - ASSESSMENT
64-year-old male with metastatic prostate cancer, sent in by hematologist from [facility] and cancer for uncontrolled pain, nausea, vomiting.   Patient reports diffuse pain starting 6 months ago significantly worsening for the past 2 weeks. Diagnosed with high risk high volume metastatic prostate cancer with bone, liver lymph node mets and presacral mass. Liver biopsy confirmed disease with . Started lupron, loly/pred with plans to initiate taxotere.   nephrology consulted for electrolyte abnormalities     Hypernatremia  likely dehydration currently still NPO  uncontrolled FS  Na 159->154 in 24hrs on d5  corrected Na with glucose 158  change d5 to sodium chloride 0.225%. 1000 milliLiter(s) (75 mL/Hr) x12 hrs  please repeat bmp @ 6pm. please call nephro with result  f/u endo to optimize dm control  avoid overcorrection. na should not correct 6-8meq in 24 hrs  monitor     hypophosphatemia  supplement kphos 15x1 by team  monitor  supplement more if needed    hypocalcemia   sec to low albumin  corrected ca 8.5  monitor    fever  GNR bacteremia   work up and tx per team    anemia   metastatic prostate cancer  f/u heme/onc

## 2024-10-24 LAB
-  AMPICILLIN/SULBACTAM: SIGNIFICANT CHANGE UP
-  AMPICILLIN: SIGNIFICANT CHANGE UP
-  AZTREONAM: SIGNIFICANT CHANGE UP
-  CEFAZOLIN: SIGNIFICANT CHANGE UP
-  CEFEPIME: SIGNIFICANT CHANGE UP
-  CEFOXITIN: SIGNIFICANT CHANGE UP
-  CEFTRIAXONE: SIGNIFICANT CHANGE UP
-  CIPROFLOXACIN: SIGNIFICANT CHANGE UP
-  ERTAPENEM: SIGNIFICANT CHANGE UP
-  GENTAMICIN: SIGNIFICANT CHANGE UP
-  IMIPENEM: SIGNIFICANT CHANGE UP
-  LEVOFLOXACIN: SIGNIFICANT CHANGE UP
-  MEROPENEM: SIGNIFICANT CHANGE UP
-  PIPERACILLIN/TAZOBACTAM: SIGNIFICANT CHANGE UP
-  TOBRAMYCIN: SIGNIFICANT CHANGE UP
-  TRIMETHOPRIM/SULFAMETHOXAZOLE: SIGNIFICANT CHANGE UP
ACANTHOCYTES BLD QL SMEAR: SLIGHT — SIGNIFICANT CHANGE UP
ANION GAP SERPL CALC-SCNC: 10 MMOL/L — SIGNIFICANT CHANGE UP (ref 7–14)
ANION GAP SERPL CALC-SCNC: 11 MMOL/L — SIGNIFICANT CHANGE UP (ref 7–14)
ANION GAP SERPL CALC-SCNC: 12 MMOL/L — SIGNIFICANT CHANGE UP (ref 7–14)
BASOPHILS # BLD AUTO: 0.04 K/UL — SIGNIFICANT CHANGE UP (ref 0–0.2)
BASOPHILS NFR BLD AUTO: 0.5 % — SIGNIFICANT CHANGE UP (ref 0–2)
BUN SERPL-MCNC: 38 MG/DL — HIGH (ref 7–23)
BUN SERPL-MCNC: 42 MG/DL — HIGH (ref 7–23)
BUN SERPL-MCNC: 43 MG/DL — HIGH (ref 7–23)
CALCIUM SERPL-MCNC: 6.5 MG/DL — CRITICAL LOW (ref 8.4–10.5)
CALCIUM SERPL-MCNC: 6.6 MG/DL — LOW (ref 8.4–10.5)
CALCIUM SERPL-MCNC: 6.7 MG/DL — LOW (ref 8.4–10.5)
CHLORIDE SERPL-SCNC: 118 MMOL/L — HIGH (ref 98–107)
CHLORIDE SERPL-SCNC: 120 MMOL/L — HIGH (ref 98–107)
CHLORIDE SERPL-SCNC: 120 MMOL/L — HIGH (ref 98–107)
CO2 SERPL-SCNC: 19 MMOL/L — LOW (ref 22–31)
CO2 SERPL-SCNC: 19 MMOL/L — LOW (ref 22–31)
CO2 SERPL-SCNC: 20 MMOL/L — LOW (ref 22–31)
CREAT SERPL-MCNC: 0.61 MG/DL — SIGNIFICANT CHANGE UP (ref 0.5–1.3)
CREAT SERPL-MCNC: 0.68 MG/DL — SIGNIFICANT CHANGE UP (ref 0.5–1.3)
CREAT SERPL-MCNC: 0.74 MG/DL — SIGNIFICANT CHANGE UP (ref 0.5–1.3)
CULTURE RESULTS: ABNORMAL
EGFR: 101 ML/MIN/1.73M2 — SIGNIFICANT CHANGE UP
EGFR: 103 ML/MIN/1.73M2 — SIGNIFICANT CHANGE UP
EGFR: 107 ML/MIN/1.73M2 — SIGNIFICANT CHANGE UP
EOSINOPHIL # BLD AUTO: 0 K/UL — SIGNIFICANT CHANGE UP (ref 0–0.5)
EOSINOPHIL NFR BLD AUTO: 0 % — SIGNIFICANT CHANGE UP (ref 0–6)
GIANT PLATELETS BLD QL SMEAR: PRESENT — SIGNIFICANT CHANGE UP
GLUCOSE BLDC GLUCOMTR-MCNC: 139 MG/DL — HIGH (ref 70–99)
GLUCOSE BLDC GLUCOMTR-MCNC: 155 MG/DL — HIGH (ref 70–99)
GLUCOSE BLDC GLUCOMTR-MCNC: 161 MG/DL — HIGH (ref 70–99)
GLUCOSE BLDC GLUCOMTR-MCNC: 192 MG/DL — HIGH (ref 70–99)
GLUCOSE BLDC GLUCOMTR-MCNC: 199 MG/DL — HIGH (ref 70–99)
GLUCOSE SERPL-MCNC: 142 MG/DL — HIGH (ref 70–99)
GLUCOSE SERPL-MCNC: 151 MG/DL — HIGH (ref 70–99)
GLUCOSE SERPL-MCNC: 175 MG/DL — HIGH (ref 70–99)
HCT VFR BLD CALC: 24.4 % — LOW (ref 39–50)
HCT VFR BLD CALC: 25.6 % — LOW (ref 39–50)
HGB BLD-MCNC: 8 G/DL — LOW (ref 13–17)
HGB BLD-MCNC: 8.4 G/DL — LOW (ref 13–17)
IANC: 6.95 K/UL — SIGNIFICANT CHANGE UP (ref 1.8–7.4)
IMM GRANULOCYTES NFR BLD AUTO: 8.3 % — HIGH (ref 0–0.9)
LYMPHOCYTES # BLD AUTO: 0.83 K/UL — LOW (ref 1–3.3)
LYMPHOCYTES # BLD AUTO: 9.5 % — LOW (ref 13–44)
MAGNESIUM SERPL-MCNC: 2.7 MG/DL — HIGH (ref 1.6–2.6)
MAGNESIUM SERPL-MCNC: 2.7 MG/DL — HIGH (ref 1.6–2.6)
MAGNESIUM SERPL-MCNC: 2.8 MG/DL — HIGH (ref 1.6–2.6)
MANUAL SMEAR VERIFICATION: SIGNIFICANT CHANGE UP
MCHC RBC-ENTMCNC: 30.8 PG — SIGNIFICANT CHANGE UP (ref 27–34)
MCHC RBC-ENTMCNC: 30.8 PG — SIGNIFICANT CHANGE UP (ref 27–34)
MCHC RBC-ENTMCNC: 32.8 GM/DL — SIGNIFICANT CHANGE UP (ref 32–36)
MCHC RBC-ENTMCNC: 32.8 GM/DL — SIGNIFICANT CHANGE UP (ref 32–36)
MCV RBC AUTO: 93.8 FL — SIGNIFICANT CHANGE UP (ref 80–100)
MCV RBC AUTO: 93.8 FL — SIGNIFICANT CHANGE UP (ref 80–100)
METHOD TYPE: SIGNIFICANT CHANGE UP
MONOCYTES # BLD AUTO: 0.16 K/UL — SIGNIFICANT CHANGE UP (ref 0–0.9)
MONOCYTES NFR BLD AUTO: 1.8 % — LOW (ref 2–14)
NEUTROPHILS # BLD AUTO: 6.95 K/UL — SIGNIFICANT CHANGE UP (ref 1.8–7.4)
NEUTROPHILS NFR BLD AUTO: 79.9 % — HIGH (ref 43–77)
NEUTS BAND # BLD: 1.8 % — SIGNIFICANT CHANGE UP (ref 0–6)
NRBC # BLD: 12 /100 WBCS — HIGH (ref 0–0)
NRBC # BLD: 7 /100 WBCS — HIGH (ref 0–0)
NRBC # BLD: 7 /100 WBCS — HIGH (ref 0–0)
NRBC # FLD: 0.62 K/UL — HIGH (ref 0–0)
NRBC # FLD: 0.72 K/UL — HIGH (ref 0–0)
ORGANISM # SPEC MICROSCOPIC CNT: ABNORMAL
ORGANISM # SPEC MICROSCOPIC CNT: ABNORMAL
PHOSPHATE SERPL-MCNC: 2.5 MG/DL — SIGNIFICANT CHANGE UP (ref 2.5–4.5)
PHOSPHATE SERPL-MCNC: 2.6 MG/DL — SIGNIFICANT CHANGE UP (ref 2.5–4.5)
PHOSPHATE SERPL-MCNC: 2.6 MG/DL — SIGNIFICANT CHANGE UP (ref 2.5–4.5)
PLAT MORPH BLD: NORMAL — SIGNIFICANT CHANGE UP
PLATELET # BLD AUTO: 40 K/UL — LOW (ref 150–400)
PLATELET # BLD AUTO: 40 K/UL — LOW (ref 150–400)
PLATELET COUNT - ESTIMATE: ABNORMAL
POIKILOCYTOSIS BLD QL AUTO: SLIGHT — SIGNIFICANT CHANGE UP
POTASSIUM SERPL-MCNC: 3.8 MMOL/L — SIGNIFICANT CHANGE UP (ref 3.5–5.3)
POTASSIUM SERPL-MCNC: 3.8 MMOL/L — SIGNIFICANT CHANGE UP (ref 3.5–5.3)
POTASSIUM SERPL-MCNC: 4 MMOL/L — SIGNIFICANT CHANGE UP (ref 3.5–5.3)
POTASSIUM SERPL-SCNC: 3.8 MMOL/L — SIGNIFICANT CHANGE UP (ref 3.5–5.3)
POTASSIUM SERPL-SCNC: 3.8 MMOL/L — SIGNIFICANT CHANGE UP (ref 3.5–5.3)
POTASSIUM SERPL-SCNC: 4 MMOL/L — SIGNIFICANT CHANGE UP (ref 3.5–5.3)
RBC # BLD: 2.6 M/UL — LOW (ref 4.2–5.8)
RBC # BLD: 2.73 M/UL — LOW (ref 4.2–5.8)
RBC # FLD: 17.6 % — HIGH (ref 10.3–14.5)
RBC # FLD: 17.9 % — HIGH (ref 10.3–14.5)
RBC BLD AUTO: ABNORMAL
SCHISTOCYTES BLD QL AUTO: SLIGHT — SIGNIFICANT CHANGE UP
SODIUM SERPL-SCNC: 147 MMOL/L — HIGH (ref 135–145)
SODIUM SERPL-SCNC: 151 MMOL/L — HIGH (ref 135–145)
SODIUM SERPL-SCNC: 151 MMOL/L — HIGH (ref 135–145)
WBC # BLD: 10.25 K/UL — SIGNIFICANT CHANGE UP (ref 3.8–10.5)
WBC # BLD: 8.7 K/UL — SIGNIFICANT CHANGE UP (ref 3.8–10.5)
WBC # FLD AUTO: 10.25 K/UL — SIGNIFICANT CHANGE UP (ref 3.8–10.5)
WBC # FLD AUTO: 8.7 K/UL — SIGNIFICANT CHANGE UP (ref 3.8–10.5)

## 2024-10-24 PROCEDURE — 99233 SBSQ HOSP IP/OBS HIGH 50: CPT

## 2024-10-24 RX ORDER — DEXAMETHASONE 1.5 MG/1
2 TABLET ORAL DAILY
Refills: 0 | Status: COMPLETED | OUTPATIENT
Start: 2024-10-24 | End: 2024-10-27

## 2024-10-24 RX ORDER — SODIUM CHLORIDE 9 MG/ML
1000 INJECTION, SOLUTION INTRAMUSCULAR; INTRAVENOUS; SUBCUTANEOUS
Refills: 0 | Status: DISCONTINUED | OUTPATIENT
Start: 2024-10-24 | End: 2024-10-24

## 2024-10-24 RX ORDER — HALOPERIDOL 2 MG
2.5 TABLET ORAL ONCE
Refills: 0 | Status: COMPLETED | OUTPATIENT
Start: 2024-10-24 | End: 2024-10-24

## 2024-10-24 RX ORDER — 0.9 % SODIUM CHLORIDE 0.9 %
1000 INTRAVENOUS SOLUTION INTRAVENOUS
Refills: 0 | Status: DISCONTINUED | OUTPATIENT
Start: 2024-10-24 | End: 2024-10-28

## 2024-10-24 RX ADMIN — Medication 1 TABLET(S): at 11:26

## 2024-10-24 RX ADMIN — PIPERACILLIN SODIUM AND TAZOBACTAM SODIUM 25 GRAM(S): 4; .5 INJECTION, POWDER, LYOPHILIZED, FOR SOLUTION INTRAVENOUS at 21:48

## 2024-10-24 RX ADMIN — DEXAMETHASONE 2 MILLIGRAM(S): 1.5 TABLET ORAL at 15:08

## 2024-10-24 RX ADMIN — Medication 145 MILLIGRAM(S): at 11:26

## 2024-10-24 RX ADMIN — CHLORHEXIDINE GLUCONATE 1 APPLICATION(S): 1.2 RINSE ORAL at 11:28

## 2024-10-24 RX ADMIN — ONDANSETRON HYDROCHLORIDE 4 MILLIGRAM(S): 4 TABLET, FILM COATED ORAL at 02:17

## 2024-10-24 RX ADMIN — Medication 1 MG/HR: at 08:35

## 2024-10-24 RX ADMIN — PANTOPRAZOLE SODIUM 40 MILLIGRAM(S): 40 TABLET, DELAYED RELEASE ORAL at 11:26

## 2024-10-24 RX ADMIN — Medication 100 GRAM(S): at 19:45

## 2024-10-24 RX ADMIN — ONDANSETRON HYDROCHLORIDE 4 MILLIGRAM(S): 4 TABLET, FILM COATED ORAL at 11:25

## 2024-10-24 RX ADMIN — Medication 75 MILLILITER(S): at 08:34

## 2024-10-24 RX ADMIN — PIPERACILLIN SODIUM AND TAZOBACTAM SODIUM 25 GRAM(S): 4; .5 INJECTION, POWDER, LYOPHILIZED, FOR SOLUTION INTRAVENOUS at 02:17

## 2024-10-24 RX ADMIN — ONDANSETRON HYDROCHLORIDE 4 MILLIGRAM(S): 4 TABLET, FILM COATED ORAL at 21:48

## 2024-10-24 RX ADMIN — Medication 2.5 MILLIGRAM(S): at 16:37

## 2024-10-24 RX ADMIN — PIPERACILLIN SODIUM AND TAZOBACTAM SODIUM 25 GRAM(S): 4; .5 INJECTION, POWDER, LYOPHILIZED, FOR SOLUTION INTRAVENOUS at 11:25

## 2024-10-24 NOTE — PROGRESS NOTE ADULT - SUBJECTIVE AND OBJECTIVE BOX
Date of Service: 10-24-24 @ 12:24    Patient is a 64y old  Male who presents with a chief complaint of Pain (24 Oct 2024 11:19)      Any change in ROS: he seems OK:  more alert and oriented today :   -no resp distress:   on rooma ir:  wife at bedside     MEDICATIONS  (STANDING):  abiraterone 500 mg tablet 2 Tablet(s) 2 Tablet(s) Oral daily  atorvastatin 20 milliGRAM(s) Oral at bedtime  chlorhexidine 2% Cloths 1 Application(s) Topical daily  dextrose 5%. 1000 milliLiter(s) (50 mL/Hr) IV Continuous <Continuous>  dextrose 5%. 1000 milliLiter(s) (100 mL/Hr) IV Continuous <Continuous>  dextrose 5%. 1000 milliLiter(s) (75 mL/Hr) IV Continuous <Continuous>  dextrose 5%. 1000 milliLiter(s) (130 mL/Hr) IV Continuous <Continuous>  dextrose 50% Injectable 25 Gram(s) IV Push once  dextrose 50% Injectable 25 Gram(s) IV Push once  dextrose 50% Injectable 12.5 Gram(s) IV Push once  fenofibrate Tablet 145 milliGRAM(s) Oral daily  glucagon  Injectable 1 milliGRAM(s) IntraMuscular once  haloperidol    Injectable 2.5 milliGRAM(s) IntraMuscular once  influenza  Vaccine (HIGH DOSE) 0.5 milliLiter(s) IntraMuscular once  insulin lispro (ADMELOG) corrective regimen sliding scale   SubCutaneous every 6 hours  lactobacillus acidophilus 1 Tablet(s) Oral daily  lidocaine   4% Patch 1 Patch Transdermal every 24 hours  morphine  Infusion. 1 mG/Hr (1 mL/Hr) IV Continuous <Continuous>  ondansetron Injectable 4 milliGRAM(s) IV Push every 8 hours  pantoprazole  Injectable 40 milliGRAM(s) IV Push daily  piperacillin/tazobactam IVPB.. 3.375 Gram(s) IV Intermittent every 8 hours    MEDICATIONS  (PRN):  acetaminophen     Tablet .. 650 milliGRAM(s) Oral every 6 hours PRN Temp greater or equal to 38C (100.4F), Mild Pain (1 - 3), Moderate Pain (4 - 6)  aluminum hydroxide/magnesium hydroxide/simethicone Suspension 30 milliLiter(s) Oral every 4 hours PRN Dyspepsia  artificial tears (preservative free) Ophthalmic Solution 1 Drop(s) Both EYES four times a day PRN Dry Eyes  dextrose Oral Gel 15 Gram(s) Oral once PRN Blood Glucose LESS THAN 70 milliGRAM(s)/deciliter  morphine  - Injectable 4 milliGRAM(s) IV Push every 3 hours PRN Severe Pain (7 - 10)  naloxone Injectable 0.1 milliGRAM(s) IV Push every 3 minutes PRN For ANY of the following changes in patient status:  A. RR LESS THAN 10 breaths per minute, B. Oxygen saturation LESS THAN 90%, C. Sedation score of 6  polyethylene glycol 3350 17 Gram(s) Oral daily PRN Constipation    Vital Signs Last 24 Hrs  T(C): 38.2 (24 Oct 2024 08:57), Max: 38.2 (24 Oct 2024 08:57)  T(F): 100.7 (24 Oct 2024 08:57), Max: 100.7 (24 Oct 2024 08:57)  HR: 94 (24 Oct 2024 08:57) (94 - 105)  BP: 96/62 (24 Oct 2024 08:57) (96/62 - 101/67)  BP(mean): --  RR: 16 (24 Oct 2024 08:57) (12 - 16)  SpO2: 93% (24 Oct 2024 08:57) (93% - 98%)    Parameters below as of 24 Oct 2024 08:57  Patient On (Oxygen Delivery Method): room air        I&O's Summary    23 Oct 2024 07:01  -  24 Oct 2024 07:00  --------------------------------------------------------  IN: 350 mL / OUT: 940 mL / NET: -590 mL          Physical Exam:   GENERAL: NAD, well-groomed, well-developed  HEENT: CHAVA/   Atraumatic, Normocephalic  ENMT: No tonsillar erythema, exudates, or enlargement; Moist mucous membranes, Good dentition, No lesions  NECK: Supple, No JVD, Normal thyroid  CHEST/LUNG: Clear to auscultaion----no wheezing  CVS: Regular rate and rhythm; No murmurs, rubs, or gallops  GI: : Soft, Nontender, Nondistended; Bowel sounds present  NERVOUS SYSTEM:  Alert & awake and responsive to common questions pretty well ;  EXTREMITIES:  - edema  LYMPH: No lymphadenopathy noted  SKIN: No rashes or lesions  ENDOCRINOLOGY: No Thyromegaly  PSYCH:calm     Labs:  18, 16, 18                            8.0    8.70  )-----------( 40       ( 24 Oct 2024 09:12 )             24.4                         8.4    10.25 )-----------( 40       ( 24 Oct 2024 02:00 )             25.6                         8.3    13.21 )-----------( 44       ( 23 Oct 2024 18:00 )             25.1                         8.6    18.56 )-----------( 52       ( 23 Oct 2024 06:00 )             25.9                         9.1    18.56 )-----------( 30       ( 22 Oct 2024 21:22 )             28.0                         9.7    14.94 )-----------( 23       ( 22 Oct 2024 11:10 )             29.4                         9.1    15.73 )-----------( 23       ( 22 Oct 2024 06:09 )             27.2                         7.4    12.46 )-----------( 28       ( 21 Oct 2024 17:15 )             21.9     10-24    151[H]  |  120[H]  |  42[H]  ----------------------------<  151[H]  3.8   |  20[L]  |  0.68  10-24    151[H]  |  120[H]  |  43[H]  ----------------------------<  142[H]  4.0   |  19[L]  |  0.74  10-23    151[H]  |  119[H]  |  37[H]  ----------------------------<  180[H]  4.2   |  21[L]  |  0.64  10-23    154[H]  |  122[H]  |  45[H]  ----------------------------<  282[H]  4.7   |  20[L]  |  0.71  10-22    153[H]  |  124[H]  |  52[H]  ----------------------------<  333[H]  5.6[H]   |  18[L]  |  0.81  10-22    158[H]  |  127[H]  |  54[H]  ----------------------------<  328[H]  4.7   |  19[L]  |  0.94  10-22    159[H]  |  126[H]  |  57[H]  ----------------------------<  291[H]  5.0   |  18[L]  |  0.93  10-22    159[H]  |  128[H]  |  59[H]  ----------------------------<  269[H]  4.5   |  19[L]  |  0.94  10-21    151[H]  |  121[H]  |  57[H]  ----------------------------<  459[HH]  4.2   |  18[L]  |  0.85  10-21    152[H]  |  124[H]  |  60[H]  ----------------------------<  262[H]  4.2   |  18[L]  |  0.96    Ca    6.6[L]      24 Oct 2024 09:12  Ca    6.7[L]      24 Oct 2024 02:00  Ca    6.7[L]      23 Oct 2024 18:00  Ca    6.9[L]      23 Oct 2024 06:00  Ca    6.8[L]      22 Oct 2024 21:22  Phos  2.6     10-24  Phos  2.6     10-24  Phos  1.9     10-23  Phos  2.2     10-22  Mg     2.80     10-24  Mg     2.70     10-24  Mg     2.70     10-23  Mg     2.90     10-23  Mg     3.00     10-22    TPro  4.2[L]  /  Alb  2.2[L]  /  TBili  0.9  /  DBili  x   /  AST  44[H]  /  ALT  15  /  AlkPhos  107  10-23  TPro  4.6[L]  /  Alb  1.9[L]  /  TBili  1.7[H]  /  DBili  x   /  AST  77[H]  /  ALT  17  /  AlkPhos  53  10-20    CAPILLARY BLOOD GLUCOSE      POCT Blood Glucose.: 139 mg/dL (24 Oct 2024 08:22)  POCT Blood Glucose.: 155 mg/dL (24 Oct 2024 00:46)  POCT Blood Glucose.: 193 mg/dL (23 Oct 2024 17:57)  POCT Blood Glucose.: 233 mg/dL (23 Oct 2024 13:51)      LIVER FUNCTIONS - ( 23 Oct 2024 18:00 )  Alb: 2.2 g/dL / Pro: 4.2 g/dL / ALK PHOS: 107 U/L / ALT: 15 U/L / AST: 44 U/L / GGT: x             Urinalysis Basic - ( 24 Oct 2024 09:12 )    Color: x / Appearance: x / SG: x / pH: x  Gluc: 151 mg/dL / Ketone: x  / Bili: x / Urobili: x   Blood: x / Protein: x / Nitrite: x   Leuk Esterase: x / RBC: x / WBC x   Sq Epi: x / Non Sq Epi: x / Bacteria: x            RECENT CULTURES:  10-22 @ 15:09 Clean Catch Clean Catch (Midstream)                <10,000 CFU/mL Normal Urogenital Sherlyn    10-22 @ 06:05 .Blood BLOOD   ARJUN    Growth in aerobic bottle: Gram Negative Rods    Escherichia coli  Escherichia coli     Growth in aerobic bottle: Escherichia coli    10-21 @ 20:05 .Blood BLOOD         rqad< from: Xray Chest 1 View- PORTABLE-Urgent (Xray Chest 1 View- PORTABLE-Urgent .) (10.20.24 @ 11:02) >  APOLINAR CEDENO     PROCEDURE DATE:  10/20/2024          INTERPRETATION:  EXAMINATION: XR CHEST URGENT    CLINICAL INDICATION: Bacteremia. Evaluate for pneumonia.    TECHNIQUE: Single frontal view of the chest was obtained.    COMPARISON: Chest x-ray 10/17/2024.    FINDINGS:  No focal consolidation, large pleural effusion or pneumothorax.  The heart size cannot be accurately assessed on this projection.  No acute osseous abnormalities.      IMPRESSION:  No focal consolidations.    --- End of Report ---          PRABHA MONET MD; Resident Radiologist  This document has been electronically signed.  LEA COLE MD; Attending Interventional Radiologist  This document has been electronically signed. Oct 20 2024 11:53AM    < end of copied text >         No growth at 48 Hours    10-21 @ 17:33 .Blood BLOOD                No growth at 48 Hours    10-19 @ 20:05 .Blood BLOOD       Growth in aerobic bottle: Gram Negative Rods  Growth in anaerobic bottle: Gram Negative Rods           Growth in aerobic and anaerobic bottles: Escherichia coli  See previous culture [id number]    10-19 @ 19:50 .Blood BLOOD       Growth in anaerobic bottle: Gram Negative Rods  Growth in aerobic bottle: Gram Negative Rods           Growth in aerobic and anaerobic bottles: Escherichia coli  See previous culture [id number]    10-19 @ 05:26 .Blood BLOOD       Growth in aerobic and anaerobic bottles: Gram Negative Rods and Gram  Negative Coccobacilli           Growth in aerobic and anaerobic bottles: Escherichia coli  See previous culture [id number]    10-19 @ 04:55 .Blood BLOOD   PCR    Growth in aerobic and anaerobic bottles: Gram Negative Rods    Blood Culture PCR  Escherichia coli  Blood Culture PCR     Growth in aerobic and anaerobic bottles: Escherichia coli  Direct identification is available within approximately 3-5  hours either by Blood Panel Multiplexed PCR or Direct  MALDI-TOF. Details: [email or web address]          RESPIRATORY CULTURES:      Clostridium difficile [facility] Toxins A&amp;B, EIA:   Negative (10-18 @ 23:53)      Studies  Chest X-RAY  CT SCAN Chest   Venous Dopplers: LE:   CT Abdomen  Others

## 2024-10-24 NOTE — PROGRESS NOTE ADULT - PROBLEM SELECTOR PLAN 5
CTa/p: Dissection of the SMA. Presacral mass with periaortic lymphadenopathy and lytic vertebral metastases with pathologic fracture at L1 and L5 are again seen.  > Vascular surgery recs- No vascular surgery interventions   > Unable to start heparin as patient with blood in stool and with thrombocytopenia   > Appreciate ID recs- pt found to have E.coli bacteremia and H.influenza bacteremia. On zosyn- f/u repeat blood cx  > NPO but advance diet as tolerated per medical team- f/u repeat S&S

## 2024-10-24 NOTE — PROGRESS NOTE ADULT - SUBJECTIVE AND OBJECTIVE BOX
[facility]   MD MARKO EASTMAN MD ANGELA WONG, PA    TEL:  OFFICE: [phone]  From 5pm-7am Answering Service [phone]    -- RENAL FOLLOW UP NOTE ---Date of Service 10-24-24 @ 11:19    Patient is a 64y old  Male who presents with a chief complaint of Pain (23 Oct 2024 14:43)      Patient seen and examined at bedside.   VITALS:  T(F): 100.7 (10-24-24 @ 08:57), Max: 100.7 (10-24-24 @ 08:57)  HR: 94 (10-24-24 @ 08:57)  BP: 96/62 (10-24-24 @ 08:57)  RR: 16 (10-24-24 @ 08:57)  SpO2: 93% (10-24-24 @ 08:57)  Wt(kg): --    10-23 @ 07:01  -  10-24 @ 07:00  --------------------------------------------------------  IN: 350 mL / OUT: 940 mL / NET: -590 mL          PHYSICAL EXAM:  General: asleep  Neck: No JVD  Respiratory: CTAB, no wheezes, rales or rhonchi  Cardiovascular: S1, S2, RRR  Gastrointestinal: BS+, soft, NT/ND  Extremities: No peripheral edema    Hospital Medications:   MEDICATIONS  (STANDING):  abiraterone 500 mg tablet 2 Tablet(s) 2 Tablet(s) Oral daily  atorvastatin 20 milliGRAM(s) Oral at bedtime  chlorhexidine 2% Cloths 1 Application(s) Topical daily  dextrose 5%. 1000 milliLiter(s) (75 mL/Hr) IV Continuous <Continuous>  dextrose 5%. 1000 milliLiter(s) (100 mL/Hr) IV Continuous <Continuous>  dextrose 5%. 1000 milliLiter(s) (130 mL/Hr) IV Continuous <Continuous>  dextrose 5%. 1000 milliLiter(s) (50 mL/Hr) IV Continuous <Continuous>  dextrose 50% Injectable 25 Gram(s) IV Push once  dextrose 50% Injectable 12.5 Gram(s) IV Push once  dextrose 50% Injectable 25 Gram(s) IV Push once  fenofibrate Tablet 145 milliGRAM(s) Oral daily  glucagon  Injectable 1 milliGRAM(s) IntraMuscular once  haloperidol    Injectable 2.5 milliGRAM(s) IntraMuscular once  influenza  Vaccine (HIGH DOSE) 0.5 milliLiter(s) IntraMuscular once  insulin lispro (ADMELOG) corrective regimen sliding scale   SubCutaneous every 6 hours  lactobacillus acidophilus 1 Tablet(s) Oral daily  lidocaine   4% Patch 1 Patch Transdermal every 24 hours  morphine  Infusion. 1 mG/Hr (1 mL/Hr) IV Continuous <Continuous>  ondansetron Injectable 4 milliGRAM(s) IV Push every 8 hours  pantoprazole  Injectable 40 milliGRAM(s) IV Push daily  piperacillin/tazobactam IVPB.. 3.375 Gram(s) IV Intermittent every 8 hours      LABS:  10-24    151[H]  |  120[H]  |  42[H]  ----------------------------<  151[H]  3.8   |  20[L]  |  0.68    Ca    6.6[L]      24 Oct 2024 09:12  Phos  2.6     10-24  Mg     2.80     10-24    TPro  4.2[L]  /  Alb  2.2[L]  /  TBili  0.9  /  DBili      /  AST  44[H]  /  ALT  15  /  AlkPhos  107  10-23    Creatinine Trend: 0.68 <--, 0.74 <--, 0.64 <--, 0.71 <--, 0.81 <--, 0.94 <--, 0.93 <--, 0.94 <--, 0.85 <--, 0.96 <--, 0.97 <--, 1.04 <--, 1.15 <--, 0.76 <--, 0.74 <--    Phosphorus: 2.6 mg/dL (10-24 @ 09:12)  Phosphorus: 2.6 mg/dL (10-24 @ 02:00)  Albumin: 2.2 g/dL (10-23 @ 18:00)                              8.0    8.70  )-----------( 40       ( 24 Oct 2024 09:12 )             24.4     Urine Studies:  Urinalysis - [10-24-24 @ 09:12]      Color  / Appearance  / SG  / pH       Gluc 151 / Ketone   / Bili  / Urobili        Blood  / Protein  / Leuk Est  / Nitrite       RBC  / WBC  / Hyaline  / Gran  / Sq Epi  / Non Sq Epi  / Bacteria       TSH 2.62      [10-13-24 @ 05:30]  Lipid: chol 135, , HDL 39, LDL --      [10-13-24 @ 05:30]        RADIOLOGY & ADDITIONAL STUDIES:

## 2024-10-24 NOTE — PROGRESS NOTE ADULT - ASSESSMENT
64-year-old male with metastatic prostate cancer, sent in by hematologist from [facility] and cancer for uncontrolled pain, nausea, vomiting.   Patient reports diffuse pain starting 6 months ago significantly worsening for the past 2 weeks. Diagnosed with high risk high volume metastatic prostate cancer with bone, liver lymph node mets and presacral mass. Liver biopsy confirmed disease with . Started lupron, loly/pred with plans to initiate taxotere.   nephrology consulted for electrolyte abnormalities     Hypernatremia  likely dehydration currently still NPO  corrected na with glucose 158 -> 152 this am  s/p sodium chloride 0.225% 0/23  Na improving appropriately. glucose much better.    will start d5@ 75cc/hr x12 hrs. repeat bmp @ 2pm. please call nephro with result  avoid overcorrection. na should not correct 6-8meq in 24 hrs  monitor     hypophosphatemia  supplemented  better  monitor  supplement more if needed    hypocalcemia   sec to low albumin  corrected ca 8.5  monitor    fever  GNR bacteremia   work up and tx per team    anemia   metastatic prostate cancer  f/u heme/onc

## 2024-10-24 NOTE — PROGRESS NOTE ADULT - SUBJECTIVE AND OBJECTIVE BOX
Sodium stable 151, remains on restraints, H&H 8.0, plts 40K, Febrile 100.7      MEDICATIONS  (STANDING):  abiraterone 500 mg tablet 2 Tablet(s) 2 Tablet(s) Oral daily  atorvastatin 20 milliGRAM(s) Oral at bedtime  chlorhexidine 2% Cloths 1 Application(s) Topical daily  dextrose 5%. 1000 milliLiter(s) (130 mL/Hr) IV Continuous <Continuous>  dextrose 5%. 1000 milliLiter(s) (75 mL/Hr) IV Continuous <Continuous>  dextrose 5%. 1000 milliLiter(s) (50 mL/Hr) IV Continuous <Continuous>  dextrose 5%. 1000 milliLiter(s) (100 mL/Hr) IV Continuous <Continuous>  dextrose 50% Injectable 12.5 Gram(s) IV Push once  dextrose 50% Injectable 25 Gram(s) IV Push once  dextrose 50% Injectable 25 Gram(s) IV Push once  fenofibrate Tablet 145 milliGRAM(s) Oral daily  glucagon  Injectable 1 milliGRAM(s) IntraMuscular once  haloperidol    Injectable 2.5 milliGRAM(s) IntraMuscular once  influenza  Vaccine (HIGH DOSE) 0.5 milliLiter(s) IntraMuscular once  insulin lispro (ADMELOG) corrective regimen sliding scale   SubCutaneous every 6 hours  lactobacillus acidophilus 1 Tablet(s) Oral daily  lidocaine   4% Patch 1 Patch Transdermal every 24 hours  morphine  Infusion. 1 mG/Hr (1 mL/Hr) IV Continuous <Continuous>  ondansetron Injectable 4 milliGRAM(s) IV Push every 8 hours  pantoprazole  Injectable 40 milliGRAM(s) IV Push daily  piperacillin/tazobactam IVPB.. 3.375 Gram(s) IV Intermittent every 8 hours    MEDICATIONS  (PRN):  acetaminophen     Tablet .. 650 milliGRAM(s) Oral every 6 hours PRN Temp greater or equal to 38C (100.4F), Mild Pain (1 - 3), Moderate Pain (4 - 6)  aluminum hydroxide/magnesium hydroxide/simethicone Suspension 30 milliLiter(s) Oral every 4 hours PRN Dyspepsia  artificial tears (preservative free) Ophthalmic Solution 1 Drop(s) Both EYES four times a day PRN Dry Eyes  dextrose Oral Gel 15 Gram(s) Oral once PRN Blood Glucose LESS THAN 70 milliGRAM(s)/deciliter  morphine  - Injectable 4 milliGRAM(s) IV Push every 3 hours PRN Severe Pain (7 - 10)  naloxone Injectable 0.1 milliGRAM(s) IV Push every 3 minutes PRN For ANY of the following changes in patient status:  A. RR LESS THAN 10 breaths per minute, B. Oxygen saturation LESS THAN 90%, C. Sedation score of 6  polyethylene glycol 3350 17 Gram(s) Oral daily PRN Constipation      Vital Signs Last 24 Hrs  T(C): 38.2 (24 Oct 2024 08:57), Max: 38.2 (24 Oct 2024 08:57)  T(F): 100.7 (24 Oct 2024 08:57), Max: 100.7 (24 Oct 2024 08:57)  HR: 94 (24 Oct 2024 08:57) (86 - 105)  BP: 96/62 (24 Oct 2024 08:57) (96/62 - 113/76)  BP(mean): --  RR: 16 (24 Oct 2024 08:57) (12 - 18)  SpO2: 93% (24 Oct 2024 08:57) (93% - 98%)    Parameters below as of 24 Oct 2024 08:57  Patient On (Oxygen Delivery Method): room air        PE  NAD  Anicteric, MMM  RRR  CTAB  Abd soft, NT, ND  No c/c/e  No rash grossly                            8.0    8.70  )-----------( 40       ( 24 Oct 2024 09:12 )             24.4       10-24    151[H]  |  120[H]  |  42[H]  ----------------------------<  151[H]  3.8   |  20[L]  |  0.68    Ca    6.6[L]      24 Oct 2024 09:12  Phos  2.6     10-24  Mg     2.80     10-24    TPro  4.2[L]  /  Alb  2.2[L]  /  TBili  0.9  /  DBili  x   /  AST  44[H]  /  ALT  15  /  AlkPhos  107  10-23

## 2024-10-24 NOTE — PROGRESS NOTE ADULT - PROBLEM SELECTOR PLAN 7
- Continue with Zofran 4mg q8h ATC  - speech and swallow evaluated recommended soft and bite sized and mildly thick liquids with aspiration precautions. f/u repeat S&S as patient was NPO for sma dissection

## 2024-10-24 NOTE — PROGRESS NOTE ADULT - PROBLEM SELECTOR PLAN 11
In the event of newly developing, evolving, or worsening symptoms, please contact the Palliative Medicine team via pager (if the patient is at [facility] #[phone] or if the patient is at [facility] #[phone]) The Geriatric and Palliative Medicine service has coverage 24 hours a day/ 7 days a week to provide medical recommendations regarding symptom management needs via telephone.
In the event of newly developing, evolving, or worsening symptoms, please contact the Palliative Medicine team via pager (if the patient is at [facility] #[phone] or if the patient is at [facility] #[phone]) The Geriatric and Palliative Medicine service has coverage 24 hours a day/ 7 days a week to provide medical recommendations regarding symptom management needs via telephone.
In the event of newly developing, evolving, or worsening symptoms, please contact the Palliative Medicine team via pager (if the patient is at [facility] #[phone] or if the patient is at [facility] #[phone]) The Geriatric and Palliative Medicine service has coverage 24 hours a day/ 7 days a week to provide medical recommendations regarding symptom management needs via telephone.
In the event of newly developing, evolving, or worsening symptoms, please contact the Palliative Medicine team via pager (if the patient is at [facility] #[phone] or if the patient is at [facility] #[phone]) The Geriatric and Palliative Medicine service has coverage 24 hours a day/ 7 days a week to provide medical recommendations regarding symptom management needs via telephone.

## 2024-10-24 NOTE — PROGRESS NOTE ADULT - ASSESSMENT
64-year-old male with metastatic prostate cancer, sent in by hematologist from [place] [facility] for uncontrolled pain, nausea, vomiting.       Problem/Plan - 1:  ·  Problem: Cancer related pain.   ·  Plan: - appreciate pall care recommendations:  - pain control as per palliative crae   - RT consult appreciated     Problem/Plan - 2:·  Problem: Nausea & vomiting., Diarrhea, colitis , SMA dissection   ·  Plan: -  vascular fu appreciated  - Now pt with bloody BM   - GI and surgery consult appreciated   - ID fu    Problem/Plan - 3:  ·  Problem: CA of prostate.   ·  Plan: Metastatic prostate cancer- f/u heme/onc recommendations  - Rad Onc fu   - Palliative for symptom control.    Problem/Plan - 4:  ·  Problem: Thrombocytopenia, unspecified.   ·  Plan: -monitor cbc   transfuse PRN    Problem/Plan - 5:  ·  Problem: hypernatremia  ·  Plan: - cw ivf  - renal fu     case dw wife at bedside   dw oncology attending

## 2024-10-24 NOTE — PROGRESS NOTE ADULT - NS ATTEND AMEND GEN_ALL_CORE FT
Agree with above assessment and plan.   Febrile this .7   Continue with medical management- abx   GI bleed noted- not on heparin as no clear thrombus identified although SMA dissection- would ultimately defer to vascular regarding AC management but given already had a bleed, high risk of further episodes. GI follow up   Colitis with bacteremia- continue management as above with antiobtics and fluids especially with poor PO intake and hypernatremia.   Guarded prognosis.   Would continue medical management and if improvement would offer further treatment and chemotherapy for his prostate cancer   Family aware however of his clinical state and potential for further decompensation

## 2024-10-24 NOTE — PROGRESS NOTE ADULT - PROBLEM SELECTOR PLAN 10
Patient is supported by his wife- Vijaay [phone]) and 3 adult sons- Magen, Mateus and Freddy   > Patient is recently dx with metastatic prostate cancer just 1.5 weeks ago and he is treatment oriented.  > Offered caregiver Sw referral to patient's wife- DECLINED   > 10/21: Began advanced care planning discussions with patient's son, Freddy. Pt's wife stayed overnight so is sleeping. Will plan to speak to his wife regarding further goc.  > 10/23: See GOC note- DNR/DNI. Of note, wife has shared with provider that they are familiar with hospice as her sister is on hospice in [place].   > 10/24: Plan is to continue to medically optimize patient and improve patient's performance/nutritional status with goal of pursuing further chemotherapy. Extensive goc discussion held with patient's family, Oncology team (Dr. Tate and Umm Aponte- Onc NP) and palliative care team regarding plan of care.

## 2024-10-24 NOTE — PROGRESS NOTE ADULT - PROBLEM SELECTOR PLAN 3
Patient seen with wife at bedside, remains on restraints but overnight events noted.   - Behavioral health recs appreciated: Zyprexa discontinued per son's request. Haldol prn per psych recs. hold med if QTC > 500  - EKG performed QTc 436 on 1017  - d/c restraints once mental status improves

## 2024-10-24 NOTE — PROGRESS NOTE ADULT - PROBLEM SELECTOR PLAN 6
Likely related to chemotherapy treatment  - hematology-oncology following: on daily zarxio  - on IV abx for bacteremia   - Patient s/p 1u PRBCs (10/21); 3u Plts (10/20, 10/21, 10/22)   - Patient febrile - tylenol prn

## 2024-10-24 NOTE — CHART NOTE - NSCHARTNOTEFT_GEN_A_CORE
63 y/o M w/ prostate CA s/p radical prostatectomy now metastatic, HTN, HLD admitted for pain control, N/V, osseous mets, c/b fever, diarrhea, CT A/P w/ findings of likely neutropenic enterocolitis, SMA dissection, c/f ischemic colitis on R colon.  Course c/b hypernatremia and hyperglycemia due to D5W.     Hyperglycemia resolved after stopping D5W.  Endocrine will sign off at this time. Please reconsult for any new questions.    Roberth Hathaway MD  Endocrine Fellow  Can be reached via teams. For follow up questions, discharge recommendations, or new consults, please call answering service at [phone] (weekdays); [phone] (nights/weekends).

## 2024-10-24 NOTE — PROGRESS NOTE ADULT - SUBJECTIVE AND OBJECTIVE BOX
Patient is a 64y old  Male who presents with a chief complaint of Pain (24 Oct 2024 15:25)    Date of servie : 10-24-24 @ 15:55  INTERVAL HPI/OVERNIGHT EVENTS:  T(C): 36.7 (10-24-24 @ 13:03), Max: 38.2 (10-24-24 @ 08:57)  HR: 108 (10-24-24 @ 13:03) (94 - 108)  BP: 89/64 (10-24-24 @ 13:03) (89/64 - 101/67)  RR: 17 (10-24-24 @ 13:03) (12 - 17)  SpO2: 95% (10-24-24 @ 13:03) (93% - 98%)  Wt(kg): --  I&O's Summary    23 Oct 2024 07:01  -  24 Oct 2024 07:00  --------------------------------------------------------  IN: 350 mL / OUT: 940 mL / NET: -590 mL        LABS:                        8.0    8.70  )-----------( 40       ( 24 Oct 2024 09:12 )             24.4     10-24    151[H]  |  120[H]  |  42[H]  ----------------------------<  151[H]  3.8   |  20[L]  |  0.68    Ca    6.6[L]      24 Oct 2024 09:12  Phos  2.6     10-24  Mg     2.80     10-24    TPro  4.2[L]  /  Alb  2.2[L]  /  TBili  0.9  /  DBili  x   /  AST  44[H]  /  ALT  15  /  AlkPhos  107  10-23      Urinalysis Basic - ( 24 Oct 2024 09:12 )    Color: x / Appearance: x / SG: x / pH: x  Gluc: 151 mg/dL / Ketone: x  / Bili: x / Urobili: x   Blood: x / Protein: x / Nitrite: x   Leuk Esterase: x / RBC: x / WBC x   Sq Epi: x / Non Sq Epi: x / Bacteria: x      CAPILLARY BLOOD GLUCOSE      POCT Blood Glucose.: 161 mg/dL (24 Oct 2024 12:34)  POCT Blood Glucose.: 139 mg/dL (24 Oct 2024 08:22)  POCT Blood Glucose.: 155 mg/dL (24 Oct 2024 00:46)  POCT Blood Glucose.: 193 mg/dL (23 Oct 2024 17:57)        Urinalysis Basic - ( 24 Oct 2024 09:12 )    Color: x / Appearance: x / SG: x / pH: x  Gluc: 151 mg/dL / Ketone: x  / Bili: x / Urobili: x   Blood: x / Protein: x / Nitrite: x   Leuk Esterase: x / RBC: x / WBC x   Sq Epi: x / Non Sq Epi: x / Bacteria: x        MEDICATIONS  (STANDING):  abiraterone 500 mg tablet 2 Tablet(s) 2 Tablet(s) Oral daily  atorvastatin 20 milliGRAM(s) Oral at bedtime  chlorhexidine 2% Cloths 1 Application(s) Topical daily  dexAMETHasone  Injectable 2 milliGRAM(s) IV Push daily  dextrose 5%. 1000 milliLiter(s) (50 mL/Hr) IV Continuous <Continuous>  dextrose 5%. 1000 milliLiter(s) (100 mL/Hr) IV Continuous <Continuous>  dextrose 5%. 1000 milliLiter(s) (130 mL/Hr) IV Continuous <Continuous>  dextrose 5%. 1000 milliLiter(s) (75 mL/Hr) IV Continuous <Continuous>  dextrose 50% Injectable 25 Gram(s) IV Push once  dextrose 50% Injectable 25 Gram(s) IV Push once  dextrose 50% Injectable 12.5 Gram(s) IV Push once  fenofibrate Tablet 145 milliGRAM(s) Oral daily  glucagon  Injectable 1 milliGRAM(s) IntraMuscular once  haloperidol    Injectable 2.5 milliGRAM(s) IntraMuscular once  influenza  Vaccine (HIGH DOSE) 0.5 milliLiter(s) IntraMuscular once  insulin lispro (ADMELOG) corrective regimen sliding scale   SubCutaneous every 6 hours  lactobacillus acidophilus 1 Tablet(s) Oral daily  lidocaine   4% Patch 1 Patch Transdermal every 24 hours  morphine  Infusion. 1 mG/Hr (1 mL/Hr) IV Continuous <Continuous>  ondansetron Injectable 4 milliGRAM(s) IV Push every 8 hours  pantoprazole  Injectable 40 milliGRAM(s) IV Push daily  piperacillin/tazobactam IVPB.. 3.375 Gram(s) IV Intermittent every 8 hours    MEDICATIONS  (PRN):  acetaminophen     Tablet .. 650 milliGRAM(s) Oral every 6 hours PRN Temp greater or equal to 38C (100.4F), Mild Pain (1 - 3), Moderate Pain (4 - 6)  aluminum hydroxide/magnesium hydroxide/simethicone Suspension 30 milliLiter(s) Oral every 4 hours PRN Dyspepsia  artificial tears (preservative free) Ophthalmic Solution 1 Drop(s) Both EYES four times a day PRN Dry Eyes  dextrose Oral Gel 15 Gram(s) Oral once PRN Blood Glucose LESS THAN 70 milliGRAM(s)/deciliter  morphine  - Injectable 4 milliGRAM(s) IV Push every 3 hours PRN Severe Pain (7 - 10)  naloxone Injectable 0.1 milliGRAM(s) IV Push every 3 minutes PRN For ANY of the following changes in patient status:  A. RR LESS THAN 10 breaths per minute, B. Oxygen saturation LESS THAN 90%, C. Sedation score of 6  polyethylene glycol 3350 17 Gram(s) Oral daily PRN Constipation          PHYSICAL EXAM:  GENERAL: NAD, well-groomed, well-developed  HEAD:  Atraumatic, Normocephalic  CHEST/LUNG: Clear to percussion bilaterally; No rales, rhonchi, wheezing, or rubs  HEART: Regular rate and rhythm; No murmurs, rubs, or gallops  ABDOMEN: Soft, Nontender, Nondistended; Bowel sounds present  EXTREMITIES: edema +    Care Discussed with Consultants/Other Providers [ ] YES  [ ] NO

## 2024-10-24 NOTE — PROGRESS NOTE ADULT - SUBJECTIVE AND OBJECTIVE BOX
[facility] Geriatrics and Palliative Care  Melissa Davies Palliative Care Attending  Contact Info: Page [phone] (including Nights/Weekends), message on Microsoft Teams (Melissa Davies), or leave  at Palliative Office [phone] (non-urgent)   Date of [id number] @ 15:25    SUBJECTIVE AND OBJECTIVE: Patient seen this AM with wife at bedside. Patient states that he is "feeling okay but his legs are too skinny". He is asking for more ice chips. He remains on restraints.     Indication for Geriatrics and Palliative Care Services/INTERVAL HPI: sx management and goc in setting of advanced malignancy     OVERNIGHT EVENTS:  > 10/21: Weekend events reviewed. Over the past 24 hours, patient was transitioned off pca pump. He required 4mg IV morphine x1.   > 10/22: Over the past 24 hours, patient did not require IV morphine PRNs. He did require IM Zyprexa x2. He is febrile.   > 10/23: Over the past 24 hours, patient did not required PRNs.   > 10/24: Over the past 24 hours, patient required PRNs of haldol 2.5mg x2 for agitation.     DNR on chart:DNI: Trial NIV  DNI: Trial NIV      Allergies    No Known Allergies    Intolerances    MEDICATIONS  (STANDING):  abiraterone 500 mg tablet 2 Tablet(s) 2 Tablet(s) Oral daily  atorvastatin 20 milliGRAM(s) Oral at bedtime  chlorhexidine 2% Cloths 1 Application(s) Topical daily  dexAMETHasone  Injectable 2 milliGRAM(s) IV Push daily  dextrose 5%. 1000 milliLiter(s) (75 mL/Hr) IV Continuous <Continuous>  dextrose 5%. 1000 milliLiter(s) (130 mL/Hr) IV Continuous <Continuous>  dextrose 5%. 1000 milliLiter(s) (100 mL/Hr) IV Continuous <Continuous>  dextrose 5%. 1000 milliLiter(s) (50 mL/Hr) IV Continuous <Continuous>  dextrose 50% Injectable 25 Gram(s) IV Push once  dextrose 50% Injectable 12.5 Gram(s) IV Push once  dextrose 50% Injectable 25 Gram(s) IV Push once  fenofibrate Tablet 145 milliGRAM(s) Oral daily  glucagon  Injectable 1 milliGRAM(s) IntraMuscular once  haloperidol    Injectable 2.5 milliGRAM(s) IntraMuscular once  influenza  Vaccine (HIGH DOSE) 0.5 milliLiter(s) IntraMuscular once  insulin lispro (ADMELOG) corrective regimen sliding scale   SubCutaneous every 6 hours  lactobacillus acidophilus 1 Tablet(s) Oral daily  lidocaine   4% Patch 1 Patch Transdermal every 24 hours  morphine  Infusion. 1 mG/Hr (1 mL/Hr) IV Continuous <Continuous>  ondansetron Injectable 4 milliGRAM(s) IV Push every 8 hours  pantoprazole  Injectable 40 milliGRAM(s) IV Push daily  piperacillin/tazobactam IVPB.. 3.375 Gram(s) IV Intermittent every 8 hours    MEDICATIONS  (PRN):  acetaminophen     Tablet .. 650 milliGRAM(s) Oral every 6 hours PRN Temp greater or equal to 38C (100.4F), Mild Pain (1 - 3), Moderate Pain (4 - 6)  aluminum hydroxide/magnesium hydroxide/simethicone Suspension 30 milliLiter(s) Oral every 4 hours PRN Dyspepsia  artificial tears (preservative free) Ophthalmic Solution 1 Drop(s) Both EYES four times a day PRN Dry Eyes  dextrose Oral Gel 15 Gram(s) Oral once PRN Blood Glucose LESS THAN 70 milliGRAM(s)/deciliter  morphine  - Injectable 4 milliGRAM(s) IV Push every 3 hours PRN Severe Pain (7 - 10)  naloxone Injectable 0.1 milliGRAM(s) IV Push every 3 minutes PRN For ANY of the following changes in patient status:  A. RR LESS THAN 10 breaths per minute, B. Oxygen saturation LESS THAN 90%, C. Sedation score of 6  polyethylene glycol 3350 17 Gram(s) Oral daily PRN Constipation      ITEMS UNCHECKED ARE NOT PRESENT    PRESENT SYMPTOMS: [ x]Unable to self-report - see [ ] CPOT [x ] PAINADS [x ] RDOS  Source if other than patient:  [ x]Family   [ ]Team     Pain:  [ ]yes [ ]no  QOL impact -   Location -                    Aggravating factors -  Quality -  Radiation -  Timing-  Severity (0-10 scale):  Minimal acceptable level/ pain goal (0-10 scale):     CPOT:    https://www.sccm.org/getattachment/nhf36p29-2y1b-3b9a-0b4v-5014l7128a6r/Critical-Care-Pain-Observation-Tool-(CPOT)    Dyspnea:                           [ ]Mild [ ]Moderate [ ]Severe  Anxiety:                             [ ]Mild [ ]Moderate [ ]Severe  Fatigue:                             [ ]Mild [ ]Moderate [ ]Severe  Nausea:                             [ ]Mild [ ]Moderate [ ]Severe  Loss of appetite:              [ ]Mild [ ]Moderate [ ]Severe  Constipation:                    [ ]Mild [ ]Moderate [ ]Severe  Other Symptoms:  [ ]All other review of systems negative     PCSSQ[Palliative Care Spiritual Screening Question]   Severity (0-10):  Score of 4 or > indicate consideration of Chaplaincy referral.  Chaplaincy Referral: [ ] yes [ ] refused [ ] following [ x] deferred    Caregiver [place]? : [ ] yes [ ] no [ x] Deferred [ ] Declined             Social work referral [ ] Patient & Family Centered Care Referral [ ]  Anticipatory Grief present?:  [ ] yes [ ] no  [x ] Deferred                  Social work referral [ ] Patient & Family Centered Care Referral [ ]      PHYSICAL EXAM:  Vital Signs Last 24 Hrs  T(C): 36.7 (24 Oct 2024 13:03), Max: 38.2 (24 Oct 2024 08:57)  T(F): 98 (24 Oct 2024 13:03), Max: 100.7 (24 Oct 2024 08:57)  HR: 108 (24 Oct 2024 13:03) (94 - 108)  BP: 89/64 (24 Oct 2024 13:03) (89/64 - 101/67)  BP(mean): --  RR: 17 (24 Oct 2024 13:03) (12 - 17)  SpO2: 95% (24 Oct 2024 13:03) (93% - 98%)    Parameters below as of 24 Oct 2024 13:03  Patient On (Oxygen Delivery Method): room air     I&O's Summary    23 Oct 2024 07:01  -  24 Oct 2024 07:00  --------------------------------------------------------  IN: 350 mL / OUT: 940 mL / NET: -590 mL       GENERAL: [x ]Cachexia    [x ]Alert  [ ]Oriented x   [ ]Lethargic  [ ]Unarousable  [x ]Verbal  [ ]Non-Verbal  Behavioral:   [ ]Anxiety  [ ]Delirium [ ]Agitation [x ]Other- restraints in place   HEENT:  [x ]Normal   [ ]Dry mouth   [ ]ET Tube/Trach  [ ]Oral lesions  PULMONARY:   [x ]Clear [ ]Tachypnea  [ ]Audible excessive secretions   [ ]Rhonchi        [ ]Right [ ]Left [ ]Bilateral  [ ]Crackles        [ ]Right [ ]Left [ ]Bilateral  [ ]Wheezing     [ ]Right [ ]Left [ ]Bilateral  [ ]Diminished BS [ ] Right [ ]Left [ ]Bilateral  CARDIOVASCULAR:    [ ]Regular [ ]Irregular [x ]Tachy  [ ]Rodrick [ ]Murmur [ ]Other  GASTROINTESTINAL:  [x ]Soft  [ ]Distended   [x ]+BS  [ ]Non tender [ ]Tender  [ ]Other [ ]PEG [ ]OGT/ NGT   Last BM: 10/23  GENITOURINARY:  [ ]Normal [ ]Incontinent   [ ]Oliguria/Anuria   [x ]Farah  MUSCULOSKELETAL:   [ ]Normal   [x ]Weakness  [ ]Bed/Wheelchair bound [ ]Edema  NEUROLOGIC:   [ x]No focal deficits  [ ] Cognitive impairment  [ ] Dysphagia [ ]Dysarthria [ ] Paresis [ ]Other   SKIN:   [ ]Normal  [ ]Rash  [x ]Other- scratches on face   [ ]Pressure ulcer(s) [ ]y [ ]n present on admission    CRITICAL CARE:  [ ]Shock Present  [ ]Septic [ ]Cardiogenic [ ]Neurologic [ ]Hypovolemic  [ ]Vasopressors [ ]Inotropes  [ ]Respiratory failure present [ ]Mechanical Ventilation [ ]Non-invasive ventilatory support [ ]High-Flow   [ ]Acute  [ ]Chronic [ ]Hypoxic  [ ]Hypercarbic [ ]Other  [ ]Other organ failure     LABS:                        8.0    8.70  )-----------( 40       ( 24 Oct 2024 09:12 )             24.4   10-24    151[H]  |  120[H]  |  42[H]  ----------------------------<  151[H]  3.8   |  20[L]  |  0.68    Ca    6.6[L]      24 Oct 2024 09:12  Phos  2.6     10-24  Mg     2.80     10-24    TPro  4.2[L]  /  Alb  2.2[L]  /  TBili  0.9  /  DBili  x   /  AST  44[H]  /  ALT  15  /  AlkPhos  107  10-23      Urinalysis Basic - ( 24 Oct 2024 09:12 )    Color: x / Appearance: x / SG: x / pH: x  Gluc: 151 mg/dL / Ketone: x  / Bili: x / Urobili: x   Blood: x / Protein: x / Nitrite: x   Leuk Esterase: x / RBC: x / WBC x   Sq Epi: x / Non Sq Epi: x / Bacteria: x      RADIOLOGY & ADDITIONAL STUDIES: no new     Protein Calorie Malnutrition Present: [ ]mild [ ]moderate [ ]severe [ ]underweight [ ]morbid obesity  https://www.andeal.org/vault/2440/web/files/ONC/Table_Clinical%20Characteristics%20to%20Document%20Malnutrition-White%20JV%20et%20al%202012.pdf    Height (cm): 172.7 (10-12-24 @ 09:21)  Weight (kg): 69.4 (10-12-24 @ 09:21)  BMI (kg/m2): 23.3 (10-12-24 @ 09:21)    [ ]PPSV2 < or = 30%  [ ]significant weight loss [ ]poor nutritional intake [ ]anasarca[ ]Artificial Nutrition    Other REFERRALS:  [ ]Hospice  [ ]Child Life  [ ]Social Work  [ ]Case management [ ]Holistic Therapy

## 2024-10-24 NOTE — PROGRESS NOTE ADULT - ASSESSMENT
65 y/o M with high volume metastatic prostate cancer, here with back pain, started on chemotherapy.     1. Metastatic prostate cancer  - Follows with Dr Andrew Mendoza at [facility]   - Dx 14 y/o with low risk prostate cancer s/p radical prostatectomy, negative LNs -> recently found to have multiloculated presacral soft tissue mass 5.1 x 3.9 x 4.1cm, RP LAD, low density liver lesions, lucencies in several vertebral bodies, manubrium; PSMA 10/11/24 showed hypermetabolic vera foci above and below the diaphragm, foci in the liver and extensive foci involving the axial and appendicular skeleton compatible with metastatic disease  - --> 374--> 426 on 10/8/24   - Liver biopsy 9/30/24 consistent with adenocarcinoma favoring prostate   - High risk high volume disease -> started on triplet therapy w/ ADT/lupron, abiraterone/prednisone + taxotere. (back pain after Lupron likely tumor flare).  - Continue abiraterone 1000mg daily  - Should be on steroid w/abiraterone; on dex 4mg BID (was for chemo, now per palliative for pain control) - consider tapering to 2mg BID and further from there, if tapered off then needs to be on prednisone 5mg daily w/loly  - Received taxotere 60mg/m2 10/13/24, theoretically can plan for next dose on 11/4 if clinically improves    - Kyphoplasty w/IR to T3 and L1 lesions planned for 10/22/24 - on HOLD due to other acute issues   - Palliative following for pain control and given hospital course would have further GOC discussions. Treatment will be offered but he has metastatic disease with visceral involvement and complications as outlined below.     2. Hypoxia | tachycardia | diarrhea + pancolitis | Fever l Sepsis with Bacteremia   Bacteremia- GNR and Gram negative coccobaccili   - No PE on CTA chest, found to have pancolitis  - C. diff negative  - On zosyn , Tmax 100.7  - ID and GI follow up    3. Thrombocytopenia   - Possible ITP component now with chemotherapy induced   - Baseline in the 70s  - Taxotere dose reduced to 60mg/m2 given baseline thrombocytopenia   - Transfuse for plt <10, <15 if febrile, <20 if bleeding    - Steroids may help with ITP component but also may need high dose. Nplate can also be considered   - defer to vascular regarding SMA dissection or ischemic colitis management- if they feel strongly that heparin is needed will need plt transfusion and to maintain >50K for AC     4. Neutropenia 2/2 chemotherapy   - ANC recovered, no need for further growth factor at this time  - bacteremia as above, ID follow up and c/w abx     5. Ischemic Colitis vs Infectious Colitis   - possible combination of both   - SMA dissection also noted  - colorectal and vascular surgery recs appreciated- no acute surgical intervention  - heparin per vascular but as above would transfuse if they feel there is an ischemic component and heparin is necessary    6. 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   Continue GI, ID, colorectal surgery, vascular surgery follow up.   Transfusion support recommended  Per notes, not an ICU  candidate at this time.   Monitor closely and may need ICU re-eval if further decompensates     Umm Aponte NP  Hematology/Oncology  [facility]  [phone] (Office)  [phone] (Alt office)  Evenings and weekends please call MD on call or office

## 2024-10-24 NOTE — PROGRESS NOTE ADULT - ASSESSMENT
64-year-old male with metastatic prostate cancer, sent in by hematologist from [facility] for uncontrolled pain, nausea, vomiting.   Patient reports diffuse pain starting 6 months ago significantly worsening for the past 2 weeks.  Currently the worst pain is located around the lower back.   No loss of bladder and bowel function, no saddle paresthesia.  Able to walk.  patient is oxycodone 5 every 4-6 hour.  Yesterday they started adding OxyContin 10.  However patient continued to have pain.  Family also reports significant nausea and vomiting, inability to tolerate p.o. for the last 2 days.  Last bowel movement 2 days ago.   No known allergy.  Diagnosed with high risk high volume metastatic prostate cancer with bone, liver lymph node mets and presacral mass. Liver biopsy confirmed disease with . Started lupron, loly/pred with plans to initiate taxotere.    (12 Oct 2024 15:40)  pt seems very frustrated:   he is on 2 L of oxygen : he has no underlying lung disease:  but he is requiring 2 L of oxygen      Hypoxic resp failure  Metastatic prostate cancer  Back pain     Hypoxic resp failure  -He has no underlying lung disease:  but he is requiring 2 L of oxygen :   -CTA showed; No pulmonary embolism to the level of the segmental branches bilaterally. Limited evaluation of the subsegmental branches secondary to incomplete contrast opacification.  Multilevel vertebral body lytic lesions and loss of vertebral body height, better evaluated on CT thoracic spine 10/17/2024. Metastatic prostate cancer  -Patent central airways. Bibasilar atelectasis. No pleural effusion. MEDIASTINUM AND KATTY: No lymphadenopathy.  PULMONARY ANGIOGRAM: No pulmonary embolism to the level of the segmental   branches bilaterally. Limited evaluation of the subsegmental branches secondary to incomplete contrast opacification.    10/19:  -seems pretty tired;  on 2 L of oxygen :  -cta chest showed: No pulmonary embolism to the level of the segmental branches bilaterally. Limited evaluation of the subsegmental branches secondary to incomplete   contrast opacification. Multilevel vertebral body lytic lesions and loss of vertebral body height, better evaluated on CT thoracic spine 10/17/2024.  -still with fever:  no pe  on zosyn  and leukopenic hemonc following;  has metastatic prostate ca:   -VBG seems OK:     10/20:  pulm wise he seems to be stable:   -using 2 L of oxygen    -yesterdays VBG with minimal met acidosis  -cta again noted    10/21:  on 4 L of oxygen  today    cxr is size    e coli sepsis: Gram Negative Rods and Gram Negative Coccobacilli    10/22: heis doing poorly today  : he is very agitated and uncomfortable  on mittens: ? sun downing   10/23: quiet today  : sleeping:  oxygen requirement has not increased: on rooo ha 95% as documented    WBC is slowly increasing:   no fever:   -on antibiotics   10/24: pt isnot doing well : his repeat blood cultures are positive with same organism :  would defer to ID;       New finding:  SMA dissection : neutropenic colitis/ #E Coli and H influenzae bacteremia/ #Neutropenic fever  -/f ischemic colitis on R colon   Diagnosed  on ct abd:  defer to surgery and primary team:  probably no intervention:   -cont antibiotics  -not doing very well with above new findings    10/22; no intervention per surgery    -cont antibiotics  -has fever:  ID following :  -Last chest xray on 20th  ; normal     id following   10/23  IR was consulted for vertebral augmentation of T3-T5 prior to XRT.   Patient was seen bedside. Initially, patient kept requesting no exam and was not willing to participate in the discussion. Son was bedside at time of conversation.   Per discussion with the medical attending, given the concern for SMA dissection and concern for bowel ischemia, will hold off on vertebral augmentation evaluation at this time.   Please reach out to IR when patient is medically stable for vertebral augmentation.  10/24:  remains septic:  above cancelled:   ? for palliative care now    Back pain   -likely due to metastatic disease:  adm here for sever pain :  pain control per primary team   -venous ph is ok     dw acp & family ; GOC discussion on going

## 2024-10-24 NOTE — PROGRESS NOTE ADULT - PROBLEM SELECTOR PLAN 4
MRI Lumbar Spine: (10/15) Diffuse osseous metastases. L1 and L5 pathologic fractures are associated with moderate epidural disease at L1, more mild at L5. At least mild epidural disease at the left S1-S2 neural foramen. No significant lumbar degenerative disc disease.  - MRI from 10/15 shows extensive metastasis in cervical, thoracic, lumbar, skull base and calvarium. Also small cortical subacute infarctions.  - Continue Morphine gtt @1mg/hr, IV morphine 4mg PRNs (has not required PRNs in 48 hours)   - IR evaluation for kyphoplasty on hold in setting of c/f SMA dissection and bowel ischemia   - s/p decadron 4mg IV bid x3 days   - Narcan PRN  - multimodal pain control: lidocaine patch, warm/cold packs   - recommend changing bowel regimen to PRN

## 2024-10-25 LAB
ADD ON TEST-SPECIMEN IN LAB: SIGNIFICANT CHANGE UP
ADD ON TEST-SPECIMEN IN LAB: SIGNIFICANT CHANGE UP
ALBUMIN SERPL ELPH-MCNC: 2.3 G/DL — LOW (ref 3.3–5)
ALP SERPL-CCNC: 83 U/L — SIGNIFICANT CHANGE UP (ref 40–120)
ALT FLD-CCNC: 15 U/L — SIGNIFICANT CHANGE UP (ref 4–41)
ANION GAP SERPL CALC-SCNC: 11 MMOL/L — SIGNIFICANT CHANGE UP (ref 7–14)
ANION GAP SERPL CALC-SCNC: 13 MMOL/L — SIGNIFICANT CHANGE UP (ref 7–14)
AST SERPL-CCNC: 52 U/L — HIGH (ref 4–40)
BILIRUB DIRECT SERPL-MCNC: 0.3 MG/DL — SIGNIFICANT CHANGE UP (ref 0–0.3)
BILIRUB INDIRECT FLD-MCNC: 0.6 MG/DL — SIGNIFICANT CHANGE UP (ref 0–1)
BILIRUB SERPL-MCNC: 0.9 MG/DL — SIGNIFICANT CHANGE UP (ref 0.2–1.2)
BUN SERPL-MCNC: 33 MG/DL — HIGH (ref 7–23)
BUN SERPL-MCNC: 33 MG/DL — HIGH (ref 7–23)
CALCIUM SERPL-MCNC: 6.9 MG/DL — LOW (ref 8.4–10.5)
CALCIUM SERPL-MCNC: 6.9 MG/DL — LOW (ref 8.4–10.5)
CGA FLD-MCNC: 97.5 NG/ML — SIGNIFICANT CHANGE UP (ref 0–101.8)
CHLORIDE SERPL-SCNC: 115 MMOL/L — HIGH (ref 98–107)
CHLORIDE SERPL-SCNC: 118 MMOL/L — HIGH (ref 98–107)
CO2 SERPL-SCNC: 19 MMOL/L — LOW (ref 22–31)
CO2 SERPL-SCNC: 20 MMOL/L — LOW (ref 22–31)
CREAT SERPL-MCNC: 0.51 MG/DL — SIGNIFICANT CHANGE UP (ref 0.5–1.3)
CREAT SERPL-MCNC: 0.57 MG/DL — SIGNIFICANT CHANGE UP (ref 0.5–1.3)
EGFR: 109 ML/MIN/1.73M2 — SIGNIFICANT CHANGE UP
EGFR: 113 ML/MIN/1.73M2 — SIGNIFICANT CHANGE UP
GLUCOSE BLDC GLUCOMTR-MCNC: 170 MG/DL — HIGH (ref 70–99)
GLUCOSE BLDC GLUCOMTR-MCNC: 187 MG/DL — HIGH (ref 70–99)
GLUCOSE BLDC GLUCOMTR-MCNC: 191 MG/DL — HIGH (ref 70–99)
GLUCOSE BLDC GLUCOMTR-MCNC: 198 MG/DL — HIGH (ref 70–99)
GLUCOSE BLDC GLUCOMTR-MCNC: 200 MG/DL — HIGH (ref 70–99)
GLUCOSE BLDC GLUCOMTR-MCNC: 205 MG/DL — HIGH (ref 70–99)
GLUCOSE SERPL-MCNC: 179 MG/DL — HIGH (ref 70–99)
GLUCOSE SERPL-MCNC: 181 MG/DL — HIGH (ref 70–99)
HCT VFR BLD CALC: 25.8 % — LOW (ref 39–50)
HGB BLD-MCNC: 8.4 G/DL — LOW (ref 13–17)
LDH SERPL L TO P-CCNC: 666 U/L — HIGH (ref 135–225)
MAGNESIUM SERPL-MCNC: 2.8 MG/DL — HIGH (ref 1.6–2.6)
MCHC RBC-ENTMCNC: 31 PG — SIGNIFICANT CHANGE UP (ref 27–34)
MCHC RBC-ENTMCNC: 32.6 GM/DL — SIGNIFICANT CHANGE UP (ref 32–36)
MCV RBC AUTO: 95.2 FL — SIGNIFICANT CHANGE UP (ref 80–100)
NRBC # BLD: 5 /100 WBCS — HIGH (ref 0–0)
NRBC # FLD: 0.41 K/UL — HIGH (ref 0–0)
PHOSPHATE SERPL-MCNC: 2.2 MG/DL — LOW (ref 2.5–4.5)
PHOSPHATE SERPL-MCNC: 2.4 MG/DL — LOW (ref 2.5–4.5)
PLATELET # BLD AUTO: 49 K/UL — LOW (ref 150–400)
POTASSIUM SERPL-MCNC: 4.1 MMOL/L — SIGNIFICANT CHANGE UP (ref 3.5–5.3)
POTASSIUM SERPL-MCNC: 4.4 MMOL/L — SIGNIFICANT CHANGE UP (ref 3.5–5.3)
POTASSIUM SERPL-SCNC: 4.1 MMOL/L — SIGNIFICANT CHANGE UP (ref 3.5–5.3)
POTASSIUM SERPL-SCNC: 4.4 MMOL/L — SIGNIFICANT CHANGE UP (ref 3.5–5.3)
PROT SERPL-MCNC: 4.6 G/DL — LOW (ref 6–8.3)
RBC # BLD: 2.71 M/UL — LOW (ref 4.2–5.8)
RBC # FLD: 17 % — HIGH (ref 10.3–14.5)
SODIUM SERPL-SCNC: 146 MMOL/L — HIGH (ref 135–145)
SODIUM SERPL-SCNC: 150 MMOL/L — HIGH (ref 135–145)
URATE SERPL-MCNC: 2.7 MG/DL — LOW (ref 3.4–8.8)
WBC # BLD: 8.77 K/UL — SIGNIFICANT CHANGE UP (ref 3.8–10.5)
WBC # FLD AUTO: 8.77 K/UL — SIGNIFICANT CHANGE UP (ref 3.8–10.5)

## 2024-10-25 PROCEDURE — 99232 SBSQ HOSP IP/OBS MODERATE 35: CPT

## 2024-10-25 RX ORDER — POTASSIUM PHOSPHATE, MONOBASIC POTASSIUM PHOSPHATE, DIBASIC INJECTION, 236; 224 MG/ML; MG/ML
15 SOLUTION, CONCENTRATE INTRAVENOUS ONCE
Refills: 0 | Status: COMPLETED | OUTPATIENT
Start: 2024-10-25 | End: 2024-10-25

## 2024-10-25 RX ORDER — SODIUM,POTASSIUM PHOSPHATES 278-250MG
1 POWDER IN PACKET (EA) ORAL EVERY 8 HOURS
Refills: 0 | Status: COMPLETED | OUTPATIENT
Start: 2024-10-25 | End: 2024-10-25

## 2024-10-25 RX ORDER — METRONIDAZOLE 500 MG/1
500 TABLET ORAL EVERY 12 HOURS
Refills: 0 | Status: DISCONTINUED | OUTPATIENT
Start: 2024-10-25 | End: 2024-11-07

## 2024-10-25 RX ORDER — HALOPERIDOL 2 MG
2.5 TABLET ORAL ONCE
Refills: 0 | Status: DISCONTINUED | OUTPATIENT
Start: 2024-10-25 | End: 2024-10-26

## 2024-10-25 RX ORDER — 0.9 % SODIUM CHLORIDE 0.9 %
1000 INTRAVENOUS SOLUTION INTRAVENOUS
Refills: 0 | Status: DISCONTINUED | OUTPATIENT
Start: 2024-10-25 | End: 2024-10-27

## 2024-10-25 RX ORDER — CEFTRIAXONE SODIUM 1 G
2000 VIAL (EA) INJECTION EVERY 24 HOURS
Refills: 0 | Status: DISCONTINUED | OUTPATIENT
Start: 2024-10-25 | End: 2024-10-31

## 2024-10-25 RX ORDER — 0.9 % SODIUM CHLORIDE 0.9 %
1000 INTRAVENOUS SOLUTION INTRAVENOUS
Refills: 0 | Status: DISCONTINUED | OUTPATIENT
Start: 2024-10-25 | End: 2024-10-25

## 2024-10-25 RX ADMIN — POTASSIUM PHOSPHATE, MONOBASIC POTASSIUM PHOSPHATE, DIBASIC INJECTION, 62.5 MILLIMOLE(S): 236; 224 SOLUTION, CONCENTRATE INTRAVENOUS at 20:32

## 2024-10-25 RX ADMIN — Medication 20 MILLIGRAM(S): at 06:46

## 2024-10-25 RX ADMIN — Medication 100 GRAM(S): at 14:29

## 2024-10-25 RX ADMIN — Medication 1 MG/HR: at 13:17

## 2024-10-25 RX ADMIN — Medication 100 MILLIGRAM(S): at 13:13

## 2024-10-25 RX ADMIN — Medication 1 TABLET(S): at 14:28

## 2024-10-25 RX ADMIN — Medication 125 MILLILITER(S): at 13:17

## 2024-10-25 RX ADMIN — Medication 125 MILLILITER(S): at 17:30

## 2024-10-25 RX ADMIN — Medication 1 TABLET(S): at 13:16

## 2024-10-25 RX ADMIN — METRONIDAZOLE 100 MILLIGRAM(S): 500 TABLET ORAL at 17:31

## 2024-10-25 RX ADMIN — CHLORHEXIDINE GLUCONATE 1 APPLICATION(S): 1.2 RINSE ORAL at 13:15

## 2024-10-25 RX ADMIN — DEXAMETHASONE 2 MILLIGRAM(S): 1.5 TABLET ORAL at 13:16

## 2024-10-25 RX ADMIN — Medication 1 TABLET(S): at 21:27

## 2024-10-25 RX ADMIN — ONDANSETRON HYDROCHLORIDE 4 MILLIGRAM(S): 4 TABLET, FILM COATED ORAL at 06:46

## 2024-10-25 RX ADMIN — Medication 125 MILLILITER(S): at 10:57

## 2024-10-25 RX ADMIN — Medication 145 MILLIGRAM(S): at 13:15

## 2024-10-25 RX ADMIN — Medication 125 MILLILITER(S): at 18:59

## 2024-10-25 RX ADMIN — ONDANSETRON HYDROCHLORIDE 4 MILLIGRAM(S): 4 TABLET, FILM COATED ORAL at 13:16

## 2024-10-25 RX ADMIN — PANTOPRAZOLE SODIUM 40 MILLIGRAM(S): 40 TABLET, DELAYED RELEASE ORAL at 13:15

## 2024-10-25 RX ADMIN — PIPERACILLIN SODIUM AND TAZOBACTAM SODIUM 25 GRAM(S): 4; .5 INJECTION, POWDER, LYOPHILIZED, FOR SOLUTION INTRAVENOUS at 06:45

## 2024-10-25 NOTE — PROGRESS NOTE ADULT - SUBJECTIVE AND OBJECTIVE BOX
[facility]   MD MARKO EASTMAN MD ANGELA WONG, PA    TEL:  OFFICE: [phone]  From 5pm-7am Answering Service [phone]    -- RENAL FOLLOW UP NOTE ---Date of Service 10-25-24 @ 13:32    Patient is a 64y old  Male who presents with a chief complaint of Pain (25 Oct 2024 12:11)      Patient seen and examined at bedside.     VITALS:  T(F): 98.8 (10-25-24 @ 11:48), Max: 98.8 (10-25-24 @ 11:48)  HR: 94 (10-25-24 @ 11:48)  BP: 111/73 (10-25-24 @ 11:48)  RR: 17 (10-25-24 @ 11:48)  SpO2: 96% (10-25-24 @ 11:48)  Wt(kg): --        PHYSICAL EXAM:  General: lethargic  Neck: No JVD  Respiratory: CTAB, no wheezes, rales or rhonchi  Cardiovascular: S1, S2, RRR  Gastrointestinal: BS+, soft, NT/ND  Extremities: No peripheral edema    Hospital Medications:   MEDICATIONS  (STANDING):  abiraterone 500 mg tablet 2 Tablet(s) 2 Tablet(s) Oral daily  atorvastatin 20 milliGRAM(s) Oral at bedtime  calcium gluconate IVPB 1 Gram(s) IV Intermittent once  cefTRIAXone   IVPB 2000 milliGRAM(s) IV Intermittent every 24 hours  chlorhexidine 2% Cloths 1 Application(s) Topical daily  dexAMETHasone  Injectable 2 milliGRAM(s) IV Push daily  dextrose 5%. 1000 milliLiter(s) (50 mL/Hr) IV Continuous <Continuous>  dextrose 5%. 1000 milliLiter(s) (100 mL/Hr) IV Continuous <Continuous>  dextrose 5%. 1000 milliLiter(s) (75 mL/Hr) IV Continuous <Continuous>  dextrose 5%. 1000 milliLiter(s) (130 mL/Hr) IV Continuous <Continuous>  dextrose 50% Injectable 25 Gram(s) IV Push once  dextrose 50% Injectable 25 Gram(s) IV Push once  dextrose 50% Injectable 12.5 Gram(s) IV Push once  fenofibrate Tablet 145 milliGRAM(s) Oral daily  glucagon  Injectable 1 milliGRAM(s) IntraMuscular once  haloperidol    Injectable 2.5 milliGRAM(s) IntraMuscular once  influenza  Vaccine (HIGH DOSE) 0.5 milliLiter(s) IntraMuscular once  insulin lispro (ADMELOG) corrective regimen sliding scale   SubCutaneous every 6 hours  lactobacillus acidophilus 1 Tablet(s) Oral daily  lidocaine   4% Patch 1 Patch Transdermal every 24 hours  metroNIDAZOLE  IVPB 500 milliGRAM(s) IV Intermittent every 12 hours  morphine  Infusion. 1 mG/Hr (1 mL/Hr) IV Continuous <Continuous>  ondansetron Injectable 4 milliGRAM(s) IV Push every 8 hours  pantoprazole  Injectable 40 milliGRAM(s) IV Push daily  potassium phosphate / sodium phosphate Tablet (K-PHOS No. 2) 1 Tablet(s) Oral every 8 hours  sodium chloride 0.45%. 1000 milliLiter(s) (125 mL/Hr) IV Continuous <Continuous>      LABS:  10-25    150[H]  |  118[H]  |  33[H]  ----------------------------<  179[H]  4.4   |  19[L]  |  0.57    Ca    6.9[L]      25 Oct 2024 05:30  Phos  2.4     10-25  Mg     2.80     10-25    TPro  4.6[L]  /  Alb  2.3[L]  /  TBili  0.9  /  DBili  0.3  /  AST  52[H]  /  ALT  15  /  AlkPhos  83  10-25    Creatinine Trend: 0.57 <--, 0.61 <--, 0.68 <--, 0.74 <--, 0.64 <--, 0.71 <--, 0.81 <--, 0.94 <--, 0.93 <--, 0.94 <--, 0.85 <--, 0.96 <--, 0.97 <--, 1.04 <--, 1.15 <--    Phosphorus: 2.4 mg/dL (10-25 @ 05:30)  Albumin: 2.3 g/dL (10-25 @ 05:30)                              8.4    8.77  )-----------( 49       ( 25 Oct 2024 05:30 )             25.8     Urine Studies:  Urinalysis - [10-25-24 @ 05:30]      Color  / Appearance  / SG  / pH       Gluc 179 / Ketone   / Bili  / Urobili        Blood  / Protein  / Leuk Est  / Nitrite       RBC  / WBC  / Hyaline  / Gran  / Sq Epi  / Non Sq Epi  / Bacteria       TSH 2.62      [10-13-24 @ 05:30]  Lipid: chol 135, , HDL 39, LDL --      [10-13-24 @ 05:30]        RADIOLOGY & ADDITIONAL STUDIES:

## 2024-10-25 NOTE — SWALLOW BEDSIDE ASSESSMENT ADULT - COMMENTS
H & P 10/12: "64-year-old male with metastatic prostate cancer, sent in by hematologist from [facility] for uncontrolled pain, nausea, vomiting.   Patient reports diffuse pain starting 6 months ago significantly worsening for the past 2 weeks."    CXR 10/20: "IMPRESSION: No focal consolidations."    Patient seen at bedside this morning for swallow evaluation. Wife present at bedside. Patient received alert, awake, AOx4, and in no apparent distress. Patient and wife state that patient eats regular solids and thin liquids at baseline. Patient reports pain in mouth due to mouth sores, wife endorses mouth sores and further states she has observed blood in his teeth, which she suspects to be due to mouth sores. Wife states she is regularly completing oral hygiene for patient with oral swab dipped in cold water.     Education provided and results discussed with patient, RN, and ACP Varun via TEAMS. H & P 10/12: "64-year-old male with metastatic prostate cancer, sent in by hematologist from [facility] for uncontrolled pain, nausea, vomiting.   Patient reports diffuse pain starting 6 months ago significantly worsening for the past 2 weeks."    CXR 10/20: "IMPRESSION: No focal consolidations."    Of note: Patient known to this service from 10/18 recommended soft/bite size and mildly thickened liquids. See full report for details.   Patient seen at bedside this morning for swallow evaluation. Wife present at bedside. Patient received alert, awake, AOx4, and in no apparent distress. Patient and wife state that patient eats regular solids and thin liquids at baseline. Patient reports pain in mouth due to mouth sores, wife endorses mouth sores and further states she has observed blood in his teeth, which she suspects to be due to mouth sores. Wife states she is regularly completing oral hygiene for patient with oral swab dipped in cold water.     Education provided and results discussed with patient, RN, and ACP Varun via TEAMS.

## 2024-10-25 NOTE — SWALLOW BEDSIDE ASSESSMENT ADULT - SWALLOW EVAL: RECOMMENDED DIET
puree/mildly thickened liquids
soft and bite sized and mildly thick liquids with aspiration precautions

## 2024-10-25 NOTE — PROGRESS NOTE ADULT - SUBJECTIVE AND OBJECTIVE BOX
Patient is a 64y old  Male who presents with a chief complaint of Pain (25 Oct 2024 09:09)      MEDICATIONS  (STANDING):  abiraterone 500 mg tablet 2 Tablet(s) 2 Tablet(s) Oral daily  atorvastatin 20 milliGRAM(s) Oral at bedtime  chlorhexidine 2% Cloths 1 Application(s) Topical daily  dexAMETHasone  Injectable 2 milliGRAM(s) IV Push daily  dextrose 5%. 1000 milliLiter(s) (130 mL/Hr) IV Continuous <Continuous>  dextrose 5%. 1000 milliLiter(s) (75 mL/Hr) IV Continuous <Continuous>  dextrose 5%. 1000 milliLiter(s) (100 mL/Hr) IV Continuous <Continuous>  dextrose 5%. 1000 milliLiter(s) (50 mL/Hr) IV Continuous <Continuous>  dextrose 50% Injectable 25 Gram(s) IV Push once  dextrose 50% Injectable 12.5 Gram(s) IV Push once  dextrose 50% Injectable 25 Gram(s) IV Push once  fenofibrate Tablet 145 milliGRAM(s) Oral daily  glucagon  Injectable 1 milliGRAM(s) IntraMuscular once  haloperidol    Injectable 2.5 milliGRAM(s) IntraMuscular once  influenza  Vaccine (HIGH DOSE) 0.5 milliLiter(s) IntraMuscular once  insulin lispro (ADMELOG) corrective regimen sliding scale   SubCutaneous every 6 hours  lactobacillus acidophilus 1 Tablet(s) Oral daily  lidocaine   4% Patch 1 Patch Transdermal every 24 hours  morphine  Infusion. 1 mG/Hr (1 mL/Hr) IV Continuous <Continuous>  ondansetron Injectable 4 milliGRAM(s) IV Push every 8 hours  pantoprazole  Injectable 40 milliGRAM(s) IV Push daily  piperacillin/tazobactam IVPB.. 3.375 Gram(s) IV Intermittent every 8 hours  potassium phosphate / sodium phosphate Tablet (K-PHOS No. 2) 1 Tablet(s) Oral every 8 hours  sodium chloride 0.45%. 1000 milliLiter(s) (125 mL/Hr) IV Continuous <Continuous>    MEDICATIONS  (PRN):  acetaminophen     Tablet .. 650 milliGRAM(s) Oral every 6 hours PRN Temp greater or equal to 38C (100.4F), Mild Pain (1 - 3), Moderate Pain (4 - 6)  aluminum hydroxide/magnesium hydroxide/simethicone Suspension 30 milliLiter(s) Oral every 4 hours PRN Dyspepsia  artificial tears (preservative free) Ophthalmic Solution 1 Drop(s) Both EYES four times a day PRN Dry Eyes  dextrose Oral Gel 15 Gram(s) Oral once PRN Blood Glucose LESS THAN 70 milliGRAM(s)/deciliter  morphine  - Injectable 4 milliGRAM(s) IV Push every 3 hours PRN Severe Pain (7 - 10)  naloxone Injectable 0.1 milliGRAM(s) IV Push every 3 minutes PRN For ANY of the following changes in patient status:  A. RR LESS THAN 10 breaths per minute, B. Oxygen saturation LESS THAN 90%, C. Sedation score of 6  polyethylene glycol 3350 17 Gram(s) Oral daily PRN Constipation      ROS  No fever, sweats, chills  No epistaxis, HA, sore throat  No CP, SOB, cough, sputum  No n/v/d, abd pain, melena, hematochezia  No edema  No rash  No anxiety  No back pain, joint pain  No bleeding, bruising  No dysuria, hematuria    Vital Signs Last 24 Hrs  T(C): 36.8 (25 Oct 2024 05:00), Max: 37 (25 Oct 2024 01:00)  T(F): 98.2 (25 Oct 2024 05:00), Max: 98.6 (25 Oct 2024 01:00)  HR: 80 (25 Oct 2024 05:00) (72 - 108)  BP: 102/60 (25 Oct 2024 05:00) (89/64 - 109/64)  BP(mean): --  RR: 12 (25 Oct 2024 05:00) (12 - 18)  SpO2: 98% (25 Oct 2024 05:00) (95% - 98%)    Parameters below as of 25 Oct 2024 05:00  Patient seen, asleep, NAD, appears comfortable  afebrile, plts 49,000, na 150        PE  NAD  Asleep  Anicteric, MMM  RRR  CTAB  Abd soft, NT, ND  No c/c/e  No rash grossly                            8.4    8.77  )-----------( 49       ( 25 Oct 2024 05:30 )             25.8       10-25    150[H]  |  118[H]  |  33[H]  ----------------------------<  179[H]  4.4   |  19[L]  |  0.57    Ca    6.9[L]      25 Oct 2024 05:30  Phos  2.4     10-25  Mg     2.80     10-25    TPro  4.6[L]  /  Alb  2.3[L]  /  TBili  0.9  /  DBili  0.3  /  AST  52[H]  /  ALT  15  /  AlkPhos  83  10-25

## 2024-10-25 NOTE — PROGRESS NOTE ADULT - SUBJECTIVE AND OBJECTIVE BOX
Infectious Diseases Follow Up:    Patient is a 64y old  Male who presents with a chief complaint of Pain (24 Oct 2024 15:53)      Interval History/ROS:  Remains confused, denies abdominal pain     Allergies  No Known Allergies        ANTIMICROBIALS:  piperacillin/tazobactam IVPB.. 3.375 every 8 hours      Current Abx:     Previous Abx     OTHER MEDS:  MEDICATIONS  (STANDING):  acetaminophen     Tablet .. 650 every 6 hours PRN  aluminum hydroxide/magnesium hydroxide/simethicone Suspension 30 every 4 hours PRN  atorvastatin 20 at bedtime  dexAMETHasone  Injectable 2 daily  dextrose 50% Injectable 25 once  dextrose 50% Injectable 25 once  dextrose 50% Injectable 12.5 once  dextrose Oral Gel 15 once PRN  fenofibrate Tablet 145 daily  glucagon  Injectable 1 once  haloperidol    Injectable 2.5 once  influenza  Vaccine (HIGH DOSE) 0.5 once  insulin lispro (ADMELOG) corrective regimen sliding scale  every 6 hours  morphine  - Injectable 4 every 3 hours PRN  morphine  Infusion. 1 <Continuous>  ondansetron Injectable 4 every 8 hours  pantoprazole  Injectable 40 daily  polyethylene glycol 3350 17 daily PRN      Vital Signs Last 24 Hrs  T(C): 36.8 (25 Oct 2024 05:00), Max: 37 (25 Oct 2024 01:00)  T(F): 98.2 (25 Oct 2024 05:00), Max: 98.6 (25 Oct 2024 01:00)  HR: 80 (25 Oct 2024 05:00) (72 - 108)  BP: 102/60 (25 Oct 2024 05:00) (89/64 - 109/64)  BP(mean): --  RR: 12 (25 Oct 2024 05:00) (12 - 18)  SpO2: 98% (25 Oct 2024 05:00) (95% - 98%)    Parameters below as of 25 Oct 2024 05:00  Patient On (Oxygen Delivery Method): room air        PHYSICAL EXAM:  GENERAL: NAD, well-developed  HEAD:  Atraumatic, Normocephalic  EYES: EOMI, conjunctiva and sclera clear  CHEST/LUNG: Clear to auscultation bilaterally; No wheeze  HEART: Regular rate and rhythm; No murmurs, rubs, or gallops  ABDOMEN: Soft, Nontender, Nondistended; Bowel sounds present  PSYCH: Calm, but still confused                                      8.4    8.77  )-----------( 49       ( 25 Oct 2024 05:30 )             25.8       10-25    150[H]  |  118[H]  |  33[H]  ----------------------------<  179[H]  4.4   |  19[L]  |  0.57    Ca    6.9[L]      25 Oct 2024 05:30  Phos  2.4     10-25  Mg     2.80     10-25    TPro  4.2[L]  /  Alb  2.2[L]  /  TBili  0.9  /  DBili  x   /  AST  44[H]  /  ALT  15  /  AlkPhos  107  10-23      Urinalysis Basic - ( 25 Oct 2024 05:30 )    Color: x / Appearance: x / SG: x / pH: x  Gluc: 179 mg/dL / Ketone: x  / Bili: x / Urobili: x   Blood: x / Protein: x / Nitrite: x   Leuk Esterase: x / RBC: x / WBC x   Sq Epi: x / Non Sq Epi: x / Bacteria: x        MICROBIOLOGY:  v  .Blood BLOOD  10-24-24   No growth at 24 hours  --  --      .Blood BLOOD  10-24-24   No growth at 24 hours  --  --      Clean Catch Clean Catch (Midstream)  10-22-24   <10,000 CFU/mL Normal Urogenital Sherlyn  --  --      .Blood BLOOD  10-22-24   Growth in aerobic bottle: Escherichia coli  --  Escherichia coli      .Blood BLOOD  10-21-24   No growth at 72 Hours  --  --      .Blood BLOOD  10-21-24   No growth at 72 Hours  --  --      .Blood BLOOD  10-19-24   Growth in aerobic and anaerobic bottles: Escherichia coli  See previous culture [id number]  --    Growth in aerobic bottle: Gram Negative Rods  Growth in anaerobic bottle: Gram Negative Rods      .Blood BLOOD  10-19-24   Growth in aerobic and anaerobic bottles: Escherichia coli  See previous culture [id number]  --    Growth in anaerobic bottle: Gram Negative Rods  Growth in aerobic bottle: Gram Negative Rods      .Blood BLOOD  10-19-24   Growth in aerobic and anaerobic bottles: Escherichia coli  See previous culture [id number]  --    Growth in aerobic and anaerobic bottles: Gram Negative Rods and Gram  Negative Coccobacilli      .Blood BLOOD  10-19-24   Growth in aerobic and anaerobic bottles: Escherichia coli  Direct identification is available within approximately 3-5  hours either by Blood Panel Multiplexed PCR or Direct  MALDI-TOF. Details: [email or web address]  --  Blood Culture PCR  Escherichia coli            Clostridium difficile GDH Toxins A&amp;B, EIA:   Negative (10-18-24 @ 23:53)  Clostridium difficile GDH Interpretation: Negative for toxigenic C. Difficile.  This specimen is negative for C.  Difficile glutamate dehydrogenase (GDH) antigen and negative for C.  Difficile Toxins A & B, by EIA.  GDH is a highly sensitive screening  marker for C. Difficile that is produced in large amounts by all C.  Difficile strains, both toxigenic and nontoxigenic.  This assay has not  been validated as a test of cure.  Repeat testing during the same episode  of diarrhea is of limited value and is discouraged.  The results of this  assay should always be interpreted in conjunction with patient's clinical  history. (10-18-24 @ 23:53)      RADIOLOGY:

## 2024-10-25 NOTE — SWALLOW BEDSIDE ASSESSMENT ADULT - ASR SWALLOW ASPIRATION MONITOR
change of breathing pattern/oral hygiene/position upright (90Y)/cough/gurgly voice/fever/pneumonia/throat clearing/upper respiratory infection
change of breathing pattern/oral hygiene/position upright (90Y)/cough/gurgly voice/fever/throat clearing/upper respiratory infection

## 2024-10-25 NOTE — PROGRESS NOTE ADULT - ASSESSMENT
65 y/o M with high volume metastatic prostate cancer, here with back pain, started on chemotherapy.     1. Metastatic prostate cancer  - Follows with Dr Andrew Mendoza at [facility]   - Dx 14 y/o with low risk prostate cancer s/p radical prostatectomy, negative LNs -> recently found to have multiloculated presacral soft tissue mass 5.1 x 3.9 x 4.1cm, RP LAD, low density liver lesions, lucencies in several vertebral bodies, manubrium; PSMA 10/11/24 showed hypermetabolic vera foci above and below the diaphragm, foci in the liver and extensive foci involving the axial and appendicular skeleton compatible with metastatic disease  - --> 374--> 426 on 10/8/24   - Liver biopsy 9/30/24 consistent with adenocarcinoma favoring prostate   - High risk high volume disease -> started on triplet therapy w/ ADT/lupron, abiraterone/prednisone + taxotere. (back pain after Lupron likely tumor flare).  - Continue abiraterone 1000mg daily  - Should be on steroid w/abiraterone; on dex 4mg BID (was for chemo, now per palliative for pain control) - consider tapering to 2mg BID and further from there, if tapered off then needs to be on prednisone 5mg daily w/loly  - Received taxotere 60mg/m2 10/13/24, theoretically can plan for next dose on 11/4 if clinically improves    - Kyphoplasty w/IR to T3 and L1 lesions planned for 10/22/24 - on HOLD due to other acute issues   - Palliative following for pain control and given hospital course would have further GOC discussions. Treatment will be offered but he has metastatic disease with visceral involvement and complications as outlined below.     2. Hypoxia | tachycardia | diarrhea + pancolitis | Fever l Sepsis with Bacteremia   Bacteremia- GNR and Gram negative coccobaccili   - No PE on CTA chest, found to have pancolitis  - C. diff negative  - continues zosyn , afebrile overnight  - ID and GI follow up    3. Thrombocytopenia   - Possible ITP component now with chemotherapy induced   - Baseline in the 70s  - Taxotere dose reduced to 60mg/m2 given baseline thrombocytopenia   - Transfuse for plt <10, <15 if febrile, <20 if bleeding    - Steroids may help with ITP component but also may need high dose. Nplate can also be considered   - defer to vascular regarding SMA dissection or ischemic colitis management- if they feel strongly that heparin is needed will need plt transfusion and to maintain >50K for AC     4. Neutropenia 2/2 chemotherapy   - ANC recovered, no need for further growth factor at this time  - bacteremia as above, ID follow up and c/w abx     5. Ischemic Colitis vs Infectious Colitis   - possible combination of both   - SMA dissection also noted  - colorectal and vascular surgery recs appreciated- no acute surgical intervention  - heparin per vascular but as above would transfuse if they feel there is an ischemic component and heparin is necessary    6. Hypernatremia  -Na 150's    continue IVF per nephrology   -Nephrology recs appreciated      7. 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   Continue GI, ID, colorectal surgery, vascular surgery follow up.   Transfusion support recommended  Per notes, not an ICU  candidate at this time.   Monitor closely and may need ICU re-eval if further decompensates     Umm Aponte NP  Hematology/Oncology  [facility]  [phone] (Office)  [phone] (Alt office)  Evenings and weekends please call MD on call or office         65 y/o M with high volume metastatic prostate cancer, here with back pain, started on chemotherapy.     1. Metastatic prostate cancer  - Follows with Dr Andrew Mendoza at [facility]   - Dx 14 y/o with low risk prostate cancer s/p radical prostatectomy, negative LNs -> recently found to have multiloculated presacral soft tissue mass 5.1 x 3.9 x 4.1cm, RP LAD, low density liver lesions, lucencies in several vertebral bodies, manubrium; PSMA 10/11/24 showed hypermetabolic vera foci above and below the diaphragm, foci in the liver and extensive foci involving the axial and appendicular skeleton compatible with metastatic disease  - --> 374--> 426 on 10/8/24   - Liver biopsy 9/30/24 consistent with adenocarcinoma favoring prostate   - High risk high volume disease -> started on triplet therapy w/ ADT/lupron, abiraterone/prednisone + taxotere. (back pain after Lupron likely tumor flare).  - Continue abiraterone 1000mg daily  - Should be on steroid w/abiraterone; on dex 4mg BID (was for chemo, now per palliative for pain control) - consider tapering to 2mg BID and further from there, if tapered off then needs to be on prednisone 5mg daily w/loly  - Received taxotere 60mg/m2 10/13/24, theoretically can plan for next dose on 11/4 if clinically improves    - Kyphoplasty w/IR to T3 and L1 lesions planned for 10/22/24 - on HOLD due to other acute issues   - Palliative following for pain control and given hospital course would have further GOC discussions. Treatment will be offered but he has metastatic disease with visceral involvement and complications as outlined below.     2. Hypoxia | tachycardia | diarrhea + pancolitis | Fever l Sepsis with Bacteremia   Bacteremia- GNR and Gram negative coccobaccili   - No PE on CTA chest, found to have pancolitis  - C. diff negative  - continues zosyn , afebrile overnight  - ID and GI follow up    3. Thrombocytopenia   - Possible ITP component now with chemotherapy induced   - Baseline in the 70s  - Taxotere dose reduced to 60mg/m2 given baseline thrombocytopenia   - Transfuse for plt <10, <15 if febrile, <20 if bleeding    - Steroids may help with ITP component but also may need high dose. Nplate can also be considered   - defer to vascular regarding SMA dissection or ischemic colitis management- if they feel strongly that heparin is needed will need plt transfusion and to maintain >50K for AC     4. Neutropenia 2/2 chemotherapy   - ANC recovered, no need for further growth factor at this time  - Bacteremia resolving, remains on Zosyn, blood cultures from 10/24 NGTD  -ID recs appreciated    5. Ischemic Colitis vs Infectious Colitis   - possible combination of both   - SMA dissection also noted  - colorectal and vascular surgery recs appreciated- no acute surgical intervention  - heparin per vascular but as above would transfuse if they feel there is an ischemic component and heparin is necessary    6. Hypernatremia  -Na 150's    continue IVF per nephrology   -Nephrology recs appreciated      7. 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   Continue GI, ID, colorectal surgery, vascular surgery follow up.   Transfusion support recommended  Per notes, not an ICU  candidate at this time.   Monitor closely and may need ICU re-eval if further decompensates     Umm Aponte NP  Hematology/Oncology  [facility]  [phone] (Office)  [phone] (Alt office)  Evenings and weekends please call MD on call or office

## 2024-10-25 NOTE — PROGRESS NOTE ADULT - NS ATTEND AMEND GEN_ALL_CORE FT
Agree with above assessment and plan.    Mr Maravilla is unfortunately a 64 year old male with high risk high volume metastatic prostate cancer with visceral (liver) disease and extensive bone mets with pathologic fracture imaging showing 4.4 x 4.9 x 3.2 cm (maximal AP x TV x CC dimensions) heterogeneously enhancing soft tissue mass within the presacral region at the S2 and S3 levels, suspicious for metastatic disease L1 and L5 pathologic fractures are associated with moderate epidural disease at L1, more mild at L5. At least mild epidural disease at the left S1-S2 neural foramen.   He was on abiraterone/prednisone and received taxotere on 10/13.   Hold abiraterone at this time especially has not been on prednisone and had a GI bleed. When recovers can resume.   Now with SMA dissection, bacteremia on antibiotics. Vascular surgery recs appreciated, would appreciate further follow up given GI bleed which has not had recurrence.   He is requiring significant opiates, lethargic.   Lengthy family discussion on 10/24 multiple family members. Discussed his guarded prognosis, how he is very ill and high risk of decompensation including death during this admission. Discussed goal is for patient to clinically improve, clear infection, improve his PS to continue his treatment. They expressed they understand his clinical state and potential for further decompensation and that he wanted to be DNR/DNI which he is currently.    Appreciate palliative consult and care.

## 2024-10-25 NOTE — SWALLOW BEDSIDE ASSESSMENT ADULT - SWALLOW EVAL: DIAGNOSIS
Mild oral dysphagia for puree, moderately thickened, mildly thickened, and thin liquids characterized by adequate anterior-posterior transport and delayed bolus transit. Delayed transit is suspected to be due to reports of pain. Patient observed to manipulate bolus slowly, suspected to avoid pain/discomfort in oral cavity. Mild-moderate oral dysphagia for soft/bite sized characterized by increased mastication time, adequate anterior-posterior transport, delayed oral transit, and significant oral residue which cleared with multiple liquid washes. Functional pharyngeal phase for puree, soft/bite size, moderately thickened, and mildly thickened liquids characterized by timely pharyngeal swallow, hyolaryngeal excursion upon digital palpation, and no overt signs/symptoms of penetration/aspiration. One instance of cough observed with puree, this was not reduplicated in subsequent trials, suspected cough to be due to phlegm at baseline.
1. Functional oral phase for pureed, mildly thick, and thin liquids marked by adequate oral acceptance and timely collection and transport with adequate clearance post swallow. 2. Mild oral dysphagia for regular solids marked by prolonged/effortful mastication resulting in delayed collection and transport with stasis observed post swallow and patient requesting liquid wash to assist with clearance. 2. Judged functional pharyngeal phase for pureed, regular solids, and mildly thick liquids marked by suspected timely pharyngeal swallow trigger and hyolaryngeal elevation noted by digital palpation without evidence of airway penetration/aspiration. 4. Pharyngeal dysphagia suspected with thin liquids marked by suspected timely pharyngeal swallow trigger and intermittent coughing. Patient also noted with belching and "gag like" symptoms post all PO trials of thin liquids. 5. Recommend soft and bite sized and mildly thick liquids with aspiration precautions

## 2024-10-25 NOTE — PROGRESS NOTE ADULT - ASSESSMENT
64-year-old male with metastatic prostate cancer, sent in by hematologist from [facility] for uncontrolled pain, nausea, vomiting.   Patient reports diffuse pain starting 6 months ago significantly worsening for the past 2 weeks.  Currently the worst pain is located around the lower back.   No loss of bladder and bowel function, no saddle paresthesia.  Able to walk.  patient is oxycodone 5 every 4-6 hour.  Yesterday they started adding OxyContin 10.  However patient continued to have pain.  Family also reports significant nausea and vomiting, inability to tolerate p.o. for the last 2 days.  Last bowel movement 2 days ago.   No known allergy.  Diagnosed with high risk high volume metastatic prostate cancer with bone, liver lymph node mets and presacral mass. Liver biopsy confirmed disease with . Started lupron, loly/pred with plans to initiate taxotere.    (12 Oct 2024 15:40)  pt seems very frustrated:   he is on 2 L of oxygen : he has no underlying lung disease:  but he is requiring 2 L of oxygen      Hypoxic resp failure  Metastatic prostate cancer  Back pain     Hypoxic resp failure  -He has no underlying lung disease:  but he is requiring 2 L of oxygen :   -CTA showed; No pulmonary embolism to the level of the segmental branches bilaterally. Limited evaluation of the subsegmental branches secondary to incomplete contrast opacification.  Multilevel vertebral body lytic lesions and loss of vertebral body height, better evaluated on CT thoracic spine 10/17/2024. Metastatic prostate cancer  -Patent central airways. Bibasilar atelectasis. No pleural effusion. MEDIASTINUM AND AKTTY: No lymphadenopathy.  PULMONARY ANGIOGRAM: No pulmonary embolism to the level of the segmental   branches bilaterally. Limited evaluation of the subsegmental branches secondary to incomplete contrast opacification.    10/19:  -seems pretty tired;  on 2 L of oxygen :  -cta chest showed: No pulmonary embolism to the level of the segmental branches bilaterally. Limited evaluation of the subsegmental branches secondary to incomplete   contrast opacification. Multilevel vertebral body lytic lesions and loss of vertebral body height, better evaluated on CT thoracic spine 10/17/2024.  -still with fever:  no pe  on zosyn  and leukopenic hemonc following;  has metastatic prostate ca:   -VBG seems OK:     10/20:  pulm wise he seems to be stable:   -using 2 L of oxygen    -yesterdays VBG with minimal met acidosis  -cta again noted    10/21:  on 4 L of oxygen  today    cxr is size    e coli sepsis: Gram Negative Rods and Gram Negative Coccobacilli    10/22: heis doing poorly today  : he is very agitated and uncomfortable  on mittens: ? sun downing   10/23: quiet today  : sleeping:  oxygen requirement has not increased: on rooo ha 95% as documented    WBC is slowly increasing:   no fever:   -on antibiotics   10/24: pt is not doing well : his repeat blood cultures are positive with same organism :  would defer to ID;   10/25: seems to be doing  ok ; no sob:  no cough :  no phlegm   : on room air       New finding:  SMA dissection : neutropenic colitis/ #E Coli and H influenzae bacteremia/ #Neutropenic fever  -/f ischemic colitis on R colon   Diagnosed  on ct abd:  defer to surgery and primary team:  probably no intervention:   -cont antibiotics  -not doing very well with above new findings    10/22; no intervention per surgery    -cont antibiotics  -has fever:  ID following :  -Last chest xray on 20th  ; normal     id following   10/23  IR was consulted for vertebral augmentation of T3-T5 prior to XRT.   Patient was seen bedside. Initially, patient kept requesting no exam and was not willing to participate in the discussion. Son was bedside at time of conversation.   Per discussion with the medical attending, given the concern for SMA dissection and concern for bowel ischemia, will hold off on vertebral augmentation evaluation at this time.   Please reach out to IR when patient is medically stable for vertebral augmentation.  10/24:  remains septic:  above cancelled:   ? for palliative care now    Back pain   -likely due to metastatic disease:  adm here for sever pain :  pain control per primary team   -venous ph is ok     dw acp & family ; GOC discussion on going

## 2024-10-25 NOTE — CHART NOTE - NSCHARTNOTEFT_GEN_A_CORE
Discussed BMP results with Dr. Horner from nephrology. SNa improved from 150 to 146. Per nephrology, will extend IVF until 23:00. Will continue to monitor pt closely.      10-25    146[H]  |  115[H]  |  33[H]  ----------------------------<  181[H]  4.1   |  20[L]  |  0.51    Ca    6.9[L]      25 Oct 2024 17:20  Phos  2.4     10-25  Mg     2.80     10-25    TPro  4.6[L]  /  Alb  2.3[L]  /  TBili  0.9  /  DBili  0.3  /  AST  52[H]  /  ALT  15  /  AlkPhos  83  10-25

## 2024-10-25 NOTE — SWALLOW BEDSIDE ASSESSMENT ADULT - PHARYNGEAL PHASE
Within functional limits One instance of cough, not reduplicated/Within functional limits Wet vocal quality post oral intake/Delayed throat clear post oral intake

## 2024-10-25 NOTE — SWALLOW BEDSIDE ASSESSMENT ADULT - SWALLOW EVAL: PROGNOSIS
Dx continued: Mild pharyngeal dysphagia for thin liquids characterized by hyolaryngeal excursion upon digital palpation, suspected delayed swallow trigger, and wet/gurgly voice/throat clearing following PO trials.
fair; continue to monitor and notify SLP if changes occur

## 2024-10-25 NOTE — SWALLOW BEDSIDE ASSESSMENT ADULT - ADDITIONAL RECOMMENDATIONS
This department will continue to follow the patient for diet tolerance/advancement during this admission, as schedule permits
1. This service to follow up as schedule permits for diet tolerance/advancement. 2. Medical team advised to reconsult if change in medical status.

## 2024-10-25 NOTE — PROGRESS NOTE ADULT - SUBJECTIVE AND OBJECTIVE BOX
Patient is a 64y old  Male who presents with a chief complaint of Pain (25 Oct 2024 13:31)    Date of servie : 10-25-24 @ 16:03  INTERVAL HPI/OVERNIGHT EVENTS:  T(C): 37.1 (10-25-24 @ 11:48), Max: 37.1 (10-25-24 @ 11:48)  HR: 94 (10-25-24 @ 11:48) (72 - 101)  BP: 111/73 (10-25-24 @ 11:48) (95/61 - 111/73)  RR: 17 (10-25-24 @ 11:48) (12 - 18)  SpO2: 96% (10-25-24 @ 11:48) (96% - 98%)  Wt(kg): --  I&O's Summary      LABS:                        8.4    8.77  )-----------( 49       ( 25 Oct 2024 05:30 )             25.8     10-25    150[H]  |  118[H]  |  33[H]  ----------------------------<  179[H]  4.4   |  19[L]  |  0.57    Ca    6.9[L]      25 Oct 2024 05:30  Phos  2.4     10-25  Mg     2.80     10-25    TPro  4.6[L]  /  Alb  2.3[L]  /  TBili  0.9  /  DBili  0.3  /  AST  52[H]  /  ALT  15  /  AlkPhos  83  10-25      Urinalysis Basic - ( 25 Oct 2024 05:30 )    Color: x / Appearance: x / SG: x / pH: x  Gluc: 179 mg/dL / Ketone: x  / Bili: x / Urobili: x   Blood: x / Protein: x / Nitrite: x   Leuk Esterase: x / RBC: x / WBC x   Sq Epi: x / Non Sq Epi: x / Bacteria: x      CAPILLARY BLOOD GLUCOSE  200 (25 Oct 2024 01:00)      POCT Blood Glucose.: 191 mg/dL (25 Oct 2024 12:29)  POCT Blood Glucose.: 170 mg/dL (25 Oct 2024 09:00)  POCT Blood Glucose.: 200 mg/dL (25 Oct 2024 01:35)  POCT Blood Glucose.: 199 mg/dL (24 Oct 2024 21:18)  POCT Blood Glucose.: 192 mg/dL (24 Oct 2024 18:04)        Urinalysis Basic - ( 25 Oct 2024 05:30 )    Color: x / Appearance: x / SG: x / pH: x  Gluc: 179 mg/dL / Ketone: x  / Bili: x / Urobili: x   Blood: x / Protein: x / Nitrite: x   Leuk Esterase: x / RBC: x / WBC x   Sq Epi: x / Non Sq Epi: x / Bacteria: x        MEDICATIONS  (STANDING):  abiraterone 500 mg tablet 2 Tablet(s) 2 Tablet(s) Oral daily  atorvastatin 20 milliGRAM(s) Oral at bedtime  cefTRIAXone   IVPB 2000 milliGRAM(s) IV Intermittent every 24 hours  chlorhexidine 2% Cloths 1 Application(s) Topical daily  dexAMETHasone  Injectable 2 milliGRAM(s) IV Push daily  dextrose 5%. 1000 milliLiter(s) (50 mL/Hr) IV Continuous <Continuous>  dextrose 5%. 1000 milliLiter(s) (130 mL/Hr) IV Continuous <Continuous>  dextrose 5%. 1000 milliLiter(s) (75 mL/Hr) IV Continuous <Continuous>  dextrose 5%. 1000 milliLiter(s) (100 mL/Hr) IV Continuous <Continuous>  dextrose 50% Injectable 12.5 Gram(s) IV Push once  dextrose 50% Injectable 25 Gram(s) IV Push once  dextrose 50% Injectable 25 Gram(s) IV Push once  fenofibrate Tablet 145 milliGRAM(s) Oral daily  glucagon  Injectable 1 milliGRAM(s) IntraMuscular once  haloperidol    Injectable 2.5 milliGRAM(s) IntraMuscular once  influenza  Vaccine (HIGH DOSE) 0.5 milliLiter(s) IntraMuscular once  insulin lispro (ADMELOG) corrective regimen sliding scale   SubCutaneous every 6 hours  lactobacillus acidophilus 1 Tablet(s) Oral daily  lidocaine   4% Patch 1 Patch Transdermal every 24 hours  metroNIDAZOLE  IVPB 500 milliGRAM(s) IV Intermittent every 12 hours  morphine  Infusion. 1 mG/Hr (1 mL/Hr) IV Continuous <Continuous>  ondansetron Injectable 4 milliGRAM(s) IV Push every 8 hours  pantoprazole  Injectable 40 milliGRAM(s) IV Push daily  potassium phosphate / sodium phosphate Tablet (K-PHOS No. 2) 1 Tablet(s) Oral every 8 hours  sodium chloride 0.45%. 1000 milliLiter(s) (125 mL/Hr) IV Continuous <Continuous>    MEDICATIONS  (PRN):  acetaminophen     Tablet .. 650 milliGRAM(s) Oral every 6 hours PRN Temp greater or equal to 38C (100.4F), Mild Pain (1 - 3), Moderate Pain (4 - 6)  aluminum hydroxide/magnesium hydroxide/simethicone Suspension 30 milliLiter(s) Oral every 4 hours PRN Dyspepsia  artificial tears (preservative free) Ophthalmic Solution 1 Drop(s) Both EYES four times a day PRN Dry Eyes  dextrose Oral Gel 15 Gram(s) Oral once PRN Blood Glucose LESS THAN 70 milliGRAM(s)/deciliter  morphine  - Injectable 4 milliGRAM(s) IV Push every 3 hours PRN Severe Pain (7 - 10)  naloxone Injectable 0.1 milliGRAM(s) IV Push every 3 minutes PRN For ANY of the following changes in patient status:  A. RR LESS THAN 10 breaths per minute, B. Oxygen saturation LESS THAN 90%, C. Sedation score of 6  polyethylene glycol 3350 17 Gram(s) Oral daily PRN Constipation          PHYSICAL EXAM:  GENERAL: frail  CHEST/LUNG: Clear to percussion bilaterally; No rales, rhonchi, wheezing, or rubs  HEART: Regular rate and rhythm; No murmurs, rubs, or gallops  ABDOMEN: Soft, Nontender, Nondistended; Bowel sounds present  EXTREMITIES:  edema +    Care Discussed with Consultants/Other Providers [ ] YES  [ ] NO

## 2024-10-25 NOTE — PROGRESS NOTE ADULT - SUBJECTIVE AND OBJECTIVE BOX
{\rtf1\dddmpo95234\ansi\qfvunhm2703\ftnbj\uc1\deff0  {\fonttbl{\f0 \fnil Segoe UI;}{\f1 \fnil \fcharset0 Segoe UI;}{\f2 \fnil Times New Nikhil;}}  {\colortbl ;\yjv623\bhjtb415\skyd084 ;\red0\green0\blue0 ;\red0\green0\rhyl177 ;\red0\green0\blue0 ;}  {\stylesheet{\f0\fs20 Normal;}{\cs1 Default Paragraph Font;}{\cs2\f0\fs16 Line Number;}{\cs3\f2\fs24\ul\cf3 Hyperlink;}}  {\*\revtbl{Unknown;}}  \imbaew50107\lcnzpn83474\atruk6832\hliaz0605\rdjts9946\ijsoi1435\hrjtzhi011\wmozdrt333\nogrowautofit\yxgran964\formshade\nofeaturethrottle1\dntblnsbdb\fet4\aendnotes\aftnnrlc\pgbrdrhead\pgbrdrfoot  \sectd\gycxai68682\uzigof28845\guttersxn0\dajvldwa5316\samfwgsd9624\etzktsdx0894\ctsryokv9152\rzhuijf207\ovqduqd279\sbkpage\pgncont\pgndec  \plain\plain\f0\fs24\pard\plain\f0\fs24\plain\f0\fs20\ouen7756\hich\f0\dbch\f0\loch\f0\fs20 Date of Service: 10-25-24 @ 12:11\par  \par  Patient is a 64y old  Male who presents with a chief complaint of Pain (25 Oct 2024 09:37)\par  \par  \par  Any change in ROS: she seems to be doing  ok : no sob: alert  \par  \par  MEDICATIONS  (STANDING):\par  abiraterone 500 mg tablet 2 Tablet(s) 2 Tablet(s) Oral daily\par  atorvastatin 20 milliGRAM(s) Oral at bedtime\par  cefTRIAXone   IVPB 2000 milliGRAM(s) IV Intermittent every 24 hours\par  chlorhexidine 2% Cloths 1 Application(s) Topical daily\par  dexAMETHasone  Injectable 2 milliGRAM(s) IV Push daily\par  dextrose 5%. 1000 milliLiter(s) (75 mL/Hr) IV Continuous <Continuous>\par  dextrose 5%. 1000 milliLiter(s) (130 mL/Hr) IV Continuous <Continuous>\par  dextrose 5%. 1000 milliLiter(s) (100 mL/Hr) IV Continuous <Continuous>\par  dextrose 5%. 1000 milliLiter(s) (50 mL/Hr) IV Continuous <Continuous>\par  dextrose 50% Injectable 12.5 Gram(s) IV Push once\par  dextrose 50% Injectable 25 Gram(s) IV Push once\par  dextrose 50% Injectable 25 Gram(s) IV Push once\par  fenofibrate Tablet 145 milliGRAM(s) Oral daily\par  glucagon  Injectable 1 milliGRAM(s) IntraMuscular once\par  haloperidol    Injectable 2.5 milliGRAM(s) IntraMuscular once\par  influenza  Vaccine (HIGH DOSE) 0.5 milliLiter(s) IntraMuscular once\par  insulin lispro (ADMELOG) corrective regimen sliding scale   SubCutaneous every 6 hours\par  lactobacillus acidophilus 1 Tablet(s) Oral daily\par  lidocaine   4% Patch 1 Patch Transdermal every 24 hours\par  metroNIDAZOLE  IVPB 500 milliGRAM(s) IV Intermittent every 12 hours\par  morphine  Infusion. 1 mG/Hr (1 mL/Hr) IV Continuous <Continuous>\par  ondansetron Injectable 4 milliGRAM(s) IV Push every 8 hours\par  pantoprazole  Injectable 40 milliGRAM(s) IV Push daily\par  potassium phosphate / sodium phosphate Tablet (K-PHOS No. 2) 1 Tablet(s) Oral every 8 hours\par  sodium chloride 0.45%. 1000 milliLiter(s) (125 mL/Hr) IV Continuous <Continuous>\par  \par  MEDICATIONS  (PRN):\par  acetaminophen     Tablet .. 650 milliGRAM(s) Oral every 6 hours PRN Temp greater or equal to 38C (100.4F), Mild Pain (1 - 3), Moderate Pain (4 - 6)\par  aluminum hydroxide/magnesium hydroxide/simethicone Suspension 30 milliLiter(s) Oral every 4 hours PRN Dyspepsia\par  artificial tears (preservative free) Ophthalmic Solution 1 Drop(s) Both EYES four times a day PRN Dry Eyes\par  dextrose Oral Gel 15 Gram(s) Oral once PRN Blood Glucose LESS THAN 70 milliGRAM(s)/deciliter\par  morphine  - Injectable 4 milliGRAM(s) IV Push every 3 hours PRN Severe Pain (7 - 10)\par  naloxone Injectable 0.1 milliGRAM(s) IV Push every 3 minutes PRN For ANY of the following changes in patient status:  A. RR LESS THAN 10 breaths per minute, B. Oxygen saturation LESS THAN 90%, C. Sedation score of 6\par  polyethylene glycol 3350 17 Gram(s) Oral daily PRN Constipation\par  \par  Vital Signs Last 24 Hrs\par  T(C): 37.1 (25 Oct 2024 11:48), Max: 37.1 (25 Oct 2024 11:48)\par  T(F): 98.8 (25 Oct 2024 11:48), Max: 98.8 (25 Oct 2024 11:48)\par  HR: 94 (25 Oct 2024 11:48) (72 - 108)\par  BP: 111/73 (25 Oct 2024 11:48) (89/64 - 111/73)\par  BP(mean): --\par  RR: 17 (25 Oct 2024 11:48) (12 - 18)\par  SpO2: 96% (25 Oct 2024 11:48) (95% - 98%)\par  \par  Parameters below as of 25 Oct 2024 11:48\par  Patient On (Oxygen Delivery Method): room air\par  \par  \par  \par  I&O's Summary\par  \par  \par  \par  Physical Exam: \par  GENERAL: NAD, well-groomed, well-developed\par  HEENT: CHAVA/   Atraumatic, Normocephalic\par  ENMT: No tonsillar erythema, exudates, or enlargement; Moist mucous membranes, Good dentition, No lesions\par  NECK: Supple, No JVD, Normal thyroid\par  CHEST/LUNG: Clear to auscultaion,\par  CVS: Regular rate and rhythm; No murmurs, rubs, or gallops\par  GI: : Soft, Nontender, Nondistended; Bowel sounds present\par  NERVOUS SYSTEM:  Alert & Oriented X3\par  EXTREMITIES: - edema\par  LYMPH: No lymphadenopathy noted\par  SKIN: No rashes or lesions\par  ENDOCRINOLOGY: No Thyromegaly\par  PSYCH: Appropriate\par  \par  Labs:\par  18, 16, 18\par  \par  \par           \par             8.4  \par  8.77  )-----------( 49       ( 25 Oct 2024 05:30 )\par             25.8 \par           \par             8.0  \par  8.70  )-----------( 40       ( 24 Oct 2024 09:12 )\par             24.4 \par           \par             8.4  \par  10.25 )-----------( 40       ( 24 Oct 2024 02:00 )\par             25.6 \par           \par             8.3  \par  13.21 )-----------( 44       ( 23 Oct 2024 18:00 )\par             25.1 \par           \par             8.6  \par  18.56 )-----------( 52       ( 23 Oct 2024 06:00 )\par             25.9 \par           \par             9.1  \par  18.56 )-----------( 30       ( 22 Oct 2024 21:22 )\par             28.0 \par           \par             9.7  \par  14.94 )-----------( 23       ( 22 Oct 2024 11:10 )\par             29.4 \par           \par             9.1  \par  15.73 )-----------( 23       ( 22 Oct 2024 06:09 )\par             27.2 \par  \par  10-25\par  \par  150[H]  |  118[H]  |  33[H]\par  ----------------------------<  179[H]\par  4.4   |  19[L]  |  0.57\par  10-24\par  \par  147[H]  |  118[H]  |  38[H]\par  ----------------------------<  175[H]\par  3.8   |  19[L]  |  0.61\par  10-24\par  \par  151[H]  |  120[H]  |  42[H]\par  ----------------------------<  151[H]\par  3.8   |  20[L]  |  0.68\par  10-24\par  \par  151[H]  |  120[H]  |  43[H]\par  ----------------------------<  142[H]\par  4.0   |  19[L]  |  0.74\par  10-23\par  \par  151[H]  |  119[H]  |  37[H]\par  ----------------------------<  180[H]\par  4.2   |  21[L]  |  0.64\par  10-23\par  \par  154[H]  |  122[H]  |  45[H]\par  ----------------------------<  282[H]\par  4.7   |  20[L]  |  0.71\par  10-22\par  \par  153[H]  |  124[H]  |  52[H]\par  ----------------------------<  333[H]\par  5.6[H]   |  18[L]  |  0.81\par  10-22\par  \par  158[H]  |  127[H]  |  54[H]\par  ----------------------------<  328[H]\par  4.7   |  19[L]  |  0.94\par  10-22\par  \par  159[H]  |  126[H]  |  57[H]\par  ----------------------------<  291[H]\par  5.0   |  18[L]  |  0.93\par  10-22\par  \par  159[H]  |  128[H]  |  59[H]\par  ----------------------------<  269[H]\par  4.5   |  19[L]  |  0.94\par  \par  Ca    6.9[L]      25 Oct 2024 05:30\par  Ca    6.5[LL]      24 Oct 2024 10:46\par  Ca    6.6[L]      24 Oct 2024 09:12\par  Ca    6.7[L]      24 Oct 2024 02:00\par  Ca    6.7[L]      23 Oct 2024 18:00\par  Phos  2.4     10-25\par  Phos  2.5     10-24\par  Phos  2.6     10-24\par  Phos  2.6     10-24\par  Mg     2.80     10-25\par  Mg     2.70     10-24\par  Mg     2.80     10-24\par  Mg     2.70     10-24\par  Mg     2.70     10-23\par  \par  TPro  4.6[L]  /  Alb  2.3[L]  /  TBili  0.9  /  DBili  0.3  /  AST  52[H]  /  ALT  15  /  AlkPhos  83  10-25\par  TPro  4.2[L]  /  Alb  2.2[L]  /  TBili  0.9  /  DBili  x   /  AST  44[H]  /  ALT  15  /  AlkPhos  107  10-23\par  \par  CAPILLARY BLOOD GLUCOSE\par  200 (25 Oct 2024 01:00)\par  \par  \par  POCT Blood Glucose.: 170 mg/dL (25 Oct 2024 09:00)\par  POCT Blood Glucose.: 200 mg/dL (25 Oct 2024 01:35)\par  POCT Blood Glucose.: 199 mg/dL (24 Oct 2024 21:18)\par  POCT Blood Glucose.: 192 mg/dL (24 Oct 2024 18:04)\par  POCT Blood Glucose.: 161 mg/dL (24 Oct 2024 12:34)\par  \par  \par  LIVER FUNCTIONS - ( 25 Oct 2024 05:30 )\par  Alb: 2.3 g/dL / Pro: 4.6 g/dL / ALK PHOS: 83 U/L / ALT: 15 U/L / AST: 52 U/L / GGT: x       \par  \par  \par  Urinalysis Basic - ( 25 Oct 2024 05:30 )\par  \par  Color: x / Appearance: x / SG: x / pH: x\par  Gluc: 179 mg/dL / Ketone: x  / Bili: x / Urobili: x \par  Blood: x / Protein: x / Nitrite: x \par  Leuk Esterase: x / RBC: x / WBC x \par  Sq Epi: x / Non Sq Epi: x / Bacteria: x\par  \par  \par  \par  \par  \par  RECENT CULTURES:\par  10-24 @ 01:55 .Blood BLOOD \par  \par  \par  \par  \par   \par  \par  No growth at 24 hours\par  \par  10-24 @ 01:42 .Blood BLOOD \par  \par  \par  \par  \par   \par  \par  No growth at 24 hours\par  \par  10-22 @ 15:09 Clean Catch Clean Catch (Midstream) \par  \par  \par  \ql\plain\f0\fs24\plain\f0\fs20\rycn7742\hich\f0\dbch\f0\loch\f0\fs20 radWe will hold RT as he is unsafe to treat.  patient is confused and agitated in mittons/restraints. \par  Please assess for sepsis? and advise per oncology if we are to Hold RT. \par  d/w Dr. Bruno\par  \par  \par  \par  IR was consulted for vertebral augmentation of T3-T5 prior to XRT. \par  Patient was seen bedside. Initially, patient kept requesting no exam and was not willing to participate in the discussion. Son was bedside at time of conversation. 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 \par  Please reach out to IR when patient is medically stable for vertebral augmentation.\plain\f1\fs20\zroi0377\hich\f1\dbch\f1\loch\f1\cf2\fs20\strike\pard\plain\f0\fs24\plain\f1\fs20\rmoe3423\hich\f1\dbch\f1\loch\f1\cf2\fs20\strike\plain\f1\fs20\sdoz2881\hich\f1\dbch\f1\loch\f1\cf2\fs20\plain\f0\fs20\rsvh5864\hich\f0\dbch\f0\loch\f0\fs20\par  \par   \par  \par  <10,000 CFU/mL Normal Urogenital Sherlyn\par  \par  10-22 @ 06:05 .Blood BLOOD \par  ARJUN\par  \par  Growth in aerobic bottle: Gram Negative Rods\par  \par  Escherichia coli\par  Escherichia coli \par  \par  Growth in aerobic bottle: Escherichia coli\par  \par  10-21 @ 20:05 .Blood BLOOD \par  \par  \par  \par  \par   \par  \par  No growth at 72 Hours\par  \par  10-21 @ 17:33 .Blood BLOOD \par  \par  \par  \par  \par   \par  \par  No growth at 72 Hours\par  \par  10-19 @ 20:05 .Blood BLOOD \par  \par  \par  Growth in aerobic bottle: Gram Negative Rods\par  Growth in anaerobic bottle: Gram Negative Rods\par  \par  \par   \par  \par  Growth in aerobic and anaerobic bottles: Escherichia coli\par  See previous culture [id number]\par  \par  10-19 @ 19:50 .Blood BLOOD \par  \par  \par  Growth in anaerobic bottle: Gram Negative Rods\par  Growth in aerobic bottle: Gram Negative Rods\par  \par  \par   \par  \par  Growth in aerobic and anaerobic bottles: Escherichia coli\par  See previous culture [id number]\par  \par  10-19 @ 05:26 .Blood BLOOD \par  \par  \par  Growth in aerobic and anaerobic bottles: Gram Negative Rods and Gram\par  Negative Coccobacilli\par  \par  \par   \par  \par  Growth in aerobic and anaerobic bottles: Escherichia coli\par  See previous culture [id number]\par  \par  10-19 @ 04:55 .Blood BLOOD \par  PCR\par  \par  Growth in aerobic and anaerobic bottles: Gram Negative Rods\par  \par  Blood Culture PCR\par  Escherichia coli\par  Blood Culture PCR \par  \par  Growth in aerobic and anaerobic bottles: Escherichia coli\par  Direct identification is available within approximately 3-5\par  hours either by Blood Panel Multiplexed PCR or Direct\par  MALDI-TOF. Details: [email or web address]\par  \par  \par  \par  \par  RESPIRATORY CULTURES:\par  \par  \par  Clostridium difficile GDH Toxins A&amp;B, EIA: \par  Negative (10-18 @ 23:53)\par  \par  \par  Studies\par  Chest X-RAY\par  CT SCAN Chest \par  Venous Dopplers: LE: \par  CT Abdomen\par  Others\par  \par  \par  \par  \par  \par  \par  \ql\plain\f0\fs24\plain\f0\fs20\[id number]\hich\f0\dbch\f0\loch\f0\fs20\par  }

## 2024-10-25 NOTE — PROGRESS NOTE ADULT - ASSESSMENT
64-year-old male with metastatic prostate cancer, sent in by hematologist from [place] [facility] for uncontrolled pain, nausea, vomiting.       Problem/Plan - 1:  ·  Problem: Cancer related pain.   ·  Plan: - appreciate pall care recommendations:  - pain control as per palliative crae   - RT consult appreciated     Problem/Plan - 2:·  Problem: Nausea & vomiting., Diarrhea, colitis , SMA dissection   ·  Plan: -  vascular fu appreciated  - Now pt with bloody BM   - GI and surgery consult appreciated   - ID fu    Problem/Plan - 3:  ·  Problem: CA of prostate.   ·  Plan: Metastatic prostate cancer- f/u heme/onc recommendations  - Rad Onc fu   - Palliative for symptom control.    Problem/Plan - 4:  ·  Problem: Thrombocytopenia, unspecified.   ·  Plan: -monitor cbc   transfuse PRN    Problem/Plan - 5:  ·  Problem: hypernatremia·  Plan: - cw ivf  - renal fu     case dw wife at bedside   dw oncology attending

## 2024-10-25 NOTE — SWALLOW BEDSIDE ASSESSMENT ADULT - ASR SWALLOW RECOMMEND DIAG
Objective testing not warranted at this time due to overt signs/symptoms of penetration/aspiration on thin liquids
objective testing not warranted at this time d/t overt signs on thin liquids and patient with some confusion/agitation; will continue to monitor tolerance and candidacy for objective testing at bedside at this time

## 2024-10-25 NOTE — SWALLOW BEDSIDE ASSESSMENT ADULT - CONSISTENCIES ADMINISTERED
1/2 container/pureed 1 container/mildly thick 2 oz/moderately thick 2 trials/soft & bite-sized 4 oz/thin liquid

## 2024-10-25 NOTE — PROGRESS NOTE ADULT - ASSESSMENT
64-year-old male with metastatic prostate cancer, sent in by hematologist from [place] [facility] for uncontrolled pain, nausea, vomiting.   Patient reports diffuse pain starting 6 months ago significantly worsening for the past 2 weeks. Diagnosed with high risk high volume metastatic prostate cancer with bone, liver lymph node mets and presacral mass. Liver biopsy confirmed disease with . Started lupron, loly/pred with plans to initiate taxotere.   nephrology consulted for electrolyte abnormalities     Hypernatremia  likely dehydration   pt now has diet  Na 151 this am. FS still elevated  will start 1/2ns @ 125cc/hr c1lzbui. please repeat bmp @ 3pm. please call nephro with result  avoid overcorrection. na should not correct 6-8meq in 24 hrs  monitor     hypophosphatemia  supplemented  monitor  supplement more if needed    hypocalcemia   sec to low albumin  corrected ca 8.5  monitor    fever  GNR bacteremia   work up and tx per team    anemia   metastatic prostate cancer  f/u heme/onc

## 2024-10-25 NOTE — PROGRESS NOTE ADULT - ASSESSMENT
This is a 65 y/o M w/ prostate CA s/p radical prostatectomy now metastatic, HTN, HLD, admitted to 10/12 for pain, N/V in setting of osseous mets. S/p docetaxel on 10/13, abiraterone 10/12. Given neutropenia started on Zarxio.  C/b hypoxia, tachycardia. CTA w/o PE. Pt w/ 10 episodes of diarrhea on 10/18. C diff negative,   Pt now febrile to 102, CT A/P with pancolitis. 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   Fever likely 2/2 to neutropenic enterocolitis, pt w/ ANC 90, at high risk of bacteremia.   Bcx now w/ E Coli and H influenzae bacteremia, E Coli likely from colitis, however unclear source of H influenza, typically from PNA, septic arthritis, consider meningitis, per family denies neck pain/HA.   Pt w/ pressured speech, paranoid thoughts. Worsening hypernatremia   BCx from 10/22 still w/ E Coli.     Recommendations:   1. Change to Ceftriaxone 2 g q 24 and Metronidazole 500 mg BID   2. F/u repeat BCx from 10/24  3. Appreciate surgery/GI recs   4. If BCx from 10/24 are positive, would repeat CT A/P w/ IV contrast and obtain TTE     Poor prognosis overall    Thank you for this consult. Inpatient ID team will follow.    Orlando Ruiz M.D.  Attending Physician  Division of Infectious Diseases  Department of Medicine    Please contact through MS Teams message.  Office: [phone] (after 5 PM or weekend)

## 2024-10-25 NOTE — SWALLOW BEDSIDE ASSESSMENT ADULT - SWALLOW EVAL: RECOMMENDED FEEDING/EATING TECHNIQUES
allow for swallow between intakes/alternate food with liquid/check mouth frequently for oral residue/pocketing/oral hygiene/position upright (90 degrees)/small sips/bites
allow for swallow between intakes/alternate food with liquid/check mouth frequently for oral residue/pocketing/maintain upright posture during/after eating for 30 mins/no straws/oral hygiene/position upright (90 degrees)/small sips/bites

## 2024-10-26 LAB
ANION GAP SERPL CALC-SCNC: 13 MMOL/L — SIGNIFICANT CHANGE UP (ref 7–14)
BLD GP AB SCN SERPL QL: NEGATIVE — SIGNIFICANT CHANGE UP
BUN SERPL-MCNC: 28 MG/DL — HIGH (ref 7–23)
CA-I BLD-SCNC: 1.02 MMOL/L — LOW (ref 1.15–1.29)
CALCIUM SERPL-MCNC: 6.5 MG/DL — CRITICAL LOW (ref 8.4–10.5)
CHLORIDE SERPL-SCNC: 115 MMOL/L — HIGH (ref 98–107)
CO2 SERPL-SCNC: 16 MMOL/L — LOW (ref 22–31)
CREAT SERPL-MCNC: 0.49 MG/DL — LOW (ref 0.5–1.3)
CULTURE RESULTS: SIGNIFICANT CHANGE UP
EGFR: 114 ML/MIN/1.73M2 — SIGNIFICANT CHANGE UP
GAS PNL BLDV: SIGNIFICANT CHANGE UP
GLUCOSE BLDC GLUCOMTR-MCNC: 108 MG/DL — HIGH (ref 70–99)
GLUCOSE BLDC GLUCOMTR-MCNC: 118 MG/DL — HIGH (ref 70–99)
GLUCOSE BLDC GLUCOMTR-MCNC: 130 MG/DL — HIGH (ref 70–99)
GLUCOSE BLDC GLUCOMTR-MCNC: 138 MG/DL — HIGH (ref 70–99)
GLUCOSE SERPL-MCNC: 141 MG/DL — HIGH (ref 70–99)
HCT VFR BLD CALC: 27.1 % — LOW (ref 39–50)
HGB BLD-MCNC: 8.6 G/DL — LOW (ref 13–17)
MAGNESIUM SERPL-MCNC: 2.4 MG/DL — SIGNIFICANT CHANGE UP (ref 1.6–2.6)
MCHC RBC-ENTMCNC: 31 PG — SIGNIFICANT CHANGE UP (ref 27–34)
MCHC RBC-ENTMCNC: 31.7 GM/DL — LOW (ref 32–36)
MCV RBC AUTO: 97.8 FL — SIGNIFICANT CHANGE UP (ref 80–100)
NRBC # BLD: 4 /100 WBCS — HIGH (ref 0–0)
NRBC # FLD: 0.36 K/UL — HIGH (ref 0–0)
PHOSPHATE SERPL-MCNC: 2.4 MG/DL — LOW (ref 2.5–4.5)
PLATELET # BLD AUTO: 42 K/UL — LOW (ref 150–400)
POTASSIUM SERPL-MCNC: 4.1 MMOL/L — SIGNIFICANT CHANGE UP (ref 3.5–5.3)
POTASSIUM SERPL-SCNC: 4.1 MMOL/L — SIGNIFICANT CHANGE UP (ref 3.5–5.3)
RBC # BLD: 2.77 M/UL — LOW (ref 4.2–5.8)
RBC # FLD: 16.4 % — HIGH (ref 10.3–14.5)
RH IG SCN BLD-IMP: POSITIVE — SIGNIFICANT CHANGE UP
SODIUM SERPL-SCNC: 144 MMOL/L — SIGNIFICANT CHANGE UP (ref 135–145)
SPECIMEN SOURCE: SIGNIFICANT CHANGE UP
WBC # BLD: 9.37 K/UL — SIGNIFICANT CHANGE UP (ref 3.8–10.5)
WBC # FLD AUTO: 9.37 K/UL — SIGNIFICANT CHANGE UP (ref 3.8–10.5)

## 2024-10-26 PROCEDURE — 93010 ELECTROCARDIOGRAM REPORT: CPT

## 2024-10-26 RX ADMIN — METRONIDAZOLE 100 MILLIGRAM(S): 500 TABLET ORAL at 06:29

## 2024-10-26 RX ADMIN — PANTOPRAZOLE SODIUM 40 MILLIGRAM(S): 40 TABLET, DELAYED RELEASE ORAL at 14:41

## 2024-10-26 RX ADMIN — Medication 100 GRAM(S): at 07:59

## 2024-10-26 RX ADMIN — DEXAMETHASONE 2 MILLIGRAM(S): 1.5 TABLET ORAL at 06:29

## 2024-10-26 RX ADMIN — METRONIDAZOLE 100 MILLIGRAM(S): 500 TABLET ORAL at 17:10

## 2024-10-26 RX ADMIN — CHLORHEXIDINE GLUCONATE 1 APPLICATION(S): 1.2 RINSE ORAL at 14:40

## 2024-10-26 RX ADMIN — Medication 1 MG/HR: at 07:59

## 2024-10-26 RX ADMIN — Medication 100 MILLIGRAM(S): at 14:42

## 2024-10-26 RX ADMIN — Medication 145 MILLIGRAM(S): at 14:43

## 2024-10-26 RX ADMIN — Medication 1 TABLET(S): at 14:43

## 2024-10-26 NOTE — PROGRESS NOTE ADULT - SUBJECTIVE AND OBJECTIVE BOX
{\rtf1\kaagdn75594\ansi\gudrgzk0231\ftnbj\uc1\deff0  {\fonttbl{\f0 \fnil Segoe UI;}{\f1 \fnil \fcharset0 Segoe UI;}{\f2 \fnil Times New Nikhil;}}  {\colortbl ;\myy993\uidou628\dtrm455 ;\red0\green0\blue0 ;\red0\green0\uklt985 ;\red0\green0\blue0 ;}  {\stylesheet{\f0\fs20 Normal;}{\cs1 Default Paragraph Font;}{\cs2\f0\fs16 Line Number;}{\cs3\f2\fs24\ul\cf3 Hyperlink;}}  {\*\revtbl{Unknown;}}  \obxgep46713\hrftxc77838\hmbys1422\bpzoy1098\oavai6972\vbetv8968\nxjvuil452\ibkezsh729\nogrowautofit\vtbbhl203\formshade\nofeaturethrottle1\dntblnsbdb\fet4\aendnotes\aftnnrlc\pgbrdrhead\pgbrdrfoot  \sectd\frcuek31401\ueveda33232\guttersxn0\xbkavzfx3051\ozfbgvcu9416\usfcskrc5249\vxidmehv3478\gysugff979\gcndhfv663\sbkpage\pgncont\pgndec  \plain\plain\f0\fs24\pard\plain\f0\fs24\plain\f0\fs20\yldx1830\hich\f0\dbch\f0\loch\f0\fs20 Date of Service: 10-26-24 @ 11:00\par  \par  Patient is a 64y old  Male who presents with a chief complaint of Pain (26 Oct 2024 10:27)\par  \par  \par  Any change in ROS: pt is on room air:  no sob:  no cough : no phlegm  but very confused:  has mittens: \par  \par  \par  MEDICATIONS  (STANDING):\par  abiraterone 500 mg tablet 2 Tablet(s) 2 Tablet(s) Oral daily\par  atorvastatin 20 milliGRAM(s) Oral at bedtime\par  cefTRIAXone   IVPB 2000 milliGRAM(s) IV Intermittent every 24 hours\par  chlorhexidine 2% Cloths 1 Application(s) Topical daily\par  dexAMETHasone  Injectable 2 milliGRAM(s) IV Push daily\par  dextrose 5%. 1000 milliLiter(s) (130 mL/Hr) IV Continuous <Continuous>\par  dextrose 5%. 1000 milliLiter(s) (100 mL/Hr) IV Continuous <Continuous>\par  dextrose 5%. 1000 milliLiter(s) (75 mL/Hr) IV Continuous <Continuous>\par  dextrose 5%. 1000 milliLiter(s) (50 mL/Hr) IV Continuous <Continuous>\par  dextrose 50% Injectable 25 Gram(s) IV Push once\par  dextrose 50% Injectable 12.5 Gram(s) IV Push once\par  dextrose 50% Injectable 25 Gram(s) IV Push once\par  fenofibrate Tablet 145 milliGRAM(s) Oral daily\par  glucagon  Injectable 1 milliGRAM(s) IntraMuscular once\par  influenza  Vaccine (HIGH DOSE) 0.5 milliLiter(s) IntraMuscular once\par  insulin lispro (ADMELOG) corrective regimen sliding scale   SubCutaneous at bedtime\par  insulin lispro (ADMELOG) corrective regimen sliding scale   SubCutaneous three times a day before meals\par  lactobacillus acidophilus 1 Tablet(s) Oral daily\par  lidocaine   4% Patch 1 Patch Transdermal every 24 hours\par  metroNIDAZOLE  IVPB 500 milliGRAM(s) IV Intermittent every 12 hours\par  morphine  Infusion. 1 mG/Hr (1 mL/Hr) IV Continuous <Continuous>\par  pantoprazole  Injectable 40 milliGRAM(s) IV Push daily\par  sodium chloride 0.45%. 1000 milliLiter(s) (125 mL/Hr) IV Continuous <Continuous>\par  \par  MEDICATIONS  (PRN):\par  acetaminophen     Tablet .. 650 milliGRAM(s) Oral every 6 hours PRN Temp greater or equal to 38C (100.4F), Mild Pain (1 - 3), Moderate Pain (4 - 6)\par  aluminum hydroxide/magnesium hydroxide/simethicone Suspension 30 milliLiter(s) Oral every 4 hours PRN Dyspepsia\par  artificial tears (preservative free) Ophthalmic Solution 1 Drop(s) Both EYES four times a day PRN Dry Eyes\par  dextrose Oral Gel 15 Gram(s) Oral once PRN Blood Glucose LESS THAN 70 milliGRAM(s)/deciliter\par  morphine  - Injectable 4 milliGRAM(s) IV Push every 3 hours PRN Severe Pain (7 - 10)\par  naloxone Injectable 0.1 milliGRAM(s) IV Push every 3 minutes PRN For ANY of the following changes in patient status:  A. RR LESS THAN 10 breaths per minute, B. Oxygen saturation LESS THAN 90%, C. Sedation score of 6\par  polyethylene glycol 3350 17 Gram(s) Oral daily PRN Constipation\par  \par  Vital Signs Last 24 Hrs\par  T(C): 36.6 (26 Oct 2024 05:05), Max: 37.1 (25 Oct 2024 11:48)\par  T(F): 97.9 (26 Oct 2024 05:05), Max: 98.8 (25 Oct 2024 11:48)\par  HR: 95 (26 Oct 2024 05:05) (87 - 95)\par  BP: 117/66 (26 Oct 2024 05:05) (99/66 - 117/66)\par  BP(mean): --\par  RR: 18 (26 Oct 2024 05:05) (17 - 18)\par  SpO2: 100% (26 Oct 2024 05:05) (94% - 100%)\par  \par  Parameters below as of 26 Oct 2024 05:05\par  Patient On (Oxygen Delivery Method): room air\par  \par  \par  \par  I&O's Summary\par  \par  25 Oct 2024 07:01  -  26 Oct 2024 07:00\par  --------------------------------------------------------\par  IN: 825 mL / OUT: 1000 mL / NET: -175 mL\par  \par  \par  \par  \par  Physical Exam: \par  GENERAL: NAD, well-groomed, well-developed\par  HEENT: CHAVA/   Atraumatic, Normocephalic\par  ENMT: No tonsillar erythema, exudates, or enlargement; Moist mucous membranes, Good dentition, No lesions\par  NECK: Supple, No JVD, Normal thyroid\par  CHEST/LUNG: Clear to auscultaion\par  CVS: Regular rate and rhythm; No murmurs, rubs, or gallops\par  GI: : Soft, Nontender, Nondistended; Bowel sounds present\par  NERVOUS SYSTEM:  Alert & Oriented X0-*1\par  EXTREMITIES: - edema\par  LYMPH: No lymphadenopathy noted\par  SKIN: No rashes or lesions\par  ENDOCRINOLOGY: No Thyromegaly\par  PSYCH: confused \par  \par  Labs:\par  18, 16, 18\par  \par  \par           \par             8.6  \par  9.37  )-----------( 42       ( 26 Oct 2024 03:40 )\par             27.1 \par           \par             8.4  \par  8.77  )-----------( 49       ( 25 Oct 2024 05:30 )\par             25.8 \par           \par             8.0  \par  8.70  )-----------( 40       ( 24 Oct 2024 09:12 )\par             24.4 \par           \par             8.4  \par  10.25 )-----------( 40       ( 24 Oct 2024 02:00 )\par             25.6 \par           \par             8.3  \par  13.21 )-----------( 44       ( 23 Oct 2024 18:00 )\par             25.1 \par           \par             8.6  \par  18.56 )-----------( 52       ( 23 Oct 2024 06:00 )\par             25.9 \par           \par             9.1  \par  18.56 )-----------( 30       ( 22 Oct 2024 21:22 )\par             28.0 \par           \par             9.7  \par  14.94 )-----------( 23       ( 22 Oct 2024 11:10 )\par             29.4 \par  \par  10-26\par  \par  144  |  115[H]  |  28[H]\par  ----------------------------<  141[H]\par  4.1   |  16[L]  |  0.49[L]\par  10-25\par  \par  146[H]  |  115[H]  |  33[H]\par  ----------------------------<  181[H]\par  4.1   |  20[L]  |  0.51\par  10-25\par  \par  150[H]  |  118[H]  |  33[H]\par  ----------------------------<  179[H]\par  4.4   |  19[L]  |  0.57\par  10-24\par  \par  147[H]  |  118[H]  |  38[H]\par  ----------------------------<  175[H]\par  3.8   |  19[L]  |  0.61\par  10-24\par  \par  151[H]  |  120[H]  |  42[H]\par  ----------------------------<  151[H]\par  3.8   |  20[L]  |  0.68\par  10-24\par  \par  151[H]  |  120[H]  |  43[H]\par  ----------------------------<  142[H]\par  4.0   |  19[L]  |  0.74\par  10-23\par  \par  151[H]  |  119[H]  |  37[H]\par  ----------------------------<  180[H]\par  4.2   |  21[L]  |  0.64\par  10-23\par  \par  154[H]  |  122[H]  |  45[H]\par  ----------------------------<  282[H]\par  4.7   |  20[L]  |  0.71\par  10-22\par  \par  153[H]  |  124[H]  |  52[H]\par  ----------------------------<  333[H]\par  5.6[H]   |  18[L]  |  0.81\par  10-22\par  \par  158[H]  |  127[H]  |  54[H]\par  ----------------------------<  328[H]\par  4.7   |  19[L]  |  0.94\par  \par  Ca    6.5[LL]      26 Oct 2024 03:40\par  Ca    6.9[L]      25 Oct 2024 17:20\par  Ca    6.9[L]      25 Oct 2024 05:30\par  Phos  2.4     10-26\par  Phos  2.2     10-25\par  Phos  2.4     10-25\par  Mg     2.40     10-26\par  Mg     2.80     10-25\par  \par  TPro  4.6[L]  /  Alb  2.3[L]  /  TBili  0.9  /  DBili  0.3  /  AST  52[H]  /  ALT  15  /  AlkPhos  83  10-25\par  TPro  4.2[L]  /  Alb  2.2[L]  /  TBili  0.9  /  DBili  x   /  AST  44[H]  /  ALT  15  /  AlkPhos  107  10-23\par  \par  CAPILLARY BLOOD GLUCOSE\par  \par  \par  POCT Blood Glucose.: 118 mg/dL (26 Oct 2024 09:02)\par  POCT Blood Glucose.: 187 mg/dL (25 Oct 2024 22:31)\par  POCT Blood Glucose.: 205 mg/dL (25 Oct 2024 21:04)\par  POCT Blood Glucose.: 198 mg/dL (25 Oct 2024 17:54)\par  POCT Blood Glucose.: 191 mg/dL (25 Oct 2024 12:29)\par  \par  \par  LIVER FUNCTIONS - ( 25 Oct 2024 05:30 )\par  Alb: 2.3 g/dL / Pro: 4.6 g/dL / ALK PHOS: 83 U/L / ALT: 15 U/L / AST: 52 U/L / GGT: x       \par  \par  \par  Urinalysis Basic - ( 26 Oct 2024 03:40 )\par  \par  Color: x / Appearance: x / SG: x / pH: x\par  Gluc: 141 mg/dL / Ketone: x  / Bili: x / Urobili: x \par  Blood: x / Protein: x / Nitrite: x \par  Leuk Esterase: x / RBC: x / WBC x \par  Sq Epi: x / Non Sq Epi: x / Bacteria: x\par  \par  \par  \par  \par  \par  RECENT CULTURES:\par  10-24 @ 01:55 .Blood BLOOD \par  \par  \par  \par  < from: Xray Chest 1 View- PORTABLE-Urgent (Xray Chest 1 View- PORTABLE-Urgent .) (10.20.24 @ 11:02) >\par  \ql\plain\f0\fs24\plain\f1\fs16\idpm3272\hich\f1\dbch\f1\loch\f1\cf2\fs16 APOLINAR STOFF \par  \par  PROCEDURE DATE:  10/20/2024  \par  \par  \par  \par  INTERPRETATION:  EXAMINATION: XR CHEST URGENT\par  \par  CLINICAL INDICATION: Bacteremia. Evaluate for pneumonia.\par  \par  TECHNIQUE: Single frontal view of the chest was obtained.\par  \par  COMPARISON: Chest x-ray 10/17/2024.\par  \par  FINDINGS:\par  No focal consolidation, large pleural effusion or pneumothorax.\par  The heart size cannot be accurately assessed on this projection.\par  No acute osseous abnormalities.\par  \par  \par  IMPRESSION:\par  No focal consolidations.\par  \par  --- End of Report ---\par  \par  \par  \par  \par  PRABHA MONET MD; Resident Radiologist\par  This document has been electronically signed.\par  LEA COLE MD; Attending Interventional Radiologist\par  \pard\plain\f0\fs24\plain\f1\fs16\tmnn8276\hich\f1\dbch\f1\loch\f1\cf2\fs16 This document has been electronically signed. Oct 20 2024 11:53AM\par  \par  < end of copied text >\par  \ql\plain\f0\fs24\plain\f0\fs20\ftik5027\hich\f0\dbch\f0\loch\f0\fs20 As a result, we will proceed with radiation as: \par  5 fractions/20gy to\par  T1-T5, and also L1-L5\par  \par  ** Addendum: per RN on floor, and family, there are concerns for sepsis, agitation, and he has AMS at present.\par  We will hold RT as he is unsafe to treat.  patient is confused and agitated in mittons/restraints. \par  Please assess for sepsis? and advise per oncology if we are to Hold RT. \par  d/w Dr. Bruno\par  \par  \par  \par  IR was consulted for vertebral augmentation of T3-T5 prior to XRT. \par  Patient was seen bedside. Initially, patient kept requesting no exam and was not willing to participate in the discussion. Son was bedside at time of conversation. 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 \par  Please reach out to IR when patient is medically stable for vertebral augmentation.\plain\f1\fs20\btnl8216\hich\f1\dbch\f1\loch\f1\cf2\fs20\strike\plain\f1\fs20\niqz1056\hich\f1\dbch\f1\loch\f1\cf2\fs20\plain\f0\fs20\rryc6009\hich\f0\dbch\f0\loch\f0\fs20\par  \par  \par  {\*\bkmkstart bkClinDocSignatures}{\*\bkmkend bkClinDocSignatures}{\*\bkmkstart bkcommentSBK}{\*\bkmkend bkcommentSBK}\plain\f1\fs20\nczb4063\hich\f1\dbch\f1\loch\f1\cf2\fs20\b Electronic Signatures:\plain\f0\fs20\fuuu9589\hich\f0\dbch\f0\loch\f0\fs20\par  \plain\f1\fs20\wlea8514\hich\f1\dbch\f1\loch\f1\cf2\fs20\b Hermilo Cárdenas (PA)\plain\f0\fs20\kqjj0625\hich\f0\dbch\f0\loch\f0\fs20   \plain\f1\fs18\xnrv8803\hich\f1\dbch\f1\loch\f1\cf2\fs18 (Signed 22-Oct-2024 10:54)\par  \fi-360\li720\plain\f0\fs24\plain\f1\fs18\kath9268\hich\f1\dbch\f1\loch\f1\cf2\fs18\tab\plain\f1\fs20\uqpv4807\hich\f1\dbch\f1\loch\f1\cf2\fs20\b\i Authored: \plain\f1\fs20\gxdd7183\hich\f1\dbch\f1\loch\f1\cf2\fs20\i Note Type, Note\plain\f0\fs20\mnmi4164\hich\f0\dbch\f0\loch\f0\fs20\par  \fi0\li0\plain\f0\fs24\plain\f0\fs20\kavh8191\hich\f0\dbch\f0\loch\f0\fs20\par  \par  \plain\f1\fs20\loxh6297\hich\f1\dbch\f1\loch\f1\cf2\fs20\b\i Last Updated: \plain\f1\fs20\ecil4522\hich\f1\dbch\f1\loch\f1\cf2\fs20\i 22-Oct-2024 10:54 by Hermilo Cárdenas (PA)\plain\f0\fs20\papf1411\hich\f0\dbch\f0\loch\f0\fs20\par  \pard\plain\f0\fs24\plain\f0\fs20\fmve8610\hich\f0\dbch\f0\loch\f0\fs20  \par  \par  No growth at 48 Hours\par  \par  10-24 @ 01:42 .Blood BLOOD \par  \par  \par  \par  \par   \par  \par  No growth at 48 Hours\par  \par  10-22 @ 15:09 Clean Catch Clean Catch (Midstream) \par  \par  \par  \par  \par   \par  \par  <10,000 CFU/mL Normal Urogenital Sherlyn\par  \par  10-22 @ 06:05 .Blood BLOOD \par  ARJUN\par  \par  Growth in aerobic bottle: Gram Negative Rods\par  \par  Escherichia coli\par  Escherichia coli \par  \par  Growth in aerobic bottle: Escherichia coli\par  \par  10-21 @ 20:05 .Blood BLOOD \par  \par  \par  \par  \par   \par  \par  No growth at 4 days\par  \par  10-21 @ 17:33 .Blood BLOOD \par  \par  \par  \par  \par   \par  \par  No growth at 4 days\par  \par  10-19 @ 20:05 .Blood BLOOD \par  \par  \par  Growth in aerobic bottle: Gram Negative Rods\par  Growth in anaerobic bottle: Gram Negative Rods\par  \par  \par   \par  \par  Growth in aerobic and anaerobic bottles: Escherichia coli\par  See previous culture [id number]\par  \par  10-19 @ 19:50 .Blood BLOOD \par  \par  \par  Growth in anaerobic bottle: Gram Negative Rods\par  Growth in aerobic bottle: Gram Negative Rods\par  \par  \par   \par  \par  Growth in aerobic and anaerobic bottles: Escherichia coli\par  See previous culture [id number]\par  \par  \par  \par  \par  RESPIRATORY CULTURES:\par  \par  \par  \par  \par  Studies\par  Chest X-RAY\par  CT SCAN Chest \par  Venous Dopplers: LE: \par  CT Abdomen\par  Others\par  \par  \par  \par  \par  \par  \par  \ql\plain\f0\fs24\plain\f0\fs20\slpn8470\hich\f0\dbch\f0\loch\f0\fs20\par  }

## 2024-10-26 NOTE — PROGRESS NOTE ADULT - ASSESSMENT
1. Metastatic prostate cancer    - Follows with Dr Andrew Mendoza at [facility] [place]   - PSMA 10/11/24 showed hypermetabolic vera foci above and below the diaphragm, foci in the liver and extensive foci involving the axial and appendicular skeleton compatible with metastatic disease  - --> 374--> 426 on 10/8/24   - Liver biopsy 9/30/24 consistent with adenocarcinoma favoring prostate   - High risk high volume disease -> started on triplet therapy w/ ADT/lupron, abiraterone/prednisone + taxotere. (back pain after Lupron likely tumor flare).  - Continue abiraterone 1000mg daily w Prednisone 5mg PO QD  - s/p  taxotere 60mg/m2 on 10/13/24. Next dose on 11/4 if clinically improves    - Kyphoplasty w/IR to T3 and L1 lesions planned    - Palliative following for pain control and given hospital course would have further GOC discussions. Treatment will be offered but he has metastatic disease with visceral involvement and complications as outlined below.     2. Pancolitis, E coli and H influenzae bacteremia    -- on CTX and Metronidazole  -- ID following  -- BCx from 10/24 neg to date    3. Thrombocytopenia     - Platelets 42K today  - Multifactorial sec to likely marrow infiltration by CaP, Chemo effect , consumption from acute illness , poss ITP  - Baseline in the 70s  - Transfuse for plt <15 or < 50K if bleeding/for procedure      4. SMA Dissection    -- [facility]  -- no plan for intervention    Miya Soliz MD

## 2024-10-26 NOTE — PROGRESS NOTE ADULT - ASSESSMENT
64-year-old male with metastatic prostate cancer, sent in by hematologist from [place] [facility] for uncontrolled pain, nausea, vomiting.   Patient reports diffuse pain starting 6 months ago significantly worsening for the past 2 weeks. Diagnosed with high risk high volume metastatic prostate cancer with bone, liver lymph node mets and presacral mass. Liver biopsy confirmed disease with . Started lupron, loly/pred with plans to initiate taxotere.   nephrology consulted for electrolyte abnormalities     Hypernatremia  likely dehydration   pt now has diet  Na 144 improved today  avoid overcorrection. na should not correct 6-8meq in 24 hrs  monitor    Acidosis   Likely from Normal saline causing hyperchloremic acidosis  Hold NS for now  Will monitor  Can send ABg to assess acidosis       hypophosphatemia  supplemented  monitor  supplement more if needed    hypocalcemia   sec to low albumin  corrected ca 8.5  monitor    fever  GNR bacteremia   work up and tx per team    anemia   metastatic prostate cancer  f/u heme/onc

## 2024-10-26 NOTE — PROGRESS NOTE ADULT - SUBJECTIVE AND OBJECTIVE BOX
[facility]    Dr. Yoselyn Brown     Office [phone] (9 am to 5 pm)  Service: [phone] (5pm to 9am)  Also Available on TEAMS      RENAL PROGRESS NOTE: DATE OF SERVICE 10-26-24 @ 10:12    Patient is a 64y old  Male who presents with a chief complaint of Pain (25 Oct 2024 16:03)      Patient seen and examined at bedside. No chest pain/sob    VITALS:  T(F): 97.9 (10-26-24 @ 05:05), Max: 98.8 (10-25-24 @ 11:48)  HR: 95 (10-26-24 @ 05:05)  BP: 117/66 (10-26-24 @ 05:05)  RR: 18 (10-26-24 @ 05:05)  SpO2: 100% (10-26-24 @ 05:05)  Wt(kg): --    10-25 @ 07:01  -  10-26 @ 07:00  --------------------------------------------------------  IN: 825 mL / OUT: 1000 mL / NET: -175 mL          PHYSICAL EXAM:  Constitutional: NAD  Neck: No JVD  Respiratory: CTAB, no wheezes, rales or rhonchi  Cardiovascular: S1, S2, RRR  Gastrointestinal: BS+, soft, NT/ND  Extremities: No peripheral edema    Hospital Medications:   MEDICATIONS  (STANDING):  abiraterone 500 mg tablet 2 Tablet(s) 2 Tablet(s) Oral daily  atorvastatin 20 milliGRAM(s) Oral at bedtime  cefTRIAXone   IVPB 2000 milliGRAM(s) IV Intermittent every 24 hours  chlorhexidine 2% Cloths 1 Application(s) Topical daily  dexAMETHasone  Injectable 2 milliGRAM(s) IV Push daily  dextrose 5%. 1000 milliLiter(s) (75 mL/Hr) IV Continuous <Continuous>  dextrose 5%. 1000 milliLiter(s) (130 mL/Hr) IV Continuous <Continuous>  dextrose 5%. 1000 milliLiter(s) (50 mL/Hr) IV Continuous <Continuous>  dextrose 5%. 1000 milliLiter(s) (100 mL/Hr) IV Continuous <Continuous>  dextrose 50% Injectable 12.5 Gram(s) IV Push once  dextrose 50% Injectable 25 Gram(s) IV Push once  dextrose 50% Injectable 25 Gram(s) IV Push once  fenofibrate Tablet 145 milliGRAM(s) Oral daily  glucagon  Injectable 1 milliGRAM(s) IntraMuscular once  influenza  Vaccine (HIGH DOSE) 0.5 milliLiter(s) IntraMuscular once  insulin lispro (ADMELOG) corrective regimen sliding scale   SubCutaneous three times a day before meals  insulin lispro (ADMELOG) corrective regimen sliding scale   SubCutaneous at bedtime  lactobacillus acidophilus 1 Tablet(s) Oral daily  lidocaine   4% Patch 1 Patch Transdermal every 24 hours  metroNIDAZOLE  IVPB 500 milliGRAM(s) IV Intermittent every 12 hours  morphine  Infusion. 1 mG/Hr (1 mL/Hr) IV Continuous <Continuous>  pantoprazole  Injectable 40 milliGRAM(s) IV Push daily  sodium chloride 0.45%. 1000 milliLiter(s) (125 mL/Hr) IV Continuous <Continuous>      LABS:  10-26    144  |  115[H]  |  28[H]  ----------------------------<  141[H]  4.1   |  16[L]  |  0.49[L]    Ca    6.5[LL]      26 Oct 2024 03:40  Phos  2.4     10-26  Mg     2.40     10-26    TPro  4.6[L]  /  Alb  2.3[L]  /  TBili  0.9  /  DBili  0.3  /  AST  52[H]  /  ALT  15  /  AlkPhos  83  10-25    Creatinine Trend: 0.49 <--, 0.51 <--, 0.57 <--, 0.61 <--, 0.68 <--, 0.74 <--, 0.64 <--, 0.71 <--, 0.81 <--, 0.94 <--, 0.93 <--, 0.94 <--, 0.85 <--, 0.96 <--, 0.97 <--, 1.04 <--    Phosphorus: 2.4 mg/dL (10-26 @ 03:40)  Phosphorus: 2.2 mg/dL (10-25 @ 17:20)                              8.6    9.37  )-----------( 42       ( 26 Oct 2024 03:40 )             27.1     Urine Studies:  Urinalysis - [10-26-24 @ 03:40]      Color  / Appearance  / SG  / pH       Gluc 141 / Ketone   / Bili  / Urobili        Blood  / Protein  / Leuk Est  / Nitrite       RBC  / WBC  / Hyaline  / Gran  / Sq Epi  / Non Sq Epi  / Bacteria       TSH 2.62      [10-13-24 @ 05:30]  Lipid: chol 135, , HDL 39, LDL --      [10-13-24 @ 05:30]        RADIOLOGY & ADDITIONAL STUDIES:

## 2024-10-26 NOTE — PROGRESS NOTE ADULT - SUBJECTIVE AND OBJECTIVE BOX
Pt is seen and examined  pt is awake and lying in bed . Has restraints  Agitated but alert and responds appropriately  pt seems comfortable and denies any complaints at this time      PAST MEDICAL & SURGICAL HISTORY:  Benign essential HTN      HLD (hyperlipidemia)      CA of prostate      Basal cell carcinoma      History of transurethral prostatectomy      S/P inguinal hernia repair      History of basal cell carcinoma (BCC) excision          ROS:  Negative except for:    MEDICATIONS  (STANDING):  abiraterone 500 mg tablet 2 Tablet(s) 2 Tablet(s) Oral daily  atorvastatin 20 milliGRAM(s) Oral at bedtime  cefTRIAXone   IVPB 2000 milliGRAM(s) IV Intermittent every 24 hours  chlorhexidine 2% Cloths 1 Application(s) Topical daily  dexAMETHasone  Injectable 2 milliGRAM(s) IV Push daily  dextrose 5%. 1000 milliLiter(s) (50 mL/Hr) IV Continuous <Continuous>  dextrose 5%. 1000 milliLiter(s) (100 mL/Hr) IV Continuous <Continuous>  dextrose 5%. 1000 milliLiter(s) (75 mL/Hr) IV Continuous <Continuous>  dextrose 5%. 1000 milliLiter(s) (130 mL/Hr) IV Continuous <Continuous>  dextrose 50% Injectable 25 Gram(s) IV Push once  dextrose 50% Injectable 25 Gram(s) IV Push once  dextrose 50% Injectable 12.5 Gram(s) IV Push once  fenofibrate Tablet 145 milliGRAM(s) Oral daily  glucagon  Injectable 1 milliGRAM(s) IntraMuscular once  influenza  Vaccine (HIGH DOSE) 0.5 milliLiter(s) IntraMuscular once  insulin lispro (ADMELOG) corrective regimen sliding scale   SubCutaneous three times a day before meals  insulin lispro (ADMELOG) corrective regimen sliding scale   SubCutaneous at bedtime  lactobacillus acidophilus 1 Tablet(s) Oral daily  lidocaine   4% Patch 1 Patch Transdermal every 24 hours  metroNIDAZOLE  IVPB 500 milliGRAM(s) IV Intermittent every 12 hours  morphine  Infusion. 1 mG/Hr (1 mL/Hr) IV Continuous <Continuous>  pantoprazole  Injectable 40 milliGRAM(s) IV Push daily  sodium chloride 0.45%. 1000 milliLiter(s) (125 mL/Hr) IV Continuous <Continuous>    MEDICATIONS  (PRN):  acetaminophen     Tablet .. 650 milliGRAM(s) Oral every 6 hours PRN Temp greater or equal to 38C (100.4F), Mild Pain (1 - 3), Moderate Pain (4 - 6)  aluminum hydroxide/magnesium hydroxide/simethicone Suspension 30 milliLiter(s) Oral every 4 hours PRN Dyspepsia  artificial tears (preservative free) Ophthalmic Solution 1 Drop(s) Both EYES four times a day PRN Dry Eyes  dextrose Oral Gel 15 Gram(s) Oral once PRN Blood Glucose LESS THAN 70 milliGRAM(s)/deciliter  morphine  - Injectable 4 milliGRAM(s) IV Push every 3 hours PRN Severe Pain (7 - 10)  naloxone Injectable 0.1 milliGRAM(s) IV Push every 3 minutes PRN For ANY of the following changes in patient status:  A. RR LESS THAN 10 breaths per minute, B. Oxygen saturation LESS THAN 90%, C. Sedation score of 6  polyethylene glycol 3350 17 Gram(s) Oral daily PRN Constipation      Allergies    No Known Allergies    Intolerances        Vital Signs Last 24 Hrs  T(C): 36.6 (26 Oct 2024 05:05), Max: 37.1 (25 Oct 2024 11:48)  T(F): 97.9 (26 Oct 2024 05:05), Max: 98.8 (25 Oct 2024 11:48)  HR: 95 (26 Oct 2024 05:05) (87 - 95)  BP: 117/66 (26 Oct 2024 05:05) (99/66 - 117/66)  BP(mean): --  RR: 18 (26 Oct 2024 05:05) (17 - 18)  SpO2: 100% (26 Oct 2024 05:05) (94% - 100%)    Parameters below as of 26 Oct 2024 05:05  Patient On (Oxygen Delivery Method): room air        PHYSICAL EXAM  NC/AT In NAD  Chest Clear Bilat  CVS S1,S2+ RRR  Abd Soft, NT/ND  Ext No edema    LABS:                          8.6    9.37  )-----------( 42       ( 26 Oct 2024 03:40 )             27.1     Serial CBC's  10-26 @ 03:40  Hct-27.1 / Hgb-8.6 / Plat-42 / RBC-2.77 / WBC-9.37          Serial CBC's  10-25 @ 05:30  Hct-25.8 / Hgb-8.4 / Plat-49 / RBC-2.71 / WBC-8.77            10-26    144  |  115[H]  |  28[H]  ----------------------------<  141[H]  4.1   |  16[L]  |  0.49[L]    Ca    6.5[LL]      26 Oct 2024 03:40  Phos  2.4     10-26  Mg     2.40     10-26    TPro  4.6[L]  /  Alb  2.3[L]  /  TBili  0.9  /  DBili  0.3  /  AST  52[H]  /  ALT  15  /  AlkPhos  83  10-25          WBC Count: 9.37 K/uL (10-26 @ 03:40)  Hemoglobin: 8.6 g/dL (10-26 @ 03:40)            RADIOLOGY & ADDITIONAL STUDIES:

## 2024-10-26 NOTE — PROGRESS NOTE ADULT - SUBJECTIVE AND OBJECTIVE BOX
Patient is a 64y old  Male who presents with a chief complaint of Pain (26 Oct 2024 10:59)    Date of servie : 10-26-24 @ 16:01  INTERVAL HPI/OVERNIGHT EVENTS:  T(C): 36.6 (10-26-24 @ 05:05), Max: 36.7 (10-25-24 @ 20:36)  HR: 95 (10-26-24 @ 05:05) (87 - 95)  BP: 117/66 (10-26-24 @ 05:05) (99/66 - 117/66)  RR: 18 (10-26-24 @ 05:05) (18 - 18)  SpO2: 100% (10-26-24 @ 05:05) (94% - 100%)  Wt(kg): --  I&O's Summary    25 Oct 2024 07:01  -  26 Oct 2024 07:00  --------------------------------------------------------  IN: 825 mL / OUT: 1000 mL / NET: -175 mL        LABS:                        8.6    9.37  )-----------( 42       ( 26 Oct 2024 03:40 )             27.1     10-26    144  |  115[H]  |  28[H]  ----------------------------<  141[H]  4.1   |  16[L]  |  0.49[L]    Ca    6.5[LL]      26 Oct 2024 03:40  Phos  2.4     10-26  Mg     2.40     10-26    TPro  4.6[L]  /  Alb  2.3[L]  /  TBili  0.9  /  DBili  0.3  /  AST  52[H]  /  ALT  15  /  AlkPhos  83  10-25      Urinalysis Basic - ( 26 Oct 2024 03:40 )    Color: x / Appearance: x / SG: x / pH: x  Gluc: 141 mg/dL / Ketone: x  / Bili: x / Urobili: x   Blood: x / Protein: x / Nitrite: x   Leuk Esterase: x / RBC: x / WBC x   Sq Epi: x / Non Sq Epi: x / Bacteria: x      CAPILLARY BLOOD GLUCOSE      POCT Blood Glucose.: 138 mg/dL (26 Oct 2024 12:11)  POCT Blood Glucose.: 118 mg/dL (26 Oct 2024 09:02)  POCT Blood Glucose.: 187 mg/dL (25 Oct 2024 22:31)  POCT Blood Glucose.: 205 mg/dL (25 Oct 2024 21:04)  POCT Blood Glucose.: 198 mg/dL (25 Oct 2024 17:54)        Urinalysis Basic - ( 26 Oct 2024 03:40 )    Color: x / Appearance: x / SG: x / pH: x  Gluc: 141 mg/dL / Ketone: x  / Bili: x / Urobili: x   Blood: x / Protein: x / Nitrite: x   Leuk Esterase: x / RBC: x / WBC x   Sq Epi: x / Non Sq Epi: x / Bacteria: x        MEDICATIONS  (STANDING):  abiraterone 500 mg tablet 2 Tablet(s) 2 Tablet(s) Oral daily  atorvastatin 20 milliGRAM(s) Oral at bedtime  cefTRIAXone   IVPB 2000 milliGRAM(s) IV Intermittent every 24 hours  chlorhexidine 2% Cloths 1 Application(s) Topical daily  dexAMETHasone  Injectable 2 milliGRAM(s) IV Push daily  dextrose 5%. 1000 milliLiter(s) (130 mL/Hr) IV Continuous <Continuous>  dextrose 5%. 1000 milliLiter(s) (100 mL/Hr) IV Continuous <Continuous>  dextrose 5%. 1000 milliLiter(s) (50 mL/Hr) IV Continuous <Continuous>  dextrose 5%. 1000 milliLiter(s) (75 mL/Hr) IV Continuous <Continuous>  dextrose 50% Injectable 25 Gram(s) IV Push once  dextrose 50% Injectable 25 Gram(s) IV Push once  dextrose 50% Injectable 12.5 Gram(s) IV Push once  fenofibrate Tablet 145 milliGRAM(s) Oral daily  glucagon  Injectable 1 milliGRAM(s) IntraMuscular once  influenza  Vaccine (HIGH DOSE) 0.5 milliLiter(s) IntraMuscular once  insulin lispro (ADMELOG) corrective regimen sliding scale   SubCutaneous at bedtime  insulin lispro (ADMELOG) corrective regimen sliding scale   SubCutaneous three times a day before meals  lactobacillus acidophilus 1 Tablet(s) Oral daily  lidocaine   4% Patch 1 Patch Transdermal every 24 hours  metroNIDAZOLE  IVPB 500 milliGRAM(s) IV Intermittent every 12 hours  morphine  Infusion. 1 mG/Hr (1 mL/Hr) IV Continuous <Continuous>  pantoprazole  Injectable 40 milliGRAM(s) IV Push daily  sodium chloride 0.45%. 1000 milliLiter(s) (125 mL/Hr) IV Continuous <Continuous>    MEDICATIONS  (PRN):  acetaminophen     Tablet .. 650 milliGRAM(s) Oral every 6 hours PRN Temp greater or equal to 38C (100.4F), Mild Pain (1 - 3), Moderate Pain (4 - 6)  aluminum hydroxide/magnesium hydroxide/simethicone Suspension 30 milliLiter(s) Oral every 4 hours PRN Dyspepsia  artificial tears (preservative free) Ophthalmic Solution 1 Drop(s) Both EYES four times a day PRN Dry Eyes  dextrose Oral Gel 15 Gram(s) Oral once PRN Blood Glucose LESS THAN 70 milliGRAM(s)/deciliter  morphine  - Injectable 4 milliGRAM(s) IV Push every 3 hours PRN Severe Pain (7 - 10)  naloxone Injectable 0.1 milliGRAM(s) IV Push every 3 minutes PRN For ANY of the following changes in patient status:  A. RR LESS THAN 10 breaths per minute, B. Oxygen saturation LESS THAN 90%, C. Sedation score of 6  polyethylene glycol 3350 17 Gram(s) Oral daily PRN Constipation          PHYSICAL EXAM:  GENERAL: NAD, well-groomed, well-developed  HEAD:  Atraumatic, Normocephalic  CHEST/LUNG: Clear to percussion bilaterally; No rales, rhonchi, wheezing, or rubs  HEART: Regular rate and rhythm; No murmurs, rubs, or gallops  ABDOMEN: Soft, Nontender, Nondistended; Bowel sounds present  EXTREMITIES:  2+ Peripheral Pulses, No clubbing, cyanosis, or edema  LYMPH: No lymphadenopathy noted  SKIN: No rashes or lesions    Care Discussed with Consultants/Other Providers [ ] YES  [ ] NO

## 2024-10-26 NOTE — PROVIDER CONTACT NOTE (OTHER) - SITUATION
pt. had a moment of restlessness, pulling out IV lines, Pulling EKGs off, and refusing to comply with medical treatment.

## 2024-10-26 NOTE — PROGRESS NOTE ADULT - ASSESSMENT
64-year-old male with metastatic prostate cancer, sent in by hematologist from [facility] for uncontrolled pain, nausea, vomiting.   Patient reports diffuse pain starting 6 months ago significantly worsening for the past 2 weeks.  Currently the worst pain is located around the lower back.   No loss of bladder and bowel function, no saddle paresthesia.  Able to walk.  patient is oxycodone 5 every 4-6 hour.  Yesterday they started adding OxyContin 10.  However patient continued to have pain.  Family also reports significant nausea and vomiting, inability to tolerate p.o. for the last 2 days.  Last bowel movement 2 days ago.   No known allergy.  Diagnosed with high risk high volume metastatic prostate cancer with bone, liver lymph node mets and presacral mass. Liver biopsy confirmed disease with . Started lupron, loly/pred with plans to initiate taxotere.    (12 Oct 2024 15:40)  pt seems very frustrated:   he is on 2 L of oxygen : he has no underlying lung disease:  but he is requiring 2 L of oxygen      Hypoxic resp failure  Metastatic prostate cancer  Back pain     Hypoxic resp failure  -He has no underlying lung disease:  but he is requiring 2 L of oxygen :   -CTA showed; No pulmonary embolism to the level of the segmental branches bilaterally. Limited evaluation of the subsegmental branches secondary to incomplete contrast opacification.  Multilevel vertebral body lytic lesions and loss of vertebral body height, better evaluated on CT thoracic spine 10/17/2024. Metastatic prostate cancer  -Patent central airways. Bibasilar atelectasis. No pleural effusion. MEDIASTINUM AND KATTY: No lymphadenopathy.  PULMONARY ANGIOGRAM: No pulmonary embolism to the level of the segmental   branches bilaterally. Limited evaluation of the subsegmental branches secondary to incomplete contrast opacification.    10/19:  -seems pretty tired;  on 2 L of oxygen :  -cta chest showed: No pulmonary embolism to the level of the segmental branches bilaterally. Limited evaluation of the subsegmental branches secondary to incomplete   contrast opacification. Multilevel vertebral body lytic lesions and loss of vertebral body height, better evaluated on CT thoracic spine 10/17/2024.  -still with fever:  no pe  on zosyn  and leukopenic hemonc following;  has metastatic prostate ca:   -VBG seems OK:     10/20:  pulm wise he seems to be stable:   -using 2 L of oxygen    -yesterdays VBG with minimal met acidosis  -cta again noted    10/21:  on 4 L of oxygen  today    cxr is size    e coli sepsis: Gram Negative Rods and Gram Negative Coccobacilli    10/22: heis doing poorly today  : he is very agitated and uncomfortable  on mittens: ? sun downing   10/23: quiet today  : sleeping:  oxygen requirement has not increased: on rooo ha 95% as documented    WBC is slowly increasing:   no fever:   -on antibiotics   10/24: pt is not doing well : his repeat blood cultures are positive with same organism :  would defer to ID;   10/25: seems to be doing  ok ; no sob:  no cough :  no phlegm   : on room air   10/26: resp failure has resolved:  is septic  : on ceftriaxone       New finding:  SMA dissection : neutropenic colitis/ #E Coli and H influenzae bacteremia/ #Neutropenic fever  -/f ischemic colitis on R colon   Diagnosed  on ct abd:  defer to surgery and primary team:  probably no intervention:   -cont antibiotics  -not doing very well with above new findings    10/22; no intervention per surgery    -cont antibiotics  -has fever:  ID following :  -Last chest xray on 20th  ; normal     id following   10/23  IR was consulted for vertebral augmentation of T3-T5 prior to XRT.   Patient was seen bedside. Initially, patient kept requesting no exam and was not willing to participate in the discussion. Son was bedside at time of conversation.   Per discussion with the medical attending, given the concern for SMA dissection and concern for bowel ischemia, will hold off on vertebral augmentation evaluation at this time.   Please reach out to IR when patient is medically stable for vertebral augmentation.  10/24:  remains septic:  above cancelled:   ? for palliative care now  10/26: he seems same to me:  no overnight events  on room air:   check VBG  : cont antibiotics   hemonc following     Back pain   -likely due to metastatic disease:  adm here for sever pain :  pain control per primary team   -venous ph is ok     dw acp & family ; GOC discussion on going

## 2024-10-27 LAB
ANION GAP SERPL CALC-SCNC: 9 MMOL/L — SIGNIFICANT CHANGE UP (ref 7–14)
BLOOD GAS VENOUS COMPREHENSIVE RESULT: SIGNIFICANT CHANGE UP
BUN SERPL-MCNC: 20 MG/DL — SIGNIFICANT CHANGE UP (ref 7–23)
CALCIUM SERPL-MCNC: 7.2 MG/DL — LOW (ref 8.4–10.5)
CHLORIDE SERPL-SCNC: 119 MMOL/L — HIGH (ref 98–107)
CO2 SERPL-SCNC: 18 MMOL/L — LOW (ref 22–31)
CREAT SERPL-MCNC: 0.39 MG/DL — LOW (ref 0.5–1.3)
CULTURE RESULTS: SIGNIFICANT CHANGE UP
EGFR: 122 ML/MIN/1.73M2 — SIGNIFICANT CHANGE UP
GLUCOSE BLDC GLUCOMTR-MCNC: 103 MG/DL — HIGH (ref 70–99)
GLUCOSE BLDC GLUCOMTR-MCNC: 113 MG/DL — HIGH (ref 70–99)
GLUCOSE BLDC GLUCOMTR-MCNC: 138 MG/DL — HIGH (ref 70–99)
GLUCOSE BLDC GLUCOMTR-MCNC: 166 MG/DL — HIGH (ref 70–99)
GLUCOSE SERPL-MCNC: 89 MG/DL — SIGNIFICANT CHANGE UP (ref 70–99)
HCT VFR BLD CALC: 25.7 % — LOW (ref 39–50)
HGB BLD-MCNC: 8.3 G/DL — LOW (ref 13–17)
MAGNESIUM SERPL-MCNC: 2.2 MG/DL — SIGNIFICANT CHANGE UP (ref 1.6–2.6)
MCHC RBC-ENTMCNC: 31.1 PG — SIGNIFICANT CHANGE UP (ref 27–34)
MCHC RBC-ENTMCNC: 32.3 GM/DL — SIGNIFICANT CHANGE UP (ref 32–36)
MCV RBC AUTO: 96.3 FL — SIGNIFICANT CHANGE UP (ref 80–100)
NRBC # BLD: 4 /100 WBCS — HIGH (ref 0–0)
NRBC # FLD: 0.28 K/UL — HIGH (ref 0–0)
PHOSPHATE SERPL-MCNC: 2.4 MG/DL — LOW (ref 2.5–4.5)
PLATELET # BLD AUTO: 78 K/UL — LOW (ref 150–400)
POTASSIUM SERPL-MCNC: 3.3 MMOL/L — LOW (ref 3.5–5.3)
POTASSIUM SERPL-SCNC: 3.3 MMOL/L — LOW (ref 3.5–5.3)
RBC # BLD: 2.67 M/UL — LOW (ref 4.2–5.8)
RBC # FLD: 15.9 % — HIGH (ref 10.3–14.5)
SODIUM SERPL-SCNC: 146 MMOL/L — HIGH (ref 135–145)
SPECIMEN SOURCE: SIGNIFICANT CHANGE UP
WBC # BLD: 6.94 K/UL — SIGNIFICANT CHANGE UP (ref 3.8–10.5)
WBC # FLD AUTO: 6.94 K/UL — SIGNIFICANT CHANGE UP (ref 3.8–10.5)

## 2024-10-27 PROCEDURE — 93010 ELECTROCARDIOGRAM REPORT: CPT

## 2024-10-27 RX ORDER — POTASSIUM CHLORIDE 600 MG/1
20 TABLET, EXTENDED RELEASE ORAL
Refills: 0 | Status: DISCONTINUED | OUTPATIENT
Start: 2024-10-27 | End: 2024-10-27

## 2024-10-27 RX ORDER — PREDNISONE 20 MG/1
5 TABLET ORAL
Refills: 0 | Status: DISCONTINUED | OUTPATIENT
Start: 2024-10-27 | End: 2024-10-27

## 2024-10-27 RX ORDER — PREDNISONE 20 MG/1
5 TABLET ORAL DAILY
Refills: 0 | Status: DISCONTINUED | OUTPATIENT
Start: 2024-10-28 | End: 2024-11-18

## 2024-10-27 RX ORDER — POTASSIUM CHLORIDE 600 MG/1
20 TABLET, EXTENDED RELEASE ORAL
Refills: 0 | Status: COMPLETED | OUTPATIENT
Start: 2024-10-27 | End: 2024-10-27

## 2024-10-27 RX ADMIN — Medication 100 MILLIGRAM(S): at 14:36

## 2024-10-27 RX ADMIN — Medication 145 MILLIGRAM(S): at 12:54

## 2024-10-27 RX ADMIN — POTASSIUM CHLORIDE 20 MILLIEQUIVALENT(S): 600 TABLET, EXTENDED RELEASE ORAL at 14:34

## 2024-10-27 RX ADMIN — Medication 20 MILLIGRAM(S): at 06:50

## 2024-10-27 RX ADMIN — Medication 1: at 12:55

## 2024-10-27 RX ADMIN — METRONIDAZOLE 100 MILLIGRAM(S): 500 TABLET ORAL at 18:17

## 2024-10-27 RX ADMIN — Medication 1 MG/HR: at 14:34

## 2024-10-27 RX ADMIN — DEXAMETHASONE 2 MILLIGRAM(S): 1.5 TABLET ORAL at 06:50

## 2024-10-27 RX ADMIN — PANTOPRAZOLE SODIUM 40 MILLIGRAM(S): 40 TABLET, DELAYED RELEASE ORAL at 12:54

## 2024-10-27 RX ADMIN — METRONIDAZOLE 100 MILLIGRAM(S): 500 TABLET ORAL at 06:50

## 2024-10-27 RX ADMIN — CHLORHEXIDINE GLUCONATE 1 APPLICATION(S): 1.2 RINSE ORAL at 14:12

## 2024-10-27 RX ADMIN — Medication 1 TABLET(S): at 12:55

## 2024-10-27 NOTE — PROGRESS NOTE ADULT - SUBJECTIVE AND OBJECTIVE BOX
{\rtf1\xqizyf65697\ansi\hdxbqvj6897\ftnbj\uc1\deff0  {\fonttbl{\f0 \fnil Segoe UI;}{\f1 \fnil \fcharset0 Segoe UI;}{\f2 \fnil Times New Nikhil;}}  {\colortbl ;\umq405\kkrdk705\gqex799 ;\red0\green0\blue0 ;\red0\green0\dxys047 ;\red0\green0\blue0 ;}  {\stylesheet{\f0\fs20 Normal;}{\cs1 Default Paragraph Font;}{\cs2\f0\fs16 Line Number;}{\cs3\f2\fs24\ul\cf3 Hyperlink;}}  {\*\revtbl{Unknown;}}  \xsqttw62814\rhmtdo21158\scljx8853\tvmva1031\rlrgo5823\nwdef8790\lowhsdy583\qaunsqc908\nogrowautofit\pbwdeb656\formshade\nofeaturethrottle1\dntblnsbdb\fet4\aendnotes\aftnnrlc\pgbrdrhead\pgbrdrfoot  \sectd\wbsktg17712\znvlrx12596\guttersxn0\sibiclew8617\gmulgxky0542\idmepzay9260\kjwxahge6457\xxgpdrp812\ovdczwi655\sbkpage\pgncont\pgndec  \plain\plain\f0\fs24\pard\plain\f0\fs24\plain\f0\fs20\hiyr0224\hich\f0\dbch\f0\loch\f0\fs20 Date of Service: 10-27-24 @ 11:34\par  \par  Patient is a 64y old  Male who presents with a chief complaint of Pain (27 Oct 2024 10:37)\par  \par  \par  Any change in ROS: seems to be doing  ok : on morphine drip : \par  \par  MEDICATIONS  (STANDING):\par  abiraterone 500 mg tablet 2 Tablet(s) 2 Tablet(s) Oral daily\par  atorvastatin 20 milliGRAM(s) Oral at bedtime\par  cefTRIAXone   IVPB 2000 milliGRAM(s) IV Intermittent every 24 hours\par  chlorhexidine 2% Cloths 1 Application(s) Topical daily\par  dextrose 5%. 1000 milliLiter(s) (75 mL/Hr) IV Continuous <Continuous>\par  dextrose 5%. 1000 milliLiter(s) (50 mL/Hr) IV Continuous <Continuous>\par  dextrose 5%. 1000 milliLiter(s) (100 mL/Hr) IV Continuous <Continuous>\par  dextrose 5%. 1000 milliLiter(s) (130 mL/Hr) IV Continuous <Continuous>\par  dextrose 50% Injectable 25 Gram(s) IV Push once\par  dextrose 50% Injectable 12.5 Gram(s) IV Push once\par  dextrose 50% Injectable 25 Gram(s) IV Push once\par  fenofibrate Tablet 145 milliGRAM(s) Oral daily\par  glucagon  Injectable 1 milliGRAM(s) IntraMuscular once\par  influenza  Vaccine (HIGH DOSE) 0.5 milliLiter(s) IntraMuscular once\par  insulin lispro (ADMELOG) corrective regimen sliding scale   SubCutaneous at bedtime\par  insulin lispro (ADMELOG) corrective regimen sliding scale   SubCutaneous three times a day before meals\par  lactobacillus acidophilus 1 Tablet(s) Oral daily\par  lidocaine   4% Patch 1 Patch Transdermal every 24 hours\par  metroNIDAZOLE  IVPB 500 milliGRAM(s) IV Intermittent every 12 hours\par  morphine  Infusion. 1 mG/Hr (1 mL/Hr) IV Continuous <Continuous>\par  pantoprazole  Injectable 40 milliGRAM(s) IV Push daily\par  potassium chloride    Tablet ER 20 milliEquivalent(s) Oral every 2 hours\par  \par  MEDICATIONS  (PRN):\par  acetaminophen     Tablet .. 650 milliGRAM(s) Oral every 6 hours PRN Temp greater or equal to 38C (100.4F), Mild Pain (1 - 3), Moderate Pain (4 - 6)\par  aluminum hydroxide/magnesium hydroxide/simethicone Suspension 30 milliLiter(s) Oral every 4 hours PRN Dyspepsia\par  artificial tears (preservative free) Ophthalmic Solution 1 Drop(s) Both EYES four times a day PRN Dry Eyes\par  dextrose Oral Gel 15 Gram(s) Oral once PRN Blood Glucose LESS THAN 70 milliGRAM(s)/deciliter\par  morphine  - Injectable 4 milliGRAM(s) IV Push every 3 hours PRN Severe Pain (7 - 10)\par  naloxone Injectable 0.1 milliGRAM(s) IV Push every 3 minutes PRN For ANY of the following changes in patient status:  A. RR LESS THAN 10 breaths per minute, B. Oxygen saturation LESS THAN 90%, C. Sedation score of 6\par  polyethylene glycol 3350 17 Gram(s) Oral daily PRN Constipation\par  \par  Vital Signs Last 24 Hrs\par  T(C): 36.4 (27 Oct 2024 06:15), Max: 37.2 (26 Oct 2024 21:23)\par  T(F): 97.6 (27 Oct 2024 06:15), Max: 98.9 (26 Oct 2024 21:23)\par  HR: 101 (27 Oct 2024 06:15) (85 - 101)\par  BP: 122/82 (27 Oct 2024 06:15) (103/68 - 122/82)\par  BP(mean): --\par  RR: 18 (27 Oct 2024 06:15) (18 - 18)\par  SpO2: 100% (27 Oct 2024 06:15) (98% - 100%)\par  \par  Parameters below as of 27 Oct 2024 06:15\par  Patient On (Oxygen Delivery Method): room air\par  \par  \par  \par  I&O's Summary\par  \par  26 Oct 2024 07:01  -  27 Oct 2024 07:00\par  --------------------------------------------------------\par  IN: 975 mL / OUT: 1160 mL / NET: -185 mL\par  \par  \par  \par  \par  Physical Exam: \par  GENERAL: NAD, well-groomed, well-developed\par  HEENT: CHAVA/   Atraumatic, Normocephalic\par  ENMT: No tonsillar erythema, exudates, or enlargement; Moist mucous membranes, Good dentition, No lesions\par  NECK: Supple, No JVD, Normal thyroid\par  CHEST/LUNG: Clear to auscultaion- no wheezing\par  CVS: Regular rate and rhythm; No murmurs, rubs, or gallops\par  GI: : Soft, Nontender, Nondistended; Bowel sounds present\par  NERVOUS SYSTEM:  Alert & Oriented X1-2\par  EXTREMITIES:- edema\par  LYMPH: No lymphadenopathy noted\par  SKIN: No rashes or lesions\par  ENDOCRINOLOGY: No Thyromegaly\par  PSYCH: calm \par  \par  Labs:\par  20, 18, 16\par  \par  \par           \par             8.3  \par  6.94  )-----------( 78       ( 27 Oct 2024 06:44 )\par             25.7 \par           \par             8.6  \par  9.37  )-----------( 42       ( 26 Oct 2024 03:40 )\par             27.1 \par           \par             8.4  \par  8.77  )-----------( 49       ( 25 Oct 2024 05:30 )\par             25.8 \par           \par             8.0  \par  8.70  )-----------( 40       ( 24 Oct 2024 09:12 )\par             24.4 \par           \par             8.4  \par  10.25 )-----------( 40       ( 24 Oct 2024 02:00 )\par             25.6 \par           \par             8.3  \par  13.21 )-----------( 44       ( 23 Oct 2024 18:00 )\par             25.1 \par  \par  10-27\par  \par  146[H]  |  119[H]  |  20\par  ----------------------------<  89\par  3.3[L]   |  18[L]  |  0.39[L]\par  10-26\par  \par  144  |  115[H]  |  28[H]\par  ----------------------------<  141[H]\par  4.1   |  16[L]  |  0.49[L]\par  10-25\par  \par  146[H]  |  115[H]  |  33[H]\par  ----------------------------<  181[H]\par  4.1   |  20[L]  |  0.51\par  10-25\par  \par  150[H]  |  118[H]  |  33[H]\par  ----------------------------<  179[H]\par  4.4   |  19[L]  |  0.57\par  10-24\par  \par  147[H]  |  118[H]  |  38[H]\par  ----------------------------<  175[H]\par  3.8   |  19[L]  |  0.61\par  10-24\par  \par  151[H]  |  120[H]  |  42[H]\par  ----------------------------<  151[H]\par  3.8   |  20[L]  |  0.68\par  10-24\par  \par  151[H]  |  120[H]  |  43[H]\par  ----------------------------<  142[H]\par  4.0   |  19[L]  |  0.74\par  10-23\par  \par  151[H]  |  119[H]  |  37[H]\par  ----------------------------<  180[H]\par  4.2   |  21[L]  |  0.64\par  \par  Ca    7.2[L]      27 Oct 2024 06:44\par  Ca    6.5[LL]      26 Oct 2024 03:40\par  Ca    6.9[L]      25 Oct 2024 17:20\par  Phos  2.4     10-27\par  Phos  2.4     10-26\par  Phos  2.2     10-25\par  Mg     2.20     10-27\par  Mg     2.40     10-26\par  \par  TPro  4.6[L]  /  Alb  2.3[L]  /  TBili  0.9  /  DBili  0.3  /  AST  52[H]  /  ALT  15  /  AlkPhos  83  10-25\par  TPro  4.2[L]  /  Alb  2.2[L]  /  TBili  0.9  /  DBili  x   /  AST  44[H]  /  ALT  15  /  AlkPhos  107  10-23\par  \par  CAPILLARY BLOOD GLUCOSE\par  \par  \par  POCT Blood Glucose.: 103 mg/dL (27 Oct 2024 08:46)\par  POCT Blood Glucose.: 108 mg/dL (26 Oct 2024 21:08)\par  POCT Blood Glucose.: 130 mg/dL (26 Oct 2024 17:58)\par  POCT Blood Glucose.: 138 mg/dL (26 Oct 2024 12:11)\par  \par  \par  \par  \par  Urinalysis Basic - ( 27 Oct 2024 06:44 )\par  \par  Color: x / Appearance: x / SG: x / pH: x\par  Gluc: 89 mg/dL / Ketone: x  / Bili: x / Urobili: x \par  Blood: x / Protein: x / Nitrite: x \par  Leuk Esterase: x / RBC: x / WBC x \par  Sq Epi: x / Non Sq Epi: x / Bacteria: x\par  \par  \ql\plain\f0\fs24\plain\f0\fs20\[id number]\hich\f0\dbch\f0\loch\f0\fs20 radd/w Dr. Bruno\par  \par  \par  \par  IR was consulted for vertebral augmentation of T3-T5 prior to XRT. \par  Patient was seen bedside. Initially, patient kept requesting no exam and was not willing to participate in the discussion. Son was bedside at time of conversation. 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 \par  Please reach out to IR when patient is medically stable for vertebral augmentation.\plain\f1\fs20\[id number]\hich\f1\dbch\f1\loch\f1\cf2\fs20\strike\plain\f1\fs20\ofib8924\hich\f1\dbch\f1\loch\f1\cf2\fs20\plain\f0\fs20\ckyx2945\hich\f0\dbch\f0\loch\f0\fs20\par  \pard\plain\f0\fs24\plain\f0\fs20\pndy1973\hich\f0\dbch\f0\loch\f0\fs20\par  < from: Xray Chest 1 View- PORTABLE-Urgent (Xray Chest 1 View- PORTABLE-Urgent .) (10.20.24 @ 11:02) >\par  \ql\plain\f0\fs24\plain\f1\fs16\hmuz0509\hich\f1\dbch\f1\loch\f1\cf2\fs16 COMPARISON: Chest x-ray 10/17/2024.\par  \par  FINDINGS:\par  No focal consolidation, large pleural effusion or pneumothorax.\par  The heart size cannot be accurately assessed on this projection.\par  No acute osseous abnormalities.\par  \par  \par  IMPRESSION:\par  No focal consolidations.\par  \par  --- End of Report ---\par  \par  \par  \par  \par  PRABHA MONET MD; Resident Radiologist\par  This document has been electronically signed.\par  LEA COLE MD; Attending Interventional Radiologist\par  \pard\plain\f0\fs24\plain\f1\fs16\[id number]\hich\f1\dbch\f1\loch\f1\cf2\fs16 This document has been electronically signed. Oct 20 2024 11:53AM\par  \par  < end of copied text >\par  \ql\plain\f0\fs24\plain\f0\fs20\[id number]\hich\f0\dbch\f0\loch\f0\fs20\par  \pard\plain\f0\fs24\plain\f0\fs20\tevj9125\hich\f0\dbch\f0\loch\f0\fs20\par  \par  RECENT CULTURES:\par  10-24 @ 01:55 .Blood BLOOD \par  \par  \par  \par  \par   \par  \par  No growth at 72 Hours\par  \par  10-24 @ 01:42 .Blood BLOOD \par  \par  \par  \par  \par   \par  \par  No growth at 72 Hours\par  \par  10-22 @ 15:09 Clean Catch Clean Catch (Midstream) \par  \par  \par  \par  \par   \par  \par  <10,000 CFU/mL Normal Urogenital Sherlyn\par  \par  10-22 @ 06:05 .Blood BLOOD \par  ARJUN\par  \par  Growth in aerobic bottle: Gram Negative Rods\par  \par  Escherichia coli\par  Escherichia coli \par  \par  Growth in aerobic bottle: Escherichia coli\par  \par  10-21 @ 20:05 .Blood BLOOD \par  \par  \par  \par  \par   \par  \par  No growth at 5 days\par  \par  10-21 @ 17:33 .Blood BLOOD \par  \par  \par  \par  \par   \par  \par  No growth at 5 days\par  \par  \par  \par  \par  RESPIRATORY CULTURES:\par  \par  \par  \par  \par  Studies\par  Chest X-RAY\par  CT SCAN Chest \par  Venous Dopplers: LE: \par  CT Abdomen\par  Others\par  \par  \par  \par  \par  \par  \par  \ql\plain\f0\fs24\plain\f0\fs20\[id number]\hich\f0\dbch\f0\loch\f0\fs20\par  }

## 2024-10-27 NOTE — PROGRESS NOTE ADULT - ASSESSMENT
64-year-old male with metastatic prostate cancer, sent in by hematologist from [place] [facility] for uncontrolled pain, nausea, vomiting.       Problem/Plan - 1:  ·  Problem: Cancer related pain.   ·  Plan: - appreciate pall care recommendations:  - pain control as per palliative crae   - RT consult appreciated     Problem/Plan - 2:·  Problem: Nausea & vomiting., Diarrhea, colitis , SMA dissection   ·  Plan: -  vascular fu appreciated  - GI and surgery consult appreciated   - ID fu    Problem/Plan - 3:  ·  Problem: CA of prostate.   ·  Plan: Metastatic prostate cancer- f/u heme/onc recommendations  - Rad Onc fu   - Palliative for symptom control.    Problem/Plan - 4:  ·  Problem: Thrombocytopenia, unspecified.   ·  Plan: -monitor cbc   transfuse PRN    Problem/Plan - 5:  ·  Problem: hypernatremia·  Plan: - cw ivf  - renal fu     dw family at bedside

## 2024-10-27 NOTE — PROGRESS NOTE ADULT - SUBJECTIVE AND OBJECTIVE BOX
[facility]    Dr. Yoselyn Brown     Office [phone] (9 am to 5 pm)  Service: [phone] (5pm to 9am)  Also Available on TEAMS      RENAL PROGRESS NOTE: DATE OF SERVICE 10-27-24 @ 10:12    Patient is a 64y old  Male who presents with a chief complaint of Pain (26 Oct 2024 16:01)      Patient seen and examined at bedside. No chest pain/sob    VITALS:  T(F): 97.6 (10-27-24 @ 06:15), Max: 98.9 (10-26-24 @ 21:23)  HR: 101 (10-27-24 @ 06:15)  BP: 122/82 (10-27-24 @ 06:15)  RR: 18 (10-27-24 @ 06:15)  SpO2: 100% (10-27-24 @ 06:15)  Wt(kg): --    10-26 @ 07:01  -  10-27 @ 07:00  --------------------------------------------------------  IN: 975 mL / OUT: 1160 mL / NET: -185 mL          PHYSICAL EXAM:  Constitutional: NAD  Neck: No JVD  Respiratory: CTAB, no wheezes, rales or rhonchi  Cardiovascular: S1, S2, RRR  Gastrointestinal: BS+, soft, NT/ND  Extremities: No peripheral edema    Hospital Medications:   MEDICATIONS  (STANDING):  abiraterone 500 mg tablet 2 Tablet(s) 2 Tablet(s) Oral daily  atorvastatin 20 milliGRAM(s) Oral at bedtime  cefTRIAXone   IVPB 2000 milliGRAM(s) IV Intermittent every 24 hours  chlorhexidine 2% Cloths 1 Application(s) Topical daily  dextrose 5%. 1000 milliLiter(s) (130 mL/Hr) IV Continuous <Continuous>  dextrose 5%. 1000 milliLiter(s) (100 mL/Hr) IV Continuous <Continuous>  dextrose 5%. 1000 milliLiter(s) (75 mL/Hr) IV Continuous <Continuous>  dextrose 5%. 1000 milliLiter(s) (50 mL/Hr) IV Continuous <Continuous>  dextrose 50% Injectable 25 Gram(s) IV Push once  dextrose 50% Injectable 25 Gram(s) IV Push once  dextrose 50% Injectable 12.5 Gram(s) IV Push once  fenofibrate Tablet 145 milliGRAM(s) Oral daily  glucagon  Injectable 1 milliGRAM(s) IntraMuscular once  influenza  Vaccine (HIGH DOSE) 0.5 milliLiter(s) IntraMuscular once  insulin lispro (ADMELOG) corrective regimen sliding scale   SubCutaneous at bedtime  insulin lispro (ADMELOG) corrective regimen sliding scale   SubCutaneous three times a day before meals  lactobacillus acidophilus 1 Tablet(s) Oral daily  lidocaine   4% Patch 1 Patch Transdermal every 24 hours  metroNIDAZOLE  IVPB 500 milliGRAM(s) IV Intermittent every 12 hours  morphine  Infusion. 1 mG/Hr (1 mL/Hr) IV Continuous <Continuous>  pantoprazole  Injectable 40 milliGRAM(s) IV Push daily  potassium chloride    Tablet ER 20 milliEquivalent(s) Oral every 2 hours  sodium chloride 0.45%. 1000 milliLiter(s) (125 mL/Hr) IV Continuous <Continuous>      LABS:  10-27    146[H]  |  119[H]  |  20  ----------------------------<  89  3.3[L]   |  18[L]  |  0.39[L]    Ca    7.2[L]      27 Oct 2024 06:44  Phos  2.4     10-27  Mg     2.20     10-27      Creatinine Trend: 0.39 <--, 0.49 <--, 0.51 <--, 0.57 <--, 0.61 <--, 0.68 <--, 0.74 <--, 0.64 <--, 0.71 <--, 0.81 <--, 0.94 <--, 0.93 <--, 0.94 <--, 0.85 <--, 0.96 <--, 0.97 <--, 1.04 <--    Phosphorus: 2.4 mg/dL (10-27 @ 06:44)                              8.3    6.94  )-----------( 78       ( 27 Oct 2024 06:44 )             25.7     Urine Studies:  Urinalysis - [10-27-24 @ 06:44]      Color  / Appearance  / SG  / pH       Gluc 89 / Ketone   / Bili  / Urobili        Blood  / Protein  / Leuk Est  / Nitrite       RBC  / WBC  / Hyaline  / Gran  / Sq Epi  / Non Sq Epi  / Bacteria       TSH 2.62      [10-13-24 @ 05:30]  Lipid: chol 135, , HDL 39, LDL --      [10-13-24 @ 05:30]        RADIOLOGY & ADDITIONAL STUDIES:

## 2024-10-27 NOTE — PROGRESS NOTE ADULT - SUBJECTIVE AND OBJECTIVE BOX
Patient is a 64y old  Male who presents with a chief complaint of Pain (27 Oct 2024 11:33)    Date of servie : 10-27-24 @ 14:14  INTERVAL HPI/OVERNIGHT EVENTS:  T(C): 37.2 (10-27-24 @ 12:00), Max: 37.2 (10-26-24 @ 21:23)  HR: 81 (10-27-24 @ 12:00) (81 - 101)  BP: 115/71 (10-27-24 @ 12:00) (103/68 - 122/82)  RR: 18 (10-27-24 @ 12:00) (18 - 18)  SpO2: 100% (10-27-24 @ 12:00) (98% - 100%)  Wt(kg): --  I&O's Summary    26 Oct 2024 07:01  -  27 Oct 2024 07:00  --------------------------------------------------------  IN: 975 mL / OUT: 1160 mL / NET: -185 mL        LABS:                        8.3    6.94  )-----------( 78       ( 27 Oct 2024 06:44 )             25.7     10-27    146[H]  |  119[H]  |  20  ----------------------------<  89  3.3[L]   |  18[L]  |  0.39[L]    Ca    7.2[L]      27 Oct 2024 06:44  Phos  2.4     10-27  Mg     2.20     10-27        Urinalysis Basic - ( 27 Oct 2024 06:44 )    Color: x / Appearance: x / SG: x / pH: x  Gluc: 89 mg/dL / Ketone: x  / Bili: x / Urobili: x   Blood: x / Protein: x / Nitrite: x   Leuk Esterase: x / RBC: x / WBC x   Sq Epi: x / Non Sq Epi: x / Bacteria: x      CAPILLARY BLOOD GLUCOSE      POCT Blood Glucose.: 166 mg/dL (27 Oct 2024 12:49)  POCT Blood Glucose.: 103 mg/dL (27 Oct 2024 08:46)  POCT Blood Glucose.: 108 mg/dL (26 Oct 2024 21:08)  POCT Blood Glucose.: 130 mg/dL (26 Oct 2024 17:58)        Urinalysis Basic - ( 27 Oct 2024 06:44 )    Color: x / Appearance: x / SG: x / pH: x  Gluc: 89 mg/dL / Ketone: x  / Bili: x / Urobili: x   Blood: x / Protein: x / Nitrite: x   Leuk Esterase: x / RBC: x / WBC x   Sq Epi: x / Non Sq Epi: x / Bacteria: x        MEDICATIONS  (STANDING):  abiraterone 500 mg tablet 2 Tablet(s) 2 Tablet(s) Oral daily  atorvastatin 20 milliGRAM(s) Oral at bedtime  cefTRIAXone   IVPB 2000 milliGRAM(s) IV Intermittent every 24 hours  chlorhexidine 2% Cloths 1 Application(s) Topical daily  dextrose 5%. 1000 milliLiter(s) (75 mL/Hr) IV Continuous <Continuous>  dextrose 5%. 1000 milliLiter(s) (100 mL/Hr) IV Continuous <Continuous>  dextrose 5%. 1000 milliLiter(s) (130 mL/Hr) IV Continuous <Continuous>  dextrose 5%. 1000 milliLiter(s) (50 mL/Hr) IV Continuous <Continuous>  dextrose 50% Injectable 25 Gram(s) IV Push once  dextrose 50% Injectable 12.5 Gram(s) IV Push once  dextrose 50% Injectable 25 Gram(s) IV Push once  fenofibrate Tablet 145 milliGRAM(s) Oral daily  glucagon  Injectable 1 milliGRAM(s) IntraMuscular once  influenza  Vaccine (HIGH DOSE) 0.5 milliLiter(s) IntraMuscular once  insulin lispro (ADMELOG) corrective regimen sliding scale   SubCutaneous three times a day before meals  insulin lispro (ADMELOG) corrective regimen sliding scale   SubCutaneous at bedtime  lactobacillus acidophilus 1 Tablet(s) Oral daily  lidocaine   4% Patch 1 Patch Transdermal every 24 hours  metroNIDAZOLE  IVPB 500 milliGRAM(s) IV Intermittent every 12 hours  morphine  Infusion. 1 mG/Hr (1 mL/Hr) IV Continuous <Continuous>  pantoprazole  Injectable 40 milliGRAM(s) IV Push daily  potassium chloride   Powder 20 milliEquivalent(s) Oral every 2 hours    MEDICATIONS  (PRN):  acetaminophen     Tablet .. 650 milliGRAM(s) Oral every 6 hours PRN Temp greater or equal to 38C (100.4F), Mild Pain (1 - 3), Moderate Pain (4 - 6)  aluminum hydroxide/magnesium hydroxide/simethicone Suspension 30 milliLiter(s) Oral every 4 hours PRN Dyspepsia  artificial tears (preservative free) Ophthalmic Solution 1 Drop(s) Both EYES four times a day PRN Dry Eyes  dextrose Oral Gel 15 Gram(s) Oral once PRN Blood Glucose LESS THAN 70 milliGRAM(s)/deciliter  morphine  - Injectable 4 milliGRAM(s) IV Push every 3 hours PRN Severe Pain (7 - 10)  naloxone Injectable 0.1 milliGRAM(s) IV Push every 3 minutes PRN For ANY of the following changes in patient status:  A. RR LESS THAN 10 breaths per minute, B. Oxygen saturation LESS THAN 90%, C. Sedation score of 6  polyethylene glycol 3350 17 Gram(s) Oral daily PRN Constipation          PHYSICAL EXAM:  GENERAL: NAD, well-groomed, well-developed  HEAD:  Atraumatic, Normocephalic  CHEST/LUNG: Clear to percussion bilaterally; No rales, rhonchi, wheezing, or rubs  HEART: Regular rate and rhythm; No murmurs, rubs, or gallops  ABDOMEN: Soft, Nontender, Nondistended; Bowel sounds present  EXTREMITIES:  2+ Peripheral Pulses, No clubbing, cyanosis, or edema  LYMPH: No lymphadenopathy noted  SKIN: No rashes or lesions    Care Discussed with Consultants/Other Providers [ ] YES  [ ] NO

## 2024-10-27 NOTE — PROGRESS NOTE ADULT - ASSESSMENT
64-year-old male with metastatic prostate cancer, sent in by hematologist from [place] [facility] for uncontrolled pain, nausea, vomiting.   Patient reports diffuse pain starting 6 months ago significantly worsening for the past 2 weeks. Diagnosed with high risk high volume metastatic prostate cancer with bone, liver lymph node mets and presacral mass. Liver biopsy confirmed disease with . Started lupron, loly/pred with plans to initiate taxotere.   nephrology consulted for electrolyte abnormalities     Hypernatremia  Poor free water intake    pt now has diet  Na 146 worsening , Can restart D5w @ 60 cc/hour for 1 L   goal approximately 10 meq over 24 hours  monitor    Hypokalemia  Replete with Potassium Chloride 40 meq x1    Acidosis   Likely from Normal saline causing hyperchloremic acidosis  Hold NS for now  Will monitor  Can send ABg to assess acidosis       hypophosphatemia  Give Phos Nak x2  monitor  supplement more if needed    hypocalcemia   sec to low albumin  corrected ca 8.5  monitor    fever  GNR bacteremia   work up and tx per team    anemia   metastatic prostate cancer  f/u heme/onc

## 2024-10-27 NOTE — PROGRESS NOTE ADULT - ASSESSMENT
1. Metastatic prostate cancer    - Follows with Dr Andrew Mendoza at [facility]   - PSMA 10/11/24 showed hypermetabolic vera foci above and below the diaphragm, foci in the liver and extensive foci involving the axial and appendicular skeleton compatible with metastatic disease  - --> 374--> 426 on 10/8/24   - Liver biopsy 9/30/24 consistent with adenocarcinoma favoring prostate   - High risk high volume disease -> started on triplet therapy w/ ADT/lupron, abiraterone/prednisone + taxotere. (back pain after Lupron likely tumor flare).  - Continue abiraterone 1000mg daily w Prednisone 5mg PO QD ( currently not on prednisone , reagan discuss with primary team if ok with ID to start)  - s/p  taxotere 60mg/m2 on 10/13/24. Next dose on 11/4 if clinically improves    - Kyphoplasty w/IR to T3 and L1 lesions planned    - Palliative following for pain control and given hospital course would have further GOC discussions. Treatment will be offered but he has metastatic disease with visceral involvement and complications as outlined below.     2. Pancolitis, E coli and H influenzae bacteremia    -- on CTX and Metronidazole  -- ID following  -- BCx from 10/24 neg to date    3. Thrombocytopenia     - Platelets 78 today  - Multifactorial sec to likely marrow infiltration by CaP, Chemo effect , consumption from acute illness , poss ITP  - Baseline in the 70s  - Transfuse for plt <15 or < 50K if bleeding/for procedure      4. SMA Dissection    -- seen by [facility] sx  -- no plan for intervention    Savage Hampton MD  HematologyOncology   O: [phone]

## 2024-10-27 NOTE — PROGRESS NOTE ADULT - ASSESSMENT
64-year-old male with metastatic prostate cancer, sent in by hematologist from [facility] for uncontrolled pain, nausea, vomiting.   Patient reports diffuse pain starting 6 months ago significantly worsening for the past 2 weeks.  Currently the worst pain is located around the lower back.   No loss of bladder and bowel function, no saddle paresthesia.  Able to walk.  patient is oxycodone 5 every 4-6 hour.  Yesterday they started adding OxyContin 10.  However patient continued to have pain.  Family also reports significant nausea and vomiting, inability to tolerate p.o. for the last 2 days.  Last bowel movement 2 days ago.   No known allergy.  Diagnosed with high risk high volume metastatic prostate cancer with bone, liver lymph node mets and presacral mass. Liver biopsy confirmed disease with . Started lupron, loly/pred with plans to initiate taxotere.    (12 Oct 2024 15:40)  pt seems very frustrated:   he is on 2 L of oxygen : he has no underlying lung disease:  but he is requiring 2 L of oxygen      Hypoxic resp failure  Metastatic prostate cancer  Back pain     Hypoxic resp failure  -He has no underlying lung disease:  but he is requiring 2 L of oxygen :   -CTA showed; No pulmonary embolism to the level of the segmental branches bilaterally. Limited evaluation of the subsegmental branches secondary to incomplete contrast opacification.  Multilevel vertebral body lytic lesions and loss of vertebral body height, better evaluated on CT thoracic spine 10/17/2024. Metastatic prostate cancer  -Patent central airways. Bibasilar atelectasis. No pleural effusion. MEDIASTINUM AND KATTY: No lymphadenopathy.  PULMONARY ANGIOGRAM: No pulmonary embolism to the level of the segmental   branches bilaterally. Limited evaluation of the subsegmental branches secondary to incomplete contrast opacification.    10/19:  -seems pretty tired;  on 2 L of oxygen :  -cta chest showed: No pulmonary embolism to the level of the segmental branches bilaterally. Limited evaluation of the subsegmental branches secondary to incomplete   contrast opacification. Multilevel vertebral body lytic lesions and loss of vertebral body height, better evaluated on CT thoracic spine 10/17/2024.  -still with fever:  no pe  on zosyn  and leukopenic hemonc following;  has metastatic prostate ca:   -VBG seems OK:     10/20:  pulm wise he seems to be stable:   -using 2 L of oxygen    -yesterdays VBG with minimal met acidosis  -cta again noted    10/21:  on 4 L of oxygen  today    cxr is size    e coli sepsis: Gram Negative Rods and Gram Negative Coccobacilli    10/22: heis doing poorly today  : he is very agitated and uncomfortable  on mittens: ? sun downing   10/23: quiet today  : sleeping:  oxygen requirement has not increased: on rooo ha 95% as documented    WBC is slowly increasing:   no fever:   -on antibiotics   10/24: pt is not doing well : his repeat blood cultures are positive with same organism :  would defer to ID;   10/25: seems to be doing  ok ; no sob:  no cough :  no phlegm   : on room air   10/26: resp failure has resolved:  is septic  : on ceftriaxone     10/27:  he seems OK:  he is on room air:  on morphine drip   remans on ceftriaxone still       New finding:  SMA dissection : neutropenic colitis/ #E Coli and H influenzae bacteremia/ #Neutropenic fever  -/f ischemic colitis on R colon   Diagnosed  on ct abd:  defer to surgery and primary team:  probably no intervention:   -cont antibiotics  -not doing very well with above new findings    10/22; no intervention per surgery    -cont antibiotics  -has fever:  ID following :  -Last chest xray on 20th  ; normal     id following   10/23  IR was consulted for vertebral augmentation of T3-T5 prior to XRT.   Patient was seen bedside. Initially, patient kept requesting no exam and was not willing to participate in the discussion. Son was bedside at time of conversation.   Per discussion with the medical attending, given the concern for SMA dissection and concern for bowel ischemia, will hold off on vertebral augmentation evaluation at this time.   Please reach out to IR when patient is medically stable for vertebral augmentation.  10/24:  remains septic:  above cancelled:   ? for palliative care now  10/26: he seems same to me:  no overnight events  on room air:   check VBG  : cont antibiotics   hemonc following   10/27:  -still on antibiotics for e coli sepsi  -id following s    Back pain   -likely due to metastatic disease:  adm here for severe pain :  -pain control per primary team   -venous ph is ok     dw acp & family ; GOC as per primary  team

## 2024-10-27 NOTE — PROGRESS NOTE ADULT - SUBJECTIVE AND OBJECTIVE BOX
Patient seen and examined at bedside.   wife at bedside   no complaints.     MEDICATIONS  (STANDING):  abiraterone 500 mg tablet 2 Tablet(s) 2 Tablet(s) Oral daily  atorvastatin 20 milliGRAM(s) Oral at bedtime  cefTRIAXone   IVPB 2000 milliGRAM(s) IV Intermittent every 24 hours  chlorhexidine 2% Cloths 1 Application(s) Topical daily  dextrose 5%. 1000 milliLiter(s) (130 mL/Hr) IV Continuous <Continuous>  dextrose 5%. 1000 milliLiter(s) (50 mL/Hr) IV Continuous <Continuous>  dextrose 5%. 1000 milliLiter(s) (100 mL/Hr) IV Continuous <Continuous>  dextrose 5%. 1000 milliLiter(s) (75 mL/Hr) IV Continuous <Continuous>  dextrose 50% Injectable 25 Gram(s) IV Push once  dextrose 50% Injectable 25 Gram(s) IV Push once  dextrose 50% Injectable 12.5 Gram(s) IV Push once  fenofibrate Tablet 145 milliGRAM(s) Oral daily  glucagon  Injectable 1 milliGRAM(s) IntraMuscular once  influenza  Vaccine (HIGH DOSE) 0.5 milliLiter(s) IntraMuscular once  insulin lispro (ADMELOG) corrective regimen sliding scale   SubCutaneous at bedtime  insulin lispro (ADMELOG) corrective regimen sliding scale   SubCutaneous three times a day before meals  lactobacillus acidophilus 1 Tablet(s) Oral daily  lidocaine   4% Patch 1 Patch Transdermal every 24 hours  metroNIDAZOLE  IVPB 500 milliGRAM(s) IV Intermittent every 12 hours  morphine  Infusion. 1 mG/Hr (1 mL/Hr) IV Continuous <Continuous>  pantoprazole  Injectable 40 milliGRAM(s) IV Push daily  potassium chloride    Tablet ER 20 milliEquivalent(s) Oral every 2 hours  sodium chloride 0.45%. 1000 milliLiter(s) (125 mL/Hr) IV Continuous <Continuous>    MEDICATIONS  (PRN):  acetaminophen     Tablet .. 650 milliGRAM(s) Oral every 6 hours PRN Temp greater or equal to 38C (100.4F), Mild Pain (1 - 3), Moderate Pain (4 - 6)  aluminum hydroxide/magnesium hydroxide/simethicone Suspension 30 milliLiter(s) Oral every 4 hours PRN Dyspepsia  artificial tears (preservative free) Ophthalmic Solution 1 Drop(s) Both EYES four times a day PRN Dry Eyes  dextrose Oral Gel 15 Gram(s) Oral once PRN Blood Glucose LESS THAN 70 milliGRAM(s)/deciliter  morphine  - Injectable 4 milliGRAM(s) IV Push every 3 hours PRN Severe Pain (7 - 10)  naloxone Injectable 0.1 milliGRAM(s) IV Push every 3 minutes PRN For ANY of the following changes in patient status:  A. RR LESS THAN 10 breaths per minute, B. Oxygen saturation LESS THAN 90%, C. Sedation score of 6  polyethylene glycol 3350 17 Gram(s) Oral daily PRN Constipation        Vital Signs Last 24 Hrs  T(C): 36.4 (27 Oct 2024 06:15), Max: 37.2 (26 Oct 2024 21:23)  T(F): 97.6 (27 Oct 2024 06:15), Max: 98.9 (26 Oct 2024 21:23)  HR: 101 (27 Oct 2024 06:15) (85 - 101)  BP: 122/82 (27 Oct 2024 06:15) (103/68 - 122/82)  BP(mean): --  RR: 18 (27 Oct 2024 06:15) (18 - 18)  SpO2: 100% (27 Oct 2024 06:15) (98% - 100%)    Parameters below as of 27 Oct 2024 06:15  Patient On (Oxygen Delivery Method): room air          PHYSICAL EXAM:     GENERAL:  Appears stated age, well-groomed  HEENT:  NC/AT,  conjunctivae clear and pink  CHEST:  CTA b/l  HEART:  S1 s2+   ABDOMEN:  Soft, non-tender, non-distended  NEURO:  Alert, oriented, no asterixis                            8.3    6.94  )-----------( 78       ( 27 Oct 2024 06:44 )             25.7       10-27    146[H]  |  119[H]  |  20  ----------------------------<  89  3.3[L]   |  18[L]  |  0.39[L]    Ca    7.2[L]      27 Oct 2024 06:44  Phos  2.4     10-27  Mg     2.20     10-27

## 2024-10-28 LAB
ANION GAP SERPL CALC-SCNC: 12 MMOL/L — SIGNIFICANT CHANGE UP (ref 7–14)
ANION GAP SERPL CALC-SCNC: 16 MMOL/L — HIGH (ref 7–14)
BLD GP AB SCN SERPL QL: NEGATIVE — SIGNIFICANT CHANGE UP
BLOOD GAS ARTERIAL - LYTES,HGB,ICA,LACT RESULT: SIGNIFICANT CHANGE UP
BUN SERPL-MCNC: 18 MG/DL — SIGNIFICANT CHANGE UP (ref 7–23)
BUN SERPL-MCNC: 19 MG/DL — SIGNIFICANT CHANGE UP (ref 7–23)
CA-I BLD-SCNC: 1.05 MMOL/L — LOW (ref 1.15–1.29)
CALCIUM SERPL-MCNC: 7 MG/DL — LOW (ref 8.4–10.5)
CALCIUM SERPL-MCNC: 7.4 MG/DL — LOW (ref 8.4–10.5)
CHLORIDE SERPL-SCNC: 116 MMOL/L — HIGH (ref 98–107)
CHLORIDE SERPL-SCNC: 116 MMOL/L — HIGH (ref 98–107)
CO2 SERPL-SCNC: 17 MMOL/L — LOW (ref 22–31)
CO2 SERPL-SCNC: 20 MMOL/L — LOW (ref 22–31)
CREAT SERPL-MCNC: 0.39 MG/DL — LOW (ref 0.5–1.3)
CREAT SERPL-MCNC: 0.48 MG/DL — LOW (ref 0.5–1.3)
EGFR: 115 ML/MIN/1.73M2 — SIGNIFICANT CHANGE UP
EGFR: 122 ML/MIN/1.73M2 — SIGNIFICANT CHANGE UP
GLUCOSE BLDC GLUCOMTR-MCNC: 103 MG/DL — HIGH (ref 70–99)
GLUCOSE BLDC GLUCOMTR-MCNC: 107 MG/DL — HIGH (ref 70–99)
GLUCOSE BLDC GLUCOMTR-MCNC: 134 MG/DL — HIGH (ref 70–99)
GLUCOSE BLDC GLUCOMTR-MCNC: 147 MG/DL — HIGH (ref 70–99)
GLUCOSE BLDC GLUCOMTR-MCNC: 99 MG/DL — SIGNIFICANT CHANGE UP (ref 70–99)
GLUCOSE SERPL-MCNC: 102 MG/DL — HIGH (ref 70–99)
GLUCOSE SERPL-MCNC: 130 MG/DL — HIGH (ref 70–99)
HCT VFR BLD CALC: 22.2 % — LOW (ref 39–50)
HCT VFR BLD CALC: 26.4 % — LOW (ref 39–50)
HGB BLD-MCNC: 7.2 G/DL — LOW (ref 13–17)
HGB BLD-MCNC: 8.5 G/DL — LOW (ref 13–17)
MAGNESIUM SERPL-MCNC: 2.1 MG/DL — SIGNIFICANT CHANGE UP (ref 1.6–2.6)
MCHC RBC-ENTMCNC: 30.8 PG — SIGNIFICANT CHANGE UP (ref 27–34)
MCHC RBC-ENTMCNC: 31.6 PG — SIGNIFICANT CHANGE UP (ref 27–34)
MCHC RBC-ENTMCNC: 32.2 GM/DL — SIGNIFICANT CHANGE UP (ref 32–36)
MCHC RBC-ENTMCNC: 32.4 GM/DL — SIGNIFICANT CHANGE UP (ref 32–36)
MCV RBC AUTO: 95.7 FL — SIGNIFICANT CHANGE UP (ref 80–100)
MCV RBC AUTO: 97.4 FL — SIGNIFICANT CHANGE UP (ref 80–100)
NRBC # BLD: 5 /100 WBCS — HIGH (ref 0–0)
NRBC # BLD: 6 /100 WBCS — HIGH (ref 0–0)
NRBC # FLD: 0.23 K/UL — HIGH (ref 0–0)
NRBC # FLD: 0.37 K/UL — HIGH (ref 0–0)
PHOSPHATE SERPL-MCNC: 2.1 MG/DL — LOW (ref 2.5–4.5)
PLATELET # BLD AUTO: 81 K/UL — LOW (ref 150–400)
PLATELET # BLD AUTO: 95 K/UL — LOW (ref 150–400)
POTASSIUM SERPL-MCNC: 2.6 MMOL/L — CRITICAL LOW (ref 3.5–5.3)
POTASSIUM SERPL-MCNC: 3.1 MMOL/L — LOW (ref 3.5–5.3)
POTASSIUM SERPL-SCNC: 2.6 MMOL/L — CRITICAL LOW (ref 3.5–5.3)
POTASSIUM SERPL-SCNC: 3.1 MMOL/L — LOW (ref 3.5–5.3)
RBC # BLD: 2.28 M/UL — LOW (ref 4.2–5.8)
RBC # BLD: 2.76 M/UL — LOW (ref 4.2–5.8)
RBC # FLD: 16.7 % — HIGH (ref 10.3–14.5)
RBC # FLD: 17 % — HIGH (ref 10.3–14.5)
RH IG SCN BLD-IMP: POSITIVE — SIGNIFICANT CHANGE UP
SODIUM SERPL-SCNC: 148 MMOL/L — HIGH (ref 135–145)
SODIUM SERPL-SCNC: 149 MMOL/L — HIGH (ref 135–145)
WBC # BLD: 4.6 K/UL — SIGNIFICANT CHANGE UP (ref 3.8–10.5)
WBC # BLD: 5.94 K/UL — SIGNIFICANT CHANGE UP (ref 3.8–10.5)
WBC # FLD AUTO: 4.6 K/UL — SIGNIFICANT CHANGE UP (ref 3.8–10.5)
WBC # FLD AUTO: 5.94 K/UL — SIGNIFICANT CHANGE UP (ref 3.8–10.5)

## 2024-10-28 PROCEDURE — 99232 SBSQ HOSP IP/OBS MODERATE 35: CPT

## 2024-10-28 PROCEDURE — 99233 SBSQ HOSP IP/OBS HIGH 50: CPT

## 2024-10-28 RX ORDER — CYANOCOBALAMIN/FOLIC AC/VIT B6 1-2.2-25MG
1 TABLET ORAL DAILY
Refills: 0 | Status: DISCONTINUED | OUTPATIENT
Start: 2024-10-28 | End: 2024-11-18

## 2024-10-28 RX ORDER — POTASSIUM CHLORIDE 600 MG/1
10 TABLET, EXTENDED RELEASE ORAL
Refills: 0 | Status: COMPLETED | OUTPATIENT
Start: 2024-10-28 | End: 2024-10-28

## 2024-10-28 RX ORDER — 0.9 % SODIUM CHLORIDE 0.9 %
1000 INTRAVENOUS SOLUTION INTRAVENOUS
Refills: 0 | Status: DISCONTINUED | OUTPATIENT
Start: 2024-10-28 | End: 2024-10-28

## 2024-10-28 RX ORDER — DIPHENHYDRAMINE HYDROCHLORIDE AND LIDOCAINE HYDROCHLORIDE AND ALUMINUM HYDROXIDE AND MAGNESIUM HYDRO
30 KIT THREE TIMES A DAY
Refills: 0 | Status: DISCONTINUED | OUTPATIENT
Start: 2024-10-28 | End: 2024-11-18

## 2024-10-28 RX ORDER — 0.9 % SODIUM CHLORIDE 0.9 %
1000 INTRAVENOUS SOLUTION INTRAVENOUS
Refills: 0 | Status: COMPLETED | OUTPATIENT
Start: 2024-10-28 | End: 2024-10-29

## 2024-10-28 RX ORDER — POTASSIUM CHLORIDE 600 MG/1
40 TABLET, EXTENDED RELEASE ORAL
Refills: 0 | Status: DISCONTINUED | OUTPATIENT
Start: 2024-10-28 | End: 2024-10-28

## 2024-10-28 RX ORDER — HEPARIN SODIUM 5000 [USP'U]/.5ML
15 INJECTION, SOLUTION INTRAVENOUS; SUBCUTANEOUS ONCE
Refills: 0 | Status: COMPLETED | OUTPATIENT
Start: 2024-10-28 | End: 2024-10-28

## 2024-10-28 RX ADMIN — POTASSIUM CHLORIDE 100 MILLIEQUIVALENT(S): 600 TABLET, EXTENDED RELEASE ORAL at 18:35

## 2024-10-28 RX ADMIN — HEPARIN SODIUM 63.75 MILLIMOLE(S): 5000 INJECTION, SOLUTION INTRAVENOUS; SUBCUTANEOUS at 15:45

## 2024-10-28 RX ADMIN — Medication 20 MILLIGRAM(S): at 07:09

## 2024-10-28 RX ADMIN — Medication 1 MG/HR: at 08:23

## 2024-10-28 RX ADMIN — Medication 1 TABLET(S): at 15:00

## 2024-10-28 RX ADMIN — Medication 75 MILLILITER(S): at 21:44

## 2024-10-28 RX ADMIN — METRONIDAZOLE 100 MILLIGRAM(S): 500 TABLET ORAL at 07:10

## 2024-10-28 RX ADMIN — Medication 100 MILLIGRAM(S): at 12:37

## 2024-10-28 RX ADMIN — PANTOPRAZOLE SODIUM 40 MILLIGRAM(S): 40 TABLET, DELAYED RELEASE ORAL at 14:54

## 2024-10-28 RX ADMIN — Medication 75 MILLILITER(S): at 12:51

## 2024-10-28 RX ADMIN — METRONIDAZOLE 100 MILLIGRAM(S): 500 TABLET ORAL at 18:34

## 2024-10-28 RX ADMIN — Medication 1 MG/HR: at 19:29

## 2024-10-28 RX ADMIN — POTASSIUM CHLORIDE 100 MILLIEQUIVALENT(S): 600 TABLET, EXTENDED RELEASE ORAL at 23:11

## 2024-10-28 RX ADMIN — PREDNISONE 5 MILLIGRAM(S): 20 TABLET ORAL at 07:10

## 2024-10-28 RX ADMIN — Medication 145 MILLIGRAM(S): at 14:54

## 2024-10-28 RX ADMIN — CHLORHEXIDINE GLUCONATE 1 APPLICATION(S): 1.2 RINSE ORAL at 15:02

## 2024-10-28 RX ADMIN — POTASSIUM CHLORIDE 40 MILLIEQUIVALENT(S): 600 TABLET, EXTENDED RELEASE ORAL at 12:37

## 2024-10-28 RX ADMIN — POTASSIUM CHLORIDE 100 MILLIEQUIVALENT(S): 600 TABLET, EXTENDED RELEASE ORAL at 16:16

## 2024-10-28 NOTE — PROGRESS NOTE ADULT - PROBLEM SELECTOR PLAN 9
Patient is supported by his wife- Vijaya [phone]) and 3 adult sons- Magen, Mateus and Freddy   > Patient is recently dx with metastatic prostate cancer just 1.5 weeks ago and he is treatment oriented.  > Offered caregiver Sw referral to patient's wife- DECLINED   > 10/21: Began advanced care planning discussions with patient's son, Freddy. Pt's wife stayed overnight so is sleeping. Will plan to speak to his wife regarding further goc.  > 10/23: See GOC note- DNR/DNI. Of note, wife has shared with provider that they are familiar with hospice as her sister is on hospice in [place].   > 10/24: Plan is to continue to medically optimize patient and improve patient's performance/nutritional status with goal of pursuing further chemotherapy. Extensive goc discussion held with patient's family, Oncology team (Dr. Tate and Umm Aponte- Onc NP) and palliative care team regarding plan of care.

## 2024-10-28 NOTE — PROGRESS NOTE ADULT - ASSESSMENT
64-year-old male with metastatic prostate cancer, sent in by hematologist from [place] [facility] for uncontrolled pain, nausea, vomiting.       Problem/Plan - 1:  ·  Problem: Cancer related pain.   ·  Plan: - appreciate pall care recommendations:  - pain control as per palliative crae   - RT consult appreciated m, awaiting kyphoplasty     Problem/Plan - 2:·  Problem: Nausea & vomiting., Diarrhea, colitis , SMA dissection   ·  Plan: -  vascular fu appreciated  - GI and surgery consult appreciated   - ID fu    Problem/Plan - 3:  ·  Problem: CA of prostate.   ·  Plan: Metastatic prostate cancer  - f/u heme/onc recommendations  - Rad Onc fu   - Palliative for symptom control.    Problem/Plan - 4:  ·  Problem: Anemia , Thrombocytopenia, unspecified.   ·  Plan: -monitor cbc   transfuse PRN    Problem/Plan - 5:  ·  Problem: Bacteremia   Plan: - ID fu   - fu cultures  - ABX as per ID     dw wife at bedside

## 2024-10-28 NOTE — PROGRESS NOTE ADULT - SUBJECTIVE AND OBJECTIVE BOX
[facility] Geriatrics and Palliative Care  Melissa Davies Palliative Care Attending  Contact Info: Page [phone] (including Nights/Weekends), message on Microsoft Teams (Melissa Davies), or leave  at Palliative Office [phone] (non-urgent)   Date of [id number] @ 14:44    SUBJECTIVE AND OBJECTIVE: Patient seen with wife at bedside. Patient reports pain persists. He is trying to increase his PO intake but continues to report dysphagia. Patient with dry mucous membranes and noted blood in oral cavity.     Indication for Geriatrics and Palliative Care Services/INTERVAL HPI: sx management in setting of advanced malignancy     OVERNIGHT EVENTS:  > 10/21: Weekend events reviewed. Over the past 24 hours, patient was transitioned off pca pump. He required 4mg IV morphine x1.   > 10/22: Over the past 24 hours, patient did not require IV morphine PRNs. He did require IM Zyprexa x2. He is febrile.   > 10/23: Over the past 24 hours, patient did not required PRNs.   > 10/24: Over the past 24 hours, patient required PRNs of haldol 2.5mg x2 for agitation.   > 10/28: Weekend events reviewed. It appears patient was off Morphine gtt as he pulled IV access during agitation episode.     DNR on chart:DNI: Trial NIV  DNI: Trial NIV      Allergies    No Known Allergies    Intolerances    MEDICATIONS  (STANDING):  abiraterone 500 mg tablet 2 Tablet(s) 2 Tablet(s) Oral daily  atorvastatin 20 milliGRAM(s) Oral at bedtime  cefTRIAXone   IVPB 2000 milliGRAM(s) IV Intermittent every 24 hours  chlorhexidine 2% Cloths 1 Application(s) Topical daily  dextrose 5%. 1000 milliLiter(s) (50 mL/Hr) IV Continuous <Continuous>  dextrose 5%. 1000 milliLiter(s) (75 mL/Hr) IV Continuous <Continuous>  dextrose 5%. 1000 milliLiter(s) (100 mL/Hr) IV Continuous <Continuous>  dextrose 50% Injectable 12.5 Gram(s) IV Push once  dextrose 50% Injectable 25 Gram(s) IV Push once  dextrose 50% Injectable 25 Gram(s) IV Push once  fenofibrate Tablet 145 milliGRAM(s) Oral daily  FIRST- Mouthwash  BLM 30 milliLiter(s) Swish and Spit three times a day  glucagon  Injectable 1 milliGRAM(s) IntraMuscular once  influenza  Vaccine (HIGH DOSE) 0.5 milliLiter(s) IntraMuscular once  insulin lispro (ADMELOG) corrective regimen sliding scale   SubCutaneous at bedtime  insulin lispro (ADMELOG) corrective regimen sliding scale   SubCutaneous three times a day before meals  lactobacillus acidophilus 1 Tablet(s) Oral daily  lidocaine   4% Patch 1 Patch Transdermal every 24 hours  metroNIDAZOLE  IVPB 500 milliGRAM(s) IV Intermittent every 12 hours  morphine  Infusion. 1 mG/Hr (1 mL/Hr) IV Continuous <Continuous>  Nephro-nancy 1 Tablet(s) Oral daily  pantoprazole  Injectable 40 milliGRAM(s) IV Push daily  potassium chloride   Powder 40 milliEquivalent(s) Oral two times a day  predniSONE   Tablet 5 milliGRAM(s) Oral daily  sodium phosphate 15 milliMole(s)/250 mL IVPB 15 milliMole(s) IV Intermittent once    MEDICATIONS  (PRN):  acetaminophen     Tablet .. 650 milliGRAM(s) Oral every 6 hours PRN Temp greater or equal to 38C (100.4F), Mild Pain (1 - 3), Moderate Pain (4 - 6)  aluminum hydroxide/magnesium hydroxide/simethicone Suspension 30 milliLiter(s) Oral every 4 hours PRN Dyspepsia  artificial tears (preservative free) Ophthalmic Solution 1 Drop(s) Both EYES four times a day PRN Dry Eyes  dextrose Oral Gel 15 Gram(s) Oral once PRN Blood Glucose LESS THAN 70 milliGRAM(s)/deciliter  morphine  - Injectable 4 milliGRAM(s) IV Push every 3 hours PRN Severe Pain (7 - 10)  naloxone Injectable 0.1 milliGRAM(s) IV Push every 3 minutes PRN For ANY of the following changes in patient status:  A. RR LESS THAN 10 breaths per minute, B. Oxygen saturation LESS THAN 90%, C. Sedation score of 6  polyethylene glycol 3350 17 Gram(s) Oral daily PRN Constipation      ITEMS UNCHECKED ARE NOT PRESENT    PRESENT SYMPTOMS: [ ]Unable to self-report - see [ ] CPOT [ ] PAINADS [ ] RDOS  Source if other than patient:  [x ]Family   [ ]Team     Pain:  [ x]yes [ ]no  QOL impact - Limits mobility  Location - Lower back  Aggravating factors - Movement  Quality - Sharp  Radiation - non-radiating  Timing- Intermittent  Severity (0-10 scale): 8  Minimal acceptable level (0-10 scale): 0    CPOT:    https://www.[email or web address]/getattachment/[email or web address]/Critical-Care-Pain-Observation-Tool-(CPOT)    Dyspnea:                           [ ]Mild [ ]Moderate [ ]Severe  Anxiety:                             [ ]Mild [ ]Moderate [ ]Severe  Fatigue:                             [ ]Mild [ ]Moderate [ ]Severe  Nausea:                             [ ]Mild [ ]Moderate [ ]Severe  Loss of appetite:              [ ]Mild [ ]Moderate [ ]Severe  Constipation:                    [ ]Mild [ ]Moderate [ ]Severe  Other Symptoms:  [ x]All other review of systems negative     PCSSQ[Palliative Care Spiritual Screening Question]   Severity (0-10):  Score of 4 or > indicate consideration of Chaplaincy referral.  Chaplaincy Referral: [ ] yes [ ] refused [ ] following [x]     Caregiver [place]? : [ ] yes [ ] no  [x ] deferred:  Social work referral [ ] Patient & Family Centered Care Referral [ ]     Anticipatory Grief present?:  [ ] yes [ ] no  [x ] deferred: Social work referral [ ] Patient & Family Centered Care Referral [ ]    PHYSICAL EXAM:  Vital Signs Last 24 Hrs  T(C): 36.7 (28 Oct 2024 06:40), Max: 37.3 (27 Oct 2024 20:41)  T(F): 98 (28 Oct 2024 06:40), Max: 99.1 (27 Oct 2024 20:41)  HR: 100 (28 Oct 2024 06:40) (100 - 102)  BP: 92/62 (28 Oct 2024 06:40) (92/62 - 105/65)  BP(mean): --  RR: 18 (28 Oct 2024 06:40) (18 - 18)  SpO2: 100% (28 Oct 2024 06:40) (98% - 100%)    Parameters below as of 28 Oct 2024 06:40  Patient On (Oxygen Delivery Method): room air     I&O's Summary     GENERAL: [x ]Cachexia    [x ]Alert  [ ]Oriented x   [ ]Lethargic  [ ]Unarousable  [x ]Verbal  [ ]Non-Verbal  Behavioral:   [ ]Anxiety  [ ]Delirium [ ]Agitation [x ]Other- restraints in place   HEENT:  [x ]Normal   [ ]Dry mouth   [ ]ET Tube/Trach  [ ]Oral lesions  PULMONARY:   [x ]Clear [ ]Tachypnea  [ ]Audible excessive secretions   [ ]Rhonchi        [ ]Right [ ]Left [ ]Bilateral  [ ]Crackles        [ ]Right [ ]Left [ ]Bilateral  [ ]Wheezing     [ ]Right [ ]Left [ ]Bilateral  [ ]Diminished BS [ ] Right [ ]Left [ ]Bilateral  CARDIOVASCULAR:    [ ]Regular [ ]Irregular [x ]Tachy  [ ]Rodrick [ ]Murmur [ ]Other  GASTROINTESTINAL:  [x ]Soft  [ ]Distended   [x ]+BS  [ ]Non tender [ ]Tender  [ ]Other [ ]PEG [ ]OGT/ NGT   Last BM: 10/27  GENITOURINARY:  [ ]Normal [ ]Incontinent   [ ]Oliguria/Anuria   [x ]Farah  MUSCULOSKELETAL:   [ ]Normal   [x ]Weakness  [ ]Bed/Wheelchair bound [ ]Edema  NEUROLOGIC:   [ x]No focal deficits  [ ] Cognitive impairment  [ ] Dysphagia [ ]Dysarthria [ ] Paresis [ ]Other   SKIN:   [ ]Normal  [ ]Rash  [x ]Other- scratches on face   [ ]Pressure ulcer(s) [ ]y [ ]n present on admission    CRITICAL CARE:  [ ]Shock Present  [ ]Septic [ ]Cardiogenic [ ]Neurologic [ ]Hypovolemic  [ ]Vasopressors [ ]Inotropes  [ ]Respiratory failure present [ ]Mechanical Ventilation [ ]Non-invasive ventilatory support [ ]High-Flow   [ ]Acute  [ ]Chronic [ ]Hypoxic  [ ]Hypercarbic [ ]Other  [ ]Other organ failure     LABS:                        8.5    5.94  )-----------( 95       ( 28 Oct 2024 07:20 )             26.4   10-28    149[H]  |  116[H]  |  19  ----------------------------<  102[H]  3.1[L]   |  17[L]  |  0.48[L]    Ca    7.4[L]      28 Oct 2024 07:20  Phos  2.1     10-28  Mg     2.10     10-28        Urinalysis Basic - ( 28 Oct 2024 07:20 )    Color: x / Appearance: x / SG: x / pH: x  Gluc: 102 mg/dL / Ketone: x  / Bili: x / Urobili: x   Blood: x / Protein: x / Nitrite: x   Leuk Esterase: x / RBC: x / WBC x   Sq Epi: x / Non Sq Epi: x / Bacteria: x      RADIOLOGY & ADDITIONAL STUDIES: no new     Protein Calorie Malnutrition Present: [ ]mild [ ]moderate [ ]severe [ ]underweight [ ]morbid obesity  https://www.andeal.org/vault/2440/web/files/ONC/Table_Clinical%20Characteristics%20to%20Document%20Malnutrition-White%20JV%20et%20al%692222.pdf    Height (cm): 172.7 (10-12-24 @ 09:21)  Weight (kg): 69.4 (10-12-24 @ 09:21)  BMI (kg/m2): 23.3 (10-12-24 @ 09:21)    [ ]PPSV2 < or = 30%  [ ]significant weight loss [ ]poor nutritional intake [ ]anasarca[ ]Artificial Nutrition    Other REFERRALS:  [ ]Hospice  [ ]Child Life  [ ]Social Work  [ ]Case management [ ]Holistic Therapy

## 2024-10-28 NOTE — PROGRESS NOTE ADULT - ASSESSMENT
64-year-old male with metastatic prostate cancer, sent in by hematologist from [facility] and cancer for uncontrolled pain, nausea, vomiting.   Patient reports diffuse pain starting 6 months ago significantly worsening for the past 2 weeks. Diagnosed with high risk high volume metastatic prostate cancer with bone, liver lymph node mets and presacral mass. Liver biopsy confirmed disease with . Started lupron, loly/pred with plans to initiate taxotere.   nephrology consulted for electrolyte abnormalities     Hypernatremia  Poor free water intake    pt now has diet  Na worsening, start D5W @75cc/hr x 15 hrs  goal approximately 10 meq over 24 hours  monitor    Hypokalemia  Replete as needed  monitor K     Acidosis   Likely from Normal saline causing hyperchloremic acidosis  Will monitor  Can send ABG to assess acidosis     hypophosphatemia  replete as needed  monitor    hypocalcemia   sec to low albumin  corrected ca 8.5  monitor    fever  GNR bacteremia   work up and tx per team    anemia  metastatic prostate cancer  f/u heme/onc

## 2024-10-28 NOTE — PROVIDER CONTACT NOTE (OTHER) - SITUATION
pt. removed IV, is extremely agitated, removing tele monitor and , pushing people away and saying he wants to go home

## 2024-10-28 NOTE — PROGRESS NOTE ADULT - SUBJECTIVE AND OBJECTIVE BOX
[facility]   MD MARKO EASTMAN MD MARIA SANTIAGO, NP    TEL:  OFFICE: [phone]  From 5pm-7am Answering Service [phone]    -- RENAL FOLLOW UP NOTE ---Date of Service 10-28-24 @ 13:07    Patient is a 64y old  Male who presents with a chief complaint of Pain       Patient seen and examined at bedside. No chest pain/sob    VITALS:  T(F): 98 (10-28-24 @ 06:40), Max: 99.1 (10-27-24 @ 20:41)  HR: 100 (10-28-24 @ 06:40)  BP: 92/62 (10-28-24 @ 06:40)  RR: 18 (10-28-24 @ 06:40)  SpO2: 100% (10-28-24 @ 06:40)  Wt(kg): --        PHYSICAL EXAM:  General: NAD  Neck: No JVD  Respiratory: CTAB, no wheezes, rales or rhonchi  Cardiovascular: S1, S2, RRR  Gastrointestinal: BS+, soft, NT/ND  Extremities: No peripheral edema    Hospital Medications:   MEDICATIONS  (STANDING):  abiraterone 500 mg tablet 2 Tablet(s) 2 Tablet(s) Oral daily  atorvastatin 20 milliGRAM(s) Oral at bedtime  cefTRIAXone   IVPB 2000 milliGRAM(s) IV Intermittent every 24 hours  chlorhexidine 2% Cloths 1 Application(s) Topical daily  dextrose 5%. 1000 milliLiter(s) (100 mL/Hr) IV Continuous <Continuous>  dextrose 5%. 1000 milliLiter(s) (75 mL/Hr) IV Continuous <Continuous>  dextrose 5%. 1000 milliLiter(s) (50 mL/Hr) IV Continuous <Continuous>  dextrose 50% Injectable 25 Gram(s) IV Push once  dextrose 50% Injectable 25 Gram(s) IV Push once  dextrose 50% Injectable 12.5 Gram(s) IV Push once  fenofibrate Tablet 145 milliGRAM(s) Oral daily  FIRST- Mouthwash  BLM 30 milliLiter(s) Swish and Spit three times a day  glucagon  Injectable 1 milliGRAM(s) IntraMuscular once  influenza  Vaccine (HIGH DOSE) 0.5 milliLiter(s) IntraMuscular once  insulin lispro (ADMELOG) corrective regimen sliding scale   SubCutaneous three times a day before meals  insulin lispro (ADMELOG) corrective regimen sliding scale   SubCutaneous at bedtime  lactobacillus acidophilus 1 Tablet(s) Oral daily  lidocaine   4% Patch 1 Patch Transdermal every 24 hours  metroNIDAZOLE  IVPB 500 milliGRAM(s) IV Intermittent every 12 hours  morphine  Infusion. 1 mG/Hr (1 mL/Hr) IV Continuous <Continuous>  Nephro-nancy 1 Tablet(s) Oral daily  pantoprazole  Injectable 40 milliGRAM(s) IV Push daily  potassium chloride   Powder 40 milliEquivalent(s) Oral two times a day  predniSONE   Tablet 5 milliGRAM(s) Oral daily  sodium phosphate 15 milliMole(s)/250 mL IVPB 15 milliMole(s) IV Intermittent once      LABS:  10-28    149[H]  |  116[H]  |  19  ----------------------------<  102[H]  3.1[L]   |  17[L]  |  0.48[L]    Ca    7.4[L]      28 Oct 2024 07:20  Phos  2.1     10-28  Mg     2.10     10-28      Creatinine Trend: 0.48 <--, 0.39 <--, 0.49 <--, 0.51 <--, 0.57 <--, 0.61 <--, 0.68 <--, 0.74 <--, 0.64 <--, 0.71 <--, 0.81 <--, 0.94 <--, 0.93 <--, 0.94 <--, 0.85 <--    Phosphorus: 2.1 mg/dL (10-28 @ 07:20)                              8.5    5.94  )-----------( 95       ( 28 Oct 2024 07:20 )             26.4     Urine Studies:  Urinalysis - [10-28-24 @ 07:20]      Color  / Appearance  / SG  / pH       Gluc 102 / Ketone   / Bili  / Urobili        Blood  / Protein  / Leuk Est  / Nitrite       RBC  / WBC  / Hyaline  / Gran  / Sq Epi  / Non Sq Epi  / Bacteria       TSH 2.62      [10-13-24 @ 05:30]  Lipid: chol 135, , HDL 39, LDL --      [10-13-24 @ 05:30]        RADIOLOGY & ADDITIONAL STUDIES:

## 2024-10-28 NOTE — PROGRESS NOTE ADULT - SUBJECTIVE AND OBJECTIVE BOX
Date of Service: 10-28-24 @ 10:49    Patient is a 64y old  Male who presents with a chief complaint of Pain (28 Oct 2024 10:19)      Any change in ROS: seems to be doing  ok : no sob     MEDICATIONS  (STANDING):  abiraterone 500 mg tablet 2 Tablet(s) 2 Tablet(s) Oral daily  atorvastatin 20 milliGRAM(s) Oral at bedtime  cefTRIAXone   IVPB 2000 milliGRAM(s) IV Intermittent every 24 hours  chlorhexidine 2% Cloths 1 Application(s) Topical daily  dextrose 5%. 1000 milliLiter(s) (50 mL/Hr) IV Continuous <Continuous>  dextrose 5%. 1000 milliLiter(s) (100 mL/Hr) IV Continuous <Continuous>  dextrose 50% Injectable 25 Gram(s) IV Push once  dextrose 50% Injectable 25 Gram(s) IV Push once  dextrose 50% Injectable 12.5 Gram(s) IV Push once  fenofibrate Tablet 145 milliGRAM(s) Oral daily  glucagon  Injectable 1 milliGRAM(s) IntraMuscular once  influenza  Vaccine (HIGH DOSE) 0.5 milliLiter(s) IntraMuscular once  insulin lispro (ADMELOG) corrective regimen sliding scale   SubCutaneous three times a day before meals  insulin lispro (ADMELOG) corrective regimen sliding scale   SubCutaneous at bedtime  lactobacillus acidophilus 1 Tablet(s) Oral daily  lidocaine   4% Patch 1 Patch Transdermal every 24 hours  metroNIDAZOLE  IVPB 500 milliGRAM(s) IV Intermittent every 12 hours  morphine  Infusion. 1 mG/Hr (1 mL/Hr) IV Continuous <Continuous>  pantoprazole  Injectable 40 milliGRAM(s) IV Push daily  potassium chloride   Powder 40 milliEquivalent(s) Oral two times a day  predniSONE   Tablet 5 milliGRAM(s) Oral daily  sodium chloride 0.45%. 1000 milliLiter(s) (75 mL/Hr) IV Continuous <Continuous>  sodium phosphate 15 milliMole(s)/250 mL IVPB 15 milliMole(s) IV Intermittent once    MEDICATIONS  (PRN):  acetaminophen     Tablet .. 650 milliGRAM(s) Oral every 6 hours PRN Temp greater or equal to 38C (100.4F), Mild Pain (1 - 3), Moderate Pain (4 - 6)  aluminum hydroxide/magnesium hydroxide/simethicone Suspension 30 milliLiter(s) Oral every 4 hours PRN Dyspepsia  artificial tears (preservative free) Ophthalmic Solution 1 Drop(s) Both EYES four times a day PRN Dry Eyes  dextrose Oral Gel 15 Gram(s) Oral once PRN Blood Glucose LESS THAN 70 milliGRAM(s)/deciliter  morphine  - Injectable 4 milliGRAM(s) IV Push every 3 hours PRN Severe Pain (7 - 10)  naloxone Injectable 0.1 milliGRAM(s) IV Push every 3 minutes PRN For ANY of the following changes in patient status:  A. RR LESS THAN 10 breaths per minute, B. Oxygen saturation LESS THAN 90%, C. Sedation score of 6  polyethylene glycol 3350 17 Gram(s) Oral daily PRN Constipation    Vital Signs Last 24 Hrs  T(C): 36.7 (28 Oct 2024 06:40), Max: 37.3 (27 Oct 2024 20:41)  T(F): 98 (28 Oct 2024 06:40), Max: 99.1 (27 Oct 2024 20:41)  HR: 100 (28 Oct 2024 06:40) (81 - 102)  BP: 92/62 (28 Oct 2024 06:40) (92/62 - 115/71)  BP(mean): --  RR: 18 (28 Oct 2024 06:40) (18 - 18)  SpO2: 100% (28 Oct 2024 06:40) (98% - 100%)    Parameters below as of 28 Oct 2024 06:40  Patient On (Oxygen Delivery Method): room air        I&O's Summary        Physical Exam:   GENERAL: NAD, well-groomed, well-developed  HEENT: CHAVA/   Atraumatic, Normocephalic  ENMT: No tonsillar erythema, exudates, or enlargement; Moist mucous membranes, Good dentition, No lesions  NECK: Supple, No JVD, Normal thyroid  CHEST/LUNG: Clear to auscultaion  CVS: Regular rate and rhythm; No murmurs, rubs, or gallops  GI: : Soft, Nontender, Nondistended; Bowel sounds present  NERVOUS SYSTEM:  Alert & awake x 0  EXTREMITIES:  - edema  LYMPH: No lymphadenopathy noted  SKIN: No rashes or lesions  ENDOCRINOLOGY: No Thyromegaly  PSYCH: confused     Labs:  20, 20                            8.5    5.94  )-----------( 95       ( 28 Oct 2024 07:20 )             26.4                         8.3    6.94  )-----------( 78       ( 27 Oct 2024 06:44 )             25.7                         8.6    9.37  )-----------( 42       ( 26 Oct 2024 03:40 )             27.1                         8.4    8.77  )-----------( 49       ( 25 Oct 2024 05:30 )             25.8     10-28    149[H]  |  116[H]  |  19  ----------------------------<  102[H]  3.1[L]   |  17[L]  |  0.48[L]  10-27    146[H]  |  119[H]  |  20  ----------------------------<  89  3.3[L]   |  18[L]  |  0.39[L]  10-26    144  |  115[H]  |  28[H]  ----------------------------<  141[H]  4.1   |  16[L]  |  0.49[L]  10-25    146[H]  |  115[H]  |  33[H]  ----------------------------<  181[H]  4.1   |  20[L]  |  0.51  10-25    150[H]  |  118[H]  |  33[H]  ----------------------------<  179[H]  4.4   |  19[L]  |  0.57    Ca    7.4[L]      28 Oct 2024 07:20  Ca    7.2[L]      27 Oct 2024 06:44  Phos  2.1     10-28  Phos  2.4     10-27  Mg     2.10     10-28  Mg     2.20     10-27    TPro  4.6[L]  /  Alb  2.3[L]  /  TBili  0.9  /  DBili  0.3  /  AST  52[H]  /  ALT  15  /  AlkPhos  83  10-25    CAPILLARY BLOOD GLUCOSE      POCT Blood Glucose.: 99 mg/dL (28 Oct 2024 09:23)  POCT Blood Glucose.: 103 mg/dL (28 Oct 2024 09:05)  POCT Blood Glucose.: 113 mg/dL (27 Oct 2024 22:18)  POCT Blood Glucose.: 138 mg/dL (27 Oct 2024 17:39)  POCT Blood Glucose.: 166 mg/dL (27 Oct 2024 12:49)          Urinalysis Basic - ( 28 Oct 2024 07:20 )    Color: x / Appearance: x / SG: x / pH: x  Gluc: 102 mg/dL / Ketone: x  / Bili: x / Urobili: x   Blood: x / Protein: x / Nitrite: x   Leuk Esterase: x / RBC: x / WBC x   Sq Epi: x / Non Sq Epi: x / Bacteria: x            RECENT CULTURES:  10-24 @ 01:55 .Blood BLOOD       rad< from: Xray Chest 1 View- PORTABLE-Urgent (Xray Chest 1 View- PORTABLE-Urgent .) (10.20.24 @ 11:02) >    ACC: [id number] EXAM:  XR CHEST PORTABLE URGENT 1V   ORDERED BY: AYANNA CEDENO     PROCEDURE DATE:  10/20/2024          INTERPRETATION:  EXAMINATION: XR CHEST URGENT    CLINICAL INDICATION: Bacteremia. Evaluate for pneumonia.    TECHNIQUE: Single frontal view of the chest was obtained.    COMPARISON: Chest x-ray 10/17/2024.    FINDINGS:  No focal consolidation, large pleural effusion or pneumothorax.  The heart size cannot be accurately assessed on this projection.  No acute osseous abnormalities.      IMPRESSION:  No focal consolidations.    --- End of Report ---          PRABHA MONET MD; Resident Radiologist  This document has been electronically signed.  LEA COLE MD; Attending Interventional Radiologist  This document has been electronically signed. Oct 20 2024 11:53AM    < end of copied text >  < from: CT Angio Abdomen and Pelvis w/ IV Cont (10.19.24 @ 18:39) >  fractures at L1 and L5 also seen in previous study.    IMPRESSION:  Dissection of the SMA as above.    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    Findings were discussed with Albino Matthews NP by Joe Carey M.D. on     < end of copied text >           No growth at 4 days    10-24 @ 01:42 .Blood BLOOD                No growth at 4 days    10-22 @ 15:09 Clean Catch Clean Catch (Midstream)                <10,000 CFU/mL Normal Urogenital Sherlyn    10-22 @ 06:05 .Blood BLOOD   ARJUN    Growth in aerobic bottle: Gram Negative Rods    Escherichia coli  Escherichia coli     Growth in aerobic bottle: Escherichia coli    10-21 @ 20:05 .Blood BLOOD                No growth at 5 days    10-21 @ 17:33 .Blood BLOOD                No growth at 5 days          RESPIRATORY CULTURES:          Studies  Chest X-RAY  CT SCAN Chest   Venous Dopplers: LE:   CT Abdomen  Others

## 2024-10-28 NOTE — PROGRESS NOTE ADULT - PROBLEM SELECTOR PLAN 10
In the event of newly developing, evolving, or worsening symptoms, please contact the Palliative Medicine team via pager (if the patient is at [facility] #[phone] or if the patient is at [facility] #[phone]) The Geriatric and Palliative Medicine service has coverage 24 hours a day/ 7 days a week to provide medical recommendations regarding symptom management needs via telephone.

## 2024-10-28 NOTE — PROGRESS NOTE ADULT - SUBJECTIVE AND OBJECTIVE BOX
Infectious Diseases Follow Up:    Patient is a 64y old  Male who presents with a chief complaint of Pain (27 Oct 2024 14:14)      Interval History/ROS:  Pt remains confused, +back pain, denies abdominal pain     Allergies  No Known Allergies        ANTIMICROBIALS:  cefTRIAXone   IVPB 2000 every 24 hours  metroNIDAZOLE  IVPB 500 every 12 hours      Current Abx:     Previous Abx     OTHER MEDS:  MEDICATIONS  (STANDING):  acetaminophen     Tablet .. 650 every 6 hours PRN  aluminum hydroxide/magnesium hydroxide/simethicone Suspension 30 every 4 hours PRN  atorvastatin 20 at bedtime  dextrose 50% Injectable 25 once  dextrose 50% Injectable 12.5 once  dextrose 50% Injectable 25 once  dextrose Oral Gel 15 once PRN  fenofibrate Tablet 145 daily  glucagon  Injectable 1 once  influenza  Vaccine (HIGH DOSE) 0.5 once  insulin lispro (ADMELOG) corrective regimen sliding scale  at bedtime  insulin lispro (ADMELOG) corrective regimen sliding scale  three times a day before meals  morphine  - Injectable 4 every 3 hours PRN  morphine  Infusion. 1 <Continuous>  pantoprazole  Injectable 40 daily  polyethylene glycol 3350 17 daily PRN  predniSONE   Tablet 5 daily      Vital Signs Last 24 Hrs  T(C): 36.7 (28 Oct 2024 06:40), Max: 37.3 (27 Oct 2024 20:41)  T(F): 98 (28 Oct 2024 06:40), Max: 99.1 (27 Oct 2024 20:41)  HR: 100 (28 Oct 2024 06:40) (81 - 102)  BP: 92/62 (28 Oct 2024 06:40) (92/62 - 115/71)  BP(mean): --  RR: 18 (28 Oct 2024 06:40) (18 - 18)  SpO2: 100% (28 Oct 2024 06:40) (98% - 100%)    Parameters below as of 28 Oct 2024 06:40  Patient On (Oxygen Delivery Method): room air      PHYSICAL EXAM:  GENERAL: NAD, well-developed  HEAD:  Atraumatic, Normocephalic  EYES: EOMI, conjunctiva and sclera clear  CHEST/LUNG: Clear to auscultation bilaterally; No wheeze  HEART: Regular rate and rhythm; No murmurs, rubs, or gallops  ABDOMEN: Soft, Nontender, Nondistended; Bowel sounds present  PSYCH: Calm, but still confused                           8.5    5.94  )-----------( 95       ( 28 Oct 2024 07:20 )             26.4       10-28    149[H]  |  116[H]  |  19  ----------------------------<  102[H]  3.1[L]   |  17[L]  |  0.48[L]    Ca    7.4[L]      28 Oct 2024 07:20  Phos  2.1     10-28  Mg     2.10     10-28        Urinalysis Basic - ( 28 Oct 2024 07:20 )    Color: x / Appearance: x / SG: x / pH: x  Gluc: 102 mg/dL / Ketone: x  / Bili: x / Urobili: x   Blood: x / Protein: x / Nitrite: x   Leuk Esterase: x / RBC: x / WBC x   Sq Epi: x / Non Sq Epi: x / Bacteria: x        MICROBIOLOGY:  v  .Blood BLOOD  10-24-24   No growth at 4 days  --  --      .Blood BLOOD  10-24-24   No growth at 4 days  --  --      Clean Catch Clean Catch (Midstream)  10-22-24   <10,000 CFU/mL Normal Urogenital Sherlyn  --  --      .Blood BLOOD  10-22-24   Growth in aerobic bottle: Escherichia coli  --  Escherichia coli      .Blood BLOOD  10-21-24   No growth at 5 days  --  --      .Blood BLOOD  10-21-24   No growth at 5 days  --  --      .Blood BLOOD  10-19-24   Growth in aerobic and anaerobic bottles: Escherichia coli  See previous culture [id number]  --    Growth in aerobic bottle: Gram Negative Rods  Growth in anaerobic bottle: Gram Negative Rods      .Blood BLOOD  10-19-24   Growth in aerobic and anaerobic bottles: Escherichia coli  See previous culture [id number]  --    Growth in anaerobic bottle: Gram Negative Rods  Growth in aerobic bottle: Gram Negative Rods      .Blood BLOOD  10-19-24   Growth in aerobic and anaerobic bottles: Escherichia coli  See previous culture [id number]  --    Growth in aerobic and anaerobic bottles: Gram Negative Rods and Gram  Negative Coccobacilli      .Blood BLOOD  10-19-24   Growth in aerobic and anaerobic bottles: Escherichia coli  Direct identification is available within approximately 3-5  hours either by Blood Panel Multiplexed PCR or Direct  MALDI-TOF. Details: [email or web address]  --  Blood Culture PCR  Escherichia coli                RADIOLOGY:

## 2024-10-28 NOTE — PROGRESS NOTE ADULT - PROBLEM SELECTOR PLAN 5
CTa/p: Dissection of the SMA. Presacral mass with periaortic lymphadenopathy and lytic vertebral metastases with pathologic fracture at L1 and L5 are again seen.  > Vascular surgery recs- No vascular surgery interventions   > Unable to start heparin as patient with blood in stool and with thrombocytopenia   > Appreciate ID recs- pt found to have E.coli bacteremia and H.influenza bacteremia. Repeat blood cultures NGTD   > NPO but advance diet as tolerated per medical team- f/u repeat S&S

## 2024-10-28 NOTE — PROGRESS NOTE ADULT - PROBLEM SELECTOR PLAN 3
Patient seen with wife at bedside, off restraints at time of visit but overnight he was very agitated, pulling out IV access.   - Behavioral health recs appreciated: Zyprexa discontinued per son's request. Haldol prn per psych recs. hold med if QTC > 500  - EKG performed QTc 436 on 1017

## 2024-10-28 NOTE — PROGRESS NOTE ADULT - NS ATTEND AMEND GEN_ALL_CORE FT
Delirium, pending psych evaluation  Advise repeating EKG to determine QTc interval and guide medication management   Magic mouthwash started for oral ulcers  Remains on antibiotics

## 2024-10-28 NOTE — PROGRESS NOTE ADULT - SUBJECTIVE AND OBJECTIVE BOX
Patient is a 64y old  Male who presents with a chief complaint of Pain (28 Oct 2024 14:44)    Date of servie : 10-28-24 @ 18:21  INTERVAL HPI/OVERNIGHT EVENTS:  T(C): 36.7 (10-28-24 @ 06:40), Max: 37.3 (10-27-24 @ 20:41)  HR: 100 (10-28-24 @ 06:40) (100 - 102)  BP: 92/62 (10-28-24 @ 06:40) (92/62 - 105/65)  RR: 18 (10-28-24 @ 06:40) (18 - 18)  SpO2: 100% (10-28-24 @ 06:40) (98% - 100%)  Wt(kg): --  I&O's Summary      LABS:                        8.5    5.94  )-----------( 95       ( 28 Oct 2024 07:20 )             26.4     10-28    149[H]  |  116[H]  |  19  ----------------------------<  102[H]  3.1[L]   |  17[L]  |  0.48[L]    Ca    7.4[L]      28 Oct 2024 07:20  Phos  2.1     10-28  Mg     2.10     10-28        Urinalysis Basic - ( 28 Oct 2024 07:20 )    Color: x / Appearance: x / SG: x / pH: x  Gluc: 102 mg/dL / Ketone: x  / Bili: x / Urobili: x   Blood: x / Protein: x / Nitrite: x   Leuk Esterase: x / RBC: x / WBC x   Sq Epi: x / Non Sq Epi: x / Bacteria: x      CAPILLARY BLOOD GLUCOSE      POCT Blood Glucose.: 147 mg/dL (28 Oct 2024 18:03)  POCT Blood Glucose.: 107 mg/dL (28 Oct 2024 12:21)  POCT Blood Glucose.: 99 mg/dL (28 Oct 2024 09:23)  POCT Blood Glucose.: 103 mg/dL (28 Oct 2024 09:05)  POCT Blood Glucose.: 113 mg/dL (27 Oct 2024 22:18)    ABG - ( 28 Oct 2024 15:54 )  pH, Arterial: 7.46  pH, Blood: x     /  pCO2: 27    /  pO2: 148   / HCO3: 19    / Base Excess: -4.1  /  SaO2: 100.0               Urinalysis Basic - ( 28 Oct 2024 07:20 )    Color: x / Appearance: x / SG: x / pH: x  Gluc: 102 mg/dL / Ketone: x  / Bili: x / Urobili: x   Blood: x / Protein: x / Nitrite: x   Leuk Esterase: x / RBC: x / WBC x   Sq Epi: x / Non Sq Epi: x / Bacteria: x        MEDICATIONS  (STANDING):  abiraterone 500 mg tablet 2 Tablet(s) 2 Tablet(s) Oral daily  atorvastatin 20 milliGRAM(s) Oral at bedtime  cefTRIAXone   IVPB 2000 milliGRAM(s) IV Intermittent every 24 hours  chlorhexidine 2% Cloths 1 Application(s) Topical daily  dextrose 5%. 1000 milliLiter(s) (50 mL/Hr) IV Continuous <Continuous>  dextrose 5%. 1000 milliLiter(s) (100 mL/Hr) IV Continuous <Continuous>  dextrose 5%. 1000 milliLiter(s) (75 mL/Hr) IV Continuous <Continuous>  dextrose 50% Injectable 25 Gram(s) IV Push once  dextrose 50% Injectable 12.5 Gram(s) IV Push once  dextrose 50% Injectable 25 Gram(s) IV Push once  fenofibrate Tablet 145 milliGRAM(s) Oral daily  FIRST- Mouthwash  BLM 30 milliLiter(s) Swish and Spit three times a day  glucagon  Injectable 1 milliGRAM(s) IntraMuscular once  influenza  Vaccine (HIGH DOSE) 0.5 milliLiter(s) IntraMuscular once  insulin lispro (ADMELOG) corrective regimen sliding scale   SubCutaneous at bedtime  insulin lispro (ADMELOG) corrective regimen sliding scale   SubCutaneous three times a day before meals  lactobacillus acidophilus 1 Tablet(s) Oral daily  lidocaine   4% Patch 1 Patch Transdermal every 24 hours  metroNIDAZOLE  IVPB 500 milliGRAM(s) IV Intermittent every 12 hours  morphine  Infusion. 1 mG/Hr (1 mL/Hr) IV Continuous <Continuous>  multivitamin 1 Tablet(s) Oral daily  Nephro-nancy 1 Tablet(s) Oral daily  pantoprazole  Injectable 40 milliGRAM(s) IV Push daily  potassium chloride  10 mEq/100 mL IVPB 10 milliEquivalent(s) IV Intermittent every 1 hour  predniSONE   Tablet 5 milliGRAM(s) Oral daily    MEDICATIONS  (PRN):  acetaminophen     Tablet .. 650 milliGRAM(s) Oral every 6 hours PRN Temp greater or equal to 38C (100.4F), Mild Pain (1 - 3), Moderate Pain (4 - 6)  aluminum hydroxide/magnesium hydroxide/simethicone Suspension 30 milliLiter(s) Oral every 4 hours PRN Dyspepsia  artificial tears (preservative free) Ophthalmic Solution 1 Drop(s) Both EYES four times a day PRN Dry Eyes  dextrose Oral Gel 15 Gram(s) Oral once PRN Blood Glucose LESS THAN 70 milliGRAM(s)/deciliter  morphine  - Injectable 4 milliGRAM(s) IV Push every 3 hours PRN Severe Pain (7 - 10)  naloxone Injectable 0.1 milliGRAM(s) IV Push every 3 minutes PRN For ANY of the following changes in patient status:  A. RR LESS THAN 10 breaths per minute, B. Oxygen saturation LESS THAN 90%, C. Sedation score of 6  polyethylene glycol 3350 17 Gram(s) Oral daily PRN Constipation          PHYSICAL EXAM:  GENERAL: frail  CHEST/LUNG: coarse breath sounds +  HEART:  S1S2+  ABDOMEN: Soft, Nontender, Nondistended; Bowel sounds present  EXTREMITIES:  edema +    Care Discussed with Consultants/Other Providers [x ] YES  [ ] NO

## 2024-10-28 NOTE — PROGRESS NOTE ADULT - PROBLEM SELECTOR PLAN 6
Improved- Likely related to chemotherapy treatment  - hematology-oncology following: on daily zarxio  - Appreciate ID recs- on Rocephin 2g q24 hours and Flagyl 500mg bid x 2weeks (11/6/24 last day)   - Patient s/p 1u PRBCs (10/21); 3u Plts (10/20, 10/21, 10/22)   - Patient febrile - tylenol prn

## 2024-10-28 NOTE — PROGRESS NOTE ADULT - ASSESSMENT
64-year-old male with metastatic prostate cancer, sent in by hematologist from [facility] for uncontrolled pain, nausea, vomiting.   Patient reports diffuse pain starting 6 months ago significantly worsening for the past 2 weeks.  Currently the worst pain is located around the lower back.   No loss of bladder and bowel function, no saddle paresthesia.  Able to walk.  patient is oxycodone 5 every 4-6 hour.  Yesterday they started adding OxyContin 10.  However patient continued to have pain.  Family also reports significant nausea and vomiting, inability to tolerate p.o. for the last 2 days.  Last bowel movement 2 days ago.   No known allergy.  Diagnosed with high risk high volume metastatic prostate cancer with bone, liver lymph node mets and presacral mass. Liver biopsy confirmed disease with . Started lupron, loly/pred with plans to initiate taxotere.    (12 Oct 2024 15:40)  pt seems very frustrated:   he is on 2 L of oxygen : he has no underlying lung disease:  but he is requiring 2 L of oxygen      Hypoxic resp failure  Metastatic prostate cancer  Back pain     Hypoxic resp failure  -He has no underlying lung disease:  but he is requiring 2 L of oxygen :   -CTA showed; No pulmonary embolism to the level of the segmental branches bilaterally. Limited evaluation of the subsegmental branches secondary to incomplete contrast opacification.  Multilevel vertebral body lytic lesions and loss of vertebral body height, better evaluated on CT thoracic spine 10/17/2024. Metastatic prostate cancer  -Patent central airways. Bibasilar atelectasis. No pleural effusion. MEDIASTINUM AND KATTY: No lymphadenopathy.  PULMONARY ANGIOGRAM: No pulmonary embolism to the level of the segmental   branches bilaterally. Limited evaluation of the subsegmental branches secondary to incomplete contrast opacification.    10/19:  -seems pretty tired;  on 2 L of oxygen :  -cta chest showed: No pulmonary embolism to the level of the segmental branches bilaterally. Limited evaluation of the subsegmental branches secondary to incomplete   contrast opacification. Multilevel vertebral body lytic lesions and loss of vertebral body height, better evaluated on CT thoracic spine 10/17/2024.  -still with fever:  no pe  on zosyn  and leukopenic hemonc following;  has metastatic prostate ca:   -VBG seems OK:     10/20:  pulm wise he seems to be stable:   -using 2 L of oxygen    -yesterdays VBG with minimal met acidosis  -cta again noted    10/21:  on 4 L of oxygen  today    cxr is size    e coli sepsis: Gram Negative Rods and Gram Negative Coccobacilli    10/22: heis doing poorly today  : he is very agitated and uncomfortable  on mittens: ? sun downing   10/23: quiet today  : sleeping:  oxygen requirement has not increased: on rooo ha 95% as documented    WBC is slowly increasing:   no fever:   -on antibiotics   10/24: pt is not doing well : his repeat blood cultures are positive with same organism :  would defer to ID;   10/25: seems to be doing  ok ; no sob:  no cough :  no phlegm   : on room air   10/26: resp failure has resolved:  is septic  : on ceftriaxone     10/27:  he seems OK:  he is on room air:  on morphine drip   remans on ceftriaxone still     10/28: she seems to be doing  ok : no sob: no cough ; no phlegm  confused:  on m orphine drip        New finding:  SMA dissection : neutropenic colitis/ #E Coli and H influenzae bacteremia/ #Neutropenic fever  -/f ischemic colitis on R colon   Diagnosed  on ct abd:  defer to surgery and primary team:  probably no intervention:   -cont antibiotics  -not doing very well with above new findings    10/22; no intervention per surgery    -cont antibiotics  -has fever:  ID following :  -Last chest xray on 20th  ; normal     id following   10/23  IR was consulted for vertebral augmentation of T3-T5 prior to XRT.   Patient was seen bedside. Initially, patient kept requesting no exam and was not willing to participate in the discussion. Son was bedside at time of conversation.   Per discussion with the medical attending, given the concern for SMA dissection and concern for bowel ischemia, will hold off on vertebral augmentation evaluation at this time.   Please reach out to IR when patient is medically stable for vertebral augmentation.  10/24:  remains septic:  above cancelled:   ? for palliative care now  10/26: he seems same to me:  no overnight events  on room air:   check VBG  : cont antibiotics   hemonc following   10/27:  -still on antibiotics for e coli sepsis  -id following s    Back pain   -likely due to metastatic disease:  adm here for severe pain :  -pain control per primary team   -venous ph is ok     dw acp & family ; GOC as per primary  team

## 2024-10-28 NOTE — CHART NOTE - NSCHARTNOTEFT_GEN_A_CORE
SOURCE: [] Patient [] Medical record [] RN/PCA [] Family/Caregiver []Patient unavailable []Patient inappropriate (disoriented, nonverbal, intubated/sedated) [] Other:   Diet rx: Regular: Pureed (PUREED)  Mildly Thick Liquids (MILDTHICKLIQS) (10-25-24 @ 11:01) [Active]    Medical Course: 65 y/o male, with a PmHx of Metastatic Prostate Cancer (s/p radical prostatectomy), HLD, HTN, sent in by hematologist from [facility] for uncontrolled pain, nausea, vomiting.     Nutrition Course: At time of visit, Pt awake, alert but weak. Pt's appetite remains inadequate per spouse at bed side. No nausea, vomiting or diarrhea @ this time. Food preferences discussed.     Pertinent Medications: Protonix, Lipitor, Miralax, Lactobacillus Acidophilus, Insulin (Lispro),   Pertinent Labs:                       8.5    5.94  )-----------( 95       ( 28 Oct 2024 07:20 )             26.4   10-28    149[H]  |  116[H]  |  19  ----------------------------<  102[H]  3.1[L]   |  17[L]  |  0.48[L]    Ca    7.4[L]      28 Oct 2024 07:20  Phos  2.1     10-28  Mg     2.10     10-28  (10/23) HbA1c 6.2% H   (10/13) Triglycerides 217 H, HDL 39 L   CAPILLARY BLOOD GLUCOSE  POCT Blood Glucose.: 107 mg/dL (28 Oct 2024 12:21)  POCT Blood Glucose.: 99 mg/dL (28 Oct 2024 09:23)  POCT Blood Glucose.: 103 mg/dL (28 Oct 2024 09:05)  POCT Blood Glucose.: 113 mg/dL (27 Oct 2024 22:18)  POCT Blood Glucose.: 138 mg/dL (27 Oct 2024 17:39)     Pt's height: 68" (10/12)  Pt's weights: 152.7#69.3 kg (10/16), 153#/69.4 Kg (10/12)  weight assessment:   IBW: 154#/-10%   BMI: 23.26 kg/m2      Physical assessment (per flow-sheets): Edema/Pressure Injury: none reported at present      Estimated Needs: [X] No change since previous assessment, based on dosing weight  lbs / kg   kcal daily @ kcal/kg,  gm protein daily @ gm/kg      Previous Nutrition Diagnosis: [x] Malnutrition, severe   Nutrition Diagnosis is [x] ongoing   New Nutrition Diagnosis: [x] not applicable      Education: [x] better food options discussed with Pt's spouse     Interventions:    1. Encourage & assist Pt with meals; Monitor PO diet tolerance; Honor food preferences;   2. Add Multivitamins 1 tab daily for micronutrient coverage;   3. Monitor & Evaluate: PO intake, tolerance to diet/supplement, nutrition related lab values, weight trends, BMs/GI distress, hydration status, skin integrity SOURCE: [x] Patient [x] Medical record [x] Family: Spouse at bed side    Diet rx: Regular: Pureed (PUREED)  Mildly Thick Liquids (MILDTHICKLIQS) (10-25-24 @ 11:01) [Active]    Medical Course: Pt 65 yo male with a PMHx of Metastatic Prostate Cancer (s/p radical prostatectomy), HLD, HTN, sent in by hematologist from [facility] for uncontrolled pain, nausea, vomiting - per chart review.     Nutrition Course: At time of visit, Pt awake, appears weak. Pt's appetite remains inadequate per spouse at bed side. S/P Swallow Bedside Assessment Adult, SLP rec: Purred with mildly thick liquid (10/25). Per spouse, Pt does not like thickened liquid. Food preferences discussed with spouse. No report of nausea, vomiting @ this time. +Loose BM/fecal incontinence, per spouse. Of note Pt DNR/DNI.     Pertinent Medications: Protonix, Lipitor, Miralax, Lactobacillus Acidophilus, Insulin (Lispro),     Pertinent Labs:                       8.5    5.94  )-----------( 95       ( 28 Oct 2024 07:20 )             26.4   10-28  149[H]  |  116[H]  |  19  ----------------------------<  102[H]  3.1[L]   |  17[L]  |  0.48[L]  Ca    7.4[L]      28 Oct 2024 07:20  Phos  2.1     10-28  Mg     2.10     10-28  (10/23) HbA1c 6.2% H   (10/13) Triglycerides 217 H, HDL 39 L     CAPILLARY BLOOD GLUCOSE  POCT Blood Glucose.: 107 mg/dL (28 Oct 2024 12:21)  POCT Blood Glucose.: 99 mg/dL (28 Oct 2024 09:23)  POCT Blood Glucose.: 103 mg/dL (28 Oct 2024 09:05)  POCT Blood Glucose.: 113 mg/dL (27 Oct 2024 22:18)  POCT Blood Glucose.: 138 mg/dL (27 Oct 2024 17:39)     Pt's height: 68" (10/12)  Pt's weights: 152.7#69.3 kg (10/16), 153#/69.4 Kg (10/12)  weight assessment: no new weight available at present    IBW: 154#/-10%   BMI: 23.26 kg/m2      Physical assessment (per flow-sheets): Edema/Pressure Injury: none reported at present    +Skin tears - R elbow    Estimated Needs: [x] no change since previous assessment, based on admit/dosing weight: 153 lbs/69.4 kg   estimated kcal needs: 7729-6498 kcal daily @ 30-35kcal/kg BW    estimated protein needs:  gm protein daily @ 1.2-1.5gm/kg BW      Previous Nutrition Diagnosis: [x] Malnutrition, severe   Nutrition Diagnosis is [x] ongoing   New Nutrition Diagnosis: [x] not applicable      Education: [x] better food options discussed with Pt's spouse     Nutrition Interventions/Recommendations::    1. Encourage & assist Pt with meals as needed;   2. Add PO supplement: Nutrition Max Protein (mildly thickened) 1 can/330 ml (150 kcal, 30 gm protein) - 2x daily;   3. Honor food preferences; Add appetite stimulant to boost Pt's PO intake/appetite;     4. Bowel regimen per MD discretion;   5. Add Multivitamins 1 tab daily for micronutrient coverage;   6. Monitor & Evaluate: PO intake, tolerance to diet/supplement; nutrition related lab values; weekly weight & weight trends; BMs/GI distress; hydration status; skin integrity;

## 2024-10-28 NOTE — CHART NOTE - NSCHARTNOTEFT_GEN_A_CORE
Spoke with ENT in regards to oral sores secondary to current treatments. Curbside recommendations of Magic Mouthwash siwsh & spit TID, folate, and Vitamin B complex greatly appreciated.

## 2024-10-28 NOTE — PROVIDER CONTACT NOTE (OTHER) - ACTION/TREATMENT ORDERED:
leave pt. alone and if he gets too agitated then will order ativan IM, Okay to keep morphine kept till morning time when he calms down, placed IV in the morning time, resumed morphine, EKG and  sonya

## 2024-10-28 NOTE — PROGRESS NOTE ADULT - ASSESSMENT
1. Metastatic prostate cancer    - Follows with Dr Andrew Mendoza at [facility]   - PSMA 10/11/24 showed hypermetabolic vera foci above and below the diaphragm, foci in the liver and extensive foci involving the axial and appendicular skeleton compatible with metastatic disease  - --> 374--> 426 on 10/8/24   - Liver biopsy 9/30/24 consistent with adenocarcinoma favoring prostate   - High risk high volume disease -> started on triplet therapy w/ ADT/lupron, abiraterone/prednisone + taxotere. (back pain after Lupron likely tumor flare).  - Continue abiraterone 1000mg daily w Prednisone 5mg PO QD ( currently not on prednisone , reagan discuss with primary team if ok with ID to start)  - s/p  taxotere 60mg/m2 on 10/13/24. Next dose on 11/4 if clinically improves    - Kyphoplasty w/IR to T3 and L1 lesions planned    - Palliative following for pain control and given hospital course would have further GOC discussions. Treatment will be offered but he has metastatic disease with visceral involvement and complications as outlined below.     2. Pancolitis, E coli and H influenzae bacteremia    -- on CTX and Metronidazole  -- ID following  -- BCx from 10/24 neg to date    3. Thrombocytopenia     - Platelets 95 today  - Multifactorial sec to likely marrow infiltration by CaP, Chemo effect , consumption from acute illness , poss ITP  - Baseline in the 70s  - Transfuse for plt <15 or < 50K if bleeding/for procedure      4. SMA Dissection  --- seen by [facility] sx  -- no plan for intervention      5. Agitation  -awaiting psych consult to address possible sundowning  -rec rechecking EKG to determine which medications would be safe for patient in current clinical setting    Umm Aponte NP  Hematology/Oncology  [facility]  [phone] (Office)  [phone] (Alt office)  Evenings and weekends please call MD on call or office          1. Metastatic prostate cancer    - Follows with Dr Andrew Mendoza at [facility]   - PSMA 10/11/24 showed hypermetabolic vera foci above and below the diaphragm, foci in the liver and extensive foci involving the axial and appendicular skeleton compatible with metastatic disease  - --> 374--> 426 on 10/8/24   - Liver biopsy 9/30/24 consistent with adenocarcinoma favoring prostate   - High risk high volume disease -> started on triplet therapy w/ ADT/lupron, abiraterone/prednisone + taxotere. (back pain after Lupron likely tumor flare).  - Continue abiraterone 1000mg daily w Prednisone 5mg PO QD ( currently not on prednisone , reagan discuss with primary team if ok with ID to start)  - s/p taxotere 60mg/m2 on 10/13/24. Next dose on 11/4 if clinically improves    - Kyphoplasty w/IR to T3 and L1 lesions planned    - Palliative following for pain control and given hospital course would have further GOC discussions. Treatment will be offered but he has metastatic disease with visceral involvement and complications as outlined below.     2. Pancolitis, E coli and H influenzae bacteremia    - on CTX and Metronidazole  - ID following  - BCx from 10/24 neg to date    3. Thrombocytopenia     - Platelets 95 today  - Multifactorial sec to likely marrow infiltration by CaP, Chemo effect, consumption from acute illness, poss ITP  - Transfuse for plt <15 or < 50K if bleeding/for procedure      4. SMA Dissection  - seen by [facility] sx  - no plan for intervention      5. Agitation  - awaiting psych consult to address possible sundowning  - rec rechecking EKG to determine which medications would be safe for patient in current clinical setting    Umm Aponte NP  Hematology/Oncology  [facility]  [phone] (Office)  [phone] (Alt office)  Evenings and weekends please call MD on call or office

## 2024-10-28 NOTE — PROGRESS NOTE ADULT - PROBLEM SELECTOR PLAN 4
MRI Lumbar Spine: (10/15) Diffuse osseous metastases. L1 and L5 pathologic fractures are associated with moderate epidural disease at L1, more mild at L5. At least mild epidural disease at the left S1-S2 neural foramen. No significant lumbar degenerative disc disease.  - MRI from 10/15 shows extensive metastasis in cervical, thoracic, lumbar, skull base and calvarium. Also small cortical subacute infarctions.  - Continue Morphine gtt @1mg/hr, IV morphine 4mg PRNs (has not required PRNs in multiple days) Encouraged family to ask for PRN pain medication if he has breakthrough pain.   - IR evaluation for kyphoplasty on hold in setting of c/f SMA dissection and bowel ischemia   - Patient with oral pain- Magic mouthwash tid added   - s/p decadron 4mg IV bid x3 days   - Narcan PRN  - multimodal pain control: lidocaine patch, warm/cold packs   - recommend changing bowel regimen to PRN

## 2024-10-28 NOTE — PROGRESS NOTE ADULT - SUBJECTIVE AND OBJECTIVE BOX
patient seen, per family has severe episodes of confusion/agitation with sundowning  Psych and ID following, afebrile      MEDICATIONS  (STANDING):  abiraterone 500 mg tablet 2 Tablet(s) 2 Tablet(s) Oral daily  atorvastatin 20 milliGRAM(s) Oral at bedtime  cefTRIAXone   IVPB 2000 milliGRAM(s) IV Intermittent every 24 hours  chlorhexidine 2% Cloths 1 Application(s) Topical daily  dextrose 5%. 1000 milliLiter(s) (100 mL/Hr) IV Continuous <Continuous>  dextrose 5%. 1000 milliLiter(s) (50 mL/Hr) IV Continuous <Continuous>  dextrose 5%. 1000 milliLiter(s) (75 mL/Hr) IV Continuous <Continuous>  dextrose 50% Injectable 25 Gram(s) IV Push once  dextrose 50% Injectable 12.5 Gram(s) IV Push once  dextrose 50% Injectable 25 Gram(s) IV Push once  fenofibrate Tablet 145 milliGRAM(s) Oral daily  FIRST- Mouthwash  BLM 30 milliLiter(s) Swish and Spit three times a day  glucagon  Injectable 1 milliGRAM(s) IntraMuscular once  influenza  Vaccine (HIGH DOSE) 0.5 milliLiter(s) IntraMuscular once  insulin lispro (ADMELOG) corrective regimen sliding scale   SubCutaneous at bedtime  insulin lispro (ADMELOG) corrective regimen sliding scale   SubCutaneous three times a day before meals  lactobacillus acidophilus 1 Tablet(s) Oral daily  lidocaine   4% Patch 1 Patch Transdermal every 24 hours  metroNIDAZOLE  IVPB 500 milliGRAM(s) IV Intermittent every 12 hours  morphine  Infusion. 1 mG/Hr (1 mL/Hr) IV Continuous <Continuous>  Nephro-nancy 1 Tablet(s) Oral daily  pantoprazole  Injectable 40 milliGRAM(s) IV Push daily  potassium chloride   Powder 40 milliEquivalent(s) Oral two times a day  predniSONE   Tablet 5 milliGRAM(s) Oral daily  sodium phosphate 15 milliMole(s)/250 mL IVPB 15 milliMole(s) IV Intermittent once    MEDICATIONS  (PRN):  acetaminophen     Tablet .. 650 milliGRAM(s) Oral every 6 hours PRN Temp greater or equal to 38C (100.4F), Mild Pain (1 - 3), Moderate Pain (4 - 6)  aluminum hydroxide/magnesium hydroxide/simethicone Suspension 30 milliLiter(s) Oral every 4 hours PRN Dyspepsia  artificial tears (preservative free) Ophthalmic Solution 1 Drop(s) Both EYES four times a day PRN Dry Eyes  dextrose Oral Gel 15 Gram(s) Oral once PRN Blood Glucose LESS THAN 70 milliGRAM(s)/deciliter  morphine  - Injectable 4 milliGRAM(s) IV Push every 3 hours PRN Severe Pain (7 - 10)  naloxone Injectable 0.1 milliGRAM(s) IV Push every 3 minutes PRN For ANY of the following changes in patient status:  A. RR LESS THAN 10 breaths per minute, B. Oxygen saturation LESS THAN 90%, C. Sedation score of 6  polyethylene glycol 3350 17 Gram(s) Oral daily PRN Constipation        Vital Signs Last 24 Hrs  T(C): 36.7 (28 Oct 2024 06:40), Max: 37.3 (27 Oct 2024 20:41)  T(F): 98 (28 Oct 2024 06:40), Max: 99.1 (27 Oct 2024 20:41)  HR: 100 (28 Oct 2024 06:40) (100 - 102)  BP: 92/62 (28 Oct 2024 06:40) (92/62 - 105/65)  BP(mean): --  RR: 18 (28 Oct 2024 06:40) (18 - 18)  SpO2: 100% (28 Oct 2024 06:40) (98% - 100%)    Parameters below as of 28 Oct 2024 06:40  Patient On (Oxygen Delivery Method): room air        PE  NAD  Family at bedside  Anicteric, MMM  RRR  CTAB  Abd soft, NT, ND  No c/c/e  No rash grossly                            8.5    5.94  )-----------( 95       ( 28 Oct 2024 07:20 )             26.4       10-28    149[H]  |  116[H]  |  19  ----------------------------<  102[H]  3.1[L]   |  17[L]  |  0.48[L]    Ca    7.4[L]      28 Oct 2024 07:20  Phos  2.1     10-28  Mg     2.10     10-28

## 2024-10-29 LAB
ANION GAP SERPL CALC-SCNC: 10 MMOL/L — SIGNIFICANT CHANGE UP (ref 7–14)
ANION GAP SERPL CALC-SCNC: 11 MMOL/L — SIGNIFICANT CHANGE UP (ref 7–14)
ANION GAP SERPL CALC-SCNC: 8 MMOL/L — SIGNIFICANT CHANGE UP (ref 7–14)
BUN SERPL-MCNC: 11 MG/DL — SIGNIFICANT CHANGE UP (ref 7–23)
BUN SERPL-MCNC: 12 MG/DL — SIGNIFICANT CHANGE UP (ref 7–23)
BUN SERPL-MCNC: 15 MG/DL — SIGNIFICANT CHANGE UP (ref 7–23)
CALCIUM SERPL-MCNC: 6.8 MG/DL — LOW (ref 8.4–10.5)
CALCIUM SERPL-MCNC: 6.9 MG/DL — LOW (ref 8.4–10.5)
CALCIUM SERPL-MCNC: 6.9 MG/DL — LOW (ref 8.4–10.5)
CHLORIDE SERPL-SCNC: 113 MMOL/L — HIGH (ref 98–107)
CHLORIDE SERPL-SCNC: 114 MMOL/L — HIGH (ref 98–107)
CHLORIDE SERPL-SCNC: 115 MMOL/L — HIGH (ref 98–107)
CO2 SERPL-SCNC: 19 MMOL/L — LOW (ref 22–31)
CO2 SERPL-SCNC: 20 MMOL/L — LOW (ref 22–31)
CO2 SERPL-SCNC: 20 MMOL/L — LOW (ref 22–31)
CREAT SERPL-MCNC: 0.33 MG/DL — LOW (ref 0.5–1.3)
CREAT SERPL-MCNC: 0.34 MG/DL — LOW (ref 0.5–1.3)
CREAT SERPL-MCNC: 0.37 MG/DL — LOW (ref 0.5–1.3)
CULTURE RESULTS: SIGNIFICANT CHANGE UP
CULTURE RESULTS: SIGNIFICANT CHANGE UP
EGFR: 124 ML/MIN/1.73M2 — SIGNIFICANT CHANGE UP
EGFR: 127 ML/MIN/1.73M2 — SIGNIFICANT CHANGE UP
EGFR: 128 ML/MIN/1.73M2 — SIGNIFICANT CHANGE UP
GLUCOSE BLDC GLUCOMTR-MCNC: 122 MG/DL — HIGH (ref 70–99)
GLUCOSE BLDC GLUCOMTR-MCNC: 130 MG/DL — HIGH (ref 70–99)
GLUCOSE BLDC GLUCOMTR-MCNC: 130 MG/DL — HIGH (ref 70–99)
GLUCOSE BLDC GLUCOMTR-MCNC: 160 MG/DL — HIGH (ref 70–99)
GLUCOSE SERPL-MCNC: 120 MG/DL — HIGH (ref 70–99)
GLUCOSE SERPL-MCNC: 132 MG/DL — HIGH (ref 70–99)
GLUCOSE SERPL-MCNC: 134 MG/DL — HIGH (ref 70–99)
HCT VFR BLD CALC: 20.4 % — CRITICAL LOW (ref 39–50)
HCT VFR BLD CALC: 23.9 % — LOW (ref 39–50)
HGB BLD-MCNC: 6.8 G/DL — CRITICAL LOW (ref 13–17)
HGB BLD-MCNC: 7.8 G/DL — LOW (ref 13–17)
MAGNESIUM SERPL-MCNC: 1.9 MG/DL — SIGNIFICANT CHANGE UP (ref 1.6–2.6)
MAGNESIUM SERPL-MCNC: 2 MG/DL — SIGNIFICANT CHANGE UP (ref 1.6–2.6)
MCHC RBC-ENTMCNC: 29.9 PG — SIGNIFICANT CHANGE UP (ref 27–34)
MCHC RBC-ENTMCNC: 31.6 PG — SIGNIFICANT CHANGE UP (ref 27–34)
MCHC RBC-ENTMCNC: 32.6 G/DL — SIGNIFICANT CHANGE UP (ref 32–36)
MCHC RBC-ENTMCNC: 33.3 GM/DL — SIGNIFICANT CHANGE UP (ref 32–36)
MCV RBC AUTO: 91.6 FL — SIGNIFICANT CHANGE UP (ref 80–100)
MCV RBC AUTO: 94.9 FL — SIGNIFICANT CHANGE UP (ref 80–100)
NRBC # BLD: 3 /100 WBCS — HIGH (ref 0–0)
NRBC # BLD: 4 /100 WBCS — HIGH (ref 0–0)
NRBC # FLD: 0.13 K/UL — HIGH (ref 0–0)
NRBC # FLD: 0.14 K/UL — HIGH (ref 0–0)
PHOSPHATE SERPL-MCNC: 1.8 MG/DL — LOW (ref 2.5–4.5)
PHOSPHATE SERPL-MCNC: 2 MG/DL — LOW (ref 2.5–4.5)
PLATELET # BLD AUTO: 90 K/UL — LOW (ref 150–400)
PLATELET # BLD AUTO: 98 K/UL — LOW (ref 150–400)
POTASSIUM SERPL-MCNC: 2.6 MMOL/L — CRITICAL LOW (ref 3.5–5.3)
POTASSIUM SERPL-MCNC: 2.9 MMOL/L — CRITICAL LOW (ref 3.5–5.3)
POTASSIUM SERPL-MCNC: 3.4 MMOL/L — LOW (ref 3.5–5.3)
POTASSIUM SERPL-SCNC: 2.6 MMOL/L — CRITICAL LOW (ref 3.5–5.3)
POTASSIUM SERPL-SCNC: 2.9 MMOL/L — CRITICAL LOW (ref 3.5–5.3)
POTASSIUM SERPL-SCNC: 3.4 MMOL/L — LOW (ref 3.5–5.3)
RBC # BLD: 2.15 M/UL — LOW (ref 4.2–5.8)
RBC # BLD: 2.61 M/UL — LOW (ref 4.2–5.8)
RBC # FLD: 16.9 % — HIGH (ref 10.3–14.5)
RBC # FLD: 19.7 % — HIGH (ref 10.3–14.5)
SODIUM SERPL-SCNC: 142 MMOL/L — SIGNIFICANT CHANGE UP (ref 135–145)
SODIUM SERPL-SCNC: 144 MMOL/L — SIGNIFICANT CHANGE UP (ref 135–145)
SODIUM SERPL-SCNC: 144 MMOL/L — SIGNIFICANT CHANGE UP (ref 135–145)
SPECIMEN SOURCE: SIGNIFICANT CHANGE UP
SPECIMEN SOURCE: SIGNIFICANT CHANGE UP
WBC # BLD: 3.89 K/UL — SIGNIFICANT CHANGE UP (ref 3.8–10.5)
WBC # BLD: 4.26 K/UL — SIGNIFICANT CHANGE UP (ref 3.8–10.5)
WBC # FLD AUTO: 3.89 K/UL — SIGNIFICANT CHANGE UP (ref 3.8–10.5)
WBC # FLD AUTO: 4.26 K/UL — SIGNIFICANT CHANGE UP (ref 3.8–10.5)

## 2024-10-29 PROCEDURE — 99232 SBSQ HOSP IP/OBS MODERATE 35: CPT

## 2024-10-29 PROCEDURE — 99233 SBSQ HOSP IP/OBS HIGH 50: CPT

## 2024-10-29 RX ORDER — ELECTROLYTE-M SOLUTION/D5W 5 %
1000 INTRAVENOUS SOLUTION INTRAVENOUS
Refills: 0 | Status: DISCONTINUED | OUTPATIENT
Start: 2024-10-29 | End: 2024-10-30

## 2024-10-29 RX ORDER — POTASSIUM CHLORIDE 600 MG/1
10 TABLET, EXTENDED RELEASE ORAL ONCE
Refills: 0 | Status: DISCONTINUED | OUTPATIENT
Start: 2024-10-29 | End: 2024-10-31

## 2024-10-29 RX ORDER — ACETAMINOPHEN, DIPHENHYDRAMINE HCL, PHENYLEPHRINE HCL 325; 25; 5 MG/1; MG/1; MG/1
5 TABLET ORAL AT BEDTIME
Refills: 0 | Status: DISCONTINUED | OUTPATIENT
Start: 2024-10-29 | End: 2024-10-29

## 2024-10-29 RX ORDER — ACETAMINOPHEN 500MG 500 MG/1
650 TABLET, COATED ORAL ONCE
Refills: 0 | Status: DISCONTINUED | OUTPATIENT
Start: 2024-10-29 | End: 2024-11-18

## 2024-10-29 RX ORDER — POTASSIUM CHLORIDE 600 MG/1
10 TABLET, EXTENDED RELEASE ORAL
Refills: 0 | Status: COMPLETED | OUTPATIENT
Start: 2024-10-29 | End: 2024-10-29

## 2024-10-29 RX ORDER — POTASSIUM PHOSPHATE, MONOBASIC POTASSIUM PHOSPHATE, DIBASIC INJECTION, 236; 224 MG/ML; MG/ML
30 SOLUTION, CONCENTRATE INTRAVENOUS ONCE
Refills: 0 | Status: COMPLETED | OUTPATIENT
Start: 2024-10-29 | End: 2024-10-29

## 2024-10-29 RX ORDER — DIPHENHYDRAMINE HCL 25 MG
25 CAPSULE ORAL ONCE
Refills: 0 | Status: DISCONTINUED | OUTPATIENT
Start: 2024-10-29 | End: 2024-11-18

## 2024-10-29 RX ORDER — ACETAMINOPHEN, DIPHENHYDRAMINE HCL, PHENYLEPHRINE HCL 325; 25; 5 MG/1; MG/1; MG/1
6 TABLET ORAL AT BEDTIME
Refills: 0 | Status: DISCONTINUED | OUTPATIENT
Start: 2024-10-29 | End: 2024-11-18

## 2024-10-29 RX ADMIN — DIPHENHYDRAMINE HYDROCHLORIDE AND LIDOCAINE HYDROCHLORIDE AND ALUMINUM HYDROXIDE AND MAGNESIUM HYDRO 30 MILLILITER(S): KIT at 04:51

## 2024-10-29 RX ADMIN — PANTOPRAZOLE SODIUM 40 MILLIGRAM(S): 40 TABLET, DELAYED RELEASE ORAL at 12:17

## 2024-10-29 RX ADMIN — PREDNISONE 5 MILLIGRAM(S): 20 TABLET ORAL at 04:51

## 2024-10-29 RX ADMIN — POTASSIUM CHLORIDE 100 MILLIEQUIVALENT(S): 600 TABLET, EXTENDED RELEASE ORAL at 04:51

## 2024-10-29 RX ADMIN — Medication 75 MILLILITER(S): at 04:57

## 2024-10-29 RX ADMIN — Medication 1 TABLET(S): at 12:19

## 2024-10-29 RX ADMIN — POTASSIUM PHOSPHATE, MONOBASIC POTASSIUM PHOSPHATE, DIBASIC INJECTION, 83.33 MILLIMOLE(S): 236; 224 SOLUTION, CONCENTRATE INTRAVENOUS at 13:32

## 2024-10-29 RX ADMIN — Medication 0.5 MG/HR: at 19:18

## 2024-10-29 RX ADMIN — Medication 0.5 MG/HR: at 14:54

## 2024-10-29 RX ADMIN — Medication 100 MILLIGRAM(S): at 12:16

## 2024-10-29 RX ADMIN — METRONIDAZOLE 100 MILLIGRAM(S): 500 TABLET ORAL at 07:38

## 2024-10-29 RX ADMIN — POTASSIUM CHLORIDE 100 MILLIEQUIVALENT(S): 600 TABLET, EXTENDED RELEASE ORAL at 09:22

## 2024-10-29 RX ADMIN — Medication 1: at 09:23

## 2024-10-29 RX ADMIN — POTASSIUM CHLORIDE 100 MILLIEQUIVALENT(S): 600 TABLET, EXTENDED RELEASE ORAL at 19:18

## 2024-10-29 RX ADMIN — Medication 145 MILLIGRAM(S): at 12:17

## 2024-10-29 RX ADMIN — METRONIDAZOLE 100 MILLIGRAM(S): 500 TABLET ORAL at 17:51

## 2024-10-29 RX ADMIN — Medication 20 MILLIGRAM(S): at 04:51

## 2024-10-29 RX ADMIN — POTASSIUM CHLORIDE 100 MILLIEQUIVALENT(S): 600 TABLET, EXTENDED RELEASE ORAL at 08:01

## 2024-10-29 RX ADMIN — POTASSIUM CHLORIDE 100 MILLIEQUIVALENT(S): 600 TABLET, EXTENDED RELEASE ORAL at 02:41

## 2024-10-29 RX ADMIN — CHLORHEXIDINE GLUCONATE 1 APPLICATION(S): 1.2 RINSE ORAL at 12:17

## 2024-10-29 RX ADMIN — Medication 0.5 MG/HR: at 13:33

## 2024-10-29 RX ADMIN — Medication 75 MILLILITER(S): at 11:45

## 2024-10-29 RX ADMIN — Medication 75 MILLILITER(S): at 21:27

## 2024-10-29 RX ADMIN — Medication 1 TABLET(S): at 12:18

## 2024-10-29 RX ADMIN — Medication 1 MG/HR: at 07:36

## 2024-10-29 RX ADMIN — POTASSIUM CHLORIDE 100 MILLIEQUIVALENT(S): 600 TABLET, EXTENDED RELEASE ORAL at 20:08

## 2024-10-29 RX ADMIN — POTASSIUM CHLORIDE 100 MILLIEQUIVALENT(S): 600 TABLET, EXTENDED RELEASE ORAL at 17:48

## 2024-10-29 NOTE — PROGRESS NOTE ADULT - ASSESSMENT
This is a 65 y/o M w/ prostate CA s/p radical prostatectomy now metastatic, HTN, HLD, admitted to 10/12 for pain, N/V in setting of osseous mets. S/p docetaxel on 10/13, abiraterone 10/12. Given neutropenia started on Zarxio.  C/b hypoxia, tachycardia. CTA w/o PE. Pt w/ 10 episodes of diarrhea on 10/18. C diff negative,   Pt now febrile to 102, CT A/P with pancolitis. 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   Fever likely 2/2 to neutropenic enterocolitis, pt w/ ANC 90, at high risk of bacteremia.   Bcx now w/ E Coli and H influenzae bacteremia, E Coli likely from colitis, however unclear source of H influenza, typically from PNA, septic arthritis, consider meningitis, per family denies neck pain/HA.   Pt w/ pressured speech, paranoid thoughts. Worsening hypernatremia   BCx from 10/22 still w/ E Coli.     Recommendations:   1. C/w Ceftriaxone 2 g q 24 and Metronidazole 500 mg BID for 2 week course until 11/6/24. If planned for d/c before then, can change to Cefpodoxime 400 mg BID and Metronidazole 500 mg BID to finish course.   2. Repeat BCx from 10/24 NGTD  3. Appreciate surgery/GI recs     Poor prognosis overall    Thank you for this consult. Inpatient ID consult team will sign off.    Further changes in lab values, imaging studies, or clinical status will not be known to ID inpatient consultants unless specifically communicated by primary team.    Orlando Ruiz MD  Attending Physician  Division of Infectious Diseases  Department of Medicine    Please contact through MS Teams message.  Office: [phone] (after 5 PM or weekend)

## 2024-10-29 NOTE — PROGRESS NOTE ADULT - NS ATTEND AMEND GEN_ALL_CORE FT
Patient seen and examined with the PA  I agree with her assessment and plan    awaiting psychiatry evaluation  palliative care following , adjusting meds  due for next cycle of chemo next week if he shows improvement and no signs of infection  Will follow Patient seen and examined with the NP  I agree with her assessment and plan    awaiting psychiatry evaluation  palliative care following , adjusting meds  due for next cycle of chemo next week if he shows improvement and no signs of infection  Will follow

## 2024-10-29 NOTE — PHYSICAL THERAPY INITIAL EVALUATION ADULT - PASSIVE RANGE OF MOTION EXAMINATION, REHAB EVAL
bilateral upper extremity Passive ROM was WFL (within functional limits)/bilateral lower extremity Passive ROM was WFL (within functional limits) declines

## 2024-10-29 NOTE — PROGRESS NOTE ADULT - ASSESSMENT
1. Metastatic prostate cancer    - Follows with Dr Andrew Mendoza at [facility]   - PSMA 10/11/24 showed hypermetabolic vera foci above and below the diaphragm, foci in the liver and extensive foci involving the axial and appendicular skeleton compatible with metastatic disease  - --> 374--> 426 on 10/8/24   - Liver biopsy 9/30/24 consistent with adenocarcinoma favoring prostate   - High risk high volume disease -> started on triplet therapy w/ ADT/lupron, abiraterone/prednisone + taxotere. (back pain after Lupron likely tumor flare).  - Continue abiraterone 1000mg daily w Prednisone 5mg PO QD ( currently not on prednisone , reagan discuss with primary team if ok with ID to start)  - s/p taxotere 60mg/m2 on 10/13/24. Next dose on 11/4 if clinically improves    - Kyphoplasty w/IR to T3 and L1 lesions planned    - Palliative following for pain control and given hospital course would have further GOC discussions. Treatment will be offered but he has metastatic disease with visceral involvement and complications as outlined below.     2. Pancolitis, E coli and H influenzae bacteremia  - on CTX and Metronidazole  - ID following  - BCx from 10/24 neg to date    3. Anemia/Thrombocytopenia   - Hgb 6.8, please transfuse 1 unit PRBC today  - Multifactorial sec to likely marrow infiltration by CaP, Chemo effect, consumption from acute illness, poss ITP  - Transfuse for plt <15 or < 50K if bleeding/for procedure      4. SMA Dissection  - seen by [facility] sx  - no plan for intervention      5. Agitation  - awaiting psych consult to address possible sundowning  -  EKG reviewed, defer to cardiology    Umm Aponte NP  Hematology/Oncology  [facility]  [phone] (Office)  [phone] (Alt office)  Evenings and weekends please call MD on call or office

## 2024-10-29 NOTE — PROGRESS NOTE ADULT - SUBJECTIVE AND OBJECTIVE BOX
[facility]   MD MARKO EASTMAN MD ANGELA WONG, PA    TEL:  OFFICE: [phone]  From 5pm-7am Answering Service [phone]    -- RENAL FOLLOW UP NOTE ---Date of Service 10-29-24 @ 11:06    Patient is a 64y old  Male who presents with a chief complaint of Pain (29 Oct 2024 08:51)      Patient seen and examined at bedside.     VITALS:  T(F): 98.1 (10-29-24 @ 08:12), Max: 98.4 (10-29-24 @ 00:33)  HR: 99 (10-29-24 @ 08:12)  BP: 100/61 (10-29-24 @ 08:12)  RR: 17 (10-29-24 @ 08:12)  SpO2: 100% (10-29-24 @ 08:12)  Wt(kg): --    10-28 @ 07:01  -  10-29 @ 07:00  --------------------------------------------------------  IN: 0 mL / OUT: 800 mL / NET: -800 mL          PHYSICAL EXAM:  General: lethargic  Neck: No JVD  Respiratory: CTAB, no wheezes, rales or rhonchi  Cardiovascular: S1, S2, RRR  Gastrointestinal: BS+, soft, NT/ND  Extremities: No peripheral edema    Hospital Medications:   MEDICATIONS  (STANDING):  abiraterone 500 mg tablet 2 Tablet(s) 2 Tablet(s) Oral daily  acetaminophen     Tablet .. 650 milliGRAM(s) Oral once  atorvastatin 20 milliGRAM(s) Oral at bedtime  cefTRIAXone   IVPB 2000 milliGRAM(s) IV Intermittent every 24 hours  chlorhexidine 2% Cloths 1 Application(s) Topical daily  dextrose 5% with potassium chloride 20 mEq/L 1000 milliLiter(s) (75 mL/Hr) IV Continuous <Continuous>  dextrose 5%. 1000 milliLiter(s) (100 mL/Hr) IV Continuous <Continuous>  dextrose 5%. 1000 milliLiter(s) (50 mL/Hr) IV Continuous <Continuous>  dextrose 50% Injectable 25 Gram(s) IV Push once  dextrose 50% Injectable 25 Gram(s) IV Push once  dextrose 50% Injectable 12.5 Gram(s) IV Push once  diphenhydrAMINE 25 milliGRAM(s) Oral once  fenofibrate Tablet 145 milliGRAM(s) Oral daily  FIRST- Mouthwash  BLM 30 milliLiter(s) Swish and Spit three times a day  glucagon  Injectable 1 milliGRAM(s) IntraMuscular once  influenza  Vaccine (HIGH DOSE) 0.5 milliLiter(s) IntraMuscular once  insulin lispro (ADMELOG) corrective regimen sliding scale   SubCutaneous at bedtime  insulin lispro (ADMELOG) corrective regimen sliding scale   SubCutaneous three times a day before meals  lactobacillus acidophilus 1 Tablet(s) Oral daily  lidocaine   4% Patch 1 Patch Transdermal every 24 hours  metroNIDAZOLE  IVPB 500 milliGRAM(s) IV Intermittent every 12 hours  morphine  Infusion. 1 mG/Hr (1 mL/Hr) IV Continuous <Continuous>  multivitamin 1 Tablet(s) Oral daily  Nephro-nancy 1 Tablet(s) Oral daily  pantoprazole  Injectable 40 milliGRAM(s) IV Push daily  potassium phosphate IVPB 30 milliMole(s) IV Intermittent once  predniSONE   Tablet 5 milliGRAM(s) Oral daily      LABS:  10-29    144  |  115[H]  |  15  ----------------------------<  134[H]  2.6[LL]   |  19[L]  |  0.37[L]    Ca    6.9[L]      29 Oct 2024 01:38  Phos  1.8     10-29  Mg     2.00     10-29      Creatinine Trend: 0.37 <--, 0.39 <--, 0.48 <--, 0.39 <--, 0.49 <--, 0.51 <--, 0.57 <--, 0.61 <--, 0.68 <--, 0.74 <--, 0.64 <--, 0.71 <--, 0.81 <--, 0.94 <--, 0.93 <--    Phosphorus: 1.8 mg/dL (10-29 @ 01:38)                              6.8    4.26  )-----------( 90       ( 29 Oct 2024 01:38 )             20.4     Urine Studies:  Urinalysis - [10-29-24 @ 01:38]      Color  / Appearance  / SG  / pH       Gluc 134 / Ketone   / Bili  / Urobili        Blood  / Protein  / Leuk Est  / Nitrite       RBC  / WBC  / Hyaline  / Gran  / Sq Epi  / Non Sq Epi  / Bacteria       TSH 2.62      [10-13-24 @ 05:30]  Lipid: chol 135, , HDL 39, LDL --      [10-13-24 @ 05:30]        RADIOLOGY & ADDITIONAL STUDIES:

## 2024-10-29 NOTE — PROGRESS NOTE ADULT - ASSESSMENT
64-year-old male with metastatic prostate cancer, sent in by hematologist from [facility] for uncontrolled pain, nausea, vomiting.   Patient reports diffuse pain starting 6 months ago significantly worsening for the past 2 weeks. Diagnosed with high risk high volume metastatic prostate cancer with bone, liver lymph node mets and presacral mass. Liver biopsy confirmed disease with . Started lupron, loly/pred with plans to initiate taxotere.   nephrology consulted for electrolyte abnormalities     Hypernatremia  Poor free water intake    pt now has diet however not eating much per family  on D5W @75cc/hr, na better however potassium low  pt not tolerating po supplement. getting iv kcl 10x3  will change d5 to d5+kcl 20 x1 day  monitor  goal approximately 10 meq over 24 hours  monitor    Hypokalemia  supplement  iv as above    Acidosis   Likely from Normal saline causing hyperchloremic acidosis  Will monitor     hypophosphatemia  kphos 30x1 today  monitor    hypocalcemia   sec to low albumin  corrected ca 8.5  monitor    fever  GNR bacteremia   work up and tx per team    anemia  metastatic prostate cancer  f/u heme/onc

## 2024-10-29 NOTE — PROGRESS NOTE ADULT - SUBJECTIVE AND OBJECTIVE BOX
Patient is a 64y old  Male who presents with a chief complaint of Pain (29 Oct 2024 12:33)    Date of servie : 10-29-24 @ 14:07  INTERVAL HPI/OVERNIGHT EVENTS:  T(C): 36.7 (10-29-24 @ 12:10), Max: 36.9 (10-29-24 @ 00:33)  HR: 105 (10-29-24 @ 12:10) (75 - 105)  BP: 102/65 (10-29-24 @ 12:10) (98/64 - 105/64)  RR: 17 (10-29-24 @ 12:10) (17 - 18)  SpO2: 99% (10-29-24 @ 12:10) (96% - 100%)  Wt(kg): --  I&O's Summary    28 Oct 2024 07:01  -  29 Oct 2024 07:00  --------------------------------------------------------  IN: 0 mL / OUT: 800 mL / NET: -800 mL        LABS:                        6.8    4.26  )-----------( 90       ( 29 Oct 2024 01:38 )             20.4     10-29    144  |  115[H]  |  15  ----------------------------<  134[H]  2.6[LL]   |  19[L]  |  0.37[L]    Ca    6.9[L]      29 Oct 2024 01:38  Phos  1.8     10-29  Mg     2.00     10-29        Urinalysis Basic - ( 29 Oct 2024 01:38 )    Color: x / Appearance: x / SG: x / pH: x  Gluc: 134 mg/dL / Ketone: x  / Bili: x / Urobili: x   Blood: x / Protein: x / Nitrite: x   Leuk Esterase: x / RBC: x / WBC x   Sq Epi: x / Non Sq Epi: x / Bacteria: x      CAPILLARY BLOOD GLUCOSE      POCT Blood Glucose.: 130 mg/dL (29 Oct 2024 12:42)  POCT Blood Glucose.: 160 mg/dL (29 Oct 2024 08:40)  POCT Blood Glucose.: 134 mg/dL (28 Oct 2024 20:57)  POCT Blood Glucose.: 147 mg/dL (28 Oct 2024 18:03)    ABG - ( 28 Oct 2024 15:54 )  pH, Arterial: 7.46  pH, Blood: x     /  pCO2: 27    /  pO2: 148   / HCO3: 19    / Base Excess: -4.1  /  SaO2: 100.0               Urinalysis Basic - ( 29 Oct 2024 01:38 )    Color: x / Appearance: x / SG: x / pH: x  Gluc: 134 mg/dL / Ketone: x  / Bili: x / Urobili: x   Blood: x / Protein: x / Nitrite: x   Leuk Esterase: x / RBC: x / WBC x   Sq Epi: x / Non Sq Epi: x / Bacteria: x        MEDICATIONS  (STANDING):  abiraterone 500 mg tablet 2 Tablet(s) 2 Tablet(s) Oral daily  acetaminophen     Tablet .. 650 milliGRAM(s) Oral once  atorvastatin 20 milliGRAM(s) Oral at bedtime  cefTRIAXone   IVPB 2000 milliGRAM(s) IV Intermittent every 24 hours  chlorhexidine 2% Cloths 1 Application(s) Topical daily  dextrose 5% with potassium chloride 20 mEq/L 1000 milliLiter(s) (75 mL/Hr) IV Continuous <Continuous>  dextrose 5%. 1000 milliLiter(s) (100 mL/Hr) IV Continuous <Continuous>  dextrose 5%. 1000 milliLiter(s) (50 mL/Hr) IV Continuous <Continuous>  dextrose 50% Injectable 25 Gram(s) IV Push once  dextrose 50% Injectable 25 Gram(s) IV Push once  dextrose 50% Injectable 12.5 Gram(s) IV Push once  diphenhydrAMINE 25 milliGRAM(s) Oral once  fenofibrate Tablet 145 milliGRAM(s) Oral daily  FIRST- Mouthwash  BLM 30 milliLiter(s) Swish and Spit three times a day  glucagon  Injectable 1 milliGRAM(s) IntraMuscular once  influenza  Vaccine (HIGH DOSE) 0.5 milliLiter(s) IntraMuscular once  insulin lispro (ADMELOG) corrective regimen sliding scale   SubCutaneous at bedtime  insulin lispro (ADMELOG) corrective regimen sliding scale   SubCutaneous three times a day before meals  lactobacillus acidophilus 1 Tablet(s) Oral daily  lidocaine   4% Patch 1 Patch Transdermal every 24 hours  metroNIDAZOLE  IVPB 500 milliGRAM(s) IV Intermittent every 12 hours  morphine  Infusion. 0.5 mG/Hr (0.5 mL/Hr) IV Continuous <Continuous>  multivitamin 1 Tablet(s) Oral daily  Nephro-nancy 1 Tablet(s) Oral daily  pantoprazole  Injectable 40 milliGRAM(s) IV Push daily  predniSONE   Tablet 5 milliGRAM(s) Oral daily    MEDICATIONS  (PRN):  acetaminophen     Tablet .. 650 milliGRAM(s) Oral every 6 hours PRN Temp greater or equal to 38C (100.4F), Mild Pain (1 - 3), Moderate Pain (4 - 6)  aluminum hydroxide/magnesium hydroxide/simethicone Suspension 30 milliLiter(s) Oral every 4 hours PRN Dyspepsia  artificial tears (preservative free) Ophthalmic Solution 1 Drop(s) Both EYES four times a day PRN Dry Eyes  dextrose Oral Gel 15 Gram(s) Oral once PRN Blood Glucose LESS THAN 70 milliGRAM(s)/deciliter  morphine  - Injectable 4 milliGRAM(s) IV Push every 3 hours PRN Severe Pain (7 - 10)  naloxone Injectable 0.1 milliGRAM(s) IV Push every 3 minutes PRN For ANY of the following changes in patient status:  A. RR LESS THAN 10 breaths per minute, B. Oxygen saturation LESS THAN 90%, C. Sedation score of 6  polyethylene glycol 3350 17 Gram(s) Oral daily PRN Constipation          PHYSICAL EXAM:  GENERAL: Frail  CHEST/LUNG: Clear to percussion bilaterally; No rales, rhonchi, wheezing, or rubs  HEART: Regular rate and rhythm; No murmurs, rubs, or gallops  ABDOMEN: Soft, Nontender, Nondistended; Bowel sounds present  EXTREMITIES:  edema +    Care Discussed with Consultants/Other Providers [ ] YES  [ ] NO

## 2024-10-29 NOTE — PROGRESS NOTE ADULT - SUBJECTIVE AND OBJECTIVE BOX
Date of Service: 10-29-24 @ 11:15    Patient is a 64y old  Male who presents with a chief complaint of Pain (29 Oct 2024 11:04)      Any change in ROS: seems OK:  no sob:  sleeping:  on morphine drip :      MEDICATIONS  (STANDING):  abiraterone 500 mg tablet 2 Tablet(s) 2 Tablet(s) Oral daily  acetaminophen     Tablet .. 650 milliGRAM(s) Oral once  atorvastatin 20 milliGRAM(s) Oral at bedtime  cefTRIAXone   IVPB 2000 milliGRAM(s) IV Intermittent every 24 hours  chlorhexidine 2% Cloths 1 Application(s) Topical daily  dextrose 5% with potassium chloride 20 mEq/L 1000 milliLiter(s) (75 mL/Hr) IV Continuous <Continuous>  dextrose 5%. 1000 milliLiter(s) (50 mL/Hr) IV Continuous <Continuous>  dextrose 5%. 1000 milliLiter(s) (100 mL/Hr) IV Continuous <Continuous>  dextrose 50% Injectable 12.5 Gram(s) IV Push once  dextrose 50% Injectable 25 Gram(s) IV Push once  dextrose 50% Injectable 25 Gram(s) IV Push once  diphenhydrAMINE 25 milliGRAM(s) Oral once  fenofibrate Tablet 145 milliGRAM(s) Oral daily  FIRST- Mouthwash  BLM 30 milliLiter(s) Swish and Spit three times a day  glucagon  Injectable 1 milliGRAM(s) IntraMuscular once  influenza  Vaccine (HIGH DOSE) 0.5 milliLiter(s) IntraMuscular once  insulin lispro (ADMELOG) corrective regimen sliding scale   SubCutaneous at bedtime  insulin lispro (ADMELOG) corrective regimen sliding scale   SubCutaneous three times a day before meals  lactobacillus acidophilus 1 Tablet(s) Oral daily  lidocaine   4% Patch 1 Patch Transdermal every 24 hours  metroNIDAZOLE  IVPB 500 milliGRAM(s) IV Intermittent every 12 hours  morphine  Infusion. 1 mG/Hr (1 mL/Hr) IV Continuous <Continuous>  multivitamin 1 Tablet(s) Oral daily  Nephro-nancy 1 Tablet(s) Oral daily  pantoprazole  Injectable 40 milliGRAM(s) IV Push daily  potassium phosphate IVPB 30 milliMole(s) IV Intermittent once  predniSONE   Tablet 5 milliGRAM(s) Oral daily    MEDICATIONS  (PRN):  acetaminophen     Tablet .. 650 milliGRAM(s) Oral every 6 hours PRN Temp greater or equal to 38C (100.4F), Mild Pain (1 - 3), Moderate Pain (4 - 6)  aluminum hydroxide/magnesium hydroxide/simethicone Suspension 30 milliLiter(s) Oral every 4 hours PRN Dyspepsia  artificial tears (preservative free) Ophthalmic Solution 1 Drop(s) Both EYES four times a day PRN Dry Eyes  dextrose Oral Gel 15 Gram(s) Oral once PRN Blood Glucose LESS THAN 70 milliGRAM(s)/deciliter  morphine  - Injectable 4 milliGRAM(s) IV Push every 3 hours PRN Severe Pain (7 - 10)  naloxone Injectable 0.1 milliGRAM(s) IV Push every 3 minutes PRN For ANY of the following changes in patient status:  A. RR LESS THAN 10 breaths per minute, B. Oxygen saturation LESS THAN 90%, C. Sedation score of 6  polyethylene glycol 3350 17 Gram(s) Oral daily PRN Constipation    Vital Signs Last 24 Hrs  T(C): 36.7 (29 Oct 2024 08:12), Max: 36.9 (29 Oct 2024 00:33)  T(F): 98.1 (29 Oct 2024 08:12), Max: 98.4 (29 Oct 2024 00:33)  HR: 99 (29 Oct 2024 08:12) (75 - 103)  BP: 100/61 (29 Oct 2024 08:12) (98/64 - 105/64)  BP(mean): --  RR: 17 (29 Oct 2024 08:12) (17 - 18)  SpO2: 100% (29 Oct 2024 08:12) (96% - 100%)    Parameters below as of 29 Oct 2024 08:12  Patient On (Oxygen Delivery Method): room air        I&O's Summary    28 Oct 2024 07:01  -  29 Oct 2024 07:00  --------------------------------------------------------  IN: 0 mL / OUT: 800 mL / NET: -800 mL          Physical Exam:   GENERAL: NAD, well-groomed, well-developed  HEENT: CHAVA/   Atraumatic, Normocephalic  ENMT: No tonsillar erythema, exudates, or enlargement; Moist mucous membranes, Good dentition, No lesions  NECK: Supple, No JVD, Normal thyroid  CHEST/LUNG: Clear to auscultaion  CVS: Regular rate and rhythm; No murmurs, rubs, or gallops  GI: : Soft, Nontender, Nondistended; Bowel sounds present  NERVOUS SYSTEM:  sleepy on room air   EXTREMITIES: -edema  LYMPH: No lymphadenopathy noted  SKIN: No rashes or lesions  ENDOCRINOLOGY: No Thyromegaly  PSYCH: calm     Labs:  ABG - ( 28 Oct 2024 15:54 )  pH, Arterial: 7.46  pH, Blood: x     /  pCO2: 27    /  pO2: 148   / HCO3: 19    / Base Excess: -4.1  /  SaO2: 100.0           20, 20                            6.8    4.26  )-----------( 90       ( 29 Oct 2024 01:38 )             20.4                         7.2    4.60  )-----------( 81       ( 28 Oct 2024 17:55 )             22.2                         8.5    5.94  )-----------( 95       ( 28 Oct 2024 07:20 )             26.4                         8.3    6.94  )-----------( 78       ( 27 Oct 2024 06:44 )             25.7                         8.6    9.37  )-----------( 42       ( 26 Oct 2024 03:40 )             27.1     10-29    144  |  115[H]  |  15  ----------------------------<  134[H]  2.6[LL]   |  19[L]  |  0.37[L]  10-28    148[H]  |  116[H]  |  18  ----------------------------<  130[H]  2.6[LL]   |  20[L]  |  0.39[L]  10-28    149[H]  |  116[H]  |  19  ----------------------------<  102[H]  3.1[L]   |  17[L]  |  0.48[L]  10-27    146[H]  |  119[H]  |  20  ----------------------------<  89  3.3[L]   |  18[L]  |  0.39[L]  10-26    144  |  115[H]  |  28[H]  ----------------------------<  141[H]  4.1   |  16[L]  |  0.49[L]  10-25    146[H]  |  115[H]  |  33[H]  ----------------------------<  181[H]  4.1   |  20[L]  |  0.51    Ca    6.9[L]      29 Oct 2024 01:38  Ca    7.0[L]      28 Oct 2024 17:55  Ca    7.4[L]      28 Oct 2024 07:20  Phos  1.8     10-29  Phos  2.1     10-28  Mg     2.00     10-29  Mg     2.10     10-28      CAPILLARY BLOOD GLUCOSE      POCT Blood Glucose.: 160 mg/dL (29 Oct 2024 08:40)  POCT Blood Glucose.: 134 mg/dL (28 Oct 2024 20:57)  POCT Blood Glucose.: 147 mg/dL (28 Oct 2024 18:03)  POCT Blood Glucose.: 107 mg/dL (28 Oct 2024 12:21)          Urinalysis Basic - ( 29 Oct 2024 01:38 )    Color: x / Appearance: x / SG: x / pH: x  Gluc: 134 mg/dL / Ketone: x  / Bili: x / Urobili: x   Blood: x / Protein: x / Nitrite: x   Leuk Esterase: x / RBC: x / WBC x   Sq Epi: x / Non Sq Epi: x / Bacteria: x            RECENT CULTURES:  10-24 @ 01:55 .Blood BLOOD       rad< from: Xray Chest 1 View- PORTABLE-Urgent (Xray Chest 1 View- PORTABLE-Urgent .) (10.20.24 @ 11:02) >    FINDINGS:  No focal consolidation, large pleural effusion or pneumothorax.  The heart size cannot be accurately assessed on this projection.  No acute osseous abnormalities.      IMPRESSION:  No focal consolidations.    --- End of Report ---          PRABHA MONET MD; Resident Radiologist  This document has been electronically signed.  LEA COLE MD; Attending Interventional Radiologist  This document has been electronically signed. Oct 20 2024 11:53AM    < end of copied text >           No growth at 5 days    10-24 @ 01:42 .Blood BLOOD                No growth at 5 days    10-22 @ 15:09 Clean Catch Clean Catch (Midstream)                <10,000 CFU/mL Normal Urogenital Sherlyn          RESPIRATORY CULTURES:          Studies  Chest X-RAY  CT SCAN Chest   Venous Dopplers: LE:   CT Abdomen  Others

## 2024-10-29 NOTE — PROGRESS NOTE ADULT - SUBJECTIVE AND OBJECTIVE BOX
[facility]  Melissa Davies Palliative Care Attending  Contact Info: Page [phone] (including Nights/Weekends), message on Microsoft Teams (Melissa Davies), or leave  at Palliative Office [phone] (non-urgent)   Date of [id number] @ 12:33    SUBJECTIVE AND OBJECTIVE: Patient seen this AM with wife at bedside. Patient was sleeping, comfortably. Wife very concerned about his dry mouth and the dried blood stuck in his mouth. She is aware that patient's hgb dropped and he required 1u prbc.     Indication for Geriatrics and Palliative Care Services/INTERVAL HPI: sx management in setting of advanced malignancy     OVERNIGHT EVENTS:   > 10/21: Weekend events reviewed. Over the past 24 hours, patient was transitioned off pca pump. He required 4mg IV morphine x1.   > 10/22: Over the past 24 hours, patient did not require IV morphine PRNs. He did require IM Zyprexa x2. He is febrile.   > 10/23: Over the past 24 hours, patient did not required PRNs.   > 10/24: Over the past 24 hours, patient required PRNs of haldol 2.5mg x2 for agitation.   > 10/28: Weekend events reviewed. It appears patient was off Morphine gtt as he pulled IV access during agitation episode.   > 10/29: Over the past 24 hours, patient did not require PRNs. He is getting 1u PRBCs.     DNR on chart:DNI: Trial NIV  DNI: Trial NIV      Allergies    No Known Allergies    Intolerances    MEDICATIONS  (STANDING):  abiraterone 500 mg tablet 2 Tablet(s) 2 Tablet(s) Oral daily  acetaminophen     Tablet .. 650 milliGRAM(s) Oral once  atorvastatin 20 milliGRAM(s) Oral at bedtime  cefTRIAXone   IVPB 2000 milliGRAM(s) IV Intermittent every 24 hours  chlorhexidine 2% Cloths 1 Application(s) Topical daily  dextrose 5% with potassium chloride 20 mEq/L 1000 milliLiter(s) (75 mL/Hr) IV Continuous <Continuous>  dextrose 5%. 1000 milliLiter(s) (50 mL/Hr) IV Continuous <Continuous>  dextrose 5%. 1000 milliLiter(s) (100 mL/Hr) IV Continuous <Continuous>  dextrose 50% Injectable 12.5 Gram(s) IV Push once  dextrose 50% Injectable 25 Gram(s) IV Push once  dextrose 50% Injectable 25 Gram(s) IV Push once  diphenhydrAMINE 25 milliGRAM(s) Oral once  fenofibrate Tablet 145 milliGRAM(s) Oral daily  FIRST- Mouthwash  BLM 30 milliLiter(s) Swish and Spit three times a day  glucagon  Injectable 1 milliGRAM(s) IntraMuscular once  influenza  Vaccine (HIGH DOSE) 0.5 milliLiter(s) IntraMuscular once  insulin lispro (ADMELOG) corrective regimen sliding scale   SubCutaneous three times a day before meals  insulin lispro (ADMELOG) corrective regimen sliding scale   SubCutaneous at bedtime  lactobacillus acidophilus 1 Tablet(s) Oral daily  lidocaine   4% Patch 1 Patch Transdermal every 24 hours  metroNIDAZOLE  IVPB 500 milliGRAM(s) IV Intermittent every 12 hours  morphine  Infusion. 0.5 mG/Hr (0.5 mL/Hr) IV Continuous <Continuous>  multivitamin 1 Tablet(s) Oral daily  Nephro-nancy 1 Tablet(s) Oral daily  pantoprazole  Injectable 40 milliGRAM(s) IV Push daily  potassium phosphate IVPB 30 milliMole(s) IV Intermittent once  predniSONE   Tablet 5 milliGRAM(s) Oral daily    MEDICATIONS  (PRN):  acetaminophen     Tablet .. 650 milliGRAM(s) Oral every 6 hours PRN Temp greater or equal to 38C (100.4F), Mild Pain (1 - 3), Moderate Pain (4 - 6)  aluminum hydroxide/magnesium hydroxide/simethicone Suspension 30 milliLiter(s) Oral every 4 hours PRN Dyspepsia  artificial tears (preservative free) Ophthalmic Solution 1 Drop(s) Both EYES four times a day PRN Dry Eyes  dextrose Oral Gel 15 Gram(s) Oral once PRN Blood Glucose LESS THAN 70 milliGRAM(s)/deciliter  morphine  - Injectable 4 milliGRAM(s) IV Push every 3 hours PRN Severe Pain (7 - 10)  naloxone Injectable 0.1 milliGRAM(s) IV Push every 3 minutes PRN For ANY of the following changes in patient status:  A. RR LESS THAN 10 breaths per minute, B. Oxygen saturation LESS THAN 90%, C. Sedation score of 6  polyethylene glycol 3350 17 Gram(s) Oral daily PRN Constipation      ITEMS UNCHECKED ARE NOT PRESENT    PRESENT SYMPTOMS: [x ]Unable to self-report - see [ ] CPOT [ ] PAINADS [ ] RDOS  Source if other than patient:  [x ]Family   [ ]Team     Pain:  [ ]yes [ ]no  QOL impact -   Location -                    Aggravating factors -  Quality -  Radiation -  Timing-  Severity (0-10 scale):  Minimal acceptable level/ pain goal (0-10 scale):     CPOT:    https://www.[facility].org/getattachment/[email or web address]/Critical-Care-Pain-Observation-Tool-(CPOT)    Dyspnea:                           [ ]Mild [ ]Moderate [ ]Severe  Anxiety:                             [ ]Mild [ ]Moderate [ ]Severe  Fatigue:                             [ ]Mild [ ]Moderate [ ]Severe  Nausea:                             [ ]Mild [ ]Moderate [ ]Severe  Loss of appetite:              [ ]Mild [ ]Moderate [ ]Severe  Constipation:                    [ ]Mild [ ]Moderate [ ]Severe  Other Symptoms:  [x ]All other review of systems negative     PCSSQ[Palliative Care Spiritual Screening Question]   Severity (0-10):  Score of 4 or > indicate consideration of Chaplaincy referral.  Chaplaincy Referral: [ ] yes [ ] refused [ ] following [ x] deferred    Caregiver Hensel? : [ ] yes [ ] no [ ] Deferred [x ] Declined             Social work referral [ ] Patient & Family Centered Care Referral [ ]  Anticipatory Grief present?:  [ ] yes [ ] no  [ x] Deferred                  Social work referral [ ] Patient & Family Centered Care Referral [ ]      PHYSICAL EXAM:  Vital Signs Last 24 Hrs  T(C): 36.7 (29 Oct 2024 08:12), Max: 36.9 (29 Oct 2024 00:33)  T(F): 98.1 (29 Oct 2024 08:12), Max: 98.4 (29 Oct 2024 00:33)  HR: 99 (29 Oct 2024 08:12) (75 - 103)  BP: 100/61 (29 Oct 2024 08:12) (98/64 - 105/64)  BP(mean): --  RR: 17 (29 Oct 2024 08:12) (17 - 18)  SpO2: 100% (29 Oct 2024 08:12) (96% - 100%)    Parameters below as of 29 Oct 2024 08:12  Patient On (Oxygen Delivery Method): room air     I&O's Summary    28 Oct 2024 07:01  -  29 Oct 2024 07:00  --------------------------------------------------------  IN: 0 mL / OUT: 800 mL / NET: -800 mL       GENERAL: [x ]Cachexia    [ ]Alert  [ ]Oriented x   [x ]Lethargic  [ ]Unarousable  [ ]Verbal  [ ]Non-Verbal  Behavioral:   [ ]Anxiety  [ ]Delirium [ ]Agitation [x ]Other- calm   HEENT:  [ ]Normal   [x ]Dry mouth   [ ]ET Tube/Trach  [ ]Oral lesions  PULMONARY:   [ ]Clear [ ]Tachypnea  [ x]Audible excessive secretions   [ ]Rhonchi        [ ]Right [ ]Left [ ]Bilateral  [ ]Crackles        [ ]Right [ ]Left [ ]Bilateral  [ ]Wheezing     [ ]Right [ ]Left [ ]Bilateral  [ ]Diminished BS [ ] Right [ ]Left [ ]Bilateral  CARDIOVASCULAR:    [ ]Regular [ ]Irregular [x ]Tachy  [ ]Rodrick [ ]Murmur [ ]Other  GASTROINTESTINAL:  [x ]Soft  [ ]Distended   [x ]+BS  [ ]Non tender [ ]Tender  [ ]Other [ ]PEG [ ]OGT/ NGT   Last BM: 10/27  GENITOURINARY:  [ ]Normal [ ]Incontinent   [ ]Oliguria/Anuria   [x ]Farah  MUSCULOSKELETAL:   [ ]Normal   [x ]Weakness  [ ]Bed/Wheelchair bound [ ]Edema  NEUROLOGIC:   [ ]No focal deficits  [ ] Cognitive impairment  [ ] Dysphagia [ ]Dysarthria [ ] Paresis [ ]Other   SKIN:   [ ]Normal  [ ]Rash  [x ]Other- scratches on face   [ ]Pressure ulcer(s) [ ]y [ ]n present on admission    CRITICAL CARE:  [ ]Shock Present  [ ]Septic [ ]Cardiogenic [ ]Neurologic [ ]Hypovolemic  [ ]Vasopressors [ ]Inotropes  [ ]Respiratory failure present [ ]Mechanical Ventilation [ ]Non-invasive ventilatory support [ ]High-Flow   [ ]Acute  [ ]Chronic [ ]Hypoxic  [ ]Hypercarbic [ ]Other  [ ]Other organ failure     LABS:                        6.8    4.26  )-----------( 90       ( 29 Oct 2024 01:38 )             20.4   10-29    144  |  115[H]  |  15  ----------------------------<  134[H]  2.6[LL]   |  19[L]  |  0.37[L]    Ca    6.9[L]      29 Oct 2024 01:38  Phos  1.8     10-29  Mg     2.00     10-29        Urinalysis Basic - ( 29 Oct 2024 01:38 )    Color: x / Appearance: x / SG: x / pH: x  Gluc: 134 mg/dL / Ketone: x  / Bili: x / Urobili: x   Blood: x / Protein: x / Nitrite: x   Leuk Esterase: x / RBC: x / WBC x   Sq Epi: x / Non Sq Epi: x / Bacteria: x      RADIOLOGY & ADDITIONAL STUDIES: no new     Protein Calorie Malnutrition Present: [ ]mild [ ]moderate [ ]severe [ ]underweight [ ]morbid obesity  https://www.andeal.org/vault/2440/web/files/ONC/Table_Clinical%20Characteristics%20to%20Document%20Malnutrition-White%20JV%20et%20al%202012.pdf    Height (cm): 172.7 (10-12-24 @ 09:21)  Weight (kg): 69.4 (10-12-24 @ 09:21)  BMI (kg/m2): 23.3 (10-12-24 @ 09:21)    [ ]PPSV2 < or = 30%  [ ]significant weight loss [ ]poor nutritional intake [ ]anasarca[ ]Artificial Nutrition    Other REFERRALS:  [ ]Hospice  [ ]Child Life  [ ]Social Work  [ ]Case management [ ]Holistic Therapy

## 2024-10-29 NOTE — PROGRESS NOTE ADULT - PROBLEM SELECTOR PLAN 9
Patient is supported by his wife- Vijaya [phone]) and 3 adult sons- Magen, Mateus and Freddy   > Patient is recently dx with metastatic prostate cancer just 1.5 weeks ago and he is treatment oriented.  > Offered caregiver Sw referral to patient's wife- DECLINED   > 10/21: Began advanced care planning discussions with patient's son, Freddy. Pt's wife stayed overnight so is sleeping. Will plan to speak to his wife regarding further goc.  > 10/23: See GOC note- DNR/DNI. Of note, wife has shared with provider that they are familiar with hospice as her sister is on hospice in [place].   > 10/24: Plan is to continue to medically optimize patient and improve patient's performance/nutritional status with goal of pursuing further chemotherapy. Extensive goc discussion held with patient's family, Oncology team (Dr. Tate and Umm Aponte- Onc NP) and palliative care team regarding plan of care.

## 2024-10-29 NOTE — PROGRESS NOTE ADULT - SUBJECTIVE AND OBJECTIVE BOX
Patient seen today, asleep, appears comfortable  Hgb 6.8, afebrile, on ceftriaxone + Metronidazole      MEDICATIONS  (STANDING):  abiraterone 500 mg tablet 2 Tablet(s) 2 Tablet(s) Oral daily  acetaminophen     Tablet .. 650 milliGRAM(s) Oral once  atorvastatin 20 milliGRAM(s) Oral at bedtime  cefTRIAXone   IVPB 2000 milliGRAM(s) IV Intermittent every 24 hours  chlorhexidine 2% Cloths 1 Application(s) Topical daily  dextrose 5%. 1000 milliLiter(s) (100 mL/Hr) IV Continuous <Continuous>  dextrose 5%. 1000 milliLiter(s) (50 mL/Hr) IV Continuous <Continuous>  dextrose 50% Injectable 12.5 Gram(s) IV Push once  dextrose 50% Injectable 25 Gram(s) IV Push once  dextrose 50% Injectable 25 Gram(s) IV Push once  diphenhydrAMINE 25 milliGRAM(s) Oral once  fenofibrate Tablet 145 milliGRAM(s) Oral daily  FIRST- Mouthwash  BLM 30 milliLiter(s) Swish and Spit three times a day  glucagon  Injectable 1 milliGRAM(s) IntraMuscular once  influenza  Vaccine (HIGH DOSE) 0.5 milliLiter(s) IntraMuscular once  insulin lispro (ADMELOG) corrective regimen sliding scale   SubCutaneous at bedtime  insulin lispro (ADMELOG) corrective regimen sliding scale   SubCutaneous three times a day before meals  lactobacillus acidophilus 1 Tablet(s) Oral daily  lidocaine   4% Patch 1 Patch Transdermal every 24 hours  metroNIDAZOLE  IVPB 500 milliGRAM(s) IV Intermittent every 12 hours  morphine  Infusion. 1 mG/Hr (1 mL/Hr) IV Continuous <Continuous>  multivitamin 1 Tablet(s) Oral daily  Nephro-nancy 1 Tablet(s) Oral daily  pantoprazole  Injectable 40 milliGRAM(s) IV Push daily  potassium chloride  10 mEq/100 mL IVPB 10 milliEquivalent(s) IV Intermittent every 1 hour  predniSONE   Tablet 5 milliGRAM(s) Oral daily    MEDICATIONS  (PRN):  acetaminophen     Tablet .. 650 milliGRAM(s) Oral every 6 hours PRN Temp greater or equal to 38C (100.4F), Mild Pain (1 - 3), Moderate Pain (4 - 6)  aluminum hydroxide/magnesium hydroxide/simethicone Suspension 30 milliLiter(s) Oral every 4 hours PRN Dyspepsia  artificial tears (preservative free) Ophthalmic Solution 1 Drop(s) Both EYES four times a day PRN Dry Eyes  dextrose Oral Gel 15 Gram(s) Oral once PRN Blood Glucose LESS THAN 70 milliGRAM(s)/deciliter  morphine  - Injectable 4 milliGRAM(s) IV Push every 3 hours PRN Severe Pain (7 - 10)  naloxone Injectable 0.1 milliGRAM(s) IV Push every 3 minutes PRN For ANY of the following changes in patient status:  A. RR LESS THAN 10 breaths per minute, B. Oxygen saturation LESS THAN 90%, C. Sedation score of 6  polyethylene glycol 3350 17 Gram(s) Oral daily PRN Constipation        Vital Signs Last 24 Hrs  T(C): 36.7 (29 Oct 2024 08:12), Max: 36.9 (29 Oct 2024 00:33)  T(F): 98.1 (29 Oct 2024 08:12), Max: 98.4 (29 Oct 2024 00:33)  HR: 99 (29 Oct 2024 08:12) (75 - 103)  BP: 100/61 (29 Oct 2024 08:12) (98/64 - 105/64)  BP(mean): --  RR: 17 (29 Oct 2024 08:12) (17 - 18)  SpO2: 100% (29 Oct 2024 08:12) (96% - 100%)    Parameters below as of 29 Oct 2024 08:12  Patient On (Oxygen Delivery Method): room air        PE  NAD  Asleep  Anicteric, MMM  RRR  CTAB  Abd soft, NT, ND  No c/c/e  No rash grossly                            6.8    4.26  )-----------( 90       ( 29 Oct 2024 01:38 )             20.4       10-29    144  |  115[H]  |  15  ----------------------------<  134[H]  2.6[LL]   |  19[L]  |  0.37[L]    Ca    6.9[L]      29 Oct 2024 01:38  Phos  1.8     10-29  Mg     2.00     10-29

## 2024-10-29 NOTE — PHYSICAL THERAPY INITIAL EVALUATION ADULT - REFERRAL TO ANOTHER SERVICE NEEDED, PT EVAL
Mammography Services    Thank you for visiting [facility]     Today you had a procedure in the Imaging Suites at [facility] location.  It was supervised by a radiologist with special education in mammography.    Name: Alicia Murray  Date of Service: 4/6/22    Today's Procedure:  Left Diagnostic Mammogram, Left Ultrasound and Consult with Nurse    Results: Probably Benign  The reference complex cyst in the left breast 1 o'clock 4cm from the nipple measures [id number], not signficantly changed and is probably benign. No additional significant masses, calcifications, or other findings are seen in the breast on the mammogram or left ultrasound. A follow-up mammogram and an ultrasound in 6 months is recommended to demonstrate stability.   You will receive a letter in the mail that summarizes the results of today's exam.  Your continued breast health is important to us.  Please refer to this information for future reference.    As a result of today's procedure, the radiologist is recommending:   Monthly Breast Self Exam  Yearly Breast Exam done by healthcare provider  6 month follow up Mammogram and Ultrasound    Scheduled:     Provider: Viki Marie    The [facility]  [facility]  [address]  [place] [address]  To schedule an appointment please call [phone].    To reach your Breast Health Coordinator, please call [place] [place]- [phone]   MALATHI Clark        
psych, pain management
social work

## 2024-10-29 NOTE — PROGRESS NOTE ADULT - PROBLEM SELECTOR PLAN 7
Patient being treated for ischemic colitis - Decreased BMs per flowsheets   - on flagyl and rocephin   - monitor BMs

## 2024-10-29 NOTE — PROGRESS NOTE ADULT - SUBJECTIVE AND OBJECTIVE BOX
Infectious Diseases Follow Up:    Patient is a 64y old  Male who presents with a chief complaint of Pain (28 Oct 2024 18:21)      Interval History/ROS:  Afebrile ON, pt denies abdominal pain     Allergies  No Known Allergies        ANTIMICROBIALS:  cefTRIAXone   IVPB 2000 every 24 hours  metroNIDAZOLE  IVPB 500 every 12 hours      Current Abx:     Previous Abx     OTHER MEDS:  MEDICATIONS  (STANDING):  acetaminophen     Tablet .. 650 every 6 hours PRN  acetaminophen     Tablet .. 650 once  aluminum hydroxide/magnesium hydroxide/simethicone Suspension 30 every 4 hours PRN  atorvastatin 20 at bedtime  dextrose 50% Injectable 12.5 once  dextrose 50% Injectable 25 once  dextrose 50% Injectable 25 once  dextrose Oral Gel 15 once PRN  diphenhydrAMINE 25 once  fenofibrate Tablet 145 daily  glucagon  Injectable 1 once  influenza  Vaccine (HIGH DOSE) 0.5 once  insulin lispro (ADMELOG) corrective regimen sliding scale  at bedtime  insulin lispro (ADMELOG) corrective regimen sliding scale  three times a day before meals  morphine  - Injectable 4 every 3 hours PRN  morphine  Infusion. 1 <Continuous>  pantoprazole  Injectable 40 daily  polyethylene glycol 3350 17 daily PRN  predniSONE   Tablet 5 daily      Vital Signs Last 24 Hrs  T(C): 36.7 (29 Oct 2024 08:12), Max: 36.9 (29 Oct 2024 00:33)  T(F): 98.1 (29 Oct 2024 08:12), Max: 98.4 (29 Oct 2024 00:33)  HR: 99 (29 Oct 2024 08:12) (75 - 103)  BP: 100/61 (29 Oct 2024 08:12) (98/64 - 105/64)  BP(mean): --  RR: 17 (29 Oct 2024 08:12) (17 - 18)  SpO2: 100% (29 Oct 2024 08:12) (96% - 100%)    Parameters below as of 29 Oct 2024 08:12  Patient On (Oxygen Delivery Method): room air          PHYSICAL EXAM:  GENERAL: NAD, well-developed  HEAD:  Atraumatic, Normocephalic  EYES: EOMI, conjunctiva and sclera clear  CHEST/LUNG: Clear to auscultation bilaterally; No wheeze  HEART: Regular rate and rhythm; No murmurs, rubs, or gallops  ABDOMEN: Soft, Nontender, Nondistended; Bowel sounds present  PSYCH: Calm                             6.8    4.26  )-----------( 90       ( 29 Oct 2024 01:38 )             20.4       10-29    144  |  115[H]  |  15  ----------------------------<  134[H]  2.6[LL]   |  19[L]  |  0.37[L]    Ca    6.9[L]      29 Oct 2024 01:38  Phos  1.8     10-29  Mg     2.00     10-29        Urinalysis Basic - ( 29 Oct 2024 01:38 )    Color: x / Appearance: x / SG: x / pH: x  Gluc: 134 mg/dL / Ketone: x  / Bili: x / Urobili: x   Blood: x / Protein: x / Nitrite: x   Leuk Esterase: x / RBC: x / WBC x   Sq Epi: x / Non Sq Epi: x / Bacteria: x        MICROBIOLOGY:  v  .Blood BLOOD  10-24-24   No growth at 4 days  --  --      .Blood BLOOD  10-24-24   No growth at 4 days  --  --      Clean Catch Clean Catch (Midstream)  10-22-24   <10,000 CFU/mL Normal Urogenital Sherlyn  --  --      .Blood BLOOD  10-22-24   Growth in aerobic bottle: Escherichia coli  --  Escherichia coli      .Blood BLOOD  10-21-24   No growth at 5 days  --  --      .Blood BLOOD  10-21-24   No growth at 5 days  --  --      .Blood BLOOD  10-19-24   Growth in aerobic and anaerobic bottles: Escherichia coli  See previous culture [id number]  --    Growth in aerobic bottle: Gram Negative Rods  Growth in anaerobic bottle: Gram Negative Rods      .Blood BLOOD  10-19-24   Growth in aerobic and anaerobic bottles: Escherichia coli  See previous culture [id number]  --    Growth in anaerobic bottle: Gram Negative Rods  Growth in aerobic bottle: Gram Negative Rods      .Blood BLOOD  10-19-24   Growth in aerobic and anaerobic bottles: Escherichia coli  See previous culture [id number]  --    Growth in aerobic and anaerobic bottles: Gram Negative Rods and Gram  Negative Coccobacilli      .Blood BLOOD  10-19-24   Growth in aerobic and anaerobic bottles: Escherichia coli  Direct identification is available within approximately 3-5  hours either by Blood Panel Multiplexed PCR or Direct  MALDI-TOF. Details: [email or web address]  --  Blood Culture PCR  Escherichia coli                RADIOLOGY:

## 2024-10-29 NOTE — PROGRESS NOTE ADULT - PROBLEM SELECTOR PLAN 6
Improved- Likely related to chemotherapy treatment  - hematology-oncology following:   - Appreciate ID recs- on Rocephin 2g q24 hours and Flagyl 500mg bid x 2weeks (11/6/24 last day)   - Patient s/p 1u PRBCs (10/21); 3u Plts (10/20, 10/21, 10/22) 1u 10/29   - Patient febrile - tylenol prn

## 2024-10-29 NOTE — PROGRESS NOTE ADULT - PROBLEM SELECTOR PLAN 10
In the event of newly developing, evolving, or worsening symptoms, please contact the Palliative Medicine team via pager (if the patient is at [facility] #[phone] or if the patient is at [facility] #[phone]) The Geriatric and Palliative Medicine service has coverage 24 hours a day/ 7 days a week to provide medical recommendations regarding symptom management needs via telephone.

## 2024-10-29 NOTE — PROGRESS NOTE ADULT - PROBLEM SELECTOR PLAN 5
CTa/p: Dissection of the SMA. Presacral mass with periaortic lymphadenopathy and lytic vertebral metastases with pathologic fracture at L1 and L5 are again seen.  > Vascular surgery recs- No vascular surgery interventions   > Unable to start heparin as patient with blood in stool and with thrombocytopenia   > Appreciate ID recs- pt found to have E.coli bacteremia and H.influenza bacteremia. Repeat blood cultures NGTD   > pureed with mildly thick liquids

## 2024-10-29 NOTE — PROGRESS NOTE ADULT - PROBLEM SELECTOR PLAN 4
MRI Lumbar Spine: (10/15) Diffuse osseous metastases. L1 and L5 pathologic fractures are associated with moderate epidural disease at L1, more mild at L5. At least mild epidural disease at the left S1-S2 neural foramen. No significant lumbar degenerative disc disease.  - MRI from 10/15 shows extensive metastasis in cervical, thoracic, lumbar, skull base and calvarium. Also small cortical subacute infarctions.  - wean Morphine gtt @0.5mg/hr, IV morphine 4mg PRNs (has not required PRNs in multiple days) Encouraged family to ask for PRN pain medication if he has breakthrough pain.   - IR evaluation for kyphoplasty on hold in setting of c/f SMA dissection and bowel ischemia   - Patient with oral pain- Magic mouthwash tid added but patient only received 1 dose this AM   - s/p decadron 4mg IV bid x3 days   - Narcan PRN  - multimodal pain control: lidocaine patch, warm/cold packs   - recommend changing bowel regimen to PRN

## 2024-10-29 NOTE — PROGRESS NOTE ADULT - ASSESSMENT
64-year-old male with metastatic prostate cancer, sent in by hematologist from [facility] for uncontrolled pain, nausea, vomiting.   Patient reports diffuse pain starting 6 months ago significantly worsening for the past 2 weeks.  Currently the worst pain is located around the lower back.   No loss of bladder and bowel function, no saddle paresthesia.  Able to walk.  patient is oxycodone 5 every 4-6 hour.  Yesterday they started adding OxyContin 10.  However patient continued to have pain.  Family also reports significant nausea and vomiting, inability to tolerate p.o. for the last 2 days.  Last bowel movement 2 days ago.   No known allergy.  Diagnosed with high risk high volume metastatic prostate cancer with bone, liver lymph node mets and presacral mass. Liver biopsy confirmed disease with . Started lupron, loly/pred with plans to initiate taxotere.    (12 Oct 2024 15:40)  pt seems very frustrated:   he is on 2 L of oxygen : he has no underlying lung disease:  but he is requiring 2 L of oxygen      Hypoxic resp failure  Metastatic prostate cancer  Back pain     Hypoxic resp failure  -He has no underlying lung disease:  but he is requiring 2 L of oxygen :   -CTA showed; No pulmonary embolism to the level of the segmental branches bilaterally. Limited evaluation of the subsegmental branches secondary to incomplete contrast opacification.  Multilevel vertebral body lytic lesions and loss of vertebral body height, better evaluated on CT thoracic spine 10/17/2024. Metastatic prostate cancer  -Patent central airways. Bibasilar atelectasis. No pleural effusion. MEDIASTINUM AND KATTY: No lymphadenopathy.  PULMONARY ANGIOGRAM: No pulmonary embolism to the level of the segmental   branches bilaterally. Limited evaluation of the subsegmental branches secondary to incomplete contrast opacification.    10/19:  -seems pretty tired;  on 2 L of oxygen :  -cta chest showed: No pulmonary embolism to the level of the segmental branches bilaterally. Limited evaluation of the subsegmental branches secondary to incomplete   contrast opacification. Multilevel vertebral body lytic lesions and loss of vertebral body height, better evaluated on CT thoracic spine 10/17/2024.  -still with fever:  no pe  on zosyn  and leukopenic hemonc following;  has metastatic prostate ca:   -VBG seems OK:     10/20:  pulm wise he seems to be stable:   -using 2 L of oxygen    -yesterdays VBG with minimal met acidosis  -cta again noted    10/21:  on 4 L of oxygen  today    cxr is size    e coli sepsis: Gram Negative Rods and Gram Negative Coccobacilli    10/22: heis doing poorly today  : he is very agitated and uncomfortable  on mittens: ? sun downing   10/23: quiet today  : sleeping:  oxygen requirement has not increased: on rooo ha 95% as documented    WBC is slowly increasing:   no fever:   -on antibiotics   10/24: pt is not doing well : his repeat blood cultures are positive with same organism :  would defer to ID;   10/25: seems to be doing  ok ; no sob:  no cough :  no phlegm   : on room air   10/26: resp failure has resolved:  is septic  : on ceftriaxone     10/27:  he seems OK:  he is on room air:  on morphine drip   remans on ceftriaxone still     10/28: she seems to be doing  ok : no sob: no cough ; no phlegm  confused:  on morphine drip    10/29; seems comfortable on room air;   cont current rx:       New finding:  SMA dissection : neutropenic colitis/ #E Coli and H influenzae bacteremia/ #Neutropenic fever  -/f ischemic colitis on R colon   Diagnosed  on ct abd:  defer to surgery and primary team:  probably no intervention:   -cont antibiotics  -not doing very well with above new findings    10/22; no intervention per surgery    -cont antibiotics  -has fever:  ID following :  -Last chest xray on 20th  ; normal     id following   10/23  IR was consulted for vertebral augmentation of T3-T5 prior to XRT.   Patient was seen bedside. Initially, patient kept requesting no exam and was not willing to participate in the discussion. Son was bedside at time of conversation.   Per discussion with the medical attending, given the concern for SMA dissection and concern for bowel ischemia, will hold off on vertebral augmentation evaluation at this time.   Please reach out to IR when patient is medically stable for vertebral augmentation.  10/24:  remains septic:  above cancelled:   ? for palliative care now  10/26: he seems same to me:  no overnight events  on room air:   check VBG  : cont antibiotics   hemonc following   10/27:  -still on antibiotics for e coli sepsis  -id following s  10/28: cont antibtiocs     Back pain   -likely due to metastatic disease:  adm here for severe pain :  -pain control per primary team   -venous ph is ok     dw acp & family ; GOC as per primary  team

## 2024-10-29 NOTE — PROGRESS NOTE ADULT - PROBLEM SELECTOR PLAN 3
Patient appears comfortable today   - Behavioral health recs appreciated: Zyprexa discontinued per son's request. Haldol prn per psych recs. hold med if QTC > 500  - EKG performed QTc 436 on 1017

## 2024-10-30 LAB
ANION GAP SERPL CALC-SCNC: 10 MMOL/L — SIGNIFICANT CHANGE UP (ref 7–14)
ANION GAP SERPL CALC-SCNC: 9 MMOL/L — SIGNIFICANT CHANGE UP (ref 7–14)
APTT BLD: 31.5 SEC — SIGNIFICANT CHANGE UP (ref 24.5–35.6)
BUN SERPL-MCNC: 10 MG/DL — SIGNIFICANT CHANGE UP (ref 7–23)
BUN SERPL-MCNC: 10 MG/DL — SIGNIFICANT CHANGE UP (ref 7–23)
CALCIUM SERPL-MCNC: 7.1 MG/DL — LOW (ref 8.4–10.5)
CALCIUM SERPL-MCNC: 7.2 MG/DL — LOW (ref 8.4–10.5)
CHLORIDE SERPL-SCNC: 112 MMOL/L — HIGH (ref 98–107)
CHLORIDE SERPL-SCNC: 113 MMOL/L — HIGH (ref 98–107)
CO2 SERPL-SCNC: 18 MMOL/L — LOW (ref 22–31)
CO2 SERPL-SCNC: 20 MMOL/L — LOW (ref 22–31)
CREAT SERPL-MCNC: 0.33 MG/DL — LOW (ref 0.5–1.3)
CREAT SERPL-MCNC: 0.37 MG/DL — LOW (ref 0.5–1.3)
EGFR: 124 ML/MIN/1.73M2 — SIGNIFICANT CHANGE UP
EGFR: 128 ML/MIN/1.73M2 — SIGNIFICANT CHANGE UP
GLUCOSE BLDC GLUCOMTR-MCNC: 101 MG/DL — HIGH (ref 70–99)
GLUCOSE BLDC GLUCOMTR-MCNC: 132 MG/DL — HIGH (ref 70–99)
GLUCOSE BLDC GLUCOMTR-MCNC: 134 MG/DL — HIGH (ref 70–99)
GLUCOSE BLDC GLUCOMTR-MCNC: 99 MG/DL — SIGNIFICANT CHANGE UP (ref 70–99)
GLUCOSE SERPL-MCNC: 122 MG/DL — HIGH (ref 70–99)
GLUCOSE SERPL-MCNC: 150 MG/DL — HIGH (ref 70–99)
HCT VFR BLD CALC: 25.1 % — LOW (ref 39–50)
HGB BLD-MCNC: 8.5 G/DL — LOW (ref 13–17)
INR BLD: 1.76 RATIO — HIGH (ref 0.85–1.16)
MAGNESIUM SERPL-MCNC: 1.8 MG/DL — SIGNIFICANT CHANGE UP (ref 1.6–2.6)
MAGNESIUM SERPL-MCNC: 1.9 MG/DL — SIGNIFICANT CHANGE UP (ref 1.6–2.6)
MCHC RBC-ENTMCNC: 30.6 PG — SIGNIFICANT CHANGE UP (ref 27–34)
MCHC RBC-ENTMCNC: 33.9 G/DL — SIGNIFICANT CHANGE UP (ref 32–36)
MCV RBC AUTO: 90.3 FL — SIGNIFICANT CHANGE UP (ref 80–100)
NRBC # BLD: 3 /100 WBCS — HIGH (ref 0–0)
NRBC # FLD: 0.08 K/UL — HIGH (ref 0–0)
PHOSPHATE SERPL-MCNC: 1.6 MG/DL — LOW (ref 2.5–4.5)
PHOSPHATE SERPL-MCNC: 1.7 MG/DL — LOW (ref 2.5–4.5)
PLATELET # BLD AUTO: 118 K/UL — LOW (ref 150–400)
POTASSIUM SERPL-MCNC: 3.2 MMOL/L — LOW (ref 3.5–5.3)
POTASSIUM SERPL-MCNC: 3.3 MMOL/L — LOW (ref 3.5–5.3)
POTASSIUM SERPL-SCNC: 3.2 MMOL/L — LOW (ref 3.5–5.3)
POTASSIUM SERPL-SCNC: 3.3 MMOL/L — LOW (ref 3.5–5.3)
PROTHROM AB SERPL-ACNC: 20.8 SEC — HIGH (ref 9.9–13.4)
RBC # BLD: 2.78 M/UL — LOW (ref 4.2–5.8)
RBC # FLD: 19.9 % — HIGH (ref 10.3–14.5)
SODIUM SERPL-SCNC: 141 MMOL/L — SIGNIFICANT CHANGE UP (ref 135–145)
SODIUM SERPL-SCNC: 141 MMOL/L — SIGNIFICANT CHANGE UP (ref 135–145)
WBC # BLD: 3.11 K/UL — LOW (ref 3.8–10.5)
WBC # FLD AUTO: 3.11 K/UL — LOW (ref 3.8–10.5)

## 2024-10-30 PROCEDURE — 99232 SBSQ HOSP IP/OBS MODERATE 35: CPT

## 2024-10-30 PROCEDURE — 99233 SBSQ HOSP IP/OBS HIGH 50: CPT

## 2024-10-30 RX ORDER — POTASSIUM PHOSPHATE, MONOBASIC POTASSIUM PHOSPHATE, DIBASIC INJECTION, 236; 224 MG/ML; MG/ML
15 SOLUTION, CONCENTRATE INTRAVENOUS ONCE
Refills: 0 | Status: COMPLETED | OUTPATIENT
Start: 2024-10-30 | End: 2024-10-30

## 2024-10-30 RX ORDER — ELECTROLYTE-M SOLUTION/D5W 5 %
1000 INTRAVENOUS SOLUTION INTRAVENOUS
Refills: 0 | Status: DISCONTINUED | OUTPATIENT
Start: 2024-10-30 | End: 2024-10-31

## 2024-10-30 RX ORDER — HALOPERIDOL 2 MG
1 TABLET ORAL ONCE
Refills: 0 | Status: COMPLETED | OUTPATIENT
Start: 2024-10-30 | End: 2024-10-30

## 2024-10-30 RX ORDER — POTASSIUM CHLORIDE 600 MG/1
10 TABLET, EXTENDED RELEASE ORAL EVERY 4 HOURS
Refills: 0 | Status: COMPLETED | OUTPATIENT
Start: 2024-10-30 | End: 2024-10-30

## 2024-10-30 RX ADMIN — POTASSIUM PHOSPHATE, MONOBASIC POTASSIUM PHOSPHATE, DIBASIC INJECTION, 62.5 MILLIMOLE(S): 236; 224 SOLUTION, CONCENTRATE INTRAVENOUS at 22:08

## 2024-10-30 RX ADMIN — POTASSIUM PHOSPHATE, MONOBASIC POTASSIUM PHOSPHATE, DIBASIC INJECTION, 62.5 MILLIMOLE(S): 236; 224 SOLUTION, CONCENTRATE INTRAVENOUS at 09:50

## 2024-10-30 RX ADMIN — POTASSIUM CHLORIDE 100 MILLIEQUIVALENT(S): 600 TABLET, EXTENDED RELEASE ORAL at 09:50

## 2024-10-30 RX ADMIN — Medication 50 MILLILITER(S): at 17:19

## 2024-10-30 RX ADMIN — METRONIDAZOLE 100 MILLIGRAM(S): 500 TABLET ORAL at 06:01

## 2024-10-30 RX ADMIN — PANTOPRAZOLE SODIUM 40 MILLIGRAM(S): 40 TABLET, DELAYED RELEASE ORAL at 13:17

## 2024-10-30 RX ADMIN — METRONIDAZOLE 100 MILLIGRAM(S): 500 TABLET ORAL at 19:01

## 2024-10-30 RX ADMIN — Medication 1 TABLET(S): at 13:18

## 2024-10-30 RX ADMIN — POTASSIUM CHLORIDE 100 MILLIEQUIVALENT(S): 600 TABLET, EXTENDED RELEASE ORAL at 13:14

## 2024-10-30 RX ADMIN — CHLORHEXIDINE GLUCONATE 1 APPLICATION(S): 1.2 RINSE ORAL at 13:15

## 2024-10-30 RX ADMIN — DIPHENHYDRAMINE HYDROCHLORIDE AND LIDOCAINE HYDROCHLORIDE AND ALUMINUM HYDROXIDE AND MAGNESIUM HYDRO 30 MILLILITER(S): KIT at 13:18

## 2024-10-30 RX ADMIN — Medication 1 MILLIGRAM(S): at 22:07

## 2024-10-30 RX ADMIN — Medication 100 MILLIGRAM(S): at 13:11

## 2024-10-30 RX ADMIN — Medication 145 MILLIGRAM(S): at 13:18

## 2024-10-30 RX ADMIN — Medication 0.5 MG/HR: at 07:22

## 2024-10-30 NOTE — CHART NOTE - NSCHARTNOTEFT_GEN_A_CORE
IR Follow-Up     Patient is a 64 year old male patient presenting metastatic prostate cancer with involvement of multiple vertebral levels. Rad-onc planned for 5 fractions/20gy to T1-T5 and L1-L5. IR originally consulted for vertebral augmentation of T3-T5 on 10/21 however, plans were put on hold given new SMA dissection and concern for bowel ischemia. After vascular surgery evaluation, no surgical intervention at this time. Patient now has no abdominal pain and low clinical concern for bowel ischemia. IR re-consulted for vertebral augmentation of T3-T5 in-preparation for XRT with rad-onc.     -- Will discuss with radiation oncology regarding radiation treatment plan which will determine final vertebral augmentation plan.  -- In the mean time, please keep labs up to date (CBC, BMP, coags)    Patient discussed with Dr. Case.        --  Ron Villavicencio DO, PGY-2  Vascular and Interventional Radiology   Available on [employer] Teams    For EMERGENT inquiries/questions:  IR Pager ([facility]): [phone]  IR Pager ([facility]): [phone] ; [phone]    For non-emergent consults/questions:   Please place a sunrise order "Consult- Interventional Radiology" with an appropriate callback number    For questions about scheduling during appropriate work hours, call IR :  [facility]: [phone]  [facility]: [phone]    For outpatient IR booking:  [facility]: [phone]  [facility]: [phone]

## 2024-10-30 NOTE — PROGRESS NOTE ADULT - PROBLEM SELECTOR PLAN 10
In the event of newly developing, evolving, or worsening symptoms, please contact the Palliative Medicine team via pager (if the patient is at [facility] #[phone] or if the patient is at [facility] #[phone]) The Geriatric and Palliative Medicine service has coverage 24 hours a day/ 7 days a week to provide medical recommendations regarding symptom management needs via telephone.

## 2024-10-30 NOTE — PROGRESS NOTE ADULT - SUBJECTIVE AND OBJECTIVE BOX
Patient is a 64y old  Male who presents with a chief complaint of Pain (30 Oct 2024 13:32)    Date of servie : 10-30-24 @ 16:25  INTERVAL HPI/OVERNIGHT EVENTS:  T(C): 36.2 (10-30-24 @ 12:22), Max: 36.8 (10-29-24 @ 20:24)  HR: 102 (10-30-24 @ 12:22) (100 - 107)  BP: 101/67 (10-30-24 @ 12:22) (98/70 - 105/62)  RR: 18 (10-30-24 @ 12:22) (18 - 18)  SpO2: 100% (10-30-24 @ 12:22) (97% - 100%)  Wt(kg): --  I&O's Summary    29 Oct 2024 07:01  -  30 Oct 2024 07:00  --------------------------------------------------------  IN: 980 mL / OUT: 370 mL / NET: 610 mL        LABS:                        8.5    3.11  )-----------( 118      ( 30 Oct 2024 06:50 )             25.1     10-30    141  |  113[H]  |  10  ----------------------------<  122[H]  3.2[L]   |  18[L]  |  0.33[L]    Ca    7.1[L]      30 Oct 2024 06:50  Phos  1.6     10-30  Mg     1.80     10-30        Urinalysis Basic - ( 30 Oct 2024 06:50 )    Color: x / Appearance: x / SG: x / pH: x  Gluc: 122 mg/dL / Ketone: x  / Bili: x / Urobili: x   Blood: x / Protein: x / Nitrite: x   Leuk Esterase: x / RBC: x / WBC x   Sq Epi: x / Non Sq Epi: x / Bacteria: x      CAPILLARY BLOOD GLUCOSE      POCT Blood Glucose.: 134 mg/dL (30 Oct 2024 13:08)  POCT Blood Glucose.: 132 mg/dL (30 Oct 2024 09:41)  POCT Blood Glucose.: 122 mg/dL (29 Oct 2024 20:46)  POCT Blood Glucose.: 130 mg/dL (29 Oct 2024 17:41)        Urinalysis Basic - ( 30 Oct 2024 06:50 )    Color: x / Appearance: x / SG: x / pH: x  Gluc: 122 mg/dL / Ketone: x  / Bili: x / Urobili: x   Blood: x / Protein: x / Nitrite: x   Leuk Esterase: x / RBC: x / WBC x   Sq Epi: x / Non Sq Epi: x / Bacteria: x        MEDICATIONS  (STANDING):  abiraterone 500 mg tablet 2 Tablet(s) 2 Tablet(s) Oral daily  acetaminophen     Tablet .. 650 milliGRAM(s) Oral once  atorvastatin 20 milliGRAM(s) Oral at bedtime  cefTRIAXone   IVPB 2000 milliGRAM(s) IV Intermittent every 24 hours  chlorhexidine 2% Cloths 1 Application(s) Topical daily  dextrose 5% + lactated ringers with potassium chloride 20 mEq/L 1000 milliLiter(s) (50 mL/Hr) IV Continuous <Continuous>  dextrose 5%. 1000 milliLiter(s) (50 mL/Hr) IV Continuous <Continuous>  dextrose 5%. 1000 milliLiter(s) (100 mL/Hr) IV Continuous <Continuous>  dextrose 50% Injectable 25 Gram(s) IV Push once  dextrose 50% Injectable 12.5 Gram(s) IV Push once  dextrose 50% Injectable 25 Gram(s) IV Push once  diphenhydrAMINE 25 milliGRAM(s) Oral once  fenofibrate Tablet 145 milliGRAM(s) Oral daily  FIRST- Mouthwash  BLM 30 milliLiter(s) Swish and Spit three times a day  glucagon  Injectable 1 milliGRAM(s) IntraMuscular once  influenza  Vaccine (HIGH DOSE) 0.5 milliLiter(s) IntraMuscular once  insulin lispro (ADMELOG) corrective regimen sliding scale   SubCutaneous three times a day before meals  insulin lispro (ADMELOG) corrective regimen sliding scale   SubCutaneous at bedtime  lactobacillus acidophilus 1 Tablet(s) Oral daily  lidocaine   4% Patch 1 Patch Transdermal every 24 hours  melatonin 6 milliGRAM(s) Oral at bedtime  metroNIDAZOLE  IVPB 500 milliGRAM(s) IV Intermittent every 12 hours  multivitamin 1 Tablet(s) Oral daily  Nephro-nancy 1 Tablet(s) Oral daily  pantoprazole  Injectable 40 milliGRAM(s) IV Push daily  potassium chloride  10 mEq/100 mL IVPB 10 milliEquivalent(s) IV Intermittent once  predniSONE   Tablet 5 milliGRAM(s) Oral daily    MEDICATIONS  (PRN):  acetaminophen     Tablet .. 650 milliGRAM(s) Oral every 6 hours PRN Temp greater or equal to 38C (100.4F), Mild Pain (1 - 3), Moderate Pain (4 - 6)  aluminum hydroxide/magnesium hydroxide/simethicone Suspension 30 milliLiter(s) Oral every 4 hours PRN Dyspepsia  artificial tears (preservative free) Ophthalmic Solution 1 Drop(s) Both EYES four times a day PRN Dry Eyes  dextrose Oral Gel 15 Gram(s) Oral once PRN Blood Glucose LESS THAN 70 milliGRAM(s)/deciliter  morphine  - Injectable 4 milliGRAM(s) IV Push every 3 hours PRN Severe Pain (7 - 10)  morphine  - Injectable 2 milliGRAM(s) IV Push every 4 hours PRN Moderate Pain (4 - 6)  naloxone Injectable 0.1 milliGRAM(s) IV Push every 3 minutes PRN For ANY of the following changes in patient status:  A. RR LESS THAN 10 breaths per minute, B. Oxygen saturation LESS THAN 90%, C. Sedation score of 6  polyethylene glycol 3350 17 Gram(s) Oral daily PRN Constipation          PHYSICAL EXAM:  GENERAL: NAD, well-groomed, well-developed  HEAD:  Atraumatic, Normocephalic  CHEST/LUNG: Clear to percussion bilaterally; No rales, rhonchi, wheezing, or rubs  HEART: Regular rate and rhythm; No murmurs, rubs, or gallops  ABDOMEN: Soft, Nontender, Nondistended; Bowel sounds present  EXTREMITIES:  2+ Peripheral Pulses, No clubbing, cyanosis, or edema  LYMPH: No lymphadenopathy noted  SKIN: No rashes or lesions    Care Discussed with Consultants/Other Providers [ ] YES  [ ] NO

## 2024-10-30 NOTE — PROGRESS NOTE ADULT - PROBLEM SELECTOR PLAN 4
MRI Lumbar Spine: (10/15) Diffuse osseous metastases. L1 and L5 pathologic fractures are associated with moderate epidural disease at L1, more mild at L5. At least mild epidural disease at the left S1-S2 neural foramen. No significant lumbar degenerative disc disease.  - MRI from 10/15 shows extensive metastasis in cervical, thoracic, lumbar, skull base and calvarium. Also small cortical subacute infarctions.  - wean Morphine gtt @0.5mg/hr, IV morphine 4mg PRNs (has not required PRNs in multiple days) Encouraged family to ask for PRN pain medication if he has breakthrough pain.   - IR evaluation for kyphoplasty on hold in setting of c/f SMA dissection and bowel ischemia   - Patient with oral pain- Magic mouthwash tid added but patient only received 1 dose this AM   - s/p decadron 4mg IV bid x3 days   - Narcan PRN  - multimodal pain control: lidocaine patch, warm/cold packs   - recommend changing bowel regimen to PRN MRI Lumbar Spine: (10/15) Diffuse osseous metastases. L1 and L5 pathologic fractures are associated with moderate epidural disease at L1, more mild at L5. At least mild epidural disease at the left S1-S2 neural foramen. No significant lumbar degenerative disc disease.  - MRI from 10/15 shows extensive metastasis in cervical, thoracic, lumbar, skull base and calvarium. Also small cortical subacute infarctions.  - wean off Morphine gtt, IV morphine2- 4mg PRN moderate to severe pain (has not required PRNs in multiple days) Encouraged family to ask for PRN pain medication if he has breakthrough pain.   - IR evaluation for kyphoplasty on hold in setting of c/f SMA dissection and bowel ischemia   - CONSIDER RECONSULTING RAD/ONC FOR POTENTIAL RT    - Patient with oral pain- Magic mouthwash tid, oral care   - s/p decadron 4mg IV bid x3 days   - Narcan PRN  - multimodal pain control: lidocaine patch, warm/cold packs   - recommend changing bowel regimen to PRN

## 2024-10-30 NOTE — PROGRESS NOTE ADULT - PROBLEM SELECTOR PLAN 9
Patient is supported by his wife- Vijaya [phone]) and 3 adult sons- Magen, Mateus and Freddy   > Patient is recently dx with metastatic prostate cancer just 1.5 weeks ago and he is treatment oriented.  > Offered caregiver Sw referral to patient's wife- DECLINED   > 10/21: Began advanced care planning discussions with patient's son, Freddy. Pt's wife stayed overnight so is sleeping. Will plan to speak to his wife regarding further goc.  > 10/23: See GOC note- DNR/DNI. Of note, wife has shared with provider that they are familiar with hospice as her sister is on hospice in [place].   > 10/24: Plan is to continue to medically optimize patient and improve patient's performance/nutritional status with goal of pursuing further chemotherapy. Extensive goc discussion held with patient's family, Oncology team (Dr. Tate and Umm Aponte- Onc NP) and palliative care team regarding plan of care. In the event of newly developing, evolving, or worsening symptoms, please contact the Palliative Medicine team via pager (if the patient is at [facility] #[phone] or if the patient is at [facility] #[phone]) The Geriatric and Palliative Medicine service has coverage 24 hours a day/ 7 days a week to provide medical recommendations regarding symptom management needs via telephone.

## 2024-10-30 NOTE — PROGRESS NOTE ADULT - PROBLEM SELECTOR PLAN 8
The patient requires nursing assistance with ADLs  - Supportive care  - Turn and position  - Continue with good skin care Patient is supported by his wife- Vijaya [phone]) and 3 adult sons- Magen, Mateus and Freddy   > Patient is recently dx with metastatic prostate cancer just 1.5 weeks ago and he is treatment oriented.  > Offered caregiver Sw referral to patient's wife- DECLINED   > 10/21: Began advanced care planning discussions with patient's son, Freddy. Pt's wife stayed overnight so is sleeping. Will plan to speak to his wife regarding further goc.  > 10/23: See GOC note- DNR/DNI. Of note, wife has shared with provider that they are familiar with hospice as her sister is on hospice in [place].   > 10/24: Plan is to continue to medically optimize patient and improve patient's performance/nutritional status with goal of pursuing further chemotherapy. Extensive goc discussion held with patient's family, Oncology team (Dr. Tate and Umm Aponte- Onc NP) and palliative care team regarding plan of care.

## 2024-10-30 NOTE — PROGRESS NOTE ADULT - ASSESSMENT
64-year-old male with metastatic prostate cancer, sent in by hematologist from [facility] for uncontrolled pain, nausea, vomiting.   Patient reports diffuse pain starting 6 months ago significantly worsening for the past 2 weeks.  Currently the worst pain is located around the lower back.   No loss of bladder and bowel function, no saddle paresthesia.  Able to walk.  patient is oxycodone 5 every 4-6 hour.  Yesterday they started adding OxyContin 10.  However patient continued to have pain.  Family also reports significant nausea and vomiting, inability to tolerate p.o. for the last 2 days.  Last bowel movement 2 days ago.   No known allergy.  Diagnosed with high risk high volume metastatic prostate cancer with bone, liver lymph node mets and presacral mass. Liver biopsy confirmed disease with . Started lupron, loly/pred with plans to initiate taxotere.    (12 Oct 2024 15:40)  pt seems very frustrated:   he is on 2 L of oxygen : he has no underlying lung disease:  but he is requiring 2 L of oxygen      Hypoxic resp failure  Metastatic prostate cancer  Back pain     Hypoxic resp failure  -He has no underlying lung disease:  but he is requiring 2 L of oxygen :   -CTA showed; No pulmonary embolism to the level of the segmental branches bilaterally. Limited evaluation of the subsegmental branches secondary to incomplete contrast opacification.  Multilevel vertebral body lytic lesions and loss of vertebral body height, better evaluated on CT thoracic spine 10/17/2024. Metastatic prostate cancer  -Patent central airways. Bibasilar atelectasis. No pleural effusion. MEDIASTINUM AND KATTY: No lymphadenopathy.  PULMONARY ANGIOGRAM: No pulmonary embolism to the level of the segmental   branches bilaterally. Limited evaluation of the subsegmental branches secondary to incomplete contrast opacification.    10/19:  -seems pretty tired;  on 2 L of oxygen :  -cta chest showed: No pulmonary embolism to the level of the segmental branches bilaterally. Limited evaluation of the subsegmental branches secondary to incomplete   contrast opacification. Multilevel vertebral body lytic lesions and loss of vertebral body height, better evaluated on CT thoracic spine 10/17/2024.  -still with fever:  no pe  on zosyn  and leukopenic hemonc following;  has metastatic prostate ca:   -VBG seems OK:     10/20:  pulm wise he seems to be stable:   -using 2 L of oxygen    -yesterdays VBG with minimal met acidosis  -cta again noted    10/21:  on 4 L of oxygen  today    cxr is size    e coli sepsis: Gram Negative Rods and Gram Negative Coccobacilli    10/22: heis doing poorly today  : he is very agitated and uncomfortable  on mittens: ? sun downing   10/23: quiet today  : sleeping:  oxygen requirement has not increased: on rooo ha 95% as documented    WBC is slowly increasing:   no fever:   -on antibiotics   10/24: pt is not doing well : his repeat blood cultures are positive with same organism :  would defer to ID;   10/25: seems to be doing  ok ; no sob:  no cough :  no phlegm   : on room air   10/26: resp failure has resolved:  is septic  : on ceftriaxone     10/27:  he seems OK:  he is on room air:  on morphine drip   remans on ceftriaxone still     10/28: she seems to be doing  ok : no sob: no cough ; no phlegm  confused:  on morphine drip    10/29; seems comfortable on room air;   cont current rx:   10/30: seems to be doing  ok ; on morphine drip : : plan to decrease morphine dose:   -cont current supportive care       New finding:  SMA dissection : neutropenic colitis/ #E Coli and H influenzae bacteremia/ #Neutropenic fever  -/f ischemic colitis on R colon   Diagnosed  on ct abd:  defer to surgery and primary team:  probably no intervention:   -cont antibiotics  -not doing very well with above new findings    10/22; no intervention per surgery    -cont antibiotics  -has fever:  ID following :  -Last chest xray on 20th  ; normal     id following   10/23  IR was consulted for vertebral augmentation of T3-T5 prior to XRT.   Patient was seen bedside. Initially, patient kept requesting no exam and was not willing to participate in the discussion. Son was bedside at time of conversation.   Per discussion with the medical attending, given the concern for SMA dissection and concern for bowel ischemia, will hold off on vertebral augmentation evaluation at this time.   Please reach out to IR when patient is medically stable for vertebral augmentation.  10/24:  remains septic:  above cancelled:   ? for palliative care now  10/26: he seems same to me:  no overnight events  on room air:   check VBG  : cont antibiotics   hemonc following   10/27:  -still on antibiotics for e coli sepsis  -id following s  10/28: cont antibtiocs   10/230: cont ceftriaxone     Back pain   -likely due to metastatic disease:  adm here for severe pain :  -pain control per primary team   -venous ph is ok     dw acp & family ; GOC as per primary  team

## 2024-10-30 NOTE — PROGRESS NOTE ADULT - SUBJECTIVE AND OBJECTIVE BOX
Date of Service: 10-30-24 @ 13:19    Patient is a 64y old  Male who presents with a chief complaint of Pain (30 Oct 2024 10:03)      Any change in ROS: she seems to be doing  ok : no cough : no phlegm  : confused     MEDICATIONS  (STANDING):  abiraterone 500 mg tablet 2 Tablet(s) 2 Tablet(s) Oral daily  acetaminophen     Tablet .. 650 milliGRAM(s) Oral once  atorvastatin 20 milliGRAM(s) Oral at bedtime  cefTRIAXone   IVPB 2000 milliGRAM(s) IV Intermittent every 24 hours  chlorhexidine 2% Cloths 1 Application(s) Topical daily  dextrose 5% + lactated ringers with potassium chloride 20 mEq/L 1000 milliLiter(s) (50 mL/Hr) IV Continuous <Continuous>  dextrose 5%. 1000 milliLiter(s) (100 mL/Hr) IV Continuous <Continuous>  dextrose 5%. 1000 milliLiter(s) (50 mL/Hr) IV Continuous <Continuous>  dextrose 50% Injectable 12.5 Gram(s) IV Push once  dextrose 50% Injectable 25 Gram(s) IV Push once  dextrose 50% Injectable 25 Gram(s) IV Push once  diphenhydrAMINE 25 milliGRAM(s) Oral once  fenofibrate Tablet 145 milliGRAM(s) Oral daily  FIRST- Mouthwash  BLM 30 milliLiter(s) Swish and Spit three times a day  glucagon  Injectable 1 milliGRAM(s) IntraMuscular once  influenza  Vaccine (HIGH DOSE) 0.5 milliLiter(s) IntraMuscular once  insulin lispro (ADMELOG) corrective regimen sliding scale   SubCutaneous at bedtime  insulin lispro (ADMELOG) corrective regimen sliding scale   SubCutaneous three times a day before meals  lactobacillus acidophilus 1 Tablet(s) Oral daily  lidocaine   4% Patch 1 Patch Transdermal every 24 hours  melatonin 6 milliGRAM(s) Oral at bedtime  metroNIDAZOLE  IVPB 500 milliGRAM(s) IV Intermittent every 12 hours  morphine  Infusion. 0.5 mG/Hr (0.5 mL/Hr) IV Continuous <Continuous>  multivitamin 1 Tablet(s) Oral daily  Nephro-nancy 1 Tablet(s) Oral daily  pantoprazole  Injectable 40 milliGRAM(s) IV Push daily  potassium chloride  10 mEq/100 mL IVPB 10 milliEquivalent(s) IV Intermittent once  predniSONE   Tablet 5 milliGRAM(s) Oral daily    MEDICATIONS  (PRN):  acetaminophen     Tablet .. 650 milliGRAM(s) Oral every 6 hours PRN Temp greater or equal to 38C (100.4F), Mild Pain (1 - 3), Moderate Pain (4 - 6)  aluminum hydroxide/magnesium hydroxide/simethicone Suspension 30 milliLiter(s) Oral every 4 hours PRN Dyspepsia  artificial tears (preservative free) Ophthalmic Solution 1 Drop(s) Both EYES four times a day PRN Dry Eyes  dextrose Oral Gel 15 Gram(s) Oral once PRN Blood Glucose LESS THAN 70 milliGRAM(s)/deciliter  morphine  - Injectable 4 milliGRAM(s) IV Push every 3 hours PRN Severe Pain (7 - 10)  naloxone Injectable 0.1 milliGRAM(s) IV Push every 3 minutes PRN For ANY of the following changes in patient status:  A. RR LESS THAN 10 breaths per minute, B. Oxygen saturation LESS THAN 90%, C. Sedation score of 6  polyethylene glycol 3350 17 Gram(s) Oral daily PRN Constipation    Vital Signs Last 24 Hrs  T(C): 36.2 (30 Oct 2024 12:22), Max: 36.8 (29 Oct 2024 16:10)  T(F): 97.2 (30 Oct 2024 12:22), Max: 98.3 (30 Oct 2024 09:30)  HR: 102 (30 Oct 2024 12:22) (100 - 107)  BP: 101/67 (30 Oct 2024 12:22) (98/66 - 105/62)  BP(mean): --  RR: 18 (30 Oct 2024 12:22) (18 - 18)  SpO2: 100% (30 Oct 2024 12:22) (97% - 100%)    Parameters below as of 30 Oct 2024 09:30  Patient On (Oxygen Delivery Method): room air        I&O's Summary    29 Oct 2024 07:01  -  30 Oct 2024 07:00  --------------------------------------------------------  IN: 980 mL / OUT: 370 mL / NET: 610 mL          Physical Exam:   GENERAL: NAD, well-groomed, well-developed  HEENT: CHAVA/   Atraumatic, Normocephalic  ENMT: No tonsillar erythema, exudates, or enlargement; Moist mucous membranes, Good dentition, No lesions  NECK: Supple, No JVD, Normal thyroid  CHEST/LUNG: Clear to auscultaion  CVS: Regular rate and rhythm; No murmurs, rubs, or gallops  GI: : Soft, Nontender, Nondistended; Bowel sounds present  NERVOUS SYSTEM:  Alert & awake but confused   EXTREMITIES:  - edema  LYMPH: No lymphadenopathy noted  SKIN: No rashes or lesions  ENDOCRINOLOGY: No Thyromegaly  PSYCH: Appropriate    Labs:  ABG - ( 28 Oct 2024 15:54 )  pH, Arterial: 7.46  pH, Blood: x     /  pCO2: 27    /  pO2: 148   / HCO3: 19    / Base Excess: -4.1  /  SaO2: 100.0           20, 20                            8.5    3.11  )-----------( 118      ( 30 Oct 2024 06:50 )             25.1                         7.8    3.89  )-----------( 98       ( 29 Oct 2024 13:41 )             23.9                         6.8    4.26  )-----------( 90       ( 29 Oct 2024 01:38 )             20.4                         7.2    4.60  )-----------( 81       ( 28 Oct 2024 17:55 )             22.2                         8.5    5.94  )-----------( 95       ( 28 Oct 2024 07:20 )             26.4                         8.3    6.94  )-----------( 78       ( 27 Oct 2024 06:44 )             25.7     10-30    141  |  113[H]  |  10  ----------------------------<  122[H]  3.2[L]   |  18[L]  |  0.33[L]  10-29    142  |  114[H]  |  11  ----------------------------<  120[H]  3.4[L]   |  20[L]  |  0.33[L]  10-29    144  |  113[H]  |  12  ----------------------------<  132[H]  2.9[LL]   |  20[L]  |  0.34[L]  10-29    144  |  115[H]  |  15  ----------------------------<  134[H]  2.6[LL]   |  19[L]  |  0.37[L]  10-28    148[H]  |  116[H]  |  18  ----------------------------<  130[H]  2.6[LL]   |  20[L]  |  0.39[L]  10-28    149[H]  |  116[H]  |  19  ----------------------------<  102[H]  3.1[L]   |  17[L]  |  0.48[L]  10-27    146[H]  |  119[H]  |  20  ----------------------------<  89  3.3[L]   |  18[L]  |  0.39[L]    Ca    7.1[L]      30 Oct 2024 06:50  Ca    6.9[L]      29 Oct 2024 22:30  Ca    6.8[L]      29 Oct 2024 13:41  Ca    6.9[L]      29 Oct 2024 01:38  Ca    7.0[L]      28 Oct 2024 17:55  Phos  1.6     10-30  Phos  2.0     10-29  Phos  1.8     10-29  Mg     1.80     10-30  Mg     1.90     10-29  Mg     2.00     10-29      CAPILLARY BLOOD GLUCOSE      POCT Blood Glucose.: 132 mg/dL (30 Oct 2024 09:41)  POCT Blood Glucose.: 122 mg/dL (29 Oct 2024 20:46)  POCT Blood Glucose.: 130 mg/dL (29 Oct 2024 17:41)          Urinalysis Basic - ( 30 Oct 2024 06:50 )    Color: x / Appearance: x / SG: x / pH: x  Gluc: 122 mg/dL / Ketone: x  / Bili: x / Urobili: x   Blood: x / Protein: x / Nitrite: x   Leuk Esterase: x / RBC: x / WBC x   Sq Epi: x / Non Sq Epi: x / Bacteria: x            RECENT CULTURES:  10-24 @ 01:55 .Blood BLOOD         rad< from: Xray Chest 1 View- PORTABLE-Urgent (Xray Chest 1 View- PORTABLE-Urgent .) (10.20.24 @ 11:02) >  TECHNIQUE: Single frontal view of the chest was obtained.    COMPARISON: Chest x-ray 10/17/2024.    FINDINGS:  No focal consolidation, large pleural effusion or pneumothorax.  The heart size cannot be accurately assessed on this projection.  No acute osseous abnormalities.      IMPRESSION:  No focal consolidations.    --- End of Report ---          PRABHA MONET MD; Resident Radiologist  This document has been electronically signed.  LEA COLE MD; Attending Interventional Radiologist  This document has been electronically signed. Oct 20 2024 11:53AM    < end of copied text >         No growth at 5 days    10-24 @ 01:42 .Blood BLOOD                No growth at 5 days          RESPIRATORY CULTURES:          Studies  Chest X-RAY  CT SCAN Chest   Venous Dopplers: LE:   CT Abdomen  Others

## 2024-10-30 NOTE — PROGRESS NOTE ADULT - SUBJECTIVE AND OBJECTIVE BOX
Infectious Diseases Follow Up:    Patient is a 64y old  Male who presents with a chief complaint of Pain (30 Oct 2024 09:17)      Interval History/ROS:  Pt remains confused, asking to be transferred to another hospital. Denies fevers, abdominal pain     Allergies  No Known Allergies        ANTIMICROBIALS:  cefTRIAXone   IVPB 2000 every 24 hours  metroNIDAZOLE  IVPB 500 every 12 hours      Current Abx:     Previous Abx     OTHER MEDS:  MEDICATIONS  (STANDING):  acetaminophen     Tablet .. 650 every 6 hours PRN  acetaminophen     Tablet .. 650 once  aluminum hydroxide/magnesium hydroxide/simethicone Suspension 30 every 4 hours PRN  atorvastatin 20 at bedtime  dextrose 50% Injectable 25 once  dextrose 50% Injectable 12.5 once  dextrose 50% Injectable 25 once  dextrose Oral Gel 15 once PRN  diphenhydrAMINE 25 once  fenofibrate Tablet 145 daily  glucagon  Injectable 1 once  influenza  Vaccine (HIGH DOSE) 0.5 once  insulin lispro (ADMELOG) corrective regimen sliding scale  at bedtime  insulin lispro (ADMELOG) corrective regimen sliding scale  three times a day before meals  melatonin 6 at bedtime  morphine  - Injectable 4 every 3 hours PRN  morphine  Infusion. 0.5 <Continuous>  pantoprazole  Injectable 40 daily  polyethylene glycol 3350 17 daily PRN  predniSONE   Tablet 5 daily      Vital Signs Last 24 Hrs  T(C): 36.7 (30 Oct 2024 05:30), Max: 36.8 (29 Oct 2024 16:10)  T(F): 98.1 (30 Oct 2024 05:30), Max: 98.2 (29 Oct 2024 16:10)  HR: 100 (30 Oct 2024 05:30) (100 - 107)  BP: 99/71 (30 Oct 2024 05:30) (98/66 - 102/68)  BP(mean): --  RR: 18 (30 Oct 2024 05:30) (17 - 18)  SpO2: 98% (30 Oct 2024 05:30) (97% - 99%)    Parameters below as of 30 Oct 2024 05:30  Patient On (Oxygen Delivery Method): room air        PHYSICAL EXAM:  GENERAL: NAD, well-developed  HEAD:  Atraumatic, Normocephalic  EYES: EOMI, conjunctiva and sclera clear  CHEST/LUNG: Clear to auscultation bilaterally; No wheeze  HEART: Regular rate and rhythm; No murmurs, rubs, or gallops  ABDOMEN: Soft, Nontender, Nondistended; Bowel sounds present  PSYCH: Remains confused                           8.5    3.11  )-----------( 118      ( 30 Oct 2024 06:50 )             25.1       10-30    141  |  113[H]  |  10  ----------------------------<  122[H]  3.2[L]   |  18[L]  |  0.33[L]    Ca    7.1[L]      30 Oct 2024 06:50  Phos  1.6     10-30  Mg     1.80     10-30        Urinalysis Basic - ( 30 Oct 2024 06:50 )    Color: x / Appearance: x / SG: x / pH: x  Gluc: 122 mg/dL / Ketone: x  / Bili: x / Urobili: x   Blood: x / Protein: x / Nitrite: x   Leuk Esterase: x / RBC: x / WBC x   Sq Epi: x / Non Sq Epi: x / Bacteria: x        MICROBIOLOGY:  v  .Blood BLOOD  10-24-24   No growth at 5 days  --  --      .Blood BLOOD  10-24-24   No growth at 5 days  --  --      Clean Catch Clean Catch (Midstream)  10-22-24   <10,000 CFU/mL Normal Urogenital Sherlyn  --  --      .Blood BLOOD  10-22-24   Growth in aerobic bottle: Escherichia coli  --  Escherichia coli      .Blood BLOOD  10-21-24   No growth at 5 days  --  --      .Blood BLOOD  10-21-24   No growth at 5 days  --  --      .Blood BLOOD  10-19-24   Growth in aerobic and anaerobic bottles: Escherichia coli  See previous culture [id number]  --    Growth in aerobic bottle: Gram Negative Rods  Growth in anaerobic bottle: Gram Negative Rods      .Blood BLOOD  10-19-24   Growth in aerobic and anaerobic bottles: Escherichia coli  See previous culture [id number]  --    Growth in anaerobic bottle: Gram Negative Rods  Growth in aerobic bottle: Gram Negative Rods      .Blood BLOOD  10-19-24   Growth in aerobic and anaerobic bottles: Escherichia coli  See previous culture [id number]  --    Growth in aerobic and anaerobic bottles: Gram Negative Rods and Gram  Negative Coccobacilli      .Blood BLOOD  10-19-24   Growth in aerobic and anaerobic bottles: Escherichia coli  Direct identification is available within approximately 3-5  hours either by Blood Panel Multiplexed PCR or Direct  MALDI-TOF. Details: [email or web address]  --  Blood Culture PCR  Escherichia coli                RADIOLOGY:

## 2024-10-30 NOTE — PROGRESS NOTE ADULT - SUBJECTIVE AND OBJECTIVE BOX
[facility] Geriatrics and Palliative Care  Melissa Davies Palliative Care Attending  Contact Info: Page [phone] (including Nights/Weekends), message on Microsoft Teams (Melissa Davies), or leave  at Palliative Office [phone] (non-urgent)   Date of [id number] @ 09:17    SUBJECTIVE AND OBJECTIVE:     Indication for Geriatrics and Palliative Care Services/INTERVAL HPI: sx management and goc in setting of advanced malignancy     OVERNIGHT EVENTS:  > 10/21: Weekend events reviewed. Over the past 24 hours, patient was transitioned off pca pump. He required 4mg IV morphine x1.   > 10/22: Over the past 24 hours, patient did not require IV morphine PRNs. He did require IM Zyprexa x2. He is febrile.   > 10/23: Over the past 24 hours, patient did not required PRNs.   > 10/24: Over the past 24 hours, patient required PRNs of haldol 2.5mg x2 for agitation.   > 10/28: Weekend events reviewed. It appears patient was off Morphine gtt as he pulled IV access during agitation episode.   > 10/29: Over the past 24 hours, patient did not require PRNs. He is getting 1u PRBCs.   > 10/30: Over the past 24 hours, no PRNs required.     DNR on chart:DNI: Trial NIV  DNI: Trial NIV      Allergies    No Known Allergies    Intolerances    MEDICATIONS  (STANDING):  abiraterone 500 mg tablet 2 Tablet(s) 2 Tablet(s) Oral daily  acetaminophen     Tablet .. 650 milliGRAM(s) Oral once  atorvastatin 20 milliGRAM(s) Oral at bedtime  cefTRIAXone   IVPB 2000 milliGRAM(s) IV Intermittent every 24 hours  chlorhexidine 2% Cloths 1 Application(s) Topical daily  dextrose 5% with potassium chloride 20 mEq/L 1000 milliLiter(s) (75 mL/Hr) IV Continuous <Continuous>  dextrose 5%. 1000 milliLiter(s) (50 mL/Hr) IV Continuous <Continuous>  dextrose 5%. 1000 milliLiter(s) (100 mL/Hr) IV Continuous <Continuous>  dextrose 50% Injectable 25 Gram(s) IV Push once  dextrose 50% Injectable 12.5 Gram(s) IV Push once  dextrose 50% Injectable 25 Gram(s) IV Push once  diphenhydrAMINE 25 milliGRAM(s) Oral once  fenofibrate Tablet 145 milliGRAM(s) Oral daily  FIRST- Mouthwash  BLM 30 milliLiter(s) Swish and Spit three times a day  glucagon  Injectable 1 milliGRAM(s) IntraMuscular once  influenza  Vaccine (HIGH DOSE) 0.5 milliLiter(s) IntraMuscular once  insulin lispro (ADMELOG) corrective regimen sliding scale   SubCutaneous three times a day before meals  insulin lispro (ADMELOG) corrective regimen sliding scale   SubCutaneous at bedtime  lactobacillus acidophilus 1 Tablet(s) Oral daily  lidocaine   4% Patch 1 Patch Transdermal every 24 hours  melatonin 6 milliGRAM(s) Oral at bedtime  metroNIDAZOLE  IVPB 500 milliGRAM(s) IV Intermittent every 12 hours  morphine  Infusion. 0.5 mG/Hr (0.5 mL/Hr) IV Continuous <Continuous>  multivitamin 1 Tablet(s) Oral daily  Nephro-nancy 1 Tablet(s) Oral daily  pantoprazole  Injectable 40 milliGRAM(s) IV Push daily  potassium chloride  10 mEq/100 mL IVPB 10 milliEquivalent(s) IV Intermittent once  potassium chloride  10 mEq/100 mL IVPB 10 milliEquivalent(s) IV Intermittent every 4 hours  potassium phosphate IVPB 15 milliMole(s) IV Intermittent once  predniSONE   Tablet 5 milliGRAM(s) Oral daily    MEDICATIONS  (PRN):  acetaminophen     Tablet .. 650 milliGRAM(s) Oral every 6 hours PRN Temp greater or equal to 38C (100.4F), Mild Pain (1 - 3), Moderate Pain (4 - 6)  aluminum hydroxide/magnesium hydroxide/simethicone Suspension 30 milliLiter(s) Oral every 4 hours PRN Dyspepsia  artificial tears (preservative free) Ophthalmic Solution 1 Drop(s) Both EYES four times a day PRN Dry Eyes  dextrose Oral Gel 15 Gram(s) Oral once PRN Blood Glucose LESS THAN 70 milliGRAM(s)/deciliter  morphine  - Injectable 4 milliGRAM(s) IV Push every 3 hours PRN Severe Pain (7 - 10)  naloxone Injectable 0.1 milliGRAM(s) IV Push every 3 minutes PRN For ANY of the following changes in patient status:  A. RR LESS THAN 10 breaths per minute, B. Oxygen saturation LESS THAN 90%, C. Sedation score of 6  polyethylene glycol 3350 17 Gram(s) Oral daily PRN Constipation      ITEMS UNCHECKED ARE NOT PRESENT    PRESENT SYMPTOMS: [ ]Unable to self-report - see [ ] CPOT [ ] PAINADS [ ] RDOS  Source if other than patient:  [ ]Family   [ ]Team     Pain:  [ ]yes [ ]no  QOL impact -   Location -                    Aggravating factors -  Quality -  Radiation -  Timing-  Severity (0-10 scale):  Minimal acceptable level/ pain goal (0-10 scale):     CPOT:    https://www.[facility].org/getattachment/[email or web address]/Critical-Care-Pain-Observation-Tool-(CPOT)    Dyspnea:                           [ ]Mild [ ]Moderate [ ]Severe  Anxiety:                             [ ]Mild [ ]Moderate [ ]Severe  Fatigue:                             [ ]Mild [ ]Moderate [ ]Severe  Nausea:                             [ ]Mild [ ]Moderate [ ]Severe  Loss of appetite:              [ ]Mild [ ]Moderate [ ]Severe  Constipation:                    [ ]Mild [ ]Moderate [ ]Severe  Other Symptoms:  [ ]All other review of systems negative     PCSSQ[Palliative Care Spiritual Screening Question]   Severity (0-10):  Score of 4 or > indicate consideration of Chaplaincy referral.  Chaplaincy Referral: [ ] yes [ ] refused [ ] following [ x] deferred    Caregiver [place]? : [ ] yes [ ] no [x ] Deferred [ ] Declined             Social work referral [ ] Patient & Family Centered Care Referral [ ]  Anticipatory Grief present?:  [ ] yes [ ] no  [x ] Deferred                  Social work referral [ ] Patient & Family Centered Care Referral [ ]      PHYSICAL EXAM:  Vital Signs Last 24 Hrs  T(C): 36.7 (30 Oct 2024 05:30), Max: 36.8 (29 Oct 2024 16:10)  T(F): 98.1 (30 Oct 2024 05:30), Max: 98.2 (29 Oct 2024 16:10)  HR: 100 (30 Oct 2024 05:30) (100 - 107)  BP: 99/71 (30 Oct 2024 05:30) (98/66 - 102/68)  BP(mean): --  RR: 18 (30 Oct 2024 05:30) (17 - 18)  SpO2: 98% (30 Oct 2024 05:30) (97% - 99%)    Parameters below as of 30 Oct 2024 05:30  Patient On (Oxygen Delivery Method): room air     I&O's Summary    29 Oct 2024 07:01  -  30 Oct 2024 07:00  --------------------------------------------------------  IN: 980 mL / OUT: 370 mL / NET: 610 mL       GENERAL: [x ]Cachexia    [ ]Alert  [ ]Oriented x   [x ]Lethargic  [ ]Unarousable  [ ]Verbal  [ ]Non-Verbal  Behavioral:   [ ]Anxiety  [ ]Delirium [ ]Agitation [x ]Other- calm   HEENT:  [ ]Normal   [x ]Dry mouth   [ ]ET Tube/Trach  [ ]Oral lesions  PULMONARY:   [ ]Clear [ ]Tachypnea  [ x]Audible excessive secretions   [ ]Rhonchi        [ ]Right [ ]Left [ ]Bilateral  [ ]Crackles        [ ]Right [ ]Left [ ]Bilateral  [ ]Wheezing     [ ]Right [ ]Left [ ]Bilateral  [ ]Diminished BS [ ] Right [ ]Left [ ]Bilateral  CARDIOVASCULAR:    [ ]Regular [ ]Irregular [x ]Tachy  [ ]Rodrick [ ]Murmur [ ]Other  GASTROINTESTINAL:  [x ]Soft  [ ]Distended   [x ]+BS  [ ]Non tender [ ]Tender  [ ]Other [ ]PEG [ ]OGT/ NGT   Last BM: 10/29  GENITOURINARY:  [ ]Normal [ ]Incontinent   [ ]Oliguria/Anuria   [x ]Farah  MUSCULOSKELETAL:   [ ]Normal   [x ]Weakness  [ ]Bed/Wheelchair bound [ ]Edema  NEUROLOGIC:   [ ]No focal deficits  [ ] Cognitive impairment  [ ] Dysphagia [ ]Dysarthria [ ] Paresis [ ]Other   SKIN:   [ ]Normal  [ ]Rash  [x ]Other- scratches on face   [ ]Pressure ulcer(s) [ ]y [ ]n present on admission    CRITICAL CARE:  [ ]Shock Present  [ ]Septic [ ]Cardiogenic [ ]Neurologic [ ]Hypovolemic  [ ]Vasopressors [ ]Inotropes  [ ]Respiratory failure present [ ]Mechanical Ventilation [ ]Non-invasive ventilatory support [ ]High-Flow   [ ]Acute  [ ]Chronic [ ]Hypoxic  [ ]Hypercarbic [ ]Other  [ ]Other organ failure     LABS:                        8.5    3.11  )-----------( 118      ( 30 Oct 2024 06:50 )             25.1   10-30    141  |  113[H]  |  10  ----------------------------<  122[H]  3.2[L]   |  18[L]  |  0.33[L]    Ca    7.1[L]      30 Oct 2024 06:50  Phos  1.6     10-30  Mg     1.80     10-30        Urinalysis Basic - ( 30 Oct 2024 06:50 )    Color: x / Appearance: x / SG: x / pH: x  Gluc: 122 mg/dL / Ketone: x  / Bili: x / Urobili: x   Blood: x / Protein: x / Nitrite: x   Leuk Esterase: x / RBC: x / WBC x   Sq Epi: x / Non Sq Epi: x / Bacteria: x      RADIOLOGY & ADDITIONAL STUDIES:     Protein Calorie Malnutrition Present: [ ]mild [ ]moderate [ ]severe [ ]underweight [ ]morbid obesity  https://www.andeal.org/vault/2440/web/files/ONC/Table_Clinical%20Characteristics%20to%20Document%20Malnutrition-White%20JV%20et%20al%202012.pdf    Height (cm): 172.7 (10-12-24 @ 09:21)  Weight (kg): 69.4 (10-12-24 @ 09:21)  BMI (kg/m2): 23.3 (10-12-24 @ 09:21)    [ ]PPSV2 < or = 30%  [ ]significant weight loss [ ]poor nutritional intake [ ]anasarca[ ]Artificial Nutrition    Other REFERRALS:  [ ]Hospice  [ ]Child Life  [ ]Social Work  [ ]Case management [ ]Holistic Therapy     Goals of Care Document: [facility] Geriatrics and Palliative Care  Melissa Davies, Palliative Care Attending  Contact Info: Page [phone] (including Nights/Weekends), message on Microsoft Teams (Melissa Davies), or leave  at Palliative Office [phone] (non-urgent)   Date of [id number] @ 09:17    SUBJECTIVE AND OBJECTIVE: Patient seen this AM with wife at bedside. Patient awake, alert, answering questions today. He still not tolerating much PO but is trying to increase PO intake. Patient was able to self-suction and brush his teeth. He is having BMs. He continues to have back pain but is also not moving much. Encouraged him to ask for pain PRNs as provider is weaning off morphine gtt.     Indication for Geriatrics and Palliative Care Services/INTERVAL HPI: sx management and goc in setting of advanced malignancy     OVERNIGHT EVENTS:  > 10/21: Weekend events reviewed. Over the past 24 hours, patient was transitioned off pca pump. He required 4mg IV morphine x1.   > 10/22: Over the past 24 hours, patient did not require IV morphine PRNs. He did require IM Zyprexa x2. He is febrile.   > 10/23: Over the past 24 hours, patient did not required PRNs.   > 10/24: Over the past 24 hours, patient required PRNs of haldol 2.5mg x2 for agitation.   > 10/28: Weekend events reviewed. It appears patient was off Morphine gtt as he pulled IV access during agitation episode.   > 10/29: Over the past 24 hours, patient did not require PRNs. He is getting 1u PRBCs.   > 10/30: Over the past 24 hours, no PRNs required.     DNR on chart:DNI: Trial NIV  DNI: Trial NIV      Allergies    No Known Allergies    Intolerances    MEDICATIONS  (STANDING):  abiraterone 500 mg tablet 2 Tablet(s) 2 Tablet(s) Oral daily  acetaminophen     Tablet .. 650 milliGRAM(s) Oral once  atorvastatin 20 milliGRAM(s) Oral at bedtime  cefTRIAXone   IVPB 2000 milliGRAM(s) IV Intermittent every 24 hours  chlorhexidine 2% Cloths 1 Application(s) Topical daily  dextrose 5% with potassium chloride 20 mEq/L 1000 milliLiter(s) (75 mL/Hr) IV Continuous <Continuous>  dextrose 5%. 1000 milliLiter(s) (50 mL/Hr) IV Continuous <Continuous>  dextrose 5%. 1000 milliLiter(s) (100 mL/Hr) IV Continuous <Continuous>  dextrose 50% Injectable 25 Gram(s) IV Push once  dextrose 50% Injectable 12.5 Gram(s) IV Push once  dextrose 50% Injectable 25 Gram(s) IV Push once  diphenhydrAMINE 25 milliGRAM(s) Oral once  fenofibrate Tablet 145 milliGRAM(s) Oral daily  FIRST- Mouthwash  BLM 30 milliLiter(s) Swish and Spit three times a day  glucagon  Injectable 1 milliGRAM(s) IntraMuscular once  influenza  Vaccine (HIGH DOSE) 0.5 milliLiter(s) IntraMuscular once  insulin lispro (ADMELOG) corrective regimen sliding scale   SubCutaneous three times a day before meals  insulin lispro (ADMELOG) corrective regimen sliding scale   SubCutaneous at bedtime  lactobacillus acidophilus 1 Tablet(s) Oral daily  lidocaine   4% Patch 1 Patch Transdermal every 24 hours  melatonin 6 milliGRAM(s) Oral at bedtime  metroNIDAZOLE  IVPB 500 milliGRAM(s) IV Intermittent every 12 hours  morphine  Infusion. 0.5 mG/Hr (0.5 mL/Hr) IV Continuous <Continuous>  multivitamin 1 Tablet(s) Oral daily  Nephro-nancy 1 Tablet(s) Oral daily  pantoprazole  Injectable 40 milliGRAM(s) IV Push daily  potassium chloride  10 mEq/100 mL IVPB 10 milliEquivalent(s) IV Intermittent once  potassium chloride  10 mEq/100 mL IVPB 10 milliEquivalent(s) IV Intermittent every 4 hours  potassium phosphate IVPB 15 milliMole(s) IV Intermittent once  predniSONE   Tablet 5 milliGRAM(s) Oral daily    MEDICATIONS  (PRN):  acetaminophen     Tablet .. 650 milliGRAM(s) Oral every 6 hours PRN Temp greater or equal to 38C (100.4F), Mild Pain (1 - 3), Moderate Pain (4 - 6)  aluminum hydroxide/magnesium hydroxide/simethicone Suspension 30 milliLiter(s) Oral every 4 hours PRN Dyspepsia  artificial tears (preservative free) Ophthalmic Solution 1 Drop(s) Both EYES four times a day PRN Dry Eyes  dextrose Oral Gel 15 Gram(s) Oral once PRN Blood Glucose LESS THAN 70 milliGRAM(s)/deciliter  morphine  - Injectable 4 milliGRAM(s) IV Push every 3 hours PRN Severe Pain (7 - 10)  naloxone Injectable 0.1 milliGRAM(s) IV Push every 3 minutes PRN For ANY of the following changes in patient status:  A. RR LESS THAN 10 breaths per minute, B. Oxygen saturation LESS THAN 90%, C. Sedation score of 6  polyethylene glycol 3350 17 Gram(s) Oral daily PRN Constipation      ITEMS UNCHECKED ARE NOT PRESENT    PRESENT SYMPTOMS: [ ]Unable to self-report - see [ ] CPOT [ ] PAINADS [ ] RDOS  Source if other than patient:  [ ]Family   [ ]Team     Pain:  [ x]yes [ ]no- Pain with movement and patient has not been moving.    QOL impact - Limits mobility  Location - Lower back  Aggravating factors - Movement  Quality - Sharp  Radiation - non-radiating  Timing- Intermittent  Severity (0-10 scale): 8  Minimal acceptable level (0-10 scale): 0    CPOT:    https://www.[facility].org/getattachment/[email or web address]/Critical-Care-Pain-Observation-Tool-(CPOT)    Dyspnea:                           [ ]Mild [ ]Moderate [ ]Severe  Anxiety:                             [ ]Mild [ ]Moderate [ ]Severe  Fatigue:                             [ ]Mild [ ]Moderate [ ]Severe  Nausea:                             [ ]Mild [ ]Moderate [ ]Severe  Loss of appetite:              [ ]Mild [ ]Moderate [ ]Severe  Constipation:                    [ ]Mild [ ]Moderate [ ]Severe  Other Symptoms: dysphagia   [x ]All other review of systems negative     PCSSQ[Palliative Care Spiritual Screening Question]   Severity (0-10):  Score of 4 or > indicate consideration of Chaplaincy referral.  Chaplaincy Referral: [ ] yes [ ] refused [ ] following [ x] deferred    Caregiver [place]? : [ ] yes [ ] no [x ] Deferred [ ] Declined             Social work referral [ ] Patient & Family Centered Care Referral [ ]  Anticipatory Grief present?:  [ ] yes [ ] no  [x ] Deferred                  Social work referral [ ] Patient & Family Centered Care Referral [ ]      PHYSICAL EXAM:  Vital Signs Last 24 Hrs  T(C): 36.7 (30 Oct 2024 05:30), Max: 36.8 (29 Oct 2024 16:10)  T(F): 98.1 (30 Oct 2024 05:30), Max: 98.2 (29 Oct 2024 16:10)  HR: 100 (30 Oct 2024 05:30) (100 - 107)  BP: 99/71 (30 Oct 2024 05:30) (98/66 - 102/68)  BP(mean): --  RR: 18 (30 Oct 2024 05:30) (17 - 18)  SpO2: 98% (30 Oct 2024 05:30) (97% - 99%)    Parameters below as of 30 Oct 2024 05:30  Patient On (Oxygen Delivery Method): room air     I&O's Summary    29 Oct 2024 07:01  -  30 Oct 2024 07:00  --------------------------------------------------------  IN: 980 mL / OUT: 370 mL / NET: 610 mL       GENERAL: [x ]Cachexia    [ ]Alert  [ ]Oriented x   [x ]Lethargic  [ ]Unarousable  [ ]Verbal  [ ]Non-Verbal  Behavioral:   [ ]Anxiety  [ ]Delirium [ ]Agitation [x ]Other- calm   HEENT:  [ ]Normal   [x ]Dry mouth   [ ]ET Tube/Trach  [ ]Oral lesions  PULMONARY:   [ ]Clear [ ]Tachypnea  [ x]Audible excessive secretions   [ ]Rhonchi        [ ]Right [ ]Left [ ]Bilateral  [ ]Crackles        [ ]Right [ ]Left [ ]Bilateral  [ ]Wheezing     [ ]Right [ ]Left [ ]Bilateral  [ ]Diminished BS [ ] Right [ ]Left [ ]Bilateral  CARDIOVASCULAR:    [ ]Regular [ ]Irregular [x ]Tachy  [ ]Rodrick [ ]Murmur [ ]Other  GASTROINTESTINAL:  [x ]Soft  [ ]Distended   [x ]+BS  [ ]Non tender [ ]Tender  [ ]Other [ ]PEG [ ]OGT/ NGT   Last BM: 10/30  GENITOURINARY:  [ ]Normal [ ]Incontinent   [ ]Oliguria/Anuria   [x ]Farah  MUSCULOSKELETAL:   [ ]Normal   [x ]Weakness  [ ]Bed/Wheelchair bound [ ]Edema  NEUROLOGIC:   [ ]No focal deficits  [ ] Cognitive impairment  [ ] Dysphagia [ ]Dysarthria [ ] Paresis [ ]Other   SKIN:   [ ]Normal  [ ]Rash  [x ]Other- scratches on face   [ ]Pressure ulcer(s) [ ]y [ ]n present on admission    CRITICAL CARE:  [ ]Shock Present  [ ]Septic [ ]Cardiogenic [ ]Neurologic [ ]Hypovolemic  [ ]Vasopressors [ ]Inotropes  [ ]Respiratory failure present [ ]Mechanical Ventilation [ ]Non-invasive ventilatory support [ ]High-Flow   [ ]Acute  [ ]Chronic [ ]Hypoxic  [ ]Hypercarbic [ ]Other  [ ]Other organ failure     LABS:                        8.5    3.11  )-----------( 118      ( 30 Oct 2024 06:50 )             25.1   10-30    141  |  113[H]  |  10  ----------------------------<  122[H]  3.2[L]   |  18[L]  |  0.33[L]    Ca    7.1[L]      30 Oct 2024 06:50  Phos  1.6     10-30  Mg     1.80     10-30        Urinalysis Basic - ( 30 Oct 2024 06:50 )    Color: x / Appearance: x / SG: x / pH: x  Gluc: 122 mg/dL / Ketone: x  / Bili: x / Urobili: x   Blood: x / Protein: x / Nitrite: x   Leuk Esterase: x / RBC: x / WBC x   Sq Epi: x / Non Sq Epi: x / Bacteria: x      RADIOLOGY & ADDITIONAL STUDIES: no new     Protein Calorie Malnutrition Present: [ ]mild [ ]moderate [ ]severe [ ]underweight [ ]morbid obesity  https://www.andeal.org/vault/2440/web/files/ONC/Table_Clinical%20Characteristics%20to%20Document%20Malnutrition-White%20JV%20et%20al%202012.pdf    Height (cm): 172.7 (10-12-24 @ 09:21)  Weight (kg): 69.4 (10-12-24 @ 09:21)  BMI (kg/m2): 23.3 (10-12-24 @ 09:21)    [ ]PPSV2 < or = 30%  [ ]significant weight loss [ ]poor nutritional intake [ ]anasarca[ ]Artificial Nutrition    Other REFERRALS:  [ ]Hospice  [ ]Child Life  [ ]Social Work  [ ]Case management [ ]Holistic Therapy

## 2024-10-30 NOTE — PROGRESS NOTE ADULT - PROBLEM SELECTOR PLAN 7
Patient being treated for ischemic colitis - Decreased BMs per flowsheets   - on flagyl and rocephin   - monitor BMs The patient requires nursing assistance with ADLs  - Supportive care  - Turn and position  - Continue with good skin care  - PT recs appreciated- DESTINY   - Dietary recs appreciated

## 2024-10-30 NOTE — PROGRESS NOTE ADULT - ASSESSMENT
64-year-old male with metastatic prostate cancer, sent in by hematologist from [place] [facility] for uncontrolled pain, nausea, vomiting.   Patient reports diffuse pain starting 6 months ago significantly worsening for the past 2 weeks. Diagnosed with high risk high volume metastatic prostate cancer with bone, liver lymph node mets and presacral mass. Liver biopsy confirmed disease with . Started lupron, loly/pred with plans to initiate taxotere.   nephrology consulted for electrolyte abnormalities     Hypernatremia  Poor free water intake    pt now has diet however not eating much per family  on D5W @75cc/hr, na better however potassium low  pt not tolerating po supplement. getting iv kcl 10  will change ivf to d5+LR+kcl 20meq @ 50cc/hr  monitor    Hypokalemia  supplement  iv as above    Acidosis   Likely from Normal saline causing hyperchloremic acidosis  Will monitor     hypophosphatemia  kphos 15x1 today  monitor    hypocalcemia   sec to low albumin  corrected ca 8.5  monitor    fever  GNR bacteremia   work up and tx per team    anemia  metastatic prostate cancer  f/u heme/onc

## 2024-10-30 NOTE — PROGRESS NOTE ADULT - NS ATTEST RISK PROBLEM GEN_ALL_CORE FT
Due to: 1. Number and complexity of problems addressed for this patient:    1.1 Moderate (At least 1)  [ ] 1 or more chronic illnesses with exacerbation, progression, or side effects of treatment  [ ] 2 or more stable chronic illnesses  [ ] 1 undiagnosed new problem with uncertain prognosis  [ ] 1 acute illness with systemic symptoms  [ x] 1 acute complicated injury  1.2 High (At least 1)   [x ] 1 or more chronic illnesses with severe exacerbation, progression, or side effects of treatment  [ ] 1 acute or chronic illnesses or injuries that may pose a threat to life or bodily function    2. Amount and/or Complexity of Data that was Reviewed and Analyzed for this case:       Moderate (1 out of 3)       High (2 out of 3)  2.1. (Any combination of 3 of the following)   [x ] Prior External notes were reviewed  [x ] Each test result was reviewed (see "LABS" and "RADIOLOGY & ADDITIONAL STUDIES" above)  [ x] The following tests were ordered and/or reviewed (Only count 1 point for ordering or reviewing a unique test):  	[ ]CBC  	[ ] Chemistry   	[ ] Imaging   	[ ] Other:   [ x] Assessment requiring an independent historian   		Name of historian and relationship:   2.2  [x ] Personally review and interpretation of  image or testing   2.3  [ ] Discussion of management or test interpretation with external physician/other qualified health care professional\appropriate source (not separately reported)    3. Risk of Complications and/or Morbidity or Mortality of for this Patient’s Management:  3.1 Moderate risk of morbidity from additional diagnostic testing or treatment (At least 1):   [x ] Prescription drug management   [ ] Decision regarding minor surgery, treatment, or procedure with identified patient or procedure risk factors  [ ] Decision regarding elective major surgery, treatment, or procedure without identified patient or procedure risk factors   [ ] Diagnosis or treatment significantly limited by social determinants of health   [ ] Other:   3.2 High risk of morbidity from additional diagnostic testing or treatment (At least 1):   [ ] Drug therapy requiring intensive monitoring for toxicity   [x ] Decision regarding elective major surgery, treatment, or procedure with identified patient or procedure risk factors   [ ] Decision regarding emergency major surgery, treatment, or procedure   [ ] Decision regarding hospitalization or escalation of hospital-level of care  [ ] Decision not to resuscitate, not to intubate, or to de-escalate care because of poor prognosis   [ ] Decision to proceed or not with artificial nutrition   [ ] Parenteral controlled substance  [ ] Other:
SMA dissection, metastatic prostate CA, polymicrobial bacteremia
SMA dissection, metastatic prostate CA, polymicrobial bacteremia, hypernatremia, metabolic encephalopathy
Due to: 1. Number and complexity of problems addressed for this patient:    1.1 Moderate (At least 1)  [ ] 1 or more chronic illnesses with exacerbation, progression, or side effects of treatment  [ ] 2 or more stable chronic illnesses  [ ] 1 undiagnosed new problem with uncertain prognosis  [ ] 1 acute illness with systemic symptoms  [ x] 1 acute complicated injury  1.2 High (At least 1)   [x ] 1 or more chronic illnesses with severe exacerbation, progression, or side effects of treatment  [ ] 1 acute or chronic illnesses or injuries that may pose a threat to life or bodily function    2. Amount and/or Complexity of Data that was Reviewed and Analyzed for this case:       Moderate (1 out of 3)       High (2 out of 3)  2.1. (Any combination of 3 of the following)   [x ] Prior External notes were reviewed  [x ] Each test result was reviewed (see "LABS" and "RADIOLOGY & ADDITIONAL STUDIES" above)  [ x] The following tests were ordered and/or reviewed (Only count 1 point for ordering or reviewing a unique test):  	[ ]CBC  	[ ] Chemistry   	[ ] Imaging   	[ ] Other:   [ x] Assessment requiring an independent historian   		Name of historian and relationship:   2.2  [x ] Personally review and interpretation of  image or testing   2.3  [ ] Discussion of management or test interpretation with external physician/other qualified health care professional\appropriate source (not separately reported)    3. Risk of Complications and/or Morbidity or Mortality of for this Patient’s Management:  3.1 Moderate risk of morbidity from additional diagnostic testing or treatment (At least 1):   [x ] Prescription drug management   [ ] Decision regarding minor surgery, treatment, or procedure with identified patient or procedure risk factors  [ ] Decision regarding elective major surgery, treatment, or procedure without identified patient or procedure risk factors   [ ] Diagnosis or treatment significantly limited by social determinants of health   [ ] Other:   3.2 High risk of morbidity from additional diagnostic testing or treatment (At least 1):   [ ] Drug therapy requiring intensive monitoring for toxicity   [x ] Decision regarding elective major surgery, treatment, or procedure with identified patient or procedure risk factors   [ ] Decision regarding emergency major surgery, treatment, or procedure   [ ] Decision regarding hospitalization or escalation of hospital-level of care  [ ] Decision not to resuscitate, not to intubate, or to de-escalate care because of poor prognosis   [ ] Decision to proceed or not with artificial nutrition   [ ] Parenteral controlled substance  [ ] Other:
Due to: 1. Number and complexity of problems addressed for this patient:    1.1 Moderate (At least 1)  [ ] 1 or more chronic illnesses with exacerbation, progression, or side effects of treatment  [ ] 2 or more stable chronic illnesses  [ ] 1 undiagnosed new problem with uncertain prognosis  [ ] 1 acute illness with systemic symptoms  [ x] 1 acute complicated injury  1.2 High (At least 1)   [x ] 1 or more chronic illnesses with severe exacerbation, progression, or side effects of treatment  [ ] 1 acute or chronic illnesses or injuries that may pose a threat to life or bodily function    2. Amount and/or Complexity of Data that was Reviewed and Analyzed for this case:       Moderate (1 out of 3)       High (2 out of 3)  2.1. (Any combination of 3 of the following)   [x ] Prior External notes were reviewed  [x ] Each test result was reviewed (see "LABS" and "RADIOLOGY & ADDITIONAL STUDIES" above)  [ x] The following tests were ordered and/or reviewed (Only count 1 point for ordering or reviewing a unique test):  	[ ]CBC  	[ ] Chemistry   	[ ] Imaging   	[ ] Other:   [ x] Assessment requiring an independent historian   		Name of historian and relationship:   2.2  [x ] Personally review and interpretation of  image or testing   2.3  [ ] Discussion of management or test interpretation with external physician/other qualified health care professional\appropriate source (not separately reported)    3. Risk of Complications and/or Morbidity or Mortality of for this Patient’s Management:  3.1 Moderate risk of morbidity from additional diagnostic testing or treatment (At least 1):   [x ] Prescription drug management   [ ] Decision regarding minor surgery, treatment, or procedure with identified patient or procedure risk factors  [ ] Decision regarding elective major surgery, treatment, or procedure without identified patient or procedure risk factors   [ ] Diagnosis or treatment significantly limited by social determinants of health   [ ] Other:   3.2 High risk of morbidity from additional diagnostic testing or treatment (At least 1):   [ ] Drug therapy requiring intensive monitoring for toxicity   [x ] Decision regarding elective major surgery, treatment, or procedure with identified patient or procedure risk factors   [ ] Decision regarding emergency major surgery, treatment, or procedure   [ ] Decision regarding hospitalization or escalation of hospital-level of care  [ ] Decision not to resuscitate, not to intubate, or to de-escalate care because of poor prognosis   [ ] Decision to proceed or not with artificial nutrition   [ ] Parenteral controlled substance  [ ] Other:    [x] 30 mins spent discussing plan of care with oncology team and family in family lounge
SMA dissection, metastatic prostate CA, polymicrobial bacteremia
Due to: 1. Number and complexity of problems addressed for this patient:    1.1 Moderate (At least 1)  [ ] 1 or more chronic illnesses with exacerbation, progression, or side effects of treatment  [ ] 2 or more stable chronic illnesses  [ ] 1 undiagnosed new problem with uncertain prognosis  [ ] 1 acute illness with systemic symptoms  [ x] 1 acute complicated injury  1.2 High (At least 1)   [x ] 1 or more chronic illnesses with severe exacerbation, progression, or side effects of treatment  [ ] 1 acute or chronic illnesses or injuries that may pose a threat to life or bodily function    2. Amount and/or Complexity of Data that was Reviewed and Analyzed for this case:       Moderate (1 out of 3)       High (2 out of 3)  2.1. (Any combination of 3 of the following)   [x ] Prior External notes were reviewed  [x ] Each test result was reviewed (see "LABS" and "RADIOLOGY & ADDITIONAL STUDIES" above)  [ x] The following tests were ordered and/or reviewed (Only count 1 point for ordering or reviewing a unique test):  	[ ]CBC  	[ ] Chemistry   	[ ] Imaging   	[ ] Other:   [ x] Assessment requiring an independent historian   		Name of historian and relationship:   2.2  [x ] Personally review and interpretation of  image or testing   2.3  [ ] Discussion of management or test interpretation with external physician/other qualified health care professional\appropriate source (not separately reported)    3. Risk of Complications and/or Morbidity or Mortality of for this Patient’s Management:  3.1 Moderate risk of morbidity from additional diagnostic testing or treatment (At least 1):   [x ] Prescription drug management   [ ] Decision regarding minor surgery, treatment, or procedure with identified patient or procedure risk factors  [ ] Decision regarding elective major surgery, treatment, or procedure without identified patient or procedure risk factors   [ ] Diagnosis or treatment significantly limited by social determinants of health   [ ] Other:   3.2 High risk of morbidity from additional diagnostic testing or treatment (At least 1):   [ ] Drug therapy requiring intensive monitoring for toxicity   [x ] Decision regarding elective major surgery, treatment, or procedure with identified patient or procedure risk factors   [ ] Decision regarding emergency major surgery, treatment, or procedure   [ ] Decision regarding hospitalization or escalation of hospital-level of care  [ ] Decision not to resuscitate, not to intubate, or to de-escalate care because of poor prognosis   [ ] Decision to proceed or not with artificial nutrition   [ ] Parenteral controlled substance  [ ] Other:
1. Number and complexity of problems addressed for this patient:    1.1 Moderate (At least 1)  [ ] 1 or more chronic illnesses with exacerbation, progression, or side effects of treatment  [ ] 2 or more stable chronic illnesses  [ ] 1 undiagnosed new problem with uncertain prognosis  [ ] 1 acute illness with systemic symptoms  [ x] 1 acute complicated injury  1.2 High (At least 1)   [x ] 1 or more chronic illnesses with severe exacerbation, progression, or side effects of treatment  [ ] 1 acute or chronic illnesses or injuries that may pose a threat to life or bodily function    2. Amount and/or Complexity of Data that was Reviewed and Analyzed for this case:       Moderate (1 out of 3)       High (2 out of 3)  2.1. (Any combination of 3 of the following)   [x ] Prior External notes were reviewed  [x ] Each test result was reviewed (see "LABS" and "RADIOLOGY & ADDITIONAL STUDIES" above)  [ ] The following tests were ordered and/or reviewed (Only count 1 point for ordering or reviewing a unique test):  	[ ]CBC  	[ ] Chemistry   	[ ] Imaging   	[ ] Other:   [ x] Assessment requiring an independent historian   		Name of historian and relationship:   2.2  [x ] Personally review and interpretation of  image or testing   2.3  [ ] Discussion of management or test interpretation with external physician/other qualified health care professional\appropriate source (not separately reported)    3. Risk of Complications and/or Morbidity or Mortality of for this Patient’s Management:  3.1 Moderate risk of morbidity from additional diagnostic testing or treatment (At least 1):   [x ] Prescription drug management   [ ] Decision regarding minor surgery, treatment, or procedure with identified patient or procedure risk factors  [ ] Decision regarding elective major surgery, treatment, or procedure without identified patient or procedure risk factors   [ ] Diagnosis or treatment significantly limited by social determinants of health   [ ] Other:   3.2 High risk of morbidity from additional diagnostic testing or treatment (At least 1):   [ ] Drug therapy requiring intensive monitoring for toxicity   [x ] Decision regarding elective major surgery, treatment, or procedure with identified patient or procedure risk factors   [ ] Decision regarding emergency major surgery, treatment, or procedure   [ ] Decision regarding hospitalization or escalation of hospital-level of care  [ ] Decision not to resuscitate, not to intubate, or to de-escalate care because of poor prognosis   [ ] Decision to proceed or not with artificial nutrition   [ ] Parenteral controlled substance  [ ] Other:
Due to: 1. Number and complexity of problems addressed for this patient:    1.1 Moderate (At least 1)  [ ] 1 or more chronic illnesses with exacerbation, progression, or side effects of treatment  [ ] 2 or more stable chronic illnesses  [ ] 1 undiagnosed new problem with uncertain prognosis  [ ] 1 acute illness with systemic symptoms  [ x] 1 acute complicated injury  1.2 High (At least 1)   [x ] 1 or more chronic illnesses with severe exacerbation, progression, or side effects of treatment  [ ] 1 acute or chronic illnesses or injuries that may pose a threat to life or bodily function    2. Amount and/or Complexity of Data that was Reviewed and Analyzed for this case:       Moderate (1 out of 3)       High (2 out of 3)  2.1. (Any combination of 3 of the following)   [x ] Prior External notes were reviewed  [x ] Each test result was reviewed (see "LABS" and "RADIOLOGY & ADDITIONAL STUDIES" above)  [ x] The following tests were ordered and/or reviewed (Only count 1 point for ordering or reviewing a unique test):  	[ ]CBC  	[ ] Chemistry   	[ ] Imaging   	[ ] Other:   [ x] Assessment requiring an independent historian   		Name of historian and relationship:   2.2  [x ] Personally review and interpretation of  image or testing   2.3  [ ] Discussion of management or test interpretation with external physician/other qualified health care professional\appropriate source (not separately reported)    3. Risk of Complications and/or Morbidity or Mortality of for this Patient’s Management:  3.1 Moderate risk of morbidity from additional diagnostic testing or treatment (At least 1):   [x ] Prescription drug management   [ ] Decision regarding minor surgery, treatment, or procedure with identified patient or procedure risk factors  [ ] Decision regarding elective major surgery, treatment, or procedure without identified patient or procedure risk factors   [ ] Diagnosis or treatment significantly limited by social determinants of health   [ ] Other:   3.2 High risk of morbidity from additional diagnostic testing or treatment (At least 1):   [ ] Drug therapy requiring intensive monitoring for toxicity   [x ] Decision regarding elective major surgery, treatment, or procedure with identified patient or procedure risk factors   [ ] Decision regarding emergency major surgery, treatment, or procedure   [ ] Decision regarding hospitalization or escalation of hospital-level of care  [ ] Decision not to resuscitate, not to intubate, or to de-escalate care because of poor prognosis   [ ] Decision to proceed or not with artificial nutrition   [ ] Parenteral controlled substance  [ ] Other:
Due to: 1. Number and complexity of problems addressed for this patient:    1.1 Moderate (At least 1)  [ ] 1 or more chronic illnesses with exacerbation, progression, or side effects of treatment  [ ] 2 or more stable chronic illnesses  [ ] 1 undiagnosed new problem with uncertain prognosis  [ ] 1 acute illness with systemic symptoms  [ x] 1 acute complicated injury  1.2 High (At least 1)   [x ] 1 or more chronic illnesses with severe exacerbation, progression, or side effects of treatment  [ ] 1 acute or chronic illnesses or injuries that may pose a threat to life or bodily function    2. Amount and/or Complexity of Data that was Reviewed and Analyzed for this case:       Moderate (1 out of 3)       High (2 out of 3)  2.1. (Any combination of 3 of the following)   [x ] Prior External notes were reviewed  [x ] Each test result was reviewed (see "LABS" and "RADIOLOGY & ADDITIONAL STUDIES" above)  [ x] The following tests were ordered and/or reviewed (Only count 1 point for ordering or reviewing a unique test):  	[ ]CBC  	[ ] Chemistry   	[ ] Imaging   	[ ] Other:   [ x] Assessment requiring an independent historian   		Name of historian and relationship:   2.2  [x ] Personally review and interpretation of  image or testing   2.3  [ ] Discussion of management or test interpretation with external physician/other qualified health care professional\appropriate source (not separately reported)    3. Risk of Complications and/or Morbidity or Mortality of for this Patient’s Management:  3.1 Moderate risk of morbidity from additional diagnostic testing or treatment (At least 1):   [x ] Prescription drug management   [ ] Decision regarding minor surgery, treatment, or procedure with identified patient or procedure risk factors  [ ] Decision regarding elective major surgery, treatment, or procedure without identified patient or procedure risk factors   [ ] Diagnosis or treatment significantly limited by social determinants of health   [ ] Other:   3.2 High risk of morbidity from additional diagnostic testing or treatment (At least 1):   [ ] Drug therapy requiring intensive monitoring for toxicity   [x ] Decision regarding elective major surgery, treatment, or procedure with identified patient or procedure risk factors   [ ] Decision regarding emergency major surgery, treatment, or procedure   [ ] Decision regarding hospitalization or escalation of hospital-level of care  [ ] Decision not to resuscitate, not to intubate, or to de-escalate care because of poor prognosis   [ ] Decision to proceed or not with artificial nutrition   [ ] Parenteral controlled substance  [ ] Other:
Due to: 1. Number and complexity of problems addressed for this patient:    1.1 Moderate (At least 1)  [ ] 1 or more chronic illnesses with exacerbation, progression, or side effects of treatment  [ ] 2 or more stable chronic illnesses  [ ] 1 undiagnosed new problem with uncertain prognosis  [ ] 1 acute illness with systemic symptoms  [ x] 1 acute complicated injury  1.2 High (At least 1)   [x ] 1 or more chronic illnesses with severe exacerbation, progression, or side effects of treatment  [ ] 1 acute or chronic illnesses or injuries that may pose a threat to life or bodily function    2. Amount and/or Complexity of Data that was Reviewed and Analyzed for this case:       Moderate (1 out of 3)       High (2 out of 3)  2.1. (Any combination of 3 of the following)   [x ] Prior External notes were reviewed  [x ] Each test result was reviewed (see "LABS" and "RADIOLOGY & ADDITIONAL STUDIES" above)  [ x] The following tests were ordered and/or reviewed (Only count 1 point for ordering or reviewing a unique test):  	[ ]CBC  	[ ] Chemistry   	[ ] Imaging   	[ ] Other:   [ x] Assessment requiring an independent historian   		Name of historian and relationship:   2.2  [x ] Personally review and interpretation of  image or testing   2.3  [ ] Discussion of management or test interpretation with external physician/other qualified health care professional\appropriate source (not separately reported)    3. Risk of Complications and/or Morbidity or Mortality of for this Patient’s Management:  3.1 Moderate risk of morbidity from additional diagnostic testing or treatment (At least 1):   [x ] Prescription drug management   [ ] Decision regarding minor surgery, treatment, or procedure with identified patient or procedure risk factors  [ ] Decision regarding elective major surgery, treatment, or procedure without identified patient or procedure risk factors   [ ] Diagnosis or treatment significantly limited by social determinants of health   [ ] Other:   3.2 High risk of morbidity from additional diagnostic testing or treatment (At least 1):   [ ] Drug therapy requiring intensive monitoring for toxicity   [x ] Decision regarding elective major surgery, treatment, or procedure with identified patient or procedure risk factors   [ ] Decision regarding emergency major surgery, treatment, or procedure   [ ] Decision regarding hospitalization or escalation of hospital-level of care  [ ] Decision not to resuscitate, not to intubate, or to de-escalate care because of poor prognosis   [ ] Decision to proceed or not with artificial nutrition   [ ] Parenteral controlled substance  [ ] Other:    [x] 30 mins spent discussing goc
Due to: 1. Number and complexity of problems addressed for this patient:    1.1 Moderate (At least 1)  [ ] 1 or more chronic illnesses with exacerbation, progression, or side effects of treatment  [ ] 2 or more stable chronic illnesses  [ ] 1 undiagnosed new problem with uncertain prognosis  [ ] 1 acute illness with systemic symptoms  [ x] 1 acute complicated injury  1.2 High (At least 1)   [x ] 1 or more chronic illnesses with severe exacerbation, progression, or side effects of treatment  [ ] 1 acute or chronic illnesses or injuries that may pose a threat to life or bodily function    2. Amount and/or Complexity of Data that was Reviewed and Analyzed for this case:       Moderate (1 out of 3)       High (2 out of 3)  2.1. (Any combination of 3 of the following)   [x ] Prior External notes were reviewed  [x ] Each test result was reviewed (see "LABS" and "RADIOLOGY & ADDITIONAL STUDIES" above)  [ x] The following tests were ordered and/or reviewed (Only count 1 point for ordering or reviewing a unique test):  	[ ]CBC  	[ ] Chemistry   	[ ] Imaging   	[ ] Other:   [ x] Assessment requiring an independent historian   		Name of historian and relationship:   2.2  [x ] Personally review and interpretation of  image or testing   2.3  [ ] Discussion of management or test interpretation with external physician/other qualified health care professional\appropriate source (not separately reported)    3. Risk of Complications and/or Morbidity or Mortality of for this Patient’s Management:  3.1 Moderate risk of morbidity from additional diagnostic testing or treatment (At least 1):   [x ] Prescription drug management   [ ] Decision regarding minor surgery, treatment, or procedure with identified patient or procedure risk factors  [ ] Decision regarding elective major surgery, treatment, or procedure without identified patient or procedure risk factors   [ ] Diagnosis or treatment significantly limited by social determinants of health   [ ] Other:   3.2 High risk of morbidity from additional diagnostic testing or treatment (At least 1):   [ ] Drug therapy requiring intensive monitoring for toxicity   [x ] Decision regarding elective major surgery, treatment, or procedure with identified patient or procedure risk factors   [ ] Decision regarding emergency major surgery, treatment, or procedure   [ ] Decision regarding hospitalization or escalation of hospital-level of care  [ ] Decision not to resuscitate, not to intubate, or to de-escalate care because of poor prognosis   [ ] Decision to proceed or not with artificial nutrition   [ ] Parenteral controlled substance  [ ] Other:

## 2024-10-30 NOTE — PROGRESS NOTE ADULT - SUBJECTIVE AND OBJECTIVE BOX
[facility] [place] NEPHROLOGY PRACTICE   MD MARKO EASTMAN MD ANGELA WONG, PA    TEL:  OFFICE: [phone]  From 5pm-7am Answering Service [phone]    -- RENAL FOLLOW UP NOTE ---Date of Service 10-30-24 @ 13:33    Patient is a 64y old  Male who presents with a chief complaint of Pain (30 Oct 2024 13:18)      Patient seen and examined at bedside. No chest pain/sob    VITALS:  T(F): 97.2 (10-30-24 @ 12:22), Max: 98.3 (10-30-24 @ 09:30)  HR: 102 (10-30-24 @ 12:22)  BP: 101/67 (10-30-24 @ 12:22)  RR: 18 (10-30-24 @ 12:22)  SpO2: 100% (10-30-24 @ 12:22)  Wt(kg): --    10-29 @ 07:01  -  10-30 @ 07:00  --------------------------------------------------------  IN: 980 mL / OUT: 370 mL / NET: 610 mL          PHYSICAL EXAM:  General: NAD  Neck: No JVD  Respiratory: CTAB, no wheezes, rales or rhonchi  Cardiovascular: S1, S2, RRR  Gastrointestinal: BS+, soft, NT/ND  Extremities: No peripheral edema    Hospital Medications:   MEDICATIONS  (STANDING):  abiraterone 500 mg tablet 2 Tablet(s) 2 Tablet(s) Oral daily  acetaminophen     Tablet .. 650 milliGRAM(s) Oral once  atorvastatin 20 milliGRAM(s) Oral at bedtime  cefTRIAXone   IVPB 2000 milliGRAM(s) IV Intermittent every 24 hours  chlorhexidine 2% Cloths 1 Application(s) Topical daily  dextrose 5% + lactated ringers with potassium chloride 20 mEq/L 1000 milliLiter(s) (50 mL/Hr) IV Continuous <Continuous>  dextrose 5%. 1000 milliLiter(s) (50 mL/Hr) IV Continuous <Continuous>  dextrose 5%. 1000 milliLiter(s) (100 mL/Hr) IV Continuous <Continuous>  dextrose 50% Injectable 25 Gram(s) IV Push once  dextrose 50% Injectable 25 Gram(s) IV Push once  dextrose 50% Injectable 12.5 Gram(s) IV Push once  diphenhydrAMINE 25 milliGRAM(s) Oral once  fenofibrate Tablet 145 milliGRAM(s) Oral daily  FIRST- Mouthwash  BLM 30 milliLiter(s) Swish and Spit three times a day  glucagon  Injectable 1 milliGRAM(s) IntraMuscular once  influenza  Vaccine (HIGH DOSE) 0.5 milliLiter(s) IntraMuscular once  insulin lispro (ADMELOG) corrective regimen sliding scale   SubCutaneous three times a day before meals  insulin lispro (ADMELOG) corrective regimen sliding scale   SubCutaneous at bedtime  lactobacillus acidophilus 1 Tablet(s) Oral daily  lidocaine   4% Patch 1 Patch Transdermal every 24 hours  melatonin 6 milliGRAM(s) Oral at bedtime  metroNIDAZOLE  IVPB 500 milliGRAM(s) IV Intermittent every 12 hours  morphine  Infusion. 0.5 mG/Hr (0.5 mL/Hr) IV Continuous <Continuous>  multivitamin 1 Tablet(s) Oral daily  Nephro-nancy 1 Tablet(s) Oral daily  pantoprazole  Injectable 40 milliGRAM(s) IV Push daily  potassium chloride  10 mEq/100 mL IVPB 10 milliEquivalent(s) IV Intermittent once  predniSONE   Tablet 5 milliGRAM(s) Oral daily      LABS:  10-30    141  |  113[H]  |  10  ----------------------------<  122[H]  3.2[L]   |  18[L]  |  0.33[L]    Ca    7.1[L]      30 Oct 2024 06:50  Phos  1.6     10-30  Mg     1.80     10-30      Creatinine Trend: 0.33 <--, 0.33 <--, 0.34 <--, 0.37 <--, 0.39 <--, 0.48 <--, 0.39 <--, 0.49 <--, 0.51 <--, 0.57 <--, 0.61 <--, 0.68 <--, 0.74 <--, 0.64 <--    Phosphorus: 1.6 mg/dL (10-30 @ 06:50)  Phosphorus: 2.0 mg/dL (10-29 @ 22:30)                              8.5    3.11  )-----------( 118      ( 30 Oct 2024 06:50 )             25.1     Urine Studies:  Urinalysis - [10-30-24 @ 06:50]      Color  / Appearance  / SG  / pH       Gluc 122 / Ketone   / Bili  / Urobili        Blood  / Protein  / Leuk Est  / Nitrite       RBC  / WBC  / Hyaline  / Gran  / Sq Epi  / Non Sq Epi  / Bacteria       TSH 2.62      [10-13-24 @ 05:30]  Lipid: chol 135, , HDL 39, LDL --      [10-13-24 @ 05:30]        RADIOLOGY & ADDITIONAL STUDIES:

## 2024-10-30 NOTE — PROGRESS NOTE ADULT - PROBLEM SELECTOR PLAN 2
Patient with elevated SNa but improving   > Appreciate nephro recs   > On IVF Na has improved significantly   > Appreciate nephro recs   > On IVF  > Potassium is being repleted

## 2024-10-30 NOTE — PROGRESS NOTE ADULT - SUBJECTIVE AND OBJECTIVE BOX
Patient seen, awake and alert      MEDICATIONS  (STANDING):  abiraterone 500 mg tablet 2 Tablet(s) 2 Tablet(s) Oral daily  acetaminophen     Tablet .. 650 milliGRAM(s) Oral once  atorvastatin 20 milliGRAM(s) Oral at bedtime  cefTRIAXone   IVPB 2000 milliGRAM(s) IV Intermittent every 24 hours  chlorhexidine 2% Cloths 1 Application(s) Topical daily  dextrose 5% with potassium chloride 20 mEq/L 1000 milliLiter(s) (75 mL/Hr) IV Continuous <Continuous>  dextrose 5%. 1000 milliLiter(s) (50 mL/Hr) IV Continuous <Continuous>  dextrose 5%. 1000 milliLiter(s) (100 mL/Hr) IV Continuous <Continuous>  dextrose 50% Injectable 12.5 Gram(s) IV Push once  dextrose 50% Injectable 25 Gram(s) IV Push once  dextrose 50% Injectable 25 Gram(s) IV Push once  diphenhydrAMINE 25 milliGRAM(s) Oral once  fenofibrate Tablet 145 milliGRAM(s) Oral daily  FIRST- Mouthwash  BLM 30 milliLiter(s) Swish and Spit three times a day  glucagon  Injectable 1 milliGRAM(s) IntraMuscular once  influenza  Vaccine (HIGH DOSE) 0.5 milliLiter(s) IntraMuscular once  insulin lispro (ADMELOG) corrective regimen sliding scale   SubCutaneous at bedtime  insulin lispro (ADMELOG) corrective regimen sliding scale   SubCutaneous three times a day before meals  lactobacillus acidophilus 1 Tablet(s) Oral daily  lidocaine   4% Patch 1 Patch Transdermal every 24 hours  melatonin 6 milliGRAM(s) Oral at bedtime  metroNIDAZOLE  IVPB 500 milliGRAM(s) IV Intermittent every 12 hours  morphine  Infusion. 0.5 mG/Hr (0.5 mL/Hr) IV Continuous <Continuous>  multivitamin 1 Tablet(s) Oral daily  Nephro-nancy 1 Tablet(s) Oral daily  pantoprazole  Injectable 40 milliGRAM(s) IV Push daily  potassium chloride  10 mEq/100 mL IVPB 10 milliEquivalent(s) IV Intermittent once  potassium chloride  10 mEq/100 mL IVPB 10 milliEquivalent(s) IV Intermittent every 4 hours  predniSONE   Tablet 5 milliGRAM(s) Oral daily    MEDICATIONS  (PRN):  acetaminophen     Tablet .. 650 milliGRAM(s) Oral every 6 hours PRN Temp greater or equal to 38C (100.4F), Mild Pain (1 - 3), Moderate Pain (4 - 6)  aluminum hydroxide/magnesium hydroxide/simethicone Suspension 30 milliLiter(s) Oral every 4 hours PRN Dyspepsia  artificial tears (preservative free) Ophthalmic Solution 1 Drop(s) Both EYES four times a day PRN Dry Eyes  dextrose Oral Gel 15 Gram(s) Oral once PRN Blood Glucose LESS THAN 70 milliGRAM(s)/deciliter  morphine  - Injectable 4 milliGRAM(s) IV Push every 3 hours PRN Severe Pain (7 - 10)  naloxone Injectable 0.1 milliGRAM(s) IV Push every 3 minutes PRN For ANY of the following changes in patient status:  A. RR LESS THAN 10 breaths per minute, B. Oxygen saturation LESS THAN 90%, C. Sedation score of 6  polyethylene glycol 3350 17 Gram(s) Oral daily PRN Constipation      ROS  No fever, sweats, chills  No epistaxis, HA, sore throat  No CP, SOB, cough, sputum  No n/v/d, abd pain, melena, hematochezia  No edema  No rash  No anxiety  No back pain, joint pain  No bleeding, bruising  No dysuria, hematuria    Vital Signs Last 24 Hrs  T(C): 36.8 (30 Oct 2024 09:30), Max: 36.8 (29 Oct 2024 16:10)  T(F): 98.3 (30 Oct 2024 09:30), Max: 98.3 (30 Oct 2024 09:30)  HR: 105 (30 Oct 2024 09:30) (100 - 107)  BP: 105/62 (30 Oct 2024 09:30) (98/66 - 105/62)  BP(mean): --  RR: 18 (30 Oct 2024 09:30) (17 - 18)  SpO2: 97% (30 Oct 2024 09:30) (97% - 99%)    Parameters below as of 30 Oct 2024 09:30  Patient On (Oxygen Delivery Method): room air        PE  NAD  Awake, alert  Anicteric, MMM  RRR  CTAB  Abd soft, NT, ND  No c/c/e  No rash grossly  FROM                          8.5    3.11  )-----------( 118      ( 30 Oct 2024 06:50 )             25.1       10-30    141  |  113[H]  |  10  ----------------------------<  122[H]  3.2[L]   |  18[L]  |  0.33[L]    Ca    7.1[L]      30 Oct 2024 06:50  Phos  1.6     10-30  Mg     1.80     10-30         Patient seen, awake and alert, brushing teeth  Wife at bedside      MEDICATIONS  (STANDING):  abiraterone 500 mg tablet 2 Tablet(s) 2 Tablet(s) Oral daily  acetaminophen     Tablet .. 650 milliGRAM(s) Oral once  atorvastatin 20 milliGRAM(s) Oral at bedtime  cefTRIAXone   IVPB 2000 milliGRAM(s) IV Intermittent every 24 hours  chlorhexidine 2% Cloths 1 Application(s) Topical daily  dextrose 5% with potassium chloride 20 mEq/L 1000 milliLiter(s) (75 mL/Hr) IV Continuous <Continuous>  dextrose 5%. 1000 milliLiter(s) (50 mL/Hr) IV Continuous <Continuous>  dextrose 5%. 1000 milliLiter(s) (100 mL/Hr) IV Continuous <Continuous>  dextrose 50% Injectable 12.5 Gram(s) IV Push once  dextrose 50% Injectable 25 Gram(s) IV Push once  dextrose 50% Injectable 25 Gram(s) IV Push once  diphenhydrAMINE 25 milliGRAM(s) Oral once  fenofibrate Tablet 145 milliGRAM(s) Oral daily  FIRST- Mouthwash  BLM 30 milliLiter(s) Swish and Spit three times a day  glucagon  Injectable 1 milliGRAM(s) IntraMuscular once  influenza  Vaccine (HIGH DOSE) 0.5 milliLiter(s) IntraMuscular once  insulin lispro (ADMELOG) corrective regimen sliding scale   SubCutaneous at bedtime  insulin lispro (ADMELOG) corrective regimen sliding scale   SubCutaneous three times a day before meals  lactobacillus acidophilus 1 Tablet(s) Oral daily  lidocaine   4% Patch 1 Patch Transdermal every 24 hours  melatonin 6 milliGRAM(s) Oral at bedtime  metroNIDAZOLE  IVPB 500 milliGRAM(s) IV Intermittent every 12 hours  morphine  Infusion. 0.5 mG/Hr (0.5 mL/Hr) IV Continuous <Continuous>  multivitamin 1 Tablet(s) Oral daily  Nephro-nancy 1 Tablet(s) Oral daily  pantoprazole  Injectable 40 milliGRAM(s) IV Push daily  potassium chloride  10 mEq/100 mL IVPB 10 milliEquivalent(s) IV Intermittent once  potassium chloride  10 mEq/100 mL IVPB 10 milliEquivalent(s) IV Intermittent every 4 hours  predniSONE   Tablet 5 milliGRAM(s) Oral daily    MEDICATIONS  (PRN):  acetaminophen     Tablet .. 650 milliGRAM(s) Oral every 6 hours PRN Temp greater or equal to 38C (100.4F), Mild Pain (1 - 3), Moderate Pain (4 - 6)  aluminum hydroxide/magnesium hydroxide/simethicone Suspension 30 milliLiter(s) Oral every 4 hours PRN Dyspepsia  artificial tears (preservative free) Ophthalmic Solution 1 Drop(s) Both EYES four times a day PRN Dry Eyes  dextrose Oral Gel 15 Gram(s) Oral once PRN Blood Glucose LESS THAN 70 milliGRAM(s)/deciliter  morphine  - Injectable 4 milliGRAM(s) IV Push every 3 hours PRN Severe Pain (7 - 10)  naloxone Injectable 0.1 milliGRAM(s) IV Push every 3 minutes PRN For ANY of the following changes in patient status:  A. RR LESS THAN 10 breaths per minute, B. Oxygen saturation LESS THAN 90%, C. Sedation score of 6  polyethylene glycol 3350 17 Gram(s) Oral daily PRN Constipation      ROS  No fever, sweats, chills  No epistaxis, HA, sore throat  No CP, SOB, cough, sputum  No n/v/d, abd pain, melena, hematochezia  No edema  No rash  No anxiety  No back pain, joint pain  No bleeding, bruising  No dysuria, hematuria    Vital Signs Last 24 Hrs  T(C): 36.8 (30 Oct 2024 09:30), Max: 36.8 (29 Oct 2024 16:10)  T(F): 98.3 (30 Oct 2024 09:30), Max: 98.3 (30 Oct 2024 09:30)  HR: 105 (30 Oct 2024 09:30) (100 - 107)  BP: 105/62 (30 Oct 2024 09:30) (98/66 - 105/62)  BP(mean): --  RR: 18 (30 Oct 2024 09:30) (17 - 18)  SpO2: 97% (30 Oct 2024 09:30) (97% - 99%)    Parameters below as of 30 Oct 2024 09:30  Patient On (Oxygen Delivery Method): room air        PE  NAD  Awake, alert  Anicteric, MMM  RRR  CTAB  Abd soft, NT, ND  No c/c/e  No rash grossly                            8.5    3.11  )-----------( 118      ( 30 Oct 2024 06:50 )             25.1       10-30    141  |  113[H]  |  10  ----------------------------<  122[H]  3.2[L]   |  18[L]  |  0.33[L]    Ca    7.1[L]      30 Oct 2024 06:50  Phos  1.6     10-30  Mg     1.80     10-30

## 2024-10-30 NOTE — PROGRESS NOTE ADULT - PROBLEM SELECTOR PLAN 1
- Metastatic prostate adenocarcinoma, follows with Dr. Andrew Mendoza  - Oncology recs appreciated: Metastatic prostate cancer to liver now s/p C1 taxotere, on abiraterone qd and dexamethasone, s/p zarxio 480mcg until ANC > 1500.  - management per oncology team  - IR consult for kyphoplasty on hold  - Appreciate rad/onc recs- on hold until mental status improves. Plan is for 5 fractions/20gy to T1-T5, and also L1-L5 - Metastatic prostate adenocarcinoma, follows with Dr. Andrew Mendoza  - Oncology recs appreciated: Metastatic prostate cancer to liver now s/p C1 taxotere, on abiraterone qd and dexamethasone, s/p zarxio 480mcg until ANC > 1500.  - management per oncology team  - IR consult for kyphoplasty on hold  - Appreciate rad/onc recs- on hold until mental status improves. Plan is for 5 fractions/20gy to T1-T5, and also L1-l5. Mental status has improved- consider RECONSULTING RAD/ONC to see if radiation can be offered.

## 2024-10-30 NOTE — PROGRESS NOTE ADULT - NS ATTEND AMEND GEN_ALL_CORE FT
currently on abirateron. spoke to pt wife and family would recommend aggressive rehab for now. would lean towords holding chemo to allow pt to get appropriate rehab

## 2024-10-30 NOTE — PROGRESS NOTE ADULT - ASSESSMENT
1. Metastatic prostate cancer    - Follows with Dr Andrew Mendoza at [facility]   - PSMA 10/11/24 showed hypermetabolic vera foci above and below the diaphragm, foci in the liver and extensive foci involving the axial and appendicular skeleton compatible with metastatic disease  - --> 374--> 426 on 10/8/24   - Liver biopsy 9/30/24 consistent with adenocarcinoma favoring prostate   - High risk high volume disease -> started on triplet therapy w/ ADT/lupron, abiraterone/prednisone + taxotere. (back pain after Lupron likely tumor flare).  - Continue abiraterone 1000mg daily w Prednisone 5mg PO QD ( currently not on prednisone , reagan discuss with primary team if ok with ID to start)  - s/p taxotere 60mg/m2 on 10/13/24. Next dose on 11/4 if clinically improves    - Kyphoplasty w/IR to T3 and L1 lesions planned    - Palliative following for pain control and given hospital course would have further GOC discussions. Treatment will be offered but he has metastatic disease with visceral involvement and complications as outlined below.     2. Pancolitis, E coli and H influenzae bacteremia  - on CTX and Metronidazole  - ID following  - BCx from 10/24 neg to date    3. Anemia/Thrombocytopenia   - Hgb 6.8, please transfuse 1 unit PRBC today  - Multifactorial sec to likely marrow infiltration by CaP, Chemo effect, consumption from acute illness, poss ITP  - Transfuse for plt <15 or < 50K if bleeding/for procedure      4. SMA Dissection  - seen by vasc sx  - no plan for intervention      5. Agitation  - awaiting psych consult to address possible sundowning  -  EKG reviewed, defer to cardiology  -seems to have improved, continue to monitor      Umm Aponte NP  Hematology/Oncology  [facility]  [phone] (Office)  [phone] (Alt office)  Evenings and weekends please call MD on call or office

## 2024-10-30 NOTE — PROGRESS NOTE ADULT - PROBLEM SELECTOR PLAN 3
- Behavioral health recs appreciated: Zyprexa discontinued per son's request. Haldol prn per psych recs. hold med if QTC > 500  - EKG performed QTc 436 on 1017 Patient has been more calm and cooperative   - Behavioral health recs appreciated: Zyprexa discontinued per son's request. Haldol prn per psych recs. hold med if QTC > 500  - EKG performed QTc 436 on 1017

## 2024-10-30 NOTE — PROGRESS NOTE ADULT - ASSESSMENT
This is a 63 y/o M w/ prostate CA s/p radical prostatectomy now metastatic, HTN, HLD, admitted to 10/12 for pain, N/V in setting of osseous mets. S/p docetaxel on 10/13, abiraterone 10/12. Given neutropenia started on Zarxio.  C/b hypoxia, tachycardia. CTA w/o PE. Pt w/ 10 episodes of diarrhea on 10/18. C diff negative,   Pt now febrile to 102, CT A/P with pancolitis. 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   Fever likely 2/2 to neutropenic enterocolitis, pt w/ ANC 90, at high risk of bacteremia.   Bcx now w/ E Coli and H influenzae bacteremia, E Coli likely from colitis, however unclear source of H influenza, typically from PNA, septic arthritis, consider meningitis, per family denies neck pain/HA.   Pt w/ pressured speech, paranoid thoughts. Worsening hypernatremia   BCx from 10/22 still w/ E Coli.     Recommendations:   1. C/w Ceftriaxone 2 g q 24 and Metronidazole 500 mg BID for 2 week course until 11/6/24. If planned for d/c before then, can change to Cefpodoxime 400 mg BID and Metronidazole 500 mg BID to finish course.   2. Repeat BCx from 10/24 NGTD  3. Appreciate surgery/GI recs   4. No ID contraindication for low dose steroids if needed. No need for PJP ppx if dose is less than 20 mg     Poor prognosis overall    Thank you for this consult. Inpatient ID consult team will sign off.    Further changes in lab values, imaging studies, or clinical status will not be known to ID inpatient consultants unless specifically communicated by primary team.    Orlando Ruiz MD  Attending Physician  Division of Infectious Diseases  Department of Medicine    Please contact through MS Teams message.  Office: [phone] (after 5 PM or weekend)

## 2024-10-31 LAB
ANION GAP SERPL CALC-SCNC: 11 MMOL/L — SIGNIFICANT CHANGE UP (ref 7–14)
ANION GAP SERPL CALC-SCNC: 13 MMOL/L — SIGNIFICANT CHANGE UP (ref 7–14)
BUN SERPL-MCNC: 10 MG/DL — SIGNIFICANT CHANGE UP (ref 7–23)
BUN SERPL-MCNC: 10 MG/DL — SIGNIFICANT CHANGE UP (ref 7–23)
CALCIUM SERPL-MCNC: 7.1 MG/DL — LOW (ref 8.4–10.5)
CALCIUM SERPL-MCNC: 7.1 MG/DL — LOW (ref 8.4–10.5)
CHLORIDE SERPL-SCNC: 116 MMOL/L — HIGH (ref 98–107)
CHLORIDE SERPL-SCNC: 118 MMOL/L — HIGH (ref 98–107)
CO2 SERPL-SCNC: 18 MMOL/L — LOW (ref 22–31)
CO2 SERPL-SCNC: 19 MMOL/L — LOW (ref 22–31)
CREAT SERPL-MCNC: 0.31 MG/DL — LOW (ref 0.5–1.3)
CREAT SERPL-MCNC: 0.34 MG/DL — LOW (ref 0.5–1.3)
EGFR: 127 ML/MIN/1.73M2 — SIGNIFICANT CHANGE UP
EGFR: 131 ML/MIN/1.73M2 — SIGNIFICANT CHANGE UP
FLUAV AG NPH QL: SIGNIFICANT CHANGE UP
FLUBV AG NPH QL: SIGNIFICANT CHANGE UP
GLUCOSE BLDC GLUCOMTR-MCNC: 100 MG/DL — HIGH (ref 70–99)
GLUCOSE BLDC GLUCOMTR-MCNC: 102 MG/DL — HIGH (ref 70–99)
GLUCOSE BLDC GLUCOMTR-MCNC: 103 MG/DL — HIGH (ref 70–99)
GLUCOSE BLDC GLUCOMTR-MCNC: 104 MG/DL — HIGH (ref 70–99)
GLUCOSE SERPL-MCNC: 105 MG/DL — HIGH (ref 70–99)
GLUCOSE SERPL-MCNC: 91 MG/DL — SIGNIFICANT CHANGE UP (ref 70–99)
HCT VFR BLD CALC: 22.8 % — LOW (ref 39–50)
HGB BLD-MCNC: 7.6 G/DL — LOW (ref 13–17)
MAGNESIUM SERPL-MCNC: 1.8 MG/DL — SIGNIFICANT CHANGE UP (ref 1.6–2.6)
MAGNESIUM SERPL-MCNC: 1.9 MG/DL — SIGNIFICANT CHANGE UP (ref 1.6–2.6)
MCHC RBC-ENTMCNC: 30.3 PG — SIGNIFICANT CHANGE UP (ref 27–34)
MCHC RBC-ENTMCNC: 33.3 G/DL — SIGNIFICANT CHANGE UP (ref 32–36)
MCV RBC AUTO: 90.8 FL — SIGNIFICANT CHANGE UP (ref 80–100)
NRBC # BLD: 1 /100 WBCS — HIGH (ref 0–0)
NRBC # FLD: 0.03 K/UL — HIGH (ref 0–0)
PHOSPHATE SERPL-MCNC: 2 MG/DL — LOW (ref 2.5–4.5)
PHOSPHATE SERPL-MCNC: 2.7 MG/DL — SIGNIFICANT CHANGE UP (ref 2.5–4.5)
PLATELET # BLD AUTO: 124 K/UL — LOW (ref 150–400)
POTASSIUM SERPL-MCNC: 2.4 MMOL/L — CRITICAL LOW (ref 3.5–5.3)
POTASSIUM SERPL-MCNC: 2.4 MMOL/L — CRITICAL LOW (ref 3.5–5.3)
POTASSIUM SERPL-SCNC: 2.4 MMOL/L — CRITICAL LOW (ref 3.5–5.3)
POTASSIUM SERPL-SCNC: 2.4 MMOL/L — CRITICAL LOW (ref 3.5–5.3)
RBC # BLD: 2.51 M/UL — LOW (ref 4.2–5.8)
RBC # FLD: 19.6 % — HIGH (ref 10.3–14.5)
RSV RNA NPH QL NAA+NON-PROBE: SIGNIFICANT CHANGE UP
SARS-COV-2 RNA SPEC QL NAA+PROBE: SIGNIFICANT CHANGE UP
SODIUM SERPL-SCNC: 146 MMOL/L — HIGH (ref 135–145)
SODIUM SERPL-SCNC: 149 MMOL/L — HIGH (ref 135–145)
WBC # BLD: 2.26 K/UL — LOW (ref 3.8–10.5)
WBC # FLD AUTO: 2.26 K/UL — LOW (ref 3.8–10.5)

## 2024-10-31 PROCEDURE — 99233 SBSQ HOSP IP/OBS HIGH 50: CPT

## 2024-10-31 PROCEDURE — 71045 X-RAY EXAM CHEST 1 VIEW: CPT | Mod: 26

## 2024-10-31 RX ORDER — CEFEPIME 2 G/1
2000 INJECTION, POWDER, FOR SOLUTION INTRAVENOUS EVERY 8 HOURS
Refills: 0 | Status: DISCONTINUED | OUTPATIENT
Start: 2024-10-31 | End: 2024-11-07

## 2024-10-31 RX ORDER — ELECTROLYTE-M SOLUTION/D5W 5 %
1000 INTRAVENOUS SOLUTION INTRAVENOUS
Refills: 0 | Status: DISCONTINUED | OUTPATIENT
Start: 2024-10-31 | End: 2024-11-01

## 2024-10-31 RX ORDER — POTASSIUM CHLORIDE 600 MG/1
40 TABLET, EXTENDED RELEASE ORAL ONCE
Refills: 0 | Status: DISCONTINUED | OUTPATIENT
Start: 2024-10-31 | End: 2024-10-31

## 2024-10-31 RX ORDER — POTASSIUM CHLORIDE 600 MG/1
10 TABLET, EXTENDED RELEASE ORAL
Refills: 0 | Status: COMPLETED | OUTPATIENT
Start: 2024-10-31 | End: 2024-10-31

## 2024-10-31 RX ORDER — CEFEPIME 2 G/1
INJECTION, POWDER, FOR SOLUTION INTRAVENOUS
Refills: 0 | Status: DISCONTINUED | OUTPATIENT
Start: 2024-10-31 | End: 2024-11-07

## 2024-10-31 RX ORDER — POTASSIUM CHLORIDE 600 MG/1
40 TABLET, EXTENDED RELEASE ORAL
Refills: 0 | Status: COMPLETED | OUTPATIENT
Start: 2024-10-31 | End: 2024-10-31

## 2024-10-31 RX ORDER — ACETAMINOPHEN 500MG 500 MG/1
1000 TABLET, COATED ORAL ONCE
Refills: 0 | Status: COMPLETED | OUTPATIENT
Start: 2024-10-31 | End: 2024-10-31

## 2024-10-31 RX ORDER — HALOPERIDOL 2 MG
1 TABLET ORAL
Refills: 0 | Status: DISCONTINUED | OUTPATIENT
Start: 2024-10-31 | End: 2024-11-18

## 2024-10-31 RX ORDER — CEFEPIME 2 G/1
2000 INJECTION, POWDER, FOR SOLUTION INTRAVENOUS ONCE
Refills: 0 | Status: COMPLETED | OUTPATIENT
Start: 2024-10-31 | End: 2024-10-31

## 2024-10-31 RX ADMIN — DIPHENHYDRAMINE HYDROCHLORIDE AND LIDOCAINE HYDROCHLORIDE AND ALUMINUM HYDROXIDE AND MAGNESIUM HYDRO 30 MILLILITER(S): KIT at 16:30

## 2024-10-31 RX ADMIN — Medication 50 MILLILITER(S): at 05:30

## 2024-10-31 RX ADMIN — CHLORHEXIDINE GLUCONATE 1 APPLICATION(S): 1.2 RINSE ORAL at 11:35

## 2024-10-31 RX ADMIN — ACETAMINOPHEN 500MG 400 MILLIGRAM(S): 500 TABLET, COATED ORAL at 03:25

## 2024-10-31 RX ADMIN — CEFEPIME 100 MILLIGRAM(S): 2 INJECTION, POWDER, FOR SOLUTION INTRAVENOUS at 16:55

## 2024-10-31 RX ADMIN — Medication 50 MILLILITER(S): at 03:02

## 2024-10-31 RX ADMIN — PANTOPRAZOLE SODIUM 40 MILLIGRAM(S): 40 TABLET, DELAYED RELEASE ORAL at 11:46

## 2024-10-31 RX ADMIN — CEFEPIME 100 MILLIGRAM(S): 2 INJECTION, POWDER, FOR SOLUTION INTRAVENOUS at 21:16

## 2024-10-31 RX ADMIN — METRONIDAZOLE 100 MILLIGRAM(S): 500 TABLET ORAL at 18:06

## 2024-10-31 RX ADMIN — POTASSIUM CHLORIDE 100 MILLIEQUIVALENT(S): 600 TABLET, EXTENDED RELEASE ORAL at 21:48

## 2024-10-31 RX ADMIN — POTASSIUM CHLORIDE 100 MILLIEQUIVALENT(S): 600 TABLET, EXTENDED RELEASE ORAL at 13:30

## 2024-10-31 RX ADMIN — METRONIDAZOLE 100 MILLIGRAM(S): 500 TABLET ORAL at 06:02

## 2024-10-31 RX ADMIN — POTASSIUM CHLORIDE 100 MILLIEQUIVALENT(S): 600 TABLET, EXTENDED RELEASE ORAL at 20:19

## 2024-10-31 RX ADMIN — POTASSIUM CHLORIDE 100 MILLIEQUIVALENT(S): 600 TABLET, EXTENDED RELEASE ORAL at 11:35

## 2024-10-31 RX ADMIN — Medication 20 MILLIGRAM(S): at 05:31

## 2024-10-31 RX ADMIN — POTASSIUM CHLORIDE 100 MILLIEQUIVALENT(S): 600 TABLET, EXTENDED RELEASE ORAL at 12:30

## 2024-10-31 RX ADMIN — POTASSIUM CHLORIDE 100 MILLIEQUIVALENT(S): 600 TABLET, EXTENDED RELEASE ORAL at 22:56

## 2024-10-31 RX ADMIN — PREDNISONE 5 MILLIGRAM(S): 20 TABLET ORAL at 05:31

## 2024-10-31 RX ADMIN — DIPHENHYDRAMINE HYDROCHLORIDE AND LIDOCAINE HYDROCHLORIDE AND ALUMINUM HYDROXIDE AND MAGNESIUM HYDRO 30 MILLILITER(S): KIT at 05:31

## 2024-10-31 NOTE — PROGRESS NOTE ADULT - PROBLEM SELECTOR PLAN 5
MRI Lumbar Spine: (10/15) Diffuse osseous metastases. L1 and L5 pathologic fractures are associated with moderate epidural disease at L1, more mild at L5. At least mild epidural disease at the left S1-S2 neural foramen. No significant lumbar degenerative disc disease.  - MRI from 10/15 shows extensive metastasis in cervical, thoracic, lumbar, skull base and calvarium. Also small cortical subacute infarctions.  - Continue IV morphine2- 4mg PRN moderate to severe pain (has not required PRNs in multiple days) Encouraged family to ask for PRN pain medication if he has breakthrough pain.   - IR evaluation for kyphoplasty on hold in setting of c/f SMA dissection and bowel ischemia   -Appreciate rad/onc recs  - Patient with oral pain- Magic mouthwash tid, oral care   - s/p decadron 4mg IV bid x3 days   - Narcan PRN  - multimodal pain control: lidocaine patch, warm/cold packs   - recommend changing bowel regimen to PRN

## 2024-10-31 NOTE — PROGRESS NOTE ADULT - ASSESSMENT
64-year-old male with metastatic prostate cancer, sent in by hematologist from [facility] for uncontrolled pain, nausea, vomiting.   Patient reports diffuse pain starting 6 months ago significantly worsening for the past 2 weeks. Diagnosed with high risk high volume metastatic prostate cancer with bone, liver lymph node mets and presacral mass. Liver biopsy confirmed disease with . Started lupron, loly/pred with plans to initiate taxotere.   nephrology consulted for electrolyte abnormalities     Hypernatremia  Poor free water intake    pt now has diet however not eating much per family  on vf to d5+LR+kcl 20meq @ 50cc/hr  na again worsen with diarrhea  increase ivf to 100cc/hr   monitor    Hypokalemia  GI lost and poor po intake  supplement kcl 10x3 and klor 40x2  iv as above  monitor    Acidosis   Likely from Normal saline causing hyperchloremic acidosis  Will monitor     hypophosphatemia  supplemented   better  monitor    hypocalcemia   sec to low albumin  corrected ca 8.5  monitor    fever  GNR bacteremia   work up and tx per team    anemia  metastatic prostate cancer  f/u heme/onc       64-year-old male with metastatic prostate cancer, sent in by hematologist from [facility] for uncontrolled pain, nausea, vomiting.   Patient reports diffuse pain starting 6 months ago significantly worsening for the past 2 weeks. Diagnosed with high risk high volume metastatic prostate cancer with bone, liver lymph node mets and presacral mass. Liver biopsy confirmed disease with . Started lupron, loly/pred with plans to initiate taxotere.   nephrology consulted for electrolyte abnormalities     Hypernatremia  Poor free water intake    pt now has diet however not eating much per family  on Ivf to d5+LR+kcl 20meq @ 50cc/hr  na again worsen with diarrhea  increase ivf to 100cc/hr   monitor    Hypokalemia  GI loss and poor po intake  supplement kcl 10x3 and klor 40x2  iv as above  monitor    Acidosis   Likely from Normal saline causing hyperchloremic acidosis  Will monitor     hypophosphatemia  supplemented   better  monitor    hypocalcemia   sec to low albumin  corrected ca 8.5  monitor    fever  GNR bacteremia   work up and tx per team    anemia  metastatic prostate cancer  f/u heme/onc

## 2024-10-31 NOTE — PROGRESS NOTE ADULT - SUBJECTIVE AND OBJECTIVE BOX
[facility]iatric[facility] and Palliative Care  Melissa Davies Palliative Care Attending  Contact Info: Page [phone] (including Nights/Weekends), message on Microsoft Teams (Melissa Davies), or leave  at Palliative Office [phone] (non-urgent)   Date of [id number] @ 12:21    SUBJECTIVE AND OBJECTIVE: Patient seen this AM with wife and son, Mateus, at bedside. Patient was sleeping. Family reports patient just got washed up but shared that overnight, he became agitated requiring Haldol. Pt with fever overnight and is awaiting results of repeat blood cultures, r/o c.diff.     Indication for Geriatrics and Palliative Care Services/INTERVAL HPI: sx management and goc in setting of advanced malignancy     OVERNIGHT EVENTS:  > 10/21: Weekend events reviewed. Over the past 24 hours, patient was transitioned off pca pump. He required 4mg IV morphine x1.   > 10/22: Over the past 24 hours, patient did not require IV morphine PRNs. He did require IM Zyprexa x2. He is febrile.   > 10/23: Over the past 24 hours, patient did not required PRNs.   > 10/24: Over the past 24 hours, patient required PRNs of haldol 2.5mg x2 for agitation.   > 10/28: Weekend events reviewed. It appears patient was off Morphine gtt as he pulled IV access during agitation episode.   > 10/29: Over the past 24 hours, patient did not require PRNs. He is getting 1u PRBCs.   > 10/30: Over the past 24 hours, no PRNs required.   > 10/31: Over the past 24 hours, patient required 1mg IV haldol x1. Febrile to 101.4 this AM.     DNR on chart:DNI: Trial NIV  DNI: Trial NIV      Allergies    No Known Allergies    Intolerances    MEDICATIONS  (STANDING):  abiraterone 500 mg tablet 2 Tablet(s) 2 Tablet(s) Oral daily  acetaminophen     Tablet .. 650 milliGRAM(s) Oral once  atorvastatin 20 milliGRAM(s) Oral at bedtime  cefTRIAXone   IVPB 2000 milliGRAM(s) IV Intermittent every 24 hours  chlorhexidine 2% Cloths 1 Application(s) Topical daily  dextrose 5% + lactated ringers with potassium chloride 20 mEq/L 1000 milliLiter(s) (100 mL/Hr) IV Continuous <Continuous>  dextrose 5%. 1000 milliLiter(s) (100 mL/Hr) IV Continuous <Continuous>  dextrose 5%. 1000 milliLiter(s) (50 mL/Hr) IV Continuous <Continuous>  dextrose 50% Injectable 25 Gram(s) IV Push once  dextrose 50% Injectable 12.5 Gram(s) IV Push once  dextrose 50% Injectable 25 Gram(s) IV Push once  diphenhydrAMINE 25 milliGRAM(s) Oral once  fenofibrate Tablet 145 milliGRAM(s) Oral daily  FIRST- Mouthwash  BLM 30 milliLiter(s) Swish and Spit three times a day  glucagon  Injectable 1 milliGRAM(s) IntraMuscular once  influenza  Vaccine (HIGH DOSE) 0.5 milliLiter(s) IntraMuscular once  insulin lispro (ADMELOG) corrective regimen sliding scale   SubCutaneous at bedtime  insulin lispro (ADMELOG) corrective regimen sliding scale   SubCutaneous three times a day before meals  lactobacillus acidophilus 1 Tablet(s) Oral daily  lidocaine   4% Patch 1 Patch Transdermal every 24 hours  melatonin 6 milliGRAM(s) Oral at bedtime  metroNIDAZOLE  IVPB 500 milliGRAM(s) IV Intermittent every 12 hours  multivitamin 1 Tablet(s) Oral daily  Nephro-nancy 1 Tablet(s) Oral daily  pantoprazole  Injectable 40 milliGRAM(s) IV Push daily  potassium chloride   Powder 40 milliEquivalent(s) Oral every 2 hours  potassium chloride  10 mEq/100 mL IVPB 10 milliEquivalent(s) IV Intermittent once  potassium chloride  10 mEq/100 mL IVPB 10 milliEquivalent(s) IV Intermittent every 1 hour  predniSONE   Tablet 5 milliGRAM(s) Oral daily    MEDICATIONS  (PRN):  acetaminophen     Tablet .. 650 milliGRAM(s) Oral every 6 hours PRN Temp greater or equal to 38C (100.4F), Mild Pain (1 - 3), Moderate Pain (4 - 6)  aluminum hydroxide/magnesium hydroxide/simethicone Suspension 30 milliLiter(s) Oral every 4 hours PRN Dyspepsia  artificial tears (preservative free) Ophthalmic Solution 1 Drop(s) Both EYES four times a day PRN Dry Eyes  dextrose Oral Gel 15 Gram(s) Oral once PRN Blood Glucose LESS THAN 70 milliGRAM(s)/deciliter  morphine  - Injectable 2 milliGRAM(s) IV Push every 4 hours PRN Moderate Pain (4 - 6)  morphine  - Injectable 4 milliGRAM(s) IV Push every 3 hours PRN Severe Pain (7 - 10)  naloxone Injectable 0.1 milliGRAM(s) IV Push every 3 minutes PRN For ANY of the following changes in patient status:  A. RR LESS THAN 10 breaths per minute, B. Oxygen saturation LESS THAN 90%, C. Sedation score of 6  polyethylene glycol 3350 17 Gram(s) Oral daily PRN Constipation      ITEMS UNCHECKED ARE NOT PRESENT    PRESENT SYMPTOMS: [x ]Unable to self-report - see [ ] CPOT [ x] PAINADS [ x] RDOS  Source if other than patient:  [x ]Family   [ ]Team     Pain:  [ ]yes [ ]no  QOL impact -   Location -                    Aggravating factors -  Quality -  Radiation -  Timing-  Severity (0-10 scale):  Minimal acceptable level/ pain goal (0-10 scale):     CPOT:    https://www.[facility].org/getattachment/[email or web address]/Critical-Care-Pain-Observation-Tool-(CPOT)    Dyspnea:                           [ ]Mild [ ]Moderate [ ]Severe  Anxiety:                             [ ]Mild [ ]Moderate [ ]Severe  Fatigue:                             [ ]Mild [ ]Moderate [ ]Severe  Nausea:                             [ ]Mild [ ]Moderate [ ]Severe  Loss of appetite:              [ ]Mild [ ]Moderate [ ]Severe  Constipation:                    [ ]Mild [ ]Moderate [ ]Severe  Other Symptoms:  [ ]All other review of systems negative     PCSSQ[Palliative Care Spiritual Screening Question]   Severity (0-10):  Score of 4 or > indicate consideration of Chaplaincy referral.  Chaplaincy Referral: [ ] yes [ ] refused [ ] following [ x] deferred    Caregiver Annabella? : [ ] yes [ ] no [x ] Deferred [ ] Declined             Social work referral [ ] Patient & Family Centered Care Referral [ ]  Anticipatory Grief present?:  [ ] yes [ ] no  [x ] Deferred                  Social work referral [ ] Patient & Family Centered Care Referral [ ]      PHYSICAL EXAM:  Vital Signs Last 24 Hrs  T(C): 37.2 (31 Oct 2024 05:02), Max: 38.6 (31 Oct 2024 03:05)  T(F): 98.9 (31 Oct 2024 05:02), Max: 101.4 (31 Oct 2024 03:05)  HR: 123 (31 Oct 2024 05:02) (102 - 129)  BP: 103/62 (31 Oct 2024 05:02) (99/61 - 106/64)  BP(mean): --  RR: 18 (31 Oct 2024 05:02) (18 - 18)  SpO2: 98% (31 Oct 2024 05:02) (98% - 100%)    Parameters below as of 31 Oct 2024 05:02  Patient On (Oxygen Delivery Method): room air     I&O's Summary    30 Oct 2024 07:01  -  31 Oct 2024 07:00  --------------------------------------------------------  IN: 1085 mL / OUT: 640 mL / NET: 445 mL       GENERAL: [x ]Cachexia    [ ]Alert  [ ]Oriented x   [x ]Lethargic  [ ]Unarousable  [ ]Verbal  [ ]Non-Verbal  Behavioral:   [ ]Anxiety  [ ]Delirium [ ]Agitation [x ]Other- calm   HEENT:  [ ]Normal   [x ]Dry mouth   [ ]ET Tube/Trach  [ ]Oral lesions  PULMONARY:   [ ]Clear [ ]Tachypnea  [ x]Audible excessive secretions   [ ]Rhonchi        [ ]Right [ ]Left [ ]Bilateral  [ ]Crackles        [ ]Right [ ]Left [ ]Bilateral  [ ]Wheezing     [ ]Right [ ]Left [ ]Bilateral  [ ]Diminished BS [ ] Right [ ]Left [ ]Bilateral  CARDIOVASCULAR:    [ ]Regular [ ]Irregular [x ]Tachy  [ ]Rodrick [ ]Murmur [ ]Other  GASTROINTESTINAL:  [x ]Soft  [ ]Distended   [x ]+BS  [ ]Non tender [ ]Tender  [ ]Other [ ]PEG [ ]OGT/ NGT   Last BM: 10/31  GENITOURINARY:  [ ]Normal [ ]Incontinent   [ ]Oliguria/Anuria   [x ]Farah  MUSCULOSKELETAL:   [ ]Normal   [x ]Weakness  [ ]Bed/Wheelchair bound [ ]Edema  NEUROLOGIC:   [ ]No focal deficits  [ ] Cognitive impairment  [ ] Dysphagia [ ]Dysarthria [ ] Paresis [ ]Other   SKIN:   [ ]Normal  [ ]Rash  [x ]Other- scratches on face   [ ]Pressure ulcer(s) [ ]y [ ]n present on admission    CRITICAL CARE:  [ ]Shock Present  [ ]Septic [ ]Cardiogenic [ ]Neurologic [ ]Hypovolemic  [ ]Vasopressors [ ]Inotropes  [ ]Respiratory failure present [ ]Mechanical Ventilation [ ]Non-invasive ventilatory support [ ]High-Flow   [ ]Acute  [ ]Chronic [ ]Hypoxic  [ ]Hypercarbic [ ]Other  [ ]Other organ failure     LABS:                        7.6    2.26  )-----------( 124      ( 31 Oct 2024 08:54 )             22.8   10-31    149[H]  |  118[H]  |  10  ----------------------------<  91  2.4[LL]   |  18[L]  |  0.34[L]    Ca    7.1[L]      31 Oct 2024 08:54  Phos  2.7     10-31  Mg     1.80     10-31    PT/INR - ( 30 Oct 2024 19:15 )   PT: 20.8 sec;   INR: 1.76 ratio         PTT - ( 30 Oct 2024 19:15 )  PTT:31.5 sec    Urinalysis Basic - ( 31 Oct 2024 08:54 )    Color: x / Appearance: x / SG: x / pH: x  Gluc: 91 mg/dL / Ketone: x  / Bili: x / Urobili: x   Blood: x / Protein: x / Nitrite: x   Leuk Esterase: x / RBC: x / WBC x   Sq Epi: x / Non Sq Epi: x / Bacteria: x      RADIOLOGY & ADDITIONAL STUDIES: no new     Protein Calorie Malnutrition Present: [ ]mild [ ]moderate [ ]severe [ ]underweight [ ]morbid obesity  https://www.andeal.org/vault/2440/web/files/ONC/Table_Clinical%20Characteristics%20to%20Document%20Malnutrition-White%20JV%20et%20al%202012.pdf    Height (cm): 172.7 (10-12-24 @ 09:21)  Weight (kg): 69.4 (10-12-24 @ 09:21)  BMI (kg/m2): 23.3 (10-12-24 @ 09:21)    [ ]PPSV2 < or = 30%  [ ]significant weight loss [ ]poor nutritional intake [ ]anasarca[ ]Artificial Nutrition    Other REFERRALS:  [ ]Hospice  [ ]Child Life  [ ]Social Work  [ ]Case management [ ]Holistic Therapy

## 2024-10-31 NOTE — PROGRESS NOTE ADULT - SUBJECTIVE AND OBJECTIVE BOX
[facility]   MD MARKO EASTMAN MD ANGELA WONG, PA    TEL:  OFFICE: [phone]  From 5pm-7am Answering Service [phone]    -- RENAL FOLLOW UP NOTE ---Date of Service 10-31-24 @ 11:58    Patient is a 64y old  Male who presents with a chief complaint of Pain (31 Oct 2024 11:41)      Patient seen and examined at bedside.     VITALS:  T(F): 98.9 (10-31-24 @ 05:02), Max: 101.4 (10-31-24 @ 03:05)  HR: 123 (10-31-24 @ 05:02)  BP: 103/62 (10-31-24 @ 05:02)  RR: 18 (10-31-24 @ 05:02)  SpO2: 98% (10-31-24 @ 05:02)  Wt(kg): --    10-30 @ 07:01  -  10-31 @ 07:00  --------------------------------------------------------  IN: 1085 mL / OUT: 640 mL / NET: 445 mL          PHYSICAL EXAM:  General: NAD  Neck: No JVD  Respiratory: CTAB, no wheezes, rales or rhonchi  Cardiovascular: S1, S2, RRR  Gastrointestinal: BS+, soft, NT/ND  Extremities: No peripheral edema    Hospital Medications:   MEDICATIONS  (STANDING):  abiraterone 500 mg tablet 2 Tablet(s) 2 Tablet(s) Oral daily  acetaminophen     Tablet .. 650 milliGRAM(s) Oral once  atorvastatin 20 milliGRAM(s) Oral at bedtime  cefTRIAXone   IVPB 2000 milliGRAM(s) IV Intermittent every 24 hours  chlorhexidine 2% Cloths 1 Application(s) Topical daily  dextrose 5% + lactated ringers with potassium chloride 20 mEq/L 1000 milliLiter(s) (100 mL/Hr) IV Continuous <Continuous>  dextrose 5%. 1000 milliLiter(s) (50 mL/Hr) IV Continuous <Continuous>  dextrose 5%. 1000 milliLiter(s) (100 mL/Hr) IV Continuous <Continuous>  dextrose 50% Injectable 12.5 Gram(s) IV Push once  dextrose 50% Injectable 25 Gram(s) IV Push once  dextrose 50% Injectable 25 Gram(s) IV Push once  diphenhydrAMINE 25 milliGRAM(s) Oral once  fenofibrate Tablet 145 milliGRAM(s) Oral daily  FIRST- Mouthwash  BLM 30 milliLiter(s) Swish and Spit three times a day  glucagon  Injectable 1 milliGRAM(s) IntraMuscular once  influenza  Vaccine (HIGH DOSE) 0.5 milliLiter(s) IntraMuscular once  insulin lispro (ADMELOG) corrective regimen sliding scale   SubCutaneous at bedtime  insulin lispro (ADMELOG) corrective regimen sliding scale   SubCutaneous three times a day before meals  lactobacillus acidophilus 1 Tablet(s) Oral daily  lidocaine   4% Patch 1 Patch Transdermal every 24 hours  melatonin 6 milliGRAM(s) Oral at bedtime  metroNIDAZOLE  IVPB 500 milliGRAM(s) IV Intermittent every 12 hours  multivitamin 1 Tablet(s) Oral daily  Nephro-nancy 1 Tablet(s) Oral daily  pantoprazole  Injectable 40 milliGRAM(s) IV Push daily  potassium chloride   Powder 40 milliEquivalent(s) Oral every 2 hours  potassium chloride  10 mEq/100 mL IVPB 10 milliEquivalent(s) IV Intermittent once  potassium chloride  10 mEq/100 mL IVPB 10 milliEquivalent(s) IV Intermittent every 1 hour  predniSONE   Tablet 5 milliGRAM(s) Oral daily      LABS:  10-31    149[H]  |  118[H]  |  10  ----------------------------<  91  2.4[LL]   |  18[L]  |  0.34[L]    Ca    7.1[L]      31 Oct 2024 08:54  Phos  2.7     10-31  Mg     1.80     10-31      Creatinine Trend: 0.34 <--, 0.37 <--, 0.33 <--, 0.33 <--, 0.34 <--, 0.37 <--, 0.39 <--, 0.48 <--, 0.39 <--, 0.49 <--, 0.51 <--, 0.57 <--    Phosphorus: 2.7 mg/dL (10-31 @ 08:54)  Phosphorus: 1.7 mg/dL (10-30 @ 19:15)                              7.6    2.26  )-----------( 124      ( 31 Oct 2024 08:54 )             22.8     Urine Studies:  Urinalysis - [10-31-24 @ 08:54]      Color  / Appearance  / SG  / pH       Gluc 91 / Ketone   / Bili  / Urobili        Blood  / Protein  / Leuk Est  / Nitrite       RBC  / WBC  / Hyaline  / Gran  / Sq Epi  / Non Sq Epi  / Bacteria       TSH 2.62      [10-13-24 @ 05:30]  Lipid: chol 135, , HDL 39, LDL --      [10-13-24 @ 05:30]        RADIOLOGY & ADDITIONAL STUDIES:

## 2024-10-31 NOTE — PROGRESS NOTE ADULT - SUBJECTIVE AND OBJECTIVE BOX
Date of Service: 10-31-24 @ 11:41    Patient is a 64y old  Male who presents with a chief complaint of Pain (31 Oct 2024 11:07)      Any change in ROS: seems to be doing  ok ; no sob:  no cough ; no phlegm  : off morphine     MEDICATIONS  (STANDING):  abiraterone 500 mg tablet 2 Tablet(s) 2 Tablet(s) Oral daily  acetaminophen     Tablet .. 650 milliGRAM(s) Oral once  atorvastatin 20 milliGRAM(s) Oral at bedtime  cefTRIAXone   IVPB 2000 milliGRAM(s) IV Intermittent every 24 hours  chlorhexidine 2% Cloths 1 Application(s) Topical daily  dextrose 5% + lactated ringers with potassium chloride 20 mEq/L 1000 milliLiter(s) (50 mL/Hr) IV Continuous <Continuous>  dextrose 5%. 1000 milliLiter(s) (100 mL/Hr) IV Continuous <Continuous>  dextrose 5%. 1000 milliLiter(s) (50 mL/Hr) IV Continuous <Continuous>  dextrose 50% Injectable 25 Gram(s) IV Push once  dextrose 50% Injectable 12.5 Gram(s) IV Push once  dextrose 50% Injectable 25 Gram(s) IV Push once  diphenhydrAMINE 25 milliGRAM(s) Oral once  fenofibrate Tablet 145 milliGRAM(s) Oral daily  FIRST- Mouthwash  BLM 30 milliLiter(s) Swish and Spit three times a day  glucagon  Injectable 1 milliGRAM(s) IntraMuscular once  influenza  Vaccine (HIGH DOSE) 0.5 milliLiter(s) IntraMuscular once  insulin lispro (ADMELOG) corrective regimen sliding scale   SubCutaneous at bedtime  insulin lispro (ADMELOG) corrective regimen sliding scale   SubCutaneous three times a day before meals  lactobacillus acidophilus 1 Tablet(s) Oral daily  lidocaine   4% Patch 1 Patch Transdermal every 24 hours  melatonin 6 milliGRAM(s) Oral at bedtime  metroNIDAZOLE  IVPB 500 milliGRAM(s) IV Intermittent every 12 hours  multivitamin 1 Tablet(s) Oral daily  Nephro-nancy 1 Tablet(s) Oral daily  pantoprazole  Injectable 40 milliGRAM(s) IV Push daily  potassium chloride   Powder 40 milliEquivalent(s) Oral every 2 hours  potassium chloride  10 mEq/100 mL IVPB 10 milliEquivalent(s) IV Intermittent every 1 hour  potassium chloride  10 mEq/100 mL IVPB 10 milliEquivalent(s) IV Intermittent once  predniSONE   Tablet 5 milliGRAM(s) Oral daily    MEDICATIONS  (PRN):  acetaminophen     Tablet .. 650 milliGRAM(s) Oral every 6 hours PRN Temp greater or equal to 38C (100.4F), Mild Pain (1 - 3), Moderate Pain (4 - 6)  aluminum hydroxide/magnesium hydroxide/simethicone Suspension 30 milliLiter(s) Oral every 4 hours PRN Dyspepsia  artificial tears (preservative free) Ophthalmic Solution 1 Drop(s) Both EYES four times a day PRN Dry Eyes  dextrose Oral Gel 15 Gram(s) Oral once PRN Blood Glucose LESS THAN 70 milliGRAM(s)/deciliter  morphine  - Injectable 4 milliGRAM(s) IV Push every 3 hours PRN Severe Pain (7 - 10)  morphine  - Injectable 2 milliGRAM(s) IV Push every 4 hours PRN Moderate Pain (4 - 6)  naloxone Injectable 0.1 milliGRAM(s) IV Push every 3 minutes PRN For ANY of the following changes in patient status:  A. RR LESS THAN 10 breaths per minute, B. Oxygen saturation LESS THAN 90%, C. Sedation score of 6  polyethylene glycol 3350 17 Gram(s) Oral daily PRN Constipation    Vital Signs Last 24 Hrs  T(C): 37.2 (31 Oct 2024 05:02), Max: 38.6 (31 Oct 2024 03:05)  T(F): 98.9 (31 Oct 2024 05:02), Max: 101.4 (31 Oct 2024 03:05)  HR: 123 (31 Oct 2024 05:02) (102 - 129)  BP: 103/62 (31 Oct 2024 05:02) (99/61 - 106/64)  BP(mean): --  RR: 18 (31 Oct 2024 05:02) (18 - 18)  SpO2: 98% (31 Oct 2024 05:02) (98% - 100%)    Parameters below as of 31 Oct 2024 05:02  Patient On (Oxygen Delivery Method): room air        I&O's Summary    30 Oct 2024 07:01  -  31 Oct 2024 07:00  --------------------------------------------------------  IN: 1085 mL / OUT: 640 mL / NET: 445 mL          Physical Exam:   GENERAL: NAD, well-groomed, well-developed  HEENT: CHAVA/   Atraumatic, Normocephalic  ENMT: No tonsillar erythema, exudates, or enlargement; Moist mucous membranes, Good dentition, No lesions  NECK: Supple, No JVD, Normal thyroid  CHEST/LUNG: Clear to auscultaion  CVS: Regular rate and rhythm; No murmurs, rubs, or gallops  GI: : Soft, Nontender, Nondistended; Bowel sounds present  NERVOUS SYSTEM:  Alert & Oriented X1  EXTREMITIES:  2+ Peripheral Pulses, No clubbing, cyanosis, or edema  LYMPH: No lymphadenopathy noted  SKIN: No rashes or lesions  ENDOCRINOLOGY: No Thyromegaly  PSYCH: calm     Labs:  20, 20                            7.6    2.26  )-----------( 124      ( 31 Oct 2024 08:54 )             22.8                         8.5    3.11  )-----------( 118      ( 30 Oct 2024 06:50 )             25.1                         7.8    3.89  )-----------( 98       ( 29 Oct 2024 13:41 )             23.9                         6.8    4.26  )-----------( 90       ( 29 Oct 2024 01:38 )             20.4                         7.2    4.60  )-----------( 81       ( 28 Oct 2024 17:55 )             22.2                         8.5    5.94  )-----------( 95       ( 28 Oct 2024 07:20 )             26.4     10-31    149[H]  |  118[H]  |  10  ----------------------------<  91  2.4[LL]   |  18[L]  |  0.34[L]  10-30    141  |  112[H]  |  10  ----------------------------<  150[H]  3.3[L]   |  20[L]  |  0.37[L]  10-30    141  |  113[H]  |  10  ----------------------------<  122[H]  3.2[L]   |  18[L]  |  0.33[L]  10-29    142  |  114[H]  |  11  ----------------------------<  120[H]  3.4[L]   |  20[L]  |  0.33[L]  10-29    144  |  113[H]  |  12  ----------------------------<  132[H]  2.9[LL]   |  20[L]  |  0.34[L]  10-29    144  |  115[H]  |  15  ----------------------------<  134[H]  2.6[LL]   |  19[L]  |  0.37[L]  10-28    148[H]  |  116[H]  |  18  ----------------------------<  130[H]  2.6[LL]   |  20[L]  |  0.39[L]  10-28    149[H]  |  116[H]  |  19  ----------------------------<  102[H]  3.1[L]   |  17[L]  |  0.48[L]    Ca    7.1[L]      31 Oct 2024 08:54  Ca    7.2[L]      30 Oct 2024 19:15  Ca    7.1[L]      30 Oct 2024 06:50  Ca    6.9[L]      29 Oct 2024 22:30  Ca    6.8[L]      29 Oct 2024 13:41  Phos  2.7     10-31  Phos  1.7     10-30  Phos  1.6     10-30  Phos  2.0     10-29  Mg     1.80     10-31  Mg     1.90     10-30  Mg     1.80     10-30  Mg     1.90     10-29      CAPILLARY BLOOD GLUCOSE      POCT Blood Glucose.: 100 mg/dL (31 Oct 2024 08:36)  POCT Blood Glucose.: 101 mg/dL (30 Oct 2024 21:28)  POCT Blood Glucose.: 99 mg/dL (30 Oct 2024 17:54)  POCT Blood Glucose.: 134 mg/dL (30 Oct 2024 13:08)        PT/INR - ( 30 Oct 2024 19:15 )   PT: 20.8 sec;   INR: 1.76 ratio         PTT - ( 30 Oct 2024 19:15 )  PTT:31.5 sec  Urinalysis Basic - ( 31 Oct 2024 08:54 )    Color: x / Appearance: x / SG: x / pH: x  Gluc: 91 mg/dL / Ketone: x  / Bili: x / Urobili: x   Blood: x / Protein: x / Nitrite: x   Leuk Esterase: x / RBC: x / WBC x   Sq Epi: x / Non Sq Epi: x / Bacteria: x      rad< from: Xray Chest 1 View- PORTABLE-Urgent (Xray Chest 1 View- PORTABLE-Urgent .) (10.31.24 @ 03:52) >    ACC: [id number] EXAM:  XR CHEST PORTABLE URGENT 1V   ORDERED BY: GENIA RUBIO     PROCEDURE DATE:  10/31/2024          INTERPRETATION:  CLINICAL INFORMATION: Fever    Time of Exam:  October 31, 2024 at 3:46 AM    EXAM:  Frontal Chest    FINDINGS:  Both lungs are equally aerated and free of any focal abnormalities.  The heart is not enlarged and there is no effusion or pneumothorax.  The bones show no acute findings.      COMPARISON: October 20, 2024        IMPRESSION: Clear lungs.    --- End of Report ---    < end of copied text >  < from: Xray Chest 1 View- PORTABLE-Urgent (Xray Chest 1 View- PORTABLE-Urgent .) (10.20.24 @ 11:02) >      INTERPRETATION:  EXAMINATION: XR CHEST URGENT    CLINICAL INDICATION: Bacteremia. Evaluate for pneumonia.    TECHNIQUE: Single frontal view of the chest was obtained.    COMPARISON: Chest x-ray 10/17/2024.    FINDINGS:  No focal consolidation, large pleural effusion or pneumothorax.  The heart size cannot be accurately assessed on this projection.  No acute osseous abnormalities.      IMPRESSION:  No focal consolidations.    --- End of Report ---    < end of copied text >        RECENT CULTURES:        RESPIRATORY CULTURES:          Studies  Chest X-RAY  CT SCAN Chest   Venous Dopplers: LE:   CT Abdomen  Others

## 2024-10-31 NOTE — CHART NOTE - NSCHARTNOTEFT_GEN_A_CORE
Case was discussed between Dr. Case and Dr. Bruno regarding plan for kyphoplasty. Given there is concern for C.Diff infection with fevers at this time, will hold off on kyphoplasty. Once patient is cleared of any infection and there is improvement in the metabolic profile, please reach out to IR to schedule patient for kyphoplasty.

## 2024-10-31 NOTE — PROGRESS NOTE ADULT - PROBLEM SELECTOR PLAN 10
In the event of newly developing, evolving, or worsening symptoms, please contact the Palliative Medicine team via pager (if the patient is at [facility] #[phone] or if the patient is at [facility] #[phone]) The Geriatric and Palliative Medicine service has coverage 24 hours a day/ 7 days a week to provide medical recommendations regarding symptom management needs via telephone.

## 2024-10-31 NOTE — PROGRESS NOTE ADULT - SUBJECTIVE AND OBJECTIVE BOX
no new events,  radiation oncology  to coordinate  treatment plan with IR  which will determine final vertebral augmentation plan.      MEDICATIONS  (STANDING):  abiraterone 500 mg tablet 2 Tablet(s) 2 Tablet(s) Oral daily  acetaminophen     Tablet .. 650 milliGRAM(s) Oral once  atorvastatin 20 milliGRAM(s) Oral at bedtime  cefTRIAXone   IVPB 2000 milliGRAM(s) IV Intermittent every 24 hours  chlorhexidine 2% Cloths 1 Application(s) Topical daily  dextrose 5% + lactated ringers with potassium chloride 20 mEq/L 1000 milliLiter(s) (50 mL/Hr) IV Continuous <Continuous>  dextrose 5%. 1000 milliLiter(s) (100 mL/Hr) IV Continuous <Continuous>  dextrose 5%. 1000 milliLiter(s) (50 mL/Hr) IV Continuous <Continuous>  dextrose 50% Injectable 25 Gram(s) IV Push once  dextrose 50% Injectable 12.5 Gram(s) IV Push once  dextrose 50% Injectable 25 Gram(s) IV Push once  diphenhydrAMINE 25 milliGRAM(s) Oral once  fenofibrate Tablet 145 milliGRAM(s) Oral daily  FIRST- Mouthwash  BLM 30 milliLiter(s) Swish and Spit three times a day  glucagon  Injectable 1 milliGRAM(s) IntraMuscular once  influenza  Vaccine (HIGH DOSE) 0.5 milliLiter(s) IntraMuscular once  insulin lispro (ADMELOG) corrective regimen sliding scale   SubCutaneous three times a day before meals  insulin lispro (ADMELOG) corrective regimen sliding scale   SubCutaneous at bedtime  lactobacillus acidophilus 1 Tablet(s) Oral daily  lidocaine   4% Patch 1 Patch Transdermal every 24 hours  melatonin 6 milliGRAM(s) Oral at bedtime  metroNIDAZOLE  IVPB 500 milliGRAM(s) IV Intermittent every 12 hours  multivitamin 1 Tablet(s) Oral daily  Nephro-nancy 1 Tablet(s) Oral daily  pantoprazole  Injectable 40 milliGRAM(s) IV Push daily  potassium chloride   Powder 40 milliEquivalent(s) Oral every 2 hours  potassium chloride  10 mEq/100 mL IVPB 10 milliEquivalent(s) IV Intermittent once  potassium chloride  10 mEq/100 mL IVPB 10 milliEquivalent(s) IV Intermittent every 1 hour  predniSONE   Tablet 5 milliGRAM(s) Oral daily    MEDICATIONS  (PRN):  acetaminophen     Tablet .. 650 milliGRAM(s) Oral every 6 hours PRN Temp greater or equal to 38C (100.4F), Mild Pain (1 - 3), Moderate Pain (4 - 6)  aluminum hydroxide/magnesium hydroxide/simethicone Suspension 30 milliLiter(s) Oral every 4 hours PRN Dyspepsia  artificial tears (preservative free) Ophthalmic Solution 1 Drop(s) Both EYES four times a day PRN Dry Eyes  dextrose Oral Gel 15 Gram(s) Oral once PRN Blood Glucose LESS THAN 70 milliGRAM(s)/deciliter  morphine  - Injectable 2 milliGRAM(s) IV Push every 4 hours PRN Moderate Pain (4 - 6)  morphine  - Injectable 4 milliGRAM(s) IV Push every 3 hours PRN Severe Pain (7 - 10)  naloxone Injectable 0.1 milliGRAM(s) IV Push every 3 minutes PRN For ANY of the following changes in patient status:  A. RR LESS THAN 10 breaths per minute, B. Oxygen saturation LESS THAN 90%, C. Sedation score of 6  polyethylene glycol 3350 17 Gram(s) Oral daily PRN Constipation        Vital Signs Last 24 Hrs  T(C): 37.2 (31 Oct 2024 05:02), Max: 38.6 (31 Oct 2024 03:05)  T(F): 98.9 (31 Oct 2024 05:02), Max: 101.4 (31 Oct 2024 03:05)  HR: 123 (31 Oct 2024 05:02) (102 - 129)  BP: 103/62 (31 Oct 2024 05:02) (99/61 - 106/64)  BP(mean): --  RR: 18 (31 Oct 2024 05:02) (18 - 18)  SpO2: 98% (31 Oct 2024 05:02) (98% - 100%)    Parameters below as of 31 Oct 2024 05:02  Patient On (Oxygen Delivery Method): room air        PE  NAD  Awake, alert  Anicteric, MMM  RRR  CTAB  Abd soft, NT, ND  No c/c/e  No rash grossly                            7.6    2.26  )-----------( 124      ( 31 Oct 2024 08:54 )             22.8       10-31    149[H]  |  118[H]  |  10  ----------------------------<  91  2.4[LL]   |  18[L]  |  0.34[L]    Ca    7.1[L]      31 Oct 2024 08:54  Phos  2.7     10-31  Mg     1.80     10-31

## 2024-10-31 NOTE — PROGRESS NOTE ADULT - ASSESSMENT
1. Metastatic prostate cancer    - Follows with Dr Andrew Mendoza at [facility]   - PSMA 10/11/24 showed hypermetabolic vera foci above and below the diaphragm, foci in the liver and extensive foci involving the axial and appendicular skeleton compatible with metastatic disease  - --> 374--> 426 on 10/8/24   - Liver biopsy 9/30/24 consistent with prostate adenocarcinoma   - High risk high volume disease -> started on triplet therapy w/ ADT/lupron, abiraterone/prednisone + taxotere. (back pain after Lupron likely tumor flare).  - Continue abiraterone 1000mg daily w Prednisone 5mg PO QD   - s/p taxotere 60mg/m2 on 10/13/24. Next dose on 11/4 if clinically improves    - Kyphoplasty w/IR to T3 and L1 lesions to be coordinated between IR and Radiation oncology  - Palliative following for pain control and given hospital course would have further GOC discussions. Treatment will be offered but he has metastatic disease with visceral involvement and complications as outlined below.     2. Pancolitis, E coli and H influenzae bacteremia  - continues on Ceftriaxone + Metronidazole  - ID following  - BCx from 10/24 neg to date    3. Anemia/Thrombocytopenia   - Hgb 7.6. s/p 1 unit 10/30, transfuse for Hgb < 7.0  - Multifactorial sec to likely marrow infiltration by CaP, Chemo effect, consumption from acute illness, poss ITP  - Transfuse for plt <15 or < 50K if bleeding/for procedure      4. SMA Dissection  - seen by vasc sx  - no plan for intervention      5. Agitation  -  EKG reviewed, defer to cardiology  -seems to have improved, continue to monitor      Umm Aponte NP  Hematology/Oncology  [facility]  [phone] (Office)  [phone] (Alt office)  Evenings and weekends please call MD on call or office          1. Metastatic prostate cancer    - Follows with Dr Andrew Mendoza at [facility]   - PSMA 10/11/24 showed hypermetabolic vera foci above and below the diaphragm, foci in the liver and extensive foci involving the axial and appendicular skeleton compatible with metastatic disease  - --> 374--> 426 on 10/8/24   - Liver biopsy 9/30/24 consistent with prostate adenocarcinoma   - High risk high volume disease -> started on triplet therapy w/ ADT/lupron, abiraterone/prednisone + taxotere. (back pain after Lupron likely tumor flare).  - Continue abiraterone 1000mg daily w Prednisone 5mg PO QD   - s/p taxotere 60mg/m2 on 10/13/24. Next dose on 11/4 if clinically improves    - Kyphoplasty w/IR to T3 and L1 lesions to be coordinated between IR and Radiation oncology  - Palliative following for pain control and given hospital course would have further GOC discussions. Treatment will be offered but he has metastatic disease with visceral involvement and complications as outlined below.     2. Pancolitis, E coli and H influenzae bacteremia  - continues on Ceftriaxone + Metronidazole  - ID following  - BCx from 10/24 neg to date    3. Anemia/Thrombocytopenia   - Hgb 7.6. s/p 1 unit 10/30, transfuse for Hgb < 7.0  - Multifactorial sec to likely marrow infiltration by CaP, Chemo effect, consumption from acute illness, poss ITP  - Transfuse for plt <15 or < 50K if bleeding/for procedure      4. SMA Dissection  - seen by vasc sx  - no plan for intervention    5. Agitation  -  EKG reviewed, defer to cardiology  -seems to have improved, continue to monitor      Umm Aponte NP  Hematology/Oncology  [facility]  [phone] (Office)  [phone] (Alt office)  Evenings and weekends please call MD on call or office

## 2024-10-31 NOTE — PROGRESS NOTE ADULT - NS ATTEND AMEND GEN_ALL_CORE FT
Agree with above assessment and plan.   Cleared his blood cultures. Continues on antibiotics. Afebrile, clinical improvement. SMA dissection with GI bleed, no plan for intervention per vascular.   IR follow up for kyphoplasty and Rad Onc follow up for RT   Holding chemo until meaningful clinical recovery with completion of antibiotics.  Nephro following for hypernatremia. Correct potassium. Monitor Qt

## 2024-10-31 NOTE — PROGRESS NOTE ADULT - ASSESSMENT
64-year-old male with metastatic prostate cancer, sent in by hematologist from [facility] for uncontrolled pain, nausea, vomiting.   Patient reports diffuse pain starting 6 months ago significantly worsening for the past 2 weeks.  Currently the worst pain is located around the lower back.   No loss of bladder and bowel function, no saddle paresthesia.  Able to walk.  patient is oxycodone 5 every 4-6 hour.  Yesterday they started adding OxyContin 10.  However patient continued to have pain.  Family also reports significant nausea and vomiting, inability to tolerate p.o. for the last 2 days.  Last bowel movement 2 days ago.   No known allergy.  Diagnosed with high risk high volume metastatic prostate cancer with bone, liver lymph node mets and presacral mass. Liver biopsy confirmed disease with . Started lupron, loly/pred with plans to initiate taxotere.    (12 Oct 2024 15:40)  pt seems very frustrated:   he is on 2 L of oxygen : he has no underlying lung disease:  but he is requiring 2 L of oxygen      Hypoxic resp failure  Metastatic prostate cancer  Back pain     Hypoxic resp failure  -He has no underlying lung disease:  but he is requiring 2 L of oxygen :   -CTA showed; No pulmonary embolism to the level of the segmental branches bilaterally. Limited evaluation of the subsegmental branches secondary to incomplete contrast opacification.  Multilevel vertebral body lytic lesions and loss of vertebral body height, better evaluated on CT thoracic spine 10/17/2024. Metastatic prostate cancer  -Patent central airways. Bibasilar atelectasis. No pleural effusion. MEDIASTINUM AND KATTY: No lymphadenopathy.  PULMONARY ANGIOGRAM: No pulmonary embolism to the level of the segmental   branches bilaterally. Limited evaluation of the subsegmental branches secondary to incomplete contrast opacification.    10/19:  -seems pretty tired;  on 2 L of oxygen :  -cta chest showed: No pulmonary embolism to the level of the segmental branches bilaterally. Limited evaluation of the subsegmental branches secondary to incomplete   contrast opacification. Multilevel vertebral body lytic lesions and loss of vertebral body height, better evaluated on CT thoracic spine 10/17/2024.  -still with fever:  no pe  on zosyn  and leukopenic hemonc following;  has metastatic prostate ca:   -VBG seems OK:     10/20:  pulm wise he seems to be stable:   -using 2 L of oxygen    -yesterdays VBG with minimal met acidosis  -cta again noted    10/21:  on 4 L of oxygen  today    cxr is size    e coli sepsis: Gram Negative Rods and Gram Negative Coccobacilli    10/22: heis doing poorly today  : he is very agitated and uncomfortable  on mittens: ? sun downing   10/23: quiet today  : sleeping:  oxygen requirement has not increased: on rooo ha 95% as documented    WBC is slowly increasing:   no fever:   -on antibiotics   10/24: pt is not doing well : his repeat blood cultures are positive with same organism :  would defer to ID;   10/25: seems to be doing  ok ; no sob:  no cough :  no phlegm   : on room air   10/26: resp failure has resolved:  is septic  : on ceftriaxone     10/27:  he seems OK:  he is on room air:  on morphine drip   remans on ceftriaxone still     10/28: she seems to be doing  ok : no sob: no cough ; no phlegm  confused:  on morphine drip    10/29; seems comfortable on room air;   cont current rx:   10/30: seems to be doing  ok ; on morphine drip : : plan to decrease morphine dose:   -cont current supportive care   10/31:  seems same tome:   no sob:  on room air:   seems comfortable at this ti me: off morphine      New finding:  SMA dissection : neutropenic colitis/ #E Coli and H influenzae bacteremia/ #Neutropenic fever  -/f ischemic colitis on R colon   Diagnosed  on ct abd:  defer to surgery and primary team:  probably no intervention:   -cont antibiotics  -not doing very well with above new findings    10/22; no intervention per surgery    -cont antibiotics  -has fever:  ID following :  -Last chest xray on 20th  ; normal     id following   10/23  IR was consulted for vertebral augmentation of T3-T5 prior to XRT.   Patient was seen bedside. Initially, patient kept requesting no exam and was not willing to participate in the discussion. Son was bedside at time of conversation.   Per discussion with the medical attending, given the concern for SMA dissection and concern for bowel ischemia, will hold off on vertebral augmentation evaluation at this time.   Please reach out to IR when patient is medically stable for vertebral augmentation.  10/24:  remains septic:  above cancelled:   ? for palliative care now  10/26: he seems same to me:  no overnight events  on room air:   check VBG  : cont antibiotics   hemonc following   10/27:  -still on antibiotics for e coli sepsis  -id following s  10/28: cont antibiotics   10/230: cont ceftriaxone   10/31: on ceftriaxone and flagyl     Back pain   -likely due to metastatic disease:  adm here for severe pain :  -pain control per primary team   -venous ph is ok     dw acp & family ; GOC as per primary  team

## 2024-10-31 NOTE — PROGRESS NOTE ADULT - PROBLEM SELECTOR PLAN 1
- Metastatic prostate adenocarcinoma, follows with Dr. Andrew Mendoza  - Oncology recs appreciated: Metastatic prostate cancer to liver now s/p C1 taxotere, on abiraterone qd and dexamethasone, s/p zarxio 480mcg until ANC > 1500.  - management per oncology team  - IR consult for kyphoplasty on hold  - Appreciate rad/onc recs- on hold until mental status improves. Plan is for 5 fractions/20gy to T1-T5, and also L1-l5.   - - Metastatic prostate adenocarcinoma, follows with Dr. Andrew Mendoza  - Oncology recs appreciated: Metastatic prostate cancer to liver now s/p C1 taxotere, on abiraterone qd and dexamethasone, s/p zarxio 480mcg until ANC > 1500.  - management per oncology team  - IR consult for kyphoplasty on hold  - Appreciate rad/onc recs- on hold until mental status improves. Plan is for 5 fractions/20gy to T1-T5, and also L1-l5.   - appreciate IR discussing with rad/onc regarding vertebral augmentation plan

## 2024-10-31 NOTE — PROGRESS NOTE ADULT - ASSESSMENT
This is a 65 y/o M w/ prostate CA s/p radical prostatectomy now metastatic, HTN, HLD, admitted to 10/12 for pain, N/V in setting of osseous mets. S/p docetaxel on 10/13, abiraterone 10/12. Given neutropenia started on Zarxio.  C/b hypoxia, tachycardia. CTA w/o PE. Pt w/ 10 episodes of diarrhea on 10/18. C diff negative,   Pt now febrile to 102, CT A/P with pancolitis. 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   Fever likely 2/2 to neutropenic enterocolitis, pt w/ ANC 90, at high risk of bacteremia.   Bcx now w/ E Coli and H influenzae bacteremia, E Coli likely from colitis, however unclear source of H influenza, typically from PNA, septic arthritis, consider meningitis, per family denies neck pain/HA.   Pt w/ pressured speech, paranoid thoughts. Worsening hypernatremia   BCx from 10/22 still w/ E Coli. Repeat from 8/24 clear.     Now with fever to 101 on 10/31, per team, pt with 5 episodes of diarrhea     Recommendations:   1. Now fever on Ceftriaxone/Flagyl, would expand antibiotics to Cefepime 2 g q8 and Metronidazole 500 mg BID given neutropenic fever   2. F/u BCx x 2   3. Agree with C diff testing     Poor prognosis overall    Thank you for this consult. Inpatient ID team will follow.    Orlando Ruiz M.D.  Attending Physician  Division of Infectious Diseases  Department of Medicine    Please contact through [employer].  Office: [phone] (after 5 PM or weekend)

## 2024-10-31 NOTE — PROGRESS NOTE ADULT - PROBLEM SELECTOR PLAN 7
Improved- Likely related to chemotherapy treatment  - hematology-oncology following:   - Appreciate ID recs- on Rocephin 2g q24 hours and Flagyl 500mg bid x 2weeks (11/6/24 last day)   - Patient s/p 1u PRBCs (10/21); 3u Plts (10/20, 10/21, 10/22) 1u 10/29   - Patient febrile - tylenol prn Improved- Likely related to immunocompromised state   - hematology-oncology following:   - Appreciate ID recs- on Rocephin 2g q24 hours and Flagyl 500mg bid x 2weeks (11/6/24 last day)   - Patient s/p 1u PRBCs (10/21); 3u Plts (10/20, 10/21, 10/22) 1u 10/29   - Patient febrile - tylenol prn  - 10/31f/u repeat blood cultures and r/o c.diff

## 2024-10-31 NOTE — PROGRESS NOTE ADULT - PROBLEM SELECTOR PLAN 3
Patient with episode of agitation overnight requiring Haldol.   - Behavioral health recs appreciated: Zyprexa discontinued per son's request. Haldol prn per psych recs. hold med if QTC > 500  - RECOMMEND SCHEDULING HALDOL 1MG AT 9PM   - EKG performed QTc 436 on 10/17

## 2024-10-31 NOTE — PROGRESS NOTE ADULT - PROBLEM SELECTOR PLAN 9
Patient is supported by his wife- Vijaya [phone]) and 3 adult sons- Magen, Mateus and Freddy   > Patient is recently dx with metastatic prostate cancer just 1.5 weeks ago and he is treatment oriented.  > Offered caregiver Sw referral to patient's wife- DECLINED   > 10/21: Began advanced care planning discussions with patient's son, Freddy. Pt's wife stayed overnight so is sleeping. Will plan to speak to his wife regarding further goc.  > 10/23: See GOC note- DNR/DNI. Of note, wife has shared with provider that they are familiar with hospice as her sister is on hospice in [place].   > 10/24: Plan is to continue to medically optimize patient and improve patient's performance/nutritional status with goal of pursuing further chemotherapy. Extensive goc discussion held with patient's family, Oncology team (Dr. Tate and Umm Aponte- Onc NP) and palliative care team regarding plan of care.

## 2024-10-31 NOTE — PROGRESS NOTE ADULT - PROBLEM SELECTOR PLAN 2
Patient: Mili Padilla    Procedure: Procedure(s):  LEFT EYE PHACOEMULSIFICATION, CATARACT, WITH INTRAOCULAR LENS IMPLANT       Anesthesia Type:  MAC    Note:  Disposition: Outpatient   Postop Pain Control: Uneventful            Sign Out: Well controlled pain   PONV: No   Neuro/Psych: Uneventful            Sign Out: Acceptable/Baseline neuro status   Airway/Respiratory: Uneventful            Sign Out: Acceptable/Baseline resp. status   CV/Hemodynamics: Uneventful            Sign Out: Acceptable CV status; No obvious hypovolemia; No obvious fluid overload   Other NRE:    DID A NON-ROUTINE EVENT OCCUR?            Last vitals:  Vitals Value Taken Time   /78 07/20/23 1337   Temp 36.2  C (97.2  F) 07/20/23 1337   Pulse     Resp 16 07/20/23 1337   SpO2 99 % 07/20/23 1337       Electronically Signed By: Yash Schaefer MD  July 20, 2023  1:56 PM   Na increased likely 2/2 diarrhea and potassium 2.4   > Appreciate nephro recs   > On IVF Na increased likely 2/2 diarrhea and potassium 2.4   > Appreciate nephro recs   > On IVF  > electrolyte repletion per nephro

## 2024-10-31 NOTE — PROGRESS NOTE ADULT - SUBJECTIVE AND OBJECTIVE BOX
Patient is a 64y old  Male who presents with a chief complaint of Pain (31 Oct 2024 15:05)    Date of servie : 10-31-24 @ 15:54  INTERVAL HPI/OVERNIGHT EVENTS:  T(C): 37.2 (10-31-24 @ 05:02), Max: 38.6 (10-31-24 @ 03:05)  HR: 123 (10-31-24 @ 05:02) (109 - 129)  BP: 103/62 (10-31-24 @ 05:02) (99/61 - 106/64)  RR: 18 (10-31-24 @ 05:02) (18 - 18)  SpO2: 98% (10-31-24 @ 05:02) (98% - 98%)  Wt(kg): --  I&O's Summary    30 Oct 2024 07:01  -  31 Oct 2024 07:00  --------------------------------------------------------  IN: 1085 mL / OUT: 640 mL / NET: 445 mL        LABS:                        7.6    2.26  )-----------( 124      ( 31 Oct 2024 08:54 )             22.8     10-31    149[H]  |  118[H]  |  10  ----------------------------<  91  2.4[LL]   |  18[L]  |  0.34[L]    Ca    7.1[L]      31 Oct 2024 08:54  Phos  2.7     10-31  Mg     1.80     10-31      PT/INR - ( 30 Oct 2024 19:15 )   PT: 20.8 sec;   INR: 1.76 ratio         PTT - ( 30 Oct 2024 19:15 )  PTT:31.5 sec  Urinalysis Basic - ( 31 Oct 2024 08:54 )    Color: x / Appearance: x / SG: x / pH: x  Gluc: 91 mg/dL / Ketone: x  / Bili: x / Urobili: x   Blood: x / Protein: x / Nitrite: x   Leuk Esterase: x / RBC: x / WBC x   Sq Epi: x / Non Sq Epi: x / Bacteria: x      CAPILLARY BLOOD GLUCOSE      POCT Blood Glucose.: 102 mg/dL (31 Oct 2024 12:22)  POCT Blood Glucose.: 100 mg/dL (31 Oct 2024 08:36)  POCT Blood Glucose.: 101 mg/dL (30 Oct 2024 21:28)  POCT Blood Glucose.: 99 mg/dL (30 Oct 2024 17:54)        Urinalysis Basic - ( 31 Oct 2024 08:54 )    Color: x / Appearance: x / SG: x / pH: x  Gluc: 91 mg/dL / Ketone: x  / Bili: x / Urobili: x   Blood: x / Protein: x / Nitrite: x   Leuk Esterase: x / RBC: x / WBC x   Sq Epi: x / Non Sq Epi: x / Bacteria: x        MEDICATIONS  (STANDING):  abiraterone 500 mg tablet 2 Tablet(s) 2 Tablet(s) Oral daily  acetaminophen     Tablet .. 650 milliGRAM(s) Oral once  atorvastatin 20 milliGRAM(s) Oral at bedtime  cefepime   IVPB 2000 milliGRAM(s) IV Intermittent once  cefepime   IVPB      cefepime   IVPB 2000 milliGRAM(s) IV Intermittent every 8 hours  chlorhexidine 2% Cloths 1 Application(s) Topical daily  dextrose 5% + lactated ringers with potassium chloride 20 mEq/L 1000 milliLiter(s) (100 mL/Hr) IV Continuous <Continuous>  dextrose 5%. 1000 milliLiter(s) (50 mL/Hr) IV Continuous <Continuous>  dextrose 5%. 1000 milliLiter(s) (100 mL/Hr) IV Continuous <Continuous>  dextrose 50% Injectable 25 Gram(s) IV Push once  dextrose 50% Injectable 12.5 Gram(s) IV Push once  dextrose 50% Injectable 25 Gram(s) IV Push once  diphenhydrAMINE 25 milliGRAM(s) Oral once  fenofibrate Tablet 145 milliGRAM(s) Oral daily  FIRST- Mouthwash  BLM 30 milliLiter(s) Swish and Spit three times a day  glucagon  Injectable 1 milliGRAM(s) IntraMuscular once  influenza  Vaccine (HIGH DOSE) 0.5 milliLiter(s) IntraMuscular once  insulin lispro (ADMELOG) corrective regimen sliding scale   SubCutaneous at bedtime  insulin lispro (ADMELOG) corrective regimen sliding scale   SubCutaneous three times a day before meals  lactobacillus acidophilus 1 Tablet(s) Oral daily  lidocaine   4% Patch 1 Patch Transdermal every 24 hours  melatonin 6 milliGRAM(s) Oral at bedtime  metroNIDAZOLE  IVPB 500 milliGRAM(s) IV Intermittent every 12 hours  multivitamin 1 Tablet(s) Oral daily  Nephro-nancy 1 Tablet(s) Oral daily  pantoprazole  Injectable 40 milliGRAM(s) IV Push daily  potassium chloride   Powder 40 milliEquivalent(s) Oral every 2 hours  potassium chloride  10 mEq/100 mL IVPB 10 milliEquivalent(s) IV Intermittent every 1 hour  potassium chloride  10 mEq/100 mL IVPB 10 milliEquivalent(s) IV Intermittent once  predniSONE   Tablet 5 milliGRAM(s) Oral daily    MEDICATIONS  (PRN):  acetaminophen     Tablet .. 650 milliGRAM(s) Oral every 6 hours PRN Temp greater or equal to 38C (100.4F), Mild Pain (1 - 3), Moderate Pain (4 - 6)  aluminum hydroxide/magnesium hydroxide/simethicone Suspension 30 milliLiter(s) Oral every 4 hours PRN Dyspepsia  artificial tears (preservative free) Ophthalmic Solution 1 Drop(s) Both EYES four times a day PRN Dry Eyes  dextrose Oral Gel 15 Gram(s) Oral once PRN Blood Glucose LESS THAN 70 milliGRAM(s)/deciliter  morphine  - Injectable 2 milliGRAM(s) IV Push every 4 hours PRN Moderate Pain (4 - 6)  morphine  - Injectable 4 milliGRAM(s) IV Push every 3 hours PRN Severe Pain (7 - 10)  naloxone Injectable 0.1 milliGRAM(s) IV Push every 3 minutes PRN For ANY of the following changes in patient status:  A. RR LESS THAN 10 breaths per minute, B. Oxygen saturation LESS THAN 90%, C. Sedation score of 6  polyethylene glycol 3350 17 Gram(s) Oral daily PRN Constipation          PHYSICAL EXAM:  GENERAL: frail  CHEST/LUNG: Clear to percussion bilaterally; No rales, rhonchi, wheezing, or rubs  HEART: Regular rate and rhythm; No murmurs, rubs, or gallops  ABDOMEN: Soft, Nontender, Nondistended; Bowel sounds present  EXTREMITIES:  edema+    Care Discussed with Consultants/Other Providers [ ] YES  [ ] NO

## 2024-10-31 NOTE — PROGRESS NOTE ADULT - PROBLEM SELECTOR PLAN 8
The patient requires nursing assistance with ADLs  - Supportive care  - Turn and position  - Continue with good skin care  - PT recs appreciated- DESTINY   - Dietary recs appreciated

## 2024-11-01 LAB
ANION GAP SERPL CALC-SCNC: 10 MMOL/L — SIGNIFICANT CHANGE UP (ref 7–14)
ANION GAP SERPL CALC-SCNC: 11 MMOL/L — SIGNIFICANT CHANGE UP (ref 7–14)
BUN SERPL-MCNC: 10 MG/DL — SIGNIFICANT CHANGE UP (ref 7–23)
C DIFF GDH STL QL: NEGATIVE — SIGNIFICANT CHANGE UP
C DIFF GDH STL QL: SIGNIFICANT CHANGE UP
CALCIUM SERPL-MCNC: 7 MG/DL — LOW (ref 8.4–10.5)
CALCIUM SERPL-MCNC: 7.1 MG/DL — LOW (ref 8.4–10.5)
CALCIUM SERPL-MCNC: 7.1 MG/DL — LOW (ref 8.4–10.5)
CALCIUM SERPL-MCNC: 7.2 MG/DL — LOW (ref 8.4–10.5)
CHLORIDE SERPL-SCNC: 117 MMOL/L — HIGH (ref 98–107)
CHLORIDE SERPL-SCNC: 117 MMOL/L — HIGH (ref 98–107)
CHLORIDE SERPL-SCNC: 118 MMOL/L — HIGH (ref 98–107)
CHLORIDE SERPL-SCNC: 119 MMOL/L — HIGH (ref 98–107)
CO2 SERPL-SCNC: 17 MMOL/L — LOW (ref 22–31)
CO2 SERPL-SCNC: 18 MMOL/L — LOW (ref 22–31)
CREAT SERPL-MCNC: 0.3 MG/DL — LOW (ref 0.5–1.3)
CREAT SERPL-MCNC: 0.31 MG/DL — LOW (ref 0.5–1.3)
CREAT SERPL-MCNC: 0.32 MG/DL — LOW (ref 0.5–1.3)
CREAT SERPL-MCNC: 0.33 MG/DL — LOW (ref 0.5–1.3)
EGFR: 128 ML/MIN/1.73M2 — SIGNIFICANT CHANGE UP
EGFR: 130 ML/MIN/1.73M2 — SIGNIFICANT CHANGE UP
EGFR: 131 ML/MIN/1.73M2 — SIGNIFICANT CHANGE UP
EGFR: 132 ML/MIN/1.73M2 — SIGNIFICANT CHANGE UP
GI PCR PANEL: SIGNIFICANT CHANGE UP
GLUCOSE BLDC GLUCOMTR-MCNC: 106 MG/DL — HIGH (ref 70–99)
GLUCOSE BLDC GLUCOMTR-MCNC: 134 MG/DL — HIGH (ref 70–99)
GLUCOSE BLDC GLUCOMTR-MCNC: 134 MG/DL — HIGH (ref 70–99)
GLUCOSE BLDC GLUCOMTR-MCNC: 97 MG/DL — SIGNIFICANT CHANGE UP (ref 70–99)
GLUCOSE SERPL-MCNC: 117 MG/DL — HIGH (ref 70–99)
GLUCOSE SERPL-MCNC: 138 MG/DL — HIGH (ref 70–99)
GLUCOSE SERPL-MCNC: 88 MG/DL — SIGNIFICANT CHANGE UP (ref 70–99)
GLUCOSE SERPL-MCNC: 92 MG/DL — SIGNIFICANT CHANGE UP (ref 70–99)
HCT VFR BLD CALC: 23.5 % — LOW (ref 39–50)
HGB BLD-MCNC: 7.7 G/DL — LOW (ref 13–17)
MAGNESIUM SERPL-MCNC: 1.8 MG/DL — SIGNIFICANT CHANGE UP (ref 1.6–2.6)
MAGNESIUM SERPL-MCNC: 1.9 MG/DL — SIGNIFICANT CHANGE UP (ref 1.6–2.6)
MAGNESIUM SERPL-MCNC: 1.9 MG/DL — SIGNIFICANT CHANGE UP (ref 1.6–2.6)
MAGNESIUM SERPL-MCNC: 2.1 MG/DL — SIGNIFICANT CHANGE UP (ref 1.6–2.6)
MCHC RBC-ENTMCNC: 30.2 PG — SIGNIFICANT CHANGE UP (ref 27–34)
MCHC RBC-ENTMCNC: 32.8 G/DL — SIGNIFICANT CHANGE UP (ref 32–36)
MCV RBC AUTO: 92.2 FL — SIGNIFICANT CHANGE UP (ref 80–100)
NRBC # BLD: 2 /100 WBCS — HIGH (ref 0–0)
NRBC # FLD: 0.05 K/UL — HIGH (ref 0–0)
PHOSPHATE SERPL-MCNC: 1.7 MG/DL — LOW (ref 2.5–4.5)
PHOSPHATE SERPL-MCNC: 1.9 MG/DL — LOW (ref 2.5–4.5)
PHOSPHATE SERPL-MCNC: 2.2 MG/DL — LOW (ref 2.5–4.5)
PHOSPHATE SERPL-MCNC: 3.2 MG/DL — SIGNIFICANT CHANGE UP (ref 2.5–4.5)
PLATELET # BLD AUTO: 142 K/UL — LOW (ref 150–400)
POTASSIUM SERPL-MCNC: 2.5 MMOL/L — CRITICAL LOW (ref 3.5–5.3)
POTASSIUM SERPL-MCNC: 2.9 MMOL/L — CRITICAL LOW (ref 3.5–5.3)
POTASSIUM SERPL-MCNC: 3.2 MMOL/L — LOW (ref 3.5–5.3)
POTASSIUM SERPL-MCNC: 3.3 MMOL/L — LOW (ref 3.5–5.3)
POTASSIUM SERPL-SCNC: 2.5 MMOL/L — CRITICAL LOW (ref 3.5–5.3)
POTASSIUM SERPL-SCNC: 2.9 MMOL/L — CRITICAL LOW (ref 3.5–5.3)
POTASSIUM SERPL-SCNC: 3.2 MMOL/L — LOW (ref 3.5–5.3)
POTASSIUM SERPL-SCNC: 3.3 MMOL/L — LOW (ref 3.5–5.3)
RBC # BLD: 2.55 M/UL — LOW (ref 4.2–5.8)
RBC # FLD: 19.6 % — HIGH (ref 10.3–14.5)
SODIUM SERPL-SCNC: 145 MMOL/L — SIGNIFICANT CHANGE UP (ref 135–145)
SODIUM SERPL-SCNC: 146 MMOL/L — HIGH (ref 135–145)
SODIUM SERPL-SCNC: 146 MMOL/L — HIGH (ref 135–145)
SODIUM SERPL-SCNC: 148 MMOL/L — HIGH (ref 135–145)
WBC # BLD: 2.29 K/UL — LOW (ref 3.8–10.5)
WBC # FLD AUTO: 2.29 K/UL — LOW (ref 3.8–10.5)

## 2024-11-01 PROCEDURE — 99233 SBSQ HOSP IP/OBS HIGH 50: CPT

## 2024-11-01 RX ORDER — POTASSIUM PHOSPHATE, MONOBASIC POTASSIUM PHOSPHATE, DIBASIC INJECTION, 236; 224 MG/ML; MG/ML
30 SOLUTION, CONCENTRATE INTRAVENOUS ONCE
Refills: 0 | Status: COMPLETED | OUTPATIENT
Start: 2024-11-01 | End: 2024-11-01

## 2024-11-01 RX ORDER — POTASSIUM CHLORIDE 600 MG/1
10 TABLET, EXTENDED RELEASE ORAL
Refills: 0 | Status: COMPLETED | OUTPATIENT
Start: 2024-11-01 | End: 2024-11-01

## 2024-11-01 RX ORDER — POTASSIUM PHOSPHATE, MONOBASIC POTASSIUM PHOSPHATE, DIBASIC INJECTION, 236; 224 MG/ML; MG/ML
30 SOLUTION, CONCENTRATE INTRAVENOUS ONCE
Refills: 0 | Status: DISCONTINUED | OUTPATIENT
Start: 2024-11-01 | End: 2024-11-01

## 2024-11-01 RX ORDER — NYSTATIN 500000 [USP'U]/1
500000 TABLET, FILM COATED ORAL THREE TIMES A DAY
Refills: 0 | Status: DISCONTINUED | OUTPATIENT
Start: 2024-11-01 | End: 2024-11-18

## 2024-11-01 RX ORDER — POTASSIUM CHLORIDE 600 MG/1
10 TABLET, EXTENDED RELEASE ORAL
Refills: 0 | Status: COMPLETED | OUTPATIENT
Start: 2024-11-01 | End: 2024-11-02

## 2024-11-01 RX ORDER — POTASSIUM PHOSPHATE, MONOBASIC POTASSIUM PHOSPHATE, DIBASIC INJECTION, 236; 224 MG/ML; MG/ML
15 SOLUTION, CONCENTRATE INTRAVENOUS ONCE
Refills: 0 | Status: DISCONTINUED | OUTPATIENT
Start: 2024-11-01 | End: 2024-11-01

## 2024-11-01 RX ORDER — 0.9 % SODIUM CHLORIDE 0.9 %
1000 INTRAVENOUS SOLUTION INTRAVENOUS
Refills: 0 | Status: DISCONTINUED | OUTPATIENT
Start: 2024-11-01 | End: 2024-11-02

## 2024-11-01 RX ORDER — POTASSIUM CHLORIDE 600 MG/1
10 TABLET, EXTENDED RELEASE ORAL ONCE
Refills: 0 | Status: COMPLETED | OUTPATIENT
Start: 2024-11-01 | End: 2024-11-01

## 2024-11-01 RX ADMIN — CEFEPIME 100 MILLIGRAM(S): 2 INJECTION, POWDER, FOR SOLUTION INTRAVENOUS at 05:31

## 2024-11-01 RX ADMIN — POTASSIUM CHLORIDE 100 MILLIEQUIVALENT(S): 600 TABLET, EXTENDED RELEASE ORAL at 03:04

## 2024-11-01 RX ADMIN — CEFEPIME 100 MILLIGRAM(S): 2 INJECTION, POWDER, FOR SOLUTION INTRAVENOUS at 14:03

## 2024-11-01 RX ADMIN — POTASSIUM CHLORIDE 100 MILLIEQUIVALENT(S): 600 TABLET, EXTENDED RELEASE ORAL at 00:05

## 2024-11-01 RX ADMIN — POTASSIUM CHLORIDE 100 MILLIEQUIVALENT(S): 600 TABLET, EXTENDED RELEASE ORAL at 02:20

## 2024-11-01 RX ADMIN — CEFEPIME 100 MILLIGRAM(S): 2 INJECTION, POWDER, FOR SOLUTION INTRAVENOUS at 21:05

## 2024-11-01 RX ADMIN — POTASSIUM CHLORIDE 100 MILLIEQUIVALENT(S): 600 TABLET, EXTENDED RELEASE ORAL at 09:17

## 2024-11-01 RX ADMIN — Medication 100 GRAM(S): at 01:46

## 2024-11-01 RX ADMIN — Medication 1 TABLET(S): at 14:10

## 2024-11-01 RX ADMIN — POTASSIUM CHLORIDE 100 MILLIEQUIVALENT(S): 600 TABLET, EXTENDED RELEASE ORAL at 10:23

## 2024-11-01 RX ADMIN — Medication 1 TABLET(S): at 14:22

## 2024-11-01 RX ADMIN — METRONIDAZOLE 100 MILLIGRAM(S): 500 TABLET ORAL at 05:31

## 2024-11-01 RX ADMIN — CHLORHEXIDINE GLUCONATE 1 APPLICATION(S): 1.2 RINSE ORAL at 14:25

## 2024-11-01 RX ADMIN — METRONIDAZOLE 100 MILLIGRAM(S): 500 TABLET ORAL at 17:45

## 2024-11-01 RX ADMIN — POTASSIUM CHLORIDE 100 MILLIEQUIVALENT(S): 600 TABLET, EXTENDED RELEASE ORAL at 04:38

## 2024-11-01 RX ADMIN — DIPHENHYDRAMINE HYDROCHLORIDE AND LIDOCAINE HYDROCHLORIDE AND ALUMINUM HYDROXIDE AND MAGNESIUM HYDRO 30 MILLILITER(S): KIT at 00:06

## 2024-11-01 RX ADMIN — PANTOPRAZOLE SODIUM 40 MILLIGRAM(S): 40 TABLET, DELAYED RELEASE ORAL at 14:22

## 2024-11-01 RX ADMIN — Medication 145 MILLIGRAM(S): at 14:07

## 2024-11-01 RX ADMIN — DIPHENHYDRAMINE HYDROCHLORIDE AND LIDOCAINE HYDROCHLORIDE AND ALUMINUM HYDROXIDE AND MAGNESIUM HYDRO 30 MILLILITER(S): KIT at 14:03

## 2024-11-01 RX ADMIN — POTASSIUM CHLORIDE 100 MILLIEQUIVALENT(S): 600 TABLET, EXTENDED RELEASE ORAL at 16:31

## 2024-11-01 RX ADMIN — POTASSIUM CHLORIDE 100 MILLIEQUIVALENT(S): 600 TABLET, EXTENDED RELEASE ORAL at 11:32

## 2024-11-01 RX ADMIN — Medication 1 TABLET(S): at 14:09

## 2024-11-01 RX ADMIN — Medication 75 MILLILITER(S): at 09:38

## 2024-11-01 RX ADMIN — Medication 75 MILLILITER(S): at 21:04

## 2024-11-01 RX ADMIN — NYSTATIN 500000 UNIT(S): 500000 TABLET, FILM COATED ORAL at 13:58

## 2024-11-01 RX ADMIN — POTASSIUM CHLORIDE 100 MILLIEQUIVALENT(S): 600 TABLET, EXTENDED RELEASE ORAL at 17:34

## 2024-11-01 RX ADMIN — POTASSIUM CHLORIDE 100 MILLIEQUIVALENT(S): 600 TABLET, EXTENDED RELEASE ORAL at 18:43

## 2024-11-01 RX ADMIN — POTASSIUM CHLORIDE 100 MILLIEQUIVALENT(S): 600 TABLET, EXTENDED RELEASE ORAL at 01:08

## 2024-11-01 RX ADMIN — POTASSIUM CHLORIDE 100 MILLIEQUIVALENT(S): 600 TABLET, EXTENDED RELEASE ORAL at 23:28

## 2024-11-01 RX ADMIN — POTASSIUM PHOSPHATE, MONOBASIC POTASSIUM PHOSPHATE, DIBASIC INJECTION, 83.33 MILLIMOLE(S): 236; 224 SOLUTION, CONCENTRATE INTRAVENOUS at 09:17

## 2024-11-01 RX ADMIN — DIPHENHYDRAMINE HYDROCHLORIDE AND LIDOCAINE HYDROCHLORIDE AND ALUMINUM HYDROXIDE AND MAGNESIUM HYDRO 30 MILLILITER(S): KIT at 05:30

## 2024-11-01 RX ADMIN — POTASSIUM CHLORIDE 100 MILLIEQUIVALENT(S): 600 TABLET, EXTENDED RELEASE ORAL at 02:00

## 2024-11-01 NOTE — PROGRESS NOTE ADULT - SUBJECTIVE AND OBJECTIVE BOX
Patient is a 64y old  Male who presents with a chief complaint of Pain (01 Nov 2024 13:27)    Date of servie : 11-01-24 @ 15:15  INTERVAL HPI/OVERNIGHT EVENTS:  T(C): 36.8 (11-01-24 @ 11:59), Max: 37.2 (11-01-24 @ 02:00)  HR: 113 (11-01-24 @ 11:59) (102 - 113)  BP: 108/62 (11-01-24 @ 11:59) (98/60 - 119/63)  RR: 18 (11-01-24 @ 11:59) (18 - 18)  SpO2: 100% (11-01-24 @ 11:59) (97% - 100%)  Wt(kg): --  I&O's Summary    31 Oct 2024 07:01  -  01 Nov 2024 07:00  --------------------------------------------------------  IN: 0 mL / OUT: 450 mL / NET: -450 mL        LABS:                        7.7    2.29  )-----------( 142      ( 01 Nov 2024 06:05 )             23.5     11-01    145  |  117[H]  |  10  ----------------------------<  88  2.9[LL]   |  17[L]  |  0.30[L]    Ca    7.1[L]      01 Nov 2024 06:05  Phos  1.7     11-01  Mg     2.10     11-01      PT/INR - ( 30 Oct 2024 19:15 )   PT: 20.8 sec;   INR: 1.76 ratio         PTT - ( 30 Oct 2024 19:15 )  PTT:31.5 sec  Urinalysis Basic - ( 01 Nov 2024 06:05 )    Color: x / Appearance: x / SG: x / pH: x  Gluc: 88 mg/dL / Ketone: x  / Bili: x / Urobili: x   Blood: x / Protein: x / Nitrite: x   Leuk Esterase: x / RBC: x / WBC x   Sq Epi: x / Non Sq Epi: x / Bacteria: x      CAPILLARY BLOOD GLUCOSE      POCT Blood Glucose.: 106 mg/dL (01 Nov 2024 12:31)  POCT Blood Glucose.: 97 mg/dL (01 Nov 2024 08:40)  POCT Blood Glucose.: 103 mg/dL (31 Oct 2024 21:41)  POCT Blood Glucose.: 104 mg/dL (31 Oct 2024 18:08)        Urinalysis Basic - ( 01 Nov 2024 06:05 )    Color: x / Appearance: x / SG: x / pH: x  Gluc: 88 mg/dL / Ketone: x  / Bili: x / Urobili: x   Blood: x / Protein: x / Nitrite: x   Leuk Esterase: x / RBC: x / WBC x   Sq Epi: x / Non Sq Epi: x / Bacteria: x        MEDICATIONS  (STANDING):  abiraterone 500 mg tablet 2 Tablet(s) 2 Tablet(s) Oral daily  acetaminophen     Tablet .. 650 milliGRAM(s) Oral once  atorvastatin 20 milliGRAM(s) Oral at bedtime  cefepime   IVPB 2000 milliGRAM(s) IV Intermittent every 8 hours  cefepime   IVPB      chlorhexidine 2% Cloths 1 Application(s) Topical daily  dextrose 5% + lactated ringers 1000 milliLiter(s) (75 mL/Hr) IV Continuous <Continuous>  dextrose 5%. 1000 milliLiter(s) (100 mL/Hr) IV Continuous <Continuous>  dextrose 5%. 1000 milliLiter(s) (50 mL/Hr) IV Continuous <Continuous>  dextrose 50% Injectable 25 Gram(s) IV Push once  dextrose 50% Injectable 12.5 Gram(s) IV Push once  dextrose 50% Injectable 25 Gram(s) IV Push once  diphenhydrAMINE 25 milliGRAM(s) Oral once  fenofibrate Tablet 145 milliGRAM(s) Oral daily  FIRST- Mouthwash  BLM 30 milliLiter(s) Swish and Spit three times a day  glucagon  Injectable 1 milliGRAM(s) IntraMuscular once  haloperidol     Tablet 1 milliGRAM(s) Oral <User Schedule>  influenza  Vaccine (HIGH DOSE) 0.5 milliLiter(s) IntraMuscular once  insulin lispro (ADMELOG) corrective regimen sliding scale   SubCutaneous at bedtime  insulin lispro (ADMELOG) corrective regimen sliding scale   SubCutaneous three times a day before meals  lactobacillus acidophilus 1 Tablet(s) Oral daily  lidocaine   4% Patch 1 Patch Transdermal every 24 hours  melatonin 6 milliGRAM(s) Oral at bedtime  metroNIDAZOLE  IVPB 500 milliGRAM(s) IV Intermittent every 12 hours  multivitamin 1 Tablet(s) Oral daily  Nephro-nancy 1 Tablet(s) Oral daily  nystatin    Suspension 293115 Unit(s) Oral three times a day  pantoprazole  Injectable 40 milliGRAM(s) IV Push daily  predniSONE   Tablet 5 milliGRAM(s) Oral daily    MEDICATIONS  (PRN):  acetaminophen     Tablet .. 650 milliGRAM(s) Oral every 6 hours PRN Temp greater or equal to 38C (100.4F), Mild Pain (1 - 3), Moderate Pain (4 - 6)  aluminum hydroxide/magnesium hydroxide/simethicone Suspension 30 milliLiter(s) Oral every 4 hours PRN Dyspepsia  artificial tears (preservative free) Ophthalmic Solution 1 Drop(s) Both EYES four times a day PRN Dry Eyes  dextrose Oral Gel 15 Gram(s) Oral once PRN Blood Glucose LESS THAN 70 milliGRAM(s)/deciliter  morphine  - Injectable 2 milliGRAM(s) IV Push every 4 hours PRN Moderate Pain (4 - 6)  morphine  - Injectable 4 milliGRAM(s) IV Push every 3 hours PRN Severe Pain (7 - 10)  naloxone Injectable 0.1 milliGRAM(s) IV Push every 3 minutes PRN For ANY of the following changes in patient status:  A. RR LESS THAN 10 breaths per minute, B. Oxygen saturation LESS THAN 90%, C. Sedation score of 6  polyethylene glycol 3350 17 Gram(s) Oral daily PRN Constipation          PHYSICAL EXAM:  GENERAL: NAD, well-groomed, well-developed  HEAD:  Atraumatic, Normocephalic  CHEST/LUNG: Clear to percussion bilaterally; No rales, rhonchi, wheezing, or rubs  HEART: Regular rate and rhythm; No murmurs, rubs, or gallops  ABDOMEN: Soft, Nontender, Nondistended; Bowel sounds present  EXTREMITIES:  2+ Peripheral Pulses, No clubbing, cyanosis, or edema  LYMPH: No lymphadenopathy noted  SKIN: No rashes or lesions    Care Discussed with Consultants/Other Providers [ ] YES  [ ] NO

## 2024-11-01 NOTE — PROGRESS NOTE ADULT - ASSESSMENT
1. Metastatic prostate cancer    - Follows with Dr Andrew Mendoza at [facility]   - PSMA 10/11/24 showed hypermetabolic vera foci above and below the diaphragm, foci in the liver and extensive foci involving the axial and appendicular skeleton compatible with metastatic disease  - --> 374--> 426 on 10/8/24   - Liver biopsy 9/30/24 consistent with prostate adenocarcinoma   - High risk high volume disease -> started on triplet therapy w/ ADT/lupron, abiraterone/prednisone + taxotere. (back pain after Lupron likely tumor flare).  - Continue abiraterone 1000mg daily w Prednisone 5mg PO QD   - s/p taxotere 60mg/m2 on 10/13/24. Next dose on 11/4 if clinically improves    - Kyphoplasty w/IR to T3 and L1 lesions to be coordinated between IR and Radiation oncology  - Palliative following for pain control and given hospital course would have further GOC discussions. Treatment will be offered but he has metastatic disease with visceral involvement and complications as outlined below.     2. Pancolitis, E coli and H influenzae bacteremia  - continues on Cefepime+ Metronidazole  - ID following  - BCx from 10/24 neg to date    3. Anemia/Thrombocytopenia   - Hgb 7.6. s/p 1 unit 10/30, transfuse for Hgb < 7.0  - Multifactorial sec to likely marrow infiltration by CaP, Chemo effect, consumption from acute illness, poss ITP  - Transfuse for plt <15 or < 50K if bleeding/for procedure      4. SMA Dissection  - seen by [facility] sx  - no plan for intervention    5. Agitation  -  EKG reviewed, defer to cardiology  -seems to have improved, continue to monitor      Umm Aponte NP  Hematology/Oncology  [facility]  [phone] (Office)  [phone] (Alt office)  Evenings and weekends please call MD on call or office

## 2024-11-01 NOTE — PROGRESS NOTE ADULT - SUBJECTIVE AND OBJECTIVE BOX
Infectious Diseases Follow Up:    Patient is a 64y old  Male who presents with a chief complaint of Pain (01 Nov 2024 08:06)      Interval History/ROS:  Pt continues to have large amounts of diarrhea,   Afebrile ON     Allergies  No Known Allergies        ANTIMICROBIALS:  cefepime   IVPB 2000 every 8 hours  cefepime   IVPB    metroNIDAZOLE  IVPB 500 every 12 hours      Current Abx:     Previous Abx     OTHER MEDS:  MEDICATIONS  (STANDING):  acetaminophen     Tablet .. 650 every 6 hours PRN  acetaminophen     Tablet .. 650 once  aluminum hydroxide/magnesium hydroxide/simethicone Suspension 30 every 4 hours PRN  atorvastatin 20 at bedtime  dextrose 50% Injectable 12.5 once  dextrose 50% Injectable 25 once  dextrose 50% Injectable 25 once  dextrose Oral Gel 15 once PRN  diphenhydrAMINE 25 once  fenofibrate Tablet 145 daily  glucagon  Injectable 1 once  haloperidol     Tablet 1 <User Schedule>  influenza  Vaccine (HIGH DOSE) 0.5 once  insulin lispro (ADMELOG) corrective regimen sliding scale  at bedtime  insulin lispro (ADMELOG) corrective regimen sliding scale  three times a day before meals  melatonin 6 at bedtime  morphine  - Injectable 2 every 4 hours PRN  morphine  - Injectable 4 every 3 hours PRN  pantoprazole  Injectable 40 daily  polyethylene glycol 3350 17 daily PRN  predniSONE   Tablet 5 daily      Vital Signs Last 24 Hrs  T(C): 37 (01 Nov 2024 08:30), Max: 37.2 (31 Oct 2024 13:00)  T(F): 98.6 (01 Nov 2024 08:30), Max: 99 (31 Oct 2024 13:00)  HR: 107 (01 Nov 2024 08:30) (102 - 109)  BP: 119/63 (01 Nov 2024 08:30) (98/60 - 119/63)  BP(mean): --  RR: 18 (01 Nov 2024 08:30) (18 - 18)  SpO2: 99% (01 Nov 2024 08:30) (97% - 99%)    Parameters below as of 01 Nov 2024 08:30  Patient On (Oxygen Delivery Method): room air        PHYSICAL EXAM:  GENERAL: NAD, well-developed  HEAD:  Atraumatic, Normocephalic  EYES: EOMI, conjunctiva and sclera clear  CHEST/LUNG: Clear to auscultation bilaterally; No wheeze  HEART: Regular rate and rhythm; No murmurs, rubs, or gallops  ABDOMEN: Soft, Nontender, Nondistended; Bowel sounds present                            7.7    2.29  )-----------( 142      ( 01 Nov 2024 06:05 )             23.5       11-01    145  |  117[H]  |  10  ----------------------------<  88  2.9[LL]   |  17[L]  |  0.30[L]    Ca    7.1[L]      01 Nov 2024 06:05  Phos  1.7     11-01  Mg     2.10     11-01        Urinalysis Basic - ( 01 Nov 2024 06:05 )    Color: x / Appearance: x / SG: x / pH: x  Gluc: 88 mg/dL / Ketone: x  / Bili: x / Urobili: x   Blood: x / Protein: x / Nitrite: x   Leuk Esterase: x / RBC: x / WBC x   Sq Epi: x / Non Sq Epi: x / Bacteria: x        MICROBIOLOGY:  v  .Blood BLOOD  10-24-24   No growth at 5 days  --  --      .Blood BLOOD  10-24-24   No growth at 5 days  --  --      Clean Catch Clean Catch (Midstream)  10-22-24   <10,000 CFU/mL Normal Urogenital Sherlyn  --  --      .Blood BLOOD  10-22-24   Growth in aerobic bottle: Escherichia coli  --  Escherichia coli      .Blood BLOOD  10-21-24   No growth at 5 days  --  --      .Blood BLOOD  10-21-24   No growth at 5 days  --  --      .Blood BLOOD  10-19-24   Growth in aerobic and anaerobic bottles: Escherichia coli  See previous culture [id number]  --    Growth in aerobic bottle: Gram Negative Rods  Growth in anaerobic bottle: Gram Negative Rods      .Blood BLOOD  10-19-24   Growth in aerobic and anaerobic bottles: Escherichia coli  See previous culture [id number]  --    Growth in anaerobic bottle: Gram Negative Rods  Growth in aerobic bottle: Gram Negative Rods      .Blood BLOOD  10-19-24   Growth in aerobic and anaerobic bottles: Escherichia coli  See previous culture [id number]  --    Growth in aerobic and anaerobic bottles: Gram Negative Rods and Gram  Negative Coccobacilli      .Blood BLOOD  10-19-24   Growth in aerobic and anaerobic bottles: Escherichia coli  Direct identification is available within approximately 3-5  hours either by Blood Panel Multiplexed PCR or Direct  MALDI-TOF. Details: [email or web address]  --  Blood Culture PCR  Escherichia coli                RADIOLOGY:

## 2024-11-01 NOTE — PROGRESS NOTE ADULT - ASSESSMENT
This is a 63 y/o M w/ prostate CA s/p radical prostatectomy now metastatic, HTN, HLD, admitted to 10/12 for pain, N/V in setting of osseous mets. S/p docetaxel on 10/13, abiraterone 10/12. Given neutropenia started on Zarxio.  C/b hypoxia, tachycardia. CTA w/o PE. Pt w/ 10 episodes of diarrhea on 10/18. C diff negative,   Pt now febrile to 102, CT A/P with pancolitis. 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   Fever likely 2/2 to neutropenic enterocolitis, pt w/ ANC 90, at high risk of bacteremia.   Bcx now w/ E Coli and H influenzae bacteremia, E Coli likely from colitis, however unclear source of H influenza, typically from PNA, septic arthritis, consider meningitis, per family denies neck pain/HA.   Pt w/ pressured speech, paranoid thoughts. Worsening hypernatremia   BCx from 10/22 still w/ E Coli. Repeat from 8/24 clear.     Now with fever to 101 on 10/31, per team, pt with profuse diarrhea, hypokalemia     Recommendations:   1. C/w Cefepime 2 g q8 and Metronidazole 500 mg BID given neutropenic fever   2. F/u BCx x 2   3. Agree with C diff testing, consider GI PCR    Poor prognosis overall    Thank you for this consult. Inpatient ID team will follow.    Orlando Ruiz M.D.  Attending Physician  Division of Infectious Diseases  Department of Medicine    Please contact through [employer].  Office: [phone] (after 5 PM or weekend)     This is a 65 y/o M w/ prostate CA s/p radical prostatectomy now metastatic, HTN, HLD, admitted to 10/12 for pain, N/V in setting of osseous mets. S/p docetaxel on 10/13, abiraterone 10/12. Given neutropenia started on Zarxio.  C/b hypoxia, tachycardia. CTA w/o PE. Pt w/ 10 episodes of diarrhea on 10/18. C diff negative,   Pt now febrile to 102, CT A/P with pancolitis. 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   Fever likely 2/2 to neutropenic enterocolitis, pt w/ ANC 90, at high risk of bacteremia.   Bcx now w/ E Coli and H influenzae bacteremia, E Coli likely from colitis, however unclear source of H influenza, typically from PNA, septic arthritis, consider meningitis, per family denies neck pain/HA.   Pt w/ pressured speech, paranoid thoughts. Worsening hypernatremia   BCx from 10/22 still w/ E Coli. Repeat from 8/24 clear.     Now with fever to 101 on 10/31, per team, pt with profuse diarrhea, hypokalemia     Recommendations:   1. C/w Cefepime 2 g q8 and Metronidazole 500 mg BID given neutropenic fever. Would continue w/ abx until 11/6 given bacteremia   2. F/u BCx x 2   3. Agree with C diff testing, consider GI PCR    Poor prognosis overall    I will be leaving for [facility] after today, my colleague will be covering.     Orlando Ruiz M.D.  Attending Physician  Division of Infectious Diseases  Department of Medicine    Please contact through MS Teams message.  Office: [phone] (after 5 PM or weekend)

## 2024-11-01 NOTE — PROGRESS NOTE ADULT - SUBJECTIVE AND OBJECTIVE BOX
[facility]   MD MARKO EASTMAN MD ANGELA WONG, PA    TEL:  OFFICE: [phone]  From 5pm-7am Answering Service [phone]    -- RENAL FOLLOW UP NOTE ---Date of Service 11-01-24 @ 15:33    Patient is a 64y old  Male who presents with a chief complaint of Pain (01 Nov 2024 15:15)      Patient seen and examined at bedside. No chest pain/sob    VITALS:  T(F): 98.3 (11-01-24 @ 11:59), Max: 99 (11-01-24 @ 06:00)  HR: 113 (11-01-24 @ 11:59)  BP: 108/62 (11-01-24 @ 11:59)  RR: 18 (11-01-24 @ 11:59)  SpO2: 100% (11-01-24 @ 11:59)  Wt(kg): --    10-31 @ 07:01  -  11-01 @ 07:00  --------------------------------------------------------  IN: 0 mL / OUT: 450 mL / NET: -450 mL          PHYSICAL EXAM:  General: NAD  Neck: No JVD  Respiratory: CTAB, no wheezes, rales or rhonchi  Cardiovascular: S1, S2, RRR  Gastrointestinal: BS+, soft, NT/ND  Extremities: No peripheral edema    Hospital Medications:   MEDICATIONS  (STANDING):  abiraterone 500 mg tablet 2 Tablet(s) 2 Tablet(s) Oral daily  acetaminophen     Tablet .. 650 milliGRAM(s) Oral once  atorvastatin 20 milliGRAM(s) Oral at bedtime  cefepime   IVPB 2000 milliGRAM(s) IV Intermittent every 8 hours  cefepime   IVPB      chlorhexidine 2% Cloths 1 Application(s) Topical daily  dextrose 5% + lactated ringers 1000 milliLiter(s) (75 mL/Hr) IV Continuous <Continuous>  dextrose 5%. 1000 milliLiter(s) (50 mL/Hr) IV Continuous <Continuous>  dextrose 5%. 1000 milliLiter(s) (100 mL/Hr) IV Continuous <Continuous>  dextrose 50% Injectable 12.5 Gram(s) IV Push once  dextrose 50% Injectable 25 Gram(s) IV Push once  dextrose 50% Injectable 25 Gram(s) IV Push once  diphenhydrAMINE 25 milliGRAM(s) Oral once  fenofibrate Tablet 145 milliGRAM(s) Oral daily  FIRST- Mouthwash  BLM 30 milliLiter(s) Swish and Spit three times a day  glucagon  Injectable 1 milliGRAM(s) IntraMuscular once  haloperidol     Tablet 1 milliGRAM(s) Oral <User Schedule>  influenza  Vaccine (HIGH DOSE) 0.5 milliLiter(s) IntraMuscular once  insulin lispro (ADMELOG) corrective regimen sliding scale   SubCutaneous three times a day before meals  insulin lispro (ADMELOG) corrective regimen sliding scale   SubCutaneous at bedtime  lactobacillus acidophilus 1 Tablet(s) Oral daily  lidocaine   4% Patch 1 Patch Transdermal every 24 hours  melatonin 6 milliGRAM(s) Oral at bedtime  metroNIDAZOLE  IVPB 500 milliGRAM(s) IV Intermittent every 12 hours  multivitamin 1 Tablet(s) Oral daily  Nephro-nancy 1 Tablet(s) Oral daily  nystatin    Suspension 424094 Unit(s) Oral three times a day  pantoprazole  Injectable 40 milliGRAM(s) IV Push daily  predniSONE   Tablet 5 milliGRAM(s) Oral daily      LABS:  11-01    145  |  117[H]  |  10  ----------------------------<  88  2.9[LL]   |  17[L]  |  0.30[L]    Ca    7.1[L]      01 Nov 2024 06:05  Phos  1.7     11-01  Mg     2.10     11-01      Creatinine Trend: 0.30 <--, 0.33 <--, 0.31 <--, 0.34 <--, 0.37 <--, 0.33 <--, 0.33 <--, 0.34 <--, 0.37 <--, 0.39 <--, 0.48 <--, 0.39 <--, 0.49 <--, 0.51 <--    Phosphorus: 1.7 mg/dL (11-01 @ 06:05)  Phosphorus: 1.9 mg/dL (11-01 @ 00:25)  Phosphorus: 2.0 mg/dL (10-31 @ 18:12)                              7.7    2.29  )-----------( 142      ( 01 Nov 2024 06:05 )             23.5     Urine Studies:  Urinalysis - [11-01-24 @ 06:05]      Color  / Appearance  / SG  / pH       Gluc 88 / Ketone   / Bili  / Urobili        Blood  / Protein  / Leuk Est  / Nitrite       RBC  / WBC  / Hyaline  / Gran  / Sq Epi  / Non Sq Epi  / Bacteria       TSH 2.62      [10-13-24 @ 05:30]  Lipid: chol 135, , HDL 39, LDL --      [10-13-24 @ 05:30]        RADIOLOGY & ADDITIONAL STUDIES:

## 2024-11-01 NOTE — PROGRESS NOTE ADULT - ASSESSMENT
64-year-old male with metastatic prostate cancer, sent in by hematologist from [facility] for uncontrolled pain, nausea, vomiting.   Patient reports diffuse pain starting 6 months ago significantly worsening for the past 2 weeks. Diagnosed with high risk high volume metastatic prostate cancer with bone, liver lymph node mets and presacral mass. Liver biopsy confirmed disease with . Started lupron, loly/pred with plans to initiate taxotere.   nephrology consulted for electrolyte abnormalities     Hypernatremia  Poor free water intake    pt now has diet however not eating much per family  continue IVF, na improving  encourage free water as tolerate  monitor    Hypokalemia  GI loss and poor po intake  unable to tolerate PO kcl  on LR +D5+40kcl  supplemented kcl 10 iv 3   monitor close  supplement as needed     Acidosis   non AG  likely GI lost  on LR   severely hypokalemic.   monitor for now    hypophosphatemia  supplement as needed   monitor    hypocalcemia   sec to low albumin  corrected ca 8.5  monitor    fever  GNR bacteremia   work up and tx per team    anemia  metastatic prostate cancer  f/u heme/onc

## 2024-11-01 NOTE — PROGRESS NOTE ADULT - ASSESSMENT
64-year-old male with metastatic prostate cancer, sent in by hematologist from [facility] for uncontrolled pain, nausea, vomiting.   Patient reports diffuse pain starting 6 months ago significantly worsening for the past 2 weeks.  Currently the worst pain is located around the lower back.   No loss of bladder and bowel function, no saddle paresthesia.  Able to walk.  patient is oxycodone 5 every 4-6 hour.  Yesterday they started adding OxyContin 10.  However patient continued to have pain.  Family also reports significant nausea and vomiting, inability to tolerate p.o. for the last 2 days.  Last bowel movement 2 days ago.   No known allergy.  Diagnosed with high risk high volume metastatic prostate cancer with bone, liver lymph node mets and presacral mass. Liver biopsy confirmed disease with . Started lupron, loly/pred with plans to initiate taxotere.    (12 Oct 2024 15:40)  pt seems very frustrated:   he is on 2 L of oxygen : he has no underlying lung disease:  but he is requiring 2 L of oxygen      Hypoxic resp failure  Metastatic prostate cancer  Back pain     Hypoxic resp failure  -He has no underlying lung disease:  but he is requiring 2 L of oxygen :   -CTA showed; No pulmonary embolism to the level of the segmental branches bilaterally. Limited evaluation of the subsegmental branches secondary to incomplete contrast opacification.  Multilevel vertebral body lytic lesions and loss of vertebral body height, better evaluated on CT thoracic spine 10/17/2024. Metastatic prostate cancer  -Patent central airways. Bibasilar atelectasis. No pleural effusion. MEDIASTINUM AND KATTY: No lymphadenopathy.  PULMONARY ANGIOGRAM: No pulmonary embolism to the level of the segmental   branches bilaterally. Limited evaluation of the subsegmental branches secondary to incomplete contrast opacification.    10/19:  -seems pretty tired;  on 2 L of oxygen :  -cta chest showed: No pulmonary embolism to the level of the segmental branches bilaterally. Limited evaluation of the subsegmental branches secondary to incomplete   contrast opacification. Multilevel vertebral body lytic lesions and loss of vertebral body height, better evaluated on CT thoracic spine 10/17/2024.  -still with fever:  no pe  on zosyn  and leukopenic hemonc following;  has metastatic prostate ca:   -VBG seems OK:     10/20:  pulm wise he seems to be stable:   -using 2 L of oxygen    -yesterdays VBG with minimal met acidosis  -cta again noted    10/21:  on 4 L of oxygen  today    cxr is size    e coli sepsis: Gram Negative Rods and Gram Negative Coccobacilli    10/22: heis doing poorly today  : he is very agitated and uncomfortable  on mittens: ? sun downing   10/23: quiet today  : sleeping:  oxygen requirement has not increased: on rooo ha 95% as documented    WBC is slowly increasing:   no fever:   -on antibiotics   10/24: pt is not doing well : his repeat blood cultures are positive with same organism :  would defer to ID;   10/25: seems to be doing  ok ; no sob:  no cough :  no phlegm   : on room air   10/26: resp failure has resolved:  is septic  : on ceftriaxone     10/27:  he seems OK:  he is on room air:  on morphine drip   remans on ceftriaxone still     10/28: she seems to be doing  ok : no sob: no cough ; no phlegm  confused:  on morphine drip    10/29; seems comfortable on room air;   cont current rx:   10/30: seems to be doing  ok ; on morphine drip : : plan to decrease morphine dose:   -cont current supportive care   10/31:  seems same tome:   no sob:  on room air:   seems comfortable at this ti me: off morphine  11/1:  he has been on room air for days;  looks comfortable:  coughs when he eats;   need pt ot  ; ? oob tochir:  dior wife:         New finding:  SMA dissection : neutropenic colitis/ #E Coli and H influenzae bacteremia/ #Neutropenic fever  -/f ischemic colitis on R colon   Diagnosed  on ct abd:  defer to surgery and primary team:  probably no intervention:   -cont antibiotics  -not doing very well with above new findings    10/22; no intervention per surgery    -cont antibiotics  -has fever:  ID following :  -Last chest xray on 20th  ; normal     id following   10/23  IR was consulted for vertebral augmentation of T3-T5 prior to XRT.   Patient was seen bedside. Initially, patient kept requesting no exam and was not willing to participate in the discussion. Son was bedside at time of conversation.   Per discussion with the medical attending, given the concern for SMA dissection and concern for bowel ischemia, will hold off on vertebral augmentation evaluation at this time.   Please reach out to IR when patient is medically stable for vertebral augmentation.  10/24:  remains septic:  above cancelled:   ? for palliative care now  10/26: he seems same to me:  no overnight events  on room air:   check VBG  : cont antibiotics   hemonc following   10/27:  -still on antibiotics for e coli sepsis  -id following s  10/28: cont antibiotics   10/230: cont ceftriaxone   10/31: on ceftriaxone and flagyl   11/1: antibtiocs per ID     Back pain   -likely due to metastatic disease:  adm here for severe pain :  -pain control per primary team   -venous ph is ok   11/1: resolved for now on meds    dw acp & family ; GOC as per primary  team

## 2024-11-01 NOTE — PROGRESS NOTE ADULT - SUBJECTIVE AND OBJECTIVE BOX
Date of Service: 11-01-24 @ 13:27    Patient is a 64y old  Male who presents with a chief complaint of Pain (01 Nov 2024 09:08)      Any change in ROS: he seems OK:  no sob:  no couuh : no phlegm  : family at bedside:  on room air     MEDICATIONS  (STANDING):  abiraterone 500 mg tablet 2 Tablet(s) 2 Tablet(s) Oral daily  acetaminophen     Tablet .. 650 milliGRAM(s) Oral once  atorvastatin 20 milliGRAM(s) Oral at bedtime  cefepime   IVPB 2000 milliGRAM(s) IV Intermittent every 8 hours  cefepime   IVPB      chlorhexidine 2% Cloths 1 Application(s) Topical daily  dextrose 5% + lactated ringers 1000 milliLiter(s) (75 mL/Hr) IV Continuous <Continuous>  dextrose 5%. 1000 milliLiter(s) (100 mL/Hr) IV Continuous <Continuous>  dextrose 5%. 1000 milliLiter(s) (50 mL/Hr) IV Continuous <Continuous>  dextrose 50% Injectable 25 Gram(s) IV Push once  dextrose 50% Injectable 12.5 Gram(s) IV Push once  dextrose 50% Injectable 25 Gram(s) IV Push once  diphenhydrAMINE 25 milliGRAM(s) Oral once  fenofibrate Tablet 145 milliGRAM(s) Oral daily  FIRST- Mouthwash  BLM 30 milliLiter(s) Swish and Spit three times a day  glucagon  Injectable 1 milliGRAM(s) IntraMuscular once  haloperidol     Tablet 1 milliGRAM(s) Oral <User Schedule>  influenza  Vaccine (HIGH DOSE) 0.5 milliLiter(s) IntraMuscular once  insulin lispro (ADMELOG) corrective regimen sliding scale   SubCutaneous at bedtime  insulin lispro (ADMELOG) corrective regimen sliding scale   SubCutaneous three times a day before meals  lactobacillus acidophilus 1 Tablet(s) Oral daily  lidocaine   4% Patch 1 Patch Transdermal every 24 hours  melatonin 6 milliGRAM(s) Oral at bedtime  metroNIDAZOLE  IVPB 500 milliGRAM(s) IV Intermittent every 12 hours  multivitamin 1 Tablet(s) Oral daily  Nephro-nancy 1 Tablet(s) Oral daily  nystatin    Suspension 800351 Unit(s) Oral three times a day  pantoprazole  Injectable 40 milliGRAM(s) IV Push daily  predniSONE   Tablet 5 milliGRAM(s) Oral daily    MEDICATIONS  (PRN):  acetaminophen     Tablet .. 650 milliGRAM(s) Oral every 6 hours PRN Temp greater or equal to 38C (100.4F), Mild Pain (1 - 3), Moderate Pain (4 - 6)  aluminum hydroxide/magnesium hydroxide/simethicone Suspension 30 milliLiter(s) Oral every 4 hours PRN Dyspepsia  artificial tears (preservative free) Ophthalmic Solution 1 Drop(s) Both EYES four times a day PRN Dry Eyes  dextrose Oral Gel 15 Gram(s) Oral once PRN Blood Glucose LESS THAN 70 milliGRAM(s)/deciliter  morphine  - Injectable 2 milliGRAM(s) IV Push every 4 hours PRN Moderate Pain (4 - 6)  morphine  - Injectable 4 milliGRAM(s) IV Push every 3 hours PRN Severe Pain (7 - 10)  naloxone Injectable 0.1 milliGRAM(s) IV Push every 3 minutes PRN For ANY of the following changes in patient status:  A. RR LESS THAN 10 breaths per minute, B. Oxygen saturation LESS THAN 90%, C. Sedation score of 6  polyethylene glycol 3350 17 Gram(s) Oral daily PRN Constipation    Vital Signs Last 24 Hrs  T(C): 36.8 (01 Nov 2024 11:59), Max: 37.2 (01 Nov 2024 02:00)  T(F): 98.3 (01 Nov 2024 11:59), Max: 99 (01 Nov 2024 06:00)  HR: 113 (01 Nov 2024 11:59) (102 - 113)  BP: 108/62 (01 Nov 2024 11:59) (98/60 - 119/63)  BP(mean): --  RR: 18 (01 Nov 2024 11:59) (18 - 18)  SpO2: 100% (01 Nov 2024 11:59) (97% - 100%)    Parameters below as of 01 Nov 2024 11:59  Patient On (Oxygen Delivery Method): room air        I&O's Summary    31 Oct 2024 07:01  -  01 Nov 2024 07:00  --------------------------------------------------------  IN: 0 mL / OUT: 450 mL / NET: -450 mL          Physical Exam:   GENERAL: NAD, well-groomed, well-developed  HEENT: CHAVA/   Atraumatic, Normocephalic  ENMT: No tonsillar erythema, exudates, or enlargement; Moist mucous membranes, Good dentition, No lesions  NECK: Supple, No JVD, Normal thyroid  CHEST/LUNG: Clear to auscultaion- no wheezing  CVS: Regular rate and rhythm; No murmurs, rubs, or gallops  GI: : Soft, Nontender, Nondistended; Bowel sounds present  NERVOUS SYSTEM:  Alert & Oriented X2-3  EXTREMITIES:  - edema  LYMPH: No lymphadenopathy noted  SKIN: No rashes or lesions  ENDOCRINOLOGY: No Thyromegaly  PSYCH: Appropriate    Labs:  20, 20                            7.7    2.29  )-----------( 142      ( 01 Nov 2024 06:05 )             23.5                         7.6    2.26  )-----------( 124      ( 31 Oct 2024 08:54 )             22.8                         8.5    3.11  )-----------( 118      ( 30 Oct 2024 06:50 )             25.1                         7.8    3.89  )-----------( 98       ( 29 Oct 2024 13:41 )             23.9                         6.8    4.26  )-----------( 90       ( 29 Oct 2024 01:38 )             20.4                         7.2    4.60  )-----------( 81       ( 28 Oct 2024 17:55 )             22.2     11-01    145  |  117[H]  |  10  ----------------------------<  88  2.9[LL]   |  17[L]  |  0.30[L]  11-01    146[H]  |  117[H]  |  10  ----------------------------<  92  2.5[LL]   |  18[L]  |  0.33[L]  10-31    146[H]  |  116[H]  |  10  ----------------------------<  105[H]  2.4[LL]   |  19[L]  |  0.31[L]  10-31    149[H]  |  118[H]  |  10  ----------------------------<  91  2.4[LL]   |  18[L]  |  0.34[L]  10-30    141  |  112[H]  |  10  ----------------------------<  150[H]  3.3[L]   |  20[L]  |  0.37[L]  10-30    141  |  113[H]  |  10  ----------------------------<  122[H]  3.2[L]   |  18[L]  |  0.33[L]  10-29    142  |  114[H]  |  11  ----------------------------<  120[H]  3.4[L]   |  20[L]  |  0.33[L]  10-29    144  |  113[H]  |  12  ----------------------------<  132[H]  2.9[LL]   |  20[L]  |  0.34[L]  10-29    144  |  115[H]  |  15  ----------------------------<  134[H]  2.6[LL]   |  19[L]  |  0.37[L]  10-28    148[H]  |  116[H]  |  18  ----------------------------<  130[H]  2.6[LL]   |  20[L]  |  0.39[L]    Ca    7.1[L]      01 Nov 2024 06:05  Ca    7.1[L]      01 Nov 2024 00:25  Ca    7.1[L]      31 Oct 2024 18:12  Ca    7.1[L]      31 Oct 2024 08:54  Ca    7.2[L]      30 Oct 2024 19:15  Phos  1.7     11-01  Phos  1.9     11-01  Phos  2.0     10-31  Phos  2.7     10-31  Phos  1.7     10-30  Mg     2.10     11-01  Mg     1.80     11-01  Mg     1.90     10-31  Mg     1.80     10-31  Mg     1.90     10-30      CAPILLARY BLOOD GLUCOSE      POCT Blood Glucose.: 106 mg/dL (01 Nov 2024 12:31)  POCT Blood Glucose.: 97 mg/dL (01 Nov 2024 08:40)  POCT Blood Glucose.: 103 mg/dL (31 Oct 2024 21:41)  POCT Blood Glucose.: 104 mg/dL (31 Oct 2024 18:08)        PT/INR - ( 30 Oct 2024 19:15 )   PT: 20.8 sec;   INR: 1.76 ratio         PTT - ( 30 Oct 2024 19:15 )  PTT:31.5 sec  Urinalysis Basic - ( 01 Nov 2024 06:05 )    Color: x / Appearance: x / SG: x / pH: x  Gluc: 88 mg/dL / Ketone: x  / Bili: x / Urobili: x   Blood: x / Protein: x / Nitrite: x   Leuk Esterase: x / RBC: x / WBC x   Sq Epi: x / Non Sq Epi: x / Bacteria: x      rad< from: Xray Chest 1 View- PORTABLE-Urgent (Xray Chest 1 View- PORTABLE-Urgent .) (10.31.24 @ 03:52) >    ACC: [id number] EXAM:  XR CHEST PORTABLE URGENT 1V   ORDERED BY: GENIA RUBIO     PROCEDURE DATE:  10/31/2024          INTERPRETATION:  CLINICAL INFORMATION: Fever    Time of Exam:  October 31, 2024 at 3:46 AM    EXAM:  Frontal Chest    FINDINGS:  Both lungs are equally aerated and free of any focal abnormalities.  The heart is not enlarged and there is no effusion or pneumothorax.  The bones show no acute findings.      COMPARISON: October 20, 2024        IMPRESSION: Clear lungs.    --- End of Report ---            GOMEZ ARANDA MD; Attending Radiologist  This document has been electronically signed. Oct 31 2024 10:49AM    < end of copied text >  < from: Xray Chest 1 View- PORTABLE-Urgent (Xray Chest 1 View- PORTABLE-Urgent .) (10.20.24 @ 11:02) >    ACC: [id number] EXAM:  XR CHEST PORTABLE URGENT 1V   ORDERED BY: AYANNA CEDENO     PROCEDURE DATE:  10/20/2024          INTERPRETATION:  EXAMINATION: XR CHEST URGENT    CLINICAL INDICATION: Bacteremia. Evaluate for pneumonia.    TECHNIQUE: Single frontal view of the chest was obtained.    COMPARISON: Chest x-ray 10/17/2024.    FINDINGS:  No focal consolidation, large pleural effusion or pneumothorax.  The heart size cannot be accurately assessed on this projection.  No acute osseous abnormalities.      IMPRESSION:  No focal consolidations.    --- End of Report ---          PRABHA MONET MD; Resident Radiologist  This document has been electronically signed.  LEA COLE MD; Attending Interventional Radiologist  This document has been electronically signed. Oct 20 2024 11:53AM    < end of copied text >        RECENT CULTURES:        RESPIRATORY CULTURES:          Studies  Chest X-RAY  CT SCAN Chest   Venous Dopplers: LE:   CT Abdomen  Others

## 2024-11-01 NOTE — PROGRESS NOTE ADULT - NS ATTEND AMEND GEN_ALL_CORE FT
Agree with above assessment and plan.     Appreciate ID recs. Continues on flagyl and ceftriaxone. Nystatin for thrush but per nursing not able to fully participate. Patient complaining of throat pain. May need IV treatment such as diflucan but would defer to ID especially given prolonged QT. He continues to have diarrhea- cdiff pending. Blood cultures pending. Consider RVP   Follow up IR and Rad Onc recs  Not planning on chemotherapy until fever/infection resolves.

## 2024-11-01 NOTE — PROGRESS NOTE ADULT - SUBJECTIVE AND OBJECTIVE BOX
Patient asleep, febrile overnight, ruling out c diff, cefepime +Metronidazole, blood cx pending      MEDICATIONS  (STANDING):  abiraterone 500 mg tablet 2 Tablet(s) 2 Tablet(s) Oral daily  acetaminophen     Tablet .. 650 milliGRAM(s) Oral once  atorvastatin 20 milliGRAM(s) Oral at bedtime  cefepime   IVPB 2000 milliGRAM(s) IV Intermittent every 8 hours  cefepime   IVPB      chlorhexidine 2% Cloths 1 Application(s) Topical daily  dextrose 5%. 1000 milliLiter(s) (50 mL/Hr) IV Continuous <Continuous>  dextrose 5%. 1000 milliLiter(s) (100 mL/Hr) IV Continuous <Continuous>  dextrose 50% Injectable 12.5 Gram(s) IV Push once  dextrose 50% Injectable 25 Gram(s) IV Push once  dextrose 50% Injectable 25 Gram(s) IV Push once  diphenhydrAMINE 25 milliGRAM(s) Oral once  fenofibrate Tablet 145 milliGRAM(s) Oral daily  FIRST- Mouthwash  BLM 30 milliLiter(s) Swish and Spit three times a day  glucagon  Injectable 1 milliGRAM(s) IntraMuscular once  haloperidol     Tablet 1 milliGRAM(s) Oral <User Schedule>  influenza  Vaccine (HIGH DOSE) 0.5 milliLiter(s) IntraMuscular once  insulin lispro (ADMELOG) corrective regimen sliding scale   SubCutaneous three times a day before meals  insulin lispro (ADMELOG) corrective regimen sliding scale   SubCutaneous at bedtime  lactobacillus acidophilus 1 Tablet(s) Oral daily  lidocaine   4% Patch 1 Patch Transdermal every 24 hours  melatonin 6 milliGRAM(s) Oral at bedtime  metroNIDAZOLE  IVPB 500 milliGRAM(s) IV Intermittent every 12 hours  multivitamin 1 Tablet(s) Oral daily  Nephro-nancy 1 Tablet(s) Oral daily  pantoprazole  Injectable 40 milliGRAM(s) IV Push daily  potassium phosphate IVPB 30 milliMole(s) IV Intermittent once  predniSONE   Tablet 5 milliGRAM(s) Oral daily    MEDICATIONS  (PRN):  acetaminophen     Tablet .. 650 milliGRAM(s) Oral every 6 hours PRN Temp greater or equal to 38C (100.4F), Mild Pain (1 - 3), Moderate Pain (4 - 6)  aluminum hydroxide/magnesium hydroxide/simethicone Suspension 30 milliLiter(s) Oral every 4 hours PRN Dyspepsia  artificial tears (preservative free) Ophthalmic Solution 1 Drop(s) Both EYES four times a day PRN Dry Eyes  dextrose Oral Gel 15 Gram(s) Oral once PRN Blood Glucose LESS THAN 70 milliGRAM(s)/deciliter  morphine  - Injectable 4 milliGRAM(s) IV Push every 3 hours PRN Severe Pain (7 - 10)  morphine  - Injectable 2 milliGRAM(s) IV Push every 4 hours PRN Moderate Pain (4 - 6)  naloxone Injectable 0.1 milliGRAM(s) IV Push every 3 minutes PRN For ANY of the following changes in patient status:  A. RR LESS THAN 10 breaths per minute, B. Oxygen saturation LESS THAN 90%, C. Sedation score of 6  polyethylene glycol 3350 17 Gram(s) Oral daily PRN Constipation        Vital Signs Last 24 Hrs  T(C): 37.2 (01 Nov 2024 06:00), Max: 37.2 (31 Oct 2024 09:00)  T(F): 99 (01 Nov 2024 06:00), Max: 99 (31 Oct 2024 13:00)  HR: 108 (01 Nov 2024 06:00) (102 - 115)  BP: 100/62 (01 Nov 2024 06:00) (98/60 - 108/65)  BP(mean): --  RR: 18 (01 Nov 2024 06:00) (18 - 18)  SpO2: 97% (01 Nov 2024 06:00) (97% - 99%)    Parameters below as of 01 Nov 2024 06:00  Patient On (Oxygen Delivery Method): room air        PE  NAD  Awake, alert  Anicteric, MMM  RRR  CTAB  Abd soft, NT, ND  No c/c/e  No rash grossly                            7.7    2.29  )-----------( 142      ( 01 Nov 2024 06:05 )             23.5       11-01    x   |  x   |  10  ----------------------------<  88  x    |  17[L]  |  0.30[L]    Ca    7.1[L]      01 Nov 2024 06:05  Phos  1.7     11-01  Mg     2.10     11-01

## 2024-11-01 NOTE — PROGRESS NOTE ADULT - ASSESSMENT
64-year-old male with metastatic prostate cancer, sent in by hematologist from [facility] for uncontrolled pain, nausea, vomiting.       Problem/Plan - 1:  ·  Problem: Cancer related pain.   ·  Plan: - appreciate pall care recommendations:  - pain control as per palliative crae   - RT consult appreciated ,  awaiting kyphoplasty     Problem/Plan - 2:·  Problem: Nausea & vomiting., Diarrhea, colitis , SMA dissection   ·  Plan: -  vascular fu appreciated  - GI and surgery consult appreciated   - ID fu    Problem/Plan - 3:  ·  Problem: CA of prostate.   ·  Plan: Metastatic prostate cancer  - f/u heme/onc recommendations  - Rad Onc fu   - Palliative for symptom control.    Problem/Plan - 4:  ·  Problem: Anemia , Thrombocytopenia, unspecified.   ·  Plan: -monitor cbc   transfuse PRN    Problem/Plan - 5:  ·  Problem: Bacteremia   Plan: - ID fu   - fu cultures  - ABX as per ID     dw wife at bedside

## 2024-11-02 LAB
ANION GAP SERPL CALC-SCNC: 10 MMOL/L — SIGNIFICANT CHANGE UP (ref 7–14)
ANION GAP SERPL CALC-SCNC: 10 MMOL/L — SIGNIFICANT CHANGE UP (ref 7–14)
BUN SERPL-MCNC: 8 MG/DL — SIGNIFICANT CHANGE UP (ref 7–23)
BUN SERPL-MCNC: 8 MG/DL — SIGNIFICANT CHANGE UP (ref 7–23)
CALCIUM SERPL-MCNC: 7.1 MG/DL — LOW (ref 8.4–10.5)
CALCIUM SERPL-MCNC: 7.3 MG/DL — LOW (ref 8.4–10.5)
CHLORIDE SERPL-SCNC: 119 MMOL/L — HIGH (ref 98–107)
CHLORIDE SERPL-SCNC: 120 MMOL/L — HIGH (ref 98–107)
CO2 SERPL-SCNC: 17 MMOL/L — LOW (ref 22–31)
CO2 SERPL-SCNC: 20 MMOL/L — LOW (ref 22–31)
CREAT SERPL-MCNC: 0.3 MG/DL — LOW (ref 0.5–1.3)
CREAT SERPL-MCNC: 0.33 MG/DL — LOW (ref 0.5–1.3)
EGFR: 128 ML/MIN/1.73M2 — SIGNIFICANT CHANGE UP
EGFR: 132 ML/MIN/1.73M2 — SIGNIFICANT CHANGE UP
GLUCOSE BLDC GLUCOMTR-MCNC: 107 MG/DL — HIGH (ref 70–99)
GLUCOSE BLDC GLUCOMTR-MCNC: 123 MG/DL — HIGH (ref 70–99)
GLUCOSE BLDC GLUCOMTR-MCNC: 127 MG/DL — HIGH (ref 70–99)
GLUCOSE BLDC GLUCOMTR-MCNC: 96 MG/DL — SIGNIFICANT CHANGE UP (ref 70–99)
GLUCOSE SERPL-MCNC: 120 MG/DL — HIGH (ref 70–99)
GLUCOSE SERPL-MCNC: 126 MG/DL — HIGH (ref 70–99)
HCT VFR BLD CALC: 26.5 % — LOW (ref 39–50)
HGB BLD-MCNC: 8.2 G/DL — LOW (ref 13–17)
MAGNESIUM SERPL-MCNC: 1.8 MG/DL — SIGNIFICANT CHANGE UP (ref 1.6–2.6)
MAGNESIUM SERPL-MCNC: 1.8 MG/DL — SIGNIFICANT CHANGE UP (ref 1.6–2.6)
MCHC RBC-ENTMCNC: 30 PG — SIGNIFICANT CHANGE UP (ref 27–34)
MCHC RBC-ENTMCNC: 30.9 G/DL — LOW (ref 32–36)
MCV RBC AUTO: 97.1 FL — SIGNIFICANT CHANGE UP (ref 80–100)
NRBC # BLD: 0 /100 WBCS — SIGNIFICANT CHANGE UP (ref 0–0)
NRBC # FLD: 0.02 K/UL — HIGH (ref 0–0)
PHOSPHATE SERPL-MCNC: 2.2 MG/DL — LOW (ref 2.5–4.5)
PHOSPHATE SERPL-MCNC: 2.6 MG/DL — SIGNIFICANT CHANGE UP (ref 2.5–4.5)
PLATELET # BLD AUTO: 148 K/UL — LOW (ref 150–400)
POTASSIUM SERPL-MCNC: 3 MMOL/L — LOW (ref 3.5–5.3)
POTASSIUM SERPL-MCNC: 3.6 MMOL/L — SIGNIFICANT CHANGE UP (ref 3.5–5.3)
POTASSIUM SERPL-SCNC: 3 MMOL/L — LOW (ref 3.5–5.3)
POTASSIUM SERPL-SCNC: 3.6 MMOL/L — SIGNIFICANT CHANGE UP (ref 3.5–5.3)
RBC # BLD: 2.73 M/UL — LOW (ref 4.2–5.8)
RBC # FLD: 20.3 % — HIGH (ref 10.3–14.5)
SODIUM SERPL-SCNC: 146 MMOL/L — HIGH (ref 135–145)
SODIUM SERPL-SCNC: 150 MMOL/L — HIGH (ref 135–145)
WBC # BLD: 2.37 K/UL — LOW (ref 3.8–10.5)
WBC # FLD AUTO: 2.37 K/UL — LOW (ref 3.8–10.5)

## 2024-11-02 RX ORDER — SODIUM,POTASSIUM PHOSPHATES 278-250MG
1 POWDER IN PACKET (EA) ORAL ONCE
Refills: 0 | Status: COMPLETED | OUTPATIENT
Start: 2024-11-02 | End: 2024-11-02

## 2024-11-02 RX ORDER — 0.9 % SODIUM CHLORIDE 0.9 %
1000 INTRAVENOUS SOLUTION INTRAVENOUS
Refills: 0 | Status: DISCONTINUED | OUTPATIENT
Start: 2024-11-02 | End: 2024-11-03

## 2024-11-02 RX ORDER — POTASSIUM CHLORIDE 600 MG/1
10 TABLET, EXTENDED RELEASE ORAL
Refills: 0 | Status: COMPLETED | OUTPATIENT
Start: 2024-11-02 | End: 2024-11-02

## 2024-11-02 RX ADMIN — Medication 1 PACKET(S): at 10:36

## 2024-11-02 RX ADMIN — METRONIDAZOLE 100 MILLIGRAM(S): 500 TABLET ORAL at 17:21

## 2024-11-02 RX ADMIN — Medication 1 TABLET(S): at 13:20

## 2024-11-02 RX ADMIN — CEFEPIME 100 MILLIGRAM(S): 2 INJECTION, POWDER, FOR SOLUTION INTRAVENOUS at 13:21

## 2024-11-02 RX ADMIN — DIPHENHYDRAMINE HYDROCHLORIDE AND LIDOCAINE HYDROCHLORIDE AND ALUMINUM HYDROXIDE AND MAGNESIUM HYDRO 30 MILLILITER(S): KIT at 22:42

## 2024-11-02 RX ADMIN — DIPHENHYDRAMINE HYDROCHLORIDE AND LIDOCAINE HYDROCHLORIDE AND ALUMINUM HYDROXIDE AND MAGNESIUM HYDRO 30 MILLILITER(S): KIT at 13:21

## 2024-11-02 RX ADMIN — POTASSIUM CHLORIDE 100 MILLIEQUIVALENT(S): 600 TABLET, EXTENDED RELEASE ORAL at 21:45

## 2024-11-02 RX ADMIN — POTASSIUM CHLORIDE 100 MILLIEQUIVALENT(S): 600 TABLET, EXTENDED RELEASE ORAL at 22:48

## 2024-11-02 RX ADMIN — CHLORHEXIDINE GLUCONATE 1 APPLICATION(S): 1.2 RINSE ORAL at 17:21

## 2024-11-02 RX ADMIN — POTASSIUM CHLORIDE 100 MILLIEQUIVALENT(S): 600 TABLET, EXTENDED RELEASE ORAL at 20:42

## 2024-11-02 RX ADMIN — POTASSIUM CHLORIDE 100 MILLIEQUIVALENT(S): 600 TABLET, EXTENDED RELEASE ORAL at 00:37

## 2024-11-02 RX ADMIN — Medication 100 MILLILITER(S): at 23:20

## 2024-11-02 RX ADMIN — POTASSIUM CHLORIDE 100 MILLIEQUIVALENT(S): 600 TABLET, EXTENDED RELEASE ORAL at 01:36

## 2024-11-02 RX ADMIN — ACETAMINOPHEN, DIPHENHYDRAMINE HCL, PHENYLEPHRINE HCL 6 MILLIGRAM(S): 325; 25; 5 TABLET ORAL at 21:12

## 2024-11-02 RX ADMIN — Medication 145 MILLIGRAM(S): at 13:20

## 2024-11-02 RX ADMIN — Medication 100 MILLILITER(S): at 19:40

## 2024-11-02 RX ADMIN — CEFEPIME 100 MILLIGRAM(S): 2 INJECTION, POWDER, FOR SOLUTION INTRAVENOUS at 22:39

## 2024-11-02 RX ADMIN — CEFEPIME 100 MILLIGRAM(S): 2 INJECTION, POWDER, FOR SOLUTION INTRAVENOUS at 05:16

## 2024-11-02 RX ADMIN — METRONIDAZOLE 100 MILLIGRAM(S): 500 TABLET ORAL at 05:16

## 2024-11-02 RX ADMIN — NYSTATIN 500000 UNIT(S): 500000 TABLET, FILM COATED ORAL at 13:21

## 2024-11-02 RX ADMIN — NYSTATIN 500000 UNIT(S): 500000 TABLET, FILM COATED ORAL at 21:11

## 2024-11-02 RX ADMIN — Medication 1 MILLIGRAM(S): at 21:10

## 2024-11-02 RX ADMIN — PANTOPRAZOLE SODIUM 40 MILLIGRAM(S): 40 TABLET, DELAYED RELEASE ORAL at 13:20

## 2024-11-02 RX ADMIN — Medication 100 MILLILITER(S): at 11:56

## 2024-11-02 NOTE — PROGRESS NOTE ADULT - ASSESSMENT
1. Metastatic prostate cancer    - Follows with Dr Andrew Mendoza at [facility]   - PSMA 10/11/24 showed hypermetabolic vera foci above and below the diaphragm, foci in the liver and extensive foci involving the axial and appendicular skeleton compatible with metastatic disease  - --> 374--> 426 on 10/8/24   - Liver biopsy 9/30/24 consistent with prostate adenocarcinoma   - High risk high volume disease -> started on triplet therapy w/ ADT/lupron, abiraterone/prednisone + taxotere. (back pain after Lupron likely tumor flare).  - Continue abiraterone 1000mg daily w Prednisone 5mg PO QD   - s/p taxotere 60mg/m2 on 10/13/24. Next dose on 11/4 if clinically improves    - Kyphoplasty w/IR to T3 and L1 lesions to be coordinated between IR and Radiation oncology  - Palliative following for pain control and given hospital course would have further GOC discussions. Treatment will be offered but he has metastatic disease with visceral involvement and complications as outlined below.     2. Pancolitis, E coli and H influenzae bacteremia  - continues on Cefepime+ Metronidazole  - ID following  - BCx from 10/24 neg to date    3. Anemia/Thrombocytopenia   - Hgb 8.2. s/p 1 unit 10/30, transfuse for Hgb < 7.0  - Multifactorial sec to likely marrow infiltration by CaP, Chemo effect, consumption from acute illness, poss ITP  - Transfuse for plt <15 or < 50K if bleeding/for procedure      4. SMA Dissection  - seen by [facility] sx  - no plan for intervention    5. Agitation  -  EKG reviewed, defer to cardiology  -seems to have improved, continue to monitor

## 2024-11-02 NOTE — PROGRESS NOTE ADULT - ASSESSMENT
64-year-old male with metastatic prostate cancer, sent in by hematologist from [facility] [place] [facility] for uncontrolled pain, nausea, vomiting.   Patient reports diffuse pain starting 6 months ago significantly worsening for the past 2 weeks. Diagnosed with high risk high volume metastatic prostate cancer with bone, liver lymph node mets and presacral mass. Liver biopsy confirmed disease with . Started lupron, loly/pred with plans to initiate taxotere.   nephrology consulted for electrolyte abnormalities     Hypernatremia  Poor free water intake    pt now has diet however not eating much per family  on LR +d5+40kcl @ 75cc/hr, Na still elevated will increase to 100cc/hr  encourage free water as tolerate  monitor    Hypokalemia  GI loss and poor po intake  unable to tolerate PO kcl  on LR +D5+40kcl  monitor close  supplement as needed     Acidosis   non AG  likely GI lost  on LR   stable  monitor for now    hypophosphatemia  supplemented by team  monitor    hypocalcemia   sec to low albumin  corrected ca 8.5  monitor    fever  GNR bacteremia   work up and tx per team    anemia  metastatic prostate cancer  f/u heme/onc

## 2024-11-02 NOTE — PROGRESS NOTE ADULT - SUBJECTIVE AND OBJECTIVE BOX
Patient asleep. No overnight events.    MEDICATIONS  (STANDING):  abiraterone 500 mg tablet 2 Tablet(s) 2 Tablet(s) Oral daily  acetaminophen     Tablet .. 650 milliGRAM(s) Oral once  atorvastatin 20 milliGRAM(s) Oral at bedtime  cefepime   IVPB 2000 milliGRAM(s) IV Intermittent every 8 hours  cefepime   IVPB      chlorhexidine 2% Cloths 1 Application(s) Topical daily  dextrose 5%. 1000 milliLiter(s) (50 mL/Hr) IV Continuous <Continuous>  dextrose 5%. 1000 milliLiter(s) (100 mL/Hr) IV Continuous <Continuous>  dextrose 50% Injectable 12.5 Gram(s) IV Push once  dextrose 50% Injectable 25 Gram(s) IV Push once  dextrose 50% Injectable 25 Gram(s) IV Push once  diphenhydrAMINE 25 milliGRAM(s) Oral once  fenofibrate Tablet 145 milliGRAM(s) Oral daily  FIRST- Mouthwash  BLM 30 milliLiter(s) Swish and Spit three times a day  glucagon  Injectable 1 milliGRAM(s) IntraMuscular once  haloperidol     Tablet 1 milliGRAM(s) Oral <User Schedule>  influenza  Vaccine (HIGH DOSE) 0.5 milliLiter(s) IntraMuscular once  insulin lispro (ADMELOG) corrective regimen sliding scale   SubCutaneous three times a day before meals  insulin lispro (ADMELOG) corrective regimen sliding scale   SubCutaneous at bedtime  lactobacillus acidophilus 1 Tablet(s) Oral daily  lidocaine   4% Patch 1 Patch Transdermal every 24 hours  melatonin 6 milliGRAM(s) Oral at bedtime  metroNIDAZOLE  IVPB 500 milliGRAM(s) IV Intermittent every 12 hours  multivitamin 1 Tablet(s) Oral daily  Nephro-nancy 1 Tablet(s) Oral daily  pantoprazole  Injectable 40 milliGRAM(s) IV Push daily  potassium phosphate IVPB 30 milliMole(s) IV Intermittent once  predniSONE   Tablet 5 milliGRAM(s) Oral daily    MEDICATIONS  (PRN):  acetaminophen     Tablet .. 650 milliGRAM(s) Oral every 6 hours PRN Temp greater or equal to 38C (100.4F), Mild Pain (1 - 3), Moderate Pain (4 - 6)  aluminum hydroxide/magnesium hydroxide/simethicone Suspension 30 milliLiter(s) Oral every 4 hours PRN Dyspepsia  artificial tears (preservative free) Ophthalmic Solution 1 Drop(s) Both EYES four times a day PRN Dry Eyes  dextrose Oral Gel 15 Gram(s) Oral once PRN Blood Glucose LESS THAN 70 milliGRAM(s)/deciliter  morphine  - Injectable 4 milliGRAM(s) IV Push every 3 hours PRN Severe Pain (7 - 10)  morphine  - Injectable 2 milliGRAM(s) IV Push every 4 hours PRN Moderate Pain (4 - 6)  naloxone Injectable 0.1 milliGRAM(s) IV Push every 3 minutes PRN For ANY of the following changes in patient status:  A. RR LESS THAN 10 breaths per minute, B. Oxygen saturation LESS THAN 90%, C. Sedation score of 6  polyethylene glycol 3350 17 Gram(s) Oral daily PRN Constipation    Vital Signs Last 24 Hrs  T(C): 36.9 (02 Nov 2024 08:40), Max: 37.4 (01 Nov 2024 20:42)  T(F): 98.4 (02 Nov 2024 08:40), Max: 99.3 (01 Nov 2024 20:42)  HR: 100 (02 Nov 2024 08:40) (100 - 113)  BP: 120/78 (02 Nov 2024 08:40) (105/67 - 120/78)  BP(mean): --  RR: 17 (02 Nov 2024 08:40) (17 - 18)  SpO2: 100% (02 Nov 2024 08:40) (96% - 100%)    Parameters below as of 02 Nov 2024 08:40  Patient On (Oxygen Delivery Method): room air      PE  NAD  Awake, alert  Anicteric, MMM  RRR  CTAB  Abd soft, NT, ND  No c/c/e  No rash grossly                          8.2    2.37  )-----------( 148      ( 02 Nov 2024 05:21 )             26.5   11-02    146[H]  |  119[H]  |  8   ----------------------------<  126[H]  3.6   |  17[L]  |  0.33[L]    Ca    7.3[L]      02 Nov 2024 05:42  Phos  2.2     11-02  Mg     1.80     11-02

## 2024-11-02 NOTE — PROGRESS NOTE ADULT - ASSESSMENT
64-year-old male with metastatic prostate cancer, sent in by hematologist from [facility] for uncontrolled pain, nausea, vomiting.       Problem/Plan - 1:  ·  Problem: Cancer related pain.   ·  Plan: - appreciate pall care recommendations:  - pain control as per palliative crae   - RT consult appreciated ,  awaiting kyphoplasty     Problem/Plan - 2:·  Problem: Nausea & vomiting., Diarrhea, colitis , SMA dissection   ·  Plan: -  vascular fu appreciated  - GI and surgery consult appreciated   - ID fu    Problem/Plan - 3:  ·  Problem: CA of prostate.   ·  Plan: Metastatic prostate cancer  - f/u heme/onc recommendations  - Rad Onc fu   - Palliative for symptom control.    Problem/Plan - 4:  ·  Problem: Anemia , Thrombocytopenia, unspecified.   ·  Plan: -monitor cbc   transfuse PRN    Problem/Plan - 5:  ·  Problem: Bacteremia   Plan: - ID fu   - fu cultures  - ABX as per ID     dw wife at bedside again

## 2024-11-02 NOTE — PROGRESS NOTE ADULT - SUBJECTIVE AND OBJECTIVE BOX
[facility]   MD MARKO EASTMAN MD ANGELA WONG, PA    TEL:  OFFICE: [phone]  From 5pm-7am Answering Service [phone]    -- RENAL FOLLOW UP NOTE ---Date of Service 11-02-24 @ 11:46    Patient is a 64y old  Male who presents with a chief complaint of Pain (02 Nov 2024 09:44)      Patient seen and examined at bedside.     VITALS:  T(F): 98.4 (11-02-24 @ 08:40), Max: 99.3 (11-01-24 @ 20:42)  HR: 100 (11-02-24 @ 08:40)  BP: 120/78 (11-02-24 @ 08:40)  RR: 17 (11-02-24 @ 08:40)  SpO2: 100% (11-02-24 @ 08:40)  Wt(kg): --    11-01 @ 07:01  -  11-02 @ 07:00  --------------------------------------------------------  IN: 0 mL / OUT: 1025 mL / NET: -1025 mL          PHYSICAL EXAM:  General: sleeping  Neck: No JVD  Respiratory: CTAB, no wheezes, rales or rhonchi  Cardiovascular: S1, S2, RRR  Gastrointestinal: BS+, soft, NT/ND  Extremities: No peripheral edema    Hospital Medications:   MEDICATIONS  (STANDING):  abiraterone 500 mg tablet 2 Tablet(s) 2 Tablet(s) Oral daily  acetaminophen     Tablet .. 650 milliGRAM(s) Oral once  atorvastatin 20 milliGRAM(s) Oral at bedtime  cefepime   IVPB 2000 milliGRAM(s) IV Intermittent every 8 hours  cefepime   IVPB      chlorhexidine 2% Cloths 1 Application(s) Topical daily  dextrose 5% + lactated ringers 1000 milliLiter(s) (100 mL/Hr) IV Continuous <Continuous>  dextrose 5%. 1000 milliLiter(s) (100 mL/Hr) IV Continuous <Continuous>  dextrose 5%. 1000 milliLiter(s) (50 mL/Hr) IV Continuous <Continuous>  dextrose 50% Injectable 25 Gram(s) IV Push once  dextrose 50% Injectable 12.5 Gram(s) IV Push once  dextrose 50% Injectable 25 Gram(s) IV Push once  diphenhydrAMINE 25 milliGRAM(s) Oral once  fenofibrate Tablet 145 milliGRAM(s) Oral daily  FIRST- Mouthwash  BLM 30 milliLiter(s) Swish and Spit three times a day  glucagon  Injectable 1 milliGRAM(s) IntraMuscular once  haloperidol     Tablet 1 milliGRAM(s) Oral <User Schedule>  influenza  Vaccine (HIGH DOSE) 0.5 milliLiter(s) IntraMuscular once  insulin lispro (ADMELOG) corrective regimen sliding scale   SubCutaneous at bedtime  insulin lispro (ADMELOG) corrective regimen sliding scale   SubCutaneous three times a day before meals  lactobacillus acidophilus 1 Tablet(s) Oral daily  lidocaine   4% Patch 1 Patch Transdermal every 24 hours  melatonin 6 milliGRAM(s) Oral at bedtime  metroNIDAZOLE  IVPB 500 milliGRAM(s) IV Intermittent every 12 hours  multivitamin 1 Tablet(s) Oral daily  Nephro-nancy 1 Tablet(s) Oral daily  nystatin    Suspension 509764 Unit(s) Oral three times a day  pantoprazole  Injectable 40 milliGRAM(s) IV Push daily  predniSONE   Tablet 5 milliGRAM(s) Oral daily      LABS:  11-02    146[H]  |  119[H]  |  8   ----------------------------<  126[H]  3.6   |  17[L]  |  0.33[L]    Ca    7.3[L]      02 Nov 2024 05:42  Phos  2.2     11-02  Mg     1.80     11-02      Creatinine Trend: 0.33 <--, 0.31 <--, 0.32 <--, 0.30 <--, 0.33 <--, 0.31 <--, 0.34 <--, 0.37 <--, 0.33 <--, 0.33 <--, 0.34 <--, 0.37 <--, 0.39 <--, 0.48 <--, 0.39 <--    Phosphorus: 2.2 mg/dL (11-02 @ 05:42)  Phosphorus: 2.2 mg/dL (11-01 @ 22:12)  Phosphorus: 3.2 mg/dL (11-01 @ 14:50)                              8.2    2.37  )-----------( 148      ( 02 Nov 2024 05:21 )             26.5     Urine Studies:  Urinalysis - [11-02-24 @ 05:42]      Color  / Appearance  / SG  / pH       Gluc 126 / Ketone   / Bili  / Urobili        Blood  / Protein  / Leuk Est  / Nitrite       RBC  / WBC  / Hyaline  / Gran  / Sq Epi  / Non Sq Epi  / Bacteria       TSH 2.62      [10-13-24 @ 05:30]  Lipid: chol 135, , HDL 39, LDL --      [10-13-24 @ 05:30]        RADIOLOGY & ADDITIONAL STUDIES:

## 2024-11-02 NOTE — PROGRESS NOTE ADULT - SUBJECTIVE AND OBJECTIVE BOX
Patient is a 64y old  Male who presents with a chief complaint of Pain (02 Nov 2024 11:45)    Date of servie : 11-02-24 @ 14:42  INTERVAL HPI/OVERNIGHT EVENTS:  T(C): 36.9 (11-02-24 @ 08:40), Max: 37.4 (11-01-24 @ 20:42)  HR: 100 (11-02-24 @ 08:40) (100 - 108)  BP: 120/78 (11-02-24 @ 08:40) (105/67 - 120/78)  RR: 17 (11-02-24 @ 08:40) (17 - 18)  SpO2: 100% (11-02-24 @ 08:40) (96% - 100%)  Wt(kg): --  I&O's Summary    01 Nov 2024 07:01  -  02 Nov 2024 07:00  --------------------------------------------------------  IN: 0 mL / OUT: 1025 mL / NET: -1025 mL        LABS:                        8.2    2.37  )-----------( 148      ( 02 Nov 2024 05:21 )             26.5     11-02    146[H]  |  119[H]  |  8   ----------------------------<  126[H]  3.6   |  17[L]  |  0.33[L]    Ca    7.3[L]      02 Nov 2024 05:42  Phos  2.2     11-02  Mg     1.80     11-02        Urinalysis Basic - ( 02 Nov 2024 05:42 )    Color: x / Appearance: x / SG: x / pH: x  Gluc: 126 mg/dL / Ketone: x  / Bili: x / Urobili: x   Blood: x / Protein: x / Nitrite: x   Leuk Esterase: x / RBC: x / WBC x   Sq Epi: x / Non Sq Epi: x / Bacteria: x      CAPILLARY BLOOD GLUCOSE      POCT Blood Glucose.: 107 mg/dL (02 Nov 2024 12:40)  POCT Blood Glucose.: 96 mg/dL (02 Nov 2024 08:43)  POCT Blood Glucose.: 134 mg/dL (01 Nov 2024 20:52)  POCT Blood Glucose.: 134 mg/dL (01 Nov 2024 18:07)        Urinalysis Basic - ( 02 Nov 2024 05:42 )    Color: x / Appearance: x / SG: x / pH: x  Gluc: 126 mg/dL / Ketone: x  / Bili: x / Urobili: x   Blood: x / Protein: x / Nitrite: x   Leuk Esterase: x / RBC: x / WBC x   Sq Epi: x / Non Sq Epi: x / Bacteria: x        MEDICATIONS  (STANDING):  abiraterone 500 mg tablet 2 Tablet(s) 2 Tablet(s) Oral daily  acetaminophen     Tablet .. 650 milliGRAM(s) Oral once  atorvastatin 20 milliGRAM(s) Oral at bedtime  cefepime   IVPB      cefepime   IVPB 2000 milliGRAM(s) IV Intermittent every 8 hours  chlorhexidine 2% Cloths 1 Application(s) Topical daily  dextrose 5% + lactated ringers 1000 milliLiter(s) (100 mL/Hr) IV Continuous <Continuous>  dextrose 5%. 1000 milliLiter(s) (50 mL/Hr) IV Continuous <Continuous>  dextrose 5%. 1000 milliLiter(s) (100 mL/Hr) IV Continuous <Continuous>  dextrose 50% Injectable 25 Gram(s) IV Push once  dextrose 50% Injectable 12.5 Gram(s) IV Push once  dextrose 50% Injectable 25 Gram(s) IV Push once  diphenhydrAMINE 25 milliGRAM(s) Oral once  fenofibrate Tablet 145 milliGRAM(s) Oral daily  FIRST- Mouthwash  BLM 30 milliLiter(s) Swish and Spit three times a day  glucagon  Injectable 1 milliGRAM(s) IntraMuscular once  haloperidol     Tablet 1 milliGRAM(s) Oral <User Schedule>  influenza  Vaccine (HIGH DOSE) 0.5 milliLiter(s) IntraMuscular once  insulin lispro (ADMELOG) corrective regimen sliding scale   SubCutaneous at bedtime  insulin lispro (ADMELOG) corrective regimen sliding scale   SubCutaneous three times a day before meals  lactobacillus acidophilus 1 Tablet(s) Oral daily  lidocaine   4% Patch 1 Patch Transdermal every 24 hours  melatonin 6 milliGRAM(s) Oral at bedtime  metroNIDAZOLE  IVPB 500 milliGRAM(s) IV Intermittent every 12 hours  multivitamin 1 Tablet(s) Oral daily  Nephro-nancy 1 Tablet(s) Oral daily  nystatin    Suspension 223304 Unit(s) Oral three times a day  pantoprazole  Injectable 40 milliGRAM(s) IV Push daily  predniSONE   Tablet 5 milliGRAM(s) Oral daily    MEDICATIONS  (PRN):  acetaminophen     Tablet .. 650 milliGRAM(s) Oral every 6 hours PRN Temp greater or equal to 38C (100.4F), Mild Pain (1 - 3), Moderate Pain (4 - 6)  aluminum hydroxide/magnesium hydroxide/simethicone Suspension 30 milliLiter(s) Oral every 4 hours PRN Dyspepsia  artificial tears (preservative free) Ophthalmic Solution 1 Drop(s) Both EYES four times a day PRN Dry Eyes  dextrose Oral Gel 15 Gram(s) Oral once PRN Blood Glucose LESS THAN 70 milliGRAM(s)/deciliter  morphine  - Injectable 4 milliGRAM(s) IV Push every 3 hours PRN Severe Pain (7 - 10)  morphine  - Injectable 2 milliGRAM(s) IV Push every 4 hours PRN Moderate Pain (4 - 6)  naloxone Injectable 0.1 milliGRAM(s) IV Push every 3 minutes PRN For ANY of the following changes in patient status:  A. RR LESS THAN 10 breaths per minute, B. Oxygen saturation LESS THAN 90%, C. Sedation score of 6  polyethylene glycol 3350 17 Gram(s) Oral daily PRN Constipation          PHYSICAL EXAM:  GENERAL: frail  CHEST/LUNG: Clear to percussion bilaterally; No rales, rhonchi, wheezing, or rubs  HEART: Regular rate and rhythm; No murmurs, rubs, or gallops  ABDOMEN: Soft, Nontender, Nondistended; Bowel sounds present  EXTREMITIES: edema +    Care Discussed with Consultants/Other Providers [ ] YES  [ ] NO

## 2024-11-03 LAB
ANION GAP SERPL CALC-SCNC: 8 MMOL/L — SIGNIFICANT CHANGE UP (ref 7–14)
BUN SERPL-MCNC: 8 MG/DL — SIGNIFICANT CHANGE UP (ref 7–23)
CALCIUM SERPL-MCNC: 7.4 MG/DL — LOW (ref 8.4–10.5)
CHLORIDE SERPL-SCNC: 123 MMOL/L — HIGH (ref 98–107)
CO2 SERPL-SCNC: 20 MMOL/L — LOW (ref 22–31)
CREAT SERPL-MCNC: 0.34 MG/DL — LOW (ref 0.5–1.3)
EGFR: 127 ML/MIN/1.73M2 — SIGNIFICANT CHANGE UP
GLUCOSE BLDC GLUCOMTR-MCNC: 128 MG/DL — HIGH (ref 70–99)
GLUCOSE BLDC GLUCOMTR-MCNC: 132 MG/DL — HIGH (ref 70–99)
GLUCOSE BLDC GLUCOMTR-MCNC: 137 MG/DL — HIGH (ref 70–99)
GLUCOSE BLDC GLUCOMTR-MCNC: 140 MG/DL — HIGH (ref 70–99)
GLUCOSE SERPL-MCNC: 133 MG/DL — HIGH (ref 70–99)
HCT VFR BLD CALC: 23.6 % — LOW (ref 39–50)
HGB BLD-MCNC: 7.6 G/DL — LOW (ref 13–17)
MAGNESIUM SERPL-MCNC: 1.8 MG/DL — SIGNIFICANT CHANGE UP (ref 1.6–2.6)
MCHC RBC-ENTMCNC: 29.9 PG — SIGNIFICANT CHANGE UP (ref 27–34)
MCHC RBC-ENTMCNC: 32.2 G/DL — SIGNIFICANT CHANGE UP (ref 32–36)
MCV RBC AUTO: 92.9 FL — SIGNIFICANT CHANGE UP (ref 80–100)
NRBC # BLD: 1 /100 WBCS — HIGH (ref 0–0)
NRBC # FLD: 0.03 K/UL — HIGH (ref 0–0)
PHOSPHATE SERPL-MCNC: 2.5 MG/DL — SIGNIFICANT CHANGE UP (ref 2.5–4.5)
PLATELET # BLD AUTO: 149 K/UL — LOW (ref 150–400)
POTASSIUM SERPL-MCNC: 3.4 MMOL/L — LOW (ref 3.5–5.3)
POTASSIUM SERPL-SCNC: 3.4 MMOL/L — LOW (ref 3.5–5.3)
RBC # BLD: 2.54 M/UL — LOW (ref 4.2–5.8)
RBC # FLD: 19.9 % — HIGH (ref 10.3–14.5)
SODIUM SERPL-SCNC: 151 MMOL/L — HIGH (ref 135–145)
WBC # BLD: 2.63 K/UL — LOW (ref 3.8–10.5)
WBC # FLD AUTO: 2.63 K/UL — LOW (ref 3.8–10.5)

## 2024-11-03 RX ORDER — 0.9 % SODIUM CHLORIDE 0.9 %
1000 INTRAVENOUS SOLUTION INTRAVENOUS
Refills: 0 | Status: DISCONTINUED | OUTPATIENT
Start: 2024-11-03 | End: 2024-11-18

## 2024-11-03 RX ORDER — POTASSIUM CHLORIDE 600 MG/1
40 TABLET, EXTENDED RELEASE ORAL ONCE
Refills: 0 | Status: COMPLETED | OUTPATIENT
Start: 2024-11-03 | End: 2024-11-03

## 2024-11-03 RX ADMIN — CEFEPIME 100 MILLIGRAM(S): 2 INJECTION, POWDER, FOR SOLUTION INTRAVENOUS at 05:11

## 2024-11-03 RX ADMIN — CEFEPIME 100 MILLIGRAM(S): 2 INJECTION, POWDER, FOR SOLUTION INTRAVENOUS at 21:15

## 2024-11-03 RX ADMIN — CHLORHEXIDINE GLUCONATE 1 APPLICATION(S): 1.2 RINSE ORAL at 13:21

## 2024-11-03 RX ADMIN — Medication 145 MILLIGRAM(S): at 13:24

## 2024-11-03 RX ADMIN — METRONIDAZOLE 100 MILLIGRAM(S): 500 TABLET ORAL at 06:33

## 2024-11-03 RX ADMIN — DIPHENHYDRAMINE HYDROCHLORIDE AND LIDOCAINE HYDROCHLORIDE AND ALUMINUM HYDROXIDE AND MAGNESIUM HYDRO 30 MILLILITER(S): KIT at 21:07

## 2024-11-03 RX ADMIN — PANTOPRAZOLE SODIUM 40 MILLIGRAM(S): 40 TABLET, DELAYED RELEASE ORAL at 13:29

## 2024-11-03 RX ADMIN — METRONIDAZOLE 100 MILLIGRAM(S): 500 TABLET ORAL at 17:42

## 2024-11-03 RX ADMIN — POTASSIUM CHLORIDE 40 MILLIEQUIVALENT(S): 600 TABLET, EXTENDED RELEASE ORAL at 13:22

## 2024-11-03 RX ADMIN — Medication 1 TABLET(S): at 13:26

## 2024-11-03 RX ADMIN — NYSTATIN 500000 UNIT(S): 500000 TABLET, FILM COATED ORAL at 05:16

## 2024-11-03 RX ADMIN — NYSTATIN 500000 UNIT(S): 500000 TABLET, FILM COATED ORAL at 21:07

## 2024-11-03 RX ADMIN — CEFEPIME 100 MILLIGRAM(S): 2 INJECTION, POWDER, FOR SOLUTION INTRAVENOUS at 14:06

## 2024-11-03 RX ADMIN — PREDNISONE 5 MILLIGRAM(S): 20 TABLET ORAL at 05:13

## 2024-11-03 RX ADMIN — Medication 1 TABLET(S): at 13:27

## 2024-11-03 RX ADMIN — NYSTATIN 500000 UNIT(S): 500000 TABLET, FILM COATED ORAL at 13:58

## 2024-11-03 RX ADMIN — Medication 1 TABLET(S): at 13:25

## 2024-11-03 RX ADMIN — DIPHENHYDRAMINE HYDROCHLORIDE AND LIDOCAINE HYDROCHLORIDE AND ALUMINUM HYDROXIDE AND MAGNESIUM HYDRO 30 MILLILITER(S): KIT at 13:20

## 2024-11-03 RX ADMIN — Medication 75 MILLILITER(S): at 13:28

## 2024-11-03 NOTE — PROGRESS NOTE ADULT - ASSESSMENT
64-year-old male with metastatic prostate cancer, sent in by hematologist from [facility] for uncontrolled pain, nausea, vomiting.   Patient reports diffuse pain starting 6 months ago significantly worsening for the past 2 weeks.  Currently the worst pain is located around the lower back.   No loss of bladder and bowel function, no saddle paresthesia.  Able to walk.  patient is oxycodone 5 every 4-6 hour.  Yesterday they started adding OxyContin 10.  However patient continued to have pain.  Family also reports significant nausea and vomiting, inability to tolerate p.o. for the last 2 days.  Last bowel movement 2 days ago.   No known allergy.  Diagnosed with high risk high volume metastatic prostate cancer with bone, liver lymph node mets and presacral mass. Liver biopsy confirmed disease with . Started lupron, loly/pred with plans to initiate taxotere.    (12 Oct 2024 15:40)  pt seems very frustrated:   he is on 2 L of oxygen : he has no underlying lung disease:  but he is requiring 2 L of oxygen      Hypoxic resp failure  Metastatic prostate cancer  Back pain     Hypoxic resp failure  -He has no underlying lung disease:  but he is requiring 2 L of oxygen :   -CTA showed; No pulmonary embolism to the level of the segmental branches bilaterally. Limited evaluation of the subsegmental branches secondary to incomplete contrast opacification.  Multilevel vertebral body lytic lesions and loss of vertebral body height, better evaluated on CT thoracic spine 10/17/2024. Metastatic prostate cancer  -Patent central airways. Bibasilar atelectasis. No pleural effusion. MEDIASTINUM AND KATTY: No lymphadenopathy.  PULMONARY ANGIOGRAM: No pulmonary embolism to the level of the segmental   branches bilaterally. Limited evaluation of the subsegmental branches secondary to incomplete contrast opacification.    10/19:  -seems pretty tired;  on 2 L of oxygen :  -cta chest showed: No pulmonary embolism to the level of the segmental branches bilaterally. Limited evaluation of the subsegmental branches secondary to incomplete   contrast opacification. Multilevel vertebral body lytic lesions and loss of vertebral body height, better evaluated on CT thoracic spine 10/17/2024.  -still with fever:  no pe  on zosyn  and leukopenic hemonc following;  has metastatic prostate ca:   -VBG seems OK:     10/20:  pulm wise he seems to be stable:   -using 2 L of oxygen    -yesterdays VBG with minimal met acidosis  -cta again noted    10/21:  on 4 L of oxygen  today    cxr is size    e coli sepsis: Gram Negative Rods and Gram Negative Coccobacilli    10/22: heis doing poorly today  : he is very agitated and uncomfortable  on mittens: ? sun downing   10/23: quiet today  : sleeping:  oxygen requirement has not increased: on rooo ha 95% as documented    WBC is slowly increasing:   no fever:   -on antibiotics   10/24: pt is not doing well : his repeat blood cultures are positive with same organism :  would defer to ID;   10/25: seems to be doing  ok ; no sob:  no cough :  no phlegm   : on room air   10/26: resp failure has resolved:  is septic  : on ceftriaxone     10/27:  he seems OK:  he is on room air:  on morphine drip   remans on ceftriaxone still     10/28: she seems to be doing  ok : no sob: no cough ; no phlegm  confused:  on morphine drip    10/29; seems comfortable on room air;   cont current rx:   10/30: seems to be doing  ok ; on morphine drip : : plan to decrease morphine dose:   -cont current supportive care   10/31:  seems same tome:   no sob:  on room air:   seems comfortable at this ti me: off morphine  11/1:  he has been on room air for days;  looks comfortable:  coughs when he eats;   need pt ot  ; ? oob to chair:  dw wife:   11/2:  he seems stable:   on room air:  responding to questions pretty well : has bleeding lower lip : not Much through ? secondary to dryness:   -resp wise seems pretty good:  no sob:        New finding:  SMA dissection : neutropenic colitis/ #E Coli and H influenzae bacteremia/ #Neutropenic fever  -/f ischemic colitis on R colon   Diagnosed  on ct abd:  defer to surgery and primary team:  probably no intervention:   -cont antibiotics  -not doing very well with above new findings    10/22; no intervention per surgery    -cont antibiotics  -has fever:  ID following :  -Last chest xray on 20th  ; normal     id following   10/23  IR was consulted for vertebral augmentation of T3-T5 prior to XRT.   Patient was seen bedside. Initially, patient kept requesting no exam and was not willing to participate in the discussion. Son was bedside at time of conversation.   Per discussion with the medical attending, given the concern for SMA dissection and concern for bowel ischemia, will hold off on vertebral augmentation evaluation at this time.   Please reach out to IR when patient is medically stable for vertebral augmentation.  10/24:  remains septic:  above cancelled:   ? for palliative care now  10/26: he seems same to me:  no overnight events  on room air:   check VBG  : cont antibiotics   hemonc following   10/27:  -still on antibiotics for e coli sepsis  -id following s  10/28: cont antibiotics   10/230: cont ceftriaxone   10/31: on ceftriaxone and flagyl   11/1: antibtiocs per ID   11/3: cont antibiotics : IR note reviewed:  no kyphoplasty at this time      Back pain   -likely due to metastatic disease:  adm here for severe pain :  -pain control per primary team   -venous ph is ok   11/1: resolved for now on meds  11/3: pain control : no kyphoplasty at this ti me per ir note:     dior acp; GOC as per primary  team  64-year-old male with metastatic prostate cancer, sent in by hematologist from [facility] for uncontrolled pain, nausea, vomiting.   Patient reports diffuse pain starting 6 months ago significantly worsening for the past 2 weeks.  Currently the worst pain is located around the lower back.   No loss of bladder and bowel function, no saddle paresthesia.  Able to walk.  patient is oxycodone 5 every 4-6 hour.  Yesterday they started adding OxyContin 10.  However patient continued to have pain.  Family also reports significant nausea and vomiting, inability to tolerate p.o. for the last 2 days.  Last bowel movement 2 days ago.   No known allergy.  Diagnosed with high risk high volume metastatic prostate cancer with bone, liver lymph node mets and presacral mass. Liver biopsy confirmed disease with . Started lupron, loly/pred with plans to initiate taxotere.    (12 Oct 2024 15:40)  pt seems very frustrated:   he is on 2 L of oxygen : he has no underlying lung disease:  but he is requiring 2 L of oxygen      Hypoxic resp failure  Metastatic prostate cancer  Back pain     Hypoxic resp failure  -He has no underlying lung disease:  but he is requiring 2 L of oxygen :   -CTA showed; No pulmonary embolism to the level of the segmental branches bilaterally. Limited evaluation of the subsegmental branches secondary to incomplete contrast opacification.  Multilevel vertebral body lytic lesions and loss of vertebral body height, better evaluated on CT thoracic spine 10/17/2024. Metastatic prostate cancer  -Patent central airways. Bibasilar atelectasis. No pleural effusion. MEDIASTINUM AND KATTY: No lymphadenopathy.  PULMONARY ANGIOGRAM: No pulmonary embolism to the level of the segmental   branches bilaterally. Limited evaluation of the subsegmental branches secondary to incomplete contrast opacification.    10/19:  -seems pretty tired;  on 2 L of oxygen :  -cta chest showed: No pulmonary embolism to the level of the segmental branches bilaterally. Limited evaluation of the subsegmental branches secondary to incomplete   contrast opacification. Multilevel vertebral body lytic lesions and loss of vertebral body height, better evaluated on CT thoracic spine 10/17/2024.  -still with fever:  no pe  on zosyn  and leukopenic hemonc following;  has metastatic prostate ca:   -VBG seems OK:     10/20:  pulm wise he seems to be stable:   -using 2 L of oxygen    -yesterdays VBG with minimal met acidosis  -cta again noted    10/21:  on 4 L of oxygen  today    cxr is size    e coli sepsis: Gram Negative Rods and Gram Negative Coccobacilli    10/22: heis doing poorly today  : he is very agitated and uncomfortable  on mittens: ? sun downing   10/23: quiet today  : sleeping:  oxygen requirement has not increased: on rooo ha 95% as documented    WBC is slowly increasing:   no fever:   -on antibiotics   10/24: pt is not doing well : his repeat blood cultures are positive with same organism :  would defer to ID;   10/25: seems to be doing  ok ; no sob:  no cough :  no phlegm   : on room air   10/26: resp failure has resolved:  is septic  : on ceftriaxone     10/27:  he seems OK:  he is on room air:  on morphine drip   remans on ceftriaxone still     10/28: she seems to be doing  ok : no sob: no cough ; no phlegm  confused:  on morphine drip    10/29; seems comfortable on room air;   cont current rx:   10/30: seems to be doing  ok ; on morphine drip : : plan to decrease morphine dose:   -cont current supportive care   10/31:  seems same tome:   no sob:  on room air:   seems comfortable at this ti me: off morphine  11/1:  he has been on room air for days;  looks comfortable:  coughs when he eats;   need pt ot  ; ? oob to chair:  dw wife:   11/3:  he seems stable:   on room air:  responding to questions pretty well : has bleeding lower lip : not Much through ? secondary to dryness:   -resp wise seems pretty good:  no sob:        New finding:  SMA dissection : neutropenic colitis/ #E Coli and H influenzae bacteremia/ #Neutropenic fever  -/f ischemic colitis on R colon   Diagnosed  on ct abd:  defer to surgery and primary team:  probably no intervention:   -cont antibiotics  -not doing very well with above new findings    10/22; no intervention per surgery    -cont antibiotics  -has fever:  ID following :  -Last chest xray on 20th  ; normal     id following   10/23  IR was consulted for vertebral augmentation of T3-T5 prior to XRT.   Patient was seen bedside. Initially, patient kept requesting no exam and was not willing to participate in the discussion. Son was bedside at time of conversation.   Per discussion with the medical attending, given the concern for SMA dissection and concern for bowel ischemia, will hold off on vertebral augmentation evaluation at this time.   Please reach out to IR when patient is medically stable for vertebral augmentation.  10/24:  remains septic:  above cancelled:   ? for palliative care now  10/26: he seems same to me:  no overnight events  on room air:   check VBG  : cont antibiotics   hemonc following   10/27:  -still on antibiotics for e coli sepsis  -id following s  10/28: cont antibiotics   10/230: cont ceftriaxone   10/31: on ceftriaxone and flagyl   11/1: antibtiocs per ID   11/3: cont antibiotics : IR note reviewed:  no kyphoplasty at this time      Back pain   -likely due to metastatic disease:  adm here for severe pain :  -pain control per primary team   -venous ph is ok   11/1: resolved for now on meds  11/3: pain control : no kyphoplasty at this ti me per ir note:     dior acp; GOC as per primary  team

## 2024-11-03 NOTE — PROGRESS NOTE ADULT - ASSESSMENT
64-year-old male with metastatic prostate cancer, sent in by hematologist from [place] [facility] for uncontrolled pain, nausea, vomiting.       Problem/Plan - 1:  ·  Problem: Cancer related pain.   ·  Plan: - appreciate pall care recommendations:  - pain control as per palliative crae   - RT consult appreciated ,  awaiting kyphoplasty     Problem/Plan - 2:·  Problem: Nausea & vomiting., Diarrhea, colitis , SMA dissection   ·  Plan: -  vascular fu appreciated  - GI and surgery consult appreciated   - ID fu    Problem/Plan - 3:  ·  Problem: CA of prostate.   ·  Plan: Metastatic prostate cancer  - f/u heme/onc recommendations  - Rad Onc fu   - Palliative for symptom control.    Problem/Plan - 4:  ·  Problem: Anemia , Thrombocytopenia, unspecified.   ·  Plan: -monitor cbc   transfuse PRN    Problem/Plan - 5:  ·  Problem: Bacteremia   Plan: - ID fu   - fu cultures  - ABX as per ID

## 2024-11-03 NOTE — PROGRESS NOTE ADULT - SUBJECTIVE AND OBJECTIVE BOX
Date of Service: 11-03-24 @ 10:18    Patient is a 64y old  Male who presents with a chief complaint of Pain (03 Nov 2024 09:03)      Any change in ROS: seems to be doing  ok : no cough:     MEDICATIONS  (STANDING):  abiraterone 500 mg tablet 2 Tablet(s) 2 Tablet(s) Oral daily  acetaminophen     Tablet .. 650 milliGRAM(s) Oral once  atorvastatin 20 milliGRAM(s) Oral at bedtime  cefepime   IVPB 2000 milliGRAM(s) IV Intermittent every 8 hours  cefepime   IVPB      chlorhexidine 2% Cloths 1 Application(s) Topical daily  dextrose 5% 1000 milliLiter(s) (75 mL/Hr) IV Continuous <Continuous>  dextrose 5%. 1000 milliLiter(s) (100 mL/Hr) IV Continuous <Continuous>  dextrose 5%. 1000 milliLiter(s) (50 mL/Hr) IV Continuous <Continuous>  dextrose 50% Injectable 25 Gram(s) IV Push once  dextrose 50% Injectable 25 Gram(s) IV Push once  dextrose 50% Injectable 12.5 Gram(s) IV Push once  diphenhydrAMINE 25 milliGRAM(s) Oral once  fenofibrate Tablet 145 milliGRAM(s) Oral daily  FIRST- Mouthwash  BLM 30 milliLiter(s) Swish and Spit three times a day  glucagon  Injectable 1 milliGRAM(s) IntraMuscular once  haloperidol     Tablet 1 milliGRAM(s) Oral <User Schedule>  influenza  Vaccine (HIGH DOSE) 0.5 milliLiter(s) IntraMuscular once  insulin lispro (ADMELOG) corrective regimen sliding scale   SubCutaneous three times a day before meals  insulin lispro (ADMELOG) corrective regimen sliding scale   SubCutaneous at bedtime  lactobacillus acidophilus 1 Tablet(s) Oral daily  lidocaine   4% Patch 1 Patch Transdermal every 24 hours  melatonin 6 milliGRAM(s) Oral at bedtime  metroNIDAZOLE  IVPB 500 milliGRAM(s) IV Intermittent every 12 hours  multivitamin 1 Tablet(s) Oral daily  Nephro-nancy 1 Tablet(s) Oral daily  nystatin    Suspension 911734 Unit(s) Oral three times a day  pantoprazole  Injectable 40 milliGRAM(s) IV Push daily  predniSONE   Tablet 5 milliGRAM(s) Oral daily    MEDICATIONS  (PRN):  acetaminophen     Tablet .. 650 milliGRAM(s) Oral every 6 hours PRN Temp greater or equal to 38C (100.4F), Mild Pain (1 - 3), Moderate Pain (4 - 6)  aluminum hydroxide/magnesium hydroxide/simethicone Suspension 30 milliLiter(s) Oral every 4 hours PRN Dyspepsia  artificial tears (preservative free) Ophthalmic Solution 1 Drop(s) Both EYES four times a day PRN Dry Eyes  dextrose Oral Gel 15 Gram(s) Oral once PRN Blood Glucose LESS THAN 70 milliGRAM(s)/deciliter  morphine  - Injectable 4 milliGRAM(s) IV Push every 3 hours PRN Severe Pain (7 - 10)  morphine  - Injectable 2 milliGRAM(s) IV Push every 4 hours PRN Moderate Pain (4 - 6)  naloxone Injectable 0.1 milliGRAM(s) IV Push every 3 minutes PRN For ANY of the following changes in patient status:  A. RR LESS THAN 10 breaths per minute, B. Oxygen saturation LESS THAN 90%, C. Sedation score of 6  polyethylene glycol 3350 17 Gram(s) Oral daily PRN Constipation    Vital Signs Last 24 Hrs  T(C): 36.6 (03 Nov 2024 09:00), Max: 37.2 (03 Nov 2024 01:00)  T(F): 97.9 (03 Nov 2024 09:00), Max: 98.9 (03 Nov 2024 01:00)  HR: 100 (03 Nov 2024 09:00) (100 - 107)  BP: 114/66 (03 Nov 2024 09:00) (104/62 - 117/76)  BP(mean): --  RR: 17 (03 Nov 2024 09:00) (17 - 18)  SpO2: 100% (03 Nov 2024 09:00) (97% - 100%)    Parameters below as of 03 Nov 2024 09:00  Patient On (Oxygen Delivery Method): room air        I&O's Summary    02 Nov 2024 08:01  -  03 Nov 2024 07:00  --------------------------------------------------------  IN: 175 mL / OUT: 700 mL / NET: -525 mL          Physical Exam:   GENERAL: NAD, well-groomed, well-developed  HEENT: CHAVA/   minor lips bleeding   ENMT: No tonsillar erythema, exudates, or enlargement; Moist mucous membranes, Good dentition, No lesions  NECK: Supple, No JVD, Normal thyroid  CHEST/LUNG: Clear to auscultaion  CVS: Regular rate and rhythm; No murmurs, rubs, or gallops  GI: : Soft, Nontender, Nondistended; Bowel sounds present  NERVOUS SYSTEM:  Alert & Oriented X3  EXTREMITIES: -edema  LYMPH: No lymphadenopathy noted  SKIN: No rashes or lesions  ENDOCRINOLOGY: No Thyromegaly  PSYCH: Appropriate    Labs:  20                            7.6    2.63  )-----------( 149      ( 03 Nov 2024 06:50 )             23.6                         8.2    2.37  )-----------( 148      ( 02 Nov 2024 05:21 )             26.5                         7.7    2.29  )-----------( 142      ( 01 Nov 2024 06:05 )             23.5                         7.6    2.26  )-----------( 124      ( 31 Oct 2024 08:54 )             22.8     11-03    151[H]  |  123[H]  |  8   ----------------------------<  133[H]  3.4[L]   |  20[L]  |  0.34[L]  11-02    150[H]  |  120[H]  |  8   ----------------------------<  120[H]  3.0[L]   |  20[L]  |  0.30[L]  11-02    146[H]  |  119[H]  |  8   ----------------------------<  126[H]  3.6   |  17[L]  |  0.33[L]  11-01    148[H]  |  119[H]  |  10  ----------------------------<  138[H]  3.3[L]   |  18[L]  |  0.31[L]  11-01    146[H]  |  118[H]  |  10  ----------------------------<  117[H]  3.2[L]   |  18[L]  |  0.32[L]  11-01    145  |  117[H]  |  10  ----------------------------<  88  2.9[LL]   |  17[L]  |  0.30[L]  11-01    146[H]  |  117[H]  |  10  ----------------------------<  92  2.5[LL]   |  18[L]  |  0.33[L]  10-31    146[H]  |  116[H]  |  10  ----------------------------<  105[H]  2.4[LL]   |  19[L]  |  0.31[L]  10-31    149[H]  |  118[H]  |  10  ----------------------------<  91  2.4[LL]   |  18[L]  |  0.34[L]  10-30    141  |  112[H]  |  10  ----------------------------<  150[H]  3.3[L]   |  20[L]  |  0.37[L]    Ca    7.4[L]      03 Nov 2024 06:50  Ca    7.1[L]      02 Nov 2024 18:08  Ca    7.3[L]      02 Nov 2024 05:42  Ca    7.2[L]      01 Nov 2024 22:12  Ca    7.0[L]      01 Nov 2024 14:50  Phos  2.5     11-03  Phos  2.6     11-02  Phos  2.2     11-02  Phos  2.2     11-01  Phos  3.2     11-01  Mg     1.80     11-03  Mg     1.80     11-02  Mg     1.80     11-02  Mg     1.90     11-01  Mg     1.90     11-01      CAPILLARY BLOOD GLUCOSE      POCT Blood Glucose.: 137 mg/dL (03 Nov 2024 08:40)  POCT Blood Glucose.: 123 mg/dL (02 Nov 2024 21:15)  POCT Blood Glucose.: 127 mg/dL (02 Nov 2024 18:07)  POCT Blood Glucose.: 107 mg/dL (02 Nov 2024 12:40)          Urinalysis Basic - ( 03 Nov 2024 06:50 )    Color: x / Appearance: x / SG: x / pH: x  Gluc: 133 mg/dL / Ketone: x  / Bili: x / Urobili: x   Blood: x / Protein: x / Nitrite: x   Leuk Esterase: x / RBC: x / WBC x   Sq Epi: x / Non Sq Epi: x / Bacteria: x            RECENT CULTURES:  10-31 @ 07:47 .Blood BLOOD                No growth at 48 Hours    10-31 @ 07:44 .Blood BLOOD       rad  · Note Type	Interventional Radiology      Case was discussed between Dr. Case and Dr. Bruno regarding plan for kyphoplasty. Given there is concern for C.Diff infection with fevers at this time, will hold off on kyphoplasty. Once patient is cleared of any infection and there is improvement in the metabolic profile, please reach out to IR to schedule patient for kyphoplasty.< from: Xray Chest 1 View- PORTABLE-Urgent (Xray Chest 1 View- PORTABLE-Urgent .) (10.31.24 @ 03:52) >  The bones show no acute findings.      COMPARISON: October 20, 2024        IMPRESSION: Clear lungs.    --- End of Report ---      < end of copied text >           No growth at 48 Hours          RESPIRATORY CULTURES:      Clostridium difficile GDH Toxins A&amp;B, EIA:   Negative (11-01 @ 07:45)      Studies  Chest X-RAY  CT SCAN Chest   Venous Dopplers: LE:   CT Abdomen  Others

## 2024-11-03 NOTE — PROGRESS NOTE ADULT - ASSESSMENT
1. Metastatic prostate cancer    - Follows with Dr Andrew Mendoza at [facility]   - PSMA 10/11/24 showed hypermetabolic vera foci above and below the diaphragm, foci in the liver and extensive foci involving the axial and appendicular skeleton compatible with metastatic disease  - --> 374--> 426 on 10/8/24   - Liver biopsy 9/30/24 consistent with prostate adenocarcinoma   - High risk high volume disease -> started on triplet therapy w/ ADT/lupron, abiraterone/prednisone + taxotere. (back pain after Lupron likely tumor flare).  - Continue abiraterone 1000mg daily w Prednisone 5mg PO QD   - s/p taxotere 60mg/m2 on 10/13/24. Next dose on 11/4 if clinically improves    - Kyphoplasty w/IR to T3 and L1 lesions to be coordinated between IR and Radiation oncology  - Palliative following for pain control and given hospital course would have further GOC discussions. Treatment will be offered but he has metastatic disease with visceral involvement and complications as outlined below.     2. Pancolitis, E coli and H influenzae bacteremia  - continues on Cefepime+ Metronidazole  - ID following  - BCx from 10/24 neg to date    3. Anemia/Thrombocytopenia   - Hgb 7.6. s/p 1 unit 10/30, transfuse for Hgb < 7.0  - Multifactorial sec to likely marrow infiltration by CaP, Chemo effect, consumption from acute illness, poss ITP  - Transfuse for plt <15 or < 50K if bleeding/for procedure      4. SMA Dissection  - seen by [facility] sx  - no plan for intervention    5. Agitation  -  EKG reviewed, defer to cardiology  -seems to have improved, continue to monitor

## 2024-11-03 NOTE — PROGRESS NOTE ADULT - SUBJECTIVE AND OBJECTIVE BOX
Patient is a 64y old  Male who presents with a chief complaint of Pain (03 Nov 2024 10:17)    Date of servie : 11-03-24 @ 12:12  INTERVAL HPI/OVERNIGHT EVENTS:  T(C): 36.4 (11-03-24 @ 12:06), Max: 37.2 (11-03-24 @ 01:00)  HR: 112 (11-03-24 @ 12:06) (100 - 112)  BP: 128/81 (11-03-24 @ 12:06) (104/62 - 128/81)  RR: 17 (11-03-24 @ 12:06) (17 - 18)  SpO2: 100% (11-03-24 @ 12:06) (97% - 100%)  Wt(kg): --  I&O's Summary    02 Nov 2024 08:01  -  03 Nov 2024 07:00  --------------------------------------------------------  IN: 175 mL / OUT: 700 mL / NET: -525 mL    03 Nov 2024 07:01  -  03 Nov 2024 12:12  --------------------------------------------------------  IN: 100 mL / OUT: 350 mL / NET: -250 mL        LABS:                        7.6    2.63  )-----------( 149      ( 03 Nov 2024 06:50 )             23.6     11-03    151[H]  |  123[H]  |  8   ----------------------------<  133[H]  3.4[L]   |  20[L]  |  0.34[L]    Ca    7.4[L]      03 Nov 2024 06:50  Phos  2.5     11-03  Mg     1.80     11-03        Urinalysis Basic - ( 03 Nov 2024 06:50 )    Color: x / Appearance: x / SG: x / pH: x  Gluc: 133 mg/dL / Ketone: x  / Bili: x / Urobili: x   Blood: x / Protein: x / Nitrite: x   Leuk Esterase: x / RBC: x / WBC x   Sq Epi: x / Non Sq Epi: x / Bacteria: x      CAPILLARY BLOOD GLUCOSE      POCT Blood Glucose.: 137 mg/dL (03 Nov 2024 08:40)  POCT Blood Glucose.: 123 mg/dL (02 Nov 2024 21:15)  POCT Blood Glucose.: 127 mg/dL (02 Nov 2024 18:07)  POCT Blood Glucose.: 107 mg/dL (02 Nov 2024 12:40)        Urinalysis Basic - ( 03 Nov 2024 06:50 )    Color: x / Appearance: x / SG: x / pH: x  Gluc: 133 mg/dL / Ketone: x  / Bili: x / Urobili: x   Blood: x / Protein: x / Nitrite: x   Leuk Esterase: x / RBC: x / WBC x   Sq Epi: x / Non Sq Epi: x / Bacteria: x        MEDICATIONS  (STANDING):  abiraterone 500 mg tablet 2 Tablet(s) 2 Tablet(s) Oral daily  acetaminophen     Tablet .. 650 milliGRAM(s) Oral once  atorvastatin 20 milliGRAM(s) Oral at bedtime  cefepime   IVPB 2000 milliGRAM(s) IV Intermittent every 8 hours  cefepime   IVPB      chlorhexidine 2% Cloths 1 Application(s) Topical daily  dextrose 5% 1000 milliLiter(s) (75 mL/Hr) IV Continuous <Continuous>  dextrose 5%. 1000 milliLiter(s) (50 mL/Hr) IV Continuous <Continuous>  dextrose 5%. 1000 milliLiter(s) (100 mL/Hr) IV Continuous <Continuous>  dextrose 50% Injectable 25 Gram(s) IV Push once  dextrose 50% Injectable 12.5 Gram(s) IV Push once  dextrose 50% Injectable 25 Gram(s) IV Push once  diphenhydrAMINE 25 milliGRAM(s) Oral once  fenofibrate Tablet 145 milliGRAM(s) Oral daily  FIRST- Mouthwash  BLM 30 milliLiter(s) Swish and Spit three times a day  glucagon  Injectable 1 milliGRAM(s) IntraMuscular once  haloperidol     Tablet 1 milliGRAM(s) Oral <User Schedule>  influenza  Vaccine (HIGH DOSE) 0.5 milliLiter(s) IntraMuscular once  insulin lispro (ADMELOG) corrective regimen sliding scale   SubCutaneous at bedtime  insulin lispro (ADMELOG) corrective regimen sliding scale   SubCutaneous three times a day before meals  lactobacillus acidophilus 1 Tablet(s) Oral daily  lidocaine   4% Patch 1 Patch Transdermal every 24 hours  melatonin 6 milliGRAM(s) Oral at bedtime  metroNIDAZOLE  IVPB 500 milliGRAM(s) IV Intermittent every 12 hours  multivitamin 1 Tablet(s) Oral daily  Nephro-nancy 1 Tablet(s) Oral daily  nystatin    Suspension 309855 Unit(s) Oral three times a day  pantoprazole  Injectable 40 milliGRAM(s) IV Push daily  potassium chloride   Powder 40 milliEquivalent(s) Oral once  predniSONE   Tablet 5 milliGRAM(s) Oral daily    MEDICATIONS  (PRN):  acetaminophen     Tablet .. 650 milliGRAM(s) Oral every 6 hours PRN Temp greater or equal to 38C (100.4F), Mild Pain (1 - 3), Moderate Pain (4 - 6)  aluminum hydroxide/magnesium hydroxide/simethicone Suspension 30 milliLiter(s) Oral every 4 hours PRN Dyspepsia  artificial tears (preservative free) Ophthalmic Solution 1 Drop(s) Both EYES four times a day PRN Dry Eyes  dextrose Oral Gel 15 Gram(s) Oral once PRN Blood Glucose LESS THAN 70 milliGRAM(s)/deciliter  morphine  - Injectable 4 milliGRAM(s) IV Push every 3 hours PRN Severe Pain (7 - 10)  morphine  - Injectable 2 milliGRAM(s) IV Push every 4 hours PRN Moderate Pain (4 - 6)  naloxone Injectable 0.1 milliGRAM(s) IV Push every 3 minutes PRN For ANY of the following changes in patient status:  A. RR LESS THAN 10 breaths per minute, B. Oxygen saturation LESS THAN 90%, C. Sedation score of 6  polyethylene glycol 3350 17 Gram(s) Oral daily PRN Constipation          PHYSICAL EXAM:  GENERAL: frail  CHEST/LUNG: Clear to percussion bilaterally; No rales, rhonchi, wheezing, or rubs  HEART: Regular rate and rhythm; No murmurs, rubs, or gallops  ABDOMEN: Soft, Nontender, Nondistended; Bowel sounds present  EXTREMITIES:  edema +  Care Discussed with Consultants/Other Providers [ ] YES  [ ] NO

## 2024-11-03 NOTE — PROGRESS NOTE ADULT - ASSESSMENT
64-year-old male with metastatic prostate cancer, sent in by hematologist from [place] [facility] for uncontrolled pain, nausea, vomiting.   Patient reports diffuse pain starting 6 months ago significantly worsening for the past 2 weeks. Diagnosed with high risk high volume metastatic prostate cancer with bone, liver lymph node mets and presacral mass. Liver biopsy confirmed disease with . Started lupron, loly/pred with plans to initiate taxotere.   nephrology consulted for electrolyte abnormalities.    Hypernatremia  Poor free water intake    pt now has diet however not eating much per family  on LR +d5+40kcl @ 75cc/hr --> increased to 100cc/hr  encourage free water as tolerated.  Adjusted IVF to D5W + 40mEq KCl today @ 75cc/hr.  monitor Na.  Avoid overcorrection >8mEq in 24hrs.    Hypokalemia  GI loss and poor po intake  unable to tolerate PO kcl  On D5+40kcl as above.  monitor closely.    Acidosis   non AG  likely GI lost.  Hyperchloremic.  IVF as above.  stable.  monitor CO2.    hypophosphatemia  supplemented by team  monitor    hypocalcemia   sec to low albumin  corrected ca 8.5  monitor    fever  GNR bacteremia   work up and tx per team    anemia  metastatic prostate cancer  f/u heme/onc       64-year-old male with metastatic prostate cancer, sent in by hematologist from [place] [facility] for uncontrolled pain, nausea, vomiting.   Patient reports diffuse pain starting 6 months ago significantly worsening for the past 2 weeks. Diagnosed with high risk high volume metastatic prostate cancer with bone, liver lymph node mets and presacral mass. Liver biopsy confirmed disease with . Started lupron, loly/pred with plans to initiate taxotere.   nephrology consulted for electrolyte abnormalities.    Hypernatremia  Poor free water intake    pt now has diet however not eating much per family  on LR +d5+40kcl @ 75cc/hr   change to d5 with 40MEq KCL at 75cc / hr  encourage free water as tolerated.  monitor Na.  Avoid overcorrection >8mEq in 24hrs.    Hypokalemia  GI loss and poor po intake  unable to tolerate PO kcl  On D5+40kcl as above.  monitor closely.    Acidosis   non AG  likely GI lost.  Hyperchloremic.  IVF as above.  stable.  monitor CO2.    hypophosphatemia  supplemented by team  monitor    hypocalcemia   sec to low albumin  corrected ca 8.5  monitor    fever  GNR bacteremia   work up and tx per team    anemia  metastatic prostate cancer  f/u heme/onc

## 2024-11-03 NOTE — PROGRESS NOTE ADULT - SUBJECTIVE AND OBJECTIVE BOX
[facility] [place] [facility]   MD MARKO EASTMAN MD ANGELA WONG, PA QIAN CHEN, NP    TEL:  OFFICE: [phone]  From 5pm-7am Answering Service [phone]    -- RENAL FOLLOW UP NOTE ---Date of Service 11-03-24 @ 16:41    Patient is a 64y old  Male who presents with a chief complaint of Pain.    Patient seen and examined at bedside. No chest pain/SOB.    VITALS:  T(F): 97.5 (11-03-24 @ 16:30), Max: 98.9 (11-03-24 @ 01:00)  HR: 99 (11-03-24 @ 16:30)  BP: 113/76 (11-03-24 @ 16:30)  RR: 17 (11-03-24 @ 16:30)  SpO2: 98% (11-03-24 @ 16:30)  Wt(kg): --    11-02 @ 08:01  -  11-03 @ 07:00  --------------------------------------------------------  IN: 175 mL / OUT: 700 mL / NET: -525 mL    11-03 @ 07:01  -  11-03 @ 16:41  --------------------------------------------------------  IN: 300 mL / OUT: 700 mL / NET: -400 mL      PHYSICAL EXAM:  General: NAD  Neck: No JVD  Respiratory: CTAB, no wheezes, rales or rhonchi  Cardiovascular: S1, S2, RRR  Gastrointestinal: BS+, soft, NT/ND  Extremities: No peripheral edema    Hospital Medications:   MEDICATIONS  (STANDING):  abiraterone 500 mg tablet 2 Tablet(s) 2 Tablet(s) Oral daily  acetaminophen     Tablet .. 650 milliGRAM(s) Oral once  atorvastatin 20 milliGRAM(s) Oral at bedtime  cefepime   IVPB 2000 milliGRAM(s) IV Intermittent every 8 hours  cefepime   IVPB      chlorhexidine 2% Cloths 1 Application(s) Topical daily  dextrose 5% 1000 milliLiter(s) (75 mL/Hr) IV Continuous <Continuous>  dextrose 5%. 1000 milliLiter(s) (50 mL/Hr) IV Continuous <Continuous>  dextrose 5%. 1000 milliLiter(s) (100 mL/Hr) IV Continuous <Continuous>  dextrose 50% Injectable 25 Gram(s) IV Push once  dextrose 50% Injectable 12.5 Gram(s) IV Push once  dextrose 50% Injectable 25 Gram(s) IV Push once  diphenhydrAMINE 25 milliGRAM(s) Oral once  fenofibrate Tablet 145 milliGRAM(s) Oral daily  FIRST- Mouthwash  BLM 30 milliLiter(s) Swish and Spit three times a day  glucagon  Injectable 1 milliGRAM(s) IntraMuscular once  haloperidol     Tablet 1 milliGRAM(s) Oral <User Schedule>  influenza  Vaccine (HIGH DOSE) 0.5 milliLiter(s) IntraMuscular once  insulin lispro (ADMELOG) corrective regimen sliding scale   SubCutaneous three times a day before meals  insulin lispro (ADMELOG) corrective regimen sliding scale   SubCutaneous at bedtime  lactobacillus acidophilus 1 Tablet(s) Oral daily  lidocaine   4% Patch 1 Patch Transdermal every 24 hours  melatonin 6 milliGRAM(s) Oral at bedtime  metroNIDAZOLE  IVPB 500 milliGRAM(s) IV Intermittent every 12 hours  multivitamin 1 Tablet(s) Oral daily  Nephro-nancy 1 Tablet(s) Oral daily  nystatin    Suspension 320152 Unit(s) Oral three times a day  pantoprazole  Injectable 40 milliGRAM(s) IV Push daily  predniSONE   Tablet 5 milliGRAM(s) Oral daily      LABS:  11-03    151[H]  |  123[H]  |  8   ----------------------------<  133[H]  3.4[L]   |  20[L]  |  0.34[L]    Ca    7.4[L]      03 Nov 2024 06:50  Phos  2.5     11-03  Mg     1.80     11-03      Creatinine Trend: 0.34 <--, 0.30 <--, 0.33 <--, 0.31 <--, 0.32 <--, 0.30 <--, 0.33 <--, 0.31 <--, 0.34 <--, 0.37 <--, 0.33 <--, 0.33 <--, 0.34 <--, 0.37 <--, 0.39 <--, 0.48 <--    Phosphorus: 2.5 mg/dL (11-03 @ 06:50)  Phosphorus: 2.6 mg/dL (11-02 @ 18:08)                          7.6    2.63  )-----------( 149      ( 03 Nov 2024 06:50 )             23.6     Urine Studies:  Urinalysis - [11-03-24 @ 06:50]      Color  / Appearance  / SG  / pH       Gluc 133 / Ketone   / Bili  / Urobili        Blood  / Protein  / Leuk Est  / Nitrite       RBC  / WBC  / Hyaline  / Gran  / Sq Epi  / Non Sq Epi  / Bacteria       TSH 2.62      [10-13-24 @ 05:30]  Lipid: chol 135, , HDL 39, LDL --      [10-13-24 @ 05:30]

## 2024-11-04 LAB
ADD ON TEST-SPECIMEN IN LAB: SIGNIFICANT CHANGE UP
ANION GAP SERPL CALC-SCNC: 8 MMOL/L — SIGNIFICANT CHANGE UP (ref 7–14)
BASOPHILS # BLD AUTO: 0.01 K/UL — SIGNIFICANT CHANGE UP (ref 0–0.2)
BASOPHILS NFR BLD AUTO: 0.4 % — SIGNIFICANT CHANGE UP (ref 0–2)
BUN SERPL-MCNC: 7 MG/DL — SIGNIFICANT CHANGE UP (ref 7–23)
BUN SERPL-MCNC: 8 MG/DL — SIGNIFICANT CHANGE UP (ref 7–23)
CALCIUM SERPL-MCNC: 7.2 MG/DL — LOW (ref 8.4–10.5)
CALCIUM SERPL-MCNC: 7.3 MG/DL — LOW (ref 8.4–10.5)
CHLORIDE SERPL-SCNC: 120 MMOL/L — HIGH (ref 98–107)
CO2 SERPL-SCNC: 20 MMOL/L — LOW (ref 22–31)
CO2 SERPL-SCNC: 20 MMOL/L — LOW (ref 22–31)
CREAT SERPL-MCNC: 0.31 MG/DL — LOW (ref 0.5–1.3)
EGFR: 131 ML/MIN/1.73M2 — SIGNIFICANT CHANGE UP
EOSINOPHIL # BLD AUTO: 0.15 K/UL — SIGNIFICANT CHANGE UP (ref 0–0.5)
EOSINOPHIL NFR BLD AUTO: 5.5 % — SIGNIFICANT CHANGE UP (ref 0–6)
GLUCOSE BLDC GLUCOMTR-MCNC: 100 MG/DL — HIGH (ref 70–99)
GLUCOSE BLDC GLUCOMTR-MCNC: 102 MG/DL — HIGH (ref 70–99)
GLUCOSE BLDC GLUCOMTR-MCNC: 105 MG/DL — HIGH (ref 70–99)
GLUCOSE BLDC GLUCOMTR-MCNC: 108 MG/DL — HIGH (ref 70–99)
GLUCOSE SERPL-MCNC: 102 MG/DL — HIGH (ref 70–99)
GLUCOSE SERPL-MCNC: 114 MG/DL — HIGH (ref 70–99)
HCT VFR BLD CALC: 22.4 % — LOW (ref 39–50)
HGB BLD-MCNC: 7.3 G/DL — LOW (ref 13–17)
IANC: 1.71 K/UL — LOW (ref 1.8–7.4)
IMM GRANULOCYTES NFR BLD AUTO: 2.9 % — HIGH (ref 0–0.9)
LYMPHOCYTES # BLD AUTO: 0.65 K/UL — LOW (ref 1–3.3)
LYMPHOCYTES # BLD AUTO: 23.9 % — SIGNIFICANT CHANGE UP (ref 13–44)
MAGNESIUM SERPL-MCNC: 1.7 MG/DL — SIGNIFICANT CHANGE UP (ref 1.6–2.6)
MAGNESIUM SERPL-MCNC: 1.7 MG/DL — SIGNIFICANT CHANGE UP (ref 1.6–2.6)
MCHC RBC-ENTMCNC: 30.3 PG — SIGNIFICANT CHANGE UP (ref 27–34)
MCHC RBC-ENTMCNC: 32.6 G/DL — SIGNIFICANT CHANGE UP (ref 32–36)
MCV RBC AUTO: 92.9 FL — SIGNIFICANT CHANGE UP (ref 80–100)
MONOCYTES # BLD AUTO: 0.12 K/UL — SIGNIFICANT CHANGE UP (ref 0–0.9)
MONOCYTES NFR BLD AUTO: 4.4 % — SIGNIFICANT CHANGE UP (ref 2–14)
NEUTROPHILS # BLD AUTO: 1.71 K/UL — LOW (ref 1.8–7.4)
NEUTROPHILS NFR BLD AUTO: 62.9 % — SIGNIFICANT CHANGE UP (ref 43–77)
NRBC # BLD: 0 /100 WBCS — SIGNIFICANT CHANGE UP (ref 0–0)
NRBC # FLD: 0.02 K/UL — HIGH (ref 0–0)
PHOSPHATE SERPL-MCNC: 2.2 MG/DL — LOW (ref 2.5–4.5)
PLATELET # BLD AUTO: 141 K/UL — LOW (ref 150–400)
POTASSIUM SERPL-MCNC: 2.9 MMOL/L — CRITICAL LOW (ref 3.5–5.3)
POTASSIUM SERPL-SCNC: 2.9 MMOL/L — CRITICAL LOW (ref 3.5–5.3)
RBC # BLD: 2.41 M/UL — LOW (ref 4.2–5.8)
RBC # FLD: 19.5 % — HIGH (ref 10.3–14.5)
SODIUM SERPL-SCNC: 148 MMOL/L — HIGH (ref 135–145)
WBC # BLD: 2.72 K/UL — LOW (ref 3.8–10.5)
WBC # FLD AUTO: 2.72 K/UL — LOW (ref 3.8–10.5)

## 2024-11-04 PROCEDURE — 99233 SBSQ HOSP IP/OBS HIGH 50: CPT

## 2024-11-04 PROCEDURE — 99232 SBSQ HOSP IP/OBS MODERATE 35: CPT

## 2024-11-04 RX ORDER — SODIUM,POTASSIUM PHOSPHATES 278-250MG
1 POWDER IN PACKET (EA) ORAL
Refills: 0 | Status: DISCONTINUED | OUTPATIENT
Start: 2024-11-04 | End: 2024-11-04

## 2024-11-04 RX ORDER — POTASSIUM CHLORIDE 600 MG/1
10 TABLET, EXTENDED RELEASE ORAL
Refills: 0 | Status: COMPLETED | OUTPATIENT
Start: 2024-11-04 | End: 2024-11-04

## 2024-11-04 RX ORDER — 0.9 % SODIUM CHLORIDE 0.9 %
1000 INTRAVENOUS SOLUTION INTRAVENOUS
Refills: 0 | Status: DISCONTINUED | OUTPATIENT
Start: 2024-11-04 | End: 2024-11-05

## 2024-11-04 RX ORDER — POTASSIUM PHOSPHATE, MONOBASIC POTASSIUM PHOSPHATE, DIBASIC INJECTION, 236; 224 MG/ML; MG/ML
15 SOLUTION, CONCENTRATE INTRAVENOUS ONCE
Refills: 0 | Status: COMPLETED | OUTPATIENT
Start: 2024-11-04 | End: 2024-11-04

## 2024-11-04 RX ADMIN — Medication 20 MILLIGRAM(S): at 22:14

## 2024-11-04 RX ADMIN — CEFEPIME 100 MILLIGRAM(S): 2 INJECTION, POWDER, FOR SOLUTION INTRAVENOUS at 06:34

## 2024-11-04 RX ADMIN — ACETAMINOPHEN, DIPHENHYDRAMINE HCL, PHENYLEPHRINE HCL 6 MILLIGRAM(S): 325; 25; 5 TABLET ORAL at 22:13

## 2024-11-04 RX ADMIN — Medication 145 MILLIGRAM(S): at 12:20

## 2024-11-04 RX ADMIN — METRONIDAZOLE 100 MILLIGRAM(S): 500 TABLET ORAL at 17:08

## 2024-11-04 RX ADMIN — Medication 75 MILLILITER(S): at 17:16

## 2024-11-04 RX ADMIN — CHLORHEXIDINE GLUCONATE 1 APPLICATION(S): 1.2 RINSE ORAL at 12:23

## 2024-11-04 RX ADMIN — NYSTATIN 500000 UNIT(S): 500000 TABLET, FILM COATED ORAL at 16:24

## 2024-11-04 RX ADMIN — POTASSIUM CHLORIDE 100 MILLIEQUIVALENT(S): 600 TABLET, EXTENDED RELEASE ORAL at 12:24

## 2024-11-04 RX ADMIN — METRONIDAZOLE 100 MILLIGRAM(S): 500 TABLET ORAL at 07:02

## 2024-11-04 RX ADMIN — Medication 75 MILLILITER(S): at 02:08

## 2024-11-04 RX ADMIN — Medication 1 TABLET(S): at 12:22

## 2024-11-04 RX ADMIN — PREDNISONE 5 MILLIGRAM(S): 20 TABLET ORAL at 12:20

## 2024-11-04 RX ADMIN — CEFEPIME 100 MILLIGRAM(S): 2 INJECTION, POWDER, FOR SOLUTION INTRAVENOUS at 22:22

## 2024-11-04 RX ADMIN — POTASSIUM CHLORIDE 100 MILLIEQUIVALENT(S): 600 TABLET, EXTENDED RELEASE ORAL at 14:27

## 2024-11-04 RX ADMIN — POTASSIUM CHLORIDE 100 MILLIEQUIVALENT(S): 600 TABLET, EXTENDED RELEASE ORAL at 20:42

## 2024-11-04 RX ADMIN — Medication 1 TABLET(S): at 12:21

## 2024-11-04 RX ADMIN — PANTOPRAZOLE SODIUM 40 MILLIGRAM(S): 40 TABLET, DELAYED RELEASE ORAL at 12:21

## 2024-11-04 RX ADMIN — POTASSIUM CHLORIDE 100 MILLIEQUIVALENT(S): 600 TABLET, EXTENDED RELEASE ORAL at 17:09

## 2024-11-04 RX ADMIN — POTASSIUM CHLORIDE 100 MILLIEQUIVALENT(S): 600 TABLET, EXTENDED RELEASE ORAL at 19:28

## 2024-11-04 RX ADMIN — POTASSIUM CHLORIDE 100 MILLIEQUIVALENT(S): 600 TABLET, EXTENDED RELEASE ORAL at 18:26

## 2024-11-04 RX ADMIN — CEFEPIME 100 MILLIGRAM(S): 2 INJECTION, POWDER, FOR SOLUTION INTRAVENOUS at 12:52

## 2024-11-04 RX ADMIN — Medication 1 MILLIGRAM(S): at 22:13

## 2024-11-04 RX ADMIN — POTASSIUM PHOSPHATE, MONOBASIC POTASSIUM PHOSPHATE, DIBASIC INJECTION, 62.5 MILLIMOLE(S): 236; 224 SOLUTION, CONCENTRATE INTRAVENOUS at 12:25

## 2024-11-04 RX ADMIN — DIPHENHYDRAMINE HYDROCHLORIDE AND LIDOCAINE HYDROCHLORIDE AND ALUMINUM HYDROXIDE AND MAGNESIUM HYDRO 30 MILLILITER(S): KIT at 22:18

## 2024-11-04 RX ADMIN — Medication 75 MILLILITER(S): at 19:30

## 2024-11-04 RX ADMIN — NYSTATIN 500000 UNIT(S): 500000 TABLET, FILM COATED ORAL at 22:17

## 2024-11-04 NOTE — PROGRESS NOTE ADULT - SUBJECTIVE AND OBJECTIVE BOX
Date of Service: 11-04-24 @ 10:49    Patient is a 64y old  Male who presents with a chief complaint of Pain (03 Nov 2024 16:41)      Any change in ROS: He seems OK:  wife at bedside:  he is alert and awake and is on room air:     MEDICATIONS  (STANDING):  abiraterone 500 mg tablet 2 Tablet(s) 2 Tablet(s) Oral daily  acetaminophen     Tablet .. 650 milliGRAM(s) Oral once  atorvastatin 20 milliGRAM(s) Oral at bedtime  cefepime   IVPB 2000 milliGRAM(s) IV Intermittent every 8 hours  cefepime   IVPB      chlorhexidine 2% Cloths 1 Application(s) Topical daily  dextrose 5% 1000 milliLiter(s) (75 mL/Hr) IV Continuous <Continuous>  dextrose 5%. 1000 milliLiter(s) (50 mL/Hr) IV Continuous <Continuous>  dextrose 5%. 1000 milliLiter(s) (100 mL/Hr) IV Continuous <Continuous>  dextrose 50% Injectable 25 Gram(s) IV Push once  dextrose 50% Injectable 12.5 Gram(s) IV Push once  dextrose 50% Injectable 25 Gram(s) IV Push once  diphenhydrAMINE 25 milliGRAM(s) Oral once  fenofibrate Tablet 145 milliGRAM(s) Oral daily  FIRST- Mouthwash  BLM 30 milliLiter(s) Swish and Spit three times a day  glucagon  Injectable 1 milliGRAM(s) IntraMuscular once  haloperidol     Tablet 1 milliGRAM(s) Oral <User Schedule>  influenza  Vaccine (HIGH DOSE) 0.5 milliLiter(s) IntraMuscular once  insulin lispro (ADMELOG) corrective regimen sliding scale   SubCutaneous at bedtime  insulin lispro (ADMELOG) corrective regimen sliding scale   SubCutaneous three times a day before meals  lactobacillus acidophilus 1 Tablet(s) Oral daily  lidocaine   4% Patch 1 Patch Transdermal every 24 hours  melatonin 6 milliGRAM(s) Oral at bedtime  metroNIDAZOLE  IVPB 500 milliGRAM(s) IV Intermittent every 12 hours  multivitamin 1 Tablet(s) Oral daily  Nephro-nancy 1 Tablet(s) Oral daily  nystatin    Suspension 796595 Unit(s) Oral three times a day  pantoprazole  Injectable 40 milliGRAM(s) IV Push daily  potassium chloride  10 mEq/100 mL IVPB 10 milliEquivalent(s) IV Intermittent every 1 hour  potassium phosphate IVPB 15 milliMole(s) IV Intermittent once  predniSONE   Tablet 5 milliGRAM(s) Oral daily    MEDICATIONS  (PRN):  acetaminophen     Tablet .. 650 milliGRAM(s) Oral every 6 hours PRN Temp greater or equal to 38C (100.4F), Mild Pain (1 - 3), Moderate Pain (4 - 6)  aluminum hydroxide/magnesium hydroxide/simethicone Suspension 30 milliLiter(s) Oral every 4 hours PRN Dyspepsia  artificial tears (preservative free) Ophthalmic Solution 1 Drop(s) Both EYES four times a day PRN Dry Eyes  dextrose Oral Gel 15 Gram(s) Oral once PRN Blood Glucose LESS THAN 70 milliGRAM(s)/deciliter  morphine  - Injectable 4 milliGRAM(s) IV Push every 3 hours PRN Severe Pain (7 - 10)  morphine  - Injectable 2 milliGRAM(s) IV Push every 4 hours PRN Moderate Pain (4 - 6)  naloxone Injectable 0.1 milliGRAM(s) IV Push every 3 minutes PRN For ANY of the following changes in patient status:  A. RR LESS THAN 10 breaths per minute, B. Oxygen saturation LESS THAN 90%, C. Sedation score of 6  polyethylene glycol 3350 17 Gram(s) Oral daily PRN Constipation    Vital Signs Last 24 Hrs  T(C): 36.8 (04 Nov 2024 06:00), Max: 36.8 (03 Nov 2024 21:00)  T(F): 98.2 (04 Nov 2024 06:00), Max: 98.3 (03 Nov 2024 21:00)  HR: 103 (04 Nov 2024 06:00) (98 - 112)  BP: 122/79 (04 Nov 2024 06:00) (112/74 - 128/81)  BP(mean): --  RR: 18 (04 Nov 2024 06:00) (17 - 18)  SpO2: 98% (04 Nov 2024 06:00) (97% - 100%)    Parameters below as of 04 Nov 2024 06:00  Patient On (Oxygen Delivery Method): room air        I&O's Summary    03 Nov 2024 07:01  -  04 Nov 2024 07:00  --------------------------------------------------------  IN: 450 mL / OUT: 1900 mL / NET: -1450 mL          Physical Exam:   GENERAL: NAD, well-groomed, well-developed  HEENT: CHAVA/   Atraumatic, Normocephalic  ENMT: mucosal ulceration   NECK: Supple, No JVD, Normal thyroid  CHEST/LUNG: Clear to auscultaion, ; No rales, rhonchi, wheezing, or rubs  CVS: Regular rate and rhythm; No murmurs, rubs, or gallops  GI: : Soft, Nontender, Nondistended; Bowel sounds present  NERVOUS SYSTEM:  Alert & Oriented X3  EXTREMITIES: - edema  LYMPH: No lymphadenopathy noted  SKIN: No rashes or lesions  ENDOCRINOLOGY: No Thyromegaly  PSYCH: Appropriate    Labs:                              7.3    2.72  )-----------( 141      ( 04 Nov 2024 06:43 )             22.4                         7.6    2.63  )-----------( 149      ( 03 Nov 2024 06:50 )             23.6                         8.2    2.37  )-----------( 148      ( 02 Nov 2024 05:21 )             26.5                         7.7    2.29  )-----------( 142      ( 01 Nov 2024 06:05 )             23.5     11-04    148[H]  |  120[H]  |  7   ----------------------------<  102[H]  2.9[LL]   |  20[L]  |  0.31[L]  11-03    151[H]  |  123[H]  |  8   ----------------------------<  133[H]  3.4[L]   |  20[L]  |  0.34[L]  11-02    150[H]  |  120[H]  |  8   ----------------------------<  120[H]  3.0[L]   |  20[L]  |  0.30[L]  11-02    146[H]  |  119[H]  |  8   ----------------------------<  126[H]  3.6   |  17[L]  |  0.33[L]  11-01    148[H]  |  119[H]  |  10  ----------------------------<  138[H]  3.3[L]   |  18[L]  |  0.31[L]  11-01    146[H]  |  118[H]  |  10  ----------------------------<  117[H]  3.2[L]   |  18[L]  |  0.32[L]  11-01    145  |  117[H]  |  10  ----------------------------<  88  2.9[LL]   |  17[L]  |  0.30[L]  11-01    146[H]  |  117[H]  |  10  ----------------------------<  92  2.5[LL]   |  18[L]  |  0.33[L]  10-31    146[H]  |  116[H]  |  10  ----------------------------<  105[H]  2.4[LL]   |  19[L]  |  0.31[L]    Ca    7.3[L]      04 Nov 2024 06:43  Ca    7.4[L]      03 Nov 2024 06:50  Ca    7.1[L]      02 Nov 2024 18:08  Phos  2.2     11-04  Phos  2.5     11-03  Phos  2.6     11-02  Mg     1.70     11-04  Mg     1.80     11-03  Mg     1.80     11-02      CAPILLARY BLOOD GLUCOSE      POCT Blood Glucose.: 105 mg/dL (04 Nov 2024 08:25)  POCT Blood Glucose.: 132 mg/dL (03 Nov 2024 20:46)  POCT Blood Glucose.: 128 mg/dL (03 Nov 2024 18:33)  POCT Blood Glucose.: 140 mg/dL (03 Nov 2024 12:19)          Urinalysis Basic - ( 04 Nov 2024 06:43 )    Color: x / Appearance: x / SG: x / pH: x  Gluc: 102 mg/dL / Ketone: x  / Bili: x / Urobili: x   Blood: x / Protein: x / Nitrite: x   Leuk Esterase: x / RBC: x / WBC x   Sq Epi: x / Non Sq Epi: x / Bacteria: x            RECENT CULTURES:  10-31 @ 07:47 .Blood BLOOD       rad< from: Xray Chest 1 View- PORTABLE-Urgent (Xray Chest 1 View- PORTABLE-Urgent .) (10.31.24 @ 03:52) >      INTERPRETATION:  CLINICAL INFORMATION: Fever    Time of Exam:  October 31, 2024 at 3:46 AM    EXAM:  Frontal Chest    FINDINGS:  Both lungs are equally aerated and free of any focal abnormalities.  The heart is not enlarged and there is no effusion or pneumothorax.  The bones show no acute findings.      COMPARISON: October 20, 2024    < end of copied text >           No growth at 72 Hours    10-31 @ 07:44 .Blood BLOOD                No growth at 72 Hours          RESPIRATORY CULTURES:      Clostridium difficile GD Toxins A&amp;B, EIA:   Negative (11-01 @ 07:45)      Studies  Chest X-RAY  CT SCAN Chest   Venous Dopplers: LE:   CT Abdomen  Others

## 2024-11-04 NOTE — PROGRESS NOTE ADULT - ASSESSMENT
This is a 63 y/o M w/ prostate CA s/p radical prostatectomy now metastatic, admitted 10/12 for pain, N/V in setting of osseous mets. S/p docetaxel on 10/13, abiraterone 10/12.  Found to be neutropenic- started on Zarxio.  C/b hypoxia, tachycardia, fever, diarrhea on 10/18. C diff negative,  CT A/P with pancolitis, SMA dissection c/f ischemic colitis.  Started on Vancomycin/Zosyn for suspicion neutropenic enterocolitis     Blood cultures 10/19 grew E coli and H influenza   Blood culture 10/22 +  Blood cultures cleared 10/24    #Neutropenic fever resolved   afebrile   ANC 1.7  #E Coli and H influenzae bacteremia 10/16  likely due to neutropenic enterocolitis    cleared 10/24  #SMA dissection, c/f ischemic colitis   #Diarrhea   GI PCR 11/1 not detected       Recommendations:      C/w Cefepime 2 g q8 and Metronidazole 500 mg BID until 11/6   (2 weeks post clearance of bacteremia)   if c/w diarrhea check stool for c dif

## 2024-11-04 NOTE — PROGRESS NOTE ADULT - RESPIRATORY DISTRESS OBSERVATION: RESPIRATORY RATE
Less than or equal to 18 breaths
Less than or equal to 18 breaths
19 – 30 breaths
Less than or equal to 18 breaths

## 2024-11-04 NOTE — PROGRESS NOTE ADULT - SUBJECTIVE AND OBJECTIVE BOX
[facility]  Melissa Davies Palliative Care Attending  Contact Info: Page [phone] (including Nights/Weekends), message on Microsoft Teams (Melissa Davies), or leave  at Palliative Office [phone] (non-urgent)   Date of [id number] @ 14:05    SUBJECTIVE AND OBJECTIVE: Patient seen this AM with wife at bedside. Patient resting comfortably. Mental status has improved, only tolerating small amounts of food. Pt with rectal tube in place and continued liquid stools.     Indication for Geriatrics and Palliative Care Services/INTERVAL HPI: sx management and goc in setting of advanced malignancy     OVERNIGHT EVENTS:  > 10/21: Weekend events reviewed. Over the past 24 hours, patient was transitioned off pca pump. He required 4mg IV morphine x1.   > 10/22: Over the past 24 hours, patient did not require IV morphine PRNs. He did require IM Zyprexa x2. He is febrile.   > 10/23: Over the past 24 hours, patient did not required PRNs.   > 10/24: Over the past 24 hours, patient required PRNs of haldol 2.5mg x2 for agitation.   > 10/28: Weekend events reviewed. It appears patient was off Morphine gtt as he pulled IV access during agitation episode.   > 10/29: Over the past 24 hours, patient did not require PRNs. He is getting 1u PRBCs.   > 10/30: Over the past 24 hours, no PRNs required.   > 10/31: Over the past 24 hours, patient required 1mg IV haldol x1. Febrile to 101.4 this AM.   > 11/4: Weekend events reviewed. No PRNs required.     DNR on chart:DNI: Trial NIV  DNI: Trial NIV      Allergies    No Known Allergies    Intolerances    MEDICATIONS  (STANDING):  abiraterone 500 mg tablet 2 Tablet(s) 2 Tablet(s) Oral daily  acetaminophen     Tablet .. 650 milliGRAM(s) Oral once  atorvastatin 20 milliGRAM(s) Oral at bedtime  cefepime   IVPB 2000 milliGRAM(s) IV Intermittent every 8 hours  cefepime   IVPB      chlorhexidine 2% Cloths 1 Application(s) Topical daily  dextrose 5% 1000 milliLiter(s) (75 mL/Hr) IV Continuous <Continuous>  dextrose 5% 1000 milliLiter(s) (75 mL/Hr) IV Continuous <Continuous>  dextrose 5%. 1000 milliLiter(s) (50 mL/Hr) IV Continuous <Continuous>  dextrose 5%. 1000 milliLiter(s) (100 mL/Hr) IV Continuous <Continuous>  dextrose 50% Injectable 25 Gram(s) IV Push once  dextrose 50% Injectable 25 Gram(s) IV Push once  dextrose 50% Injectable 12.5 Gram(s) IV Push once  diphenhydrAMINE 25 milliGRAM(s) Oral once  fenofibrate Tablet 145 milliGRAM(s) Oral daily  FIRST- Mouthwash  BLM 30 milliLiter(s) Swish and Spit three times a day  glucagon  Injectable 1 milliGRAM(s) IntraMuscular once  haloperidol     Tablet 1 milliGRAM(s) Oral <User Schedule>  influenza  Vaccine (HIGH DOSE) 0.5 milliLiter(s) IntraMuscular once  insulin lispro (ADMELOG) corrective regimen sliding scale   SubCutaneous at bedtime  insulin lispro (ADMELOG) corrective regimen sliding scale   SubCutaneous three times a day before meals  lactobacillus acidophilus 1 Tablet(s) Oral daily  lidocaine   4% Patch 1 Patch Transdermal every 24 hours  melatonin 6 milliGRAM(s) Oral at bedtime  metroNIDAZOLE  IVPB 500 milliGRAM(s) IV Intermittent every 12 hours  multivitamin 1 Tablet(s) Oral daily  Nephro-nancy 1 Tablet(s) Oral daily  nystatin    Suspension 253468 Unit(s) Oral three times a day  pantoprazole  Injectable 40 milliGRAM(s) IV Push daily  potassium chloride  10 mEq/100 mL IVPB 10 milliEquivalent(s) IV Intermittent every 1 hour  predniSONE   Tablet 5 milliGRAM(s) Oral daily    MEDICATIONS  (PRN):  acetaminophen     Tablet .. 650 milliGRAM(s) Oral every 6 hours PRN Temp greater or equal to 38C (100.4F), Mild Pain (1 - 3), Moderate Pain (4 - 6)  aluminum hydroxide/magnesium hydroxide/simethicone Suspension 30 milliLiter(s) Oral every 4 hours PRN Dyspepsia  artificial tears (preservative free) Ophthalmic Solution 1 Drop(s) Both EYES four times a day PRN Dry Eyes  dextrose Oral Gel 15 Gram(s) Oral once PRN Blood Glucose LESS THAN 70 milliGRAM(s)/deciliter  morphine  - Injectable 4 milliGRAM(s) IV Push every 3 hours PRN Severe Pain (7 - 10)  morphine  - Injectable 2 milliGRAM(s) IV Push every 4 hours PRN Moderate Pain (4 - 6)  naloxone Injectable 0.1 milliGRAM(s) IV Push every 3 minutes PRN For ANY of the following changes in patient status:  A. RR LESS THAN 10 breaths per minute, B. Oxygen saturation LESS THAN 90%, C. Sedation score of 6  polyethylene glycol 3350 17 Gram(s) Oral daily PRN Constipation      ITEMS UNCHECKED ARE NOT PRESENT    PRESENT SYMPTOMS: [ ]Unable to self-report - see [ ] CPOT [ ] PAINADS [ ] RDOS  Source if other than patient:  [ ]Family   [ ]Team     Pain:  [ ]yes [x ]no  QOL impact -   Location -                    Aggravating factors -  Quality -  Radiation -  Timing-  Severity (0-10 scale):  Minimal acceptable level/ pain goal (0-10 scale):     CPOT:    https://www.[facility].org/getattachment/kcw18b39-7j1t-[email or web address]-[email or web address]-[email or web address]/Critical-Care-Pain-Observation-Tool-(CPOT)    Dyspnea:                           [ ]Mild [ ]Moderate [ ]Severe  Anxiety:                             [ ]Mild [ ]Moderate [ ]Severe  Fatigue:                             [ ]Mild [ ]Moderate [ ]Severe  Nausea:                             [ ]Mild [ ]Moderate [ ]Severe  Loss of appetite:              [ ]Mild [ ]Moderate [ ]Severe  Constipation:                    [ ]Mild [ ]Moderate [ ]Severe  Other Symptoms: diarrhea   [ x]All other review of systems negative     PCSSQ[Palliative Care Spiritual Screening Question]   Severity (0-10):  Score of 4 or > indicate consideration of Chaplaincy referral.  Chaplaincy Referral: [ ] yes [ ] refused [ ] following [ x] deferred    Caregiver [place]? : [ ] yes [ ] no [x ] Deferred [ ] Declined             Social work referral [ ] Patient & Family Centered Care Referral [ ]  Anticipatory Grief present?:  [ ] yes [ ] no  [x ] Deferred                  Social work referral [ ] Patient & Family Centered Care Referral [ ]      PHYSICAL EXAM:  Vital Signs Last 24 Hrs  T(C): 36.7 (04 Nov 2024 12:38), Max: 36.8 (03 Nov 2024 21:00)  T(F): 98.1 (04 Nov 2024 12:38), Max: 98.3 (03 Nov 2024 21:00)  HR: 95 (04 Nov 2024 12:38) (95 - 107)  BP: 113/69 (04 Nov 2024 12:38) (105/74 - 122/79)  BP(mean): --  RR: 18 (04 Nov 2024 12:38) (17 - 18)  SpO2: 99% (04 Nov 2024 12:38) (97% - 99%)    Parameters below as of 04 Nov 2024 12:38  Patient On (Oxygen Delivery Method): room air     I&O's Summary    03 Nov 2024 07:01  -  04 Nov 2024 07:00  --------------------------------------------------------  IN: 450 mL / OUT: 1900 mL / NET: -1450 mL    04 Nov 2024 07:01  -  04 Nov 2024 14:05  --------------------------------------------------------  IN: 150 mL / OUT: 275 mL / NET: -125 mL       GENERAL: [x ]Cachexia    [ ]Alert  [ ]Oriented x   [x ]Lethargic  [ ]Unarousable  [ ]Verbal  [ ]Non-Verbal  Behavioral:   [ ]Anxiety  [ ]Delirium [ ]Agitation [x ]Other- calm   HEENT:  [ ]Normal   [x ]Dry mouth   [ ]ET Tube/Trach  [ ]Oral lesions  PULMONARY:   [ ]Clear [ ]Tachypnea  [ x]Audible excessive secretions   [ ]Rhonchi        [ ]Right [ ]Left [ ]Bilateral  [ ]Crackles        [ ]Right [ ]Left [ ]Bilateral  [ ]Wheezing     [ ]Right [ ]Left [ ]Bilateral  [ ]Diminished BS [ ] Right [ ]Left [ ]Bilateral  CARDIOVASCULAR:    [ ]Regular [ ]Irregular [x ]Tachy  [ ]Rodrick [ ]Murmur [ ]Other  GASTROINTESTINAL:  [x ]Soft  [ ]Distended   [x ]+BS  [ ]Non tender [ ]Tender  [ ]Other [ ]PEG [ ]OGT/ NGT   Last BM: rectal tube   GENITOURINARY:  [ ]Normal [ ]Incontinent   [ ]Oliguria/Anuria   [x ]Farah  MUSCULOSKELETAL:   [ ]Normal   [x ]Weakness  [ ]Bed/Wheelchair bound [ ]Edema  NEUROLOGIC:   [ ]No focal deficits  [ ] Cognitive impairment  [ ] Dysphagia [ ]Dysarthria [ ] Paresis [ ]Other   SKIN:   [ ]Normal  [ ]Rash  [x ]Other- scratches on face   [ ]Pressure ulcer(s) [ ]y [ ]n present on admission      CRITICAL CARE:  [ ]Shock Present  [ ]Septic [ ]Cardiogenic [ ]Neurologic [ ]Hypovolemic  [ ]Vasopressors [ ]Inotropes  [ ]Respiratory failure present [ ]Mechanical Ventilation [ ]Non-invasive ventilatory support [ ]High-Flow   [ ]Acute  [ ]Chronic [ ]Hypoxic  [ ]Hypercarbic [ ]Other  [ ]Other organ failure     LABS:                        7.3    2.72  )-----------( 141      ( 04 Nov 2024 06:43 )             22.4   11-04    148[H]  |  120[H]  |  7   ----------------------------<  102[H]  2.9[LL]   |  20[L]  |  0.31[L]    Ca    7.3[L]      04 Nov 2024 06:43  Phos  2.2     11-04  Mg     1.70     11-04        Urinalysis Basic - ( 04 Nov 2024 06:43 )    Color: x / Appearance: x / SG: x / pH: x  Gluc: 102 mg/dL / Ketone: x  / Bili: x / Urobili: x   Blood: x / Protein: x / Nitrite: x   Leuk Esterase: x / RBC: x / WBC x   Sq Epi: x / Non Sq Epi: x / Bacteria: x      RADIOLOGY & ADDITIONAL STUDIES: no new     Protein Calorie Malnutrition Present: [ ]mild [ ]moderate [ ]severe [ ]underweight [ ]morbid obesity  https://www.andeal.org/vault/4318/web/files/ONC/Table_Clinical%20Characteristics%20to%20Document%20Malnutrition-White%20JV%20et%20al%202012.pdf    Height (cm): 172.7 (10-12-24 @ 09:21)  Weight (kg): 69.4 (10-12-24 @ 09:21)  BMI (kg/m2): 23.3 (10-12-24 @ 09:21)    [ ]PPSV2 < or = 30%  [ ]significant weight loss [ ]poor nutritional intake [ ]anasarca[ ]Artificial Nutrition    Other REFERRALS:  [ ]Hospice  [ ]Child Life  [ ]Social Work  [ ]Case management [ ]Holistic Therapy

## 2024-11-04 NOTE — PROGRESS NOTE ADULT - PROBLEM SELECTOR PLAN 3
Patient with episode of agitation overnight requiring Haldol.   - Behavioral health recs appreciated: Zyprexa discontinued per son's request. Haldol prn per psych recs. hold med if QTC > 500  - can discontinue HALDOL 1MG AT 9PM as pt has been refusing   - EKG performed QTc 436 on 10/17

## 2024-11-04 NOTE — PROGRESS NOTE ADULT - TIME BILLING
Reviewing the chart, interpreting lab data, discussing case with team, interview and examination of patient, and documentation. Pt is at high risk of mortality due to his disease.
Time spent for extensive review of the physical chart, electronic medical record, and documentation to obtain collateral information including but not limited to:  [x ] Inpatient records (ED, H&P, primary team, and consultants if applicable, care coordination)  [x ] Inpatient values/results (biomarkers, immunoassays, imaging, and microbiology results)  [x ] Current or proposed treatment plans  [ x ] Discussion with the primary team  [x ] Discussion with the patient, surrogate decision maker, or family    Time spent: >50 min

## 2024-11-04 NOTE — PROGRESS NOTE ADULT - PROBLEM/PLAN-5
DISPLAY PLAN FREE TEXT
Home Suture Removal Text: Patient was provided instructions on removing sutures and will remove their sutures at home.  If they have any questions or difficulties they will call the office.

## 2024-11-04 NOTE — PROGRESS NOTE ADULT - SUBJECTIVE AND OBJECTIVE BOX
Infectious Diseases Follow Up:    Patient is a 64y old  Male who presents with a chief complaint of Pain (01 Nov 2024 08:06)      Interval History/ROS:    afebrile over weekend    flexiseal in place   getting electrolytes replaced     ANC 1.7    Allergies  No Known Allergies        ANTIMICROBIALS:  cefepime   IVPB 2000 every 8 hours  cefepime   IVPB    metroNIDAZOLE  IVPB 500 every 12 hours  nystatin    Suspension 921130 three times a day        OTHER MEDS:   MEDICATIONS  (STANDING):  acetaminophen     Tablet .. 650 every 6 hours PRN  acetaminophen     Tablet .. 650 once  aluminum hydroxide/magnesium hydroxide/simethicone Suspension 30 every 4 hours PRN  atorvastatin 20 at bedtime  dextrose 50% Injectable 25 once  dextrose 50% Injectable 12.5 once  dextrose 50% Injectable 25 once  dextrose Oral Gel 15 once PRN  diphenhydrAMINE 25 once  fenofibrate Tablet 145 daily  glucagon  Injectable 1 once  haloperidol     Tablet 1 <User Schedule>  influenza  Vaccine (HIGH DOSE) 0.5 once  insulin lispro (ADMELOG) corrective regimen sliding scale  three times a day before meals  insulin lispro (ADMELOG) corrective regimen sliding scale  at bedtime  melatonin 6 at bedtime  morphine   Solution 5 every 4 hours PRN  morphine   Solution 2.5 every 4 hours PRN  pantoprazole  Injectable 40 daily  polyethylene glycol 3350 17 daily PRN  predniSONE   Tablet 5 daily      cefepime   IVPB 2000 every 8 hours  cefepime   IVPB    metroNIDAZOLE  IVPB 500 every 12 hours  nystatin    Suspension 840902 three times a day      Vital Signs Last 24 Hrs  T(F): 98.1 (11-04-24 @ 12:38), Max: 98.3 (11-03-24 @ 21:00)  HR: 95 (11-04-24 @ 12:38)  BP: 113/69 (11-04-24 @ 12:38)  RR: 18 (11-04-24 @ 12:38)  SpO2: 99% (11-04-24 @ 12:38) (97% - 99%)        PHYSICAL EXAM:  GENERAL:  weak, non toxic   CHEST/LUNG: Clear to auscultation bilaterally  HEART: s1s2  ABDOMEN: Soft, Nontender, Nondistended; Bowel sounds present  flexiseal  peripheral iv left arm                             7.3    2.72  )-----------( 141      ( 04 Nov 2024 06:43 )             22.4 11-04    148  |  120  |  7   ----------------------------<  102  2.9   |  20  |  0.31  Ca    7.3      04 Nov 2024 06:43Phos  2.2     11-04Mg     1.70     11-04        Urine Microscopic-Add On (NC) (10.22.24 @ 12:22)   Review: Reviewed  Epithelial Cells: 2 /HPF  Cast: 13 /LPF  Bacteria: Negative /HPF  Red Blood Cell - Urine: 402 /HPF  White Blood Cell - Urine: 3 /HPF    GI PCR Panel Stool (11.01.24 @ 07:45)   GI PCR Panel: [facility]:      MICROBIOLOGY:  v  .Blood BLOOD  10-24-24   No growth at 5 days  --  --      .Blood BLOOD  10-24-24   No growth at 5 days  --  --      Clean Catch Clean Catch (Midstream)  10-22-24   <10,000 CFU/mL Normal Urogenital Sherlyn  --  --      .Blood BLOOD  10-22-24   Growth in aerobic bottle: Escherichia coli  --  Escherichia coli      .Blood BLOOD  10-21-24   No growth at 5 days  --  --      .Blood BLOOD  10-21-24   No growth at 5 days  --  --      .Blood BLOOD  10-19-24   Growth in aerobic and anaerobic bottles: Escherichia coli  See previous culture [id number]  --    Growth in aerobic bottle: Gram Negative Rods  Growth in anaerobic bottle: Gram Negative Rods      .Blood BLOOD  10-19-24   Growth in aerobic and anaerobic bottles: Escherichia coli  See previous culture [id number]  --    Growth in anaerobic bottle: Gram Negative Rods  Growth in aerobic bottle: Gram Negative Rods      .Blood BLOOD  10-19-24   Growth in aerobic and anaerobic bottles: Escherichia coli  See previous culture [id number]  --    Growth in aerobic and anaerobic bottles: Gram Negative Rods and Gram  Negative Coccobacilli      .Blood BLOOD  10-19-24   Growth in aerobic and anaerobic bottles: Escherichia coli  Direct identification is available within approximately 3-5  hours either by Blood Panel Multiplexed PCR or Direct  MALDI-TOF. Details: [email or web address]  --  Blood Culture PCR  Escherichia coli        RADIOLOGY:    < from: Xray Chest 1 View- PORTABLE-Urgent (Xray Chest 1 View- PORTABLE-Urgent .) (10.31.24 @ 03:52) >    COMPARISON: October 20, 2024        IMPRESSION: Clear lungs.    < end of copied text >  < from: CT Angio Abdomen and Pelvis w/ IV Cont (10.19.24 @ 18:39) >    ACC: [id number] EXAM:  CT ANGIO ABD PELV (W)AW IC   ORDERED BY: SHERRILL HEDRICK     PROCEDURE DATE:  10/19/2024          INTERPRETATION:  CLINICAL INFORMATION: IR CT with findings concerning for   dissection of the SMA.    COMPARISON: CT abdomen pelvis 10/18/2024    CONTRAST/COMPLICATIONS:  IV Contrast: Omnipaque 350  90 cc administered   10 cc discarded  Oral Contrast: NONE  Complications: None reported at time of study completion    PROCEDURE:  CT Angiography of the Abdomen and Pelvis.  Arterial phase images were acquired.  Sagittal and coronal reformats were performed as well as 3D (MIP)   reconstructions.    FINDINGS:  LOWER CHEST: Within normal limits.    LIVER: Within normal limits.  BILE DUCTS: CBD measures roughly 8 mm in diameter likely representing   postcholecystectomy change.  GALLBLADDER: Cholecystectomy.  SPLEEN: Within normal limits.  PANCREAS: Within normal limits.  ADRENALS: Within normal limits.  KIDNEYS/URETERS: Within normal limits.    BLADDER: Within normal limits.  REPRODUCTIVE ORGANS: Prostate within normal limits.    BOWEL: No bowel obstruction. Layering radiopaque content in the region of   the rectosigmoid junction seen on (4-197). Appendix is normal.  PERITONEUM/RETROPERITONEUM: Redemonstrated presacral pelvic mass   unchanged compared to prior near the rectosigmoid junction.  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 Moderate atherosclerotic change of the   aorta. Large calcified atheromawith overlying soft plaque deposition   seen on (4-115).  LYMPH NODES: Redemonstrated left para-aortic lymphadenopathy..  ABDOMINAL WALL: Small, fat-containing left inguinal hernia.   Redemonstrated likely right inguinal hernia plug..  BONES: Demonstrated multilevel lytic metastases with likely pathologic   fractures at L1 and L5 also seen in previous study.    IMPRESSION:  Dissection of the SMA as above.    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    Findings were discussed with Albino Matthews NP by Joe Carey M.D. on   10/19/2024 at 9:11 PM    < end of copied text >

## 2024-11-04 NOTE — PROGRESS NOTE ADULT - PROBLEM SELECTOR PLAN 2
Na increased likely 2/2 diarrhea and potassium 2.4   > Appreciate nephro recs   > On IVF  > electrolyte repletion per nephro

## 2024-11-04 NOTE — PROGRESS NOTE ADULT - PROBLEM SELECTOR PLAN 1
- Metastatic prostate adenocarcinoma, follows with Dr. Andrew Mendoza  - Oncology recs appreciated: Metastatic prostate cancer to liver now s/p C1 taxotere, on abiraterone qd and dexamethasone, s/p zarxio 480mcg until ANC > 1500.  - management per oncology team  - IR consult for kyphoplasty on hold  - Appreciate rad/onc recs- on hold until mental status improves. Plan is for 5 fractions/20gy to T1-T5, and also L1-l5.   - appreciate IR discussing with rad/onc regarding vertebral augmentation plan

## 2024-11-04 NOTE — PROGRESS NOTE ADULT - PROBLEM SELECTOR PLAN 7
Improved- Likely related to immunocompromised state   - hematology-oncology following:   - Appreciate ID recs- on Rocephin 2g q24 hours and Flagyl 500mg bid x 2weeks (11/6/24 last day)   - Patient s/p 1u PRBCs (10/21); 3u Plts (10/20, 10/21, 10/22) 1u 10/29   - Patient febrile - tylenol prn  - 10/31- repeat blood cultures and r/o c.diff- Negative

## 2024-11-04 NOTE — PROGRESS NOTE ADULT - PROBLEM SELECTOR PLAN 9
Patient is supported by his wife- Vijaya [phone]) and 3 adult sons- Magen, Mateus and Freddy   > Patient is recently dx with metastatic prostate cancer just 1.5 weeks ago and he is treatment oriented.  > Offered caregiver Sw referral to patient's wife- DECLINED   > 10/21: Began advanced care planning discussions with patient's son, Freddy. Pt's wife stayed overnight so is sleeping. Will plan to speak to his wife regarding further goc.  > 10/23: See GOC note- DNR/DNI. Of note, wife has shared with provider that they are familiar with hospice as her sister is on hospice in [place].   > 10/24: Plan is to continue to medically optimize patient and improve patient's performance/nutritional status with goal of pursuing further chemotherapy. Extensive goc discussion held with patient's family, Oncology team (Dr. Tate and Umm Aponte- Onc NP) and palliative care team regarding plan of care.    DISPO; Plan for rehab

## 2024-11-04 NOTE — PROGRESS NOTE ADULT - SUBJECTIVE AND OBJECTIVE BOX
No new events, awaiting for plans for RT or Kyphoplasty  Continues metronidazole + Cefepime, afebrile      MEDICATIONS  (STANDING):  abiraterone 500 mg tablet 2 Tablet(s) 2 Tablet(s) Oral daily  acetaminophen     Tablet .. 650 milliGRAM(s) Oral once  atorvastatin 20 milliGRAM(s) Oral at bedtime  cefepime   IVPB 2000 milliGRAM(s) IV Intermittent every 8 hours  cefepime   IVPB      chlorhexidine 2% Cloths 1 Application(s) Topical daily  dextrose 5% 1000 milliLiter(s) (75 mL/Hr) IV Continuous <Continuous>  dextrose 5%. 1000 milliLiter(s) (100 mL/Hr) IV Continuous <Continuous>  dextrose 5%. 1000 milliLiter(s) (50 mL/Hr) IV Continuous <Continuous>  dextrose 50% Injectable 25 Gram(s) IV Push once  dextrose 50% Injectable 12.5 Gram(s) IV Push once  dextrose 50% Injectable 25 Gram(s) IV Push once  diphenhydrAMINE 25 milliGRAM(s) Oral once  fenofibrate Tablet 145 milliGRAM(s) Oral daily  FIRST- Mouthwash  BLM 30 milliLiter(s) Swish and Spit three times a day  glucagon  Injectable 1 milliGRAM(s) IntraMuscular once  haloperidol     Tablet 1 milliGRAM(s) Oral <User Schedule>  influenza  Vaccine (HIGH DOSE) 0.5 milliLiter(s) IntraMuscular once  insulin lispro (ADMELOG) corrective regimen sliding scale   SubCutaneous at bedtime  insulin lispro (ADMELOG) corrective regimen sliding scale   SubCutaneous three times a day before meals  lactobacillus acidophilus 1 Tablet(s) Oral daily  lidocaine   4% Patch 1 Patch Transdermal every 24 hours  melatonin 6 milliGRAM(s) Oral at bedtime  metroNIDAZOLE  IVPB 500 milliGRAM(s) IV Intermittent every 12 hours  multivitamin 1 Tablet(s) Oral daily  Nephro-nancy 1 Tablet(s) Oral daily  nystatin    Suspension 650942 Unit(s) Oral three times a day  pantoprazole  Injectable 40 milliGRAM(s) IV Push daily  potassium chloride  10 mEq/100 mL IVPB 10 milliEquivalent(s) IV Intermittent every 1 hour  potassium phosphate IVPB 15 milliMole(s) IV Intermittent once  predniSONE   Tablet 5 milliGRAM(s) Oral daily    MEDICATIONS  (PRN):  acetaminophen     Tablet .. 650 milliGRAM(s) Oral every 6 hours PRN Temp greater or equal to 38C (100.4F), Mild Pain (1 - 3), Moderate Pain (4 - 6)  aluminum hydroxide/magnesium hydroxide/simethicone Suspension 30 milliLiter(s) Oral every 4 hours PRN Dyspepsia  artificial tears (preservative free) Ophthalmic Solution 1 Drop(s) Both EYES four times a day PRN Dry Eyes  dextrose Oral Gel 15 Gram(s) Oral once PRN Blood Glucose LESS THAN 70 milliGRAM(s)/deciliter  morphine  - Injectable 4 milliGRAM(s) IV Push every 3 hours PRN Severe Pain (7 - 10)  morphine  - Injectable 2 milliGRAM(s) IV Push every 4 hours PRN Moderate Pain (4 - 6)  naloxone Injectable 0.1 milliGRAM(s) IV Push every 3 minutes PRN For ANY of the following changes in patient status:  A. RR LESS THAN 10 breaths per minute, B. Oxygen saturation LESS THAN 90%, C. Sedation score of 6  polyethylene glycol 3350 17 Gram(s) Oral daily PRN Constipation        Vital Signs Last 24 Hrs  T(C): 36.8 (04 Nov 2024 06:00), Max: 36.8 (03 Nov 2024 21:00)  T(F): 98.2 (04 Nov 2024 06:00), Max: 98.3 (03 Nov 2024 21:00)  HR: 97 (04 Nov 2024 10:00) (97 - 107)  BP: 105/74 (04 Nov 2024 10:00) (105/74 - 122/79)  BP(mean): --  RR: 17 (04 Nov 2024 10:00) (17 - 18)  SpO2: 97% (04 Nov 2024 10:00) (97% - 98%)    Parameters below as of 04 Nov 2024 10:00  Patient On (Oxygen Delivery Method): room air        PE  NAD  Awake, alert  Anicteric, MMM  RRR  CTAB  Abd soft, NT, ND  No c/c/e  No rash grossly  FROM                          7.3    2.72  )-----------( 141      ( 04 Nov 2024 06:43 )             22.4       11-04    148[H]  |  120[H]  |  7   ----------------------------<  102[H]  2.9[LL]   |  20[L]  |  0.31[L]    Ca    7.3[L]      04 Nov 2024 06:43  Phos  2.2     11-04  Mg     1.70     11-04

## 2024-11-04 NOTE — PROGRESS NOTE ADULT - ASSESSMENT
64-year-old male with metastatic prostate cancer, sent in by hematologist from [facility] for uncontrolled pain, nausea, vomiting.   Patient reports diffuse pain starting 6 months ago significantly worsening for the past 2 weeks. Diagnosed with high risk high volume metastatic prostate cancer with bone, liver lymph node mets and presacral mass. Liver biopsy confirmed disease with . Started lupron, loly/pred with plans to initiate taxotere.   nephrology consulted for electrolyte abnormalities.    Hypernatremia  Poor free water intake    pt now has diet however not eating much per family  was on LR +d5+40kcl @ 75cc/hr   changed to d5 with 40MEq KCL at 75cc / hr   continue ivf   encourage free water as tolerated.  monitor Na.  Avoid overcorrection >8mEq in 24hrs.    Hypokalemia  GI loss and poor po intake  unable to tolerate PO kcl  On D5+40kcl as above.  give KCl 10meq x 6 doses today   monitor closely.    Acidosis   non AG  likely GI lost.  Hyperchloremic.  IVF as above.  stable.  monitor CO2.    hypophosphatemia  replete as needed   monitor    hypocalcemia   sec to low albumin  corrected ca 8.5  monitor    fever  GNR bacteremia   work up and tx per team    anemia  metastatic prostate cancer  f/u heme/onc       64-year-old male with metastatic prostate cancer, sent in by hematologist from [facility] for uncontrolled pain, nausea, vomiting.   Patient reports diffuse pain starting 6 months ago significantly worsening for the past 2 weeks. Diagnosed with high risk high volume metastatic prostate cancer with bone, liver lymph node mets and presacral mass. Liver biopsy confirmed disease with . Started lupron, loly/pred with plans to initiate taxotere.   nephrology consulted for electrolyte abnormalities.    Hypernatremia  Poor free water intake    pt now has diet however not eating much per family  was on LR +d5+40kcl @ 75cc/hr   changed to d5 with 40MEq KCL at 75cc / hr   continue ivf   encourage free water as tolerated.  monitor Na.  Avoid overcorrection >8mEq in 24hrs.    Hypokalemia  GI loss and poor po intake  unable to tolerate PO kcl  On D5+40kcl as above.  give KCl 10meq x 6 doses today  as unable to take PO KCL  monitor closely.    Acidosis   non AG  likely GI lost.  Hyperchloremic.  IVF as above.  stable.  monitor CO2.    hypophosphatemia  replete as needed   monitor    hypocalcemia   sec to low albumin  corrected ca 8.5  monitor    fever  GNR bacteremia   work up and tx per team    anemia  metastatic prostate cancer  f/u heme/onc

## 2024-11-04 NOTE — PROGRESS NOTE ADULT - PROBLEM SELECTOR PLAN 4
Patient with rectal tube in place   C.diff ruled out   monitor stool count   consider opium tincture 6mg tid as diarrhea has been persistent

## 2024-11-04 NOTE — PROGRESS NOTE ADULT - ASSESSMENT
1. Metastatic prostate cancer    - Follows with Dr Andrew Mendoza at [facility]   - PSMA 10/11/24 showed hypermetabolic vera foci above and below the diaphragm, foci in the liver and extensive foci involving the axial and appendicular skeleton compatible with metastatic disease  - --> 374--> 426 on 10/8/24   - Liver biopsy 9/30/24 consistent with prostate adenocarcinoma   - High risk high volume disease -> started on triplet therapy w/ ADT/lupron, abiraterone/prednisone + taxotere. (back pain after Lupron likely tumor flare).  - Continue abiraterone 1000mg daily w Prednisone 5mg PO QD   - s/p taxotere 60mg/m2 on 10/13/24. Next dose on 11/4 if clinically improves    - Kyphoplasty w/IR to T3 and L1 lesions to be coordinated between IR and Radiation oncology  - Palliative following for pain control and given hospital course would have further GOC discussions. Treatment will be offered but he has metastatic disease with visceral involvement and complications as outlined below.     2. Pancolitis, E coli and H influenzae bacteremia  - continues on Cefepime+ Metronidazole  - ID following  - BCx from 10/24 neg to date    3. Anemia/Thrombocytopenia   - Hgb 7.3 s/p 1 unit 10/30, transfuse for Hgb < 7.0  - Multifactorial sec to likely marrow infiltration by CaP, Chemo effect, consumption from acute illness, poss ITP  - Transfuse for plt <15 or < 50K if bleeding/for procedure      4. SMA Dissection  - seen by vasc sx  - no plan for intervention    5. Agitation  -  EKG reviewed, defer to cardiology  -seems to have improved, continue to monitor      Umm Aponte NP  Hematology/Oncology  [facility]  [phone] (Office)  [phone] (Alt office)  Evenings and weekends please call MD on call or office

## 2024-11-04 NOTE — PROGRESS NOTE ADULT - NS ATTEND AMEND GEN_ALL_CORE FT
mental status seems back to baseline  overall clinically improved  however unable to work with PT d/t back pain  will have a difficult time in rehab unless back pain is controlled  would re-consider kypho now

## 2024-11-04 NOTE — PROGRESS NOTE ADULT - PROBLEM SELECTOR PLAN 6
CTa/p: Dissection of the SMA. Presacral mass with periaortic lymphadenopathy and lytic vertebral metastases with pathologic fracture at L1 and L5 are again seen.  > Vascular surgery recs- No vascular surgery interventions   > Unable to start heparin as patient with blood in stool and with thrombocytopenia   > Appreciate ID recs- pt found to have E.coli bacteremia and H.influenza bacteremia. Repeat blood cultures NGTD. ON IV abx until 11/6   > pureed with mildly thick liquids

## 2024-11-04 NOTE — PROGRESS NOTE ADULT - PROBLEM SELECTOR PLAN 5
MRI Lumbar Spine: (10/15) Diffuse osseous metastases. L1 and L5 pathologic fractures are associated with moderate epidural disease at L1, more mild at L5. At least mild epidural disease at the left S1-S2 neural foramen. No significant lumbar degenerative disc disease.  - MRI from 10/15 shows extensive metastasis in cervical, thoracic, lumbar, skull base and calvarium. Also small cortical subacute infarctions.  - Transition IV morphine to 2.5-5mg PO morphine solution q4h prn mod-severe pain   - IR evaluation for kyphoplasty on hold in setting of c/f SMA dissection and bowel ischemia   -Appreciate rad/onc recs  - Patient with oral pain- Magic mouthwash tid, oral care   - s/p decadron 4mg IV bid x3 days   - Narcan PRN  - multimodal pain control: lidocaine patch, warm/cold packs   - recommend changing bowel regimen to PRN

## 2024-11-04 NOTE — PROGRESS NOTE ADULT - PROBLEM SELECTOR PLAN 10
In the event of newly developing, evolving, or worsening symptoms, please contact the Palliative Medicine team via pager (if the patient is at [facility] #[phone] or if the patient is at [facility] #[phone]) The Geriatric and Palliative Medicine service has coverage 24 hours a day/ 7 days a week to provide medical recommendations regarding symptom management needs via telephone. Goals are clear. Palliative team signing off.     In the event of newly developing, evolving, or worsening symptoms, please contact the Palliative Medicine team via pager (if the patient is at [facility] #[phone] or if the patient is at [facility] #[phone]) The Geriatric and Palliative Medicine service has coverage 24 hours a day/ 7 days a week to provide medical recommendations regarding symptom management needs via telephone.

## 2024-11-04 NOTE — PROGRESS NOTE ADULT - SUBJECTIVE AND OBJECTIVE BOX
Patient is a 64y old  Male who presents with a chief complaint of Pain (04 Nov 2024 14:04)    Date of servie : 11-04-24 @ 14:33  INTERVAL HPI/OVERNIGHT EVENTS:  T(C): 36.7 (11-04-24 @ 12:38), Max: 36.8 (11-03-24 @ 21:00)  HR: 95 (11-04-24 @ 12:38) (95 - 107)  BP: 113/69 (11-04-24 @ 12:38) (105/74 - 122/79)  RR: 18 (11-04-24 @ 12:38) (17 - 18)  SpO2: 99% (11-04-24 @ 12:38) (97% - 99%)  Wt(kg): --  I&O's Summary    03 Nov 2024 07:01  -  04 Nov 2024 07:00  --------------------------------------------------------  IN: 450 mL / OUT: 1900 mL / NET: -1450 mL    04 Nov 2024 07:01  -  04 Nov 2024 14:33  --------------------------------------------------------  IN: 150 mL / OUT: 275 mL / NET: -125 mL        LABS:                        7.3    2.72  )-----------( 141      ( 04 Nov 2024 06:43 )             22.4     11-04    148[H]  |  120[H]  |  7   ----------------------------<  102[H]  2.9[LL]   |  20[L]  |  0.31[L]    Ca    7.3[L]      04 Nov 2024 06:43  Phos  2.2     11-04  Mg     1.70     11-04        Urinalysis Basic - ( 04 Nov 2024 06:43 )    Color: x / Appearance: x / SG: x / pH: x  Gluc: 102 mg/dL / Ketone: x  / Bili: x / Urobili: x   Blood: x / Protein: x / Nitrite: x   Leuk Esterase: x / RBC: x / WBC x   Sq Epi: x / Non Sq Epi: x / Bacteria: x      CAPILLARY BLOOD GLUCOSE      POCT Blood Glucose.: 100 mg/dL (04 Nov 2024 12:24)  POCT Blood Glucose.: 105 mg/dL (04 Nov 2024 08:25)  POCT Blood Glucose.: 132 mg/dL (03 Nov 2024 20:46)  POCT Blood Glucose.: 128 mg/dL (03 Nov 2024 18:33)        Urinalysis Basic - ( 04 Nov 2024 06:43 )    Color: x / Appearance: x / SG: x / pH: x  Gluc: 102 mg/dL / Ketone: x  / Bili: x / Urobili: x   Blood: x / Protein: x / Nitrite: x   Leuk Esterase: x / RBC: x / WBC x   Sq Epi: x / Non Sq Epi: x / Bacteria: x        MEDICATIONS  (STANDING):  abiraterone 500 mg tablet 2 Tablet(s) 2 Tablet(s) Oral daily  acetaminophen     Tablet .. 650 milliGRAM(s) Oral once  atorvastatin 20 milliGRAM(s) Oral at bedtime  cefepime   IVPB 2000 milliGRAM(s) IV Intermittent every 8 hours  cefepime   IVPB      chlorhexidine 2% Cloths 1 Application(s) Topical daily  dextrose 5% 1000 milliLiter(s) (75 mL/Hr) IV Continuous <Continuous>  dextrose 5% 1000 milliLiter(s) (75 mL/Hr) IV Continuous <Continuous>  dextrose 5%. 1000 milliLiter(s) (100 mL/Hr) IV Continuous <Continuous>  dextrose 5%. 1000 milliLiter(s) (50 mL/Hr) IV Continuous <Continuous>  dextrose 50% Injectable 25 Gram(s) IV Push once  dextrose 50% Injectable 25 Gram(s) IV Push once  dextrose 50% Injectable 12.5 Gram(s) IV Push once  diphenhydrAMINE 25 milliGRAM(s) Oral once  fenofibrate Tablet 145 milliGRAM(s) Oral daily  FIRST- Mouthwash  BLM 30 milliLiter(s) Swish and Spit three times a day  glucagon  Injectable 1 milliGRAM(s) IntraMuscular once  haloperidol     Tablet 1 milliGRAM(s) Oral <User Schedule>  influenza  Vaccine (HIGH DOSE) 0.5 milliLiter(s) IntraMuscular once  insulin lispro (ADMELOG) corrective regimen sliding scale   SubCutaneous three times a day before meals  insulin lispro (ADMELOG) corrective regimen sliding scale   SubCutaneous at bedtime  lactobacillus acidophilus 1 Tablet(s) Oral daily  lidocaine   4% Patch 1 Patch Transdermal every 24 hours  melatonin 6 milliGRAM(s) Oral at bedtime  metroNIDAZOLE  IVPB 500 milliGRAM(s) IV Intermittent every 12 hours  multivitamin 1 Tablet(s) Oral daily  Nephro-nancy 1 Tablet(s) Oral daily  nystatin    Suspension 804143 Unit(s) Oral three times a day  pantoprazole  Injectable 40 milliGRAM(s) IV Push daily  potassium chloride  10 mEq/100 mL IVPB 10 milliEquivalent(s) IV Intermittent every 1 hour  predniSONE   Tablet 5 milliGRAM(s) Oral daily    MEDICATIONS  (PRN):  acetaminophen     Tablet .. 650 milliGRAM(s) Oral every 6 hours PRN Temp greater or equal to 38C (100.4F), Mild Pain (1 - 3), Moderate Pain (4 - 6)  aluminum hydroxide/magnesium hydroxide/simethicone Suspension 30 milliLiter(s) Oral every 4 hours PRN Dyspepsia  artificial tears (preservative free) Ophthalmic Solution 1 Drop(s) Both EYES four times a day PRN Dry Eyes  dextrose Oral Gel 15 Gram(s) Oral once PRN Blood Glucose LESS THAN 70 milliGRAM(s)/deciliter  morphine   Solution 5 milliGRAM(s) Oral every 4 hours PRN Severe Pain (7 - 10)  morphine   Solution 2.5 milliGRAM(s) Oral every 4 hours PRN Moderate Pain (4 - 6)  naloxone Injectable 0.1 milliGRAM(s) IV Push every 3 minutes PRN For ANY of the following changes in patient status:  A. RR LESS THAN 10 breaths per minute, B. Oxygen saturation LESS THAN 90%, C. Sedation score of 6  polyethylene glycol 3350 17 Gram(s) Oral daily PRN Constipation          PHYSICAL EXAM:  GENERAL: frail  CHEST/LUNG: Clear to percussion bilaterally; No rales, rhonchi, wheezing, or rubs  HEART: Regular rate and rhythm; No murmurs, rubs, or gallops  ABDOMEN: Soft, Nontender, Nondistended; Bowel sounds present  EXTREMITIES:  edema +    Care Discussed with Consultants/Other Providers [ x] YES  [ ] NO

## 2024-11-04 NOTE — PROGRESS NOTE ADULT - SUBJECTIVE AND OBJECTIVE BOX
[facility]   MD MARKO EASTMAN MD MARIA SANTIAGO, NP    TEL:  OFFICE: [phone]  From 5pm-7am Answering Service [phone]    -- RENAL FOLLOW UP NOTE ---Date of Service 11-04-24 @ 13:03    Patient is a 64y old  Male who presents with a chief complaint of Pain       Patient seen and examined at bedside. No chest pain/sob    VITALS:  T(F): 98.1 (11-04-24 @ 12:38), Max: 98.3 (11-03-24 @ 21:00)  HR: 95 (11-04-24 @ 12:38)  BP: 113/69 (11-04-24 @ 12:38)  RR: 18 (11-04-24 @ 12:38)  SpO2: 99% (11-04-24 @ 12:38)  Wt(kg): --    11-03 @ 07:01  -  11-04 @ 07:00  --------------------------------------------------------  IN: 450 mL / OUT: 1900 mL / NET: -1450 mL    11-04 @ 07:01  -  11-04 @ 13:03  --------------------------------------------------------  IN: 150 mL / OUT: 275 mL / NET: -125 mL          PHYSICAL EXAM:  General: NAD  Neck: No JVD  Respiratory: CTAB, no wheezes, rales or rhonchi  Cardiovascular: S1, S2, RRR  Gastrointestinal: BS+, soft, NT/ND; +rectal tube   Extremities: No peripheral edema    Hospital Medications:   MEDICATIONS  (STANDING):  abiraterone 500 mg tablet 2 Tablet(s) 2 Tablet(s) Oral daily  acetaminophen     Tablet .. 650 milliGRAM(s) Oral once  atorvastatin 20 milliGRAM(s) Oral at bedtime  cefepime   IVPB 2000 milliGRAM(s) IV Intermittent every 8 hours  cefepime   IVPB      chlorhexidine 2% Cloths 1 Application(s) Topical daily  dextrose 5% 1000 milliLiter(s) (75 mL/Hr) IV Continuous <Continuous>  dextrose 5%. 1000 milliLiter(s) (50 mL/Hr) IV Continuous <Continuous>  dextrose 5%. 1000 milliLiter(s) (100 mL/Hr) IV Continuous <Continuous>  dextrose 50% Injectable 25 Gram(s) IV Push once  dextrose 50% Injectable 25 Gram(s) IV Push once  dextrose 50% Injectable 12.5 Gram(s) IV Push once  diphenhydrAMINE 25 milliGRAM(s) Oral once  fenofibrate Tablet 145 milliGRAM(s) Oral daily  FIRST- Mouthwash  BLM 30 milliLiter(s) Swish and Spit three times a day  glucagon  Injectable 1 milliGRAM(s) IntraMuscular once  haloperidol     Tablet 1 milliGRAM(s) Oral <User Schedule>  influenza  Vaccine (HIGH DOSE) 0.5 milliLiter(s) IntraMuscular once  insulin lispro (ADMELOG) corrective regimen sliding scale   SubCutaneous three times a day before meals  insulin lispro (ADMELOG) corrective regimen sliding scale   SubCutaneous at bedtime  lactobacillus acidophilus 1 Tablet(s) Oral daily  lidocaine   4% Patch 1 Patch Transdermal every 24 hours  melatonin 6 milliGRAM(s) Oral at bedtime  metroNIDAZOLE  IVPB 500 milliGRAM(s) IV Intermittent every 12 hours  multivitamin 1 Tablet(s) Oral daily  Nephro-nancy 1 Tablet(s) Oral daily  nystatin    Suspension 715329 Unit(s) Oral three times a day  pantoprazole  Injectable 40 milliGRAM(s) IV Push daily  potassium chloride  10 mEq/100 mL IVPB 10 milliEquivalent(s) IV Intermittent every 1 hour  predniSONE   Tablet 5 milliGRAM(s) Oral daily      LABS:  11-04    148[H]  |  120[H]  |  7   ----------------------------<  102[H]  2.9[LL]   |  20[L]  |  0.31[L]    Ca    7.3[L]      04 Nov 2024 06:43  Phos  2.2     11-04  Mg     1.70     11-04      Creatinine Trend: 0.31 <--, 0.34 <--, 0.30 <--, 0.33 <--, 0.31 <--, 0.32 <--, 0.30 <--, 0.33 <--, 0.31 <--, 0.34 <--, 0.37 <--, 0.33 <--, 0.33 <--, 0.34 <--, 0.37 <--, 0.39 <--    Phosphorus: 2.2 mg/dL (11-04 @ 06:43)                              7.3    2.72  )-----------( 141      ( 04 Nov 2024 06:43 )             22.4     Urine Studies:  Urinalysis - [11-04-24 @ 06:43]      Color  / Appearance  / SG  / pH       Gluc 102 / Ketone   / Bili  / Urobili        Blood  / Protein  / Leuk Est  / Nitrite       RBC  / WBC  / Hyaline  / Gran  / Sq Epi  / Non Sq Epi  / Bacteria       TSH 2.62      [10-13-24 @ 05:30]  Lipid: chol 135, , HDL 39, LDL --      [10-13-24 @ 05:30]        RADIOLOGY & ADDITIONAL STUDIES:

## 2024-11-04 NOTE — GOALS OF CARE CONVERSATION - ADVANCED CARE PLANNING - CONVERSATION DETAILS
Palliative care team met with pt's spouse to f/u on previous goals of care discussions. Goal remains for [facility] to continue DMT as an out-patient. Spouse recognizes that Denny prolonged hospitalization has resulted in his current debilitated state. She says that he cant even stand right now. She states she spoke to medical attending, Dr. Valle, about transition to sub acute rehab facility when medically stable. Spouse shared her understanding that in order for Rashwan to be a candidate for further DMT his functional performance status would need to improve. Since the goal remains for continued cancer directed therapy the current plan is for sub acute rehab. when stable.

## 2024-11-04 NOTE — PROGRESS NOTE ADULT - ASSESSMENT
64-year-old male with metastatic prostate cancer, sent in by hematologist from [facility] for uncontrolled pain, nausea, vomiting.   Patient reports diffuse pain starting 6 months ago significantly worsening for the past 2 weeks.  Currently the worst pain is located around the lower back.   No loss of bladder and bowel function, no saddle paresthesia.  Able to walk.  patient is oxycodone 5 every 4-6 hour.  Yesterday they started adding OxyContin 10.  However patient continued to have pain.  Family also reports significant nausea and vomiting, inability to tolerate p.o. for the last 2 days.  Last bowel movement 2 days ago.   No known allergy.  Diagnosed with high risk high volume metastatic prostate cancer with bone, liver lymph node mets and presacral mass. Liver biopsy confirmed disease with . Started lupron, loly/pred with plans to initiate taxotere.    (12 Oct 2024 15:40)  pt seems very frustrated:   he is on 2 L of oxygen : he has no underlying lung disease:  but he is requiring 2 L of oxygen      Hypoxic resp failure  Metastatic prostate cancer  Back pain     Hypoxic resp failure  -He has no underlying lung disease:  but he is requiring 2 L of oxygen :   -CTA showed; No pulmonary embolism to the level of the segmental branches bilaterally. Limited evaluation of the subsegmental branches secondary to incomplete contrast opacification.  Multilevel vertebral body lytic lesions and loss of vertebral body height, better evaluated on CT thoracic spine 10/17/2024. Metastatic prostate cancer  -Patent central airways. Bibasilar atelectasis. No pleural effusion. MEDIASTINUM AND KATTY: No lymphadenopathy.  PULMONARY ANGIOGRAM: No pulmonary embolism to the level of the segmental   branches bilaterally. Limited evaluation of the subsegmental branches secondary to incomplete contrast opacification.    10/19:  -seems pretty tired;  on 2 L of oxygen :  -cta chest showed: No pulmonary embolism to the level of the segmental branches bilaterally. Limited evaluation of the subsegmental branches secondary to incomplete   contrast opacification. Multilevel vertebral body lytic lesions and loss of vertebral body height, better evaluated on CT thoracic spine 10/17/2024.  -still with fever:  no pe  on zosyn  and leukopenic hemonc following;  has metastatic prostate ca:   -VBG seems OK:     10/20:  pulm wise he seems to be stable:   -using 2 L of oxygen    -yesterdays VBG with minimal met acidosis  -cta again noted    10/21:  on 4 L of oxygen  today    cxr is size    e coli sepsis: Gram Negative Rods and Gram Negative Coccobacilli    10/22: heis doing poorly today  : he is very agitated and uncomfortable  on mittens: ? sun downing   10/23: quiet today  : sleeping:  oxygen requirement has not increased: on rooo ha 95% as documented    WBC is slowly increasing:   no fever:   -on antibiotics   10/24: pt is not doing well : his repeat blood cultures are positive with same organism :  would defer to ID;   10/25: seems to be doing  ok ; no sob:  no cough :  no phlegm   : on room air   10/26: resp failure has resolved:  is septic  : on ceftriaxone     10/27:  he seems OK:  he is on room air:  on morphine drip   remans on ceftriaxone still     10/28: she seems to be doing  ok : no sob: no cough ; no phlegm  confused:  on morphine drip    10/29; seems comfortable on room air;   cont current rx:   10/30: seems to be doing  ok ; on morphine drip : : plan to decrease morphine dose:   -cont current supportive care   10/31:  seems same tome:   no sob:  on room air:   seems comfortable at this ti me: off morphine  11/1:  he has been on room air for days;  looks comfortable:  coughs when he eats;   need pt ot  ; ? oob to chair:  dw wife:   11/3:  he seems stable:   on room air:  responding to questions pretty well : has bleeding lower lip : not Much through ? secondary to dryness:   -resp wise seems pretty good:  no sob:    114:  -seems pretty good:  no sob:  on room air  -has iral ulceration:  bleeding:  not much though : cont magic moutwash       New finding:  SMA dissection : neutropenic colitis/ #E Coli and H influenzae bacteremia/ #Neutropenic fever  -/f ischemic colitis on R colon   Diagnosed  on ct abd:  defer to surgery and primary team:  probably no intervention:   -cont antibiotics  -not doing very well with above new findings    10/22; no intervention per surgery    -cont antibiotics  -has fever:  ID following :  -Last chest xray on 20th  ; normal     id following   10/23  IR was consulted for vertebral augmentation of T3-T5 prior to XRT.   Patient was seen bedside. Initially, patient kept requesting no exam and was not willing to participate in the discussion. Son was bedside at time of conversation.   Per discussion with the medical attending, given the concern for SMA dissection and concern for bowel ischemia, will hold off on vertebral augmentation evaluation at this time.   Please reach out to IR when patient is medically stable for vertebral augmentation.  10/24:  remains septic:  above cancelled:   ? for palliative care now  10/26: he seems same to me:  no overnight events  on room air:   check VBG  : cont antibiotics   hemonc following   10/27:  -still on antibiotics for e coli sepsis  -id following s  10/28: cont antibiotics   10/230: cont ceftriaxone   10/31: on ceftriaxone and flagyl   11/1: antibiotics per ID   11/3: cont antibiotics : IR note reviewed:  no kyphoplasty at this time    11/4: cont antibiotics per ID     Back pain   -likely due to metastatic disease:  adm here for severe pain :  -pain control per primary team   -venous ph is ok   11/1: resolved for now on meds  11/3: pain control : no kyphoplasty at this ti me per ir note:   11/4: as above    dw acp; GOC as per primary  team

## 2024-11-04 NOTE — PROGRESS NOTE ADULT - ASSESSMENT
64-year-old male with metastatic prostate cancer, sent in by hematologist from [facility] for uncontrolled pain, nausea, vomiting.       Problem/Plan - 1:  ·  Problem: Cancer related pain.   ·  Plan: - appreciate pall care recommendations:  - pain control as per palliative crae   - RT consult appreciated ,  awaiting kyphoplasty   - will recall iR for kyphoplasty     Problem/Plan - 2:·  Problem: Nausea & vomiting., Diarrhea, colitis , SMA dissection   ·  Plan: -  vascular fu appreciated  - GI and surgery consult appreciated   - ID fu    Problem/Plan - 3:  ·  Problem: CA of prostate.   ·  Plan: Metastatic prostate cancer  - f/u heme/onc recommendations  - Rad Onc fu   - Palliative for symptom control.    Problem/Plan - 4:  ·  Problem: Anemia , Thrombocytopenia, unspecified.   ·  Plan: -monitor cbc   transfuse PRN    Problem/Plan - 5:  ·  Problem: Bacteremia   Plan: - ID fu   - fu cultures  - ABX as per ID

## 2024-11-05 LAB
ANION GAP SERPL CALC-SCNC: 7 MMOL/L — SIGNIFICANT CHANGE UP (ref 7–14)
ANION GAP SERPL CALC-SCNC: 8 MMOL/L — SIGNIFICANT CHANGE UP (ref 7–14)
BASOPHILS # BLD AUTO: 0.01 K/UL — SIGNIFICANT CHANGE UP (ref 0–0.2)
BASOPHILS NFR BLD AUTO: 0.3 % — SIGNIFICANT CHANGE UP (ref 0–2)
BLD GP AB SCN SERPL QL: NEGATIVE — SIGNIFICANT CHANGE UP
BUN SERPL-MCNC: 8 MG/DL — SIGNIFICANT CHANGE UP (ref 7–23)
CALCIUM SERPL-MCNC: 7.3 MG/DL — LOW (ref 8.4–10.5)
CHLORIDE SERPL-SCNC: 115 MMOL/L — HIGH (ref 98–107)
CHLORIDE SERPL-SCNC: 116 MMOL/L — HIGH (ref 98–107)
CO2 SERPL-SCNC: 21 MMOL/L — LOW (ref 22–31)
CREAT SERPL-MCNC: 0.31 MG/DL — LOW (ref 0.5–1.3)
CREAT SERPL-MCNC: 0.32 MG/DL — LOW (ref 0.5–1.3)
CULTURE RESULTS: SIGNIFICANT CHANGE UP
CULTURE RESULTS: SIGNIFICANT CHANGE UP
EGFR: 130 ML/MIN/1.73M2 — SIGNIFICANT CHANGE UP
EGFR: 131 ML/MIN/1.73M2 — SIGNIFICANT CHANGE UP
EOSINOPHIL # BLD AUTO: 0.16 K/UL — SIGNIFICANT CHANGE UP (ref 0–0.5)
EOSINOPHIL NFR BLD AUTO: 5.3 % — SIGNIFICANT CHANGE UP (ref 0–6)
GLUCOSE BLDC GLUCOMTR-MCNC: 107 MG/DL — HIGH (ref 70–99)
GLUCOSE BLDC GLUCOMTR-MCNC: 111 MG/DL — HIGH (ref 70–99)
GLUCOSE BLDC GLUCOMTR-MCNC: 135 MG/DL — HIGH (ref 70–99)
GLUCOSE BLDC GLUCOMTR-MCNC: 146 MG/DL — HIGH (ref 70–99)
GLUCOSE SERPL-MCNC: 97 MG/DL — SIGNIFICANT CHANGE UP (ref 70–99)
HCT VFR BLD CALC: 22.5 % — LOW (ref 39–50)
HGB BLD-MCNC: 7.3 G/DL — LOW (ref 13–17)
IANC: 1.93 K/UL — SIGNIFICANT CHANGE UP (ref 1.8–7.4)
IMM GRANULOCYTES NFR BLD AUTO: 4 % — HIGH (ref 0–0.9)
LYMPHOCYTES # BLD AUTO: 0.71 K/UL — LOW (ref 1–3.3)
LYMPHOCYTES # BLD AUTO: 23.5 % — SIGNIFICANT CHANGE UP (ref 13–44)
MAGNESIUM SERPL-MCNC: 1.7 MG/DL — SIGNIFICANT CHANGE UP (ref 1.6–2.6)
MCHC RBC-ENTMCNC: 30.3 PG — SIGNIFICANT CHANGE UP (ref 27–34)
MCHC RBC-ENTMCNC: 32.4 G/DL — SIGNIFICANT CHANGE UP (ref 32–36)
MCV RBC AUTO: 93.4 FL — SIGNIFICANT CHANGE UP (ref 80–100)
MONOCYTES # BLD AUTO: 0.09 K/UL — SIGNIFICANT CHANGE UP (ref 0–0.9)
MONOCYTES NFR BLD AUTO: 3 % — SIGNIFICANT CHANGE UP (ref 2–14)
NEUTROPHILS # BLD AUTO: 1.93 K/UL — SIGNIFICANT CHANGE UP (ref 1.8–7.4)
NEUTROPHILS NFR BLD AUTO: 63.9 % — SIGNIFICANT CHANGE UP (ref 43–77)
NRBC # BLD: 0 /100 WBCS — SIGNIFICANT CHANGE UP (ref 0–0)
NRBC # FLD: 0 K/UL — SIGNIFICANT CHANGE UP (ref 0–0)
PHOSPHATE SERPL-MCNC: 2.2 MG/DL — LOW (ref 2.5–4.5)
PHOSPHATE SERPL-MCNC: 2.7 MG/DL — SIGNIFICANT CHANGE UP (ref 2.5–4.5)
PLATELET # BLD AUTO: 138 K/UL — LOW (ref 150–400)
POTASSIUM SERPL-MCNC: 3.6 MMOL/L — SIGNIFICANT CHANGE UP (ref 3.5–5.3)
POTASSIUM SERPL-MCNC: 3.8 MMOL/L — SIGNIFICANT CHANGE UP (ref 3.5–5.3)
POTASSIUM SERPL-SCNC: 3.6 MMOL/L — SIGNIFICANT CHANGE UP (ref 3.5–5.3)
POTASSIUM SERPL-SCNC: 3.8 MMOL/L — SIGNIFICANT CHANGE UP (ref 3.5–5.3)
RBC # BLD: 2.41 M/UL — LOW (ref 4.2–5.8)
RBC # FLD: 19.4 % — HIGH (ref 10.3–14.5)
RH IG SCN BLD-IMP: POSITIVE — SIGNIFICANT CHANGE UP
SODIUM SERPL-SCNC: 143 MMOL/L — SIGNIFICANT CHANGE UP (ref 135–145)
SODIUM SERPL-SCNC: 144 MMOL/L — SIGNIFICANT CHANGE UP (ref 135–145)
SPECIMEN SOURCE: SIGNIFICANT CHANGE UP
SPECIMEN SOURCE: SIGNIFICANT CHANGE UP
WBC # BLD: 3.02 K/UL — LOW (ref 3.8–10.5)
WBC # FLD AUTO: 3.02 K/UL — LOW (ref 3.8–10.5)

## 2024-11-05 RX ORDER — POTASSIUM PHOSPHATE, MONOBASIC POTASSIUM PHOSPHATE, DIBASIC INJECTION, 236; 224 MG/ML; MG/ML
30 SOLUTION, CONCENTRATE INTRAVENOUS ONCE
Refills: 0 | Status: COMPLETED | OUTPATIENT
Start: 2024-11-05 | End: 2024-11-05

## 2024-11-05 RX ORDER — 0.9 % SODIUM CHLORIDE 0.9 %
1000 INTRAVENOUS SOLUTION INTRAVENOUS
Refills: 0 | Status: DISCONTINUED | OUTPATIENT
Start: 2024-11-05 | End: 2024-11-06

## 2024-11-05 RX ORDER — POTASSIUM CHLORIDE 600 MG/1
10 TABLET, EXTENDED RELEASE ORAL
Refills: 0 | Status: COMPLETED | OUTPATIENT
Start: 2024-11-05 | End: 2024-11-05

## 2024-11-05 RX ADMIN — NYSTATIN 500000 UNIT(S): 500000 TABLET, FILM COATED ORAL at 21:44

## 2024-11-05 RX ADMIN — POTASSIUM CHLORIDE 100 MILLIEQUIVALENT(S): 600 TABLET, EXTENDED RELEASE ORAL at 10:46

## 2024-11-05 RX ADMIN — DIPHENHYDRAMINE HYDROCHLORIDE AND LIDOCAINE HYDROCHLORIDE AND ALUMINUM HYDROXIDE AND MAGNESIUM HYDRO 30 MILLILITER(S): KIT at 06:48

## 2024-11-05 RX ADMIN — Medication 20 MILLIGRAM(S): at 21:45

## 2024-11-05 RX ADMIN — PANTOPRAZOLE SODIUM 40 MILLIGRAM(S): 40 TABLET, DELAYED RELEASE ORAL at 11:51

## 2024-11-05 RX ADMIN — Medication 75 MILLILITER(S): at 06:42

## 2024-11-05 RX ADMIN — NYSTATIN 500000 UNIT(S): 500000 TABLET, FILM COATED ORAL at 14:12

## 2024-11-05 RX ADMIN — Medication 1 MILLIGRAM(S): at 21:44

## 2024-11-05 RX ADMIN — METRONIDAZOLE 100 MILLIGRAM(S): 500 TABLET ORAL at 18:02

## 2024-11-05 RX ADMIN — Medication 1 TABLET(S): at 11:49

## 2024-11-05 RX ADMIN — NYSTATIN 500000 UNIT(S): 500000 TABLET, FILM COATED ORAL at 06:47

## 2024-11-05 RX ADMIN — POTASSIUM CHLORIDE 100 MILLIEQUIVALENT(S): 600 TABLET, EXTENDED RELEASE ORAL at 09:24

## 2024-11-05 RX ADMIN — PREDNISONE 5 MILLIGRAM(S): 20 TABLET ORAL at 11:51

## 2024-11-05 RX ADMIN — Medication 1 TABLET(S): at 11:51

## 2024-11-05 RX ADMIN — CEFEPIME 100 MILLIGRAM(S): 2 INJECTION, POWDER, FOR SOLUTION INTRAVENOUS at 21:44

## 2024-11-05 RX ADMIN — CEFEPIME 100 MILLIGRAM(S): 2 INJECTION, POWDER, FOR SOLUTION INTRAVENOUS at 06:42

## 2024-11-05 RX ADMIN — Medication 145 MILLIGRAM(S): at 11:50

## 2024-11-05 RX ADMIN — POTASSIUM PHOSPHATE, MONOBASIC POTASSIUM PHOSPHATE, DIBASIC INJECTION, 83.33 MILLIMOLE(S): 236; 224 SOLUTION, CONCENTRATE INTRAVENOUS at 12:49

## 2024-11-05 RX ADMIN — POTASSIUM CHLORIDE 100 MILLIEQUIVALENT(S): 600 TABLET, EXTENDED RELEASE ORAL at 11:47

## 2024-11-05 RX ADMIN — ACETAMINOPHEN, DIPHENHYDRAMINE HCL, PHENYLEPHRINE HCL 6 MILLIGRAM(S): 325; 25; 5 TABLET ORAL at 21:44

## 2024-11-05 RX ADMIN — METRONIDAZOLE 100 MILLIGRAM(S): 500 TABLET ORAL at 07:21

## 2024-11-05 RX ADMIN — DIPHENHYDRAMINE HYDROCHLORIDE AND LIDOCAINE HYDROCHLORIDE AND ALUMINUM HYDROXIDE AND MAGNESIUM HYDRO 30 MILLILITER(S): KIT at 21:45

## 2024-11-05 RX ADMIN — CHLORHEXIDINE GLUCONATE 1 APPLICATION(S): 1.2 RINSE ORAL at 11:47

## 2024-11-05 RX ADMIN — CEFEPIME 100 MILLIGRAM(S): 2 INJECTION, POWDER, FOR SOLUTION INTRAVENOUS at 14:12

## 2024-11-05 RX ADMIN — DIPHENHYDRAMINE HYDROCHLORIDE AND LIDOCAINE HYDROCHLORIDE AND ALUMINUM HYDROXIDE AND MAGNESIUM HYDRO 30 MILLILITER(S): KIT at 14:12

## 2024-11-05 NOTE — PROGRESS NOTE ADULT - ASSESSMENT
1. Metastatic prostate cancer    - Follows with Dr Andrew Mendoza at [facility]   - PSMA 10/11/24 showed hypermetabolic vera foci above and below the diaphragm, foci in the liver and extensive foci involving the axial and appendicular skeleton compatible with metastatic disease  - --> 374--> 426 on 10/8/24   - Liver biopsy 9/30/24 consistent with prostate adenocarcinoma   - High risk high volume disease -> started on triplet therapy w/ ADT/lupron, abiraterone/prednisone + taxotere. (back pain after Lupron likely tumor flare).  - Continue abiraterone 1000mg daily w Prednisone 5mg PO QD   - s/p taxotere 60mg/m2 on 10/13/24. Next dose on 11/4 held at this time as patient awaiting Kyphoplasty and has weakness. IR recs appreciated   - Kyphoplasty w/IR to T3 and L1 lesions to be coordinated between IR and Radiation oncology  - Palliative following for pain control and given hospital course would have further GOC discussions. Treatment will be offered but he has metastatic disease with visceral involvement and complications as outlined below.     2. Pancolitis, E coli and H influenzae bacteremia  - continues on Cefepime+ Metronidazole  - ID following  - BCx from 10/24 neg to date    3. Anemia/Thrombocytopenia   - Hgb 7.3 s/p 1 unit 10/30, transfuse for Hgb < 7.0  - Multifactorial sec to likely marrow infiltration by CaP, Chemo effect, consumption from acute illness, poss ITP  - Transfuse for plt <15 or < 50K if bleeding/for procedure      4. SMA Dissection  - seen by vasc sx  - no plan for intervention          Umm Aponte NP  Hematology/Oncology  [facility]  [phone] (Office)  [phone] (Alt office)  Evenings and weekends please call MD on call or office

## 2024-11-05 NOTE — PROGRESS NOTE ADULT - SUBJECTIVE AND OBJECTIVE BOX
Patient seen today, thrush improvement but still with difficulty eating  IR planning for Kyphoplasty once antibiotics have been completed, wife at bedside, son on phone during assessment        MEDICATIONS  (STANDING):  abiraterone 500 mg tablet 2 Tablet(s) 2 Tablet(s) Oral daily  acetaminophen     Tablet .. 650 milliGRAM(s) Oral once  atorvastatin 20 milliGRAM(s) Oral at bedtime  cefepime   IVPB 2000 milliGRAM(s) IV Intermittent every 8 hours  cefepime   IVPB      chlorhexidine 2% Cloths 1 Application(s) Topical daily  dextrose 5% 1000 milliLiter(s) (75 mL/Hr) IV Continuous <Continuous>  dextrose 5% 1000 milliLiter(s) (75 mL/Hr) IV Continuous <Continuous>  dextrose 5%. 1000 milliLiter(s) (50 mL/Hr) IV Continuous <Continuous>  dextrose 5%. 1000 milliLiter(s) (100 mL/Hr) IV Continuous <Continuous>  dextrose 50% Injectable 25 Gram(s) IV Push once  dextrose 50% Injectable 12.5 Gram(s) IV Push once  dextrose 50% Injectable 25 Gram(s) IV Push once  diphenhydrAMINE 25 milliGRAM(s) Oral once  fenofibrate Tablet 145 milliGRAM(s) Oral daily  FIRST- Mouthwash  BLM 30 milliLiter(s) Swish and Spit three times a day  glucagon  Injectable 1 milliGRAM(s) IntraMuscular once  haloperidol     Tablet 1 milliGRAM(s) Oral <User Schedule>  influenza  Vaccine (HIGH DOSE) 0.5 milliLiter(s) IntraMuscular once  insulin lispro (ADMELOG) corrective regimen sliding scale   SubCutaneous at bedtime  insulin lispro (ADMELOG) corrective regimen sliding scale   SubCutaneous three times a day before meals  lactobacillus acidophilus 1 Tablet(s) Oral daily  lidocaine   4% Patch 1 Patch Transdermal every 24 hours  melatonin 6 milliGRAM(s) Oral at bedtime  metroNIDAZOLE  IVPB 500 milliGRAM(s) IV Intermittent every 12 hours  multivitamin 1 Tablet(s) Oral daily  Nephro-nancy 1 Tablet(s) Oral daily  nystatin    Suspension 495604 Unit(s) Oral three times a day  pantoprazole  Injectable 40 milliGRAM(s) IV Push daily  potassium chloride  10 mEq/100 mL IVPB 10 milliEquivalent(s) IV Intermittent every 1 hour  potassium phosphate IVPB 30 milliMole(s) IV Intermittent once  predniSONE   Tablet 5 milliGRAM(s) Oral daily    MEDICATIONS  (PRN):  acetaminophen     Tablet .. 650 milliGRAM(s) Oral every 6 hours PRN Temp greater or equal to 38C (100.4F), Mild Pain (1 - 3), Moderate Pain (4 - 6)  aluminum hydroxide/magnesium hydroxide/simethicone Suspension 30 milliLiter(s) Oral every 4 hours PRN Dyspepsia  artificial tears (preservative free) Ophthalmic Solution 1 Drop(s) Both EYES four times a day PRN Dry Eyes  dextrose Oral Gel 15 Gram(s) Oral once PRN Blood Glucose LESS THAN 70 milliGRAM(s)/deciliter  morphine   Solution 5 milliGRAM(s) Oral every 4 hours PRN Severe Pain (7 - 10)  morphine   Solution 2.5 milliGRAM(s) Oral every 4 hours PRN Moderate Pain (4 - 6)  naloxone Injectable 0.1 milliGRAM(s) IV Push every 3 minutes PRN For ANY of the following changes in patient status:  A. RR LESS THAN 10 breaths per minute, B. Oxygen saturation LESS THAN 90%, C. Sedation score of 6  polyethylene glycol 3350 17 Gram(s) Oral daily PRN Constipation        Vital Signs Last 24 Hrs  T(C): 36.7 (05 Nov 2024 06:00), Max: 36.8 (04 Nov 2024 16:30)  T(F): 98 (05 Nov 2024 06:00), Max: 98.3 (04 Nov 2024 21:00)  HR: 98 (05 Nov 2024 06:00) (95 - 100)  BP: 107/75 (05 Nov 2024 06:00) (107/75 - 123/88)  BP(mean): --  RR: 18 (05 Nov 2024 06:00) (15 - 18)  SpO2: 98% (05 Nov 2024 06:00) (96% - 100%)    Parameters below as of 05 Nov 2024 06:00  Patient On (Oxygen Delivery Method): room air        PE  NAD  Awake, alert  Anicteric, MMM  RRR  CTAB  Abd soft, NT, ND  No c/c/e  No rash grossly                            7.3    3.02  )-----------( 138      ( 05 Nov 2024 06:30 )             22.5       11-05    143  |  115[H]  |  8   ----------------------------<  97  3.6   |  21[L]  |  0.31[L]    Ca    7.3[L]      05 Nov 2024 06:30  Phos  2.2     11-05  Mg     1.70     11-05

## 2024-11-05 NOTE — PROGRESS NOTE ADULT - SUBJECTIVE AND OBJECTIVE BOX
Patient is a 64y old  Male who presents with a chief complaint of Pain (05 Nov 2024 14:37)    Date of servie : 11-05-24 @ 17:15  INTERVAL HPI/OVERNIGHT EVENTS:  T(C): 36.7 (11-05-24 @ 11:20), Max: 36.8 (11-04-24 @ 21:00)  HR: 94 (11-05-24 @ 11:20) (94 - 98)  BP: 108/72 (11-05-24 @ 11:20) (107/75 - 123/88)  RR: 19 (11-05-24 @ 11:20) (15 - 19)  SpO2: 100% (11-05-24 @ 11:20) (96% - 100%)  Wt(kg): --  I&O's Summary    04 Nov 2024 07:01  -  05 Nov 2024 07:00  --------------------------------------------------------  IN: 275 mL / OUT: 555 mL / NET: -280 mL    05 Nov 2024 07:01  -  05 Nov 2024 17:15  --------------------------------------------------------  IN: 120 mL / OUT: 550 mL / NET: -430 mL        LABS:                        7.3    3.02  )-----------( 138      ( 05 Nov 2024 06:30 )             22.5     11-05    143  |  115[H]  |  8   ----------------------------<  97  3.6   |  21[L]  |  0.31[L]    Ca    7.3[L]      05 Nov 2024 06:30  Phos  2.2     11-05  Mg     1.70     11-05        Urinalysis Basic - ( 05 Nov 2024 06:30 )    Color: x / Appearance: x / SG: x / pH: x  Gluc: 97 mg/dL / Ketone: x  / Bili: x / Urobili: x   Blood: x / Protein: x / Nitrite: x   Leuk Esterase: x / RBC: x / WBC x   Sq Epi: x / Non Sq Epi: x / Bacteria: x      CAPILLARY BLOOD GLUCOSE      POCT Blood Glucose.: 111 mg/dL (05 Nov 2024 13:20)  POCT Blood Glucose.: 107 mg/dL (05 Nov 2024 08:47)  POCT Blood Glucose.: 108 mg/dL (04 Nov 2024 21:06)  POCT Blood Glucose.: 102 mg/dL (04 Nov 2024 18:08)        Urinalysis Basic - ( 05 Nov 2024 06:30 )    Color: x / Appearance: x / SG: x / pH: x  Gluc: 97 mg/dL / Ketone: x  / Bili: x / Urobili: x   Blood: x / Protein: x / Nitrite: x   Leuk Esterase: x / RBC: x / WBC x   Sq Epi: x / Non Sq Epi: x / Bacteria: x        MEDICATIONS  (STANDING):  abiraterone 500 mg tablet 2 Tablet(s) 2 Tablet(s) Oral daily  acetaminophen     Tablet .. 650 milliGRAM(s) Oral once  atorvastatin 20 milliGRAM(s) Oral at bedtime  cefepime   IVPB 2000 milliGRAM(s) IV Intermittent every 8 hours  cefepime   IVPB      chlorhexidine 2% Cloths 1 Application(s) Topical daily  dextrose 5% 1000 milliLiter(s) (75 mL/Hr) IV Continuous <Continuous>  dextrose 5% 1000 milliLiter(s) (75 mL/Hr) IV Continuous <Continuous>  dextrose 5%. 1000 milliLiter(s) (50 mL/Hr) IV Continuous <Continuous>  dextrose 5%. 1000 milliLiter(s) (100 mL/Hr) IV Continuous <Continuous>  dextrose 50% Injectable 25 Gram(s) IV Push once  dextrose 50% Injectable 12.5 Gram(s) IV Push once  dextrose 50% Injectable 25 Gram(s) IV Push once  diphenhydrAMINE 25 milliGRAM(s) Oral once  fenofibrate Tablet 145 milliGRAM(s) Oral daily  FIRST- Mouthwash  BLM 30 milliLiter(s) Swish and Spit three times a day  glucagon  Injectable 1 milliGRAM(s) IntraMuscular once  haloperidol     Tablet 1 milliGRAM(s) Oral <User Schedule>  influenza  Vaccine (HIGH DOSE) 0.5 milliLiter(s) IntraMuscular once  insulin lispro (ADMELOG) corrective regimen sliding scale   SubCutaneous at bedtime  insulin lispro (ADMELOG) corrective regimen sliding scale   SubCutaneous three times a day before meals  lactobacillus acidophilus 1 Tablet(s) Oral daily  lidocaine   4% Patch 1 Patch Transdermal every 24 hours  melatonin 6 milliGRAM(s) Oral at bedtime  metroNIDAZOLE  IVPB 500 milliGRAM(s) IV Intermittent every 12 hours  multivitamin 1 Tablet(s) Oral daily  Nephro-nancy 1 Tablet(s) Oral daily  nystatin    Suspension 286989 Unit(s) Oral three times a day  pantoprazole  Injectable 40 milliGRAM(s) IV Push daily  predniSONE   Tablet 5 milliGRAM(s) Oral daily    MEDICATIONS  (PRN):  acetaminophen     Tablet .. 650 milliGRAM(s) Oral every 6 hours PRN Temp greater or equal to 38C (100.4F), Mild Pain (1 - 3), Moderate Pain (4 - 6)  aluminum hydroxide/magnesium hydroxide/simethicone Suspension 30 milliLiter(s) Oral every 4 hours PRN Dyspepsia  artificial tears (preservative free) Ophthalmic Solution 1 Drop(s) Both EYES four times a day PRN Dry Eyes  dextrose Oral Gel 15 Gram(s) Oral once PRN Blood Glucose LESS THAN 70 milliGRAM(s)/deciliter  morphine   Solution 5 milliGRAM(s) Oral every 4 hours PRN Severe Pain (7 - 10)  morphine   Solution 2.5 milliGRAM(s) Oral every 4 hours PRN Moderate Pain (4 - 6)  naloxone Injectable 0.1 milliGRAM(s) IV Push every 3 minutes PRN For ANY of the following changes in patient status:  A. RR LESS THAN 10 breaths per minute, B. Oxygen saturation LESS THAN 90%, C. Sedation score of 6  polyethylene glycol 3350 17 Gram(s) Oral daily PRN Constipation          PHYSICAL EXAM:  GENERAL: NAD, well-groomed, well-developed  HEAD:  Atraumatic, Normocephalic  CHEST/LUNG: Clear to percussion bilaterally; No rales, rhonchi, wheezing, or rubs  HEART: Regular rate and rhythm; No murmurs, rubs, or gallops  ABDOMEN: Soft, Nontender, Nondistended; Bowel sounds present  EXTREMITIES:  2+ Peripheral Pulses, No clubbing, cyanosis, or edema  LYMPH: No lymphadenopathy noted  SKIN: No rashes or lesions    Care Discussed with Consultants/Other Providers [ ] YES  [ ] NO

## 2024-11-05 NOTE — CHART NOTE - NSCHARTNOTEFT_GEN_A_CORE
SOURCE: [x] Patient [x] Medical record [x] Family: Spouse at bed side  [x] nurse   Diet rx: Regular: Pureed (PUREED); Mildly Thick Liquids (MILDTHICKLIQS)  Supplement Feeding Modality:  Oral  Ensure Plus High Protein Cans or Servings Per Day:  1       Frequency:  Two Times a day (10-29-24 @ 13:12) [Active]    Medical Course: Pt 65 yo male with a PMHx of Metastatic Prostate Cancer (s/p radical prostatectomy), HLD, HTN, sent in by hematologist from [facility] and cancer for uncontrolled pain, nausea, vomiting - per chart review.     Nutrition Course: At time of visit, Pt awake, appears weak. Pt's spouse at bed side. Pt's appetite remains inadequate reported. Pt drinks PO supplement: Ensure Plus, but not 2 cans daily, as ordered. Pt c/o oral thrush. No report of nausea, vomiting @ this time. Pt having diarrhea; +rectal tube in place reported. Of note, Pt s/p Swallow Bedside Assessment Adult, SLP rec: Purred with mildly thick liquid (10/25). Of note Pt DNR/DNI. Case discussed with nurse.      Pertinent Medications: Protonix, Lipitor, Miralax, Lactobacillus Acidophilus, First Mouthwash, Insulin (Lispro), Multivitamin, Nephro-nancy    Pertinent Labs:                   7.3    3.02  )-----------( 138      ( 05 Nov 2024 06:30 )             22.5   11-05    143  |  115[H]  |  8   ----------------------------<  97  3.6   |  21[L]  |  0.31[L]    Ca    7.3[L]      05 Nov 2024 06:30  Phos  2.2     11-05  Mg     1.70     11-05  (10/25) Albumin 2.3 L, AST 52 H,   (10/23) HbA1c 6.2% H   (10/13) Triglycerides 217 H, HDL 39 L   CAPILLARY BLOOD GLUCOSE  POCT Blood Glucose.: 107 mg/dL (05 Nov 2024 08:47)  POCT Blood Glucose.: 108 mg/dL (04 Nov 2024 21:06)  POCT Blood Glucose.: 102 mg/dL (04 Nov 2024 18:08)  POCT Blood Glucose.: 100 mg/dL (04 Nov 2024 12:24)    Pt's height: 68"/172.72 cm (10/12)  Pt's weights: 152.7#69.3 kg (10/16), 153#/69.4 Kg (10/12)  weight assessment: no new weight available at present    IBW: 154#/-10%   BMI: 23.26 kg/m2      Physical assessment (per flow-sheets): Edema/Pressure Injury: none reported at present    Estimated Needs: [x] no change since previous assessment, based on admit/dosing weight: 153 lbs/69.4 kg   estimated kcal needs: 7519-8227 kcal daily @ 30-35kcal/kg BW    estimated protein needs:  gm protein daily @ 1.2-1.5gm/kg BW      Previous Nutrition Diagnosis: [x] Malnutrition, severe   Nutrition Diagnosis is [x] ongoing   New Nutrition Diagnosis: [x] not applicable      Education: [x] better food options discussed with Pt & his spouse     Nutrition Interventions/Recommendations::    1. Encourage & assist Pt with meals as needed;   2. Add appetite stimulant to boost Pt's PO intake/appetite;     3. Add Banatrol (contains soluble fiber and prebiotic) - 1 packet (11 gm/40 Kcal) - 2x daily;    4. Bowel regimen per MD discretion;   5. Monitor & Evaluate: PO intake, tolerance to diet/supplement; nutrition related lab values; weekly weight & weight trends; BMs/GI distress; hydration status; skin integrity; SOURCE: [x] Patient [x] Medical record [x] Family: Spouse at bed side  [x] nurse   Diet rx: Regular: Pureed (PUREED); Mildly Thick Liquids (MILDTHICKLIQS)  Supplement Feeding Modality:  Oral  Ensure Plus High Protein Cans or Servings Per Day:  1       Frequency:  Two Times a day (10-29-24 @ 13:12) [Active]    Medical Course: Pt 63 yo male with a PMHx of Metastatic Prostate Cancer (s/p radical prostatectomy), HLD, HTN, sent in by hematologist from [facility] and cancer for uncontrolled pain, nausea, vomiting - per chart review.     Nutrition Course: At time of visit, Pt awake, appears weak. Pt's spouse at bed side. Pt's appetite remains inadequate reported. Pt drinks PO supplement: Ensure Plus, but not 2 cans daily, as ordered. Pt c/o oral thrush, makes it harder fot Pt to eat. No report of nausea, vomiting @ this time. But Pt having diarrhea; +rectal tube in place reported. Pt may benefit from adding Banatrol (contains soluble fiber and prebiotic) - 1 packet (11 gm/40 Kcal) - 2x daily. Pt s/p Swallow Bedside Assessment Adult, SLP rec: Purred with mildly thick liquid (10/25). Of note Pt DNR/DNI. Case discussed with nurse.      Pertinent Medications: Protonix, Lipitor, Miralax, Lactobacillus Acidophilus, First Mouthwash, Insulin (Lispro), Multivitamin, Nephro-nancy    Pertinent Labs:                   7.3    3.02  )-----------( 138      ( 05 Nov 2024 06:30 )             22.5   11-05    143  |  115[H]  |  8   ----------------------------<  97  3.6   |  21[L]  |  0.31[L]    Ca    7.3[L]      05 Nov 2024 06:30  Phos  2.2     11-05  Mg     1.70     11-05  (10/25) Albumin 2.3 L, AST 52 H,   (10/23) HbA1c 6.2% H   (10/13) Triglycerides 217 H, HDL 39 L   CAPILLARY BLOOD GLUCOSE  POCT Blood Glucose.: 107 mg/dL (05 Nov 2024 08:47)  POCT Blood Glucose.: 108 mg/dL (04 Nov 2024 21:06)  POCT Blood Glucose.: 102 mg/dL (04 Nov 2024 18:08)  POCT Blood Glucose.: 100 mg/dL (04 Nov 2024 12:24)    Pt's height: 68"/172.72 cm (10/12)  Pt's weights: 152.7#69.3 kg (10/16), 153#/69.4 Kg (10/12)  weight assessment: no new weight available at present    IBW: 154#/-10%   BMI: 23.26 kg/m2    Physical assessment (per flow-sheets): Edema/Pressure Injury: none reported at present      Estimated Needs: [x] no change since previous assessment, based on admit/dosing weight: 153 lbs/69.4 kg   estimated kcal needs: 7249-9463 kcal daily @ 30-35kcal/kg BW    estimated protein needs:  gm protein daily @ 1.2-1.5gm/kg BW      Previous Nutrition Diagnosis: [x] Malnutrition, severe   Nutrition Diagnosis is [x] ongoing   New Nutrition Diagnosis: [x] not applicable      Education: [x] better food options discussed with Pt & his spouse     Nutrition Interventions/Recommendations::    1. Encourage & assist Pt with meals as needed;   2. Add appetite stimulant to boost Pt's PO intake/appetite;     3. Add Banatrol (contains soluble fiber and prebiotic) - 1 packet (11 gm/40 Kcal) - 2x daily;    4. Bowel regimen per MD discretion;   5. Monitor & Evaluate: PO intake, tolerance to diet/supplement; nutrition related lab values; weekly weight & weight trends; BMs/GI distress; hydration status; skin integrity;

## 2024-11-05 NOTE — PROGRESS NOTE ADULT - SUBJECTIVE AND OBJECTIVE BOX
[facility]   MD MARKO EASTMAN MD ANGELA WONG, PA    TEL:  OFFICE: [phone]  From 5pm-7am Answering Service [phone]    -- RENAL FOLLOW UP NOTE ---Date of Service 11-05-24 @ 14:38    Patient is a 64y old  Male who presents with a chief complaint of Pain (05 Nov 2024 11:30)      Patient seen and examined at bedside.     VITALS:  T(F): 98 (11-05-24 @ 06:00), Max: 98.3 (11-04-24 @ 21:00)  HR: 98 (11-05-24 @ 06:00)  BP: 107/75 (11-05-24 @ 06:00)  RR: 18 (11-05-24 @ 06:00)  SpO2: 98% (11-05-24 @ 06:00)  Wt(kg): --    11-04 @ 07:01  -  11-05 @ 07:00  --------------------------------------------------------  IN: 275 mL / OUT: 555 mL / NET: -280 mL    11-05 @ 07:01  -  11-05 @ 14:38  --------------------------------------------------------  IN: 120 mL / OUT: 550 mL / NET: -430 mL          PHYSICAL EXAM:  General: asleep  Neck: No JVD  Respiratory: CTAB, no wheezes, rales or rhonchi  Cardiovascular: S1, S2, RRR  Gastrointestinal: BS+, soft, NT/ND  Extremities: No peripheral edema    Hospital Medications:   MEDICATIONS  (STANDING):  abiraterone 500 mg tablet 2 Tablet(s) 2 Tablet(s) Oral daily  acetaminophen     Tablet .. 650 milliGRAM(s) Oral once  atorvastatin 20 milliGRAM(s) Oral at bedtime  cefepime   IVPB 2000 milliGRAM(s) IV Intermittent every 8 hours  cefepime   IVPB      chlorhexidine 2% Cloths 1 Application(s) Topical daily  dextrose 5% 1000 milliLiter(s) (75 mL/Hr) IV Continuous <Continuous>  dextrose 5% 1000 milliLiter(s) (75 mL/Hr) IV Continuous <Continuous>  dextrose 5%. 1000 milliLiter(s) (50 mL/Hr) IV Continuous <Continuous>  dextrose 5%. 1000 milliLiter(s) (100 mL/Hr) IV Continuous <Continuous>  dextrose 50% Injectable 25 Gram(s) IV Push once  dextrose 50% Injectable 12.5 Gram(s) IV Push once  dextrose 50% Injectable 25 Gram(s) IV Push once  diphenhydrAMINE 25 milliGRAM(s) Oral once  fenofibrate Tablet 145 milliGRAM(s) Oral daily  FIRST- Mouthwash  BLM 30 milliLiter(s) Swish and Spit three times a day  glucagon  Injectable 1 milliGRAM(s) IntraMuscular once  haloperidol     Tablet 1 milliGRAM(s) Oral <User Schedule>  influenza  Vaccine (HIGH DOSE) 0.5 milliLiter(s) IntraMuscular once  insulin lispro (ADMELOG) corrective regimen sliding scale   SubCutaneous at bedtime  insulin lispro (ADMELOG) corrective regimen sliding scale   SubCutaneous three times a day before meals  lactobacillus acidophilus 1 Tablet(s) Oral daily  lidocaine   4% Patch 1 Patch Transdermal every 24 hours  melatonin 6 milliGRAM(s) Oral at bedtime  metroNIDAZOLE  IVPB 500 milliGRAM(s) IV Intermittent every 12 hours  multivitamin 1 Tablet(s) Oral daily  Nephro-nancy 1 Tablet(s) Oral daily  nystatin    Suspension 448129 Unit(s) Oral three times a day  pantoprazole  Injectable 40 milliGRAM(s) IV Push daily  predniSONE   Tablet 5 milliGRAM(s) Oral daily      LABS:  11-05    143  |  115[H]  |  8   ----------------------------<  97  3.6   |  21[L]  |  0.31[L]    Ca    7.3[L]      05 Nov 2024 06:30  Phos  2.2     11-05  Mg     1.70     11-05      Creatinine Trend: 0.31 <--, 0.32 <--, 0.31 <--, 0.34 <--, 0.30 <--, 0.33 <--, 0.31 <--, 0.32 <--, 0.30 <--, 0.33 <--, 0.31 <--, 0.34 <--, 0.37 <--, 0.33 <--, 0.33 <--    Phosphorus: 2.2 mg/dL (11-05 @ 06:30)  Phosphorus: 2.7 mg/dL (11-04 @ 23:33)                              7.3    3.02  )-----------( 138      ( 05 Nov 2024 06:30 )             22.5     Urine Studies:  Urinalysis - [11-05-24 @ 06:30]      Color  / Appearance  / SG  / pH       Gluc 97 / Ketone   / Bili  / Urobili        Blood  / Protein  / Leuk Est  / Nitrite       RBC  / WBC  / Hyaline  / Gran  / Sq Epi  / Non Sq Epi  / Bacteria       TSH 2.62      [10-13-24 @ 05:30]  Lipid: chol 135, , HDL 39, LDL --      [10-13-24 @ 05:30]        RADIOLOGY & ADDITIONAL STUDIES:

## 2024-11-05 NOTE — PROGRESS NOTE ADULT - ASSESSMENT
64-year-old male with metastatic prostate cancer, sent in by hematologist from [place] [facility] and cancer for uncontrolled pain, nausea, vomiting.   Patient reports diffuse pain starting 6 months ago significantly worsening for the past 2 weeks. Diagnosed with high risk high volume metastatic prostate cancer with bone, liver lymph node mets and presacral mass. Liver biopsy confirmed disease with . Started lupron, loly/pred with plans to initiate taxotere.   nephrology consulted for electrolyte abnormalities.    Hypernatremia  Poor free water intake    pt now has diet however not eating much per family  was on LR +d5+40kcl @ 75cc/hr   changed to d5 with 40MEq KCL at 75cc / hr   na better. will continue current ivf  encourage free water as tolerated.  monitor Na.  Avoid overcorrection >8mEq in 24hrs.    Hypokalemia  GI loss and poor po intake  unable to tolerate PO kcl  On D5+40kcl as above.  pt still having diarrhea per wife and poor po intake  supplement kcl 10x3 dose today  monitor closely.    Acidosis   non AG  likely GI lost.  Hyperchloremic.  stable.  monitor CO2.    hypophosphatemia  kphos 30iv x1 given  monitor    hypocalcemia   sec to low albumin  corrected ca 8.5  monitor    fever  GNR bacteremia   work up and tx per team    anemia  metastatic prostate cancer  f/u heme/onc

## 2024-11-05 NOTE — PROGRESS NOTE ADULT - SUBJECTIVE AND OBJECTIVE BOX
Date of Service: 11-05-24 @ 11:30    Patient is a 64y old  Male who presents with a chief complaint of Pain (05 Nov 2024 10:35)      Any change in ROS: wife at bedside:  pt is alert and awake on room air:     MEDICATIONS  (STANDING):  abiraterone 500 mg tablet 2 Tablet(s) 2 Tablet(s) Oral daily  acetaminophen     Tablet .. 650 milliGRAM(s) Oral once  atorvastatin 20 milliGRAM(s) Oral at bedtime  cefepime   IVPB 2000 milliGRAM(s) IV Intermittent every 8 hours  cefepime   IVPB      chlorhexidine 2% Cloths 1 Application(s) Topical daily  dextrose 5% 1000 milliLiter(s) (75 mL/Hr) IV Continuous <Continuous>  dextrose 5% 1000 milliLiter(s) (75 mL/Hr) IV Continuous <Continuous>  dextrose 5%. 1000 milliLiter(s) (50 mL/Hr) IV Continuous <Continuous>  dextrose 5%. 1000 milliLiter(s) (100 mL/Hr) IV Continuous <Continuous>  dextrose 50% Injectable 25 Gram(s) IV Push once  dextrose 50% Injectable 25 Gram(s) IV Push once  dextrose 50% Injectable 12.5 Gram(s) IV Push once  diphenhydrAMINE 25 milliGRAM(s) Oral once  fenofibrate Tablet 145 milliGRAM(s) Oral daily  FIRST- Mouthwash  BLM 30 milliLiter(s) Swish and Spit three times a day  glucagon  Injectable 1 milliGRAM(s) IntraMuscular once  haloperidol     Tablet 1 milliGRAM(s) Oral <User Schedule>  influenza  Vaccine (HIGH DOSE) 0.5 milliLiter(s) IntraMuscular once  insulin lispro (ADMELOG) corrective regimen sliding scale   SubCutaneous at bedtime  insulin lispro (ADMELOG) corrective regimen sliding scale   SubCutaneous three times a day before meals  lactobacillus acidophilus 1 Tablet(s) Oral daily  lidocaine   4% Patch 1 Patch Transdermal every 24 hours  melatonin 6 milliGRAM(s) Oral at bedtime  metroNIDAZOLE  IVPB 500 milliGRAM(s) IV Intermittent every 12 hours  multivitamin 1 Tablet(s) Oral daily  Nephro-nancy 1 Tablet(s) Oral daily  nystatin    Suspension 467577 Unit(s) Oral three times a day  pantoprazole  Injectable 40 milliGRAM(s) IV Push daily  potassium chloride  10 mEq/100 mL IVPB 10 milliEquivalent(s) IV Intermittent every 1 hour  potassium phosphate IVPB 30 milliMole(s) IV Intermittent once  predniSONE   Tablet 5 milliGRAM(s) Oral daily    MEDICATIONS  (PRN):  acetaminophen     Tablet .. 650 milliGRAM(s) Oral every 6 hours PRN Temp greater or equal to 38C (100.4F), Mild Pain (1 - 3), Moderate Pain (4 - 6)  aluminum hydroxide/magnesium hydroxide/simethicone Suspension 30 milliLiter(s) Oral every 4 hours PRN Dyspepsia  artificial tears (preservative free) Ophthalmic Solution 1 Drop(s) Both EYES four times a day PRN Dry Eyes  dextrose Oral Gel 15 Gram(s) Oral once PRN Blood Glucose LESS THAN 70 milliGRAM(s)/deciliter  morphine   Solution 2.5 milliGRAM(s) Oral every 4 hours PRN Moderate Pain (4 - 6)  morphine   Solution 5 milliGRAM(s) Oral every 4 hours PRN Severe Pain (7 - 10)  naloxone Injectable 0.1 milliGRAM(s) IV Push every 3 minutes PRN For ANY of the following changes in patient status:  A. RR LESS THAN 10 breaths per minute, B. Oxygen saturation LESS THAN 90%, C. Sedation score of 6  polyethylene glycol 3350 17 Gram(s) Oral daily PRN Constipation    Vital Signs Last 24 Hrs  T(C): 36.7 (05 Nov 2024 06:00), Max: 36.8 (04 Nov 2024 16:30)  T(F): 98 (05 Nov 2024 06:00), Max: 98.3 (04 Nov 2024 21:00)  HR: 98 (05 Nov 2024 06:00) (95 - 100)  BP: 107/75 (05 Nov 2024 06:00) (107/75 - 123/88)  BP(mean): --  RR: 18 (05 Nov 2024 06:00) (15 - 18)  SpO2: 98% (05 Nov 2024 06:00) (96% - 100%)    Parameters below as of 05 Nov 2024 06:00  Patient On (Oxygen Delivery Method): room air        I&O's Summary    04 Nov 2024 07:01  -  05 Nov 2024 07:00  --------------------------------------------------------  IN: 275 mL / OUT: 555 mL / NET: -280 mL          Physical Exam:   GENERAL: NAD, well-groomed, well-developed  HEENT: CHAVA/   Atraumatic, Normocephalic  ENMT: No tonsillar erythema, exudates, or enlargement; Moist mucous membranes, Good dentition, No lesions  NECK: Supple, No JVD, Normal thyroid  CHEST/LUNG: Clear to auscultaion  CVS: Regular rate and rhythm; No murmurs, rubs, or gallops  GI: : Soft, Nontender, Nondistended; Bowel sounds present  NERVOUS SYSTEM:  Alert & Oriented X3  EXTREMITIES:  - edema  LYMPH: No lymphadenopathy noted  SKIN: No rashes or lesions  ENDOCRINOLOGY: No Thyromegaly  PSYCH: Appropriate    Labs:                              7.3    3.02  )-----------( 138      ( 05 Nov 2024 06:30 )             22.5                         7.3    2.72  )-----------( 141      ( 04 Nov 2024 06:43 )             22.4                         7.6    2.63  )-----------( 149      ( 03 Nov 2024 06:50 )             23.6                         8.2    2.37  )-----------( 148      ( 02 Nov 2024 05:21 )             26.5     11-05    143  |  115[H]  |  8   ----------------------------<  97  3.6   |  21[L]  |  0.31[L]  11-04    144  |  116[H]  |  8   ----------------------------<  114[H]  3.8   |  20[L]  |  0.32[L]  11-04    148[H]  |  120[H]  |  7   ----------------------------<  102[H]  2.9[LL]   |  20[L]  |  0.31[L]  11-03    151[H]  |  123[H]  |  8   ----------------------------<  133[H]  3.4[L]   |  20[L]  |  0.34[L]  11-02    150[H]  |  120[H]  |  8   ----------------------------<  120[H]  3.0[L]   |  20[L]  |  0.30[L]  11-02    146[H]  |  119[H]  |  8   ----------------------------<  126[H]  3.6   |  17[L]  |  0.33[L]  11-01    148[H]  |  119[H]  |  10  ----------------------------<  138[H]  3.3[L]   |  18[L]  |  0.31[L]  11-01    146[H]  |  118[H]  |  10  ----------------------------<  117[H]  3.2[L]   |  18[L]  |  0.32[L]    Ca    7.3[L]      05 Nov 2024 06:30  Ca    7.2[L]      04 Nov 2024 23:33  Ca    7.3[L]      04 Nov 2024 06:43  Phos  2.2     11-05  Phos  2.7     11-04  Phos  2.2     11-04  Mg     1.70     11-05  Mg     1.70     11-04  Mg     1.70     11-04      CAPILLARY BLOOD GLUCOSE      POCT Blood Glucose.: 107 mg/dL (05 Nov 2024 08:47)  POCT Blood Glucose.: 108 mg/dL (04 Nov 2024 21:06)  POCT Blood Glucose.: 102 mg/dL (04 Nov 2024 18:08)  POCT Blood Glucose.: 100 mg/dL (04 Nov 2024 12:24)          Urinalysis Basic - ( 05 Nov 2024 06:30 )    Color: x / Appearance: x / SG: x / pH: x  Gluc: 97 mg/dL / Ketone: x  / Bili: x / Urobili: x   Blood: x / Protein: x / Nitrite: x   Leuk Esterase: x / RBC: x / WBC x   Sq Epi: x / Non Sq Epi: x / Bacteria: x        rqad< from: Xray Chest 1 View- PORTABLE-Urgent (Xray Chest 1 View- PORTABLE-Urgent .) (10.31.24 @ 03:52) >    PROCEDURE DATE:  10/31/2024          INTERPRETATION:  CLINICAL INFORMATION: Fever    Time of Exam:  October 31, 2024 at 3:46 AM    EXAM:  Frontal Chest    FINDINGS:  Both lungs are equally aerated and free of any focal abnormalities.  The heart is not enlarged and there is no effusion or pneumothorax.  The bones show no acute findings.      COMPARISON: October 20, 2024        IMPRESSION: Clear lungs.    < end of copied text >      RECENT CULTURES:  10-31 @ 07:47 .Blood BLOOD                No growth at 4 days    10-31 @ 07:44 .Blood BLOOD                No growth at 4 days          RESPIRATORY CULTURES:      Clostridium difficile [facility] A&[facility], EIA:   Negative (11-01 @ 07:45)      Studies  Chest X-RAY  CT SCAN Chest   Venous Dopplers: LE:   CT Abdomen  Others

## 2024-11-05 NOTE — CHART NOTE - NSCHARTNOTEFT_GEN_A_CORE
IR Follow-Up     IR still following for T3,T4,T5 and L1 vertebral augmentation.  c.diff has cleared  will plan for intervention once patient is off antibiotics for enterocolitis    --  Billy Wing DO/MADIE/PRASHANT, PGY-6 Chief Resident  Vascular and Interventional Radiology   Available on Microsoft Teams    For EMERGENT inquiries/questions:  IR Pager ([facility]): [phone]  IR Pager ([facility]): [phone] ; [phone]    For non-emergent consults/questions:   Please place a sunrise order "Consult- Interventional Radiology" with an appropriate callback number    For questions about scheduling during appropriate work hours, call IR :  [facility]: [phone]  [facility]: [phone]    For outpatient IR booking:  [facility]: [phone]  [facility]: [phone]

## 2024-11-05 NOTE — PROGRESS NOTE ADULT - ASSESSMENT
64-year-old male with metastatic prostate cancer, sent in by hematologist from [facility] for uncontrolled pain, nausea, vomiting.   Patient reports diffuse pain starting 6 months ago significantly worsening for the past 2 weeks.  Currently the worst pain is located around the lower back.   No loss of bladder and bowel function, no saddle paresthesia.  Able to walk.  patient is oxycodone 5 every 4-6 hour.  Yesterday they started adding OxyContin 10.  However patient continued to have pain.  Family also reports significant nausea and vomiting, inability to tolerate p.o. for the last 2 days.  Last bowel movement 2 days ago.   No known allergy.  Diagnosed with high risk high volume metastatic prostate cancer with bone, liver lymph node mets and presacral mass. Liver biopsy confirmed disease with . Started lupron, loly/pred with plans to initiate taxotere.    (12 Oct 2024 15:40)  pt seems very frustrated:   he is on 2 L of oxygen : he has no underlying lung disease:  but he is requiring 2 L of oxygen      Hypoxic resp failure  Metastatic prostate cancer  Back pain     Hypoxic resp failure  -He has no underlying lung disease:  but he is requiring 2 L of oxygen :   -CTA showed; No pulmonary embolism to the level of the segmental branches bilaterally. Limited evaluation of the subsegmental branches secondary to incomplete contrast opacification.  Multilevel vertebral body lytic lesions and loss of vertebral body height, better evaluated on CT thoracic spine 10/17/2024. Metastatic prostate cancer  -Patent central airways. Bibasilar atelectasis. No pleural effusion. MEDIASTINUM AND KATTY: No lymphadenopathy.  PULMONARY ANGIOGRAM: No pulmonary embolism to the level of the segmental   branches bilaterally. Limited evaluation of the subsegmental branches secondary to incomplete contrast opacification.    10/19:  -seems pretty tired;  on 2 L of oxygen :  -cta chest showed: No pulmonary embolism to the level of the segmental branches bilaterally. Limited evaluation of the subsegmental branches secondary to incomplete   contrast opacification. Multilevel vertebral body lytic lesions and loss of vertebral body height, better evaluated on CT thoracic spine 10/17/2024.  -still with fever:  no pe  on zosyn  and leukopenic hemonc following;  has metastatic prostate ca:   -VBG seems OK:     10/20:  pulm wise he seems to be stable:   -using 2 L of oxygen    -yesterdays VBG with minimal met acidosis  -cta again noted    10/21:  on 4 L of oxygen  today    cxr is size    e coli sepsis: Gram Negative Rods and Gram Negative Coccobacilli    10/22: heis doing poorly today  : he is very agitated and uncomfortable  on mittens: ? sun downing   10/23: quiet today  : sleeping:  oxygen requirement has not increased: on rooo ha 95% as documented    WBC is slowly increasing:   no fever:   -on antibiotics   10/24: pt is not doing well : his repeat blood cultures are positive with same organism :  would defer to ID;   10/25: seems to be doing  ok ; no sob:  no cough :  no phlegm   : on room air   10/26: resp failure has resolved:  is septic  : on ceftriaxone     10/27:  he seems OK:  he is on room air:  on morphine drip   remans on ceftriaxone still     10/28: she seems to be doing  ok : no sob: no cough ; no phlegm  confused:  on morphine drip    10/29; seems comfortable on room air;   cont current rx:   10/30: seems to be doing  ok ; on morphine drip : : plan to decrease morphine dose:   -cont current supportive care   10/31:  seems same tome:   no sob:  on room air:   seems comfortable at this ti me: off morphine  11/1:  he has been on room air for days;  looks comfortable:  coughs when he eats;   need pt ot  ; ? oob to chair:  dw wife:   11/3:  he seems stable:   on room air:  responding to questions pretty well : has bleeding lower lip : not Much through ? secondary to dryness:   -resp wise seems pretty good:  no sob:    114:  -seems pretty good:  no sob:  on room air  -has iral ulceration:  bleeding:  not much though : cont magic mouth wash   11/5:  -he seems  pretty good:   on room air:   no sob:  no cough :  no phlegm : difficulty in eating secondary to mucositis:  cont current rx:         New finding:  SMA dissection : neutropenic colitis/ #E Coli and H influenzae bacteremia/ #Neutropenic fever  -/f ischemic colitis on R colon   Diagnosed  on ct abd:  defer to surgery and primary team:  probably no intervention:   -cont antibiotics  -not doing very well with above new findings    10/22; no intervention per surgery    -cont antibiotics  -has fever:  ID following :  -Last chest xray on 20th  ; normal     id following   10/23  IR was consulted for vertebral augmentation of T3-T5 prior to XRT.   Patient was seen bedside. Initially, patient kept requesting no exam and was not willing to participate in the discussion. Son was bedside at time of conversation.   Per discussion with the medical attending, given the concern for SMA dissection and concern for bowel ischemia, will hold off on vertebral augmentation evaluation at this time.   Please reach out to IR when patient is medically stable for vertebral augmentation.  10/24:  remains septic:  above cancelled:   ? for palliative care now  10/26: he seems same to me:  no overnight events  on room air:   check VBG  : cont antibiotics   hemonc following   10/27:  -still on antibiotics for e coli sepsis  -id following s  10/28: cont antibiotics   10/230: cont ceftriaxone   10/31: on ceftriaxone and flagyl   11/1: antibiotics per ID   11/3: cont antibiotics : IR note reviewed:  no kyphoplasty at this time    11/4: cont antibiotics per ID   11/5: on cefepime:  id following     Back pain   -likely due to metastatic disease:  adm here for severe pain :  -pain control per primary team   -venous ph is ok   11/1: resolved for now on meds  11/3: pain control : no kyphoplasty at this ti me per ir note:   11/4: as above    dw acp; GOC as per primary  team

## 2024-11-05 NOTE — PROGRESS NOTE ADULT - NS ATTEND AMEND GEN_ALL_CORE FT
Patient seen and examined with the NP during rounds  I agree with her assessment and plan    overall doing well, continues with IV antibiotics  would need to have kyphoplasty done and resuming chemotherapy once cleared by ID  Will follow

## 2024-11-06 LAB
ALBUMIN SERPL ELPH-MCNC: 1.8 G/DL — LOW (ref 3.3–5)
ALP SERPL-CCNC: 143 U/L — HIGH (ref 40–120)
ALT FLD-CCNC: 22 U/L — SIGNIFICANT CHANGE UP (ref 4–41)
ANION GAP SERPL CALC-SCNC: 7 MMOL/L — SIGNIFICANT CHANGE UP (ref 7–14)
AST SERPL-CCNC: 43 U/L — HIGH (ref 4–40)
BASOPHILS # BLD AUTO: 0.04 K/UL — SIGNIFICANT CHANGE UP (ref 0–0.2)
BASOPHILS NFR BLD AUTO: 1 % — SIGNIFICANT CHANGE UP (ref 0–2)
BILIRUB SERPL-MCNC: 0.5 MG/DL — SIGNIFICANT CHANGE UP (ref 0.2–1.2)
BLD GP AB SCN SERPL QL: NEGATIVE — SIGNIFICANT CHANGE UP
BUN SERPL-MCNC: 6 MG/DL — LOW (ref 7–23)
CALCIUM SERPL-MCNC: 7.1 MG/DL — LOW (ref 8.4–10.5)
CHLORIDE SERPL-SCNC: 109 MMOL/L — HIGH (ref 98–107)
CO2 SERPL-SCNC: 20 MMOL/L — LOW (ref 22–31)
CREAT SERPL-MCNC: 0.26 MG/DL — LOW (ref 0.5–1.3)
EGFR: 138 ML/MIN/1.73M2 — SIGNIFICANT CHANGE UP
EOSINOPHIL # BLD AUTO: 0.15 K/UL — SIGNIFICANT CHANGE UP (ref 0–0.5)
EOSINOPHIL NFR BLD AUTO: 4 % — SIGNIFICANT CHANGE UP (ref 0–6)
GLUCOSE BLDC GLUCOMTR-MCNC: 134 MG/DL — HIGH (ref 70–99)
GLUCOSE BLDC GLUCOMTR-MCNC: 134 MG/DL — HIGH (ref 70–99)
GLUCOSE BLDC GLUCOMTR-MCNC: 138 MG/DL — HIGH (ref 70–99)
GLUCOSE BLDC GLUCOMTR-MCNC: 94 MG/DL — SIGNIFICANT CHANGE UP (ref 70–99)
GLUCOSE SERPL-MCNC: 107 MG/DL — HIGH (ref 70–99)
HCT VFR BLD CALC: 21.8 % — LOW (ref 39–50)
HCT VFR BLD CALC: 25.7 % — LOW (ref 39–50)
HGB BLD-MCNC: 6.9 G/DL — CRITICAL LOW (ref 13–17)
HGB BLD-MCNC: 8.4 G/DL — LOW (ref 13–17)
IANC: 2.51 K/UL — SIGNIFICANT CHANGE UP (ref 1.8–7.4)
LYMPHOCYTES # BLD AUTO: 0.42 K/UL — LOW (ref 1–3.3)
LYMPHOCYTES # BLD AUTO: 11 % — LOW (ref 13–44)
MAGNESIUM SERPL-MCNC: 1.4 MG/DL — LOW (ref 1.6–2.6)
MAGNESIUM SERPL-MCNC: 1.7 MG/DL — SIGNIFICANT CHANGE UP (ref 1.6–2.6)
MANUAL SMEAR VERIFICATION: SIGNIFICANT CHANGE UP
MCHC RBC-ENTMCNC: 29.6 PG — SIGNIFICANT CHANGE UP (ref 27–34)
MCHC RBC-ENTMCNC: 30.1 PG — SIGNIFICANT CHANGE UP (ref 27–34)
MCHC RBC-ENTMCNC: 31.7 G/DL — LOW (ref 32–36)
MCHC RBC-ENTMCNC: 32.7 G/DL — SIGNIFICANT CHANGE UP (ref 32–36)
MCV RBC AUTO: 92.1 FL — SIGNIFICANT CHANGE UP (ref 80–100)
MCV RBC AUTO: 93.6 FL — SIGNIFICANT CHANGE UP (ref 80–100)
METAMYELOCYTES # FLD: 1 % — SIGNIFICANT CHANGE UP (ref 0–1)
MONOCYTES # BLD AUTO: 0.15 K/UL — SIGNIFICANT CHANGE UP (ref 0–0.9)
MONOCYTES NFR BLD AUTO: 4 % — SIGNIFICANT CHANGE UP (ref 2–14)
MYELOCYTES NFR BLD: 1 % — HIGH (ref 0–0)
NEUTROPHILS # BLD AUTO: 3 K/UL — SIGNIFICANT CHANGE UP (ref 1.8–7.4)
NEUTROPHILS NFR BLD AUTO: 74 % — SIGNIFICANT CHANGE UP (ref 43–77)
NEUTS BAND # BLD: 4 % — SIGNIFICANT CHANGE UP (ref 0–6)
NRBC # BLD: 0 /100 WBCS — SIGNIFICANT CHANGE UP (ref 0–0)
NRBC # BLD: 0 /100 WBCS — SIGNIFICANT CHANGE UP (ref 0–0)
NRBC # FLD: 0.02 K/UL — HIGH (ref 0–0)
PHOSPHATE SERPL-MCNC: 1.9 MG/DL — LOW (ref 2.5–4.5)
PHOSPHATE SERPL-MCNC: 2.3 MG/DL — LOW (ref 2.5–4.5)
PLAT MORPH BLD: NORMAL — SIGNIFICANT CHANGE UP
PLATELET # BLD AUTO: 129 K/UL — LOW (ref 150–400)
PLATELET # BLD AUTO: 144 K/UL — LOW (ref 150–400)
PLATELET COUNT - ESTIMATE: ABNORMAL
POTASSIUM SERPL-MCNC: 4.1 MMOL/L — SIGNIFICANT CHANGE UP (ref 3.5–5.3)
POTASSIUM SERPL-SCNC: 4.1 MMOL/L — SIGNIFICANT CHANGE UP (ref 3.5–5.3)
PROT SERPL-MCNC: 4.4 G/DL — LOW (ref 6–8.3)
RBC # BLD: 2.33 M/UL — LOW (ref 4.2–5.8)
RBC # BLD: 2.79 M/UL — LOW (ref 4.2–5.8)
RBC # FLD: 18.6 % — HIGH (ref 10.3–14.5)
RBC # FLD: 18.7 % — HIGH (ref 10.3–14.5)
RBC BLD AUTO: SIGNIFICANT CHANGE UP
RH IG SCN BLD-IMP: POSITIVE — SIGNIFICANT CHANGE UP
SODIUM SERPL-SCNC: 136 MMOL/L — SIGNIFICANT CHANGE UP (ref 135–145)
WBC # BLD: 3.85 K/UL — SIGNIFICANT CHANGE UP (ref 3.8–10.5)
WBC # BLD: 4.94 K/UL — SIGNIFICANT CHANGE UP (ref 3.8–10.5)
WBC # FLD AUTO: 3.85 K/UL — SIGNIFICANT CHANGE UP (ref 3.8–10.5)
WBC # FLD AUTO: 4.94 K/UL — SIGNIFICANT CHANGE UP (ref 3.8–10.5)

## 2024-11-06 RX ORDER — POTASSIUM PHOSPHATE, MONOBASIC POTASSIUM PHOSPHATE, DIBASIC INJECTION, 236; 224 MG/ML; MG/ML
15 SOLUTION, CONCENTRATE INTRAVENOUS ONCE
Refills: 0 | Status: COMPLETED | OUTPATIENT
Start: 2024-11-06 | End: 2024-11-06

## 2024-11-06 RX ORDER — 0.9 % SODIUM CHLORIDE 0.9 %
1000 INTRAVENOUS SOLUTION INTRAVENOUS
Refills: 0 | Status: DISCONTINUED | OUTPATIENT
Start: 2024-11-06 | End: 2024-11-07

## 2024-11-06 RX ADMIN — DIPHENHYDRAMINE HYDROCHLORIDE AND LIDOCAINE HYDROCHLORIDE AND ALUMINUM HYDROXIDE AND MAGNESIUM HYDRO 30 MILLILITER(S): KIT at 15:07

## 2024-11-06 RX ADMIN — Medication 1 TABLET(S): at 15:09

## 2024-11-06 RX ADMIN — PANTOPRAZOLE SODIUM 40 MILLIGRAM(S): 40 TABLET, DELAYED RELEASE ORAL at 15:08

## 2024-11-06 RX ADMIN — PREDNISONE 5 MILLIGRAM(S): 20 TABLET ORAL at 06:08

## 2024-11-06 RX ADMIN — CHLORHEXIDINE GLUCONATE 1 APPLICATION(S): 1.2 RINSE ORAL at 15:08

## 2024-11-06 RX ADMIN — Medication 145 MILLIGRAM(S): at 15:09

## 2024-11-06 RX ADMIN — NYSTATIN 500000 UNIT(S): 500000 TABLET, FILM COATED ORAL at 06:08

## 2024-11-06 RX ADMIN — METRONIDAZOLE 100 MILLIGRAM(S): 500 TABLET ORAL at 17:24

## 2024-11-06 RX ADMIN — DIPHENHYDRAMINE HYDROCHLORIDE AND LIDOCAINE HYDROCHLORIDE AND ALUMINUM HYDROXIDE AND MAGNESIUM HYDRO 30 MILLILITER(S): KIT at 21:23

## 2024-11-06 RX ADMIN — NYSTATIN 500000 UNIT(S): 500000 TABLET, FILM COATED ORAL at 21:23

## 2024-11-06 RX ADMIN — DIPHENHYDRAMINE HYDROCHLORIDE AND LIDOCAINE HYDROCHLORIDE AND ALUMINUM HYDROXIDE AND MAGNESIUM HYDRO 30 MILLILITER(S): KIT at 06:09

## 2024-11-06 RX ADMIN — CEFEPIME 100 MILLIGRAM(S): 2 INJECTION, POWDER, FOR SOLUTION INTRAVENOUS at 15:14

## 2024-11-06 RX ADMIN — ACETAMINOPHEN, DIPHENHYDRAMINE HCL, PHENYLEPHRINE HCL 6 MILLIGRAM(S): 325; 25; 5 TABLET ORAL at 21:23

## 2024-11-06 RX ADMIN — Medication 1 MILLIGRAM(S): at 21:23

## 2024-11-06 RX ADMIN — POTASSIUM PHOSPHATE, MONOBASIC POTASSIUM PHOSPHATE, DIBASIC INJECTION, 62.5 MILLIMOLE(S): 236; 224 SOLUTION, CONCENTRATE INTRAVENOUS at 15:14

## 2024-11-06 RX ADMIN — CEFEPIME 100 MILLIGRAM(S): 2 INJECTION, POWDER, FOR SOLUTION INTRAVENOUS at 06:08

## 2024-11-06 RX ADMIN — CEFEPIME 100 MILLIGRAM(S): 2 INJECTION, POWDER, FOR SOLUTION INTRAVENOUS at 21:23

## 2024-11-06 RX ADMIN — METRONIDAZOLE 100 MILLIGRAM(S): 500 TABLET ORAL at 06:08

## 2024-11-06 RX ADMIN — Medication 75 MILLILITER(S): at 15:13

## 2024-11-06 RX ADMIN — Medication 20 MILLIGRAM(S): at 06:08

## 2024-11-06 RX ADMIN — Medication 75 MILLILITER(S): at 10:31

## 2024-11-06 RX ADMIN — NYSTATIN 500000 UNIT(S): 500000 TABLET, FILM COATED ORAL at 15:07

## 2024-11-06 NOTE — CHART NOTE - NSCHARTNOTEFT_GEN_A_CORE
Contacted by primary team to be notified of antibiotics completion.    Will plan for T3,T4,T5 and L1 vertebral augmentation on 11/12/24.    Please place the procedure order under Dr. Case.  Please complete an IR pre-procedure note.  NPO at midnight the night before procedure.  Please obtain morning labs before the procedure (CBC, BMP, coags, active T&S).  No antiplatelet/anticoagulation meds noted in the record.    HANNAH Mejia PGY3

## 2024-11-06 NOTE — PROVIDER CONTACT NOTE (CRITICAL VALUE NOTIFICATION) - NS PROVIDER READ BACK TO LAB
yes
yes
Potassium 2.9/yes
yes
WBC 0.79 (at 0835) and WBC 0.55 (at 1117)/yes
yes
hemoglobin of 6.8 and hematocrit of 20.4/yes
yes
yes
Potassium of 2.6/yes
yes
potassium 2.6/yes
yes

## 2024-11-06 NOTE — PROGRESS NOTE ADULT - SUBJECTIVE AND OBJECTIVE BOX
Patient is a 64y old  Male who presents with a chief complaint of Pain (06 Nov 2024 14:44)    Date of servie : 11-06-24 @ 17:13  INTERVAL HPI/OVERNIGHT EVENTS:  T(C): 36.3 (11-06-24 @ 13:24), Max: 36.7 (11-05-24 @ 17:18)  HR: 100 (11-06-24 @ 13:24) (89 - 100)  BP: 111/78 (11-06-24 @ 13:24) (106/70 - 118/79)  RR: 18 (11-06-24 @ 13:24) (12 - 19)  SpO2: 98% (11-06-24 @ 13:24) (96% - 99%)  Wt(kg): --  I&O's Summary    05 Nov 2024 07:01  -  06 Nov 2024 07:00  --------------------------------------------------------  IN: 120 mL / OUT: 1750 mL / NET: -1630 mL        LABS:                        8.4    4.94  )-----------( 144      ( 06 Nov 2024 09:46 )             25.7     11-06    136  |  109[H]  |  6[L]  ----------------------------<  107[H]  4.1   |  20[L]  |  0.26[L]    Ca    7.1[L]      06 Nov 2024 08:49  Phos  2.3     11-06  Mg     1.70     11-06    TPro  4.4[L]  /  Alb  1.8[L]  /  TBili  0.5  /  DBili  x   /  AST  43[H]  /  ALT  22  /  AlkPhos  143[H]  11-06      Urinalysis Basic - ( 06 Nov 2024 08:49 )    Color: x / Appearance: x / SG: x / pH: x  Gluc: 107 mg/dL / Ketone: x  / Bili: x / Urobili: x   Blood: x / Protein: x / Nitrite: x   Leuk Esterase: x / RBC: x / WBC x   Sq Epi: x / Non Sq Epi: x / Bacteria: x      CAPILLARY BLOOD GLUCOSE      POCT Blood Glucose.: 134 mg/dL (06 Nov 2024 12:52)  POCT Blood Glucose.: 134 mg/dL (06 Nov 2024 09:00)  POCT Blood Glucose.: 135 mg/dL (05 Nov 2024 21:09)  POCT Blood Glucose.: 146 mg/dL (05 Nov 2024 18:20)        Urinalysis Basic - ( 06 Nov 2024 08:49 )    Color: x / Appearance: x / SG: x / pH: x  Gluc: 107 mg/dL / Ketone: x  / Bili: x / Urobili: x   Blood: x / Protein: x / Nitrite: x   Leuk Esterase: x / RBC: x / WBC x   Sq Epi: x / Non Sq Epi: x / Bacteria: x        MEDICATIONS  (STANDING):  abiraterone 500 mg tablet 2 Tablet(s) 2 Tablet(s) Oral daily  acetaminophen     Tablet .. 650 milliGRAM(s) Oral once  atorvastatin 20 milliGRAM(s) Oral at bedtime  cefepime   IVPB 2000 milliGRAM(s) IV Intermittent every 8 hours  cefepime   IVPB      chlorhexidine 2% Cloths 1 Application(s) Topical daily  dextrose 5% 1000 milliLiter(s) (75 mL/Hr) IV Continuous <Continuous>  dextrose 5%. 1000 milliLiter(s) (50 mL/Hr) IV Continuous <Continuous>  dextrose 5%. 1000 milliLiter(s) (100 mL/Hr) IV Continuous <Continuous>  dextrose 50% Injectable 12.5 Gram(s) IV Push once  dextrose 50% Injectable 25 Gram(s) IV Push once  dextrose 50% Injectable 25 Gram(s) IV Push once  diphenhydrAMINE 25 milliGRAM(s) Oral once  fenofibrate Tablet 145 milliGRAM(s) Oral daily  FIRST- Mouthwash  BLM 30 milliLiter(s) Swish and Spit three times a day  glucagon  Injectable 1 milliGRAM(s) IntraMuscular once  haloperidol     Tablet 1 milliGRAM(s) Oral <User Schedule>  influenza  Vaccine (HIGH DOSE) 0.5 milliLiter(s) IntraMuscular once  insulin lispro (ADMELOG) corrective regimen sliding scale   SubCutaneous three times a day before meals  insulin lispro (ADMELOG) corrective regimen sliding scale   SubCutaneous at bedtime  lactated ringers. 1000 milliLiter(s) (75 mL/Hr) IV Continuous <Continuous>  lactobacillus acidophilus 1 Tablet(s) Oral daily  lidocaine   4% Patch 1 Patch Transdermal every 24 hours  melatonin 6 milliGRAM(s) Oral at bedtime  metroNIDAZOLE  IVPB 500 milliGRAM(s) IV Intermittent every 12 hours  multivitamin 1 Tablet(s) Oral daily  Nephro-nancy 1 Tablet(s) Oral daily  nystatin    Suspension 403811 Unit(s) Oral three times a day  pantoprazole  Injectable 40 milliGRAM(s) IV Push daily  predniSONE   Tablet 5 milliGRAM(s) Oral daily    MEDICATIONS  (PRN):  acetaminophen     Tablet .. 650 milliGRAM(s) Oral every 6 hours PRN Temp greater or equal to 38C (100.4F), Mild Pain (1 - 3), Moderate Pain (4 - 6)  aluminum hydroxide/magnesium hydroxide/simethicone Suspension 30 milliLiter(s) Oral every 4 hours PRN Dyspepsia  artificial tears (preservative free) Ophthalmic Solution 1 Drop(s) Both EYES four times a day PRN Dry Eyes  dextrose Oral Gel 15 Gram(s) Oral once PRN Blood Glucose LESS THAN 70 milliGRAM(s)/deciliter  morphine   Solution 5 milliGRAM(s) Oral every 4 hours PRN Severe Pain (7 - 10)  morphine   Solution 2.5 milliGRAM(s) Oral every 4 hours PRN Moderate Pain (4 - 6)  naloxone Injectable 0.1 milliGRAM(s) IV Push every 3 minutes PRN For ANY of the following changes in patient status:  A. RR LESS THAN 10 breaths per minute, B. Oxygen saturation LESS THAN 90%, C. Sedation score of 6  polyethylene glycol 3350 17 Gram(s) Oral daily PRN Constipation          PHYSICAL EXAM:  GENERAL: NAD, well-groomed, well-developed  HEAD:  Atraumatic, Normocephalic  CHEST/LUNG: Clear to percussion bilaterally; No rales, rhonchi, wheezing, or rubs  HEART: Regular rate and rhythm; No murmurs, rubs, or gallops  ABDOMEN: Soft, Nontender, Nondistended; Bowel sounds present  EXTREMITIES:  2+ Peripheral Pulses, No clubbing, cyanosis, or edema  LYMPH: No lymphadenopathy noted  SKIN: No rashes or lesions    Care Discussed with Consultants/Other Providers [ ] YES  [ ] NO

## 2024-11-06 NOTE — PROVIDER CONTACT NOTE (CRITICAL VALUE NOTIFICATION) - SITUATION
Critical lab value of hemoglobin of 6.8 and hematocrit of 20.4
Pt's aerobic blood cultures came back positive for gram negative rods.
wbc 0.31
Critical lab value of Potassium of 2.6.
K+ is 2.3, HGb 6.8, hct 20
Critical lab value of potassium being 2.6
Pt's anaerobic blood cultures resulted as gram negative rods.
provider made aware of critical potassium value of 2.9
critical value called to unit
wbc 0.22
Potassium 2.4
calcium 6.5
Critical lab value called in by lab:  WBC 0.54
Hgb 6.9/BMP contaminated
Platelets 15
Potassium 2.5
Potassium 2.9
Pt's blood cultures came back as growth in aerobic culture- E.coli, growth in anaerobic- gram rods
WBC 0.23
platelets count 16 on cbc
HIMANSHU Kaiser from lab called with critical value WBC 0.79 at 0835    After Repeat CBC was drawn HIMANSHU Kaiser called back with a new critical value: WBC 0.55 at 1117
Pt's platelets are a 17.
blood culture shows growth in aerobic and anaerobic bottles ream negative rods. second set also shows gram negative coccobacilli
growth in aerobic--gram negative rods
K 2.9
Critical value of calcium 6.5

## 2024-11-06 NOTE — PROVIDER CONTACT NOTE (CRITICAL VALUE NOTIFICATION) - PERSON GIVING RESULT:
Channing
Giovanni R
Geno BREAUX
Yudi Garrido
lab
EMANUEL Perez
Manuela stevenson
Govind, B
Hematology/ Silvestre
Valerie Medina
lab
HANNAH Madrigal/ANI Murillo
Kemi, K
SAEID Montes De Oca
[facility]
anna faria
Camron BREAUX
Lauri, S
Stacie thomas
SAEID Pollack
shauna cole
Lexie Perez- lab
Job, C
MELLO Crowder
Chapis ARMSTRONG

## 2024-11-06 NOTE — PROGRESS NOTE ADULT - ASSESSMENT
64-year-old male with metastatic prostate cancer, sent in by hematologist from [facility] for uncontrolled pain, nausea, vomiting.       Problem/Plan - 1:  ·  Problem: Cancer related pain.   ·  Plan: - appreciate pall care recommendations:  - pain control as per palliative crae   - RT consult appreciated ,  awaiting kyphoplasty   - IR   kyphoplasty pending    Problem/Plan - 2:·  Problem: Nausea & vomiting., Diarrhea, colitis , SMA dissection   ·  Plan: -  vascular fu appreciated  - GI and surgery consult appreciated   - ID fu    Problem/Plan - 3:  ·  Problem: CA of prostate.   ·  Plan: Metastatic prostate cancer  - f/u heme/onc recommendations  - Rad Onc fu   - Palliative for symptom control.    Problem/Plan - 4:  ·  Problem: Anemia , Thrombocytopenia, unspecified.   ·  Plan: -monitor cbc   transfuse PRN    Problem/Plan - 5:  ·  Problem: Bacteremia   Plan: - ID fu   - fu cultures  - ABX as per ID  nicotine patch, thiamine, folate, CIWA, prn ativan

## 2024-11-06 NOTE — PROGRESS NOTE ADULT - SUBJECTIVE AND OBJECTIVE BOX
patient seen today, clinically improved, wife at bedside  IR agreeable for Kyphoplasty, planned for after completion of IV antibiotics, thrush improving      MEDICATIONS  (STANDING):  abiraterone 500 mg tablet 2 Tablet(s) 2 Tablet(s) Oral daily  acetaminophen     Tablet .. 650 milliGRAM(s) Oral once  atorvastatin 20 milliGRAM(s) Oral at bedtime  cefepime   IVPB 2000 milliGRAM(s) IV Intermittent every 8 hours  cefepime   IVPB      chlorhexidine 2% Cloths 1 Application(s) Topical daily  dextrose 5% 1000 milliLiter(s) (75 mL/Hr) IV Continuous <Continuous>  dextrose 5% 1000 milliLiter(s) (75 mL/Hr) IV Continuous <Continuous>  dextrose 5%. 1000 milliLiter(s) (50 mL/Hr) IV Continuous <Continuous>  dextrose 5%. 1000 milliLiter(s) (100 mL/Hr) IV Continuous <Continuous>  dextrose 50% Injectable 25 Gram(s) IV Push once  dextrose 50% Injectable 12.5 Gram(s) IV Push once  dextrose 50% Injectable 25 Gram(s) IV Push once  diphenhydrAMINE 25 milliGRAM(s) Oral once  fenofibrate Tablet 145 milliGRAM(s) Oral daily  FIRST- Mouthwash  BLM 30 milliLiter(s) Swish and Spit three times a day  glucagon  Injectable 1 milliGRAM(s) IntraMuscular once  haloperidol     Tablet 1 milliGRAM(s) Oral <User Schedule>  influenza  Vaccine (HIGH DOSE) 0.5 milliLiter(s) IntraMuscular once  insulin lispro (ADMELOG) corrective regimen sliding scale   SubCutaneous three times a day before meals  insulin lispro (ADMELOG) corrective regimen sliding scale   SubCutaneous at bedtime  lactobacillus acidophilus 1 Tablet(s) Oral daily  lidocaine   4% Patch 1 Patch Transdermal every 24 hours  melatonin 6 milliGRAM(s) Oral at bedtime  metroNIDAZOLE  IVPB 500 milliGRAM(s) IV Intermittent every 12 hours  multivitamin 1 Tablet(s) Oral daily  Nephro-nancy 1 Tablet(s) Oral daily  nystatin    Suspension 346968 Unit(s) Oral three times a day  pantoprazole  Injectable 40 milliGRAM(s) IV Push daily  predniSONE   Tablet 5 milliGRAM(s) Oral daily    MEDICATIONS  (PRN):  acetaminophen     Tablet .. 650 milliGRAM(s) Oral every 6 hours PRN Temp greater or equal to 38C (100.4F), Mild Pain (1 - 3), Moderate Pain (4 - 6)  aluminum hydroxide/magnesium hydroxide/simethicone Suspension 30 milliLiter(s) Oral every 4 hours PRN Dyspepsia  artificial tears (preservative free) Ophthalmic Solution 1 Drop(s) Both EYES four times a day PRN Dry Eyes  dextrose Oral Gel 15 Gram(s) Oral once PRN Blood Glucose LESS THAN 70 milliGRAM(s)/deciliter  morphine   Solution 5 milliGRAM(s) Oral every 4 hours PRN Severe Pain (7 - 10)  morphine   Solution 2.5 milliGRAM(s) Oral every 4 hours PRN Moderate Pain (4 - 6)  naloxone Injectable 0.1 milliGRAM(s) IV Push every 3 minutes PRN For ANY of the following changes in patient status:  A. RR LESS THAN 10 breaths per minute, B. Oxygen saturation LESS THAN 90%, C. Sedation score of 6  polyethylene glycol 3350 17 Gram(s) Oral daily PRN Constipation        Vital Signs Last 24 Hrs  T(C): 36.4 (06 Nov 2024 01:00), Max: 36.7 (05 Nov 2024 11:20)  T(F): 97.6 (06 Nov 2024 01:00), Max: 98.1 (05 Nov 2024 17:18)  HR: 94 (06 Nov 2024 01:00) (89 - 95)  BP: 118/75 (06 Nov 2024 01:00) (108/72 - 118/79)  BP(mean): --  RR: 16 (06 Nov 2024 01:00) (16 - 19)  SpO2: 98% (06 Nov 2024 01:00) (97% - 100%)    Parameters below as of 06 Nov 2024 01:00  Patient On (Oxygen Delivery Method): room air        PE  NAD  Awake, alert, weak  Anicteric, MMM  RRR  CTAB  Abd soft, NT, ND  No c/c/e  No rash grossly                            7.3    3.02  )-----------( 138      ( 05 Nov 2024 06:30 )             22.5       11-05    143  |  115[H]  |  8   ----------------------------<  97  3.6   |  21[L]  |  0.31[L]    Ca    7.3[L]      05 Nov 2024 06:30  Phos  2.2     11-05  Mg     1.70     11-05

## 2024-11-06 NOTE — PROVIDER CONTACT NOTE (CRITICAL VALUE NOTIFICATION) - ASSESSMENT
Alert and oriented x2-3, no fever noted. Vital done .
Pt A&Ox4, v/s stable except tachycardic, provider aware. Denies chest pain, sob, dizziness. Denies respiratory distress.
Pt a&ox2. No c/o or s/s of chest pain, SOB, numbness or tingling noted. Breathing nonlabored on RA. Pt on tele monitor.
patient with no overt s/s of bleeding, fluids running
A&O x2 can be confused at times. Pt's breathing is nonlabored on 4L NC. No s/s of distress noted
Pt a&ox2. No c/o or s/s of chest pain, SOB, numbness or tingling noted. Breathing nonlabored on RA. Pt on tele monitor.
no acute s/s of distress noted, VS per flowsheet
temp 98.4, hr 106, bp 135/83, O2 96%. No signs of acute distress at this time
A&O x2 can be confused at times. Pt's breathing is nonlabred on 4L NC. No s/s of distress noted.
no distress, vitals stable, pt. safety maintained
Alert and oriented x2-3, no fever noted. Vital done .
Patient. is A&Ox4, on room air and OOB w/ +1 assist. Complains of generalized pain.
Pt a&ox2. No c/o or s/s of chest pain, SOB, numbness or tingling noted. Breathing nonlabored on RA. Pt on tele monitor.
Pt is A&O x2, confused and agitated at times. Breathing is nonlabored on 4L NC. All VS are stable and documented.
Pt a&ox2. No c/o or s/s of chest pain, SOB, numbness or tingling noted. Breathing nonlabored on RA. Pt NSR on tele monitor.
temp 98.6, hr 121, bp 137/84, O2 93% on 4L NC
A&Ox4, denies chest pain, denies difficulty breathing
A&O x2 can be confused at times. Pt's breathing is nonlabored on 4L NC. No s/s of distress noted.
Pt A&Ox3. Breathing on room air. Denies chest pain and SOB.
blood glucose re-assessed via fingerstick in opposite hand as fluids; fingerstick 290
A&O x2 can be confused at times. Pt's breathing is nonlabored on 4L NC. No s/s of distress noted.
patient vs per flowsheet.
patient vs per flowsheet.
pt currently on ABTs  tylenol given prior for fever
A&O x3. Denies chest pain and SOB.

## 2024-11-06 NOTE — PROVIDER CONTACT NOTE (CRITICAL VALUE NOTIFICATION) - ACTION/TREATMENT ORDERED:
repeat blood work, add type and screen
ACP provider Anna Marie Delgado notified regarding both critical values. She stated oncology is notified. No intervention at this time.
Continue plan of care
Give IV Calcium gluconate
IV calcium gluconate given
No actions to be taken at this time.
provider to review patient to be transferred to telemetry capable unit
continue with current treatment plan
No further actions at this time.
No further actions at this time.
Continue plan of care
Continue with IV fluids
Electrolyte repletion ordered.
No further actions to be taken at this time.
Per provider, " lets continue to monitor him for now."
patient needs to maintain fluids for hypernatremia; insulin sliding scale ordered with fingerstick monitoring
Provider made aware.
provider to obtain patient consent for transfusion of platelets to be ordered.
Electrolyte repletion ordered
No new order given.
no further orders at this time
supplemental potassium
Continue plan of care
No actions to be taken at this time.

## 2024-11-06 NOTE — CHART NOTE - NSCHARTNOTEFT_GEN_A_CORE
Notified by RN patient HMG is 6.9. RN reports patient without bloody bowel movement or hematuria. Repeat Hmg was 8.4. Will hold of Transfusion.

## 2024-11-06 NOTE — PROVIDER CONTACT NOTE (CRITICAL VALUE NOTIFICATION) - RECOMMENDATIONS
None further actions at this time.
pt on abx review current regimen
No actions to be taken at this time.
No further actions at this time.
Notify provider
notify provider
Continue plan of care
Continue with IV fluids
IV calcium gluconate ordered
No further actions to be taken at this time.
repeat blood work
transfusion recommended
Continue plan of care
no new orders at this time
Continues IV ZOSYN
notify provider
Electrolyte repletion
Electrolyte repletion
replete potassium as ordered
Continue plan of care
Give IV Calcium gluconate
No actions to be taken at this time.
able to stop or change fluids? need for correction?
supplemental potassium

## 2024-11-06 NOTE — PROGRESS NOTE ADULT - ASSESSMENT
1. Metastatic prostate cancer    - Follows with Dr Andrew Mendoza at [facility]   - PSMA 10/11/24 showed hypermetabolic vera foci above and below the diaphragm, foci in the liver and extensive foci involving the axial and appendicular skeleton compatible with metastatic disease  - --> 374--> 426 on 10/8/24   - Liver biopsy 9/30/24 consistent with prostate adenocarcinoma   - High risk high volume disease -> started on triplet therapy w/ ADT/lupron, abiraterone/prednisone + taxotere. (back pain after Lupron likely tumor flare).  - Continue abiraterone 1000mg daily w Prednisone 5mg PO QD   - s/p taxotere 60mg/m2 on 10/13/24. Next dose was planned for 11/4 , held at this time as patient awaiting Kyphoplasty and has weakness. IR recs appreciated   - Kyphoplasty w/IR to T3 and L1 lesions to be planned once he completes antibiotics  - Palliative following for pain control and given hospital course would have further GOC discussions. Treatment will be offered but he has metastatic disease with visceral involvement and complications as outlined below.     2. Pancolitis, E coli and H influenzae bacteremia  - continues on Cefepime+ Metronidazole  - ID following  - BCx from 10/24 neg to date    3. Anemia/Thrombocytopenia   - Hgb 7.3 s/p 1 unit 10/30, transfuse for Hgb < 7.0  - Multifactorial sec to likely marrow infiltration by CaP, Chemo effect, consumption from acute illness, poss ITP  - Transfuse for plt <15 or < 50K if bleeding/for procedure      4. SMA Dissection  - seen by vasc sx  - no plan for intervention          Umm Aponte NP  Hematology/Oncology  [facility]  [phone] (Office)  [phone] (Alt office)  Evenings and weekends please call MD on call or office

## 2024-11-06 NOTE — PROGRESS NOTE ADULT - SUBJECTIVE AND OBJECTIVE BOX
Date of Service: 11-06-24 @ 13:10    Patient is a 64y old  Male who presents with a chief complaint of Pain (06 Nov 2024 06:39)      Any change in ROS: seems to be doing  ok : no sob:   on room air:      wife at bedside     MEDICATIONS  (STANDING):  abiraterone 500 mg tablet 2 Tablet(s) 2 Tablet(s) Oral daily  acetaminophen     Tablet .. 650 milliGRAM(s) Oral once  atorvastatin 20 milliGRAM(s) Oral at bedtime  cefepime   IVPB 2000 milliGRAM(s) IV Intermittent every 8 hours  cefepime   IVPB      chlorhexidine 2% Cloths 1 Application(s) Topical daily  dextrose 5% 1000 milliLiter(s) (75 mL/Hr) IV Continuous <Continuous>  dextrose 5% 1000 milliLiter(s) (75 mL/Hr) IV Continuous <Continuous>  dextrose 5%. 1000 milliLiter(s) (100 mL/Hr) IV Continuous <Continuous>  dextrose 5%. 1000 milliLiter(s) (50 mL/Hr) IV Continuous <Continuous>  dextrose 50% Injectable 25 Gram(s) IV Push once  dextrose 50% Injectable 12.5 Gram(s) IV Push once  dextrose 50% Injectable 25 Gram(s) IV Push once  diphenhydrAMINE 25 milliGRAM(s) Oral once  fenofibrate Tablet 145 milliGRAM(s) Oral daily  FIRST- Mouthwash  BLM 30 milliLiter(s) Swish and Spit three times a day  glucagon  Injectable 1 milliGRAM(s) IntraMuscular once  haloperidol     Tablet 1 milliGRAM(s) Oral <User Schedule>  influenza  Vaccine (HIGH DOSE) 0.5 milliLiter(s) IntraMuscular once  insulin lispro (ADMELOG) corrective regimen sliding scale   SubCutaneous three times a day before meals  insulin lispro (ADMELOG) corrective regimen sliding scale   SubCutaneous at bedtime  lactobacillus acidophilus 1 Tablet(s) Oral daily  lidocaine   4% Patch 1 Patch Transdermal every 24 hours  melatonin 6 milliGRAM(s) Oral at bedtime  metroNIDAZOLE  IVPB 500 milliGRAM(s) IV Intermittent every 12 hours  multivitamin 1 Tablet(s) Oral daily  Nephro-nancy 1 Tablet(s) Oral daily  nystatin    Suspension 724422 Unit(s) Oral three times a day  pantoprazole  Injectable 40 milliGRAM(s) IV Push daily  predniSONE   Tablet 5 milliGRAM(s) Oral daily    MEDICATIONS  (PRN):  acetaminophen     Tablet .. 650 milliGRAM(s) Oral every 6 hours PRN Temp greater or equal to 38C (100.4F), Mild Pain (1 - 3), Moderate Pain (4 - 6)  aluminum hydroxide/magnesium hydroxide/simethicone Suspension 30 milliLiter(s) Oral every 4 hours PRN Dyspepsia  artificial tears (preservative free) Ophthalmic Solution 1 Drop(s) Both EYES four times a day PRN Dry Eyes  dextrose Oral Gel 15 Gram(s) Oral once PRN Blood Glucose LESS THAN 70 milliGRAM(s)/deciliter  morphine   Solution 5 milliGRAM(s) Oral every 4 hours PRN Severe Pain (7 - 10)  morphine   Solution 2.5 milliGRAM(s) Oral every 4 hours PRN Moderate Pain (4 - 6)  naloxone Injectable 0.1 milliGRAM(s) IV Push every 3 minutes PRN For ANY of the following changes in patient status:  A. RR LESS THAN 10 breaths per minute, B. Oxygen saturation LESS THAN 90%, C. Sedation score of 6  polyethylene glycol 3350 17 Gram(s) Oral daily PRN Constipation    Vital Signs Last 24 Hrs  T(C): 36.5 (06 Nov 2024 05:00), Max: 36.7 (05 Nov 2024 17:18)  T(F): 97.7 (06 Nov 2024 05:00), Max: 98.1 (05 Nov 2024 17:18)  HR: 96 (06 Nov 2024 05:00) (89 - 96)  BP: 106/70 (06 Nov 2024 05:00) (106/70 - 118/79)  BP(mean): --  RR: 12 (06 Nov 2024 05:00) (12 - 19)  SpO2: 96% (06 Nov 2024 05:00) (96% - 99%)    Parameters below as of 06 Nov 2024 05:00  Patient On (Oxygen Delivery Method): room air        I&O's Summary    05 Nov 2024 07:01  -  06 Nov 2024 07:00  --------------------------------------------------------  IN: 120 mL / OUT: 1750 mL / NET: -1630 mL          Physical Exam:   GENERAL: NAD, well-groomed, well-developed  HEENT: CHAVA/   Atraumatic, Normocephalic  ENMT: No tonsillar erythema, exudates, or enlargement; Moist mucous membranes, Good dentition, No lesions  NECK: Supple, No JVD, Normal thyroid  CHEST/LUNG: Clear to auscultaion  CVS: Regular rate and rhythm; No murmurs, rubs, or gallops  GI: : Soft, Nontender, Nondistended; Bowel sounds present  NERVOUS SYSTEM:  Alert & Oriented X3  EXTREMITIES:  - edema  LYMPH: No lymphadenopathy noted  SKIN: No rashes or lesions  ENDOCRINOLOGY: No Thyromegaly  PSYCH: Appropriate    Labs:                              8.4    4.94  )-----------( 144      ( 06 Nov 2024 09:46 )             25.7                         6.9    3.85  )-----------( 129      ( 06 Nov 2024 06:00 )             21.8                         7.3    3.02  )-----------( 138      ( 05 Nov 2024 06:30 )             22.5                         7.3    2.72  )-----------( 141      ( 04 Nov 2024 06:43 )             22.4                         7.6    2.63  )-----------( 149      ( 03 Nov 2024 06:50 )             23.6     11-06    136  |  109[H]  |  6[L]  ----------------------------<  107[H]  4.1   |  20[L]  |  0.26[L]  11-05    143  |  115[H]  |  8   ----------------------------<  97  3.6   |  21[L]  |  0.31[L]  11-04    144  |  116[H]  |  8   ----------------------------<  114[H]  3.8   |  20[L]  |  0.32[L]  11-04    148[H]  |  120[H]  |  7   ----------------------------<  102[H]  2.9[LL]   |  20[L]  |  0.31[L]  11-03    151[H]  |  123[H]  |  8   ----------------------------<  133[H]  3.4[L]   |  20[L]  |  0.34[L]  11-02    150[H]  |  120[H]  |  8   ----------------------------<  120[H]  3.0[L]   |  20[L]  |  0.30[L]    Ca    7.1[L]      06 Nov 2024 08:49  Ca    7.3[L]      05 Nov 2024 06:30  Ca    7.2[L]      04 Nov 2024 23:33  Phos  2.3     11-06  Phos  TNP     11-06  Phos  2.2     11-05  Phos  2.7     11-04  Mg     1.70     11-06  Mg     TNP     11-06  Mg     1.70     11-05  Mg     1.70     11-04    TPro  4.4[L]  /  Alb  1.8[L]  /  TBili  0.5  /  DBili  x   /  AST  43[H]  /  ALT  22  /  AlkPhos  143[H]  11-06    CAPILLARY BLOOD GLUCOSE      POCT Blood Glucose.: 134 mg/dL (06 Nov 2024 12:52)  POCT Blood Glucose.: 134 mg/dL (06 Nov 2024 09:00)  POCT Blood Glucose.: 135 mg/dL (05 Nov 2024 21:09)  POCT Blood Glucose.: 146 mg/dL (05 Nov 2024 18:20)  POCT Blood Glucose.: 111 mg/dL (05 Nov 2024 13:20)      LIVER FUNCTIONS - ( 06 Nov 2024 08:49 )  Alb: 1.8 g/dL / Pro: 4.4 g/dL / ALK PHOS: 143 U/L / ALT: 22 U/L / AST: 43 U/L / GGT: x             Urinalysis Basic - ( 06 Nov 2024 08:49 )    Color: x / Appearance: x / SG: x / pH: x  Gluc: 107 mg/dL / Ketone: x  / Bili: x / Urobili: x   Blood: x / Protein: x / Nitrite: x   Leuk Esterase: x / RBC: x / WBC x   Sq Epi: x / Non Sq Epi: x / Bacteria: x            RECENT CULTURES:  10-31 @ 07:47 .Blood BLOOD   rad  · Note Type	Interventional Radiology      Case was discussed between Dr. Case and Dr. Bruno regarding plan for kyphoplasty. Given there is concern for C.Diff infection with fevers at this time, will hold off on kyphoplasty. Once patient is cleared of any infection and there is improvement in the metabolic profile, please reach out to IR to schedule patient for kyphoplasty.             No growth at 5 days    10-31 @ 07:44 .Blood BLOOD                No growth at 5 days          RESPIRATORY CULTURES:      Clostridium difficile GDH Toxins A&amp;B, EIA:   Negative (11-01 @ 07:45)      Studies  Chest X-RAY  CT SCAN Chest   Venous Dopplers: LE:   CT Abdomen  Others

## 2024-11-06 NOTE — PROVIDER CONTACT NOTE (CRITICAL VALUE NOTIFICATION) - NAME OF MD/NP/PA/DO NOTIFIED:
Alessandra Ha
Alessandra Ha
Franco Art
Nicole Cabral
MEJIA De Los Santos
Franco GAN
Nicole Cabral
Alessandra WALTON
Argenis Yang
MEJIA Michelle
Anna Marie Delgado
Byron Posada ACP
Nicole Cabral
ACP Halima
Alessandra Ha
Gisele Galicia
Juancarlos blandon
Argenis FINNEY
Byron Posada ACP
Viviana Alonzo
mann guerra
MEJIA Cole
Melanie Stephens
nicholas silva
ACP CAROLYNE Tinoco and f/u with night MEJIA Ha at [address]
Alessandra Ha

## 2024-11-06 NOTE — PROVIDER CONTACT NOTE (CRITICAL VALUE NOTIFICATION) - TEST AND RESULT REPORTED:
Potassium 2.4
WBC 0.79 (at 0835) and WBC 0.55 (at 1117)
blood culture
platelet count
wbc 0.22
Hgb 6.9/BMP contaminated
Potassium 2.9
Potassium 2.5
Platelets 15
Potassium of 2.6
hemoglobin of 6.8 and hematocrit of 20.4
WBC
Calcium 6.5
potassium
Blood culture anaerobic- gram negative rods
K 2.9
glucose 459
growth in aerobic--gram negative rods
Blood culture aerobic- grm negative rods
calcium 6.5
WBC 0.54
potassium 2.6
wbc 0.31
Growth in aerobic culture- E.coli, growth in anaerobic- gram rods
Platelets- 17

## 2024-11-06 NOTE — PROVIDER CONTACT NOTE (CRITICAL VALUE NOTIFICATION) - NS PROVIDER READ BACK
yes
Potassium of 2.6/yes
yes
yes
WBC 0.79 (at 0835) and WBC 0.55 (at 1117)/yes
potassium 2.6/yes
yes
yes
hemoglobin of 6.8 and hematocrit of 20.4/yes
yes
TEAMS/yes
yes
Potassium 2.9/yes

## 2024-11-06 NOTE — PROVIDER CONTACT NOTE (CRITICAL VALUE NOTIFICATION) - BACKGROUND
63 y/o male, with a PmHx of Metastatic Prostate Cancer (s/p radical prostatectomy), HLD, HTN, sent in by hematologist from [facility] for uncontrolled pain, nausea, vomiting.
Pt admitted for neoplasm related pain. Currently on Morphine pca pump
patient on IV fluids of Dextrose 5% and 45% NS running at 100ml/hour for hypernatremia .
pt admitted for ca pain. pmh of prostate cancer
PmHx: ) Neoplasm related pain, Basal cell carcinoma, CA of prostate, HLD (hyperlipidemia), and Benign essential HTN
pt admitted for uncontrolled pain related to metastatic prostate CA
63 y/o male, with a PmHx of Metastatic Prostate Cancer (s/p radical prostatectomy), HLD, HTN, sent in by hematologist from [facility] for uncontrolled pain, nausea, vomiting.
65 y/o male, with a PmHx of Metastatic Prostate Cancer (s/p radical prostatectomy), HLD, HTN, sent in by hematologist from [facility] for uncontrolled pain, nausea, vomiting.
PmHx of Metastatic Prostate Cancer (s/p radical prostatectomy), HLD, HTN, sent in by hematologist from [facility] for uncontrolled pain, nausea, vomiting.
PmHx: Neoplasm related pain, Basal cell carcinoma, CA of prostate, HLD (hyperlipidemia), and Benign essential HTN.
PmHx: Neoplasm related pain, Basal cell carcinoma, CA of prostate, HLD (hyperlipidemia), and Benign essential HTN.
64 y Male, PMH metastatic prostate cancer s/p prostatectomy, HLD, HTN
PmHx of Metastatic Prostate Cancer (s/p radical prostatectomy), HLD, HTN, sent in by hematologist from [facility] for uncontrolled pain, nausea, vomiting.
PmHx: Neoplasm related pain, Basal cell carcinoma, CA of prostate, and HLD (hyperlipidemia)
PMH of metastatic prostate cancer, HLD, HTN  Admitted for uncontrolled pain nausea and vomiting
65 y/o male, with a PmHx of Metastatic Prostate Cancer (s/p radical prostatectomy), HLD, HTN, sent in by hematologist from [facility] for uncontrolled pain, nausea, vomiting.
Pt admitted for neoplasm related pain. Currently on Morphine pca pump
pt admitted for uncontrolled pain related to metastatic prostate CA
PmHx of Metastatic Prostate Cancer (s/p radical prostatectomy), HLD, HTN, sent in by hematologist from [facility] for uncontrolled pain, nausea, vomiting.
Metastatic Prostate Cancer (s/p radical prostatectomy), HLD, HTN, sent in by hematologist from [facility] for uncontrolled pain, nausea, vomiting.
Patient is diagnosed w/ metastatic prostate CA, Admitted for neoplasm related pain.
Neoplasm related pain, Basal cell carcinoma, CA of prostate, HLD (hyperlipidemia), and Benign essential HTN.
PmHx of Metastatic Prostate Cancer (s/p radical prostatectomy), HLD, HTN, sent in by hematologist from [facility] for uncontrolled pain, nausea, vomiting.
Basel cell carcinoma, Agitation, Ischemic cells

## 2024-11-06 NOTE — PROGRESS NOTE ADULT - SUBJECTIVE AND OBJECTIVE BOX
[facility]   MD MARKO EASTMAN MD ANGELA WONG, PA    TEL:  OFFICE: [phone]  From 5pm-7am Answering Service [phone]    -- RENAL FOLLOW UP NOTE ---Date of Service 11-06-24 @ 14:45    Patient is a 64y old  Male who presents with a chief complaint of Pain (06 Nov 2024 13:10)      Patient seen and examined at bedside. No chest pain/sob    VITALS:  T(F): 97.4 (11-06-24 @ 13:24), Max: 98.1 (11-05-24 @ 17:18)  HR: 100 (11-06-24 @ 13:24)  BP: 111/78 (11-06-24 @ 13:24)  RR: 18 (11-06-24 @ 13:24)  SpO2: 98% (11-06-24 @ 13:24)  Wt(kg): --    11-05 @ 07:01  -  11-06 @ 07:00  --------------------------------------------------------  IN: 120 mL / OUT: 1750 mL / NET: -1630 mL          PHYSICAL EXAM:  General: NAD  Neck: No JVD  Respiratory: CTAB, no wheezes, rales or rhonchi  Cardiovascular: S1, S2, RRR  Gastrointestinal: BS+, soft, NT/ND  Extremities: No peripheral edema    Hospital Medications:   MEDICATIONS  (STANDING):  abiraterone 500 mg tablet 2 Tablet(s) 2 Tablet(s) Oral daily  acetaminophen     Tablet .. 650 milliGRAM(s) Oral once  atorvastatin 20 milliGRAM(s) Oral at bedtime  cefepime   IVPB 2000 milliGRAM(s) IV Intermittent every 8 hours  cefepime   IVPB      chlorhexidine 2% Cloths 1 Application(s) Topical daily  dextrose 5% 1000 milliLiter(s) (75 mL/Hr) IV Continuous <Continuous>  dextrose 5%. 1000 milliLiter(s) (50 mL/Hr) IV Continuous <Continuous>  dextrose 5%. 1000 milliLiter(s) (100 mL/Hr) IV Continuous <Continuous>  dextrose 50% Injectable 25 Gram(s) IV Push once  dextrose 50% Injectable 12.5 Gram(s) IV Push once  dextrose 50% Injectable 25 Gram(s) IV Push once  diphenhydrAMINE 25 milliGRAM(s) Oral once  fenofibrate Tablet 145 milliGRAM(s) Oral daily  FIRST- Mouthwash  BLM 30 milliLiter(s) Swish and Spit three times a day  glucagon  Injectable 1 milliGRAM(s) IntraMuscular once  haloperidol     Tablet 1 milliGRAM(s) Oral <User Schedule>  influenza  Vaccine (HIGH DOSE) 0.5 milliLiter(s) IntraMuscular once  insulin lispro (ADMELOG) corrective regimen sliding scale   SubCutaneous three times a day before meals  insulin lispro (ADMELOG) corrective regimen sliding scale   SubCutaneous at bedtime  lactated ringers. 1000 milliLiter(s) (75 mL/Hr) IV Continuous <Continuous>  lactobacillus acidophilus 1 Tablet(s) Oral daily  lidocaine   4% Patch 1 Patch Transdermal every 24 hours  melatonin 6 milliGRAM(s) Oral at bedtime  metroNIDAZOLE  IVPB 500 milliGRAM(s) IV Intermittent every 12 hours  multivitamin 1 Tablet(s) Oral daily  Nephro-nancy 1 Tablet(s) Oral daily  nystatin    Suspension 826510 Unit(s) Oral three times a day  pantoprazole  Injectable 40 milliGRAM(s) IV Push daily  potassium phosphate IVPB 15 milliMole(s) IV Intermittent once  predniSONE   Tablet 5 milliGRAM(s) Oral daily      LABS:  11-06    136  |  109[H]  |  6[L]  ----------------------------<  107[H]  4.1   |  20[L]  |  0.26[L]    Ca    7.1[L]      06 Nov 2024 08:49  Phos  2.3     11-06  Mg     1.70     11-06    TPro  4.4[L]  /  Alb  1.8[L]  /  TBili  0.5  /  DBili      /  AST  43[H]  /  ALT  22  /  AlkPhos  143[H]  11-06    Creatinine Trend: 0.26 <--, 0.31 <--, 0.32 <--, 0.31 <--, 0.34 <--, 0.30 <--, 0.33 <--, 0.31 <--, 0.32 <--, 0.30 <--, 0.33 <--, 0.31 <--, 0.34 <--, 0.37 <--    Albumin: 1.8 g/dL (11-06 @ 08:49)  Phosphorus: 2.3 mg/dL (11-06 @ 08:49)  Phosphorus: TNP mg/dL (11-06 @ 06:00)                              8.4    4.94  )-----------( 144      ( 06 Nov 2024 09:46 )             25.7     Urine Studies:  Urinalysis - [11-06-24 @ 08:49]      Color  / Appearance  / SG  / pH       Gluc 107 / Ketone   / Bili  / Urobili        Blood  / Protein  / Leuk Est  / Nitrite       RBC  / WBC  / Hyaline  / Gran  / Sq Epi  / Non Sq Epi  / Bacteria       TSH 2.62      [10-13-24 @ 05:30]  Lipid: chol 135, , HDL 39, LDL --      [10-13-24 @ 05:30]        RADIOLOGY & ADDITIONAL STUDIES:

## 2024-11-06 NOTE — PROGRESS NOTE ADULT - ASSESSMENT
64-year-old male with metastatic prostate cancer, sent in by hematologist from [facility] for uncontrolled pain, nausea, vomiting.   Patient reports diffuse pain starting 6 months ago significantly worsening for the past 2 weeks.  Currently the worst pain is located around the lower back.   No loss of bladder and bowel function, no saddle paresthesia.  Able to walk.  patient is oxycodone 5 every 4-6 hour.  Yesterday they started adding OxyContin 10.  However patient continued to have pain.  Family also reports significant nausea and vomiting, inability to tolerate p.o. for the last 2 days.  Last bowel movement 2 days ago.   No known allergy.  Diagnosed with high risk high volume metastatic prostate cancer with bone, liver lymph node mets and presacral mass. Liver biopsy confirmed disease with . Started lupron, loly/pred with plans to initiate taxotere.    (12 Oct 2024 15:40)  pt seems very frustrated:   he is on 2 L of oxygen : he has no underlying lung disease:  but he is requiring 2 L of oxygen      Hypoxic resp failure  Metastatic prostate cancer  Back pain     Hypoxic resp failure  -He has no underlying lung disease:  but he is requiring 2 L of oxygen :   -CTA showed; No pulmonary embolism to the level of the segmental branches bilaterally. Limited evaluation of the subsegmental branches secondary to incomplete contrast opacification.  Multilevel vertebral body lytic lesions and loss of vertebral body height, better evaluated on CT thoracic spine 10/17/2024. Metastatic prostate cancer  -Patent central airways. Bibasilar atelectasis. No pleural effusion. MEDIASTINUM AND KATTY: No lymphadenopathy.  PULMONARY ANGIOGRAM: No pulmonary embolism to the level of the segmental   branches bilaterally. Limited evaluation of the subsegmental branches secondary to incomplete contrast opacification.    10/19:  -seems pretty tired;  on 2 L of oxygen :  -cta chest showed: No pulmonary embolism to the level of the segmental branches bilaterally. Limited evaluation of the subsegmental branches secondary to incomplete   contrast opacification. Multilevel vertebral body lytic lesions and loss of vertebral body height, better evaluated on CT thoracic spine 10/17/2024.  -still with fever:  no pe  on zosyn  and leukopenic hemonc following;  has metastatic prostate ca:   -VBG seems OK:     10/20:  pulm wise he seems to be stable:   -using 2 L of oxygen    -yesterdays VBG with minimal met acidosis  -cta again noted    10/21:  on 4 L of oxygen  today    cxr is size    e coli sepsis: Gram Negative Rods and Gram Negative Coccobacilli    10/22: heis doing poorly today  : he is very agitated and uncomfortable  on mittens: ? sun downing   10/23: quiet today  : sleeping:  oxygen requirement has not increased: on rooo ah 95% as documented    WBC is slowly increasing:   no fever:   -on antibiotics   10/24: pt is not doing well : his repeat blood cultures are positive with same organism :  would defer to ID;   10/25: seems to be doing  ok ; no sob:  no cough :  no phlegm   : on room air   10/26: resp failure has resolved:  is septic  : on ceftriaxone     10/27:  he seems OK:  he is on room air:  on morphine drip   remans on ceftriaxone still     10/28: she seems to be doing  ok : no sob: no cough ; no phlegm  confused:  on morphine drip    10/29; seems comfortable on room air;   cont current rx:   10/30: seems to be doing  ok ; on morphine drip : : plan to decrease morphine dose:   -cont current supportive care   10/31:  seems same tome:   no sob:  on room air:   seems comfortable at this ti me: off morphine  11/1:  he has been on room air for days;  looks comfortable:  coughs when he eats;   need pt ot  ; ? oob to chair:  dw wife:   11/3:  he seems stable:   on room air:  responding to questions pretty well : has bleeding lower lip : not Much through ? secondary to dryness:   -resp wise seems pretty good:  no sob:    114:  -seems pretty good:  no sob:  on room air  -has iral ulceration:  bleeding:  not much though : cont magic mouth wash   11/5:  -he seems  pretty good:   on room air:   no sob:  no cough :  no phlegm : difficulty in eating secondary to mucositis:  cont current rx:     11/6: seems OK:   no sob:   on room air:  now again kyphoplasty being considered    New finding:  SMA dissection : neutropenic colitis/ #E Coli and H influenzae bacteremia/ #Neutropenic fever  -/f ischemic colitis on R colon   Diagnosed  on ct abd:  defer to surgery and primary team:  probably no intervention:   -cont antibiotics  -not doing very well with above new findings    10/22; no intervention per surgery    -cont antibiotics  -has fever:  ID following :  -Last chest xray on 20th  ; normal     id following   10/23  IR was consulted for vertebral augmentation of T3-T5 prior to XRT.   Patient was seen bedside. Initially, patient kept requesting no exam and was not willing to participate in the discussion. Son was bedside at time of conversation.   Per discussion with the medical attending, given the concern for SMA dissection and concern for bowel ischemia, will hold off on vertebral augmentation evaluation at this time.   Please reach out to IR when patient is medically stable for vertebral augmentation.  10/24:  remains septic:  above cancelled:   ? for palliative care now  10/26: he seems same to me:  no overnight events  on room air:   check VBG  : cont antibiotics   hemonc following   10/27:  -still on antibiotics for e coli sepsis  -id following s  10/28: cont antibiotics   10/230: cont ceftriaxone   10/31: on ceftriaxone and flagyl   11/1: antibiotics per ID   11/3: cont antibiotics : IR note reviewed:  no kyphoplasty at this time    11/4: cont antibiotics per ID   11/5: on cefepime:  id following   11/6: on cefepime and flagyl!    Back pain   -likely due to metastatic disease:  adm here for severe pain :  -pain control per primary team   -venous ph is ok   11/1: resolved for now on meds  11/3: pain control : no kyphoplasty at this ti me per ir note:   11/4: as above  11/6: for possible kyphoplasty   now     dw acp; GOC as per primary  team

## 2024-11-06 NOTE — PROGRESS NOTE ADULT - ASSESSMENT
64-year-old male with metastatic prostate cancer, sent in by hematologist from [facility] for uncontrolled pain, nausea, vomiting.   Patient reports diffuse pain starting 6 months ago significantly worsening for the past 2 weeks. Diagnosed with high risk high volume metastatic prostate cancer with bone, liver lymph node mets and presacral mass. Liver biopsy confirmed disease with . Started lupron, loly/pred with plans to initiate taxotere.   nephrology consulted for electrolyte abnormalities.    Hypernatremia  Poor free water intake    pt now has diet however not eating much per family  on d5 with 40MEq KCL at 75cc / hr   na better. will change to LR @75cc/hr.   encourage free water as tolerated.  monitor Na.  Avoid overcorrection >8mEq in 24hrs.    Hypokalemia  GI loss and poor po intake  unable to tolerate PO kcl  better   diarrhea slightly better  IVF as above   monitor closely.    Acidosis   non AG  likely GI lost.  Hyperchloremic.  stable.  monitor CO2.    hypophosphatemia  kphos 15iv x1 given  monitor    hypocalcemia   sec to low albumin  corrected ca 8.5  monitor    fever  GNR bacteremia   work up and tx per team    anemia  metastatic prostate cancer  f/u heme/onc

## 2024-11-07 LAB
ALBUMIN SERPL ELPH-MCNC: 1.9 G/DL — LOW (ref 3.3–5)
ALP SERPL-CCNC: 156 U/L — HIGH (ref 40–120)
ALT FLD-CCNC: 17 U/L — SIGNIFICANT CHANGE UP (ref 4–41)
ANION GAP SERPL CALC-SCNC: 10 MMOL/L — SIGNIFICANT CHANGE UP (ref 7–14)
AST SERPL-CCNC: 33 U/L — SIGNIFICANT CHANGE UP (ref 4–40)
BILIRUB SERPL-MCNC: 0.5 MG/DL — SIGNIFICANT CHANGE UP (ref 0.2–1.2)
BUN SERPL-MCNC: 5 MG/DL — LOW (ref 7–23)
CALCIUM SERPL-MCNC: 7.2 MG/DL — LOW (ref 8.4–10.5)
CHLORIDE SERPL-SCNC: 107 MMOL/L — SIGNIFICANT CHANGE UP (ref 98–107)
CO2 SERPL-SCNC: 23 MMOL/L — SIGNIFICANT CHANGE UP (ref 22–31)
CREAT SERPL-MCNC: 0.3 MG/DL — LOW (ref 0.5–1.3)
EGFR: 132 ML/MIN/1.73M2 — SIGNIFICANT CHANGE UP
GLUCOSE BLDC GLUCOMTR-MCNC: 100 MG/DL — HIGH (ref 70–99)
GLUCOSE BLDC GLUCOMTR-MCNC: 125 MG/DL — HIGH (ref 70–99)
GLUCOSE BLDC GLUCOMTR-MCNC: 84 MG/DL — SIGNIFICANT CHANGE UP (ref 70–99)
GLUCOSE BLDC GLUCOMTR-MCNC: 96 MG/DL — SIGNIFICANT CHANGE UP (ref 70–99)
GLUCOSE SERPL-MCNC: 73 MG/DL — SIGNIFICANT CHANGE UP (ref 70–99)
HCT VFR BLD CALC: 24.1 % — LOW (ref 39–50)
HGB BLD-MCNC: 7.8 G/DL — LOW (ref 13–17)
MAGNESIUM SERPL-MCNC: 1.7 MG/DL — SIGNIFICANT CHANGE UP (ref 1.6–2.6)
MCHC RBC-ENTMCNC: 30.5 PG — SIGNIFICANT CHANGE UP (ref 27–34)
MCHC RBC-ENTMCNC: 32.4 G/DL — SIGNIFICANT CHANGE UP (ref 32–36)
MCV RBC AUTO: 94.1 FL — SIGNIFICANT CHANGE UP (ref 80–100)
NRBC # BLD: 0 /100 WBCS — SIGNIFICANT CHANGE UP (ref 0–0)
NRBC # FLD: 0.02 K/UL — HIGH (ref 0–0)
PHOSPHATE SERPL-MCNC: 2.7 MG/DL — SIGNIFICANT CHANGE UP (ref 2.5–4.5)
PLATELET # BLD AUTO: 138 K/UL — LOW (ref 150–400)
POTASSIUM SERPL-MCNC: 3 MMOL/L — LOW (ref 3.5–5.3)
POTASSIUM SERPL-SCNC: 3 MMOL/L — LOW (ref 3.5–5.3)
PROT SERPL-MCNC: 4.4 G/DL — LOW (ref 6–8.3)
RBC # BLD: 2.56 M/UL — LOW (ref 4.2–5.8)
RBC # FLD: 18.6 % — HIGH (ref 10.3–14.5)
SODIUM SERPL-SCNC: 140 MMOL/L — SIGNIFICANT CHANGE UP (ref 135–145)
WBC # BLD: 4.91 K/UL — SIGNIFICANT CHANGE UP (ref 3.8–10.5)
WBC # FLD AUTO: 4.91 K/UL — SIGNIFICANT CHANGE UP (ref 3.8–10.5)

## 2024-11-07 RX ORDER — 0.9 % SODIUM CHLORIDE 0.9 %
1000 INTRAVENOUS SOLUTION INTRAVENOUS
Refills: 0 | Status: DISCONTINUED | OUTPATIENT
Start: 2024-11-07 | End: 2024-11-08

## 2024-11-07 RX ORDER — POTASSIUM CHLORIDE 600 MG/1
10 TABLET, EXTENDED RELEASE ORAL
Refills: 0 | Status: COMPLETED | OUTPATIENT
Start: 2024-11-07 | End: 2024-11-07

## 2024-11-07 RX ADMIN — Medication 1 TABLET(S): at 13:03

## 2024-11-07 RX ADMIN — CEFEPIME 100 MILLIGRAM(S): 2 INJECTION, POWDER, FOR SOLUTION INTRAVENOUS at 05:58

## 2024-11-07 RX ADMIN — NYSTATIN 500000 UNIT(S): 500000 TABLET, FILM COATED ORAL at 13:01

## 2024-11-07 RX ADMIN — PANTOPRAZOLE SODIUM 40 MILLIGRAM(S): 40 TABLET, DELAYED RELEASE ORAL at 13:01

## 2024-11-07 RX ADMIN — Medication 1 MILLIGRAM(S): at 21:12

## 2024-11-07 RX ADMIN — POTASSIUM CHLORIDE 100 MILLIEQUIVALENT(S): 600 TABLET, EXTENDED RELEASE ORAL at 12:59

## 2024-11-07 RX ADMIN — LIDOCAINE 1 PATCH: 40 CREAM TOPICAL at 19:00

## 2024-11-07 RX ADMIN — CHLORHEXIDINE GLUCONATE 1 APPLICATION(S): 1.2 RINSE ORAL at 13:02

## 2024-11-07 RX ADMIN — Medication 75 MILLILITER(S): at 19:48

## 2024-11-07 RX ADMIN — Medication 75 MILLILITER(S): at 13:00

## 2024-11-07 RX ADMIN — DIPHENHYDRAMINE HYDROCHLORIDE AND LIDOCAINE HYDROCHLORIDE AND ALUMINUM HYDROXIDE AND MAGNESIUM HYDRO 30 MILLILITER(S): KIT at 21:12

## 2024-11-07 RX ADMIN — LIDOCAINE 1 PATCH: 40 CREAM TOPICAL at 09:57

## 2024-11-07 RX ADMIN — POTASSIUM CHLORIDE 100 MILLIEQUIVALENT(S): 600 TABLET, EXTENDED RELEASE ORAL at 10:47

## 2024-11-07 RX ADMIN — NYSTATIN 500000 UNIT(S): 500000 TABLET, FILM COATED ORAL at 05:57

## 2024-11-07 RX ADMIN — Medication 145 MILLIGRAM(S): at 13:03

## 2024-11-07 RX ADMIN — Medication 75 MILLILITER(S): at 09:57

## 2024-11-07 RX ADMIN — POTASSIUM CHLORIDE 100 MILLIEQUIVALENT(S): 600 TABLET, EXTENDED RELEASE ORAL at 11:50

## 2024-11-07 RX ADMIN — NYSTATIN 500000 UNIT(S): 500000 TABLET, FILM COATED ORAL at 21:13

## 2024-11-07 RX ADMIN — PREDNISONE 5 MILLIGRAM(S): 20 TABLET ORAL at 05:57

## 2024-11-07 RX ADMIN — METRONIDAZOLE 100 MILLIGRAM(S): 500 TABLET ORAL at 05:58

## 2024-11-07 RX ADMIN — DIPHENHYDRAMINE HYDROCHLORIDE AND LIDOCAINE HYDROCHLORIDE AND ALUMINUM HYDROXIDE AND MAGNESIUM HYDRO 30 MILLILITER(S): KIT at 13:02

## 2024-11-07 RX ADMIN — Medication 1 TABLET(S): at 13:01

## 2024-11-07 RX ADMIN — DIPHENHYDRAMINE HYDROCHLORIDE AND LIDOCAINE HYDROCHLORIDE AND ALUMINUM HYDROXIDE AND MAGNESIUM HYDRO 30 MILLILITER(S): KIT at 05:57

## 2024-11-07 RX ADMIN — ACETAMINOPHEN, DIPHENHYDRAMINE HCL, PHENYLEPHRINE HCL 6 MILLIGRAM(S): 325; 25; 5 TABLET ORAL at 21:13

## 2024-11-07 RX ADMIN — Medication 20 MILLIGRAM(S): at 05:57

## 2024-11-07 NOTE — CHART NOTE - NSCHARTNOTEFT_GEN_A_CORE
PRE-INTERVENTIONAL RADIOLOGY PROCEDURE NOTE    Patient Age: 64y  Patient Gender: Male    Procedure: vertebral augmentation     Diagnosis / Indication:     Interventional Radiology Attending Physician:   Ordering Attending Physician: Ganesh Miramontes    Pertinent medical history:     Pertinent labs:                       7.8    4.91  )-----------( 138      ( 07 Nov 2024 05:51 )             24.1       11-07    140  |  107  |  5[L]  ----------------------------<  73  3.0[L]   |  23  |  0.30[L]    Ca    7.2[L]      07 Nov 2024 05:51  Phos  2.7     11-07  Mg     1.70     11-07    TPro  4.4[L]  /  Alb  1.9[L]  /  TBili  0.5  /  DBili  x   /  AST  33  /  ALT  17  /  AlkPhos  156[H]  11-07          Patient and Family aware? Yes

## 2024-11-07 NOTE — PROGRESS NOTE ADULT - SUBJECTIVE AND OBJECTIVE BOX
[facility]   MD MARKO EASTMAN MD ANGELA WONG, PA    TEL:  OFFICE: [phone]  From 5pm-7am Answering Service [phone]    -- RENAL FOLLOW UP NOTE ---Date of Service 11-07-24 @ 15:15    Patient is a 64y old  Male who presents with a chief complaint of Pain (07 Nov 2024 12:18)      Patient seen and examined at bedside. No chest pain/sob    VITALS:  T(F): 97.9 (11-07-24 @ 11:54), Max: 98.1 (11-07-24 @ 05:00)  HR: 102 (11-07-24 @ 11:54)  BP: 110/79 (11-07-24 @ 11:54)  RR: 17 (11-07-24 @ 11:54)  SpO2: 100% (11-07-24 @ 11:54)  Wt(kg): --    11-06 @ 07:01  -  11-07 @ 07:00  --------------------------------------------------------  IN: 0 mL / OUT: 1000 mL / NET: -1000 mL          PHYSICAL EXAM:  General: NAD  Neck: No JVD  Respiratory: CTAB, no wheezes, rales or rhonchi  Cardiovascular: S1, S2, RRR  Gastrointestinal: BS+, soft, NT/ND  Extremities: No peripheral edema    Hospital Medications:   MEDICATIONS  (STANDING):  abiraterone 500 mg tablet 2 Tablet(s) 2 Tablet(s) Oral daily  acetaminophen     Tablet .. 650 milliGRAM(s) Oral once  atorvastatin 20 milliGRAM(s) Oral at bedtime  chlorhexidine 2% Cloths 1 Application(s) Topical daily  dextrose 5% 1000 milliLiter(s) (75 mL/Hr) IV Continuous <Continuous>  dextrose 5%. 1000 milliLiter(s) (100 mL/Hr) IV Continuous <Continuous>  dextrose 5%. 1000 milliLiter(s) (50 mL/Hr) IV Continuous <Continuous>  dextrose 50% Injectable 25 Gram(s) IV Push once  dextrose 50% Injectable 25 Gram(s) IV Push once  dextrose 50% Injectable 12.5 Gram(s) IV Push once  diphenhydrAMINE 25 milliGRAM(s) Oral once  fenofibrate Tablet 145 milliGRAM(s) Oral daily  FIRST- Mouthwash  BLM 30 milliLiter(s) Swish and Spit three times a day  glucagon  Injectable 1 milliGRAM(s) IntraMuscular once  haloperidol     Tablet 1 milliGRAM(s) Oral <User Schedule>  influenza  Vaccine (HIGH DOSE) 0.5 milliLiter(s) IntraMuscular once  insulin lispro (ADMELOG) corrective regimen sliding scale   SubCutaneous three times a day before meals  insulin lispro (ADMELOG) corrective regimen sliding scale   SubCutaneous at bedtime  lactated ringers 1000 milliLiter(s) (75 mL/Hr) IV Continuous <Continuous>  lactobacillus acidophilus 1 Tablet(s) Oral daily  lidocaine   4% Patch 1 Patch Transdermal every 24 hours  melatonin 6 milliGRAM(s) Oral at bedtime  multivitamin 1 Tablet(s) Oral daily  Nephro-nancy 1 Tablet(s) Oral daily  nystatin    Suspension [id number] Unit(s) Oral three times a day  pantoprazole  Injectable 40 milliGRAM(s) IV Push daily  predniSONE   Tablet 5 milliGRAM(s) Oral daily      LABS:  11-07    140  |  107  |  5[L]  ----------------------------<  73  3.0[L]   |  23  |  0.30[L]    Ca    7.2[L]      07 Nov 2024 05:51  Phos  2.7     11-07  Mg     1.70     11-07    TPro  4.4[L]  /  Alb  1.9[L]  /  TBili  0.5  /  DBili      /  AST  33  /  ALT  17  /  AlkPhos  156[H]  11-07    Creatinine Trend: 0.30 <--, 0.26 <--, 0.31 <--, 0.32 <--, 0.31 <--, 0.34 <--, 0.30 <--, 0.33 <--, 0.31 <--, 0.32 <--, 0.30 <--, 0.33 <--, 0.31 <--    Albumin: 1.9 g/dL (11-07 @ 05:51)  Phosphorus: 2.7 mg/dL (11-07 @ 05:51)                              7.8    4.91  )-----------( 138      ( 07 Nov 2024 05:51 )             24.1     Urine Studies:  Urinalysis - [11-07-24 @ 05:51]      Color  / Appearance  / SG  / pH       Gluc 73 / Ketone   / Bili  / Urobili        Blood  / Protein  / Leuk Est  / Nitrite       RBC  / WBC  / Hyaline  / Gran  / Sq Epi  / Non Sq Epi  / Bacteria       TSH 2.62      [10-13-24 @ 05:30]  Lipid: chol 135, , HDL 39, LDL --      [10-13-24 @ 05:30]        RADIOLOGY & ADDITIONAL STUDIES:

## 2024-11-07 NOTE — PROGRESS NOTE ADULT - ASSESSMENT
64-year-old male with metastatic prostate cancer, sent in by hematologist from [place] [facility] for uncontrolled pain, nausea, vomiting.   Patient reports diffuse pain starting 6 months ago significantly worsening for the past 2 weeks. Diagnosed with high risk high volume metastatic prostate cancer with bone, liver lymph node mets and presacral mass. Liver biopsy confirmed disease with . Started lupron, loly/pred with plans to initiate taxotere.   nephrology consulted for electrolyte abnormalities.    Hypernatremia  Poor free water intake    pt now has diet however not eating much per family  na stable on LR  encourage free water as tolerated.  monitor Na.  Avoid overcorrection >8mEq in 24hrs.    Hypokalemia  GI loss and poor po intake  unable to tolerate PO kcl  still having diarrhea  k dropped again supplement kcl 10x3. will change LR +40meq kcl @ 75  monitor closely.    Acidosis   non AG  likely GI lost.  Hyperchloremic.  better  monitor CO2.    hypophosphatemia  supplement as needed  monitor    hypocalcemia   sec to low albumin  corrected ca 8.5  monitor    fever  GNR bacteremia   work up and tx per team    anemia  metastatic prostate cancer  f/u heme/onc

## 2024-11-07 NOTE — PROGRESS NOTE ADULT - ASSESSMENT
64-year-old male with metastatic prostate cancer, sent in by hematologist from [facility] for uncontrolled pain, nausea, vomiting.       Problem/Plan - 1:  ·  Problem: Cancer related pain.   ·  Plan: - appreciate pall care recommendations:  - pain control as per palliative crae   - RT consult appreciated ,  awaiting kyphoplasty   - IR  kyphoplasty pending for tuesday     Problem/Plan - 2:·  Problem: Nausea & vomiting., Diarrhea, colitis , SMA dissection   ·  Plan: -  vascular fu appreciated  - GI and surgery consult appreciated   - ID fu  - finished abx  - ni intervention as per vascular     Problem/Plan - 3:  ·  Problem: CA of prostate.   ·  Plan: Metastatic prostate cancer  - f/u heme/onc recommendations  - Rad Onc fu   - Palliative for symptom control.    Problem/Plan - 4:  ·  Problem: Anemia , Thrombocytopenia, unspecified.   ·  Plan: -monitor cbc   transfuse PRN    Problem/Plan - 5:  ·  Problem: Bacteremia   Plan: - ID fu   - finished  ABX as per ID     case dw IR attending

## 2024-11-07 NOTE — PROGRESS NOTE ADULT - SUBJECTIVE AND OBJECTIVE BOX
Patient is a 64y old  Male who presents with a chief complaint of Pain (07 Nov 2024 15:15)    Date of servie : 11-07-24 @ 17:13  INTERVAL HPI/OVERNIGHT EVENTS:  T(C): 36.6 (11-07-24 @ 11:54), Max: 36.7 (11-06-24 @ 20:30)  HR: 102 (11-07-24 @ 11:54) (91 - 102)  BP: 110/79 (11-07-24 @ 11:54) (106/71 - 119/79)  RR: 17 (11-07-24 @ 11:54) (16 - 18)  SpO2: 100% (11-07-24 @ 11:54) (95% - 100%)  Wt(kg): --  I&O's Summary    06 Nov 2024 07:01  -  07 Nov 2024 07:00  --------------------------------------------------------  IN: 0 mL / OUT: 1000 mL / NET: -1000 mL        LABS:                        7.8    4.91  )-----------( 138      ( 07 Nov 2024 05:51 )             24.1     11-07    140  |  107  |  5[L]  ----------------------------<  73  3.0[L]   |  23  |  0.30[L]    Ca    7.2[L]      07 Nov 2024 05:51  Phos  2.7     11-07  Mg     1.70     11-07    TPro  4.4[L]  /  Alb  1.9[L]  /  TBili  0.5  /  DBili  x   /  AST  33  /  ALT  17  /  AlkPhos  156[H]  11-07      Urinalysis Basic - ( 07 Nov 2024 05:51 )    Color: x / Appearance: x / SG: x / pH: x  Gluc: 73 mg/dL / Ketone: x  / Bili: x / Urobili: x   Blood: x / Protein: x / Nitrite: x   Leuk Esterase: x / RBC: x / WBC x   Sq Epi: x / Non Sq Epi: x / Bacteria: x      CAPILLARY BLOOD GLUCOSE      POCT Blood Glucose.: 96 mg/dL (07 Nov 2024 12:48)  POCT Blood Glucose.: 84 mg/dL (07 Nov 2024 08:41)  POCT Blood Glucose.: 94 mg/dL (06 Nov 2024 20:49)  POCT Blood Glucose.: 138 mg/dL (06 Nov 2024 17:54)        Urinalysis Basic - ( 07 Nov 2024 05:51 )    Color: x / Appearance: x / SG: x / pH: x  Gluc: 73 mg/dL / Ketone: x  / Bili: x / Urobili: x   Blood: x / Protein: x / Nitrite: x   Leuk Esterase: x / RBC: x / WBC x   Sq Epi: x / Non Sq Epi: x / Bacteria: x        MEDICATIONS  (STANDING):  abiraterone 500 mg tablet 2 Tablet(s) 2 Tablet(s) Oral daily  acetaminophen     Tablet .. 650 milliGRAM(s) Oral once  atorvastatin 20 milliGRAM(s) Oral at bedtime  chlorhexidine 2% Cloths 1 Application(s) Topical daily  dextrose 5% 1000 milliLiter(s) (75 mL/Hr) IV Continuous <Continuous>  dextrose 5%. 1000 milliLiter(s) (50 mL/Hr) IV Continuous <Continuous>  dextrose 5%. 1000 milliLiter(s) (100 mL/Hr) IV Continuous <Continuous>  dextrose 50% Injectable 12.5 Gram(s) IV Push once  dextrose 50% Injectable 25 Gram(s) IV Push once  dextrose 50% Injectable 25 Gram(s) IV Push once  diphenhydrAMINE 25 milliGRAM(s) Oral once  fenofibrate Tablet 145 milliGRAM(s) Oral daily  FIRST- Mouthwash  BLM 30 milliLiter(s) Swish and Spit three times a day  glucagon  Injectable 1 milliGRAM(s) IntraMuscular once  haloperidol     Tablet 1 milliGRAM(s) Oral <User Schedule>  influenza  Vaccine (HIGH DOSE) 0.5 milliLiter(s) IntraMuscular once  insulin lispro (ADMELOG) corrective regimen sliding scale   SubCutaneous at bedtime  insulin lispro (ADMELOG) corrective regimen sliding scale   SubCutaneous three times a day before meals  lactated ringers 1000 milliLiter(s) (75 mL/Hr) IV Continuous <Continuous>  lactobacillus acidophilus 1 Tablet(s) Oral daily  lidocaine   4% Patch 1 Patch Transdermal every 24 hours  melatonin 6 milliGRAM(s) Oral at bedtime  multivitamin 1 Tablet(s) Oral daily  Nephro-nancy 1 Tablet(s) Oral daily  nystatin    Suspension [id number] Unit(s) Oral three times a day  pantoprazole  Injectable 40 milliGRAM(s) IV Push daily  predniSONE   Tablet 5 milliGRAM(s) Oral daily    MEDICATIONS  (PRN):  acetaminophen     Tablet .. 650 milliGRAM(s) Oral every 6 hours PRN Temp greater or equal to 38C (100.4F), Mild Pain (1 - 3), Moderate Pain (4 - 6)  aluminum hydroxide/magnesium hydroxide/simethicone Suspension 30 milliLiter(s) Oral every 4 hours PRN Dyspepsia  artificial tears (preservative free) Ophthalmic Solution 1 Drop(s) Both EYES four times a day PRN Dry Eyes  dextrose Oral Gel 15 Gram(s) Oral once PRN Blood Glucose LESS THAN 70 milliGRAM(s)/deciliter  morphine   Solution 5 milliGRAM(s) Oral every 4 hours PRN Severe Pain (7 - 10)  morphine   Solution 2.5 milliGRAM(s) Oral every 4 hours PRN Moderate Pain (4 - 6)  naloxone Injectable 0.1 milliGRAM(s) IV Push every 3 minutes PRN For ANY of the following changes in patient status:  A. RR LESS THAN 10 breaths per minute, B. Oxygen saturation LESS THAN 90%, C. Sedation score of 6  polyethylene glycol 3350 17 Gram(s) Oral daily PRN Constipation          PHYSICAL EXAM:  GENERAL: NAD, well-groomed, well-developed  HEAD:  Atraumatic, Normocephalic  CHEST/LUNG: Clear to percussion bilaterally; No rales, rhonchi, wheezing, or rubs  HEART: Regular rate and rhythm; No murmurs, rubs, or gallops  ABDOMEN: Soft, Nontender, Nondistended; Bowel sounds present  EXTREMITIES:  2+ Peripheral Pulses, No clubbing, cyanosis, or edema  LYMPH: No lymphadenopathy noted  SKIN: No rashes or lesions    Care Discussed with Consultants/Other Providers [ ] YES  [ ] NO

## 2024-11-07 NOTE — PROGRESS NOTE ADULT - SUBJECTIVE AND OBJECTIVE BOX
Patient seen today, resting  Plan for Kyphoplasty on 11/12 with IR      MEDICATIONS  (STANDING):  abiraterone 500 mg tablet 2 Tablet(s) 2 Tablet(s) Oral daily  acetaminophen     Tablet .. 650 milliGRAM(s) Oral once  atorvastatin 20 milliGRAM(s) Oral at bedtime  chlorhexidine 2% Cloths 1 Application(s) Topical daily  dextrose 5% 1000 milliLiter(s) (75 mL/Hr) IV Continuous <Continuous>  dextrose 5%. 1000 milliLiter(s) (50 mL/Hr) IV Continuous <Continuous>  dextrose 5%. 1000 milliLiter(s) (100 mL/Hr) IV Continuous <Continuous>  dextrose 50% Injectable 12.5 Gram(s) IV Push once  dextrose 50% Injectable 25 Gram(s) IV Push once  dextrose 50% Injectable 25 Gram(s) IV Push once  diphenhydrAMINE 25 milliGRAM(s) Oral once  fenofibrate Tablet 145 milliGRAM(s) Oral daily  FIRST- Mouthwash  BLM 30 milliLiter(s) Swish and Spit three times a day  glucagon  Injectable 1 milliGRAM(s) IntraMuscular once  haloperidol     Tablet 1 milliGRAM(s) Oral <User Schedule>  influenza  Vaccine (HIGH DOSE) 0.5 milliLiter(s) IntraMuscular once  insulin lispro (ADMELOG) corrective regimen sliding scale   SubCutaneous three times a day before meals  insulin lispro (ADMELOG) corrective regimen sliding scale   SubCutaneous at bedtime  lactated ringers. 1000 milliLiter(s) (75 mL/Hr) IV Continuous <Continuous>  lactobacillus acidophilus 1 Tablet(s) Oral daily  lidocaine   4% Patch 1 Patch Transdermal every 24 hours  melatonin 6 milliGRAM(s) Oral at bedtime  multivitamin 1 Tablet(s) Oral daily  Nephro-nancy 1 Tablet(s) Oral daily  nystatin    Suspension [id number] Unit(s) Oral three times a day  pantoprazole  Injectable 40 milliGRAM(s) IV Push daily  potassium chloride  10 mEq/100 mL IVPB 10 milliEquivalent(s) IV Intermittent every 1 hour  predniSONE   Tablet 5 milliGRAM(s) Oral daily    MEDICATIONS  (PRN):  acetaminophen     Tablet .. 650 milliGRAM(s) Oral every 6 hours PRN Temp greater or equal to 38C (100.4F), Mild Pain (1 - 3), Moderate Pain (4 - 6)  aluminum hydroxide/magnesium hydroxide/simethicone Suspension 30 milliLiter(s) Oral every 4 hours PRN Dyspepsia  artificial tears (preservative free) Ophthalmic Solution 1 Drop(s) Both EYES four times a day PRN Dry Eyes  dextrose Oral Gel 15 Gram(s) Oral once PRN Blood Glucose LESS THAN 70 milliGRAM(s)/deciliter  morphine   Solution 5 milliGRAM(s) Oral every 4 hours PRN Severe Pain (7 - 10)  morphine   Solution 2.5 milliGRAM(s) Oral every 4 hours PRN Moderate Pain (4 - 6)  naloxone Injectable 0.1 milliGRAM(s) IV Push every 3 minutes PRN For ANY of the following changes in patient status:  A. RR LESS THAN 10 breaths per minute, B. Oxygen saturation LESS THAN 90%, C. Sedation score of 6  polyethylene glycol 3350 17 Gram(s) Oral daily PRN Constipation          Vital Signs Last 24 Hrs  T(C): 36.6 (07 Nov 2024 11:54), Max: 36.7 (06 Nov 2024 20:30)  T(F): 97.9 (07 Nov 2024 11:54), Max: 98.1 (07 Nov 2024 05:00)  HR: 102 (07 Nov 2024 11:54) (91 - 102)  BP: 110/79 (07 Nov 2024 11:54) (106/71 - 119/79)  BP(mean): --  RR: 17 (07 Nov 2024 11:54) (16 - 18)  SpO2: 100% (07 Nov 2024 11:54) (95% - 100%)    Parameters below as of 07 Nov 2024 11:54  Patient On (Oxygen Delivery Method): room air        PE  NAD  asleep  Anicteric, MMM  RRR  CTAB  Abd soft, NT, ND  No c/c/e  No rash grossly                            7.8    4.91  )-----------( 138      ( 07 Nov 2024 05:51 )             24.1       11-07    140  |  107  |  5[L]  ----------------------------<  73  3.0[L]   |  23  |  0.30[L]    Ca    7.2[L]      07 Nov 2024 05:51  Phos  2.7     11-07  Mg     1.70     11-07    TPro  4.4[L]  /  Alb  1.9[L]  /  TBili  0.5  /  DBili  x   /  AST  33  /  ALT  17  /  AlkPhos  156[H]  11-07

## 2024-11-07 NOTE — PROGRESS NOTE ADULT - ASSESSMENT
64-year-old male with metastatic prostate cancer, sent in by hematologist from [facility] for uncontrolled pain, nausea, vomiting.   Patient reports diffuse pain starting 6 months ago significantly worsening for the past 2 weeks.  Currently the worst pain is located around the lower back.   No loss of bladder and bowel function, no saddle paresthesia.  Able to walk.  patient is oxycodone 5 every 4-6 hour.  Yesterday they started adding OxyContin 10.  However patient continued to have pain.  Family also reports significant nausea and vomiting, inability to tolerate p.o. for the last 2 days.  Last bowel movement 2 days ago.   No known allergy.  Diagnosed with high risk high volume metastatic prostate cancer with bone, liver lymph node mets and presacral mass. Liver biopsy confirmed disease with . Started lupron, loly/pred with plans to initiate taxotere.    (12 Oct 2024 15:40)  pt seems very frustrated:   he is on 2 L of oxygen : he has no underlying lung disease:  but he is requiring 2 L of oxygen      Hypoxic resp failure  Metastatic prostate cancer  Back pain     Hypoxic resp failure  -He has no underlying lung disease:  but he is requiring 2 L of oxygen :   -CTA showed; No pulmonary embolism to the level of the segmental branches bilaterally. Limited evaluation of the subsegmental branches secondary to incomplete contrast opacification.  Multilevel vertebral body lytic lesions and loss of vertebral body height, better evaluated on CT thoracic spine 10/17/2024. Metastatic prostate cancer  -Patent central airways. Bibasilar atelectasis. No pleural effusion. MEDIASTINUM AND KATTY: No lymphadenopathy.  PULMONARY ANGIOGRAM: No pulmonary embolism to the level of the segmental   branches bilaterally. Limited evaluation of the subsegmental branches secondary to incomplete contrast opacification.    10/19:  -seems pretty tired;  on 2 L of oxygen :  -cta chest showed: No pulmonary embolism to the level of the segmental branches bilaterally. Limited evaluation of the subsegmental branches secondary to incomplete   contrast opacification. Multilevel vertebral body lytic lesions and loss of vertebral body height, better evaluated on CT thoracic spine 10/17/2024.  -still with fever:  no pe  on zosyn  and leukopenic hemonc following;  has metastatic prostate ca:   -VBG seems OK:     10/20:  pulm wise he seems to be stable:   -using 2 L of oxygen    -yesterdays VBG with minimal met acidosis  -cta again noted    10/21:  on 4 L of oxygen  today    cxr is size    e coli sepsis: Gram Negative Rods and Gram Negative Coccobacilli    10/22: heis doing poorly today  : he is very agitated and uncomfortable  on mittens: ? sun downing   10/23: quiet today  : sleeping:  oxygen requirement has not increased: on rooo ha 95% as documented    WBC is slowly increasing:   no fever:   -on antibiotics   10/24: pt is not doing well : his repeat blood cultures are positive with same organism :  would defer to ID;   10/25: seems to be doing  ok ; no sob:  no cough :  no phlegm   : on room air   10/26: resp failure has resolved:  is septic  : on ceftriaxone     10/27:  he seems OK:  he is on room air:  on morphine drip   remans on ceftriaxone still     10/28: she seems to be doing  ok : no sob: no cough ; no phlegm  confused:  on morphine drip    10/29; seems comfortable on room air;   cont current rx:   10/30: seems to be doing  ok ; on morphine drip : : plan to decrease morphine dose:   -cont current supportive care   10/31:  seems same tome:   no sob:  on room air:   seems comfortable at this ti me: off morphine  11/1:  he has been on room air for days;  looks comfortable:  coughs when he eats;   need pt ot  ; ? oob to chair:  dw wife:   11/3:  he seems stable:   on room air:  responding to questions pretty well : has bleeding lower lip : not Much through ? secondary to dryness:   -resp wise seems pretty good:  no sob:    114:  -seems pretty good:  no sob:  on room air  -has iral ulceration:  bleeding:  not much though : cont magic mouth wash   11/5:  -he seems  pretty good:   on room air:   no sob:  no cough :  no phlegm : difficulty in eating secondary to mucositis:  cont current rx:     11/6: seems OK:   no sob:   on room air:  now again kyphoplasty being considered    11/7:  -he seems same:  no sob:  on room air:   for possible kyphoplasty      New finding:  SMA dissection : neutropenic colitis/ #E Coli and H influenzae bacteremia/ #Neutropenic fever  -/f ischemic colitis on R colon   Diagnosed  on ct abd:  defer to surgery and primary team:  probably no intervention:   -cont antibiotics  -not doing very well with above new findings    10/22; no intervention per surgery    -cont antibiotics  -has fever:  ID following :  -Last chest xray on 20th  ; normal     id following   10/23  IR was consulted for vertebral augmentation of T3-T5 prior to XRT.   Patient was seen bedside. Initially, patient kept requesting no exam and was not willing to participate in the discussion. Son was bedside at time of conversation.   Per discussion with the medical attending, given the concern for SMA dissection and concern for bowel ischemia, will hold off on vertebral augmentation evaluation at this time.   Please reach out to IR when patient is medically stable for vertebral augmentation.  10/24:  remains septic:  above cancelled:   ? for palliative care now  10/26: he seems same to me:  no overnight events  on room air:   check VBG  : cont antibiotics   hemonc following   10/27:  -still on antibiotics for e coli sepsis  -id following s  10/28: cont antibiotics   10/230: cont ceftriaxone   10/31: on ceftriaxone and flagyl   11/1: antibiotics per ID   11/3: cont antibiotics : IR note reviewed:  no kyphoplasty at this time    11/4: cont antibiotics per ID   11/5: on cefepime:  id following   11/6: on cefepime and flagyl!  11/7: off antibiotics now:     Back pain   -likely due to metastatic disease:  adm here for severe pain :  -pain control per primary team   -venous ph is ok   11/1: resolved for now on meds  11/3: pain control : no kyphoplasty at this ti me per ir note:   11/4: as above  11/6: for possible kyphoplasty   now   11/7: for kyphoplasty now:     dior acp; GOC as per primary  team

## 2024-11-07 NOTE — PROGRESS NOTE ADULT - ASSESSMENT
1. Metastatic prostate cancer    - Follows with Dr Andrew Mendoza at [facility]   - PSMA 10/11/24 showed hypermetabolic vera foci above and below the diaphragm, foci in the liver and extensive foci involving the axial and appendicular skeleton compatible with metastatic disease  - --> 374--> 426 on 10/8/24   - Liver biopsy 9/30/24 consistent with prostate adenocarcinoma   - High risk high volume disease -> started on triplet therapy w/ ADT/lupron, abiraterone/prednisone + taxotere. (back pain after Lupron likely tumor flare).  - Continue abiraterone 1000mg daily w Prednisone 5mg PO QD   - s/p taxotere 60mg/m2 on 10/13/24. Next dose was planned for 11/4 , held at this time as patient awaiting Kyphoplasty and has weakness. IR recs appreciated   - Kyphoplasty w/IR to T3 and L1 lesions to be planned once he completes antibiotics  - Palliative following for pain control and given hospital course would have further GOC discussions. Treatment will be offered but he has metastatic disease with visceral involvement and complications as outlined below.     2. Pancolitis, E coli and H influenzae bacteremia  - continues on Cefepime+ Metronidazole, plan for Kypho once abx completed  - ID following  - BCx from 10/24 neg to date    3. Anemia/Thrombocytopenia   - Hgb 7.3 s/p 1 unit 10/30, transfuse for Hgb < 7.0  - Multifactorial sec to likely marrow infiltration by CaP, Chemo effect, consumption from acute illness, poss ITP  - Transfuse for plt <15 or < 50K if bleeding/for procedure      4. SMA Dissection  - seen by vasc sx  - no plan for intervention          Umm Aponte NP  Hematology/Oncology  [facility]  [phone] (Office)  [phone] (Alt office)  Evenings and weekends please call MD on call or office

## 2024-11-07 NOTE — PROGRESS NOTE ADULT - SUBJECTIVE AND OBJECTIVE BOX
Date of Service: 11-07-24 @ 12:19    Patient is a 64y old  Male who presents with a chief complaint of Pain (07 Nov 2024 12:06)      Any change in ROS: seems to be doing  ok : on room air:  wife at bedside:  he seemed very weak      MEDICATIONS  (STANDING):  abiraterone 500 mg tablet 2 Tablet(s) 2 Tablet(s) Oral daily  acetaminophen     Tablet .. 650 milliGRAM(s) Oral once  atorvastatin 20 milliGRAM(s) Oral at bedtime  chlorhexidine 2% Cloths 1 Application(s) Topical daily  dextrose 5% 1000 milliLiter(s) (75 mL/Hr) IV Continuous <Continuous>  dextrose 5%. 1000 milliLiter(s) (50 mL/Hr) IV Continuous <Continuous>  dextrose 5%. 1000 milliLiter(s) (100 mL/Hr) IV Continuous <Continuous>  dextrose 50% Injectable 25 Gram(s) IV Push once  dextrose 50% Injectable 25 Gram(s) IV Push once  dextrose 50% Injectable 12.5 Gram(s) IV Push once  diphenhydrAMINE 25 milliGRAM(s) Oral once  fenofibrate Tablet 145 milliGRAM(s) Oral daily  FIRST- Mouthwash  BLM 30 milliLiter(s) Swish and Spit three times a day  glucagon  Injectable 1 milliGRAM(s) IntraMuscular once  haloperidol     Tablet 1 milliGRAM(s) Oral <User Schedule>  influenza  Vaccine (HIGH DOSE) 0.5 milliLiter(s) IntraMuscular once  insulin lispro (ADMELOG) corrective regimen sliding scale   SubCutaneous three times a day before meals  insulin lispro (ADMELOG) corrective regimen sliding scale   SubCutaneous at bedtime  lactated ringers 1000 milliLiter(s) (75 mL/Hr) IV Continuous <Continuous>  lactobacillus acidophilus 1 Tablet(s) Oral daily  lidocaine   4% Patch 1 Patch Transdermal every 24 hours  melatonin 6 milliGRAM(s) Oral at bedtime  multivitamin 1 Tablet(s) Oral daily  Nephro-nancy 1 Tablet(s) Oral daily  nystatin    Suspension 870681 Unit(s) Oral three times a day  pantoprazole  Injectable 40 milliGRAM(s) IV Push daily  potassium chloride  10 mEq/100 mL IVPB 10 milliEquivalent(s) IV Intermittent every 1 hour  predniSONE   Tablet 5 milliGRAM(s) Oral daily    MEDICATIONS  (PRN):  acetaminophen     Tablet .. 650 milliGRAM(s) Oral every 6 hours PRN Temp greater or equal to 38C (100.4F), Mild Pain (1 - 3), Moderate Pain (4 - 6)  aluminum hydroxide/magnesium hydroxide/simethicone Suspension 30 milliLiter(s) Oral every 4 hours PRN Dyspepsia  artificial tears (preservative free) Ophthalmic Solution 1 Drop(s) Both EYES four times a day PRN Dry Eyes  dextrose Oral Gel 15 Gram(s) Oral once PRN Blood Glucose LESS THAN 70 milliGRAM(s)/deciliter  morphine   Solution 5 milliGRAM(s) Oral every 4 hours PRN Severe Pain (7 - 10)  morphine   Solution 2.5 milliGRAM(s) Oral every 4 hours PRN Moderate Pain (4 - 6)  naloxone Injectable 0.1 milliGRAM(s) IV Push every 3 minutes PRN For ANY of the following changes in patient status:  A. RR LESS THAN 10 breaths per minute, B. Oxygen saturation LESS THAN 90%, C. Sedation score of 6  polyethylene glycol 3350 17 Gram(s) Oral daily PRN Constipation    Vital Signs Last 24 Hrs  T(C): 36.6 (07 Nov 2024 11:54), Max: 36.7 (06 Nov 2024 20:30)  T(F): 97.9 (07 Nov 2024 11:54), Max: 98.1 (07 Nov 2024 05:00)  HR: 102 (07 Nov 2024 11:54) (91 - 102)  BP: 110/79 (07 Nov 2024 11:54) (106/71 - 119/79)  BP(mean): --  RR: 17 (07 Nov 2024 11:54) (16 - 18)  SpO2: 100% (07 Nov 2024 11:54) (95% - 100%)    Parameters below as of 07 Nov 2024 11:54  Patient On (Oxygen Delivery Method): room air        I&O's Summary    06 Nov 2024 07:01  -  07 Nov 2024 07:00  --------------------------------------------------------  IN: 0 mL / OUT: 1000 mL / NET: -1000 mL          Physical Exam:   GENERAL: cahectic  HEENT: CHAVA/   Atraumatic, Normocephalic  ENMT: No tonsillar erythema, exudates, or enlargement; Moist mucous membranes, Good dentition, No lesions  NECK: Supple, No JVD, Normal thyroid  CHEST/LUNG: Clear to auscultaion  CVS: Regular rate and rhythm; No murmurs, rubs, or gallops  GI: : Soft, Nontender, Nondistended; Bowel sounds present  NERVOUS SYSTEM:  Alert & Oriented X3  EXTREMITIES:  - edema  LYMPH: No lymphadenopathy noted  SKIN: No rashes or lesions  ENDOCRINOLOGY: No Thyromegaly  PSYCH: calm     Labs:                              7.8    4.91  )-----------( 138      ( 07 Nov 2024 05:51 )             24.1                         8.4    4.94  )-----------( 144      ( 06 Nov 2024 09:46 )             25.7                         6.9    3.85  )-----------( 129      ( 06 Nov 2024 06:00 )             21.8                         7.3    3.02  )-----------( 138      ( 05 Nov 2024 06:30 )             22.5                         7.3    2.72  )-----------( 141      ( 04 Nov 2024 06:43 )             22.4     11-07    140  |  107  |  5[L]  ----------------------------<  73  3.0[L]   |  23  |  0.30[L]  11-06    136  |  109[H]  |  6[L]  ----------------------------<  107[H]  4.1   |  20[L]  |  0.26[L]  11-05    143  |  115[H]  |  8   ----------------------------<  97  3.6   |  21[L]  |  0.31[L]  11-04    144  |  116[H]  |  8   ----------------------------<  114[H]  3.8   |  20[L]  |  0.32[L]  11-04    148[H]  |  120[H]  |  7   ----------------------------<  102[H]  2.9[LL]   |  20[L]  |  0.31[L]    Ca    7.2[L]      07 Nov 2024 05:51  Ca    7.1[L]      06 Nov 2024 08:49  Phos  2.7     11-07  Phos  2.3     11-06  Phos  TNP     11-06  Mg     1.70     11-07  Mg     1.70     11-06  Mg     TNP     11-06    TPro  4.4[L]  /  Alb  1.9[L]  /  TBili  0.5  /  DBili  x   /  AST  33  /  ALT  17  /  AlkPhos  156[H]  11-07  TPro  4.4[L]  /  Alb  1.8[L]  /  TBili  0.5  /  DBili  x   /  AST  43[H]  /  ALT  22  /  AlkPhos  143[H]  11-06    CAPILLARY BLOOD GLUCOSE      POCT Blood Glucose.: 84 mg/dL (07 Nov 2024 08:41)  POCT Blood Glucose.: 94 mg/dL (06 Nov 2024 20:49)  POCT Blood Glucose.: 138 mg/dL (06 Nov 2024 17:54)  POCT Blood Glucose.: 134 mg/dL (06 Nov 2024 12:52)      LIVER FUNCTIONS - ( 07 Nov 2024 05:51 )  Alb: 1.9 g/dL / Pro: 4.4 g/dL / ALK PHOS: 156 U/L / ALT: 17 U/L / AST: 33 U/L / GGT: x             Urinalysis Basic - ( 07 Nov 2024 05:51 )    Color: x / Appearance: x / SG: x / pH: x  Gluc: 73 mg/dL / Ketone: x  / Bili: x / Urobili: x   Blood: x / Protein: x / Nitrite: x   Leuk Esterase: x / RBC: x / WBC x   Sq Epi: x / Non Sq Epi: x / Bacteria: x      < from: Xray Chest 1 View- PORTABLE-Urgent (Xray Chest 1 View- PORTABLE-Urgent .) (10.31.24 @ 03:52) >        INTERPRETATION:  CLINICAL INFORMATION: Fever    Time of Exam:  October 31, 2024 at 3:46 AM    EXAM:  Frontal Chest    FINDINGS:  Both lungs are equally aerated and free of any focal abnormalities.  The heart is not enlarged and there is no effusion or pneumothorax.  The bones show no acute findings.      COMPARISON: October 20, 2024        IMPRESSION: Clear lungs.    --- End of Report ---            GOMEZ ARANDA MD; Attending Radiologist  This document has been electronically signed. Oct 31 2024 10:49AM    < end of copied text >        RECENT CULTURES:        RESPIRATORY CULTURES:      Clostridium difficile [facility] Toxins A&amp;B, EIA:   Negative (11-01 @ 07:45)      Studies  Chest X-RAY  CT SCAN Chest   Venous Dopplers: LE:   CT Abdomen  Others

## 2024-11-08 LAB
ALBUMIN SERPL ELPH-MCNC: 1.9 G/DL — LOW (ref 3.3–5)
ALP SERPL-CCNC: 172 U/L — HIGH (ref 40–120)
ALT FLD-CCNC: 14 U/L — SIGNIFICANT CHANGE UP (ref 4–41)
ANION GAP SERPL CALC-SCNC: 8 MMOL/L — SIGNIFICANT CHANGE UP (ref 7–14)
AST SERPL-CCNC: 31 U/L — SIGNIFICANT CHANGE UP (ref 4–40)
BILIRUB SERPL-MCNC: 0.5 MG/DL — SIGNIFICANT CHANGE UP (ref 0.2–1.2)
BUN SERPL-MCNC: 5 MG/DL — LOW (ref 7–23)
CALCIUM SERPL-MCNC: 7.4 MG/DL — LOW (ref 8.4–10.5)
CHLORIDE SERPL-SCNC: 108 MMOL/L — HIGH (ref 98–107)
CO2 SERPL-SCNC: 23 MMOL/L — SIGNIFICANT CHANGE UP (ref 22–31)
CREAT SERPL-MCNC: 0.28 MG/DL — LOW (ref 0.5–1.3)
EGFR: 135 ML/MIN/1.73M2 — SIGNIFICANT CHANGE UP
GLUCOSE BLDC GLUCOMTR-MCNC: 101 MG/DL — HIGH (ref 70–99)
GLUCOSE BLDC GLUCOMTR-MCNC: 109 MG/DL — HIGH (ref 70–99)
GLUCOSE BLDC GLUCOMTR-MCNC: 89 MG/DL — SIGNIFICANT CHANGE UP (ref 70–99)
GLUCOSE BLDC GLUCOMTR-MCNC: 95 MG/DL — SIGNIFICANT CHANGE UP (ref 70–99)
GLUCOSE SERPL-MCNC: 83 MG/DL — SIGNIFICANT CHANGE UP (ref 70–99)
HCT VFR BLD CALC: 22.5 % — LOW (ref 39–50)
HGB BLD-MCNC: 7.3 G/DL — LOW (ref 13–17)
MAGNESIUM SERPL-MCNC: 1.7 MG/DL — SIGNIFICANT CHANGE UP (ref 1.6–2.6)
MCHC RBC-ENTMCNC: 30.2 PG — SIGNIFICANT CHANGE UP (ref 27–34)
MCHC RBC-ENTMCNC: 32.4 G/DL — SIGNIFICANT CHANGE UP (ref 32–36)
MCV RBC AUTO: 93 FL — SIGNIFICANT CHANGE UP (ref 80–100)
NRBC # BLD: 0 /100 WBCS — SIGNIFICANT CHANGE UP (ref 0–0)
NRBC # FLD: 0.02 K/UL — HIGH (ref 0–0)
PHOSPHATE SERPL-MCNC: 2 MG/DL — LOW (ref 2.5–4.5)
PLATELET # BLD AUTO: 165 K/UL — SIGNIFICANT CHANGE UP (ref 150–400)
POTASSIUM SERPL-MCNC: 3.5 MMOL/L — SIGNIFICANT CHANGE UP (ref 3.5–5.3)
POTASSIUM SERPL-SCNC: 3.5 MMOL/L — SIGNIFICANT CHANGE UP (ref 3.5–5.3)
PROT SERPL-MCNC: 4.3 G/DL — LOW (ref 6–8.3)
RBC # BLD: 2.42 M/UL — LOW (ref 4.2–5.8)
RBC # FLD: 18.9 % — HIGH (ref 10.3–14.5)
SODIUM SERPL-SCNC: 139 MMOL/L — SIGNIFICANT CHANGE UP (ref 135–145)
WBC # BLD: 6.44 K/UL — SIGNIFICANT CHANGE UP (ref 3.8–10.5)
WBC # FLD AUTO: 6.44 K/UL — SIGNIFICANT CHANGE UP (ref 3.8–10.5)

## 2024-11-08 PROCEDURE — 99232 SBSQ HOSP IP/OBS MODERATE 35: CPT

## 2024-11-08 RX ORDER — 0.9 % SODIUM CHLORIDE 0.9 %
1000 INTRAVENOUS SOLUTION INTRAVENOUS
Refills: 0 | Status: DISCONTINUED | OUTPATIENT
Start: 2024-11-08 | End: 2024-11-18

## 2024-11-08 RX ORDER — HEPARIN SODIUM 5000 [USP'U]/.5ML
15 INJECTION, SOLUTION INTRAVENOUS; SUBCUTANEOUS ONCE
Refills: 0 | Status: COMPLETED | OUTPATIENT
Start: 2024-11-08 | End: 2024-11-08

## 2024-11-08 RX ADMIN — LIDOCAINE 1 PATCH: 40 CREAM TOPICAL at 21:23

## 2024-11-08 RX ADMIN — NYSTATIN 500000 UNIT(S): 500000 TABLET, FILM COATED ORAL at 13:57

## 2024-11-08 RX ADMIN — Medication 1 TABLET(S): at 13:59

## 2024-11-08 RX ADMIN — NYSTATIN 500000 UNIT(S): 500000 TABLET, FILM COATED ORAL at 05:21

## 2024-11-08 RX ADMIN — Medication 1 TABLET(S): at 14:00

## 2024-11-08 RX ADMIN — ACETAMINOPHEN, DIPHENHYDRAMINE HCL, PHENYLEPHRINE HCL 6 MILLIGRAM(S): 325; 25; 5 TABLET ORAL at 21:23

## 2024-11-08 RX ADMIN — DIPHENHYDRAMINE HYDROCHLORIDE AND LIDOCAINE HYDROCHLORIDE AND ALUMINUM HYDROXIDE AND MAGNESIUM HYDRO 30 MILLILITER(S): KIT at 21:22

## 2024-11-08 RX ADMIN — PANTOPRAZOLE SODIUM 40 MILLIGRAM(S): 40 TABLET, DELAYED RELEASE ORAL at 13:57

## 2024-11-08 RX ADMIN — NYSTATIN 500000 UNIT(S): 500000 TABLET, FILM COATED ORAL at 21:22

## 2024-11-08 RX ADMIN — CHLORHEXIDINE GLUCONATE 1 APPLICATION(S): 1.2 RINSE ORAL at 14:37

## 2024-11-08 RX ADMIN — Medication 145 MILLIGRAM(S): at 13:58

## 2024-11-08 RX ADMIN — DIPHENHYDRAMINE HYDROCHLORIDE AND LIDOCAINE HYDROCHLORIDE AND ALUMINUM HYDROXIDE AND MAGNESIUM HYDRO 30 MILLILITER(S): KIT at 05:21

## 2024-11-08 RX ADMIN — Medication 75 MILLILITER(S): at 15:02

## 2024-11-08 RX ADMIN — PREDNISONE 5 MILLIGRAM(S): 20 TABLET ORAL at 05:20

## 2024-11-08 RX ADMIN — DIPHENHYDRAMINE HYDROCHLORIDE AND LIDOCAINE HYDROCHLORIDE AND ALUMINUM HYDROXIDE AND MAGNESIUM HYDRO 30 MILLILITER(S): KIT at 15:02

## 2024-11-08 RX ADMIN — Medication 20 MILLIGRAM(S): at 05:20

## 2024-11-08 RX ADMIN — HEPARIN SODIUM 63.75 MILLIMOLE(S): 5000 INJECTION, SOLUTION INTRAVENOUS; SUBCUTANEOUS at 19:02

## 2024-11-08 RX ADMIN — Medication 1 MILLIGRAM(S): at 21:23

## 2024-11-08 NOTE — PROGRESS NOTE ADULT - ASSESSMENT
64-year-old male with metastatic prostate cancer, sent in by hematologist from [facility] for uncontrolled pain, nausea, vomiting.   Patient reports diffuse pain starting 6 months ago significantly worsening for the past 2 weeks. Diagnosed with high risk high volume metastatic prostate cancer with bone, liver lymph node mets and presacral mass. Liver biopsy confirmed disease with . Started lupron, loly/pred with plans to initiate taxotere.   nephrology consulted for electrolyte abnormalities.    Hypernatremia  Poor free water intake    pt now has diet however not eating much per family  na stable on LR +kcl 40meq  encourage free water as tolerated.  monitor Na.  Avoid overcorrection >8mEq in 24hrs.    Hypokalemia  GI loss and poor po intake  unable to tolerate PO kcl  still having diarrhea  stable continue LR +40meq kcl @ 75  monitor closely.    Acidosis   non AG  likely GI lost.  Hyperchloremic.  better  monitor CO2.    hypophosphatemia  supplemented k phos 15x1  monitor    hypocalcemia   sec to low albumin  corrected ca 8.5  monitor    fever  GNR bacteremia   work up and tx per team    anemia  metastatic prostate cancer  f/u heme/onc

## 2024-11-08 NOTE — PROGRESS NOTE ADULT - ASSESSMENT
1. Metastatic prostate cancer    - Follows with Dr Andrew Mendoza at [facility]   - PSMA 10/11/24 showed hypermetabolic vera foci above and below the diaphragm, foci in the liver and extensive foci involving the axial and appendicular skeleton compatible with metastatic disease  - --> 374--> 426 on 10/8/24   - Liver biopsy 9/30/24 consistent with prostate adenocarcinoma   - High risk high volume disease -> started on triplet therapy w/ ADT/lupron, abiraterone/prednisone + taxotere. (back pain after Lupron likely tumor flare).  - Continue abiraterone 1000mg daily w Prednisone 5mg PO QD   - s/p taxotere 60mg/m2 on 10/13/24. Next dose was planned for 11/4 , held for weakness, clinical progress  -Bacteremia cleared. Remains on abx. Clinically improving. GI PCR negative. plan for kyphoplasty on 11/12. IR recs appreciated   - Kyphoplasty w/IR to T3 and L1 lesions to be planned once he completes antibiotics  - Palliative following for pain control and given hospital course would have further GOC discussions. Treatment will be offered but he has metastatic disease with visceral involvement and complications as outlined below.     2. Pancolitis, E coli and H influenzae bacteremia  - continues on Cefepime+ Metronidazole, plan for Kypho once abx completed  - ID following  - BCx from 10/24 neg to date    3. Anemia/Thrombocytopenia   - Hgb 7.8 , transfuse for Hgb < 7.0  - Multifactorial sec to likely marrow infiltration by CaP, Chemo effect, consumption from acute illness, poss ITP  - Transfuse for plt <15 or < 50K if bleeding/for procedure      4. SMA Dissection  - [facility]  - no plan for intervention          Umm Aponte NP  Hematology/Oncology  [facility]  [phone] (Office)  [phone] (Alt office)  Evenings and weekends please call MD on call or office

## 2024-11-08 NOTE — PROGRESS NOTE ADULT - SUBJECTIVE AND OBJECTIVE BOX
Infectious Diseases Follow Up:    Patient is a 64y old  Male who presents with a chief complaint of Pain (01 Nov 2024 08:06)      Interval History/ROS:    afebrile   antibiotics completed yesterday   lass diarrhea  flexiseal in place     Allergies  No Known Allergies        ANTIMICROBIALS:  nystatin    Suspension [id number] three times a day          OTHER MEDS:   MEDICATIONS  (STANDING):  acetaminophen     Tablet .. 650 every 6 hours PRN  acetaminophen     Tablet .. 650 once  aluminum hydroxide/magnesium hydroxide/simethicone Suspension 30 every 4 hours PRN  atorvastatin 20 at bedtime  dextrose 50% Injectable 25 once  dextrose 50% Injectable 12.5 once  dextrose 50% Injectable 25 once  dextrose Oral Gel 15 once PRN  diphenhydrAMINE 25 once  fenofibrate Tablet 145 daily  glucagon  Injectable 1 once  haloperidol     Tablet 1 <User Schedule>  influenza  Vaccine (HIGH DOSE) 0.5 once  insulin lispro (ADMELOG) corrective regimen sliding scale  three times a day before meals  insulin lispro (ADMELOG) corrective regimen sliding scale  at bedtime  melatonin 6 at bedtime  morphine   Solution 5 every 4 hours PRN  morphine   Solution 2.5 every 4 hours PRN  pantoprazole  Injectable 40 daily  polyethylene glycol 3350 17 daily PRN  predniSONE   Tablet 5 daily      Vital Signs Last 24 Hrs  T(F): 98 (11-08-24 @ 11:00), Max: 98.5 (11-08-24 @ 04:00)  HR: 95 (11-08-24 @ 11:00)  BP: 123/77 (11-08-24 @ 11:00)  RR: 18 (11-08-24 @ 11:00)  SpO2: 97% (11-08-24 @ 11:00) (95% - 100%)        PHYSICAL EXAM:  GENERAL:  alert, non toxic   CHEST/LUNG: Clear to auscultation bilaterally  HEART: s1s2  ABDOMEN: Soft, Nontender, Nondistended; Bowel sounds present  flexiseal                          7.3    6.44  )-----------( 165      ( 08 Nov 2024 06:50 )             22.5 11-08    139  |  108  |  5   ----------------------------<  83  3.5   |  23  |  0.28  Ca    7.4      08 Nov 2024 06:50Phos  2.0     11-08Mg     1.70     11-08  TPro  4.3  /  Alb  1.9  /  TBili  0.5  /  DBili  x   /  AST  31  /  ALT  14  /  AlkPhos  172  11-08        GI PCR Panel Stool (11.01.24 @ 07:45)   GI PCR Panel: NotDetec:      MICROBIOLOGY:  v  .Blood BLOOD  10-24-24   No growth at 5 days  --  --      .Blood BLOOD  10-24-24   No growth at 5 days  --  --      Clean Catch Clean Catch (Midstream)  10-22-24   <10,000 CFU/mL Normal Urogenital Sherlyn  --  --      .Blood BLOOD  10-22-24   Growth in aerobic bottle: Escherichia coli  --  Escherichia coli      .Blood BLOOD  10-21-24   No growth at 5 days  --  --      .Blood BLOOD  10-21-24   No growth at 5 days  --  --      .Blood BLOOD  10-19-24   Growth in aerobic and anaerobic bottles: Escherichia coli  See previous culture [id number]  --    Growth in aerobic bottle: Gram Negative Rods  Growth in anaerobic bottle: Gram Negative Rods      .Blood BLOOD  10-19-24   Growth in aerobic and anaerobic bottles: Escherichia coli  See previous culture [id number]  --    Growth in anaerobic bottle: Gram Negative Rods  Growth in aerobic bottle: Gram Negative Rods      .Blood BLOOD  10-19-24   Growth in aerobic and anaerobic bottles: Escherichia coli  See previous culture [id number]  --    Growth in aerobic and anaerobic bottles: Gram Negative Rods and Gram  Negative Coccobacilli      .Blood BLOOD  10-19-24   Growth in aerobic and anaerobic bottles: Escherichia coli  Direct identification is available within approximately 3-5  hours either by Blood Panel Multiplexed PCR or Direct  MALDI-TOF. Details: [email or web address]  --  Blood Culture PCR  Escherichia coli        RADIOLOGY:    < from: Xray Chest 1 View- PORTABLE-Urgent (Xray Chest 1 View- PORTABLE-Urgent .) (10.31.24 @ 03:52) >    COMPARISON: October 20, 2024        IMPRESSION: Clear lungs.    < end of copied text >  < from: CT Angio Abdomen and Pelvis w/ IV Cont (10.19.24 @ 18:39) >    ACC: [id number] EXAM:  CT ANGIO ABD PELV (W)AW IC   ORDERED BY: SHERRILL HEDRICK     PROCEDURE DATE:  10/19/2024          INTERPRETATION:  CLINICAL INFORMATION: IR CT with findings concerning for   dissection of the SMA.    COMPARISON: CT abdomen pelvis 10/18/2024    CONTRAST/COMPLICATIONS:  IV Contrast: Omnipaque 350  90 cc administered   10 cc discarded  Oral Contrast: NONE  Complications: None reported at time of study completion    PROCEDURE:  CT Angiography of the Abdomen and Pelvis.  Arterial phase images were acquired.  Sagittal and coronal reformats were performed as well as 3D (MIP)   reconstructions.    FINDINGS:  LOWER CHEST: Within normal limits.    LIVER: Within normal limits.  BILE DUCTS: CBD measures roughly 8 mm in diameter likely representing   postcholecystectomy change.  GALLBLADDER: Cholecystectomy.  SPLEEN: Within normal limits.  PANCREAS: Within normal limits.  ADRENALS: Within normal limits.  KIDNEYS/URETERS: Within normal limits.    BLADDER: Within normal limits.  REPRODUCTIVE ORGANS: Prostate within normal limits.    BOWEL: No bowel obstruction. Layering radiopaque content in the region of   the rectosigmoid junction seen on (4-197). Appendix is normal.  PERITONEUM/RETROPERITONEUM: Redemonstrated presacral pelvic mass   unchanged compared to prior near the rectosigmoid junction.  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 Moderate atherosclerotic change of the   aorta. Large calcified atheromawith overlying soft plaque deposition   seen on (4-115).  LYMPH NODES: Redemonstrated left para-aortic lymphadenopathy..  ABDOMINAL WALL: Small, fat-containing left inguinal hernia.   Redemonstrated likely right inguinal hernia plug..  BONES: Demonstrated multilevel lytic metastases with likely pathologic   fractures at L1 and L5 also seen in previous study.    IMPRESSION:  Dissection of the SMA as above.    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    Findings were discussed with Albino Matthews NP by Joe Carey M.D. on   10/19/2024 at 9:11 PM    < end of copied text >

## 2024-11-08 NOTE — PROGRESS NOTE ADULT - SUBJECTIVE AND OBJECTIVE BOX
[facility]   MD MARKO EASTMAN MD ANGELA WONG, PA    TEL:  OFFICE: [phone]  From 5pm-7am Answering Service [phone]    -- RENAL FOLLOW UP NOTE ---Date of Service 11-08-24 @ 12:40    Patient is a 64y old  Male who presents with a chief complaint of Pain (08 Nov 2024 07:34)      Patient seen and examined at bedside. No chest pain/sob    VITALS:  T(F): 98 (11-08-24 @ 11:00), Max: 98.5 (11-08-24 @ 04:00)  HR: 95 (11-08-24 @ 11:00)  BP: 123/77 (11-08-24 @ 11:00)  RR: 18 (11-08-24 @ 11:00)  SpO2: 97% (11-08-24 @ 11:00)  Wt(kg): --        PHYSICAL EXAM:  General: NAD  Neck: No JVD  Respiratory: CTAB, no wheezes, rales or rhonchi  Cardiovascular: S1, S2, RRR  Gastrointestinal: BS+, soft, NT/ND  Extremities: No peripheral edema    Hospital Medications:   MEDICATIONS  (STANDING):  abiraterone 500 mg tablet 2 Tablet(s) 2 Tablet(s) Oral daily  acetaminophen     Tablet .. 650 milliGRAM(s) Oral once  atorvastatin 20 milliGRAM(s) Oral at bedtime  chlorhexidine 2% Cloths 1 Application(s) Topical daily  dextrose 5% 1000 milliLiter(s) (75 mL/Hr) IV Continuous <Continuous>  dextrose 5%. 1000 milliLiter(s) (50 mL/Hr) IV Continuous <Continuous>  dextrose 5%. 1000 milliLiter(s) (100 mL/Hr) IV Continuous <Continuous>  dextrose 50% Injectable 12.5 Gram(s) IV Push once  dextrose 50% Injectable 25 Gram(s) IV Push once  dextrose 50% Injectable 25 Gram(s) IV Push once  diphenhydrAMINE 25 milliGRAM(s) Oral once  fenofibrate Tablet 145 milliGRAM(s) Oral daily  FIRST- Mouthwash  BLM 30 milliLiter(s) Swish and Spit three times a day  glucagon  Injectable 1 milliGRAM(s) IntraMuscular once  haloperidol     Tablet 1 milliGRAM(s) Oral <User Schedule>  influenza  Vaccine (HIGH DOSE) 0.5 milliLiter(s) IntraMuscular once  insulin lispro (ADMELOG) corrective regimen sliding scale   SubCutaneous at bedtime  insulin lispro (ADMELOG) corrective regimen sliding scale   SubCutaneous three times a day before meals  lactated ringers 1000 milliLiter(s) (75 mL/Hr) IV Continuous <Continuous>  lactobacillus acidophilus 1 Tablet(s) Oral daily  lidocaine   4% Patch 1 Patch Transdermal every 24 hours  melatonin 6 milliGRAM(s) Oral at bedtime  multivitamin 1 Tablet(s) Oral daily  Nephro-nancy 1 Tablet(s) Oral daily  nystatin    Suspension 176281 Unit(s) Oral three times a day  pantoprazole  Injectable 40 milliGRAM(s) IV Push daily  predniSONE   Tablet 5 milliGRAM(s) Oral daily  sodium phosphate 15 milliMole(s)/250 mL IVPB 15 milliMole(s) IV Intermittent once      LABS:  11-08    139  |  108[H]  |  5[L]  ----------------------------<  83  3.5   |  23  |  0.28[L]    Ca    7.4[L]      08 Nov 2024 06:50  Phos  2.0     11-08  Mg     1.70     11-08    TPro  4.3[L]  /  Alb  1.9[L]  /  TBili  0.5  /  DBili      /  AST  31  /  ALT  14  /  AlkPhos  172[H]  11-08    Creatinine Trend: 0.28 <--, 0.30 <--, 0.26 <--, 0.31 <--, 0.32 <--, 0.31 <--, 0.34 <--, 0.30 <--, 0.33 <--, 0.31 <--, 0.32 <--    Albumin: 1.9 g/dL (11-08 @ 06:50)  Phosphorus: 2.0 mg/dL (11-08 @ 06:50)                              7.3    6.44  )-----------( 165      ( 08 Nov 2024 06:50 )             22.5     Urine Studies:  Urinalysis - [11-08-24 @ 06:50]      Color  / Appearance  / SG  / pH       Gluc 83 / Ketone   / Bili  / Urobili        Blood  / Protein  / Leuk Est  / Nitrite       RBC  / WBC  / Hyaline  / Gran  / Sq Epi  / Non Sq Epi  / Bacteria       TSH 2.62      [10-13-24 @ 05:30]  Lipid: chol 135, , HDL 39, LDL --      [10-13-24 @ 05:30]        RADIOLOGY & ADDITIONAL STUDIES:

## 2024-11-08 NOTE — PROGRESS NOTE ADULT - SUBJECTIVE AND OBJECTIVE BOX
Date of Service: 11-08-24 @ 14:01    Patient is a 64y old  Male who presents with a chief complaint of Pain (08 Nov 2024 13:27)      Any change in ROS: seems same:  on room air:  seems pretty weak      MEDICATIONS  (STANDING):  abiraterone 500 mg tablet 2 Tablet(s) 2 Tablet(s) Oral daily  acetaminophen     Tablet .. 650 milliGRAM(s) Oral once  atorvastatin 20 milliGRAM(s) Oral at bedtime  chlorhexidine 2% Cloths 1 Application(s) Topical daily  dextrose 5% 1000 milliLiter(s) (75 mL/Hr) IV Continuous <Continuous>  dextrose 5%. 1000 milliLiter(s) (100 mL/Hr) IV Continuous <Continuous>  dextrose 5%. 1000 milliLiter(s) (50 mL/Hr) IV Continuous <Continuous>  dextrose 50% Injectable 25 Gram(s) IV Push once  dextrose 50% Injectable 25 Gram(s) IV Push once  dextrose 50% Injectable 12.5 Gram(s) IV Push once  diphenhydrAMINE 25 milliGRAM(s) Oral once  fenofibrate Tablet 145 milliGRAM(s) Oral daily  FIRST- Mouthwash  BLM 30 milliLiter(s) Swish and Spit three times a day  glucagon  Injectable 1 milliGRAM(s) IntraMuscular once  haloperidol     Tablet 1 milliGRAM(s) Oral <User Schedule>  influenza  Vaccine (HIGH DOSE) 0.5 milliLiter(s) IntraMuscular once  insulin lispro (ADMELOG) corrective regimen sliding scale   SubCutaneous three times a day before meals  insulin lispro (ADMELOG) corrective regimen sliding scale   SubCutaneous at bedtime  lactated ringers 1000 milliLiter(s) (75 mL/Hr) IV Continuous <Continuous>  lactobacillus acidophilus 1 Tablet(s) Oral daily  lidocaine   4% Patch 1 Patch Transdermal every 24 hours  melatonin 6 milliGRAM(s) Oral at bedtime  multivitamin 1 Tablet(s) Oral daily  Nephro-nancy 1 Tablet(s) Oral daily  nystatin    Suspension 269411 Unit(s) Oral three times a day  pantoprazole  Injectable 40 milliGRAM(s) IV Push daily  predniSONE   Tablet 5 milliGRAM(s) Oral daily  sodium phosphate 15 milliMole(s)/250 mL IVPB 15 milliMole(s) IV Intermittent once    MEDICATIONS  (PRN):  acetaminophen     Tablet .. 650 milliGRAM(s) Oral every 6 hours PRN Temp greater or equal to 38C (100.4F), Mild Pain (1 - 3), Moderate Pain (4 - 6)  aluminum hydroxide/magnesium hydroxide/simethicone Suspension 30 milliLiter(s) Oral every 4 hours PRN Dyspepsia  artificial tears (preservative free) Ophthalmic Solution 1 Drop(s) Both EYES four times a day PRN Dry Eyes  dextrose Oral Gel 15 Gram(s) Oral once PRN Blood Glucose LESS THAN 70 milliGRAM(s)/deciliter  morphine   Solution 2.5 milliGRAM(s) Oral every 4 hours PRN Moderate Pain (4 - 6)  morphine   Solution 5 milliGRAM(s) Oral every 4 hours PRN Severe Pain (7 - 10)  naloxone Injectable 0.1 milliGRAM(s) IV Push every 3 minutes PRN For ANY of the following changes in patient status:  A. RR LESS THAN 10 breaths per minute, B. Oxygen saturation LESS THAN 90%, C. Sedation score of 6  polyethylene glycol 3350 17 Gram(s) Oral daily PRN Constipation    Vital Signs Last 24 Hrs  T(C): 36.7 (08 Nov 2024 11:00), Max: 36.9 (08 Nov 2024 04:00)  T(F): 98 (08 Nov 2024 11:00), Max: 98.5 (08 Nov 2024 04:00)  HR: 95 (08 Nov 2024 11:00) (93 - 100)  BP: 123/77 (08 Nov 2024 11:00) (101/65 - 126/83)  BP(mean): --  RR: 18 (08 Nov 2024 11:00) (16 - 18)  SpO2: 97% (08 Nov 2024 11:00) (95% - 100%)    Parameters below as of 08 Nov 2024 11:00  Patient On (Oxygen Delivery Method): room air        I&O's Summary        Physical Exam:   GENERAL: NAD, well-groomed, well-developed  HEENT: CHAVA/   Atraumatic, Normocephalic  ENMT: No tonsillar erythema, exudates, or enlargement; Moist mucous membranes, Good dentition, No lesions  NECK: Supple, No JVD, Normal thyroid  CHEST/LUNG: Clear to auscultaion  CVS: Regular rate and rhythm; No murmurs, rubs, or gallops  GI: : Soft, Nontender, Nondistended; Bowel sounds present  NERVOUS SYSTEM:  Alert & Oriented X3  EXTREMITIES:  - edema  LYMPH: No lymphadenopathy noted  SKIN: No rashes or lesions  ENDOCRINOLOGY: No Thyromegaly  PSYCH: Appropriate    Labs:                              7.3    6.44  )-----------( 165      ( 08 Nov 2024 06:50 )             22.5                         7.8    4.91  )-----------( 138      ( 07 Nov 2024 05:51 )             24.1                         8.4    4.94  )-----------( 144      ( 06 Nov 2024 09:46 )             25.7                         6.9    3.85  )-----------( 129      ( 06 Nov 2024 06:00 )             21.8                         7.3    3.02  )-----------( 138      ( 05 Nov 2024 06:30 )             22.5     11-08    139  |  108[H]  |  5[L]  ----------------------------<  83  3.5   |  23  |  0.28[L]  11-07    140  |  107  |  5[L]  ----------------------------<  73  3.0[L]   |  23  |  0.30[L]  11-06    136  |  109[H]  |  6[L]  ----------------------------<  107[H]  4.1   |  20[L]  |  0.26[L]  11-05    143  |  115[H]  |  8   ----------------------------<  97  3.6   |  21[L]  |  0.31[L]  11-04    144  |  116[H]  |  8   ----------------------------<  114[H]  3.8   |  20[L]  |  0.32[L]    Ca    7.4[L]      08 Nov 2024 06:50  Ca    7.2[L]      07 Nov 2024 05:51  Phos  2.0     11-08  Phos  2.7     11-07  Mg     1.70     11-08  Mg     1.70     11-07    TPro  4.3[L]  /  Alb  1.9[L]  /  TBili  0.5  /  DBili  x   /  AST  31  /  ALT  14  /  AlkPhos  172[H]  11-08  TPro  4.4[L]  /  Alb  1.9[L]  /  TBili  0.5  /  DBili  x   /  AST  33  /  ALT  17  /  AlkPhos  156[H]  11-07  TPro  4.4[L]  /  Alb  1.8[L]  /  TBili  0.5  /  DBili  x   /  AST  43[H]  /  ALT  22  /  AlkPhos  143[H]  11-06    CAPILLARY BLOOD GLUCOSE      POCT Blood Glucose.: 95 mg/dL (08 Nov 2024 12:35)  POCT Blood Glucose.: 89 mg/dL (08 Nov 2024 08:55)  POCT Blood Glucose.: 125 mg/dL (07 Nov 2024 20:16)  POCT Blood Glucose.: 100 mg/dL (07 Nov 2024 17:48)      LIVER FUNCTIONS - ( 08 Nov 2024 06:50 )  Alb: 1.9 g/dL / Pro: 4.3 g/dL / ALK PHOS: 172 U/L / ALT: 14 U/L / AST: 31 U/L / GGT: x             Urinalysis Basic - ( 08 Nov 2024 06:50 )    Color: x / Appearance: x / SG: x / pH: x  Gluc: 83 mg/dL / Ketone: x  / Bili: x / Urobili: x   Blood: x / Protein: x / Nitrite: x   Leuk Esterase: x / RBC: x / WBC x   Sq Epi: x / Non Sq Epi: x / Bacteria: x      rad< from: Xray Chest 1 View- PORTABLE-Urgent (Xray Chest 1 View- PORTABLE-Urgent .) (10.31.24 @ 03:52) >  PROCEDURE DATE:  10/31/2024          INTERPRETATION:  CLINICAL INFORMATION: Fever    Time of Exam:  October 31, 2024 at 3:46 AM    EXAM:  Frontal Chest    FINDINGS:  Both lungs are equally aerated and free of any focal abnormalities.  The heart is not enlarged and there is no effusion or pneumothorax.  The bones show no acute findings.      COMPARISON: October 20, 2024        IMPRESSION: Clear lungs.    < end of copied text >        RECENT CULTURES:        RESPIRATORY CULTURES:          Studies  Chest X-RAY  CT SCAN Chest   Venous Dopplers: LE:   CT Abdomen  Others

## 2024-11-08 NOTE — PROGRESS NOTE ADULT - SUBJECTIVE AND OBJECTIVE BOX
Patient seen, awake and alert   Bacteremia cleared. Remains on abx. Clinically improving. GI PCR negative.   Plan for kyphoplasty on 11/12      MEDICATIONS  (STANDING):  abiraterone 500 mg tablet 2 Tablet(s) 2 Tablet(s) Oral daily  acetaminophen     Tablet .. 650 milliGRAM(s) Oral once  atorvastatin 20 milliGRAM(s) Oral at bedtime  chlorhexidine 2% Cloths 1 Application(s) Topical daily  dextrose 5% 1000 milliLiter(s) (75 mL/Hr) IV Continuous <Continuous>  dextrose 5%. 1000 milliLiter(s) (50 mL/Hr) IV Continuous <Continuous>  dextrose 5%. 1000 milliLiter(s) (100 mL/Hr) IV Continuous <Continuous>  dextrose 50% Injectable 25 Gram(s) IV Push once  dextrose 50% Injectable 12.5 Gram(s) IV Push once  dextrose 50% Injectable 25 Gram(s) IV Push once  diphenhydrAMINE 25 milliGRAM(s) Oral once  fenofibrate Tablet 145 milliGRAM(s) Oral daily  FIRST- Mouthwash  BLM 30 milliLiter(s) Swish and Spit three times a day  glucagon  Injectable 1 milliGRAM(s) IntraMuscular once  haloperidol     Tablet 1 milliGRAM(s) Oral <User Schedule>  influenza  Vaccine (HIGH DOSE) 0.5 milliLiter(s) IntraMuscular once  insulin lispro (ADMELOG) corrective regimen sliding scale   SubCutaneous at bedtime  insulin lispro (ADMELOG) corrective regimen sliding scale   SubCutaneous three times a day before meals  lactated ringers 1000 milliLiter(s) (75 mL/Hr) IV Continuous <Continuous>  lactobacillus acidophilus 1 Tablet(s) Oral daily  lidocaine   4% Patch 1 Patch Transdermal every 24 hours  melatonin 6 milliGRAM(s) Oral at bedtime  multivitamin 1 Tablet(s) Oral daily  Nephro-nancy 1 Tablet(s) Oral daily  nystatin    Suspension 565606 Unit(s) Oral three times a day  pantoprazole  Injectable 40 milliGRAM(s) IV Push daily  predniSONE   Tablet 5 milliGRAM(s) Oral daily    MEDICATIONS  (PRN):  acetaminophen     Tablet .. 650 milliGRAM(s) Oral every 6 hours PRN Temp greater or equal to 38C (100.4F), Mild Pain (1 - 3), Moderate Pain (4 - 6)  aluminum hydroxide/magnesium hydroxide/simethicone Suspension 30 milliLiter(s) Oral every 4 hours PRN Dyspepsia  artificial tears (preservative free) Ophthalmic Solution 1 Drop(s) Both EYES four times a day PRN Dry Eyes  dextrose Oral Gel 15 Gram(s) Oral once PRN Blood Glucose LESS THAN 70 milliGRAM(s)/deciliter  morphine   Solution 2.5 milliGRAM(s) Oral every 4 hours PRN Moderate Pain (4 - 6)  morphine   Solution 5 milliGRAM(s) Oral every 4 hours PRN Severe Pain (7 - 10)  naloxone Injectable 0.1 milliGRAM(s) IV Push every 3 minutes PRN For ANY of the following changes in patient status:  A. RR LESS THAN 10 breaths per minute, B. Oxygen saturation LESS THAN 90%, C. Sedation score of 6  polyethylene glycol 3350 17 Gram(s) Oral daily PRN Constipation        Vital Signs Last 24 Hrs  T(C): 36.9 (08 Nov 2024 04:00), Max: 36.9 (08 Nov 2024 04:00)  T(F): 98.5 (08 Nov 2024 04:00), Max: 98.5 (08 Nov 2024 04:00)  HR: 100 (08 Nov 2024 04:00) (91 - 102)  BP: 106/78 (08 Nov 2024 04:00) (101/65 - 126/83)  BP(mean): --  RR: 18 (08 Nov 2024 04:00) (16 - 18)  SpO2: 100% (08 Nov 2024 04:00) (95% - 100%)    Parameters below as of 08 Nov 2024 04:00  Patient On (Oxygen Delivery Method): room air        PE  NAD  Awake, alert  Anicteric, MMM  RRR  CTAB  Abd soft, NT, ND  No c/c/e  No rash grossly                            7.8    4.91  )-----------( 138      ( 07 Nov 2024 05:51 )             24.1       11-07    140  |  107  |  5[L]  ----------------------------<  73  3.0[L]   |  23  |  0.30[L]    Ca    7.2[L]      07 Nov 2024 05:51  Phos  2.7     11-07  Mg     1.70     11-07    TPro  4.4[L]  /  Alb  1.9[L]  /  TBili  0.5  /  DBili  x   /  AST  33  /  ALT  17  /  AlkPhos  156[H]  11-07

## 2024-11-08 NOTE — PROGRESS NOTE ADULT - ASSESSMENT
64-year-old male with metastatic prostate cancer, sent in by hematologist from [facility] for uncontrolled pain, nausea, vomiting.   Patient reports diffuse pain starting 6 months ago significantly worsening for the past 2 weeks.  Currently the worst pain is located around the lower back.   No loss of bladder and bowel function, no saddle paresthesia.  Able to walk.  patient is oxycodone 5 every 4-6 hour.  Yesterday they started adding OxyContin 10.  However patient continued to have pain.  Family also reports significant nausea and vomiting, inability to tolerate p.o. for the last 2 days.  Last bowel movement 2 days ago.   No known allergy.  Diagnosed with high risk high volume metastatic prostate cancer with bone, liver lymph node mets and presacral mass. Liver biopsy confirmed disease with . Started lupron, loly/pred with plans to initiate taxotere.    (12 Oct 2024 15:40)  pt seems very frustrated:   he is on 2 L of oxygen : he has no underlying lung disease:  but he is requiring 2 L of oxygen      Hypoxic resp failure  Metastatic prostate cancer  Back pain     Hypoxic resp failure  -He has no underlying lung disease:  but he is requiring 2 L of oxygen :   -CTA showed; No pulmonary embolism to the level of the segmental branches bilaterally. Limited evaluation of the subsegmental branches secondary to incomplete contrast opacification.  Multilevel vertebral body lytic lesions and loss of vertebral body height, better evaluated on CT thoracic spine 10/17/2024. Metastatic prostate cancer  -Patent central airways. Bibasilar atelectasis. No pleural effusion. MEDIASTINUM AND KATTY: No lymphadenopathy.  PULMONARY ANGIOGRAM: No pulmonary embolism to the level of the segmental   branches bilaterally. Limited evaluation of the subsegmental branches secondary to incomplete contrast opacification.    10/19:  -seems pretty tired;  on 2 L of oxygen :  -cta chest showed: No pulmonary embolism to the level of the segmental branches bilaterally. Limited evaluation of the subsegmental branches secondary to incomplete   contrast opacification. Multilevel vertebral body lytic lesions and loss of vertebral body height, better evaluated on CT thoracic spine 10/17/2024.  -still with fever:  no pe  on zosyn  and leukopenic hemonc following;  has metastatic prostate ca:   -VBG seems OK:     10/20:  pulm wise he seems to be stable:   -using 2 L of oxygen    -yesterdays VBG with minimal met acidosis  -cta again noted    10/21:  on 4 L of oxygen  today    cxr is size    e coli sepsis: Gram Negative Rods and Gram Negative Coccobacilli    10/22: heis doing poorly today  : he is very agitated and uncomfortable  on mittens: ? sun downing   10/23: quiet today  : sleeping:  oxygen requirement has not increased: on rooo ha 95% as documented    WBC is slowly increasing:   no fever:   -on antibiotics   10/24: pt is not doing well : his repeat blood cultures are positive with same organism :  would defer to ID;   10/25: seems to be doing  ok ; no sob:  no cough :  no phlegm   : on room air   10/26: resp failure has resolved:  is septic  : on ceftriaxone     10/27:  he seems OK:  he is on room air:  on morphine drip   remans on ceftriaxone still     10/28: she seems to be doing  ok : no sob: no cough ; no phlegm  confused:  on morphine drip    10/29; seems comfortable on room air;   cont current rx:   10/30: seems to be doing  ok ; on morphine drip : : plan to decrease morphine dose:   -cont current supportive care   10/31:  seems same tome:   no sob:  on room air:   seems comfortable at this ti me: off morphine  11/1:  he has been on room air for days;  looks comfortable:  coughs when he eats;   need pt ot  ; ? oob to chair:  dw wife:   11/3:  he seems stable:   on room air:  responding to questions pretty well : has bleeding lower lip : not Much through ? secondary to dryness:   -resp wise seems pretty good:  no sob:    114:  -seems pretty good:  no sob:  on room air  -has iral ulceration:  bleeding:  not much though : cont magic mouth wash   11/5:  -he seems  pretty good:   on room air:   no sob:  no cough :  no phlegm : difficulty in eating secondary to mucositis:  cont current rx:     11/6: seems OK:   no sob:   on room air:  now again kyphoplasty being considered    11/7:  -he seems same:  no sob:  on room air:   for possible kyphoplasty      11/8: now kyphoplasty next Wednesday   he is weak but looks good:  on rooma ir     New finding:  SMA dissection : neutropenic colitis/ #E Coli and H influenzae bacteremia/ #Neutropenic fever  -/f ischemic colitis on R colon   Diagnosed  on ct abd:  defer to surgery and primary team:  probably no intervention:   -cont antibiotics  -not doing very well with above new findings    10/22; no intervention per surgery    -cont antibiotics  -has fever:  ID following :  -Last chest xray on 20th  ; normal     id following   10/23  IR was consulted for vertebral augmentation of T3-T5 prior to XRT.   Patient was seen bedside. Initially, patient kept requesting no exam and was not willing to participate in the discussion. Son was bedside at time of conversation.   Per discussion with the medical attending, given the concern for SMA dissection and concern for bowel ischemia, will hold off on vertebral augmentation evaluation at this time.   Please reach out to IR when patient is medically stable for vertebral augmentation.  10/24:  remains septic:  above cancelled:   ? for palliative care now  10/26: he seems same to me:  no overnight events  on room air:   check VBG  : cont antibiotics   hemonc following   10/27:  -still on antibiotics for e coli sepsis  -id following s  10/28: cont antibiotics   10/230: cont ceftriaxone   10/31: on ceftriaxone and flagyl   11/1: antibiotics per ID   11/3: cont antibiotics : IR note reviewed:  no kyphoplasty at this time    11/4: cont antibiotics per ID   11/5: on cefepime:  id following   11/6: on cefepime and flagyl!  11/7: off antibiotics now:   11/8: Completed antibiotics     Back pain   -likely due to metastatic disease:  adm here for severe pain :  -pain control per primary team   -venous ph is ok   11/1: resolved for now on meds  11/3: pain control : no kyphoplasty at this ti me per ir note:   11/4: as above  11/6: for possible kyphoplasty   now   11/7: for kyphoplasty now:     dior acp; GOC as per primary  team

## 2024-11-08 NOTE — PROGRESS NOTE ADULT - SUBJECTIVE AND OBJECTIVE BOX
Patient is a 64y old  Male who presents with a chief complaint of Pain (08 Nov 2024 14:00)    Date of servie : 11-08-24 @ 14:19  INTERVAL HPI/OVERNIGHT EVENTS:  T(C): 36.7 (11-08-24 @ 11:00), Max: 36.9 (11-08-24 @ 04:00)  HR: 95 (11-08-24 @ 11:00) (93 - 100)  BP: 123/77 (11-08-24 @ 11:00) (101/65 - 126/83)  RR: 18 (11-08-24 @ 11:00) (16 - 18)  SpO2: 97% (11-08-24 @ 11:00) (95% - 100%)  Wt(kg): --  I&O's Summary      LABS:                        7.3    6.44  )-----------( 165      ( 08 Nov 2024 06:50 )             22.5     11-08    139  |  108[H]  |  5[L]  ----------------------------<  83  3.5   |  23  |  0.28[L]    Ca    7.4[L]      08 Nov 2024 06:50  Phos  2.0     11-08  Mg     1.70     11-08    TPro  4.3[L]  /  Alb  1.9[L]  /  TBili  0.5  /  DBili  x   /  AST  31  /  ALT  14  /  AlkPhos  172[H]  11-08      Urinalysis Basic - ( 08 Nov 2024 06:50 )    Color: x / Appearance: x / SG: x / pH: x  Gluc: 83 mg/dL / Ketone: x  / Bili: x / Urobili: x   Blood: x / Protein: x / Nitrite: x   Leuk Esterase: x / RBC: x / WBC x   Sq Epi: x / Non Sq Epi: x / Bacteria: x      CAPILLARY BLOOD GLUCOSE      POCT Blood Glucose.: 95 mg/dL (08 Nov 2024 12:35)  POCT Blood Glucose.: 89 mg/dL (08 Nov 2024 08:55)  POCT Blood Glucose.: 125 mg/dL (07 Nov 2024 20:16)  POCT Blood Glucose.: 100 mg/dL (07 Nov 2024 17:48)        Urinalysis Basic - ( 08 Nov 2024 06:50 )    Color: x / Appearance: x / SG: x / pH: x  Gluc: 83 mg/dL / Ketone: x  / Bili: x / Urobili: x   Blood: x / Protein: x / Nitrite: x   Leuk Esterase: x / RBC: x / WBC x   Sq Epi: x / Non Sq Epi: x / Bacteria: x        MEDICATIONS  (STANDING):  abiraterone 500 mg tablet 2 Tablet(s) 2 Tablet(s) Oral daily  acetaminophen     Tablet .. 650 milliGRAM(s) Oral once  atorvastatin 20 milliGRAM(s) Oral at bedtime  chlorhexidine 2% Cloths 1 Application(s) Topical daily  dextrose 5% 1000 milliLiter(s) (75 mL/Hr) IV Continuous <Continuous>  dextrose 5%. 1000 milliLiter(s) (50 mL/Hr) IV Continuous <Continuous>  dextrose 5%. 1000 milliLiter(s) (100 mL/Hr) IV Continuous <Continuous>  dextrose 50% Injectable 12.5 Gram(s) IV Push once  dextrose 50% Injectable 25 Gram(s) IV Push once  dextrose 50% Injectable 25 Gram(s) IV Push once  diphenhydrAMINE 25 milliGRAM(s) Oral once  fenofibrate Tablet 145 milliGRAM(s) Oral daily  FIRST- Mouthwash  BLM 30 milliLiter(s) Swish and Spit three times a day  glucagon  Injectable 1 milliGRAM(s) IntraMuscular once  haloperidol     Tablet 1 milliGRAM(s) Oral <User Schedule>  influenza  Vaccine (HIGH DOSE) 0.5 milliLiter(s) IntraMuscular once  insulin lispro (ADMELOG) corrective regimen sliding scale   SubCutaneous three times a day before meals  insulin lispro (ADMELOG) corrective regimen sliding scale   SubCutaneous at bedtime  lactated ringers 1000 milliLiter(s) (75 mL/Hr) IV Continuous <Continuous>  lactobacillus acidophilus 1 Tablet(s) Oral daily  lidocaine   4% Patch 1 Patch Transdermal every 24 hours  melatonin 6 milliGRAM(s) Oral at bedtime  multivitamin 1 Tablet(s) Oral daily  Nephro-nancy 1 Tablet(s) Oral daily  nystatin    Suspension 056950 Unit(s) Oral three times a day  pantoprazole  Injectable 40 milliGRAM(s) IV Push daily  predniSONE   Tablet 5 milliGRAM(s) Oral daily  sodium phosphate 15 milliMole(s)/250 mL IVPB 15 milliMole(s) IV Intermittent once    MEDICATIONS  (PRN):  acetaminophen     Tablet .. 650 milliGRAM(s) Oral every 6 hours PRN Temp greater or equal to 38C (100.4F), Mild Pain (1 - 3), Moderate Pain (4 - 6)  aluminum hydroxide/magnesium hydroxide/simethicone Suspension 30 milliLiter(s) Oral every 4 hours PRN Dyspepsia  artificial tears (preservative free) Ophthalmic Solution 1 Drop(s) Both EYES four times a day PRN Dry Eyes  dextrose Oral Gel 15 Gram(s) Oral once PRN Blood Glucose LESS THAN 70 milliGRAM(s)/deciliter  morphine   Solution 2.5 milliGRAM(s) Oral every 4 hours PRN Moderate Pain (4 - 6)  morphine   Solution 5 milliGRAM(s) Oral every 4 hours PRN Severe Pain (7 - 10)  naloxone Injectable 0.1 milliGRAM(s) IV Push every 3 minutes PRN For ANY of the following changes in patient status:  A. RR LESS THAN 10 breaths per minute, B. Oxygen saturation LESS THAN 90%, C. Sedation score of 6  polyethylene glycol 3350 17 Gram(s) Oral daily PRN Constipation          PHYSICAL EXAM:  GENERAL: NAD, well-groomed, well-developed  HEAD:  Atraumatic, Normocephalic  CHEST/LUNG: Clear to percussion bilaterally; No rales, rhonchi, wheezing, or rubs  HEART: Regular rate and rhythm; No murmurs, rubs, or gallops  ABDOMEN: Soft, Nontender, Nondistended; Bowel sounds present  EXTREMITIES:  2+ Peripheral Pulses, No clubbing, cyanosis, or edema  LYMPH: No lymphadenopathy noted  SKIN: No rashes or lesions    Care Discussed with Consultants/Other Providers [ ] YES  [ ] NO

## 2024-11-08 NOTE — PROGRESS NOTE ADULT - ASSESSMENT
64-year-old male with metastatic prostate cancer, sent in by hematologist from [facility] and cancer for uncontrolled pain, nausea, vomiting.       Problem/Plan - 1:  ·  Problem: Cancer related pain.   ·  Plan: - appreciate pall care recommendations:  - pain control as per palliative crae   - RT consult appreciated ,  awaiting kyphoplasty   - IR  kyphoplasty pending for tuesday     Problem/Plan - 2:·  Problem: Nausea & vomiting., Diarrhea, colitis , SMA dissection   ·  Plan: -  vascular fu appreciated  - GI and surgery consult appreciated   - ID fu  - finished abx  - ni intervention as per vascular     Problem/Plan - 3:  ·  Problem: CA of prostate.   ·  Plan: Metastatic prostate cancer  - f/u heme/onc recommendations  - Rad Onc fu   - Palliative for symptom control.    Problem/Plan - 4:  ·  Problem: Anemia , Thrombocytopenia, unspecified.   ·  Plan: -monitor cbc   transfuse PRN    Problem/Plan - 5:  ·  Problem: Bacteremia   Plan: - ID fu   - finished  ABX as per ID     dw wife at bedside

## 2024-11-08 NOTE — PROGRESS NOTE ADULT - ASSESSMENT
This is a 65 y/o M w/ prostate CA s/p radical prostatectomy now metastatic, admitted 10/12 for pain, N/V in setting of osseous mets. S/p docetaxel on 10/13, abiraterone 10/12.  Found to be neutropenic- started on Zarxio.  C/b hypoxia, tachycardia, fever, diarrhea on 10/18. C diff negative,  CT A/P with pancolitis, SMA dissection c/f ischemic colitis.  Started on Vancomycin/Zosyn for suspicion neutropenic enterocolitis     Blood cultures 10/19 grew E coli and H influenza   Blood culture 10/22 +  Blood cultures cleared 10/24    #Neutropenic fever resolved   afebrile   ANC 3.0 on 11/6  #E Coli and H influenzae bacteremia 10/16  likely due to neutropenic enterocolitis    cleared 10/24  #SMA dissection, c/f ischemic colitis   #Diarrhea   GI PCR 11/1 not detected improving       Recommendations:     observe off antibiotics for now     will d/c f/u   please call if any questions

## 2024-11-09 LAB
ALBUMIN SERPL ELPH-MCNC: 1.8 G/DL — LOW (ref 3.3–5)
ALP SERPL-CCNC: 178 U/L — HIGH (ref 40–120)
ALT FLD-CCNC: 15 U/L — SIGNIFICANT CHANGE UP (ref 4–41)
ANION GAP SERPL CALC-SCNC: 10 MMOL/L — SIGNIFICANT CHANGE UP (ref 7–14)
AST SERPL-CCNC: 29 U/L — SIGNIFICANT CHANGE UP (ref 4–40)
BILIRUB SERPL-MCNC: 0.5 MG/DL — SIGNIFICANT CHANGE UP (ref 0.2–1.2)
BUN SERPL-MCNC: 4 MG/DL — LOW (ref 7–23)
CALCIUM SERPL-MCNC: 7.2 MG/DL — LOW (ref 8.4–10.5)
CHLORIDE SERPL-SCNC: 103 MMOL/L — SIGNIFICANT CHANGE UP (ref 98–107)
CO2 SERPL-SCNC: 24 MMOL/L — SIGNIFICANT CHANGE UP (ref 22–31)
CREAT SERPL-MCNC: 0.28 MG/DL — LOW (ref 0.5–1.3)
EGFR: 135 ML/MIN/1.73M2 — SIGNIFICANT CHANGE UP
GLUCOSE BLDC GLUCOMTR-MCNC: 111 MG/DL — HIGH (ref 70–99)
GLUCOSE BLDC GLUCOMTR-MCNC: 125 MG/DL — HIGH (ref 70–99)
GLUCOSE BLDC GLUCOMTR-MCNC: 90 MG/DL — SIGNIFICANT CHANGE UP (ref 70–99)
GLUCOSE BLDC GLUCOMTR-MCNC: 95 MG/DL — SIGNIFICANT CHANGE UP (ref 70–99)
GLUCOSE SERPL-MCNC: 81 MG/DL — SIGNIFICANT CHANGE UP (ref 70–99)
HCT VFR BLD CALC: 23.3 % — LOW (ref 39–50)
HGB BLD-MCNC: 7.4 G/DL — LOW (ref 13–17)
MAGNESIUM SERPL-MCNC: 1.7 MG/DL — SIGNIFICANT CHANGE UP (ref 1.6–2.6)
MCHC RBC-ENTMCNC: 30 PG — SIGNIFICANT CHANGE UP (ref 27–34)
MCHC RBC-ENTMCNC: 31.8 G/DL — LOW (ref 32–36)
MCV RBC AUTO: 94.3 FL — SIGNIFICANT CHANGE UP (ref 80–100)
NRBC # BLD: 0 /100 WBCS — SIGNIFICANT CHANGE UP (ref 0–0)
NRBC # FLD: 0.02 K/UL — HIGH (ref 0–0)
PHOSPHATE SERPL-MCNC: 2.3 MG/DL — LOW (ref 2.5–4.5)
PLATELET # BLD AUTO: 166 K/UL — SIGNIFICANT CHANGE UP (ref 150–400)
POTASSIUM SERPL-MCNC: 3.3 MMOL/L — LOW (ref 3.5–5.3)
POTASSIUM SERPL-SCNC: 3.3 MMOL/L — LOW (ref 3.5–5.3)
PROT SERPL-MCNC: 4.5 G/DL — LOW (ref 6–8.3)
RBC # BLD: 2.47 M/UL — LOW (ref 4.2–5.8)
RBC # FLD: 19.4 % — HIGH (ref 10.3–14.5)
SODIUM SERPL-SCNC: 137 MMOL/L — SIGNIFICANT CHANGE UP (ref 135–145)
WBC # BLD: 6.17 K/UL — SIGNIFICANT CHANGE UP (ref 3.8–10.5)
WBC # FLD AUTO: 6.17 K/UL — SIGNIFICANT CHANGE UP (ref 3.8–10.5)

## 2024-11-09 RX ORDER — TAMSULOSIN HYDROCHLORIDE 0.4 MG/1
0.8 CAPSULE ORAL AT BEDTIME
Refills: 0 | Status: DISCONTINUED | OUTPATIENT
Start: 2024-11-09 | End: 2024-11-18

## 2024-11-09 RX ORDER — ENOXAPARIN SODIUM 30 MG/.3ML
40 INJECTION SUBCUTANEOUS EVERY 24 HOURS
Refills: 0 | Status: DISCONTINUED | OUTPATIENT
Start: 2024-11-09 | End: 2024-11-18

## 2024-11-09 RX ADMIN — Medication 75 MILLILITER(S): at 07:55

## 2024-11-09 RX ADMIN — NYSTATIN 500000 UNIT(S): 500000 TABLET, FILM COATED ORAL at 05:51

## 2024-11-09 RX ADMIN — Medication 1 TABLET(S): at 13:55

## 2024-11-09 RX ADMIN — CHLORHEXIDINE GLUCONATE 1 APPLICATION(S): 1.2 RINSE ORAL at 13:53

## 2024-11-09 RX ADMIN — Medication 145 MILLIGRAM(S): at 13:55

## 2024-11-09 RX ADMIN — NYSTATIN 500000 UNIT(S): 500000 TABLET, FILM COATED ORAL at 13:53

## 2024-11-09 RX ADMIN — DIPHENHYDRAMINE HYDROCHLORIDE AND LIDOCAINE HYDROCHLORIDE AND ALUMINUM HYDROXIDE AND MAGNESIUM HYDRO 30 MILLILITER(S): KIT at 13:53

## 2024-11-09 RX ADMIN — PREDNISONE 5 MILLIGRAM(S): 20 TABLET ORAL at 05:51

## 2024-11-09 RX ADMIN — ACETAMINOPHEN, DIPHENHYDRAMINE HCL, PHENYLEPHRINE HCL 6 MILLIGRAM(S): 325; 25; 5 TABLET ORAL at 21:43

## 2024-11-09 RX ADMIN — Medication 1 MILLIGRAM(S): at 21:43

## 2024-11-09 RX ADMIN — ENOXAPARIN SODIUM 40 MILLIGRAM(S): 30 INJECTION SUBCUTANEOUS at 16:13

## 2024-11-09 RX ADMIN — TAMSULOSIN HYDROCHLORIDE 0.8 MILLIGRAM(S): 0.4 CAPSULE ORAL at 16:13

## 2024-11-09 RX ADMIN — DIPHENHYDRAMINE HYDROCHLORIDE AND LIDOCAINE HYDROCHLORIDE AND ALUMINUM HYDROXIDE AND MAGNESIUM HYDRO 30 MILLILITER(S): KIT at 05:51

## 2024-11-09 RX ADMIN — Medication 1 TABLET(S): at 14:07

## 2024-11-09 RX ADMIN — NYSTATIN 500000 UNIT(S): 500000 TABLET, FILM COATED ORAL at 21:44

## 2024-11-09 RX ADMIN — Medication 20 MILLIGRAM(S): at 05:51

## 2024-11-09 RX ADMIN — PANTOPRAZOLE SODIUM 40 MILLIGRAM(S): 40 TABLET, DELAYED RELEASE ORAL at 13:53

## 2024-11-09 NOTE — PROGRESS NOTE ADULT - SUBJECTIVE AND OBJECTIVE BOX
Patient is a 64y old  Male who presents with a chief complaint of Pain (09 Nov 2024 15:06)    Date of servie : 11-09-24 @ 15:15  INTERVAL HPI/OVERNIGHT EVENTS:  T(C): 36.8 (11-09-24 @ 12:16), Max: 37 (11-09-24 @ 00:30)  HR: 92 (11-09-24 @ 12:16) (92 - 98)  BP: 124/83 (11-09-24 @ 12:16) (114/80 - 124/83)  RR: 17 (11-09-24 @ 12:16) (17 - 18)  SpO2: 95% (11-09-24 @ 12:16) (95% - 97%)  Wt(kg): --  I&O's Summary      LABS:                        7.4    6.17  )-----------( 166      ( 09 Nov 2024 05:51 )             23.3     11-09    137  |  103  |  4[L]  ----------------------------<  81  3.3[L]   |  24  |  0.28[L]    Ca    7.2[L]      09 Nov 2024 05:51  Phos  2.3     11-09  Mg     1.70     11-09    TPro  4.5[L]  /  Alb  1.8[L]  /  TBili  0.5  /  DBili  x   /  AST  29  /  ALT  15  /  AlkPhos  178[H]  11-09      Urinalysis Basic - ( 09 Nov 2024 05:51 )    Color: x / Appearance: x / SG: x / pH: x  Gluc: 81 mg/dL / Ketone: x  / Bili: x / Urobili: x   Blood: x / Protein: x / Nitrite: x   Leuk Esterase: x / RBC: x / WBC x   Sq Epi: x / Non Sq Epi: x / Bacteria: x      CAPILLARY BLOOD GLUCOSE      POCT Blood Glucose.: 95 mg/dL (09 Nov 2024 12:59)  POCT Blood Glucose.: 90 mg/dL (09 Nov 2024 08:45)  POCT Blood Glucose.: 101 mg/dL (08 Nov 2024 20:37)  POCT Blood Glucose.: 109 mg/dL (08 Nov 2024 17:57)        Urinalysis Basic - ( 09 Nov 2024 05:51 )    Color: x / Appearance: x / SG: x / pH: x  Gluc: 81 mg/dL / Ketone: x  / Bili: x / Urobili: x   Blood: x / Protein: x / Nitrite: x   Leuk Esterase: x / RBC: x / WBC x   Sq Epi: x / Non Sq Epi: x / Bacteria: x        MEDICATIONS  (STANDING):  abiraterone 500 mg tablet 2 Tablet(s) 2 Tablet(s) Oral daily  acetaminophen     Tablet .. 650 milliGRAM(s) Oral once  atorvastatin 20 milliGRAM(s) Oral at bedtime  chlorhexidine 2% Cloths 1 Application(s) Topical daily  dextrose 5% 1000 milliLiter(s) (75 mL/Hr) IV Continuous <Continuous>  dextrose 5%. 1000 milliLiter(s) (100 mL/Hr) IV Continuous <Continuous>  dextrose 5%. 1000 milliLiter(s) (50 mL/Hr) IV Continuous <Continuous>  dextrose 50% Injectable 12.5 Gram(s) IV Push once  dextrose 50% Injectable 25 Gram(s) IV Push once  dextrose 50% Injectable 25 Gram(s) IV Push once  diphenhydrAMINE 25 milliGRAM(s) Oral once  fenofibrate Tablet 145 milliGRAM(s) Oral daily  FIRST- Mouthwash  BLM 30 milliLiter(s) Swish and Spit three times a day  glucagon  Injectable 1 milliGRAM(s) IntraMuscular once  haloperidol     Tablet 1 milliGRAM(s) Oral <User Schedule>  influenza  Vaccine (HIGH DOSE) 0.5 milliLiter(s) IntraMuscular once  insulin lispro (ADMELOG) corrective regimen sliding scale   SubCutaneous three times a day before meals  insulin lispro (ADMELOG) corrective regimen sliding scale   SubCutaneous at bedtime  lactated ringers 1000 milliLiter(s) (75 mL/Hr) IV Continuous <Continuous>  lactobacillus acidophilus 1 Tablet(s) Oral daily  lidocaine   4% Patch 1 Patch Transdermal every 24 hours  melatonin 6 milliGRAM(s) Oral at bedtime  multivitamin 1 Tablet(s) Oral daily  Nephro-nancy 1 Tablet(s) Oral daily  nystatin    Suspension 456161 Unit(s) Oral three times a day  pantoprazole  Injectable 40 milliGRAM(s) IV Push daily  predniSONE   Tablet 5 milliGRAM(s) Oral daily    MEDICATIONS  (PRN):  acetaminophen     Tablet .. 650 milliGRAM(s) Oral every 6 hours PRN Temp greater or equal to 38C (100.4F), Mild Pain (1 - 3), Moderate Pain (4 - 6)  aluminum hydroxide/magnesium hydroxide/simethicone Suspension 30 milliLiter(s) Oral every 4 hours PRN Dyspepsia  artificial tears (preservative free) Ophthalmic Solution 1 Drop(s) Both EYES four times a day PRN Dry Eyes  dextrose Oral Gel 15 Gram(s) Oral once PRN Blood Glucose LESS THAN 70 milliGRAM(s)/deciliter  morphine   Solution 2.5 milliGRAM(s) Oral every 4 hours PRN Moderate Pain (4 - 6)  morphine   Solution 5 milliGRAM(s) Oral every 4 hours PRN Severe Pain (7 - 10)  naloxone Injectable 0.1 milliGRAM(s) IV Push every 3 minutes PRN For ANY of the following changes in patient status:  A. RR LESS THAN 10 breaths per minute, B. Oxygen saturation LESS THAN 90%, C. Sedation score of 6  polyethylene glycol 3350 17 Gram(s) Oral daily PRN Constipation          PHYSICAL EXAM:  GENERAL: NAD, well-groomed, well-developed  HEAD:  Atraumatic, Normocephalic  CHEST/LUNG: Clear to percussion bilaterally; No rales, rhonchi, wheezing, or rubs  HEART: Regular rate and rhythm; No murmurs, rubs, or gallops  ABDOMEN: Soft, Nontender, Nondistended; Bowel sounds present  EXTREMITIES:  2+ Peripheral Pulses, No clubbing, cyanosis, or edema  LYMPH: No lymphadenopathy noted  SKIN: No rashes or lesions    Care Discussed with Consultants/Other Providers [ ] YES  [ ] NO

## 2024-11-09 NOTE — PROVIDER CONTACT NOTE (OTHER) - ACTION/TREATMENT ORDERED:
re-inflate the balloon and deflate then remove. report if issue perists re-insert, re-inflate the balloon, deflate, and then remove. report if issue perists

## 2024-11-09 NOTE — PROGRESS NOTE ADULT - ASSESSMENT
64-year-old male with metastatic prostate cancer, sent in by hematologist from [facility] and cancer for uncontrolled pain, nausea, vomiting.       Problem/Plan - 1:  ·  Problem: Cancer related pain.   ·  Plan: - appreciate pall care recommendations:  - pain control as per palliative crae   - RT consult appreciated ,  awaiting kyphoplasty   - IR  kyphoplasty pending for tuesday     Problem/Plan - 2:·  Problem: Nausea & vomiting., Diarrhea, colitis , SMA dissection   ·  Plan: -  vascular fu appreciated  - GI and surgery consult appreciated   - ID fu  - finished abx  - ni intervention as per vascular     Problem/Plan - 3:  ·  Problem: CA of prostate.   ·  Plan: Metastatic prostate cancer  - f/u heme/onc recommendations  - Rad Onc fu   - Palliative for symptom control.    Problem/Plan - 4:  ·  Problem: Anemia , Thrombocytopenia, unspecified.   ·  Plan: -monitor cbc   transfuse PRN    Problem/Plan - 5:  ·  Problem: Bacteremia   Plan: - ID fu   - finished  ABX as per ID     dw wife at bedside     dc lau for TOV  dc rectal tube

## 2024-11-09 NOTE — PROGRESS NOTE ADULT - ASSESSMENT
64-year-old male with metastatic prostate cancer, sent in by hematologist from [facility] for uncontrolled pain, nausea, vomiting.   Patient reports diffuse pain starting 6 months ago significantly worsening for the past 2 weeks.  Currently the worst pain is located around the lower back.   No loss of bladder and bowel function, no saddle paresthesia.  Able to walk.  patient is oxycodone 5 every 4-6 hour.  Yesterday they started adding OxyContin 10.  However patient continued to have pain.  Family also reports significant nausea and vomiting, inability to tolerate p.o. for the last 2 days.  Last bowel movement 2 days ago.   No known allergy.  Diagnosed with high risk high volume metastatic prostate cancer with bone, liver lymph node mets and presacral mass. Liver biopsy confirmed disease with . Started lupron, loly/pred with plans to initiate taxotere.    (12 Oct 2024 15:40)  pt seems very frustrated:   he is on 2 L of oxygen : he has no underlying lung disease:  but he is requiring 2 L of oxygen      Hypoxic resp failure  Metastatic prostate cancer  Back pain     Hypoxic resp failure  -He has no underlying lung disease:  but he is requiring 2 L of oxygen :   -CTA showed; No pulmonary embolism to the level of the segmental branches bilaterally. Limited evaluation of the subsegmental branches secondary to incomplete contrast opacification.  Multilevel vertebral body lytic lesions and loss of vertebral body height, better evaluated on CT thoracic spine 10/17/2024. Metastatic prostate cancer  -Patent central airways. Bibasilar atelectasis. No pleural effusion. MEDIASTINUM AND KATTY: No lymphadenopathy.  PULMONARY ANGIOGRAM: No pulmonary embolism to the level of the segmental   branches bilaterally. Limited evaluation of the subsegmental branches secondary to incomplete contrast opacification.    10/19:  -seems pretty tired;  on 2 L of oxygen :  -cta chest showed: No pulmonary embolism to the level of the segmental branches bilaterally. Limited evaluation of the subsegmental branches secondary to incomplete   contrast opacification. Multilevel vertebral body lytic lesions and loss of vertebral body height, better evaluated on CT thoracic spine 10/17/2024.  -still with fever:  no pe  on zosyn  and leukopenic hemonc following;  has metastatic prostate ca:   -VBG seems OK:     10/20:  pulm wise he seems to be stable:   -using 2 L of oxygen    -yesterdays VBG with minimal met acidosis  -cta again noted    10/21:  on 4 L of oxygen  today    cxr is size    e coli sepsis: Gram Negative Rods and Gram Negative Coccobacilli    10/22: heis doing poorly today  : he is very agitated and uncomfortable  on mittens: ? sun downing   10/23: quiet today  : sleeping:  oxygen requirement has not increased: on rooo ha 95% as documented    WBC is slowly increasing:   no fever:   -on antibiotics   10/24: pt is not doing well : his repeat blood cultures are positive with same organism :  would defer to ID;   10/25: seems to be doing  ok ; no sob:  no cough :  no phlegm   : on room air   10/26: resp failure has resolved:  is septic  : on ceftriaxone     10/27:  he seems OK:  he is on room air:  on morphine drip   remans on ceftriaxone still     10/28: she seems to be doing  ok : no sob: no cough ; no phlegm  confused:  on morphine drip    10/29; seems comfortable on room air;   cont current rx:   10/30: seems to be doing  ok ; on morphine drip : : plan to decrease morphine dose:   -cont current supportive care   10/31:  seems same tome:   no sob:  on room air:   seems comfortable at this ti me: off morphine  11/1:  he has been on room air for days;  looks comfortable:  coughs when he eats;   need pt ot  ; ? oob to chair:  dw wife:   11/3:  he seems stable:   on room air:  responding to questions pretty well : has bleeding lower lip : not Much through ? secondary to dryness:   -resp wise seems pretty good:  no sob:    114:  -seems pretty good:  no sob:  on room air  -has iral ulceration:  bleeding:  not much though : cont magic mouth wash   11/5:  -he seems  pretty good:   on room air:   no sob:  no cough :  no phlegm : difficulty in eating secondary to mucositis:  cont current rx:     11/6: seems OK:   no sob:   on room air:  now again kyphoplasty being considered    11/7:  -he seems same:  no sob:  on room air:   for possible kyphoplasty      11/8: now kyphoplasty next Wednesday   he is weak but looks good:  on room air     11/9"  seems to be doing  ok :  no sob:  no cough ; no phlegm     New finding:  SMA dissection : neutropenic colitis/ #E Coli and H influenzae bacteremia/ #Neutropenic fever  -/f ischemic colitis on R colon   Diagnosed  on ct abd:  defer to surgery and primary team:  probably no intervention:   -cont antibiotics  -not doing very well with above new findings    10/22; no intervention per surgery    -cont antibiotics  -has fever:  ID following :  -Last chest xray on 20th  ; normal     id following   10/23  IR was consulted for vertebral augmentation of T3-T5 prior to XRT.   Patient was seen bedside. Initially, patient kept requesting no exam and was not willing to participate in the discussion. Son was bedside at time of conversation.   Per discussion with the medical attending, given the concern for SMA dissection and concern for bowel ischemia, will hold off on vertebral augmentation evaluation at this time.   Please reach out to IR when patient is medically stable for vertebral augmentation.  10/24:  remains septic:  above cancelled:   ? for palliative care now  10/26: he seems same to me:  no overnight events  on room air:   check VBG  : cont antibiotics   hemonc following   10/27:  -still on antibiotics for e coli sepsis  -id following s  10/28: cont antibiotics   10/230: cont ceftriaxone   10/31: on ceftriaxone and flagyl   11/1: antibiotics per ID   11/3: cont antibiotics : IR note reviewed:  no kyphoplasty at this time    11/4: cont antibiotics per ID   11/5: on cefepime:  id following   11/6: on cefepime and flagyl!  11/7: off antibiotics now:   11/8: Completed antibiotics   11/9: cont antibutics     Back pain   -likely due to metastatic disease:  adm here for severe pain :  -pain control per primary team   -venous ph is ok   11/1: resolved for now on meds  11/3: pain control : no kyphoplasty at this ti me per ir note:   11/4: as above  11/6: for possible kyphoplasty   now   11/7: for kyphoplasty now:     dior acp; GOC as per primary  team

## 2024-11-09 NOTE — PROGRESS NOTE ADULT - SUBJECTIVE AND OBJECTIVE BOX
RORY PEACE  64y  Patient is a 64y old  Male who presents with a chief complaint of Pain (09 Nov 2024 11:10)    HPI:  Followed for Hyponatremia after admission for Pain from metastatic Ca.    HEALTH ISSUES - PROBLEM Dx:  Cancer related pain    Hyperlipidemia    Benign essential HTN    CA of prostate    Nausea & vomiting    Thrombocytopenia, unspecified    Need for prophylactic measure    Debility    Encounter for palliative care    Counseling regarding goals of care    Trouble swallowing    Anxiety    Pancytopenia    Diarrhea    Advanced care planning/counseling discussion    Ischemic colitis    Hypernatremia    Agitation    Acute hyperglycemia    At risk of fracture due to osteoporosis          MEDICATIONS  (STANDING):  abiraterone 500 mg tablet 2 Tablet(s) 2 Tablet(s) Oral daily  acetaminophen     Tablet .. 650 milliGRAM(s) Oral once  atorvastatin 20 milliGRAM(s) Oral at bedtime  chlorhexidine 2% Cloths 1 Application(s) Topical daily  dextrose 5% 1000 milliLiter(s) (75 mL/Hr) IV Continuous <Continuous>  dextrose 5%. 1000 milliLiter(s) (50 mL/Hr) IV Continuous <Continuous>  dextrose 5%. 1000 milliLiter(s) (100 mL/Hr) IV Continuous <Continuous>  dextrose 50% Injectable 12.5 Gram(s) IV Push once  dextrose 50% Injectable 25 Gram(s) IV Push once  dextrose 50% Injectable 25 Gram(s) IV Push once  diphenhydrAMINE 25 milliGRAM(s) Oral once  fenofibrate Tablet 145 milliGRAM(s) Oral daily  FIRST- Mouthwash  BLM 30 milliLiter(s) Swish and Spit three times a day  glucagon  Injectable 1 milliGRAM(s) IntraMuscular once  haloperidol     Tablet 1 milliGRAM(s) Oral <User Schedule>  influenza  Vaccine (HIGH DOSE) 0.5 milliLiter(s) IntraMuscular once  insulin lispro (ADMELOG) corrective regimen sliding scale   SubCutaneous three times a day before meals  insulin lispro (ADMELOG) corrective regimen sliding scale   SubCutaneous at bedtime  lactated ringers 1000 milliLiter(s) (75 mL/Hr) IV Continuous <Continuous>  lactobacillus acidophilus 1 Tablet(s) Oral daily  lidocaine   4% Patch 1 Patch Transdermal every 24 hours  melatonin 6 milliGRAM(s) Oral at bedtime  multivitamin 1 Tablet(s) Oral daily  Nephro-nancy 1 Tablet(s) Oral daily  nystatin    Suspension [id number] Unit(s) Oral three times a day  pantoprazole  Injectable 40 milliGRAM(s) IV Push daily  predniSONE   Tablet 5 milliGRAM(s) Oral daily    MEDICATIONS  (PRN):  acetaminophen     Tablet .. 650 milliGRAM(s) Oral every 6 hours PRN Temp greater or equal to 38C (100.4F), Mild Pain (1 - 3), Moderate Pain (4 - 6)  aluminum hydroxide/magnesium hydroxide/simethicone Suspension 30 milliLiter(s) Oral every 4 hours PRN Dyspepsia  artificial tears (preservative free) Ophthalmic Solution 1 Drop(s) Both EYES four times a day PRN Dry Eyes  dextrose Oral Gel 15 Gram(s) Oral once PRN Blood Glucose LESS THAN 70 milliGRAM(s)/deciliter  morphine   Solution 2.5 milliGRAM(s) Oral every 4 hours PRN Moderate Pain (4 - 6)  morphine   Solution 5 milliGRAM(s) Oral every 4 hours PRN Severe Pain (7 - 10)  naloxone Injectable 0.1 milliGRAM(s) IV Push every 3 minutes PRN For ANY of the following changes in patient status:  A. RR LESS THAN 10 breaths per minute, B. Oxygen saturation LESS THAN 90%, C. Sedation score of 6  polyethylene glycol 3350 17 Gram(s) Oral daily PRN Constipation    Vital Signs Last 24 Hrs  T(C): 36.8 (09 Nov 2024 12:16), Max: 37 (09 Nov 2024 00:30)  T(F): 98.2 (09 Nov 2024 12:16), Max: 98.6 (09 Nov 2024 00:30)  HR: 92 (09 Nov 2024 12:16) (92 - 98)  BP: 124/83 (09 Nov 2024 12:16) (114/80 - 124/83)  BP(mean): --  RR: 17 (09 Nov 2024 12:16) (17 - 18)  SpO2: 95% (09 Nov 2024 12:16) (95% - 97%)    Parameters below as of 09 Nov 2024 08:30  Patient On (Oxygen Delivery Method): room air      Daily     Daily     PHYSICAL EXAM:  Constitutional: He appears comfortable and not distressed. Not diaphoretic.    Neck:  The thyroid is normal. Trachea is midline.     Breasts: Normal examination.    Respiratory: The lungs are clear to auscultation. No dullness and expansion is normal.    Cardiovascular: S1 and S2 are normal. No mummurs, rubs or gallops are present.    Gastrointestinal: The abdomen is soft. No tenderness is present. No masses are present. Bowel sounds are normal.    Genitourinary: The bladder is not distended. No CVA tenderness is present.    Extremities: No edema is noted. No deformities are present.    Neurological: Tone and power normal.    Skin: No lesions are seen  or palpated.    Lymph Nodes: No lymphadenopathy is present.                          7.4    6.17  )-----------( 166      ( 09 Nov 2024 05:51 )             23.3     11-09    137  |  103  |  4[L]  ----------------------------<  81  3.3[L]   |  24  |  0.28[L]    Ca    7.2[L]      09 Nov 2024 05:51  Phos  2.3     11-09  Mg     1.70     11-09    TPro  4.5[L]  /  Alb  1.8[L]  /  TBili  0.5  /  DBili  x   /  AST  29  /  ALT  15  /  AlkPhos  178[H]  11-09    Urinalysis Basic - ( 09 Nov 2024 05:51 )    Color: x / Appearance: x / SG: x / pH: x  Gluc: 81 mg/dL / Ketone: x  / Bili: x / Urobili: x   Blood: x / Protein: x / Nitrite: x   Leuk Esterase: x / RBC: x / WBC x   Sq Epi: x / Non Sq Epi: x / Bacteria: x

## 2024-11-09 NOTE — PROGRESS NOTE ADULT - ASSESSMENT
64-year-old male with metastatic prostate cancer, sent in by hematologist from [place] [facility] and cancer for uncontrolled pain, nausea, vomiting.   Patient reports diffuse pain starting 6 months ago significantly worsening for the past 2 weeks. Diagnosed with high risk high volume metastatic prostate cancer with bone, liver lymph node mets and presacral mass. Liver biopsy confirmed disease with . Started lupron, loly/pred with plans to initiate taxotere.   nephrology consulted for electrolyte abnormalities.    Hypernatremia  Poor free water intake    He now has diet however not eating much per family  Na is stable on LR +kcl 40meq  encourage free water as tolerated.  monitor Na.  Avoid overcorrection >8mEq in 24hrs.    Hypokalemia  GI loss and poor po intake  unable to tolerate PO kcl  still having diarrhea  stable continue LR +40meq kcl @ 75  monitor closely.    Acidosis   non AG  likely GI lost.  Hyperchloremic.  better  monitor CO2.    hypophosphatemia  supplemented k phos 15x1  monitor    hypocalcemia   sec to low albumin  corrected ca 8.5  monitor    fever  GNR bacteremia   work up and tx per team    anemia  metastatic prostate cancer  f/u heme/onc

## 2024-11-09 NOTE — PROGRESS NOTE ADULT - SUBJECTIVE AND OBJECTIVE BOX
Date of Service: 11-09-24 @ 11:11    Patient is a 64y old  Male who presents with a chief complaint of Pain (08 Nov 2024 14:18)      Any change in ROS: seems to be doing  ok : no sob:  no cough ; no fever on room air     MEDICATIONS  (STANDING):  abiraterone 500 mg tablet 2 Tablet(s) 2 Tablet(s) Oral daily  acetaminophen     Tablet .. 650 milliGRAM(s) Oral once  atorvastatin 20 milliGRAM(s) Oral at bedtime  chlorhexidine 2% Cloths 1 Application(s) Topical daily  dextrose 5% 1000 milliLiter(s) (75 mL/Hr) IV Continuous <Continuous>  dextrose 5%. 1000 milliLiter(s) (50 mL/Hr) IV Continuous <Continuous>  dextrose 5%. 1000 milliLiter(s) (100 mL/Hr) IV Continuous <Continuous>  dextrose 50% Injectable 12.5 Gram(s) IV Push once  dextrose 50% Injectable 25 Gram(s) IV Push once  dextrose 50% Injectable 25 Gram(s) IV Push once  diphenhydrAMINE 25 milliGRAM(s) Oral once  fenofibrate Tablet 145 milliGRAM(s) Oral daily  FIRST- Mouthwash  BLM 30 milliLiter(s) Swish and Spit three times a day  glucagon  Injectable 1 milliGRAM(s) IntraMuscular once  haloperidol     Tablet 1 milliGRAM(s) Oral <User Schedule>  influenza  Vaccine (HIGH DOSE) 0.5 milliLiter(s) IntraMuscular once  insulin lispro (ADMELOG) corrective regimen sliding scale   SubCutaneous three times a day before meals  insulin lispro (ADMELOG) corrective regimen sliding scale   SubCutaneous at bedtime  lactated ringers 1000 milliLiter(s) (75 mL/Hr) IV Continuous <Continuous>  lactobacillus acidophilus 1 Tablet(s) Oral daily  lidocaine   4% Patch 1 Patch Transdermal every 24 hours  melatonin 6 milliGRAM(s) Oral at bedtime  multivitamin 1 Tablet(s) Oral daily  Nephro-nancy 1 Tablet(s) Oral daily  nystatin    Suspension [id number] Unit(s) Oral three times a day  pantoprazole  Injectable 40 milliGRAM(s) IV Push daily  predniSONE   Tablet 5 milliGRAM(s) Oral daily    MEDICATIONS  (PRN):  acetaminophen     Tablet .. 650 milliGRAM(s) Oral every 6 hours PRN Temp greater or equal to 38C (100.4F), Mild Pain (1 - 3), Moderate Pain (4 - 6)  aluminum hydroxide/magnesium hydroxide/simethicone Suspension 30 milliLiter(s) Oral every 4 hours PRN Dyspepsia  artificial tears (preservative free) Ophthalmic Solution 1 Drop(s) Both EYES four times a day PRN Dry Eyes  dextrose Oral Gel 15 Gram(s) Oral once PRN Blood Glucose LESS THAN 70 milliGRAM(s)/deciliter  morphine   Solution 2.5 milliGRAM(s) Oral every 4 hours PRN Moderate Pain (4 - 6)  morphine   Solution 5 milliGRAM(s) Oral every 4 hours PRN Severe Pain (7 - 10)  naloxone Injectable 0.1 milliGRAM(s) IV Push every 3 minutes PRN For ANY of the following changes in patient status:  A. RR LESS THAN 10 breaths per minute, B. Oxygen saturation LESS THAN 90%, C. Sedation score of 6  polyethylene glycol 3350 17 Gram(s) Oral daily PRN Constipation    Vital Signs Last 24 Hrs  T(C): 36.7 (09 Nov 2024 08:30), Max: 37 (09 Nov 2024 00:30)  T(F): 98 (09 Nov 2024 08:30), Max: 98.6 (09 Nov 2024 00:30)  HR: 98 (09 Nov 2024 08:30) (93 - 98)  BP: 116/79 (09 Nov 2024 08:30) (114/80 - 122/84)  BP(mean): --  RR: 18 (09 Nov 2024 08:30) (18 - 18)  SpO2: 95% (09 Nov 2024 08:30) (95% - 97%)    Parameters below as of 09 Nov 2024 08:30  Patient On (Oxygen Delivery Method): room air        I&O's Summary        Physical Exam:   GENERAL: cacechxia  HEENT: CHAVA/   Atraumatic, Normocephalic  ENMT: No tonsillar erythema, exudates, or enlargement; Moist mucous membranes, Good dentition, No lesions  NECK: Supple, No JVD, Normal thyroid  CHEST/LUNG: Clear to auscultaion  CVS: Regular rate and rhythm; No murmurs, rubs, or gallops  GI: : Soft, Nontender, Nondistended; Bowel sounds present  NERVOUS SYSTEM:  Alert & Oriented X3  EXTREMITIES:  2+ Peripheral Pulses, No clubbing, cyanosis, or edema  LYMPH: No lymphadenopathy noted  SKIN: No rashes or lesions  ENDOCRINOLOGY: No Thyromegaly  PSYCH: Appropriate    Labs:                              7.4    6.17  )-----------( 166      ( 09 Nov 2024 05:51 )             23.3                         7.3    6.44  )-----------( 165      ( 08 Nov 2024 06:50 )             22.5                         7.8    4.91  )-----------( 138      ( 07 Nov 2024 05:51 )             24.1                         8.4    4.94  )-----------( 144      ( 06 Nov 2024 09:46 )             25.7                         6.9    3.85  )-----------( 129      ( 06 Nov 2024 06:00 )             21.8     11-09    137  |  103  |  4[L]  ----------------------------<  81  3.3[L]   |  24  |  0.28[L]  11-08    139  |  108[H]  |  5[L]  ----------------------------<  83  3.5   |  23  |  0.28[L]  11-07    140  |  107  |  5[L]  ----------------------------<  73  3.0[L]   |  23  |  0.30[L]  11-06    136  |  109[H]  |  6[L]  ----------------------------<  107[H]  4.1   |  20[L]  |  0.26[L]    Ca    7.2[L]      09 Nov 2024 05:51  Ca    7.4[L]      08 Nov 2024 06:50  Phos  2.3     11-09  Phos  2.0     11-08  Mg     1.70     11-09  Mg     1.70     11-08    TPro  4.5[L]  /  Alb  1.8[L]  /  TBili  0.5  /  DBili  x   /  AST  29  /  ALT  15  /  AlkPhos  178[H]  11-09  TPro  4.3[L]  /  Alb  1.9[L]  /  TBili  0.5  /  DBili  x   /  AST  31  /  ALT  14  /  AlkPhos  172[H]  11-08  TPro  4.4[L]  /  Alb  1.9[L]  /  TBili  0.5  /  DBili  x   /  AST  33  /  ALT  17  /  AlkPhos  156[H]  11-07  TPro  4.4[L]  /  Alb  1.8[L]  /  TBili  0.5  /  DBili  x   /  AST  43[H]  /  ALT  22  /  AlkPhos  143[H]  11-06    CAPILLARY BLOOD GLUCOSE      POCT Blood Glucose.: 90 mg/dL (09 Nov 2024 08:45)  POCT Blood Glucose.: 101 mg/dL (08 Nov 2024 20:37)  POCT Blood Glucose.: 109 mg/dL (08 Nov 2024 17:57)  POCT Blood Glucose.: 95 mg/dL (08 Nov 2024 12:35)      LIVER FUNCTIONS - ( 09 Nov 2024 05:51 )  Alb: 1.8 g/dL / Pro: 4.5 g/dL / ALK PHOS: 178 U/L / ALT: 15 U/L / AST: 29 U/L / GGT: x             Urinalysis Basic - ( 09 Nov 2024 05:51 )    Color: x / Appearance: x / SG: x / pH: x  Gluc: 81 mg/dL / Ketone: x  / Bili: x / Urobili: x   Blood: x / Protein: x / Nitrite: x   Leuk Esterase: x / RBC: x / WBC x   Sq Epi: x / Non Sq Epi: x / Bacteria: x    rad< from: Xray Chest 1 View- PORTABLE-Urgent (Xray Chest 1 View- PORTABLE-Urgent .) (10.31.24 @ 03:52) >    FINDINGS:  Both lungs are equally aerated and free of any focal abnormalities.  The heart is not enlarged and there is no effusion or pneumothorax.  The bones show no acute findings.      COMPARISON: October 20, 2024        IMPRESSION: Clear lungs.    --- End of Report ---            GOMEZ ARANDA MD; Attending Radiologist  This document has been electronically signed. Oct 31 2024 10:49AM    < end of copied text >          RECENT CULTURES:        RESPIRATORY CULTURES:          Studies  Chest X-RAY  CT SCAN Chest   Venous Dopplers: LE:   CT Abdomen  Others

## 2024-11-09 NOTE — PROGRESS NOTE ADULT - SUBJECTIVE AND OBJECTIVE BOX
INTERVAL HPI/OVERNIGHT EVENTS:  Patient S&E at bedside. No o/n events, patient resting comfortably. Planned for kyphoplasty next week.     VITAL SIGNS:  T(F): 98.2 (11-09-24 @ 12:16)  HR: 92 (11-09-24 @ 12:16)  BP: 124/83 (11-09-24 @ 12:16)  RR: 17 (11-09-24 @ 12:16)  SpO2: 95% (11-09-24 @ 12:16)  Wt(kg): --    PHYSICAL EXAM:    Constitutional: NAD  Eyes: EOMI, sclera non-icteric  Neck: supple, no masses  Respiratory: symmetric chest expansion   Cardiovascular: RRR, no M/R/G  Gastrointestinal: soft, NTND, no masses palpable, rectal tube in place   : lau in place   Extremities: no c/c/e  Neurological: AAOx3      MEDICATIONS  (STANDING):  abiraterone 500 mg tablet 2 Tablet(s) 2 Tablet(s) Oral daily  acetaminophen     Tablet .. 650 milliGRAM(s) Oral once  atorvastatin 20 milliGRAM(s) Oral at bedtime  chlorhexidine 2% Cloths 1 Application(s) Topical daily  dextrose 5% 1000 milliLiter(s) (75 mL/Hr) IV Continuous <Continuous>  dextrose 5%. 1000 milliLiter(s) (50 mL/Hr) IV Continuous <Continuous>  dextrose 5%. 1000 milliLiter(s) (100 mL/Hr) IV Continuous <Continuous>  dextrose 50% Injectable 25 Gram(s) IV Push once  dextrose 50% Injectable 12.5 Gram(s) IV Push once  dextrose 50% Injectable 25 Gram(s) IV Push once  diphenhydrAMINE 25 milliGRAM(s) Oral once  fenofibrate Tablet 145 milliGRAM(s) Oral daily  FIRST- Mouthwash  BLM 30 milliLiter(s) Swish and Spit three times a day  glucagon  Injectable 1 milliGRAM(s) IntraMuscular once  haloperidol     Tablet 1 milliGRAM(s) Oral <User Schedule>  influenza  Vaccine (HIGH DOSE) 0.5 milliLiter(s) IntraMuscular once  insulin lispro (ADMELOG) corrective regimen sliding scale   SubCutaneous at bedtime  insulin lispro (ADMELOG) corrective regimen sliding scale   SubCutaneous three times a day before meals  lactated ringers 1000 milliLiter(s) (75 mL/Hr) IV Continuous <Continuous>  lactobacillus acidophilus 1 Tablet(s) Oral daily  lidocaine   4% Patch 1 Patch Transdermal every 24 hours  melatonin 6 milliGRAM(s) Oral at bedtime  multivitamin 1 Tablet(s) Oral daily  Nephro-nancy 1 Tablet(s) Oral daily  nystatin    Suspension [id number] Unit(s) Oral three times a day  pantoprazole  Injectable 40 milliGRAM(s) IV Push daily  predniSONE   Tablet 5 milliGRAM(s) Oral daily    MEDICATIONS  (PRN):  acetaminophen     Tablet .. 650 milliGRAM(s) Oral every 6 hours PRN Temp greater or equal to 38C (100.4F), Mild Pain (1 - 3), Moderate Pain (4 - 6)  aluminum hydroxide/magnesium hydroxide/simethicone Suspension 30 milliLiter(s) Oral every 4 hours PRN Dyspepsia  artificial tears (preservative free) Ophthalmic Solution 1 Drop(s) Both EYES four times a day PRN Dry Eyes  dextrose Oral Gel 15 Gram(s) Oral once PRN Blood Glucose LESS THAN 70 milliGRAM(s)/deciliter  morphine   Solution 2.5 milliGRAM(s) Oral every 4 hours PRN Moderate Pain (4 - 6)  morphine   Solution 5 milliGRAM(s) Oral every 4 hours PRN Severe Pain (7 - 10)  naloxone Injectable 0.1 milliGRAM(s) IV Push every 3 minutes PRN For ANY of the following changes in patient status:  A. RR LESS THAN 10 breaths per minute, B. Oxygen saturation LESS THAN 90%, C. Sedation score of 6  polyethylene glycol 3350 17 Gram(s) Oral daily PRN Constipation      Allergies    No Known Allergies    Intolerances        LABS:                        7.4    6.17  )-----------( 166      ( 09 Nov 2024 05:51 )             23.3     11-09    137  |  103  |  4[L]  ----------------------------<  81  3.3[L]   |  24  |  0.28[L]    Ca    7.2[L]      09 Nov 2024 05:51  Phos  2.3     11-09  Mg     1.70     11-09    TPro  4.5[L]  /  Alb  1.8[L]  /  TBili  0.5  /  DBili  x   /  AST  29  /  ALT  15  /  AlkPhos  178[H]  11-09      Urinalysis Basic - ( 09 Nov 2024 05:51 )    Color: x / Appearance: x / SG: x / pH: x  Gluc: 81 mg/dL / Ketone: x  / Bili: x / Urobili: x   Blood: x / Protein: x / Nitrite: x   Leuk Esterase: x / RBC: x / WBC x   Sq Epi: x / Non Sq Epi: x / Bacteria: x

## 2024-11-10 LAB
ALBUMIN SERPL ELPH-MCNC: 2 G/DL — LOW (ref 3.3–5)
ALP SERPL-CCNC: 186 U/L — HIGH (ref 40–120)
ALT FLD-CCNC: 12 U/L — SIGNIFICANT CHANGE UP (ref 4–41)
ANION GAP SERPL CALC-SCNC: 8 MMOL/L — SIGNIFICANT CHANGE UP (ref 7–14)
AST SERPL-CCNC: 28 U/L — SIGNIFICANT CHANGE UP (ref 4–40)
BILIRUB SERPL-MCNC: 0.5 MG/DL — SIGNIFICANT CHANGE UP (ref 0.2–1.2)
BUN SERPL-MCNC: 5 MG/DL — LOW (ref 7–23)
CALCIUM SERPL-MCNC: 7.3 MG/DL — LOW (ref 8.4–10.5)
CHLORIDE SERPL-SCNC: 103 MMOL/L — SIGNIFICANT CHANGE UP (ref 98–107)
CO2 SERPL-SCNC: 24 MMOL/L — SIGNIFICANT CHANGE UP (ref 22–31)
CREAT SERPL-MCNC: 0.28 MG/DL — LOW (ref 0.5–1.3)
EGFR: 135 ML/MIN/1.73M2 — SIGNIFICANT CHANGE UP
GLUCOSE BLDC GLUCOMTR-MCNC: 102 MG/DL — HIGH (ref 70–99)
GLUCOSE BLDC GLUCOMTR-MCNC: 107 MG/DL — HIGH (ref 70–99)
GLUCOSE BLDC GLUCOMTR-MCNC: 136 MG/DL — HIGH (ref 70–99)
GLUCOSE BLDC GLUCOMTR-MCNC: 151 MG/DL — HIGH (ref 70–99)
GLUCOSE SERPL-MCNC: 86 MG/DL — SIGNIFICANT CHANGE UP (ref 70–99)
HCT VFR BLD CALC: 23 % — LOW (ref 39–50)
HGB BLD-MCNC: 7.4 G/DL — LOW (ref 13–17)
MAGNESIUM SERPL-MCNC: 1.7 MG/DL — SIGNIFICANT CHANGE UP (ref 1.6–2.6)
MCHC RBC-ENTMCNC: 30.6 PG — SIGNIFICANT CHANGE UP (ref 27–34)
MCHC RBC-ENTMCNC: 32.2 G/DL — SIGNIFICANT CHANGE UP (ref 32–36)
MCV RBC AUTO: 95 FL — SIGNIFICANT CHANGE UP (ref 80–100)
NRBC # BLD: 0 /100 WBCS — SIGNIFICANT CHANGE UP (ref 0–0)
NRBC # FLD: 0.03 K/UL — HIGH (ref 0–0)
PHOSPHATE SERPL-MCNC: 2.6 MG/DL — SIGNIFICANT CHANGE UP (ref 2.5–4.5)
PLATELET # BLD AUTO: 182 K/UL — SIGNIFICANT CHANGE UP (ref 150–400)
POTASSIUM SERPL-MCNC: 3.2 MMOL/L — LOW (ref 3.5–5.3)
POTASSIUM SERPL-SCNC: 3.2 MMOL/L — LOW (ref 3.5–5.3)
PROT SERPL-MCNC: 4.5 G/DL — LOW (ref 6–8.3)
RBC # BLD: 2.42 M/UL — LOW (ref 4.2–5.8)
RBC # FLD: 19.8 % — HIGH (ref 10.3–14.5)
SODIUM SERPL-SCNC: 135 MMOL/L — SIGNIFICANT CHANGE UP (ref 135–145)
WBC # BLD: 5.82 K/UL — SIGNIFICANT CHANGE UP (ref 3.8–10.5)
WBC # FLD AUTO: 5.82 K/UL — SIGNIFICANT CHANGE UP (ref 3.8–10.5)

## 2024-11-10 RX ADMIN — Medication 1 TABLET(S): at 12:06

## 2024-11-10 RX ADMIN — PANTOPRAZOLE SODIUM 40 MILLIGRAM(S): 40 TABLET, DELAYED RELEASE ORAL at 12:06

## 2024-11-10 RX ADMIN — Medication 2.5 MILLIGRAM(S): at 10:44

## 2024-11-10 RX ADMIN — Medication 20 MILLIGRAM(S): at 06:16

## 2024-11-10 RX ADMIN — NYSTATIN 500000 UNIT(S): 500000 TABLET, FILM COATED ORAL at 12:07

## 2024-11-10 RX ADMIN — DIPHENHYDRAMINE HYDROCHLORIDE AND LIDOCAINE HYDROCHLORIDE AND ALUMINUM HYDROXIDE AND MAGNESIUM HYDRO 30 MILLILITER(S): KIT at 21:23

## 2024-11-10 RX ADMIN — TAMSULOSIN HYDROCHLORIDE 0.8 MILLIGRAM(S): 0.4 CAPSULE ORAL at 21:23

## 2024-11-10 RX ADMIN — ACETAMINOPHEN, DIPHENHYDRAMINE HCL, PHENYLEPHRINE HCL 6 MILLIGRAM(S): 325; 25; 5 TABLET ORAL at 21:23

## 2024-11-10 RX ADMIN — Medication 25 GRAM(S): at 12:07

## 2024-11-10 RX ADMIN — CHLORHEXIDINE GLUCONATE 1 APPLICATION(S): 1.2 RINSE ORAL at 12:07

## 2024-11-10 RX ADMIN — Medication 1: at 18:33

## 2024-11-10 RX ADMIN — ENOXAPARIN SODIUM 40 MILLIGRAM(S): 30 INJECTION SUBCUTANEOUS at 17:04

## 2024-11-10 RX ADMIN — Medication 2.5 MILLIGRAM(S): at 11:45

## 2024-11-10 RX ADMIN — Medication 145 MILLIGRAM(S): at 12:06

## 2024-11-10 RX ADMIN — PREDNISONE 5 MILLIGRAM(S): 20 TABLET ORAL at 06:16

## 2024-11-10 RX ADMIN — DIPHENHYDRAMINE HYDROCHLORIDE AND LIDOCAINE HYDROCHLORIDE AND ALUMINUM HYDROXIDE AND MAGNESIUM HYDRO 30 MILLILITER(S): KIT at 12:19

## 2024-11-10 RX ADMIN — NYSTATIN 500000 UNIT(S): 500000 TABLET, FILM COATED ORAL at 06:16

## 2024-11-10 NOTE — PROGRESS NOTE ADULT - OPHTHALMOLOGIC
negative
Quality 111:Pneumonia Vaccination Status For Older Adults: Pneumococcal Vaccination Previously Received
Detail Level: Detailed
Quality 110: Preventive Care And Screening: Influenza Immunization: Influenza Immunization Administered during Influenza season

## 2024-11-10 NOTE — PROGRESS NOTE ADULT - SUBJECTIVE AND OBJECTIVE BOX
Patient is a 64y old  Male who presents with a chief complaint of Pain (10 Nov 2024 11:36)    Date of servie : 11-10-24 @ 13:45  INTERVAL HPI/OVERNIGHT EVENTS:  T(C): 36.8 (11-10-24 @ 12:14), Max: 36.8 (11-09-24 @ 16:18)  HR: 103 (11-10-24 @ 12:14) (100 - 107)  BP: 118/84 (11-10-24 @ 12:14) (113/70 - 139/82)  RR: 17 (11-10-24 @ 12:14) (17 - 18)  SpO2: 96% (11-10-24 @ 12:14) (96% - 98%)  Wt(kg): --  I&O's Summary    09 Nov 2024 07:01  -  10 Nov 2024 07:00  --------------------------------------------------------  IN: 720 mL / OUT: 550 mL / NET: 170 mL        LABS:                        7.4    5.82  )-----------( 182      ( 10 Nov 2024 06:50 )             23.0     11-10    135  |  103  |  5[L]  ----------------------------<  86  3.2[L]   |  24  |  0.28[L]    Ca    7.3[L]      10 Nov 2024 06:50  Phos  2.6     11-10  Mg     1.70     11-10    TPro  4.5[L]  /  Alb  2.0[L]  /  TBili  0.5  /  DBili  x   /  AST  28  /  ALT  12  /  AlkPhos  186[H]  11-10      Urinalysis Basic - ( 10 Nov 2024 06:50 )    Color: x / Appearance: x / SG: x / pH: x  Gluc: 86 mg/dL / Ketone: x  / Bili: x / Urobili: x   Blood: x / Protein: x / Nitrite: x   Leuk Esterase: x / RBC: x / WBC x   Sq Epi: x / Non Sq Epi: x / Bacteria: x      CAPILLARY BLOOD GLUCOSE      POCT Blood Glucose.: 107 mg/dL (10 Nov 2024 12:51)  POCT Blood Glucose.: 102 mg/dL (10 Nov 2024 08:55)  POCT Blood Glucose.: 111 mg/dL (09 Nov 2024 20:41)  POCT Blood Glucose.: 125 mg/dL (09 Nov 2024 18:08)        Urinalysis Basic - ( 10 Nov 2024 06:50 )    Color: x / Appearance: x / SG: x / pH: x  Gluc: 86 mg/dL / Ketone: x  / Bili: x / Urobili: x   Blood: x / Protein: x / Nitrite: x   Leuk Esterase: x / RBC: x / WBC x   Sq Epi: x / Non Sq Epi: x / Bacteria: x        MEDICATIONS  (STANDING):  abiraterone 500 mg tablet 2 Tablet(s) 2 Tablet(s) Oral daily  acetaminophen     Tablet .. 650 milliGRAM(s) Oral once  atorvastatin 20 milliGRAM(s) Oral at bedtime  chlorhexidine 2% Cloths 1 Application(s) Topical daily  dextrose 5% 1000 milliLiter(s) (75 mL/Hr) IV Continuous <Continuous>  dextrose 5%. 1000 milliLiter(s) (100 mL/Hr) IV Continuous <Continuous>  dextrose 5%. 1000 milliLiter(s) (50 mL/Hr) IV Continuous <Continuous>  dextrose 50% Injectable 25 Gram(s) IV Push once  dextrose 50% Injectable 12.5 Gram(s) IV Push once  dextrose 50% Injectable 25 Gram(s) IV Push once  diphenhydrAMINE 25 milliGRAM(s) Oral once  enoxaparin Injectable 40 milliGRAM(s) SubCutaneous every 24 hours  fenofibrate Tablet 145 milliGRAM(s) Oral daily  FIRST- Mouthwash  BLM 30 milliLiter(s) Swish and Spit three times a day  glucagon  Injectable 1 milliGRAM(s) IntraMuscular once  haloperidol     Tablet 1 milliGRAM(s) Oral <User Schedule>  influenza  Vaccine (HIGH DOSE) 0.5 milliLiter(s) IntraMuscular once  insulin lispro (ADMELOG) corrective regimen sliding scale   SubCutaneous three times a day before meals  insulin lispro (ADMELOG) corrective regimen sliding scale   SubCutaneous at bedtime  lactated ringers 1000 milliLiter(s) (75 mL/Hr) IV Continuous <Continuous>  lactobacillus acidophilus 1 Tablet(s) Oral daily  lidocaine   4% Patch 1 Patch Transdermal every 24 hours  melatonin 6 milliGRAM(s) Oral at bedtime  multivitamin 1 Tablet(s) Oral daily  Nephro-nancy 1 Tablet(s) Oral daily  nystatin    Suspension [id number] Unit(s) Oral three times a day  pantoprazole  Injectable 40 milliGRAM(s) IV Push daily  predniSONE   Tablet 5 milliGRAM(s) Oral daily  tamsulosin 0.8 milliGRAM(s) Oral at bedtime    MEDICATIONS  (PRN):  acetaminophen     Tablet .. 650 milliGRAM(s) Oral every 6 hours PRN Temp greater or equal to 38C (100.4F), Mild Pain (1 - 3), Moderate Pain (4 - 6)  aluminum hydroxide/magnesium hydroxide/simethicone Suspension 30 milliLiter(s) Oral every 4 hours PRN Dyspepsia  artificial tears (preservative free) Ophthalmic Solution 1 Drop(s) Both EYES four times a day PRN Dry Eyes  dextrose Oral Gel 15 Gram(s) Oral once PRN Blood Glucose LESS THAN 70 milliGRAM(s)/deciliter  morphine   Solution 2.5 milliGRAM(s) Oral every 4 hours PRN Moderate Pain (4 - 6)  morphine   Solution 5 milliGRAM(s) Oral every 4 hours PRN Severe Pain (7 - 10)  naloxone Injectable 0.1 milliGRAM(s) IV Push every 3 minutes PRN For ANY of the following changes in patient status:  A. RR LESS THAN 10 breaths per minute, B. Oxygen saturation LESS THAN 90%, C. Sedation score of 6  polyethylene glycol 3350 17 Gram(s) Oral daily PRN Constipation          PHYSICAL EXAM:  GENERAL: NAD, well-groomed, well-developed  HEAD:  Atraumatic, Normocephalic  CHEST/LUNG: Clear to percussion bilaterally; No rales, rhonchi, wheezing, or rubs  HEART: Regular rate and rhythm; No murmurs, rubs, or gallops  ABDOMEN: Soft, Nontender, Nondistended; Bowel sounds present  EXTREMITIES:  2+ Peripheral Pulses, No clubbing, cyanosis, or edema  LYMPH: No lymphadenopathy noted  SKIN: No rashes or lesions    Care Discussed with Consultants/Other Providers [ ] YES  [ ] NO

## 2024-11-10 NOTE — PROGRESS NOTE ADULT - SUBJECTIVE AND OBJECTIVE BOX
Date of Service: 11-10-24 @ 11:17    Patient is a 64y old  Male who presents with a chief complaint of Pain (09 Nov 2024 15:15)      Any change in ROS: Seems K:  no sob:       MEDICATIONS  (STANDING):  abiraterone 500 mg tablet 2 Tablet(s) 2 Tablet(s) Oral daily  acetaminophen     Tablet .. 650 milliGRAM(s) Oral once  atorvastatin 20 milliGRAM(s) Oral at bedtime  chlorhexidine 2% Cloths 1 Application(s) Topical daily  dextrose 5% 1000 milliLiter(s) (75 mL/Hr) IV Continuous <Continuous>  dextrose 5%. 1000 milliLiter(s) (100 mL/Hr) IV Continuous <Continuous>  dextrose 5%. 1000 milliLiter(s) (50 mL/Hr) IV Continuous <Continuous>  dextrose 50% Injectable 25 Gram(s) IV Push once  dextrose 50% Injectable 25 Gram(s) IV Push once  dextrose 50% Injectable 12.5 Gram(s) IV Push once  diphenhydrAMINE 25 milliGRAM(s) Oral once  enoxaparin Injectable 40 milliGRAM(s) SubCutaneous every 24 hours  fenofibrate Tablet 145 milliGRAM(s) Oral daily  FIRST- Mouthwash  BLM 30 milliLiter(s) Swish and Spit three times a day  glucagon  Injectable 1 milliGRAM(s) IntraMuscular once  haloperidol     Tablet 1 milliGRAM(s) Oral <User Schedule>  influenza  Vaccine (HIGH DOSE) 0.5 milliLiter(s) IntraMuscular once  insulin lispro (ADMELOG) corrective regimen sliding scale   SubCutaneous three times a day before meals  insulin lispro (ADMELOG) corrective regimen sliding scale   SubCutaneous at bedtime  lactated ringers 1000 milliLiter(s) (75 mL/Hr) IV Continuous <Continuous>  lactobacillus acidophilus 1 Tablet(s) Oral daily  lidocaine   4% Patch 1 Patch Transdermal every 24 hours  magnesium sulfate  IVPB 2 Gram(s) IV Intermittent once  melatonin 6 milliGRAM(s) Oral at bedtime  multivitamin 1 Tablet(s) Oral daily  Nephro-nancy 1 Tablet(s) Oral daily  nystatin    Suspension [id number] Unit(s) Oral three times a day  pantoprazole  Injectable 40 milliGRAM(s) IV Push daily  predniSONE   Tablet 5 milliGRAM(s) Oral daily  tamsulosin 0.8 milliGRAM(s) Oral at bedtime    MEDICATIONS  (PRN):  acetaminophen     Tablet .. 650 milliGRAM(s) Oral every 6 hours PRN Temp greater or equal to 38C (100.4F), Mild Pain (1 - 3), Moderate Pain (4 - 6)  aluminum hydroxide/magnesium hydroxide/simethicone Suspension 30 milliLiter(s) Oral every 4 hours PRN Dyspepsia  artificial tears (preservative free) Ophthalmic Solution 1 Drop(s) Both EYES four times a day PRN Dry Eyes  dextrose Oral Gel 15 Gram(s) Oral once PRN Blood Glucose LESS THAN 70 milliGRAM(s)/deciliter  morphine   Solution 2.5 milliGRAM(s) Oral every 4 hours PRN Moderate Pain (4 - 6)  morphine   Solution 5 milliGRAM(s) Oral every 4 hours PRN Severe Pain (7 - 10)  naloxone Injectable 0.1 milliGRAM(s) IV Push every 3 minutes PRN For ANY of the following changes in patient status:  A. RR LESS THAN 10 breaths per minute, B. Oxygen saturation LESS THAN 90%, C. Sedation score of 6  polyethylene glycol 3350 17 Gram(s) Oral daily PRN Constipation    Vital Signs Last 24 Hrs  T(C): 36.4 (10 Nov 2024 05:11), Max: 36.8 (09 Nov 2024 12:16)  T(F): 97.6 (10 Nov 2024 05:11), Max: 98.3 (09 Nov 2024 16:18)  HR: 100 (10 Nov 2024 05:11) (92 - 107)  BP: 139/82 (10 Nov 2024 05:11) (113/70 - 139/82)  BP(mean): --  RR: 17 (10 Nov 2024 05:11) (17 - 18)  SpO2: 97% (10 Nov 2024 05:11) (95% - 98%)    Parameters below as of 10 Nov 2024 05:11  Patient On (Oxygen Delivery Method): room air        I&O's Summary    09 Nov 2024 07:01  -  10 Nov 2024 07:00  --------------------------------------------------------  IN: 720 mL / OUT: 550 mL / NET: 170 mL          Physical Exam:   GENERAL: weak and cacehctic  HEENT: CHAVA/   Atraumatic, Normocephalic  ENMT: No tonsillar erythema, exudates, or enlargement; Moist mucous membranes, Good dentition, No lesions  NECK: Supple, No JVD, Normal thyroid  CHEST/LUNG: Clear to auscultaion, ; No rales, rhonchi, wheezing, or rubs  CVS: Regular rate and rhythm; No murmurs, rubs, or gallops  GI: : Soft, Nontender, Nondistended; Bowel sounds present  NERVOUS SYSTEM:  Alert & Oriented X3  EXTREMITIES:  - edema  LYMPH: No lymphadenopathy noted  SKIN: No rashes or lesions  ENDOCRINOLOGY: No Thyromegaly  PSYCH: Appropriate    Labs:                              7.4    5.82  )-----------( 182      ( 10 Nov 2024 06:50 )             23.0                         7.4    6.17  )-----------( 166      ( 09 Nov 2024 05:51 )             23.3                         7.3    6.44  )-----------( 165      ( 08 Nov 2024 06:50 )             22.5                         7.8    4.91  )-----------( 138      ( 07 Nov 2024 05:51 )             24.1     11-10    135  |  103  |  5[L]  ----------------------------<  86  3.2[L]   |  24  |  0.28[L]  11-09    137  |  103  |  4[L]  ----------------------------<  81  3.3[L]   |  24  |  0.28[L]  11-08    139  |  108[H]  |  5[L]  ----------------------------<  83  3.5   |  23  |  0.28[L]  11-07    140  |  107  |  5[L]  ----------------------------<  73  3.0[L]   |  23  |  0.30[L]    Ca    7.3[L]      10 Nov 2024 06:50  Ca    7.2[L]      09 Nov 2024 05:51  Phos  2.6     11-10  Phos  2.3     11-09  Mg     1.70     11-10  Mg     1.70     11-09    TPro  4.5[L]  /  Alb  2.0[L]  /  TBili  0.5  /  DBili  x   /  AST  28  /  ALT  12  /  AlkPhos  186[H]  11-10  TPro  4.5[L]  /  Alb  1.8[L]  /  TBili  0.5  /  DBili  x   /  AST  29  /  ALT  15  /  AlkPhos  178[H]  11-09  TPro  4.3[L]  /  Alb  1.9[L]  /  TBili  0.5  /  DBili  x   /  AST  31  /  ALT  14  /  AlkPhos  172[H]  11-08  TPro  4.4[L]  /  Alb  1.9[L]  /  TBili  0.5  /  DBili  x   /  AST  33  /  ALT  17  /  AlkPhos  156[H]  11-07    CAPILLARY BLOOD GLUCOSE      POCT Blood Glucose.: 102 mg/dL (10 Nov 2024 08:55)  POCT Blood Glucose.: 111 mg/dL (09 Nov 2024 20:41)  POCT Blood Glucose.: 125 mg/dL (09 Nov 2024 18:08)  POCT Blood Glucose.: 95 mg/dL (09 Nov 2024 12:59)      LIVER FUNCTIONS - ( 10 Nov 2024 06:50 )  Alb: 2.0 g/dL / Pro: 4.5 g/dL / ALK PHOS: 186 U/L / ALT: 12 U/L / AST: 28 U/L / GGT: x             Urinalysis Basic - ( 10 Nov 2024 06:50 )    Color: x / Appearance: x / SG: x / pH: x  Gluc: 86 mg/dL / Ketone: x  / Bili: x / Urobili: x   Blood: x / Protein: x / Nitrite: x   Leuk Esterase: x / RBC: x / WBC x   Sq Epi: x / Non Sq Epi: x / Bacteria: x        rad< from: Xray Chest 1 View- PORTABLE-Urgent (Xray Chest 1 View- PORTABLE-Urgent .) (10.31.24 @ 03:52) >    EXAM:  Frontal Chest    FINDINGS:  Both lungs are equally aerated and free of any focal abnormalities.  The heart is not enlarged and there is no effusion or pneumothorax.  The bones show no acute findings.      COMPARISON: October 20, 2024        IMPRESSION: Clear lungs.    --- End of Report ---            OGMEZ ARANDA MD; Attending Radiologist  This document has been electronically signed. Oct 31 2024 10:49AM    < end of copied text >      RECENT CULTURES:        RESPIRATORY CULTURES:          Studies  Chest X-RAY  CT SCAN Chest   Venous Dopplers: LE:   CT Abdomen  Others

## 2024-11-10 NOTE — PROGRESS NOTE ADULT - ASSESSMENT
1. Metastatic prostate cancer    - Follows with Dr Andrew Mendoza at [facility]   - PSMA 10/11/24 showed hypermetabolic vera foci above and below the diaphragm, foci in the liver and extensive foci involving the axial and appendicular skeleton compatible with metastatic disease  - --> 374--> 426 on 10/8/24   - Liver biopsy 9/30/24 consistent with prostate adenocarcinoma   - High risk high volume disease -> started on triplet therapy w/ ADT/lupron, abiraterone/prednisone + taxotere. (back pain after Lupron likely tumor flare).  - Continue abiraterone 1000mg daily w Prednisone 5mg PO QD   - s/p taxotere 60mg/m2 on 10/13/24. Next dose was planned for 11/4 , held for weakness, clinical progress  -Bacteremia cleared. Remains on abx. Clinically improving. GI PCR negative. plan for kyphoplasty on 11/12. IR recs appreciated   - Kyphoplasty w/IR to T3 and L1 lesions to be planned once he completes antibiotics  - Palliative following for pain control and given hospital course would have further GOC discussions. Treatment will be offered but he has metastatic disease with visceral involvement and complications as outlined below.     2. Pancolitis, E coli and H influenzae bacteremia  - continues on Cefepime+ Metronidazole, plan for Kypho once abx completed  - ID following  - BCx from 10/24 neg to date  - Rectal tube and lau catheter removed    3. Anemia/Thrombocytopenia   - Transfuse for Hgb < 7.0  - Multifactorial sec to likely marrow infiltration by CaP, Chemo effect, consumption from acute illness, poss ITP  - Transfuse for plt <15 or < 50K if bleeding/for procedure      4. SMA Dissection  [facility]  - no plan for intervention      Meera Trotter MD  Hematology/Oncology  [facility]  [phone] (Office)  [phone] (Alt office)  Evenings and weekends please call MD on call or office

## 2024-11-10 NOTE — PROGRESS NOTE ADULT - SUBJECTIVE AND OBJECTIVE BOX
RORY PEACE  64y  Patient is a 64y old  Male who presents with a chief complaint of Pain (10 Nov 2024 11:17)    HPI:      Followed for Hyponatremia after admission for pain.    HEALTH ISSUES - PROBLEM Dx:  Cancer related pain    Hyperlipidemia    Benign essential HTN    CA of prostate    Nausea & vomiting    Thrombocytopenia, unspecified    Need for prophylactic measure    Debility    Encounter for palliative care    Counseling regarding goals of care    Trouble swallowing    Anxiety    Pancytopenia    Diarrhea    Advanced care planning/counseling discussion    Ischemic colitis    Hypernatremia    Agitation    Acute hyperglycemia    At risk of fracture due to osteoporosis          MEDICATIONS  (STANDING):  abiraterone 500 mg tablet 2 Tablet(s) 2 Tablet(s) Oral daily  acetaminophen     Tablet .. 650 milliGRAM(s) Oral once  atorvastatin 20 milliGRAM(s) Oral at bedtime  chlorhexidine 2% Cloths 1 Application(s) Topical daily  dextrose 5% 1000 milliLiter(s) (75 mL/Hr) IV Continuous <Continuous>  dextrose 5%. 1000 milliLiter(s) (100 mL/Hr) IV Continuous <Continuous>  dextrose 5%. 1000 milliLiter(s) (50 mL/Hr) IV Continuous <Continuous>  dextrose 50% Injectable 25 Gram(s) IV Push once  dextrose 50% Injectable 12.5 Gram(s) IV Push once  dextrose 50% Injectable 25 Gram(s) IV Push once  diphenhydrAMINE 25 milliGRAM(s) Oral once  enoxaparin Injectable 40 milliGRAM(s) SubCutaneous every 24 hours  fenofibrate Tablet 145 milliGRAM(s) Oral daily  FIRST- Mouthwash  BLM 30 milliLiter(s) Swish and Spit three times a day  glucagon  Injectable 1 milliGRAM(s) IntraMuscular once  haloperidol     Tablet 1 milliGRAM(s) Oral <User Schedule>  influenza  Vaccine (HIGH DOSE) 0.5 milliLiter(s) IntraMuscular once  insulin lispro (ADMELOG) corrective regimen sliding scale   SubCutaneous at bedtime  insulin lispro (ADMELOG) corrective regimen sliding scale   SubCutaneous three times a day before meals  lactated ringers 1000 milliLiter(s) (75 mL/Hr) IV Continuous <Continuous>  lactobacillus acidophilus 1 Tablet(s) Oral daily  lidocaine   4% Patch 1 Patch Transdermal every 24 hours  magnesium sulfate  IVPB 2 Gram(s) IV Intermittent once  melatonin 6 milliGRAM(s) Oral at bedtime  multivitamin 1 Tablet(s) Oral daily  Nephro-nancy 1 Tablet(s) Oral daily  nystatin    Suspension 565720 Unit(s) Oral three times a day  pantoprazole  Injectable 40 milliGRAM(s) IV Push daily  predniSONE   Tablet 5 milliGRAM(s) Oral daily  tamsulosin 0.8 milliGRAM(s) Oral at bedtime    MEDICATIONS  (PRN):  acetaminophen     Tablet .. 650 milliGRAM(s) Oral every 6 hours PRN Temp greater or equal to 38C (100.4F), Mild Pain (1 - 3), Moderate Pain (4 - 6)  aluminum hydroxide/magnesium hydroxide/simethicone Suspension 30 milliLiter(s) Oral every 4 hours PRN Dyspepsia  artificial tears (preservative free) Ophthalmic Solution 1 Drop(s) Both EYES four times a day PRN Dry Eyes  dextrose Oral Gel 15 Gram(s) Oral once PRN Blood Glucose LESS THAN 70 milliGRAM(s)/deciliter  morphine   Solution 2.5 milliGRAM(s) Oral every 4 hours PRN Moderate Pain (4 - 6)  morphine   Solution 5 milliGRAM(s) Oral every 4 hours PRN Severe Pain (7 - 10)  naloxone Injectable 0.1 milliGRAM(s) IV Push every 3 minutes PRN For ANY of the following changes in patient status:  A. RR LESS THAN 10 breaths per minute, B. Oxygen saturation LESS THAN 90%, C. Sedation score of 6  polyethylene glycol 3350 17 Gram(s) Oral daily PRN Constipation    Vital Signs Last 24 Hrs  T(C): 36.4 (10 Nov 2024 05:11), Max: 36.8 (09 Nov 2024 12:16)  T(F): 97.6 (10 Nov 2024 05:11), Max: 98.3 (09 Nov 2024 16:18)  HR: 100 (10 Nov 2024 05:11) (92 - 107)  BP: 139/82 (10 Nov 2024 05:11) (113/70 - 139/82)  BP(mean): --  RR: 17 (10 Nov 2024 05:11) (17 - 18)  SpO2: 97% (10 Nov 2024 05:11) (95% - 98%)    Parameters below as of 10 Nov 2024 05:11  Patient On (Oxygen Delivery Method): room air      Daily     Daily     PHYSICAL EXAM:  Constitutional:  He appears comfortable and not distressed. Not diaphoretic.    Neck:  The thyroid is normal. Trachea is midline.     Respiratory: The lungs are clear to auscultation. No dullness and expansion is normal.    Cardiovascular: S1 and S2 are normal. No mummurs, rubs or gallops are present.    Gastrointestinal: The abdomen is soft. No tenderness is present. No masses are present. Bowel sounds are normal.    Genitourinary: The bladder is not distended. No CVA tenderness is present.    Extremities: No edema is noted. No deformities are present.    Neurological: Cognition is normal. Tone, power and sensation are normal. Gait is steady.    Skin: No leasions are seen  or palpated.    Lymph Nodes: No lymphadenopathy is present.    Psychiatric: Mood is appropriate. No hallucinations or flight of ideas are noted.                              7.4    5.82  )-----------( 182      ( 10 Nov 2024 06:50 )             23.0     11-10    135  |  103  |  5[L]  ----------------------------<  86  3.2[L]   |  24  |  0.28[L]    Ca    7.3[L]      10 Nov 2024 06:50  Phos  2.6     11-10  Mg     1.70     11-10    TPro  4.5[L]  /  Alb  2.0[L]  /  TBili  0.5  /  DBili  x   /  AST  28  /  ALT  12  /  AlkPhos  186[H]  11-10

## 2024-11-10 NOTE — PROGRESS NOTE ADULT - SUBJECTIVE AND OBJECTIVE BOX
INTERVAL HPI/OVERNIGHT EVENTS:  Patient S&E at bedside. No o/n events, patient resting comfortably. No complaints at this time. Rectal tube and lau removed.     VITAL SIGNS:  T(F): 98.3 (11-10-24 @ 12:14)  HR: 103 (11-10-24 @ 12:14)  BP: 118/84 (11-10-24 @ 12:14)  RR: 17 (11-10-24 @ 12:14)  SpO2: 96% (11-10-24 @ 12:14)  Wt(kg): --    PHYSICAL EXAM:    Constitutional: NAD  Eyes: EOMI, sclera non-icteric  Neck: supple, no masses, no JVD  Respiratory: CTA b/l, good air entry b/l  Cardiovascular: RRR, no M/R/G  Gastrointestinal: soft, NTND, no masses palpable, + BS, no hepatosplenomegaly  Extremities: no c/c/e  Neurological: AAOx3      MEDICATIONS  (STANDING):  abiraterone 500 mg tablet 2 Tablet(s) 2 Tablet(s) Oral daily  acetaminophen     Tablet .. 650 milliGRAM(s) Oral once  atorvastatin 20 milliGRAM(s) Oral at bedtime  chlorhexidine 2% Cloths 1 Application(s) Topical daily  dextrose 5% 1000 milliLiter(s) (75 mL/Hr) IV Continuous <Continuous>  dextrose 5%. 1000 milliLiter(s) (50 mL/Hr) IV Continuous <Continuous>  dextrose 5%. 1000 milliLiter(s) (100 mL/Hr) IV Continuous <Continuous>  dextrose 50% Injectable 25 Gram(s) IV Push once  dextrose 50% Injectable 12.5 Gram(s) IV Push once  dextrose 50% Injectable 25 Gram(s) IV Push once  diphenhydrAMINE 25 milliGRAM(s) Oral once  enoxaparin Injectable 40 milliGRAM(s) SubCutaneous every 24 hours  fenofibrate Tablet 145 milliGRAM(s) Oral daily  FIRST- Mouthwash  BLM 30 milliLiter(s) Swish and Spit three times a day  glucagon  Injectable 1 milliGRAM(s) IntraMuscular once  haloperidol     Tablet 1 milliGRAM(s) Oral <User Schedule>  influenza  Vaccine (HIGH DOSE) 0.5 milliLiter(s) IntraMuscular once  insulin lispro (ADMELOG) corrective regimen sliding scale   SubCutaneous at bedtime  insulin lispro (ADMELOG) corrective regimen sliding scale   SubCutaneous three times a day before meals  lactated ringers 1000 milliLiter(s) (75 mL/Hr) IV Continuous <Continuous>  lactobacillus acidophilus 1 Tablet(s) Oral daily  lidocaine   4% Patch 1 Patch Transdermal every 24 hours  melatonin 6 milliGRAM(s) Oral at bedtime  multivitamin 1 Tablet(s) Oral daily  Nephro-nancy 1 Tablet(s) Oral daily  nystatin    Suspension 728099 Unit(s) Oral three times a day  pantoprazole  Injectable 40 milliGRAM(s) IV Push daily  predniSONE   Tablet 5 milliGRAM(s) Oral daily  tamsulosin 0.8 milliGRAM(s) Oral at bedtime    MEDICATIONS  (PRN):  acetaminophen     Tablet .. 650 milliGRAM(s) Oral every 6 hours PRN Temp greater or equal to 38C (100.4F), Mild Pain (1 - 3), Moderate Pain (4 - 6)  aluminum hydroxide/magnesium hydroxide/simethicone Suspension 30 milliLiter(s) Oral every 4 hours PRN Dyspepsia  artificial tears (preservative free) Ophthalmic Solution 1 Drop(s) Both EYES four times a day PRN Dry Eyes  dextrose Oral Gel 15 Gram(s) Oral once PRN Blood Glucose LESS THAN 70 milliGRAM(s)/deciliter  morphine   Solution 5 milliGRAM(s) Oral every 4 hours PRN Severe Pain (7 - 10)  morphine   Solution 2.5 milliGRAM(s) Oral every 4 hours PRN Moderate Pain (4 - 6)  naloxone Injectable 0.1 milliGRAM(s) IV Push every 3 minutes PRN For ANY of the following changes in patient status:  A. RR LESS THAN 10 breaths per minute, B. Oxygen saturation LESS THAN 90%, C. Sedation score of 6  polyethylene glycol 3350 17 Gram(s) Oral daily PRN Constipation      Allergies    No Known Allergies    Intolerances        LABS:                        7.4    5.82  )-----------( 182      ( 10 Nov 2024 06:50 )             23.0     11-10    135  |  103  |  5[L]  ----------------------------<  86  3.2[L]   |  24  |  0.28[L]    Ca    7.3[L]      10 Nov 2024 06:50  Phos  2.6     11-10  Mg     1.70     11-10    TPro  4.5[L]  /  Alb  2.0[L]  /  TBili  0.5  /  DBili  x   /  AST  28  /  ALT  12  /  AlkPhos  186[H]  11-10      Urinalysis Basic - ( 10 Nov 2024 06:50 )    Color: x / Appearance: x / SG: x / pH: x  Gluc: 86 mg/dL / Ketone: x  / Bili: x / Urobili: x   Blood: x / Protein: x / Nitrite: x   Leuk Esterase: x / RBC: x / WBC x   Sq Epi: x / Non Sq Epi: x / Bacteria: x        RADIOLOGY & ADDITIONAL TESTS:  Studies reviewed.    ASSESSMENT & PLAN:

## 2024-11-10 NOTE — PROGRESS NOTE ADULT - ASSESSMENT
64-year-old male with metastatic prostate cancer, sent in by hematologist from [facility] and cancer for uncontrolled pain, nausea, vomiting.       Problem/Plan - 1:  ·  Problem: Cancer related pain.   ·  Plan: - appreciate pall care recommendations:  - pain control as per palliative crae   - RT consult appreciated ,  awaiting kyphoplasty   - IR  kyphoplasty pending for tuesday     Problem/Plan - 2:·  Problem: Nausea & vomiting., Diarrhea, colitis , SMA dissection   ·  Plan: -  vascular fu appreciated  - GI and surgery consult appreciated   - ID fu  - finished abx  - ni intervention as per vascular     Problem/Plan - 3:  ·  Problem: CA of prostate.   ·  Plan: Metastatic prostate cancer  - f/u heme/onc recommendations  - Rad Onc fu   - Palliative for symptom control.    Problem/Plan - 4:  ·  Problem: Anemia , Thrombocytopenia, unspecified.   ·  Plan: -monitor cbc   transfuse PRN  Problem/Plan - 5:  ·  Problem: Bacteremia   Plan: - ID fu   - finished  ABX as per ID     dw wife at bedside     dc lau for TOV  dc rectal tube

## 2024-11-10 NOTE — PROGRESS NOTE ADULT - ASSESSMENT
64-year-old male with metastatic prostate cancer, sent in by hematologist from [facility] for uncontrolled pain, nausea, vomiting.   Patient reports diffuse pain starting 6 months ago significantly worsening for the past 2 weeks.  Currently the worst pain is located around the lower back.   No loss of bladder and bowel function, no saddle paresthesia.  Able to walk.  patient is oxycodone 5 every 4-6 hour.  Yesterday they started adding OxyContin 10.  However patient continued to have pain.  Family also reports significant nausea and vomiting, inability to tolerate p.o. for the last 2 days.  Last bowel movement 2 days ago.   No known allergy.  Diagnosed with high risk high volume metastatic prostate cancer with bone, liver lymph node mets and presacral mass. Liver biopsy confirmed disease with . Started lupron, loly/pred with plans to initiate taxotere.    (12 Oct 2024 15:40)  pt seems very frustrated:   he is on 2 L of oxygen : he has no underlying lung disease:  but he is requiring 2 L of oxygen      Hypoxic resp failure  Metastatic prostate cancer  Back pain     Hypoxic resp failure  -He has no underlying lung disease:  but he is requiring 2 L of oxygen :   -CTA showed; No pulmonary embolism to the level of the segmental branches bilaterally. Limited evaluation of the subsegmental branches secondary to incomplete contrast opacification.  Multilevel vertebral body lytic lesions and loss of vertebral body height, better evaluated on CT thoracic spine 10/17/2024. Metastatic prostate cancer  -Patent central airways. Bibasilar atelectasis. No pleural effusion. MEDIASTINUM AND KATTY: No lymphadenopathy.  PULMONARY ANGIOGRAM: No pulmonary embolism to the level of the segmental   branches bilaterally. Limited evaluation of the subsegmental branches secondary to incomplete contrast opacification.    10/19:  -seems pretty tired;  on 2 L of oxygen :  -cta chest showed: No pulmonary embolism to the level of the segmental branches bilaterally. Limited evaluation of the subsegmental branches secondary to incomplete   contrast opacification. Multilevel vertebral body lytic lesions and loss of vertebral body height, better evaluated on CT thoracic spine 10/17/2024.  -still with fever:  no pe  on zosyn  and leukopenic hemonc following;  has metastatic prostate ca:   -VBG seems OK:     10/20:  pulm wise he seems to be stable:   -using 2 L of oxygen    -yesterdays VBG with minimal met acidosis  -cta again noted    10/21:  on 4 L of oxygen  today    cxr is size    e coli sepsis: Gram Negative Rods and Gram Negative Coccobacilli    10/22: heis doing poorly today  : he is very agitated and uncomfortable  on mittens: ? sun downing   10/23: quiet today  : sleeping:  oxygen requirement has not increased: on rooo ha 95% as documented    WBC is slowly increasing:   no fever:   -on antibiotics   10/24: pt is not doing well : his repeat blood cultures are positive with same organism :  would defer to ID;   10/25: seems to be doing  ok ; no sob:  no cough :  no phlegm   : on room air   10/26: resp failure has resolved:  is septic  : on ceftriaxone     10/27:  he seems OK:  he is on room air:  on morphine drip   remans on ceftriaxone still     10/28: she seems to be doing  ok : no sob: no cough ; no phlegm  confused:  on morphine drip    10/29; seems comfortable on room air;   cont current rx:   10/30: seems to be doing  ok ; on morphine drip : : plan to decrease morphine dose:   -cont current supportive care   10/31:  seems same tome:   no sob:  on room air:   seems comfortable at this ti me: off morphine  11/1:  he has been on room air for days;  looks comfortable:  coughs when he eats;   need pt ot  ; ? oob to chair:  dw wife:   11/3:  he seems stable:   on room air:  responding to questions pretty well : has bleeding lower lip : not Much through ? secondary to dryness:   -resp wise seems pretty good:  no sob:    114:  -seems pretty good:  no sob:  on room air  -has iral ulceration:  bleeding:  not much though : cont magic mouth wash   11/5:  -he seems  pretty good:   on room air:   no sob:  no cough :  no phlegm : difficulty in eating secondary to mucositis:  cont current rx:     11/6: seems OK:   no sob:   on room air:  now again kyphoplasty being considered    11/7:  -he seems same:  no sob:  on room air:   for possible kyphoplasty      11/8: now kyphoplasty next Wednesday   he is weak but looks good:  on room air     11/9"  seems to be doing  ok :  no sob:  no cough ; no phlegm     11/10: seems prettty good resp wise;   bed bound:   on room air:  for kyphoplasty :  encouraged to do IS     New finding:  SMA dissection : neutropenic colitis/ #E Coli and H influenzae bacteremia/ #Neutropenic fever  -/f ischemic colitis on R colon   Diagnosed  on ct abd:  defer to surgery and primary team:  probably no intervention:   -cont antibiotics  -not doing very well with above new findings    10/22; no intervention per surgery    -cont antibiotics  -has fever:  ID following :  -Last chest xray on 20th  ; normal     id following   10/23  IR was consulted for vertebral augmentation of T3-T5 prior to XRT.   Patient was seen bedside. Initially, patient kept requesting no exam and was not willing to participate in the discussion. Son was bedside at time of conversation.   Per discussion with the medical attending, given the concern for SMA dissection and concern for bowel ischemia, will hold off on vertebral augmentation evaluation at this time.   Please reach out to IR when patient is medically stable for vertebral augmentation.  10/24:  remains septic:  above cancelled:   ? for palliative care now  10/26: he seems same to me:  no overnight events  on room air:   check VBG  : cont antibiotics   hemonc following   10/27:  -still on antibiotics for e coli sepsis  -id following s  10/28: cont antibiotics   10/230: cont ceftriaxone   10/31: on ceftriaxone and flagyl   11/1: antibiotics per ID   11/3: cont antibiotics : IR note reviewed:  no kyphoplasty at this time    11/4: cont antibiotics per ID   11/5: on cefepime:  id following   11/6: on cefepime and flagyl!  11/7: off antibiotics now:   11/8: Completed antibiotics   11/9: -antibiotics  ; have been on  low dose steroids ; not from pulm side     Back pain   -likely due to metastatic disease:  adm here for severe pain :  -pain control per primary team   -venous ph is ok   11/1: resolved for now on meds  11/3: pain control : no kyphoplasty at this ti me per ir note:   11/4: as above  11/6: for possible kyphoplasty   now   11/7: for kyphoplasty now:     dior acp; GOC as per primary  team

## 2024-11-11 LAB
ALBUMIN SERPL ELPH-MCNC: 1.9 G/DL — LOW (ref 3.3–5)
ALP SERPL-CCNC: 199 U/L — HIGH (ref 40–120)
ALT FLD-CCNC: 12 U/L — SIGNIFICANT CHANGE UP (ref 4–41)
ANION GAP SERPL CALC-SCNC: 9 MMOL/L — SIGNIFICANT CHANGE UP (ref 7–14)
AST SERPL-CCNC: 24 U/L — SIGNIFICANT CHANGE UP (ref 4–40)
BILIRUB SERPL-MCNC: 0.4 MG/DL — SIGNIFICANT CHANGE UP (ref 0.2–1.2)
BUN SERPL-MCNC: 5 MG/DL — LOW (ref 7–23)
CALCIUM SERPL-MCNC: 7.3 MG/DL — LOW (ref 8.4–10.5)
CHLORIDE SERPL-SCNC: 105 MMOL/L — SIGNIFICANT CHANGE UP (ref 98–107)
CO2 SERPL-SCNC: 25 MMOL/L — SIGNIFICANT CHANGE UP (ref 22–31)
CREAT SERPL-MCNC: 0.26 MG/DL — LOW (ref 0.5–1.3)
EGFR: 138 ML/MIN/1.73M2 — SIGNIFICANT CHANGE UP
GLUCOSE BLDC GLUCOMTR-MCNC: 107 MG/DL — HIGH (ref 70–99)
GLUCOSE BLDC GLUCOMTR-MCNC: 109 MG/DL — HIGH (ref 70–99)
GLUCOSE BLDC GLUCOMTR-MCNC: 114 MG/DL — HIGH (ref 70–99)
GLUCOSE BLDC GLUCOMTR-MCNC: 128 MG/DL — HIGH (ref 70–99)
GLUCOSE SERPL-MCNC: 94 MG/DL — SIGNIFICANT CHANGE UP (ref 70–99)
HCT VFR BLD CALC: 23.7 % — LOW (ref 39–50)
HGB BLD-MCNC: 7.6 G/DL — LOW (ref 13–17)
MAGNESIUM SERPL-MCNC: 2 MG/DL — SIGNIFICANT CHANGE UP (ref 1.6–2.6)
MCHC RBC-ENTMCNC: 31.1 PG — SIGNIFICANT CHANGE UP (ref 27–34)
MCHC RBC-ENTMCNC: 32.1 G/DL — SIGNIFICANT CHANGE UP (ref 32–36)
MCV RBC AUTO: 97.1 FL — SIGNIFICANT CHANGE UP (ref 80–100)
NRBC # BLD: 0 /100 WBCS — SIGNIFICANT CHANGE UP (ref 0–0)
NRBC # FLD: 0.02 K/UL — HIGH (ref 0–0)
PHOSPHATE SERPL-MCNC: 2.6 MG/DL — SIGNIFICANT CHANGE UP (ref 2.5–4.5)
PLATELET # BLD AUTO: 199 K/UL — SIGNIFICANT CHANGE UP (ref 150–400)
POTASSIUM SERPL-MCNC: 3.3 MMOL/L — LOW (ref 3.5–5.3)
POTASSIUM SERPL-SCNC: 3.3 MMOL/L — LOW (ref 3.5–5.3)
PROT SERPL-MCNC: 4.6 G/DL — LOW (ref 6–8.3)
RBC # BLD: 2.44 M/UL — LOW (ref 4.2–5.8)
RBC # FLD: 20.4 % — HIGH (ref 10.3–14.5)
SODIUM SERPL-SCNC: 139 MMOL/L — SIGNIFICANT CHANGE UP (ref 135–145)
WBC # BLD: 5.27 K/UL — SIGNIFICANT CHANGE UP (ref 3.8–10.5)
WBC # FLD AUTO: 5.27 K/UL — SIGNIFICANT CHANGE UP (ref 3.8–10.5)

## 2024-11-11 RX ORDER — POTASSIUM CHLORIDE 600 MG/1
10 TABLET, EXTENDED RELEASE ORAL
Refills: 0 | Status: COMPLETED | OUTPATIENT
Start: 2024-11-11 | End: 2024-11-11

## 2024-11-11 RX ADMIN — PANTOPRAZOLE SODIUM 40 MILLIGRAM(S): 40 TABLET, DELAYED RELEASE ORAL at 11:50

## 2024-11-11 RX ADMIN — Medication 1 TABLET(S): at 11:49

## 2024-11-11 RX ADMIN — NYSTATIN 500000 UNIT(S): 500000 TABLET, FILM COATED ORAL at 11:49

## 2024-11-11 RX ADMIN — DIPHENHYDRAMINE HYDROCHLORIDE AND LIDOCAINE HYDROCHLORIDE AND ALUMINUM HYDROXIDE AND MAGNESIUM HYDRO 30 MILLILITER(S): KIT at 12:06

## 2024-11-11 RX ADMIN — DIPHENHYDRAMINE HYDROCHLORIDE AND LIDOCAINE HYDROCHLORIDE AND ALUMINUM HYDROXIDE AND MAGNESIUM HYDRO 30 MILLILITER(S): KIT at 21:23

## 2024-11-11 RX ADMIN — Medication 1 TABLET(S): at 11:50

## 2024-11-11 RX ADMIN — Medication 20 MILLIGRAM(S): at 05:44

## 2024-11-11 RX ADMIN — DIPHENHYDRAMINE HYDROCHLORIDE AND LIDOCAINE HYDROCHLORIDE AND ALUMINUM HYDROXIDE AND MAGNESIUM HYDRO 30 MILLILITER(S): KIT at 05:44

## 2024-11-11 RX ADMIN — TAMSULOSIN HYDROCHLORIDE 0.8 MILLIGRAM(S): 0.4 CAPSULE ORAL at 21:23

## 2024-11-11 RX ADMIN — ACETAMINOPHEN, DIPHENHYDRAMINE HCL, PHENYLEPHRINE HCL 6 MILLIGRAM(S): 325; 25; 5 TABLET ORAL at 21:23

## 2024-11-11 RX ADMIN — PREDNISONE 5 MILLIGRAM(S): 20 TABLET ORAL at 05:44

## 2024-11-11 RX ADMIN — POTASSIUM CHLORIDE 100 MILLIEQUIVALENT(S): 600 TABLET, EXTENDED RELEASE ORAL at 13:05

## 2024-11-11 RX ADMIN — POTASSIUM CHLORIDE 100 MILLIEQUIVALENT(S): 600 TABLET, EXTENDED RELEASE ORAL at 16:38

## 2024-11-11 RX ADMIN — ENOXAPARIN SODIUM 40 MILLIGRAM(S): 30 INJECTION SUBCUTANEOUS at 16:39

## 2024-11-11 RX ADMIN — NYSTATIN 500000 UNIT(S): 500000 TABLET, FILM COATED ORAL at 21:24

## 2024-11-11 RX ADMIN — POTASSIUM CHLORIDE 100 MILLIEQUIVALENT(S): 600 TABLET, EXTENDED RELEASE ORAL at 11:49

## 2024-11-11 RX ADMIN — Medication 145 MILLIGRAM(S): at 11:50

## 2024-11-11 RX ADMIN — CHLORHEXIDINE GLUCONATE 1 APPLICATION(S): 1.2 RINSE ORAL at 11:50

## 2024-11-11 NOTE — PROGRESS NOTE ADULT - SUBJECTIVE AND OBJECTIVE BOX
Patient is a 64y old  Male who presents with a chief complaint of Pain (11 Nov 2024 12:29)    Date of servie : 11-11-24 @ 15:25  INTERVAL HPI/OVERNIGHT EVENTS:  T(C): 36.7 (11-11-24 @ 11:38), Max: 36.8 (11-10-24 @ 20:59)  HR: 107 (11-11-24 @ 11:38) (104 - 107)  BP: 117/78 (11-11-24 @ 11:38) (110/74 - 125/85)  RR: 18 (11-11-24 @ 11:38) (18 - 18)  SpO2: 97% (11-11-24 @ 11:38) (97% - 97%)  Wt(kg): --  I&O's Summary    10 Nov 2024 07:01  -  11 Nov 2024 07:00  --------------------------------------------------------  IN: 160 mL / OUT: 770 mL / NET: -610 mL        LABS:                        7.6    5.27  )-----------( 199      ( 11 Nov 2024 06:29 )             23.7     11-11    139  |  105  |  5[L]  ----------------------------<  94  3.3[L]   |  25  |  0.26[L]    Ca    7.3[L]      11 Nov 2024 06:29  Phos  2.6     11-11  Mg     2.00     11-11    TPro  4.6[L]  /  Alb  1.9[L]  /  TBili  0.4  /  DBili  x   /  AST  24  /  ALT  12  /  AlkPhos  199[H]  11-11      Urinalysis Basic - ( 11 Nov 2024 06:29 )    Color: x / Appearance: x / SG: x / pH: x  Gluc: 94 mg/dL / Ketone: x  / Bili: x / Urobili: x   Blood: x / Protein: x / Nitrite: x   Leuk Esterase: x / RBC: x / WBC x   Sq Epi: x / Non Sq Epi: x / Bacteria: x      CAPILLARY BLOOD GLUCOSE      POCT Blood Glucose.: 114 mg/dL (11 Nov 2024 12:37)  POCT Blood Glucose.: 107 mg/dL (11 Nov 2024 08:35)  POCT Blood Glucose.: 136 mg/dL (10 Nov 2024 21:21)  POCT Blood Glucose.: 151 mg/dL (10 Nov 2024 18:00)        Urinalysis Basic - ( 11 Nov 2024 06:29 )    Color: x / Appearance: x / SG: x / pH: x  Gluc: 94 mg/dL / Ketone: x  / Bili: x / Urobili: x   Blood: x / Protein: x / Nitrite: x   Leuk Esterase: x / RBC: x / WBC x   Sq Epi: x / Non Sq Epi: x / Bacteria: x        MEDICATIONS  (STANDING):  abiraterone 500 mg tablet 2 Tablet(s) 2 Tablet(s) Oral daily  acetaminophen     Tablet .. 650 milliGRAM(s) Oral once  atorvastatin 20 milliGRAM(s) Oral at bedtime  chlorhexidine 2% Cloths 1 Application(s) Topical daily  dextrose 5% 1000 milliLiter(s) (75 mL/Hr) IV Continuous <Continuous>  dextrose 5%. 1000 milliLiter(s) (50 mL/Hr) IV Continuous <Continuous>  dextrose 5%. 1000 milliLiter(s) (100 mL/Hr) IV Continuous <Continuous>  dextrose 50% Injectable 25 Gram(s) IV Push once  dextrose 50% Injectable 12.5 Gram(s) IV Push once  dextrose 50% Injectable 25 Gram(s) IV Push once  diphenhydrAMINE 25 milliGRAM(s) Oral once  enoxaparin Injectable 40 milliGRAM(s) SubCutaneous every 24 hours  fenofibrate Tablet 145 milliGRAM(s) Oral daily  FIRST- Mouthwash  BLM 30 milliLiter(s) Swish and Spit three times a day  glucagon  Injectable 1 milliGRAM(s) IntraMuscular once  haloperidol     Tablet 1 milliGRAM(s) Oral <User Schedule>  influenza  Vaccine (HIGH DOSE) 0.5 milliLiter(s) IntraMuscular once  insulin lispro (ADMELOG) corrective regimen sliding scale   SubCutaneous three times a day before meals  insulin lispro (ADMELOG) corrective regimen sliding scale   SubCutaneous at bedtime  lactated ringers 1000 milliLiter(s) (75 mL/Hr) IV Continuous <Continuous>  lactobacillus acidophilus 1 Tablet(s) Oral daily  lidocaine   4% Patch 1 Patch Transdermal every 24 hours  melatonin 6 milliGRAM(s) Oral at bedtime  multivitamin 1 Tablet(s) Oral daily  Nephro-nancy 1 Tablet(s) Oral daily  nystatin    Suspension 440522 Unit(s) Oral three times a day  pantoprazole  Injectable 40 milliGRAM(s) IV Push daily  potassium chloride  10 mEq/100 mL IVPB 10 milliEquivalent(s) IV Intermittent every 1 hour  predniSONE   Tablet 5 milliGRAM(s) Oral daily  tamsulosin 0.8 milliGRAM(s) Oral at bedtime    MEDICATIONS  (PRN):  acetaminophen     Tablet .. 650 milliGRAM(s) Oral every 6 hours PRN Temp greater or equal to 38C (100.4F), Mild Pain (1 - 3), Moderate Pain (4 - 6)  aluminum hydroxide/magnesium hydroxide/simethicone Suspension 30 milliLiter(s) Oral every 4 hours PRN Dyspepsia  artificial tears (preservative free) Ophthalmic Solution 1 Drop(s) Both EYES four times a day PRN Dry Eyes  dextrose Oral Gel 15 Gram(s) Oral once PRN Blood Glucose LESS THAN 70 milliGRAM(s)/deciliter  morphine   Solution 2.5 milliGRAM(s) Oral every 4 hours PRN Moderate Pain (4 - 6)  morphine   Solution 5 milliGRAM(s) Oral every 4 hours PRN Severe Pain (7 - 10)  naloxone Injectable 0.1 milliGRAM(s) IV Push every 3 minutes PRN For ANY of the following changes in patient status:  A. RR LESS THAN 10 breaths per minute, B. Oxygen saturation LESS THAN 90%, C. Sedation score of 6  polyethylene glycol 3350 17 Gram(s) Oral daily PRN Constipation          PHYSICAL EXAM:  GENERAL: NAD, well-groomed, well-developed  HEAD:  Atraumatic, Normocephalic  CHEST/LUNG: Clear to percussion bilaterally; No rales, rhonchi, wheezing, or rubs  HEART: Regular rate and rhythm; No murmurs, rubs, or gallops  ABDOMEN: Soft, Nontender, Nondistended; Bowel sounds present  EXTREMITIES:  2+ Peripheral Pulses, No clubbing, cyanosis, or edema  LYMPH: No lymphadenopathy noted  SKIN: No rashes or lesions    Care Discussed with Consultants/Other Providers [ ] YES  [ ] NO

## 2024-11-11 NOTE — PROGRESS NOTE ADULT - ASSESSMENT
1. Metastatic prostate cancer    - Follows with Dr Andrew Mendoza at [facility]   - PSMA 10/11/24 showed hypermetabolic vera foci above and below the diaphragm, foci in the liver and extensive foci involving the axial and appendicular skeleton compatible with metastatic disease  - --> 374--> 426 on 10/8/24   - Liver biopsy 9/30/24 consistent with prostate adenocarcinoma   - High risk high volume disease -> started on triplet therapy w/ ADT/lupron, abiraterone/prednisone + taxotere. (back pain after Lupron likely tumor flare).  - Continue abiraterone 1000mg daily w Prednisone 5mg PO QD   - s/p taxotere 60mg/m2 on 10/13/24. Next dose was planned for 11/4 , held for weakness, clinical progress  -Bacteremia cleared  - Kyphoplasty w/IR to T3 and L1 lesions scheduled for 11/12, IR recs appreciated  - Palliative following for pain control and given hospital course would have further GOC discussions. Treatment will be offered but he has metastatic disease with visceral involvement and complications as outlined below.     2. Pancolitis, E coli and H influenzae bacteremia  - completed Cefepime+ Metronidazole  - ID following  - BCx from 10/24 neg to date  - Rectal tube and lau catheter removed    3. Anemia/Thrombocytopenia   - Transfuse for Hgb < 7.0  - Multifactorial sec to likely marrow infiltration by CaP, Chemo effect, consumption from acute illness, poss ITP  - Transfuse for plt <15 or < 50K if bleeding/for procedure      4. SMA Dissection  - seen by [facility] sx  - no plan for intervention      Umm Aponte NP  Hematology/Oncology  [facility]  [phone] (Office)  [phone] (Alt office)  Evenings and weekends please call MD on call or office

## 2024-11-11 NOTE — PROGRESS NOTE ADULT - ASSESSMENT
64-year-old male with metastatic prostate cancer, sent in by hematologist from [facility] for uncontrolled pain, nausea, vomiting.   Patient reports diffuse pain starting 6 months ago significantly worsening for the past 2 weeks.  Currently the worst pain is located around the lower back.   No loss of bladder and bowel function, no saddle paresthesia.  Able to walk.  patient is oxycodone 5 every 4-6 hour.  Yesterday they started adding OxyContin 10.  However patient continued to have pain.  Family also reports significant nausea and vomiting, inability to tolerate p.o. for the last 2 days.  Last bowel movement 2 days ago.   No known allergy.  Diagnosed with high risk high volume metastatic prostate cancer with bone, liver lymph node mets and presacral mass. Liver biopsy confirmed disease with . Started lupron, loly/pred with plans to initiate taxotere.    (12 Oct 2024 15:40)  pt seems very frustrated:   he is on 2 L of oxygen : he has no underlying lung disease:  but he is requiring 2 L of oxygen      Hypoxic resp failure  Metastatic prostate cancer  Back pain     Hypoxic resp failure  -He has no underlying lung disease:  but he is requiring 2 L of oxygen :   -CTA showed; No pulmonary embolism to the level of the segmental branches bilaterally. Limited evaluation of the subsegmental branches secondary to incomplete contrast opacification.  Multilevel vertebral body lytic lesions and loss of vertebral body height, better evaluated on CT thoracic spine 10/17/2024. Metastatic prostate cancer  -Patent central airways. Bibasilar atelectasis. No pleural effusion. MEDIASTINUM AND KATTY: No lymphadenopathy.  PULMONARY ANGIOGRAM: No pulmonary embolism to the level of the segmental   branches bilaterally. Limited evaluation of the subsegmental branches secondary to incomplete contrast opacification.    10/19:  -seems pretty tired;  on 2 L of oxygen :  -cta chest showed: No pulmonary embolism to the level of the segmental branches bilaterally. Limited evaluation of the subsegmental branches secondary to incomplete   contrast opacification. Multilevel vertebral body lytic lesions and loss of vertebral body height, better evaluated on CT thoracic spine 10/17/2024.  -still with fever:  no pe  on zosyn  and leukopenic hemonc following;  has metastatic prostate ca:   -VBG seems OK:     10/20:  pulm wise he seems to be stable:   -using 2 L of oxygen    -yesterdays VBG with minimal met acidosis  -cta again noted    10/21:  on 4 L of oxygen  today    cxr is size    e coli sepsis: Gram Negative Rods and Gram Negative Coccobacilli    10/22: heis doing poorly today  : he is very agitated and uncomfortable  on mittens: ? sun downing   10/23: quiet today  : sleeping:  oxygen requirement has not increased: on rooo ha 95% as documented    WBC is slowly increasing:   no fever:   -on antibiotics   10/24: pt is not doing well : his repeat blood cultures are positive with same organism :  would defer to ID;   10/25: seems to be doing  ok ; no sob:  no cough :  no phlegm   : on room air   10/26: resp failure has resolved:  is septic  : on ceftriaxone     10/27:  he seems OK:  he is on room air:  on morphine drip   remans on ceftriaxone still     10/28: she seems to be doing  ok : no sob: no cough ; no phlegm  confused:  on morphine drip    10/29; seems comfortable on room air;   cont current rx:   10/30: seems to be doing  ok ; on morphine drip : : plan to decrease morphine dose:   -cont current supportive care   10/31:  seems same tome:   no sob:  on room air:   seems comfortable at this ti me: off morphine  11/1:  he has been on room air for days;  looks comfortable:  coughs when he eats;   need pt ot  ; ? oob to chair:  dw wife:   11/3:  he seems stable:   on room air:  responding to questions pretty well : has bleeding lower lip : not Much through ? secondary to dryness:   -resp wise seems pretty good:  no sob:    114:  -seems pretty good:  no sob:  on room air  -has iral ulceration:  bleeding:  not much though : cont magic mouth wash   11/5:  -he seems  pretty good:   on room air:   no sob:  no cough :  no phlegm : difficulty in eating secondary to mucositis:  cont current rx:     11/6: seems OK:   no sob:   on room air:  now again kyphoplasty being considered    11/7:  -he seems same:  no sob:  on room air:   for possible kyphoplasty      11/8: now kyphoplasty next Wednesday   he is weak but looks good:  on room air     11/9"  seems to be doing  ok :  no sob:  no cough ; no phlegm     11/10: seems prettty good resp wise;   bed bound:   on room air:  for kyphoplasty :  encouraged to do IS   11/11: pulm wise he seems optimized to get kyphoplasty tomorrow:   wife at bedside:  no events overnight      New finding:  SMA dissection : neutropenic colitis/ #E Coli and H influenzae bacteremia/ #Neutropenic fever  -/f ischemic colitis on R colon   Diagnosed  on ct abd:  defer to surgery and primary team:  probably no intervention:   -cont antibiotics  -not doing very well with above new findings    10/22; no intervention per surgery    -cont antibiotics  -has fever:  ID following :  -Last chest xray on 20th  ; normal     id following   10/23  IR was consulted for vertebral augmentation of T3-T5 prior to XRT.   Patient was seen bedside. Initially, patient kept requesting no exam and was not willing to participate in the discussion. Son was bedside at time of conversation.   Per discussion with the medical attending, given the concern for SMA dissection and concern for bowel ischemia, will hold off on vertebral augmentation evaluation at this time.   Please reach out to IR when patient is medically stable for vertebral augmentation.  10/24:  remains septic:  above cancelled:   ? for palliative care now  10/26: he seems same to me:  no overnight events  on room air:   check VBG  : cont antibiotics   hemonc following   10/27:  -still on antibiotics for e coli sepsis  -id following s  10/28: cont antibiotics   10/230: cont ceftriaxone   10/31: on ceftriaxone and flagyl   11/1: antibiotics per ID   11/3: cont antibiotics : IR note reviewed:  no kyphoplasty at this time    11/4: cont antibiotics per ID   11/5: on cefepime:  id following   11/6: on cefepime and flagyl!  11/7: off antibiotics now:   11/8: Completed antibiotics   11/9: -antibiotics  ; have been on  low dose steroids ; not from pulm side   11/11: seems to be doing  ok : no sob:  no cough : on room air     Back pain   -likely due to metastatic disease:  adm here for severe pain :  -pain control per primary team   -venous ph is ok   11/1: resolved for now on meds  11/3: pain control : no kyphoplasty at this ti me per ir note:   11/4: as above  11/6: for possible kyphoplasty   now   11/7: for kyphoplasty now:     dior acp; GOC as per primary  team

## 2024-11-11 NOTE — PROGRESS NOTE ADULT - SUBJECTIVE AND OBJECTIVE BOX
Patient seen today, asleep, appears comfortable  wife at bedside, planned for Kyphoplasty 11/12      MEDICATIONS  (STANDING):  abiraterone 500 mg tablet 2 Tablet(s) 2 Tablet(s) Oral daily  acetaminophen     Tablet .. 650 milliGRAM(s) Oral once  atorvastatin 20 milliGRAM(s) Oral at bedtime  chlorhexidine 2% Cloths 1 Application(s) Topical daily  dextrose 5% 1000 milliLiter(s) (75 mL/Hr) IV Continuous <Continuous>  dextrose 5%. 1000 milliLiter(s) (50 mL/Hr) IV Continuous <Continuous>  dextrose 5%. 1000 milliLiter(s) (100 mL/Hr) IV Continuous <Continuous>  dextrose 50% Injectable 25 Gram(s) IV Push once  dextrose 50% Injectable 12.5 Gram(s) IV Push once  dextrose 50% Injectable 25 Gram(s) IV Push once  diphenhydrAMINE 25 milliGRAM(s) Oral once  enoxaparin Injectable 40 milliGRAM(s) SubCutaneous every 24 hours  fenofibrate Tablet 145 milliGRAM(s) Oral daily  FIRST- Mouthwash  BLM 30 milliLiter(s) Swish and Spit three times a day  glucagon  Injectable 1 milliGRAM(s) IntraMuscular once  haloperidol     Tablet 1 milliGRAM(s) Oral <User Schedule>  influenza  Vaccine (HIGH DOSE) 0.5 milliLiter(s) IntraMuscular once  insulin lispro (ADMELOG) corrective regimen sliding scale   SubCutaneous three times a day before meals  insulin lispro (ADMELOG) corrective regimen sliding scale   SubCutaneous at bedtime  lactated ringers 1000 milliLiter(s) (75 mL/Hr) IV Continuous <Continuous>  lactobacillus acidophilus 1 Tablet(s) Oral daily  lidocaine   4% Patch 1 Patch Transdermal every 24 hours  melatonin 6 milliGRAM(s) Oral at bedtime  multivitamin 1 Tablet(s) Oral daily  Nephro-nancy 1 Tablet(s) Oral daily  nystatin    Suspension 924246 Unit(s) Oral three times a day  pantoprazole  Injectable 40 milliGRAM(s) IV Push daily  predniSONE   Tablet 5 milliGRAM(s) Oral daily  tamsulosin 0.8 milliGRAM(s) Oral at bedtime    MEDICATIONS  (PRN):  acetaminophen     Tablet .. 650 milliGRAM(s) Oral every 6 hours PRN Temp greater or equal to 38C (100.4F), Mild Pain (1 - 3), Moderate Pain (4 - 6)  aluminum hydroxide/magnesium hydroxide/simethicone Suspension 30 milliLiter(s) Oral every 4 hours PRN Dyspepsia  artificial tears (preservative free) Ophthalmic Solution 1 Drop(s) Both EYES four times a day PRN Dry Eyes  dextrose Oral Gel 15 Gram(s) Oral once PRN Blood Glucose LESS THAN 70 milliGRAM(s)/deciliter  morphine   Solution 2.5 milliGRAM(s) Oral every 4 hours PRN Moderate Pain (4 - 6)  morphine   Solution 5 milliGRAM(s) Oral every 4 hours PRN Severe Pain (7 - 10)  naloxone Injectable 0.1 milliGRAM(s) IV Push every 3 minutes PRN For ANY of the following changes in patient status:  A. RR LESS THAN 10 breaths per minute, B. Oxygen saturation LESS THAN 90%, C. Sedation score of 6  polyethylene glycol 3350 17 Gram(s) Oral daily PRN Constipation    Vital Signs Last 24 Hrs  T(C): 36.8 (11 Nov 2024 04:54), Max: 36.8 (10 Nov 2024 12:14)  T(F): 98.3 (11 Nov 2024 04:54), Max: 98.3 (10 Nov 2024 12:14)  HR: 104 (11 Nov 2024 04:54) (103 - 107)  BP: 125/85 (11 Nov 2024 04:54) (110/74 - 125/85)  BP(mean): --  RR: 18 (11 Nov 2024 04:54) (17 - 18)  SpO2: 97% (11 Nov 2024 04:54) (96% - 97%)    Parameters below as of 11 Nov 2024 04:54  Patient On (Oxygen Delivery Method): room air        PE  NAD  Asleep  Anicteric, MMM  RRR  CTAB  Abd soft, NT, ND  No c/c/e  No rash grossly                            7.6    5.27  )-----------( 199      ( 11 Nov 2024 06:29 )             23.7       11-11    139  |  105  |  5[L]  ----------------------------<  94  3.3[L]   |  25  |  0.26[L]    Ca    7.3[L]      11 Nov 2024 06:29  Phos  2.6     11-11  Mg     2.00     11-11    TPro  4.6[L]  /  Alb  1.9[L]  /  TBili  0.4  /  DBili  x   /  AST  24  /  ALT  12  /  AlkPhos  199[H]  11-11

## 2024-11-11 NOTE — PROGRESS NOTE ADULT - ASSESSMENT
64-year-old male with metastatic prostate cancer, sent in by hematologist from [facility] for uncontrolled pain, nausea, vomiting.       Problem/Plan - 1:  ·  Problem: Cancer related pain.   ·  Plan: - appreciate pall care recommendations:  - pain control as per palliative crae   - RT consult appreciated ,  awaiting kyphoplasty   - IR  kyphoplasty pending for tuesday     Problem/Plan - 2:·  Problem: Nausea & vomiting., Diarrhea, colitis , SMA dissection   ·  Plan: -  vascular fu appreciated  - GI and surgery consult appreciated   - ID fu  - finished abx  - ni intervention as per vascular     Problem/Plan - 3:  ·  Problem: CA of prostate.   ·  Plan: Metastatic prostate cancer  - f/u heme/onc recommendations  - Rad Onc fu   - Palliative for symptom control.    Problem/Plan - 4:  ·  Problem: Anemia , Thrombocytopenia, unspecified.   ·  Plan: -monitor cbc   transfuse PRN  Problem/Plan - 5:  ·  Problem: Bacteremia   Plan: - ID fu   - finished  ABX as per ID     dw wife at bedside     dc lau for TOV

## 2024-11-11 NOTE — PROGRESS NOTE ADULT - ASSESSMENT
64-year-old male with metastatic prostate cancer, sent in by hematologist from [facility] for uncontrolled pain, nausea, vomiting.   Patient reports diffuse pain starting 6 months ago significantly worsening for the past 2 weeks. Diagnosed with high risk high volume metastatic prostate cancer with bone, liver lymph node mets and presacral mass. Liver biopsy confirmed disease with . Started lupron, loly/pred with plans to initiate taxotere.   nephrology consulted for electrolyte abnormalities.    Hypernatremia  Poor free water intake    He now has diet however not eating much per family  Na is stable currently off ivf.   encourage free water as tolerated.  monitor Na.  Avoid overcorrection >8mEq in 24hrs.    Hypokalemia  GI loss and poor po intake  unable to tolerate PO kcl  supplement kcl 10x3 iv  continue kcl 10x3 if k<4  monitor closely.    Acidosis   non AG  likely GI lost.  Hyperchloremic.  better  monitor CO2.    hypophosphatemia  supplemented k phos 15x1  monitor    hypocalcemia   sec to low albumin  corrected ca 8.5  monitor    fever  GNR bacteremia   work up and tx per team    anemia  metastatic prostate cancer  f/u heme/onc

## 2024-11-11 NOTE — PROGRESS NOTE ADULT - SUBJECTIVE AND OBJECTIVE BOX
Date of Service: 11-11-24 @ 10:31    Patient is a 64y old  Male who presents with a chief complaint of Pain (10 Nov 2024 15:09)      Any change in ROS: seems to be doing  ok :  no sob:  noc ugh : no phlegm     MEDICATIONS  (STANDING):  abiraterone 500 mg tablet 2 Tablet(s) 2 Tablet(s) Oral daily  acetaminophen     Tablet .. 650 milliGRAM(s) Oral once  atorvastatin 20 milliGRAM(s) Oral at bedtime  chlorhexidine 2% Cloths 1 Application(s) Topical daily  dextrose 5% 1000 milliLiter(s) (75 mL/Hr) IV Continuous <Continuous>  dextrose 5%. 1000 milliLiter(s) (50 mL/Hr) IV Continuous <Continuous>  dextrose 5%. 1000 milliLiter(s) (100 mL/Hr) IV Continuous <Continuous>  dextrose 50% Injectable 25 Gram(s) IV Push once  dextrose 50% Injectable 25 Gram(s) IV Push once  dextrose 50% Injectable 12.5 Gram(s) IV Push once  diphenhydrAMINE 25 milliGRAM(s) Oral once  enoxaparin Injectable 40 milliGRAM(s) SubCutaneous every 24 hours  fenofibrate Tablet 145 milliGRAM(s) Oral daily  FIRST- Mouthwash  BLM 30 milliLiter(s) Swish and Spit three times a day  glucagon  Injectable 1 milliGRAM(s) IntraMuscular once  haloperidol     Tablet 1 milliGRAM(s) Oral <User Schedule>  influenza  Vaccine (HIGH DOSE) 0.5 milliLiter(s) IntraMuscular once  insulin lispro (ADMELOG) corrective regimen sliding scale   SubCutaneous three times a day before meals  insulin lispro (ADMELOG) corrective regimen sliding scale   SubCutaneous at bedtime  lactated ringers 1000 milliLiter(s) (75 mL/Hr) IV Continuous <Continuous>  lactobacillus acidophilus 1 Tablet(s) Oral daily  lidocaine   4% Patch 1 Patch Transdermal every 24 hours  melatonin 6 milliGRAM(s) Oral at bedtime  multivitamin 1 Tablet(s) Oral daily  Nephro-nancy 1 Tablet(s) Oral daily  nystatin    Suspension 797193 Unit(s) Oral three times a day  pantoprazole  Injectable 40 milliGRAM(s) IV Push daily  predniSONE   Tablet 5 milliGRAM(s) Oral daily  tamsulosin 0.8 milliGRAM(s) Oral at bedtime    MEDICATIONS  (PRN):  acetaminophen     Tablet .. 650 milliGRAM(s) Oral every 6 hours PRN Temp greater or equal to 38C (100.4F), Mild Pain (1 - 3), Moderate Pain (4 - 6)  aluminum hydroxide/magnesium hydroxide/simethicone Suspension 30 milliLiter(s) Oral every 4 hours PRN Dyspepsia  artificial tears (preservative free) Ophthalmic Solution 1 Drop(s) Both EYES four times a day PRN Dry Eyes  dextrose Oral Gel 15 Gram(s) Oral once PRN Blood Glucose LESS THAN 70 milliGRAM(s)/deciliter  morphine   Solution 2.5 milliGRAM(s) Oral every 4 hours PRN Moderate Pain (4 - 6)  morphine   Solution 5 milliGRAM(s) Oral every 4 hours PRN Severe Pain (7 - 10)  naloxone Injectable 0.1 milliGRAM(s) IV Push every 3 minutes PRN For ANY of the following changes in patient status:  A. RR LESS THAN 10 breaths per minute, B. Oxygen saturation LESS THAN 90%, C. Sedation score of 6  polyethylene glycol 3350 17 Gram(s) Oral daily PRN Constipation    Vital Signs Last 24 Hrs  T(C): 36.8 (11 Nov 2024 04:54), Max: 36.8 (10 Nov 2024 12:14)  T(F): 98.3 (11 Nov 2024 04:54), Max: 98.3 (10 Nov 2024 12:14)  HR: 104 (11 Nov 2024 04:54) (103 - 107)  BP: 125/85 (11 Nov 2024 04:54) (110/74 - 125/85)  BP(mean): --  RR: 18 (11 Nov 2024 04:54) (17 - 18)  SpO2: 97% (11 Nov 2024 04:54) (96% - 97%)    Parameters below as of 11 Nov 2024 04:54  Patient On (Oxygen Delivery Method): room air        I&O's Summary    10 Nov 2024 07:01  -  11 Nov 2024 07:00  --------------------------------------------------------  IN: 160 mL / OUT: 770 mL / NET: -610 mL          Physical Exam:   GENERAL: cacechtic  HEENT: CHAVA/   Atraumatic, Normocephalic  ENMT: No tonsillar erythema, exudates, or enlargement; Moist mucous membranes, Good dentition, No lesions  NECK: Supple, No JVD, Normal thyroid  CHEST/LUNG: Clear to auscultaion  CVS: Regular rate and rhythm; No murmurs, rubs, or gallops  GI: : Soft, Nontender, Nondistended; Bowel sounds present  NERVOUS SYSTEM:  Alert & Oriented X3  EXTREMITIES:- edema  LYMPH: No lymphadenopathy noted  SKIN: No rashes or lesions  ENDOCRINOLOGY: No Thyromegaly  PSYCH: Appropriate    Labs:                              7.6    5.27  )-----------( 199      ( 11 Nov 2024 06:29 )             23.7                         7.4    5.82  )-----------( 182      ( 10 Nov 2024 06:50 )             23.0                         7.4    6.17  )-----------( 166      ( 09 Nov 2024 05:51 )             23.3                         7.3    6.44  )-----------( 165      ( 08 Nov 2024 06:50 )             22.5     11-11    139  |  105  |  5[L]  ----------------------------<  94  3.3[L]   |  25  |  0.26[L]  11-10    135  |  103  |  5[L]  ----------------------------<  86  3.2[L]   |  24  |  0.28[L]  11-09    137  |  103  |  4[L]  ----------------------------<  81  3.3[L]   |  24  |  0.28[L]  11-08    139  |  108[H]  |  5[L]  ----------------------------<  83  3.5   |  23  |  0.28[L]    Ca    7.3[L]      11 Nov 2024 06:29  Ca    7.3[L]      10 Nov 2024 06:50  Phos  2.6     11-11  Phos  2.6     11-10  Mg     2.00     11-11  Mg     1.70     11-10    TPro  4.6[L]  /  Alb  1.9[L]  /  TBili  0.4  /  DBili  x   /  AST  24  /  ALT  12  /  AlkPhos  199[H]  11-11  TPro  4.5[L]  /  Alb  2.0[L]  /  TBili  0.5  /  DBili  x   /  AST  28  /  ALT  12  /  AlkPhos  186[H]  11-10  TPro  4.5[L]  /  Alb  1.8[L]  /  TBili  0.5  /  DBili  x   /  AST  29  /  ALT  15  /  AlkPhos  178[H]  11-09  TPro  4.3[L]  /  Alb  1.9[L]  /  TBili  0.5  /  DBili  x   /  AST  31  /  ALT  14  /  AlkPhos  172[H]  11-08    CAPILLARY BLOOD GLUCOSE      POCT Blood Glucose.: 107 mg/dL (11 Nov 2024 08:35)  POCT Blood Glucose.: 136 mg/dL (10 Nov 2024 21:21)  POCT Blood Glucose.: 151 mg/dL (10 Nov 2024 18:00)  POCT Blood Glucose.: 107 mg/dL (10 Nov 2024 12:51)      LIVER FUNCTIONS - ( 11 Nov 2024 06:29 )  Alb: 1.9 g/dL / Pro: 4.6 g/dL / ALK PHOS: 199 U/L / ALT: 12 U/L / AST: 24 U/L / GGT: x             Urinalysis Basic - ( 11 Nov 2024 06:29 )    Color: x / Appearance: x / SG: x / pH: x  Gluc: 94 mg/dL / Ketone: x  / Bili: x / Urobili: x   Blood: x / Protein: x / Nitrite: x   Leuk Esterase: x / RBC: x / WBC x   Sq Epi: x / Non Sq Epi: x / Bacteria: x      rad< from: Xray Chest 1 View- PORTABLE-Urgent (Xray Chest 1 View- PORTABLE-Urgent .) (10.31.24 @ 03:52) >  JOANNE     PROCEDURE DATE:  10/31/2024          INTERPRETATION:  CLINICAL INFORMATION: Fever    Time of Exam:  October 31, 2024 at 3:46 AM    EXAM:  Frontal Chest    FINDINGS:  Both lungs are equally aerated and free of any focal abnormalities.  The heart is not enlarged and there is no effusion or pneumothorax.  The bones show no acute findings.      COMPARISON: October 20, 2024        IMPRESSION: Clear lungs.    --- End of Report ---            GOMEZ ARANDA MD; Attending Radiologist    < end of copied text >        RECENT CULTURES:        RESPIRATORY CULTURES:          Studies  Chest X-RAY  CT SCAN Chest   Venous Dopplers: LE:   CT Abdomen  Others

## 2024-11-11 NOTE — CHART NOTE - NSCHARTNOTEFT_GEN_A_CORE
IR Pre-Procedure Note    Patient Age:   64y    Patient Gender:   Male    Procedure (including site / side if known): T3,T4,T5 and L1 vertebral augmentation    Diagnosis / Indication: Patient is a 64y old  Male who presents with a chief complaint of Pain (10 Nov 2024 15:09)      Interventional Radiology Attending Physician: Dr Case    Ordering Attending Physician: Dr Valle    PAST MEDICAL & SURGICAL HISTORY:  Benign essential HTN      HLD (hyperlipidemia)      CA of prostate      Basal cell carcinoma      History of transurethral prostatectomy      S/P inguinal hernia repair      History of basal cell carcinoma (BCC) excision           Pertinent Labs:   CBC Full  -  ( 11 Nov 2024 06:29 )  WBC Count : 5.27 K/uL  RBC Count : 2.44 M/uL  Hemoglobin : 7.6 g/dL  Hematocrit : 23.7 %  Platelet Count - Automated : 199 K/uL  Mean Cell Volume : 97.1 fL  Mean Cell Hemoglobin : 31.1 pg  Mean Cell Hemoglobin Concentration : 32.1 g/dL  Auto Neutrophil # : x  Auto Lymphocyte # : x  Auto Monocyte # : x  Auto Eosinophil # : x  Auto Basophil # : x  Auto Neutrophil % : x  Auto Lymphocyte % : x  Auto Monocyte % : x  Auto Eosinophil % : x  Auto Basophil % : x    11-10    135  |  103  |  5[L]  ----------------------------<  86  3.2[L]   |  24  |  0.28[L]    Ca    7.3[L]      10 Nov 2024 06:50  Phos  2.6     11-10  Mg     1.70     11-10    TPro  4.5[L]  /  Alb  2.0[L]  /  TBili  0.5  /  DBili  x   /  AST  28  /  ALT  12  /  AlkPhos  186[H]  11-10        Patient / Family aware of procedure:   [  ] Y   [  ] N

## 2024-11-11 NOTE — PROGRESS NOTE ADULT - SUBJECTIVE AND OBJECTIVE BOX
[facility]   MD MARKO EASTMAN MD ANGELA WONG, PA    TEL:  OFFICE: [phone]  From 5pm-7am Answering Service [phone]    -- RENAL FOLLOW UP NOTE ---Date of Service 11-11-24 @ 12:29    Patient is a 64y old  Male who presents with a chief complaint of Pain (11 Nov 2024 10:40)      Patient seen and examined at bedside. No chest pain/sob    VITALS:  T(F): 98 (11-11-24 @ 11:38), Max: 98.3 (11-10-24 @ 20:59)  HR: 107 (11-11-24 @ 11:38)  BP: 117/78 (11-11-24 @ 11:38)  RR: 18 (11-11-24 @ 11:38)  SpO2: 97% (11-11-24 @ 11:38)  Wt(kg): --    11-10 @ 07:01  -  11-11 @ 07:00  --------------------------------------------------------  IN: 160 mL / OUT: 770 mL / NET: -610 mL          PHYSICAL EXAM:  General: NAD  Neck: No JVD  Respiratory: CTAB, no wheezes, rales or rhonchi  Cardiovascular: S1, S2, RRR  Gastrointestinal: BS+, soft, NT/ND  Extremities: No peripheral edema    Hospital Medications:   MEDICATIONS  (STANDING):  abiraterone 500 mg tablet 2 Tablet(s) 2 Tablet(s) Oral daily  acetaminophen     Tablet .. 650 milliGRAM(s) Oral once  atorvastatin 20 milliGRAM(s) Oral at bedtime  chlorhexidine 2% Cloths 1 Application(s) Topical daily  dextrose 5% 1000 milliLiter(s) (75 mL/Hr) IV Continuous <Continuous>  dextrose 5%. 1000 milliLiter(s) (100 mL/Hr) IV Continuous <Continuous>  dextrose 5%. 1000 milliLiter(s) (50 mL/Hr) IV Continuous <Continuous>  dextrose 50% Injectable 25 Gram(s) IV Push once  dextrose 50% Injectable 12.5 Gram(s) IV Push once  dextrose 50% Injectable 25 Gram(s) IV Push once  diphenhydrAMINE 25 milliGRAM(s) Oral once  enoxaparin Injectable 40 milliGRAM(s) SubCutaneous every 24 hours  fenofibrate Tablet 145 milliGRAM(s) Oral daily  FIRST- Mouthwash  BLM 30 milliLiter(s) Swish and Spit three times a day  glucagon  Injectable 1 milliGRAM(s) IntraMuscular once  haloperidol     Tablet 1 milliGRAM(s) Oral <User Schedule>  influenza  Vaccine (HIGH DOSE) 0.5 milliLiter(s) IntraMuscular once  insulin lispro (ADMELOG) corrective regimen sliding scale   SubCutaneous at bedtime  insulin lispro (ADMELOG) corrective regimen sliding scale   SubCutaneous three times a day before meals  lactated ringers 1000 milliLiter(s) (75 mL/Hr) IV Continuous <Continuous>  lactobacillus acidophilus 1 Tablet(s) Oral daily  lidocaine   4% Patch 1 Patch Transdermal every 24 hours  melatonin 6 milliGRAM(s) Oral at bedtime  multivitamin 1 Tablet(s) Oral daily  Nephro-nancy 1 Tablet(s) Oral daily  nystatin    Suspension 470598 Unit(s) Oral three times a day  pantoprazole  Injectable 40 milliGRAM(s) IV Push daily  potassium chloride  10 mEq/100 mL IVPB 10 milliEquivalent(s) IV Intermittent every 1 hour  predniSONE   Tablet 5 milliGRAM(s) Oral daily  tamsulosin 0.8 milliGRAM(s) Oral at bedtime      LABS:  11-11    139  |  105  |  5[L]  ----------------------------<  94  3.3[L]   |  25  |  0.26[L]    Ca    7.3[L]      11 Nov 2024 06:29  Phos  2.6     11-11  Mg     2.00     11-11    TPro  4.6[L]  /  Alb  1.9[L]  /  TBili  0.4  /  DBili      /  AST  24  /  ALT  12  /  AlkPhos  199[H]  11-11    Creatinine Trend: 0.26 <--, 0.28 <--, 0.28 <--, 0.28 <--, 0.30 <--, 0.26 <--, 0.31 <--, 0.32 <--    Albumin: 1.9 g/dL (11-11 @ 06:29)  Phosphorus: 2.6 mg/dL (11-11 @ 06:29)                              7.6    5.27  )-----------( 199      ( 11 Nov 2024 06:29 )             23.7     Urine Studies:  Urinalysis - [11-11-24 @ 06:29]      Color  / Appearance  / SG  / pH       Gluc 94 / Ketone   / Bili  / Urobili        Blood  / Protein  / Leuk Est  / Nitrite       RBC  / WBC  / Hyaline  / Gran  / Sq Epi  / Non Sq Epi  / Bacteria       TSH 2.62      [10-13-24 @ 05:30]  Lipid: chol 135, , HDL 39, LDL --      [10-13-24 @ 05:30]        RADIOLOGY & ADDITIONAL STUDIES:

## 2024-11-12 LAB
ALBUMIN SERPL ELPH-MCNC: 2 G/DL — LOW (ref 3.3–5)
ALP SERPL-CCNC: 199 U/L — HIGH (ref 40–120)
ALT FLD-CCNC: 7 U/L — SIGNIFICANT CHANGE UP (ref 4–41)
ANION GAP SERPL CALC-SCNC: 10 MMOL/L — SIGNIFICANT CHANGE UP (ref 7–14)
APTT BLD: 35.5 SEC — SIGNIFICANT CHANGE UP (ref 24.5–35.6)
AST SERPL-CCNC: 24 U/L — SIGNIFICANT CHANGE UP (ref 4–40)
BILIRUB SERPL-MCNC: 0.5 MG/DL — SIGNIFICANT CHANGE UP (ref 0.2–1.2)
BLD GP AB SCN SERPL QL: NEGATIVE — SIGNIFICANT CHANGE UP
BUN SERPL-MCNC: 5 MG/DL — LOW (ref 7–23)
CALCIUM SERPL-MCNC: 7.7 MG/DL — LOW (ref 8.4–10.5)
CHLORIDE SERPL-SCNC: 104 MMOL/L — SIGNIFICANT CHANGE UP (ref 98–107)
CO2 SERPL-SCNC: 22 MMOL/L — SIGNIFICANT CHANGE UP (ref 22–31)
CREAT SERPL-MCNC: 0.27 MG/DL — LOW (ref 0.5–1.3)
EGFR: 136 ML/MIN/1.73M2 — SIGNIFICANT CHANGE UP
GLUCOSE BLDC GLUCOMTR-MCNC: 103 MG/DL — HIGH (ref 70–99)
GLUCOSE BLDC GLUCOMTR-MCNC: 119 MG/DL — HIGH (ref 70–99)
GLUCOSE BLDC GLUCOMTR-MCNC: 123 MG/DL — HIGH (ref 70–99)
GLUCOSE BLDC GLUCOMTR-MCNC: 187 MG/DL — HIGH (ref 70–99)
GLUCOSE BLDC GLUCOMTR-MCNC: 205 MG/DL — HIGH (ref 70–99)
GLUCOSE SERPL-MCNC: 88 MG/DL — SIGNIFICANT CHANGE UP (ref 70–99)
HCT VFR BLD CALC: 23.1 % — LOW (ref 39–50)
HGB BLD-MCNC: 7.3 G/DL — LOW (ref 13–17)
INR BLD: 1.27 RATIO — HIGH (ref 0.85–1.16)
MAGNESIUM SERPL-MCNC: 1.8 MG/DL — SIGNIFICANT CHANGE UP (ref 1.6–2.6)
MCHC RBC-ENTMCNC: 30.4 PG — SIGNIFICANT CHANGE UP (ref 27–34)
MCHC RBC-ENTMCNC: 31.6 G/DL — LOW (ref 32–36)
MCV RBC AUTO: 96.3 FL — SIGNIFICANT CHANGE UP (ref 80–100)
NRBC # BLD: 0 /100 WBCS — SIGNIFICANT CHANGE UP (ref 0–0)
NRBC # FLD: 0 K/UL — SIGNIFICANT CHANGE UP (ref 0–0)
PHOSPHATE SERPL-MCNC: 2.6 MG/DL — SIGNIFICANT CHANGE UP (ref 2.5–4.5)
PLATELET # BLD AUTO: 208 K/UL — SIGNIFICANT CHANGE UP (ref 150–400)
POTASSIUM SERPL-MCNC: 3.4 MMOL/L — LOW (ref 3.5–5.3)
POTASSIUM SERPL-SCNC: 3.4 MMOL/L — LOW (ref 3.5–5.3)
PROT SERPL-MCNC: 4.6 G/DL — LOW (ref 6–8.3)
PROTHROM AB SERPL-ACNC: 14.7 SEC — HIGH (ref 9.9–13.4)
RBC # BLD: 2.4 M/UL — LOW (ref 4.2–5.8)
RBC # FLD: 20.3 % — HIGH (ref 10.3–14.5)
RH IG SCN BLD-IMP: POSITIVE — SIGNIFICANT CHANGE UP
SODIUM SERPL-SCNC: 136 MMOL/L — SIGNIFICANT CHANGE UP (ref 135–145)
WBC # BLD: 5.32 K/UL — SIGNIFICANT CHANGE UP (ref 3.8–10.5)
WBC # FLD AUTO: 5.32 K/UL — SIGNIFICANT CHANGE UP (ref 3.8–10.5)

## 2024-11-12 RX ORDER — POTASSIUM CHLORIDE 600 MG/1
10 TABLET, EXTENDED RELEASE ORAL
Refills: 0 | Status: COMPLETED | OUTPATIENT
Start: 2024-11-12 | End: 2024-11-12

## 2024-11-12 RX ADMIN — NYSTATIN 500000 UNIT(S): 500000 TABLET, FILM COATED ORAL at 14:17

## 2024-11-12 RX ADMIN — POTASSIUM CHLORIDE 100 MILLIEQUIVALENT(S): 600 TABLET, EXTENDED RELEASE ORAL at 06:52

## 2024-11-12 RX ADMIN — CHLORHEXIDINE GLUCONATE 1 APPLICATION(S): 1.2 RINSE ORAL at 14:16

## 2024-11-12 RX ADMIN — ACETAMINOPHEN, DIPHENHYDRAMINE HCL, PHENYLEPHRINE HCL 6 MILLIGRAM(S): 325; 25; 5 TABLET ORAL at 21:03

## 2024-11-12 RX ADMIN — POTASSIUM CHLORIDE 100 MILLIEQUIVALENT(S): 600 TABLET, EXTENDED RELEASE ORAL at 05:48

## 2024-11-12 RX ADMIN — Medication 1 TABLET(S): at 14:18

## 2024-11-12 RX ADMIN — Medication 145 MILLIGRAM(S): at 14:18

## 2024-11-12 RX ADMIN — NYSTATIN 500000 UNIT(S): 500000 TABLET, FILM COATED ORAL at 05:03

## 2024-11-12 RX ADMIN — POTASSIUM CHLORIDE 100 MILLIEQUIVALENT(S): 600 TABLET, EXTENDED RELEASE ORAL at 07:49

## 2024-11-12 RX ADMIN — PREDNISONE 5 MILLIGRAM(S): 20 TABLET ORAL at 05:02

## 2024-11-12 RX ADMIN — Medication 20 MILLIGRAM(S): at 05:02

## 2024-11-12 RX ADMIN — Medication 1: at 18:10

## 2024-11-12 RX ADMIN — Medication 1 TABLET(S): at 14:17

## 2024-11-12 RX ADMIN — NYSTATIN 500000 UNIT(S): 500000 TABLET, FILM COATED ORAL at 21:03

## 2024-11-12 RX ADMIN — DIPHENHYDRAMINE HYDROCHLORIDE AND LIDOCAINE HYDROCHLORIDE AND ALUMINUM HYDROXIDE AND MAGNESIUM HYDRO 30 MILLILITER(S): KIT at 21:04

## 2024-11-12 RX ADMIN — PANTOPRAZOLE SODIUM 40 MILLIGRAM(S): 40 TABLET, DELAYED RELEASE ORAL at 14:18

## 2024-11-12 RX ADMIN — TAMSULOSIN HYDROCHLORIDE 0.8 MILLIGRAM(S): 0.4 CAPSULE ORAL at 21:03

## 2024-11-12 RX ADMIN — DIPHENHYDRAMINE HYDROCHLORIDE AND LIDOCAINE HYDROCHLORIDE AND ALUMINUM HYDROXIDE AND MAGNESIUM HYDRO 30 MILLILITER(S): KIT at 14:17

## 2024-11-12 NOTE — PROGRESS NOTE ADULT - ASSESSMENT
64-year-old male with metastatic prostate cancer, sent in by hematologist from [place] [facility] for uncontrolled pain, nausea, vomiting.   Patient reports diffuse pain starting 6 months ago significantly worsening for the past 2 weeks. Diagnosed with high risk high volume metastatic prostate cancer with bone, liver lymph node mets and presacral mass. Liver biopsy confirmed disease with . Started lupron, loly/pred with plans to initiate taxotere.   nephrology consulted for electrolyte abnormalities.    Hypernatremia  Poor free water intake    He now has diet however not eating much per family  Na is stable currently off ivf.   encourage free water as tolerated.  monitor Na.  Avoid overcorrection >8mEq in 24hrs.    Hypokalemia  GI loss and poor po intake  unable to tolerate PO kcl  supplement kcl 10x3 iv  continue kcl 10x3 if k<4  monitor closely.    Acidosis   non AG  likely GI lost.  Hyperchloremic.  better  monitor CO2.    hypophosphatemia  supplemented   monitor    hypocalcemia   sec to low albumin  corrected ca 8.5  monitor    fever  GNR bacteremia   work up and tx per team    anemia  metastatic prostate cancer  f/u heme/onc

## 2024-11-12 NOTE — CHART NOTE - NSCHARTNOTEFT_GEN_A_CORE
Plan for T3 and T4 vertebral augmentation on Friday. Please place IR procedure order under Dr. Case (procedure cannot be performed without this order).  Updated cbc, bmp and coags.   Hold prophylactic ac morning of procedure.   NPO at midnight prior to procedure.   Write IR pre procedure note.

## 2024-11-12 NOTE — CHART NOTE - NSCHARTNOTEFT_GEN_A_CORE
Florina Morocho is a 64-year-old male with metastatic prostate cancer known to our service,  admitted for lower back pain, nausea, vomiting found to have E.coli bacteremia on Iv antibiotics. His MRi spine shows Diffuse osseous metastases. No cervical pathologic fracture. Multiple mild endplate deformities in the lower thoracic spine are likely pathologic. Widespread epidural disease is predominantly low grade, high-grade at T3.  L1 and L5 pathologic fractures are associated with moderate epidural disease at L1, more mild at L5. At least mild epidural disease at the left S1-S2 neural foramen.     He is s/p Kyphoplasty T5/l1 with plans for Plan for T3 and T4 vertebral augmentation on Friday. Plan is for consideration for radiation therapy (SBRT) for durable pain control with Dr Bruno  as originally planned. Inpatient service will continue to follow

## 2024-11-12 NOTE — PRE PROCEDURE NOTE - PRE PROCEDURE EVALUATION
------------------------------------------------------------  Interventional Radiology Pre-Procedure Note  ------------------------------------------------------------    Indication: 64y Male with history of metastatic prostate cancer with T3-T5 and L1 lesions with plan for vertebral augmentation prior to RT and for pain relief.     Past Medical History:  Benign essential HTN    HLD (hyperlipidemia)    CA of prostate    Basal cell carcinoma        Allergies: No Known Allergies      Medications:    enoxaparin Injectable: 40 milliGRAM(s) SubCutaneous (11-11-24 @ 16:39)  nystatin    Suspension: 034801 Unit(s) Oral (11-12-24 @ 05:03)      Vital Signs:   T(F): 98.2 (05:00), Max: 98.2 (05:00)  HR: 102 (05:00)  BP: 113/76 (05:00)  RR: 17 (05:00)  SpO2: 96% (05:00)    Labs:           7.3  5.32)-----(208     (11-12-24 @ 04:22)         23.1     136 | 104 | 5  --------------------< 88     (11-12-24 @ 04:22)  3.4 | 22 | 0.27       PT: 14.7[H] 11-12-24 @ 04:22  aPTT: 35.5 11-12-24 @ 04:22   INR: 1.27[H] 11-12-24 @ 04:22    Imaging: CT abdomen and pelvis 10/19/24 and CT chest, abdomen and pelvis 10/18/24 were reviewed     Consent: Risks/benefits/alternatives were explained and informed written consent was obtained.     Procedure Plan: Plan for T3-5 and L1 vertebral augmentation today.      ------------------------------------------------------------  Interventional Radiology Pre-Procedure Note  ------------------------------------------------------------    Indication: 64y Male with history of metastatic prostate cancer with T5 and L1 lesions with plan for vertebral augmentation prior to RT and for pain relief.     Past Medical History:  Benign essential HTN    HLD (hyperlipidemia)    CA of prostate    Basal cell carcinoma        Allergies: No Known Allergies      Medications:    enoxaparin Injectable: 40 milliGRAM(s) SubCutaneous (11-11-24 @ 16:39)  nystatin    Suspension: [id number] Unit(s) Oral (11-12-24 @ 05:03)      Vital Signs:   T(F): 98.2 (05:00), Max: 98.2 (05:00)  HR: 102 (05:00)  BP: 113/76 (05:00)  RR: 17 (05:00)  SpO2: 96% (05:00)    Labs:           7.3  5.32)-----(208     (11-12-24 @ 04:22)         23.1     136 | 104 | 5  --------------------< 88     (11-12-24 @ 04:22)  3.4 | 22 | 0.27       PT: 14.7[H] 11-12-24 @ 04:22  aPTT: 35.5 11-12-24 @ 04:22   INR: 1.27[H] 11-12-24 @ 04:22    Imaging: CT abdomen and pelvis 10/19/24 and CT chest, abdomen and pelvis 10/18/24 were reviewed     Consent: Risks/benefits/alternatives were explained and informed written consent was obtained.     Procedure Plan: Plan for T5 and L1 vertebral augmentation today.

## 2024-11-12 NOTE — PROGRESS NOTE ADULT - SUBJECTIVE AND OBJECTIVE BOX
Patient is a 64y old  Male who presents with a chief complaint of Pain (11 Nov 2024 15:24)    Date of servie : 11-12-24 @ 14:49  INTERVAL HPI/OVERNIGHT EVENTS:  T(C): 36.6 (11-12-24 @ 11:45), Max: 36.8 (11-12-24 @ 05:00)  HR: 98 (11-12-24 @ 12:30) (88 - 103)  BP: 102/82 (11-12-24 @ 12:30) (97/72 - 124/83)  RR: 15 (11-12-24 @ 12:30) (15 - 18)  SpO2: 98% (11-12-24 @ 12:30) (96% - 99%)  Wt(kg): --  I&O's Summary    11 Nov 2024 07:01  -  12 Nov 2024 07:00  --------------------------------------------------------  IN: 0 mL / OUT: 1300 mL / NET: -1300 mL        LABS:                        7.3    5.32  )-----------( 208      ( 12 Nov 2024 04:22 )             23.1     11-12    136  |  104  |  5[L]  ----------------------------<  88  3.4[L]   |  22  |  0.27[L]    Ca    7.7[L]      12 Nov 2024 04:22  Phos  2.6     11-12  Mg     1.80     11-12    TPro  4.6[L]  /  Alb  2.0[L]  /  TBili  0.5  /  DBili  x   /  AST  24  /  ALT  7   /  AlkPhos  199[H]  11-12    PT/INR - ( 12 Nov 2024 04:22 )   PT: 14.7 sec;   INR: 1.27 ratio         PTT - ( 12 Nov 2024 04:22 )  PTT:35.5 sec  Urinalysis Basic - ( 12 Nov 2024 04:22 )    Color: x / Appearance: x / SG: x / pH: x  Gluc: 88 mg/dL / Ketone: x  / Bili: x / Urobili: x   Blood: x / Protein: x / Nitrite: x   Leuk Esterase: x / RBC: x / WBC x   Sq Epi: x / Non Sq Epi: x / Bacteria: x      CAPILLARY BLOOD GLUCOSE      POCT Blood Glucose.: 119 mg/dL (12 Nov 2024 13:25)  POCT Blood Glucose.: 123 mg/dL (12 Nov 2024 12:14)  POCT Blood Glucose.: 103 mg/dL (12 Nov 2024 07:48)  POCT Blood Glucose.: 128 mg/dL (11 Nov 2024 20:59)  POCT Blood Glucose.: 109 mg/dL (11 Nov 2024 17:29)        Urinalysis Basic - ( 12 Nov 2024 04:22 )    Color: x / Appearance: x / SG: x / pH: x  Gluc: 88 mg/dL / Ketone: x  / Bili: x / Urobili: x   Blood: x / Protein: x / Nitrite: x   Leuk Esterase: x / RBC: x / WBC x   Sq Epi: x / Non Sq Epi: x / Bacteria: x        MEDICATIONS  (STANDING):  abiraterone 500 mg tablet 2 Tablet(s) 2 Tablet(s) Oral daily  acetaminophen     Tablet .. 650 milliGRAM(s) Oral once  atorvastatin 20 milliGRAM(s) Oral at bedtime  chlorhexidine 2% Cloths 1 Application(s) Topical daily  dextrose 5% 1000 milliLiter(s) (75 mL/Hr) IV Continuous <Continuous>  dextrose 5%. 1000 milliLiter(s) (50 mL/Hr) IV Continuous <Continuous>  dextrose 5%. 1000 milliLiter(s) (100 mL/Hr) IV Continuous <Continuous>  dextrose 50% Injectable 25 Gram(s) IV Push once  dextrose 50% Injectable 12.5 Gram(s) IV Push once  dextrose 50% Injectable 25 Gram(s) IV Push once  diphenhydrAMINE 25 milliGRAM(s) Oral once  enoxaparin Injectable 40 milliGRAM(s) SubCutaneous every 24 hours  fenofibrate Tablet 145 milliGRAM(s) Oral daily  FIRST- Mouthwash  BLM 30 milliLiter(s) Swish and Spit three times a day  glucagon  Injectable 1 milliGRAM(s) IntraMuscular once  haloperidol     Tablet 1 milliGRAM(s) Oral <User Schedule>  influenza  Vaccine (HIGH DOSE) 0.5 milliLiter(s) IntraMuscular once  insulin lispro (ADMELOG) corrective regimen sliding scale   SubCutaneous at bedtime  insulin lispro (ADMELOG) corrective regimen sliding scale   SubCutaneous three times a day before meals  lactated ringers 1000 milliLiter(s) (75 mL/Hr) IV Continuous <Continuous>  lactobacillus acidophilus 1 Tablet(s) Oral daily  lidocaine   4% Patch 1 Patch Transdermal every 24 hours  melatonin 6 milliGRAM(s) Oral at bedtime  multivitamin 1 Tablet(s) Oral daily  Nephro-nancy 1 Tablet(s) Oral daily  nystatin    Suspension 158925 Unit(s) Oral three times a day  pantoprazole  Injectable 40 milliGRAM(s) IV Push daily  predniSONE   Tablet 5 milliGRAM(s) Oral daily  tamsulosin 0.8 milliGRAM(s) Oral at bedtime    MEDICATIONS  (PRN):  acetaminophen     Tablet .. 650 milliGRAM(s) Oral every 6 hours PRN Temp greater or equal to 38C (100.4F), Mild Pain (1 - 3), Moderate Pain (4 - 6)  aluminum hydroxide/magnesium hydroxide/simethicone Suspension 30 milliLiter(s) Oral every 4 hours PRN Dyspepsia  artificial tears (preservative free) Ophthalmic Solution 1 Drop(s) Both EYES four times a day PRN Dry Eyes  dextrose Oral Gel 15 Gram(s) Oral once PRN Blood Glucose LESS THAN 70 milliGRAM(s)/deciliter  morphine   Solution 5 milliGRAM(s) Oral every 4 hours PRN Severe Pain (7 - 10)  morphine   Solution 2.5 milliGRAM(s) Oral every 4 hours PRN Moderate Pain (4 - 6)  naloxone Injectable 0.1 milliGRAM(s) IV Push every 3 minutes PRN For ANY of the following changes in patient status:  A. RR LESS THAN 10 breaths per minute, B. Oxygen saturation LESS THAN 90%, C. Sedation score of 6  polyethylene glycol 3350 17 Gram(s) Oral daily PRN Constipation          PHYSICAL EXAM:  GENERAL: NAD, well-groomed, well-developed  HEAD:  Atraumatic, Normocephalic  CHEST/LUNG: Clear to percussion bilaterally; No rales, rhonchi, wheezing, or rubs  HEART: Regular rate and rhythm; No murmurs, rubs, or gallops  ABDOMEN: Soft, Nontender, Nondistended; Bowel sounds present  EXTREMITIES:  2+ Peripheral Pulses, No clubbing, cyanosis, or edema  LYMPH: No lymphadenopathy noted  SKIN: No rashes or lesions    Care Discussed with Consultants/Other Providers [ ] YES  [ ] NO

## 2024-11-12 NOTE — CHART NOTE - NSCHARTNOTEFT_GEN_A_CORE
NUTRITION FOLLOW UP NOTE    Pt seen for follow up-severe malnutrition    SOURCE: [x] Patient [x] Medical record [] RN/PCA [x] Family/Caregiver [] Patient unavailable [] Patient inappropriate (disoriented, nonverbal, intubated/sedated) [] Other:    Medical Course: per chart review (11/11): 64-year-old male with metastatic prostate cancer, sent in by hematologist from [facility] for uncontrolled pain, nausea, vomiting.     Diet Prescription: Diet, Regular:   Pureed (PUREED)  Mildly Thick Liquids (MILDTHICKLIQS)  Supplement Feeding Modality:  Oral  Ensure Plus High Protein Cans or Servings Per Day:  1       Frequency:  Two Times a day (10-29-24 @ 13:12)      Nutrition Course: Met patient and his wife at bedside. Patient alert. Patient is NPO at time of visit. Now advanced diet to regular pureed, mildly thick liquids per SLP evaluation on 10/25. Patient reports fair appetite in hospital. Per RN flowsheets intake is 51-75%. Patient is receiving Ensure Plus High Protein Therapeutic Shake 2x daily (700kcal, 40g protein) and Banatrol 2x daily, but patient dislikes them. Patient denies any in house nausea, vomiting, diarrhea, or constipation. Last BM noted on 11/11 per RN flowsheets. Bowel regimen is in placed. No reported chewing/swallowing issues. No known food allergies. Medications notable for insulin. Labs reviewed, A1c 6.2% indicating fair control, fingersticks WDL. Patient amenable to a trial of Mighty Shake 3x daily (220kcal, 6gm pro per each serving) to optimize nutrition. And d/c Ensure Plus High Protein 2x daily and Banatrol 2x daily. Food preferences explored and noted, diet office informed. Education declined. RD to remain available for further nutritional interventions as indicated       Food Allergy/Intolerance: NKFA  -    Pertinent Medications: MEDICATIONS  (STANDING):  abiraterone 500 mg tablet 2 Tablet(s) 2 Tablet(s) Oral daily  acetaminophen     Tablet .. 650 milliGRAM(s) Oral once  atorvastatin 20 milliGRAM(s) Oral at bedtime  chlorhexidine 2% Cloths 1 Application(s) Topical daily  dextrose 5% 1000 milliLiter(s) (75 mL/Hr) IV Continuous <Continuous>  dextrose 5%. 1000 milliLiter(s) (50 mL/Hr) IV Continuous <Continuous>  dextrose 5%. 1000 milliLiter(s) (100 mL/Hr) IV Continuous <Continuous>  dextrose 50% Injectable 12.5 Gram(s) IV Push once  dextrose 50% Injectable 25 Gram(s) IV Push once  dextrose 50% Injectable 25 Gram(s) IV Push once  diphenhydrAMINE 25 milliGRAM(s) Oral once  enoxaparin Injectable 40 milliGRAM(s) SubCutaneous every 24 hours  fenofibrate Tablet 145 milliGRAM(s) Oral daily  FIRST- Mouthwash  BLM 30 milliLiter(s) Swish and Spit three times a day  glucagon  Injectable 1 milliGRAM(s) IntraMuscular once  haloperidol     Tablet 1 milliGRAM(s) Oral <User Schedule>  influenza  Vaccine (HIGH DOSE) 0.5 milliLiter(s) IntraMuscular once  insulin lispro (ADMELOG) corrective regimen sliding scale   SubCutaneous three times a day before meals  insulin lispro (ADMELOG) corrective regimen sliding scale   SubCutaneous at bedtime  lactated ringers 1000 milliLiter(s) (75 mL/Hr) IV Continuous <Continuous>  lactobacillus acidophilus 1 Tablet(s) Oral daily  lidocaine   4% Patch 1 Patch Transdermal every 24 hours  melatonin 6 milliGRAM(s) Oral at bedtime  multivitamin 1 Tablet(s) Oral daily  Nephro-nancy 1 Tablet(s) Oral daily  nystatin    Suspension 991440 Unit(s) Oral three times a day  pantoprazole  Injectable 40 milliGRAM(s) IV Push daily  predniSONE   Tablet 5 milliGRAM(s) Oral daily  tamsulosin 0.8 milliGRAM(s) Oral at bedtime    MEDICATIONS  (PRN):  acetaminophen     Tablet .. 650 milliGRAM(s) Oral every 6 hours PRN Temp greater or equal to 38C (100.4F), Mild Pain (1 - 3), Moderate Pain (4 - 6)  aluminum hydroxide/magnesium hydroxide/simethicone Suspension 30 milliLiter(s) Oral every 4 hours PRN Dyspepsia  artificial tears (preservative free) Ophthalmic Solution 1 Drop(s) Both EYES four times a day PRN Dry Eyes  dextrose Oral Gel 15 Gram(s) Oral once PRN Blood Glucose LESS THAN 70 milliGRAM(s)/deciliter  morphine   Solution 5 milliGRAM(s) Oral every 4 hours PRN Severe Pain (7 - 10)  morphine   Solution 2.5 milliGRAM(s) Oral every 4 hours PRN Moderate Pain (4 - 6)  naloxone Injectable 0.1 milliGRAM(s) IV Push every 3 minutes PRN For ANY of the following changes in patient status:  A. RR LESS THAN 10 breaths per minute, B. Oxygen saturation LESS THAN 90%, C. Sedation score of 6  polyethylene glycol 3350 17 Gram(s) Oral daily PRN Constipation    [] Relevant notes on medications:     Pertinent Labs: 11-12 Na136 mmol/L Glu 88 mg/dL K+ 3.4 mmol/L[L] Cr  0.27 mg/dL[L] BUN 5 mg/dL[L] 11-12 Phos 2.6 mg/dL 11-12 Alb 2.0 g/dL[L]     A1C with Estimated Average Glucose Result: 6.2 % (10-23-24 @ 06:00)         CAPILLARY BLOOD GLUCOSE      POCT Blood Glucose.: 119 mg/dL (12 Nov 2024 13:25)  POCT Blood Glucose.: 123 mg/dL (12 Nov 2024 12:14)  POCT Blood Glucose.: 103 mg/dL (12 Nov 2024 07:48)  POCT Blood Glucose.: 128 mg/dL (11 Nov 2024 20:59)  POCT Blood Glucose.: 109 mg/dL (11 Nov 2024 17:29)    [] Relevant notes on labs:     Weight: dosing weight 69.4kg(10/12); 79.4kg(6/13); 79.4kg(5/16)  Weight Assessment: no updated weight. Weight loss of 12.6%x4 months  Height: 172.7cm  IBW: 154lbs / 70kg +/-10%  BMI: 23.3kg/m^2    Physical Assessment, per flowsheets:  Edema: No edema noted per RN flowsheets   Pressure Injury: No pressure injuries/DTI noted per RN flowsheets.         Estimated Needs:   [X] No change since previous assessment, based on dosing weight 69.4kg   2082-2429kcal daily @ 30-35kcal/kg,  83-104gm protein daily @ 1.2-1.5gm/kg       Previous Nutrition Diagnosis:   [ ] Malnutrition, severe  Nutrition Diagnosis is [x ] ongoing  [ ] resolved [ ] not applicable   New Nutrition Diagnosis: [ x] not applicable     Education:   [x ] Provided to patient all nutrition questions answered during visit       Interventions:   1) Continue present diet order as it remains appropriate at this time    2) RD will provide Mighty Shake 3x daily (220kcal, 6gm pro per each serving)  3) Obtain daily weights   4) Encourage PO intake and honor food preferences       Monitor & Evaluate:  PO intake, tolerance to diet/supplement, nutrition related lab values, weight trends, BMs/GI distress, hydration status, skin integrity.    Izabella Ann, MS, RDN, CDN    Also available on [employer] Teams

## 2024-11-12 NOTE — PROGRESS NOTE ADULT - SUBJECTIVE AND OBJECTIVE BOX
[facility] [place] [facility]   MD MARKO EASTMAN MD ANGELA WONG, PA    TEL:  OFFICE: [phone]  From 5pm-7am Answering Service [phone]    -- RENAL FOLLOW UP NOTE ---Date of Service 11-12-24 @ 15:01    Patient is a 64y old  Male who presents with a chief complaint of Pain (12 Nov 2024 14:47)      Patient seen and examined at bedside. No chest pain/sob    VITALS:  T(F): 97.8 (11-12-24 @ 11:45), Max: 98.2 (11-12-24 @ 05:00)  HR: 98 (11-12-24 @ 12:30)  BP: 102/82 (11-12-24 @ 12:30)  RR: 15 (11-12-24 @ 12:30)  SpO2: 98% (11-12-24 @ 12:30)  Wt(kg): --    11-11 @ 07:01  -  11-12 @ 07:00  --------------------------------------------------------  IN: 0 mL / OUT: 1300 mL / NET: -1300 mL          PHYSICAL EXAM:  General: NAD  Neck: No JVD  Respiratory: CTAB, no wheezes, rales or rhonchi  Cardiovascular: S1, S2, RRR  Gastrointestinal: BS+, soft, NT/ND  Extremities: No peripheral edema    Hospital Medications:   MEDICATIONS  (STANDING):  abiraterone 500 mg tablet 2 Tablet(s) 2 Tablet(s) Oral daily  acetaminophen     Tablet .. 650 milliGRAM(s) Oral once  atorvastatin 20 milliGRAM(s) Oral at bedtime  chlorhexidine 2% Cloths 1 Application(s) Topical daily  dextrose 5% 1000 milliLiter(s) (75 mL/Hr) IV Continuous <Continuous>  dextrose 5%. 1000 milliLiter(s) (50 mL/Hr) IV Continuous <Continuous>  dextrose 5%. 1000 milliLiter(s) (100 mL/Hr) IV Continuous <Continuous>  dextrose 50% Injectable 12.5 Gram(s) IV Push once  dextrose 50% Injectable 25 Gram(s) IV Push once  dextrose 50% Injectable 25 Gram(s) IV Push once  diphenhydrAMINE 25 milliGRAM(s) Oral once  enoxaparin Injectable 40 milliGRAM(s) SubCutaneous every 24 hours  fenofibrate Tablet 145 milliGRAM(s) Oral daily  FIRST- Mouthwash  BLM 30 milliLiter(s) Swish and Spit three times a day  glucagon  Injectable 1 milliGRAM(s) IntraMuscular once  haloperidol     Tablet 1 milliGRAM(s) Oral <User Schedule>  influenza  Vaccine (HIGH DOSE) 0.5 milliLiter(s) IntraMuscular once  insulin lispro (ADMELOG) corrective regimen sliding scale   SubCutaneous at bedtime  insulin lispro (ADMELOG) corrective regimen sliding scale   SubCutaneous three times a day before meals  lactated ringers 1000 milliLiter(s) (75 mL/Hr) IV Continuous <Continuous>  lactobacillus acidophilus 1 Tablet(s) Oral daily  lidocaine   4% Patch 1 Patch Transdermal every 24 hours  melatonin 6 milliGRAM(s) Oral at bedtime  multivitamin 1 Tablet(s) Oral daily  Nephro-nancy 1 Tablet(s) Oral daily  nystatin    Suspension [id number] Unit(s) Oral three times a day  pantoprazole  Injectable 40 milliGRAM(s) IV Push daily  predniSONE   Tablet 5 milliGRAM(s) Oral daily  tamsulosin 0.8 milliGRAM(s) Oral at bedtime      LABS:  11-12    136  |  104  |  5[L]  ----------------------------<  88  3.4[L]   |  22  |  0.27[L]    Ca    7.7[L]      12 Nov 2024 04:22  Phos  2.6     11-12  Mg     1.80     11-12    TPro  4.6[L]  /  Alb  2.0[L]  /  TBili  0.5  /  DBili      /  AST  24  /  ALT  7   /  AlkPhos  199[H]  11-12    Creatinine Trend: 0.27 <--, 0.26 <--, 0.28 <--, 0.28 <--, 0.28 <--, 0.30 <--, 0.26 <--    Albumin: 2.0 g/dL (11-12 @ 04:22)  Phosphorus: 2.6 mg/dL (11-12 @ 04:22)                              7.3    5.32  )-----------( 208      ( 12 Nov 2024 04:22 )             23.1     Urine Studies:  Urinalysis - [11-12-24 @ 04:22]      Color  / Appearance  / SG  / pH       Gluc 88 / Ketone   / Bili  / Urobili        Blood  / Protein  / Leuk Est  / Nitrite       RBC  / WBC  / Hyaline  / Gran  / Sq Epi  / Non Sq Epi  / Bacteria       TSH 2.62      [10-13-24 @ 05:30]  Lipid: chol 135, , HDL 39, LDL --      [10-13-24 @ 05:30]        RADIOLOGY & ADDITIONAL STUDIES:

## 2024-11-12 NOTE — PROGRESS NOTE ADULT - ASSESSMENT
64-year-old male with metastatic prostate cancer, sent in by hematologist from [facility] and cancer for uncontrolled pain, nausea, vomiting.       Problem/Plan - 1:  ·  Problem: Cancer related pain.   ·  Plan: - appreciate pall care recommendations:  - pain control as per palliative crae   - RT consult appreciated ,  awaiting kyphoplasty   - IR  kyphoplasty today and tomorrow  - RT likely outpt     Problem/Plan - 2:·  Problem: Nausea & vomiting., Diarrhea, colitis , SMA dissection   ·  Plan: -  vascular fu appreciated  - GI and surgery consult appreciated   - ID fu  - finished abx  - ni intervention as per vascular     Problem/Plan - 3:  ·  Problem: CA of prostate.   ·  Plan: Metastatic prostate cancer  - f/u heme/onc recommendations  - Rad Onc fu   - Palliative for symptom control.    Problem/Plan - 4:  ·  Problem: Anemia , Thrombocytopenia, unspecified.   ·  Plan: -monitor cbc   transfuse PRN  Problem/Plan - 5:  ·  Problem: Bacteremia   Plan: - ID fu   - finished  ABX as per ID     dw wife at bedside

## 2024-11-13 LAB
ANION GAP SERPL CALC-SCNC: 7 MMOL/L — SIGNIFICANT CHANGE UP (ref 7–14)
BUN SERPL-MCNC: 9 MG/DL — SIGNIFICANT CHANGE UP (ref 7–23)
CALCIUM SERPL-MCNC: 7.7 MG/DL — LOW (ref 8.4–10.5)
CHLORIDE SERPL-SCNC: 105 MMOL/L — SIGNIFICANT CHANGE UP (ref 98–107)
CO2 SERPL-SCNC: 24 MMOL/L — SIGNIFICANT CHANGE UP (ref 22–31)
CREAT SERPL-MCNC: 0.3 MG/DL — LOW (ref 0.5–1.3)
EGFR: 132 ML/MIN/1.73M2 — SIGNIFICANT CHANGE UP
GLUCOSE BLDC GLUCOMTR-MCNC: 116 MG/DL — HIGH (ref 70–99)
GLUCOSE BLDC GLUCOMTR-MCNC: 126 MG/DL — HIGH (ref 70–99)
GLUCOSE BLDC GLUCOMTR-MCNC: 129 MG/DL — HIGH (ref 70–99)
GLUCOSE BLDC GLUCOMTR-MCNC: 136 MG/DL — HIGH (ref 70–99)
GLUCOSE SERPL-MCNC: 143 MG/DL — HIGH (ref 70–99)
HCT VFR BLD CALC: 23 % — LOW (ref 39–50)
HGB BLD-MCNC: 7.2 G/DL — LOW (ref 13–17)
MAGNESIUM SERPL-MCNC: 1.9 MG/DL — SIGNIFICANT CHANGE UP (ref 1.6–2.6)
MCHC RBC-ENTMCNC: 30.4 PG — SIGNIFICANT CHANGE UP (ref 27–34)
MCHC RBC-ENTMCNC: 31.3 G/DL — LOW (ref 32–36)
MCV RBC AUTO: 97 FL — SIGNIFICANT CHANGE UP (ref 80–100)
NRBC # BLD: 0 /100 WBCS — SIGNIFICANT CHANGE UP (ref 0–0)
NRBC # FLD: 0 K/UL — SIGNIFICANT CHANGE UP (ref 0–0)
PHOSPHATE SERPL-MCNC: 2.7 MG/DL — SIGNIFICANT CHANGE UP (ref 2.5–4.5)
PLATELET # BLD AUTO: 239 K/UL — SIGNIFICANT CHANGE UP (ref 150–400)
POTASSIUM SERPL-MCNC: 3.7 MMOL/L — SIGNIFICANT CHANGE UP (ref 3.5–5.3)
POTASSIUM SERPL-SCNC: 3.7 MMOL/L — SIGNIFICANT CHANGE UP (ref 3.5–5.3)
RBC # BLD: 2.37 M/UL — LOW (ref 4.2–5.8)
RBC # FLD: 20 % — HIGH (ref 10.3–14.5)
SODIUM SERPL-SCNC: 136 MMOL/L — SIGNIFICANT CHANGE UP (ref 135–145)
WBC # BLD: 5.06 K/UL — SIGNIFICANT CHANGE UP (ref 3.8–10.5)
WBC # FLD AUTO: 5.06 K/UL — SIGNIFICANT CHANGE UP (ref 3.8–10.5)

## 2024-11-13 RX ADMIN — LIDOCAINE 1 PATCH: 40 CREAM TOPICAL at 15:46

## 2024-11-13 RX ADMIN — Medication 1 TABLET(S): at 11:39

## 2024-11-13 RX ADMIN — ENOXAPARIN SODIUM 40 MILLIGRAM(S): 30 INJECTION SUBCUTANEOUS at 15:46

## 2024-11-13 RX ADMIN — NYSTATIN 500000 UNIT(S): 500000 TABLET, FILM COATED ORAL at 05:05

## 2024-11-13 RX ADMIN — TAMSULOSIN HYDROCHLORIDE 0.8 MILLIGRAM(S): 0.4 CAPSULE ORAL at 21:47

## 2024-11-13 RX ADMIN — ACETAMINOPHEN, DIPHENHYDRAMINE HCL, PHENYLEPHRINE HCL 6 MILLIGRAM(S): 325; 25; 5 TABLET ORAL at 21:48

## 2024-11-13 RX ADMIN — PANTOPRAZOLE SODIUM 40 MILLIGRAM(S): 40 TABLET, DELAYED RELEASE ORAL at 11:39

## 2024-11-13 RX ADMIN — NYSTATIN 500000 UNIT(S): 500000 TABLET, FILM COATED ORAL at 21:47

## 2024-11-13 RX ADMIN — DIPHENHYDRAMINE HYDROCHLORIDE AND LIDOCAINE HYDROCHLORIDE AND ALUMINUM HYDROXIDE AND MAGNESIUM HYDRO 30 MILLILITER(S): KIT at 05:05

## 2024-11-13 RX ADMIN — NYSTATIN 500000 UNIT(S): 500000 TABLET, FILM COATED ORAL at 11:38

## 2024-11-13 RX ADMIN — PREDNISONE 5 MILLIGRAM(S): 20 TABLET ORAL at 05:06

## 2024-11-13 RX ADMIN — Medication 1 MILLIGRAM(S): at 21:47

## 2024-11-13 RX ADMIN — Medication 20 MILLIGRAM(S): at 05:06

## 2024-11-13 RX ADMIN — CHLORHEXIDINE GLUCONATE 1 APPLICATION(S): 1.2 RINSE ORAL at 11:40

## 2024-11-13 RX ADMIN — DIPHENHYDRAMINE HYDROCHLORIDE AND LIDOCAINE HYDROCHLORIDE AND ALUMINUM HYDROXIDE AND MAGNESIUM HYDRO 30 MILLILITER(S): KIT at 11:40

## 2024-11-13 RX ADMIN — Medication 145 MILLIGRAM(S): at 11:40

## 2024-11-13 RX ADMIN — LIDOCAINE 1 PATCH: 40 CREAM TOPICAL at 19:45

## 2024-11-13 NOTE — PROGRESS NOTE ADULT - SUBJECTIVE AND OBJECTIVE BOX
Date of Service: 11-13-24 @ 12:30    Patient is a 64y old  Male who presents with a chief complaint of Pain (13 Nov 2024 12:10)      Any change in ROS: seems to be doing  ok : no sob:  no cough :   on room air     MEDICATIONS  (STANDING):  abiraterone 500 mg tablet 2 Tablet(s) 2 Tablet(s) Oral daily  acetaminophen     Tablet .. 650 milliGRAM(s) Oral once  atorvastatin 20 milliGRAM(s) Oral at bedtime  chlorhexidine 2% Cloths 1 Application(s) Topical daily  dextrose 5% 1000 milliLiter(s) (75 mL/Hr) IV Continuous <Continuous>  dextrose 5%. 1000 milliLiter(s) (100 mL/Hr) IV Continuous <Continuous>  dextrose 5%. 1000 milliLiter(s) (50 mL/Hr) IV Continuous <Continuous>  dextrose 50% Injectable 12.5 Gram(s) IV Push once  dextrose 50% Injectable 25 Gram(s) IV Push once  dextrose 50% Injectable 25 Gram(s) IV Push once  diphenhydrAMINE 25 milliGRAM(s) Oral once  enoxaparin Injectable 40 milliGRAM(s) SubCutaneous every 24 hours  fenofibrate Tablet 145 milliGRAM(s) Oral daily  FIRST- Mouthwash  BLM 30 milliLiter(s) Swish and Spit three times a day  glucagon  Injectable 1 milliGRAM(s) IntraMuscular once  haloperidol     Tablet 1 milliGRAM(s) Oral <User Schedule>  influenza  Vaccine (HIGH DOSE) 0.5 milliLiter(s) IntraMuscular once  insulin lispro (ADMELOG) corrective regimen sliding scale   SubCutaneous three times a day before meals  insulin lispro (ADMELOG) corrective regimen sliding scale   SubCutaneous at bedtime  lactated ringers 1000 milliLiter(s) (75 mL/Hr) IV Continuous <Continuous>  lactobacillus acidophilus 1 Tablet(s) Oral daily  lidocaine   4% Patch 1 Patch Transdermal every 24 hours  melatonin 6 milliGRAM(s) Oral at bedtime  multivitamin 1 Tablet(s) Oral daily  Nephro-nancy 1 Tablet(s) Oral daily  nystatin    Suspension [id number] Unit(s) Oral three times a day  pantoprazole  Injectable 40 milliGRAM(s) IV Push daily  predniSONE   Tablet 5 milliGRAM(s) Oral daily  tamsulosin 0.8 milliGRAM(s) Oral at bedtime    MEDICATIONS  (PRN):  acetaminophen     Tablet .. 650 milliGRAM(s) Oral every 6 hours PRN Temp greater or equal to 38C (100.4F), Mild Pain (1 - 3), Moderate Pain (4 - 6)  aluminum hydroxide/magnesium hydroxide/simethicone Suspension 30 milliLiter(s) Oral every 4 hours PRN Dyspepsia  artificial tears (preservative free) Ophthalmic Solution 1 Drop(s) Both EYES four times a day PRN Dry Eyes  dextrose Oral Gel 15 Gram(s) Oral once PRN Blood Glucose LESS THAN 70 milliGRAM(s)/deciliter  morphine   Solution 2.5 milliGRAM(s) Oral every 4 hours PRN Moderate Pain (4 - 6)  morphine   Solution 5 milliGRAM(s) Oral every 4 hours PRN Severe Pain (7 - 10)  naloxone Injectable 0.1 milliGRAM(s) IV Push every 3 minutes PRN For ANY of the following changes in patient status:  A. RR LESS THAN 10 breaths per minute, B. Oxygen saturation LESS THAN 90%, C. Sedation score of 6  polyethylene glycol 3350 17 Gram(s) Oral daily PRN Constipation    Vital Signs Last 24 Hrs  T(C): 36.4 (13 Nov 2024 12:00), Max: 36.6 (12 Nov 2024 14:00)  T(F): 97.5 (13 Nov 2024 12:00), Max: 97.9 (12 Nov 2024 16:30)  HR: 93 (13 Nov 2024 12:00) (89 - 101)  BP: 99/72 (13 Nov 2024 12:00) (99/72 - 126/87)  BP(mean): --  RR: 18 (13 Nov 2024 12:00) (18 - 19)  SpO2: 100% (13 Nov 2024 12:00) (97% - 100%)    Parameters below as of 13 Nov 2024 05:00  Patient On (Oxygen Delivery Method): room air        I&O's Summary    12 Nov 2024 07:01  -  13 Nov 2024 07:00  --------------------------------------------------------  IN: 480 mL / OUT: 750 mL / NET: -270 mL    13 Nov 2024 07:01  -  13 Nov 2024 12:30  --------------------------------------------------------  IN: 240 mL / OUT: 250 mL / NET: -10 mL          Physical Exam:   GENERAL: NAD, well-groomed, well-developed  HEENT: CHAVA/   Atraumatic, Normocephalic  ENMT: No tonsillar erythema, exudates, or enlargement; Moist mucous membranes, Good dentition, No lesions  NECK: Supple, No JVD, Normal thyroid  CHEST/LUNG: Clear to auscultaion  CVS: Regular rate and rhythm; No murmurs, rubs, or gallops  GI: : Soft, Nontender, Nondistended; Bowel sounds present  NERVOUS SYSTEM:  Alert & Oriented X3  EXTREMITIES:  -edema  LYMPH: No lymphadenopathy noted  SKIN: No rashes or lesions  ENDOCRINOLOGY: No Thyromegaly  PSYCH: Appropriate    Labs:                              7.2    5.06  )-----------( 239      ( 13 Nov 2024 04:30 )             23.0                         7.3    5.32  )-----------( 208      ( 12 Nov 2024 04:22 )             23.1                         7.6    5.27  )-----------( 199      ( 11 Nov 2024 06:29 )             23.7                         7.4    5.82  )-----------( 182      ( 10 Nov 2024 06:50 )             23.0     11-13    136  |  105  |  9   ----------------------------<  143[H]  3.7   |  24  |  0.30[L]  11-12    136  |  104  |  5[L]  ----------------------------<  88  3.4[L]   |  22  |  0.27[L]  11-11    139  |  105  |  5[L]  ----------------------------<  94  3.3[L]   |  25  |  0.26[L]  11-10    135  |  103  |  5[L]  ----------------------------<  86  3.2[L]   |  24  |  0.28[L]    Ca    7.7[L]      13 Nov 2024 04:30  Ca    7.7[L]      12 Nov 2024 04:22  Phos  2.7     11-13  Phos  2.6     11-12  Mg     1.90     11-13  Mg     1.80     11-12    TPro  4.6[L]  /  Alb  2.0[L]  /  TBili  0.5  /  DBili  x   /  AST  24  /  ALT  7   /  AlkPhos  199[H]  11-12  TPro  4.6[L]  /  Alb  1.9[L]  /  TBili  0.4  /  DBili  x   /  AST  24  /  ALT  12  /  AlkPhos  199[H]  11-11  TPro  4.5[L]  /  Alb  2.0[L]  /  TBili  0.5  /  DBili  x   /  AST  28  /  ALT  12  /  AlkPhos  186[H]  11-10    CAPILLARY BLOOD GLUCOSE      POCT Blood Glucose.: 116 mg/dL (13 Nov 2024 08:47)  POCT Blood Glucose.: 205 mg/dL (12 Nov 2024 21:14)  POCT Blood Glucose.: 187 mg/dL (12 Nov 2024 17:27)  POCT Blood Glucose.: 119 mg/dL (12 Nov 2024 13:25)      LIVER FUNCTIONS - ( 12 Nov 2024 04:22 )  Alb: 2.0 g/dL / Pro: 4.6 g/dL / ALK PHOS: 199 U/L / ALT: 7 U/L / AST: 24 U/L / GGT: x           PT/INR - ( 12 Nov 2024 04:22 )   PT: 14.7 sec;   INR: 1.27 ratio         PTT - ( 12 Nov 2024 04:22 )  PTT:35.5 sec  Urinalysis Basic - ( 13 Nov 2024 04:30 )    Color: x / Appearance: x / SG: x / pH: x  Gluc: 143 mg/dL / Ketone: x  / Bili: x / Urobili: x   Blood: x / Protein: x / Nitrite: x   Leuk Esterase: x / RBC: x / WBC x   Sq Epi: x / Non Sq Epi: x / Bacteria: x    radReference Recent Physical Exam:   Reference Recent Physical Exam:  · In accordance with current standards limiting patient contact please refer to the recent:	Inpatient Physical Exam  · Inpatient Physical Exam Document Referenced	Hospitalist Attending      Florina Rashawn is a 64-year-old male with metastatic prostate cancer known to our service,  admitted for lower back pain, nausea, vomiting found to have E.coli bacteremia on Iv antibiotics. His MRi spine shows Diffuse osseous metastases. No cervical pathologic fracture. Multiple mild endplate deformities in the lower thoracic spine are likely pathologic. Widespread epidural disease is predominantly low grade, high-grade at T3.  L1 and L5 pathologic fractures are associated with moderate epidural disease at L1, more mild at L5. At least mild epidural disease at the left S1-S2 neural foramen.     He is s/p Kyphoplasty T5/l1 with plans for Plan for T3 and T4 vertebral augmentation on Friday. Plan is for consideration for radiation therapy (SBRT) for durable pain control with Dr Bruno  as originally planned. Inpatient service will continue to follow.        RECENT CULTURES:        RESPIRATORY CULTURES:          Studies  Chest X-RAY  CT SCAN Chest   Venous Dopplers: LE:   CT Abdomen  Others

## 2024-11-13 NOTE — PROGRESS NOTE ADULT - SUBJECTIVE AND OBJECTIVE BOX
[facility] [place] [facility]   MD MARKO EASTMAN MD ANGELA WONG, PA    TEL:  OFFICE: [phone]  From 5pm-7am Answering Service [phone]    -- RENAL FOLLOW UP NOTE ---Date of Service 11-13-24 @ 12:10    Patient is a 64y old  Male who presents with a chief complaint of Pain (13 Nov 2024 10:09)      Patient seen and examined at bedside. No chest pain/sob, no diarrhea    VITALS:  T(F): 97.5 (11-13-24 @ 12:00), Max: 97.9 (11-12-24 @ 16:30)  HR: 93 (11-13-24 @ 12:00)  BP: 99/72 (11-13-24 @ 12:00)  RR: 18 (11-13-24 @ 12:00)  SpO2: 100% (11-13-24 @ 12:00)  Wt(kg): --    11-12 @ 07:01  -  11-13 @ 07:00  --------------------------------------------------------  IN: 480 mL / OUT: 750 mL / NET: -270 mL    11-13 @ 07:01  -  11-13 @ 12:10  --------------------------------------------------------  IN: 240 mL / OUT: 250 mL / NET: -10 mL          PHYSICAL EXAM:  General: NAD  Neck: No JVD  Respiratory: CTAB, no wheezes, rales or rhonchi  Cardiovascular: S1, S2, RRR  Gastrointestinal: BS+, soft, NT/ND  Extremities: No peripheral edema    Hospital Medications:   MEDICATIONS  (STANDING):  abiraterone 500 mg tablet 2 Tablet(s) 2 Tablet(s) Oral daily  acetaminophen     Tablet .. 650 milliGRAM(s) Oral once  atorvastatin 20 milliGRAM(s) Oral at bedtime  chlorhexidine 2% Cloths 1 Application(s) Topical daily  dextrose 5% 1000 milliLiter(s) (75 mL/Hr) IV Continuous <Continuous>  dextrose 5%. 1000 milliLiter(s) (50 mL/Hr) IV Continuous <Continuous>  dextrose 5%. 1000 milliLiter(s) (100 mL/Hr) IV Continuous <Continuous>  dextrose 50% Injectable 12.5 Gram(s) IV Push once  dextrose 50% Injectable 25 Gram(s) IV Push once  dextrose 50% Injectable 25 Gram(s) IV Push once  diphenhydrAMINE 25 milliGRAM(s) Oral once  enoxaparin Injectable 40 milliGRAM(s) SubCutaneous every 24 hours  fenofibrate Tablet 145 milliGRAM(s) Oral daily  FIRST- Mouthwash  BLM 30 milliLiter(s) Swish and Spit three times a day  glucagon  Injectable 1 milliGRAM(s) IntraMuscular once  haloperidol     Tablet 1 milliGRAM(s) Oral <User Schedule>  influenza  Vaccine (HIGH DOSE) 0.5 milliLiter(s) IntraMuscular once  insulin lispro (ADMELOG) corrective regimen sliding scale   SubCutaneous three times a day before meals  insulin lispro (ADMELOG) corrective regimen sliding scale   SubCutaneous at bedtime  lactated ringers 1000 milliLiter(s) (75 mL/Hr) IV Continuous <Continuous>  lactobacillus acidophilus 1 Tablet(s) Oral daily  lidocaine   4% Patch 1 Patch Transdermal every 24 hours  melatonin 6 milliGRAM(s) Oral at bedtime  multivitamin 1 Tablet(s) Oral daily  Nephro-nancy 1 Tablet(s) Oral daily  nystatin    Suspension [id number] Unit(s) Oral three times a day  pantoprazole  Injectable 40 milliGRAM(s) IV Push daily  predniSONE   Tablet 5 milliGRAM(s) Oral daily  tamsulosin 0.8 milliGRAM(s) Oral at bedtime      LABS:  11-13    136  |  105  |  9   ----------------------------<  143[H]  3.7   |  24  |  0.30[L]    Ca    7.7[L]      13 Nov 2024 04:30  Phos  2.7     11-13  Mg     1.90     11-13    TPro  4.6[L]  /  Alb  2.0[L]  /  TBili  0.5  /  DBili      /  AST  24  /  ALT  7   /  AlkPhos  199[H]  11-12    Creatinine Trend: 0.30 <--, 0.27 <--, 0.26 <--, 0.28 <--, 0.28 <--, 0.28 <--, 0.30 <--    Phosphorus: 2.7 mg/dL (11-13 @ 04:30)                              7.2    5.06  )-----------( 239      ( 13 Nov 2024 04:30 )             23.0     Urine Studies:  Urinalysis - [11-13-24 @ 04:30]      Color  / Appearance  / SG  / pH       Gluc 143 / Ketone   / Bili  / Urobili        Blood  / Protein  / Leuk Est  / Nitrite       RBC  / WBC  / Hyaline  / Gran  / Sq Epi  / Non Sq Epi  / Bacteria       TSH 2.62      [10-13-24 @ 05:30]  Lipid: chol 135, , HDL 39, LDL --      [10-13-24 @ 05:30]        RADIOLOGY & ADDITIONAL STUDIES:

## 2024-11-13 NOTE — PROGRESS NOTE ADULT - ASSESSMENT
64-year-old male with metastatic prostate cancer, sent in by hematologist from [place] [facility] for uncontrolled pain, nausea, vomiting.   Patient reports diffuse pain starting 6 months ago significantly worsening for the past 2 weeks. Diagnosed with high risk high volume metastatic prostate cancer with bone, liver lymph node mets and presacral mass. Liver biopsy confirmed disease with . Started lupron, loly/pred with plans to initiate taxotere.   nephrology consulted for electrolyte abnormalities.    Hypernatremia  Poor free water intake    He now has diet however not eating much per family  Na is stable currently off ivf.   encourage free water as tolerated.  monitor Na.  Avoid overcorrection >8mEq in 24hrs.    Hypokalemia  better diarrhea improved   unable to tolerate PO kcl  supplement iv kcl as needed   monitor closely.    Acidosis   non AG  likely GI lost.  Hyperchloremic.  better  monitor CO2.    hypophosphatemia  supplemented   monitor    hypocalcemia   sec to low albumin  corrected ca 8.5  monitor    fever  GNR bacteremia   work up and tx per team    anemia  metastatic prostate cancer  f/u heme/onc

## 2024-11-13 NOTE — PROGRESS NOTE ADULT - SUBJECTIVE AND OBJECTIVE BOX
Patient is a 64y old  Male who presents with a chief complaint of Pain (13 Nov 2024 12:30)    Date of servie : 11-13-24 @ 14:59  INTERVAL HPI/OVERNIGHT EVENTS:  T(C): 36.4 (11-13-24 @ 12:00), Max: 36.6 (11-12-24 @ 16:30)  HR: 93 (11-13-24 @ 12:00) (89 - 101)  BP: 99/72 (11-13-24 @ 12:00) (99/72 - 126/87)  RR: 18 (11-13-24 @ 12:00) (18 - 19)  SpO2: 100% (11-13-24 @ 12:00) (98% - 100%)  Wt(kg): --  I&O's Summary    12 Nov 2024 07:01  -  13 Nov 2024 07:00  --------------------------------------------------------  IN: 480 mL / OUT: 750 mL / NET: -270 mL    13 Nov 2024 07:01  -  13 Nov 2024 14:59  --------------------------------------------------------  IN: 240 mL / OUT: 250 mL / NET: -10 mL        LABS:                        7.2    5.06  )-----------( 239      ( 13 Nov 2024 04:30 )             23.0     11-13    136  |  105  |  9   ----------------------------<  143[H]  3.7   |  24  |  0.30[L]    Ca    7.7[L]      13 Nov 2024 04:30  Phos  2.7     11-13  Mg     1.90     11-13    TPro  4.6[L]  /  Alb  2.0[L]  /  TBili  0.5  /  DBili  x   /  AST  24  /  ALT  7   /  AlkPhos  199[H]  11-12    PT/INR - ( 12 Nov 2024 04:22 )   PT: 14.7 sec;   INR: 1.27 ratio         PTT - ( 12 Nov 2024 04:22 )  PTT:35.5 sec  Urinalysis Basic - ( 13 Nov 2024 04:30 )    Color: x / Appearance: x / SG: x / pH: x  Gluc: 143 mg/dL / Ketone: x  / Bili: x / Urobili: x   Blood: x / Protein: x / Nitrite: x   Leuk Esterase: x / RBC: x / WBC x   Sq Epi: x / Non Sq Epi: x / Bacteria: x      CAPILLARY BLOOD GLUCOSE      POCT Blood Glucose.: 126 mg/dL (13 Nov 2024 12:26)  POCT Blood Glucose.: 116 mg/dL (13 Nov 2024 08:47)  POCT Blood Glucose.: 205 mg/dL (12 Nov 2024 21:14)  POCT Blood Glucose.: 187 mg/dL (12 Nov 2024 17:27)        Urinalysis Basic - ( 13 Nov 2024 04:30 )    Color: x / Appearance: x / SG: x / pH: x  Gluc: 143 mg/dL / Ketone: x  / Bili: x / Urobili: x   Blood: x / Protein: x / Nitrite: x   Leuk Esterase: x / RBC: x / WBC x   Sq Epi: x / Non Sq Epi: x / Bacteria: x        MEDICATIONS  (STANDING):  abiraterone 500 mg tablet 2 Tablet(s) 2 Tablet(s) Oral daily  acetaminophen     Tablet .. 650 milliGRAM(s) Oral once  atorvastatin 20 milliGRAM(s) Oral at bedtime  chlorhexidine 2% Cloths 1 Application(s) Topical daily  dextrose 5% 1000 milliLiter(s) (75 mL/Hr) IV Continuous <Continuous>  dextrose 5%. 1000 milliLiter(s) (100 mL/Hr) IV Continuous <Continuous>  dextrose 5%. 1000 milliLiter(s) (50 mL/Hr) IV Continuous <Continuous>  dextrose 50% Injectable 25 Gram(s) IV Push once  dextrose 50% Injectable 25 Gram(s) IV Push once  dextrose 50% Injectable 12.5 Gram(s) IV Push once  diphenhydrAMINE 25 milliGRAM(s) Oral once  enoxaparin Injectable 40 milliGRAM(s) SubCutaneous every 24 hours  fenofibrate Tablet 145 milliGRAM(s) Oral daily  FIRST- Mouthwash  BLM 30 milliLiter(s) Swish and Spit three times a day  glucagon  Injectable 1 milliGRAM(s) IntraMuscular once  haloperidol     Tablet 1 milliGRAM(s) Oral <User Schedule>  influenza  Vaccine (HIGH DOSE) 0.5 milliLiter(s) IntraMuscular once  insulin lispro (ADMELOG) corrective regimen sliding scale   SubCutaneous at bedtime  insulin lispro (ADMELOG) corrective regimen sliding scale   SubCutaneous three times a day before meals  lactated ringers 1000 milliLiter(s) (75 mL/Hr) IV Continuous <Continuous>  lactobacillus acidophilus 1 Tablet(s) Oral daily  lidocaine   4% Patch 1 Patch Transdermal every 24 hours  melatonin 6 milliGRAM(s) Oral at bedtime  multivitamin 1 Tablet(s) Oral daily  Nephro-nancy 1 Tablet(s) Oral daily  nystatin    Suspension 388552 Unit(s) Oral three times a day  pantoprazole  Injectable 40 milliGRAM(s) IV Push daily  predniSONE   Tablet 5 milliGRAM(s) Oral daily  tamsulosin 0.8 milliGRAM(s) Oral at bedtime    MEDICATIONS  (PRN):  acetaminophen     Tablet .. 650 milliGRAM(s) Oral every 6 hours PRN Temp greater or equal to 38C (100.4F), Mild Pain (1 - 3), Moderate Pain (4 - 6)  aluminum hydroxide/magnesium hydroxide/simethicone Suspension 30 milliLiter(s) Oral every 4 hours PRN Dyspepsia  artificial tears (preservative free) Ophthalmic Solution 1 Drop(s) Both EYES four times a day PRN Dry Eyes  dextrose Oral Gel 15 Gram(s) Oral once PRN Blood Glucose LESS THAN 70 milliGRAM(s)/deciliter  morphine   Solution 2.5 milliGRAM(s) Oral every 4 hours PRN Moderate Pain (4 - 6)  morphine   Solution 5 milliGRAM(s) Oral every 4 hours PRN Severe Pain (7 - 10)  naloxone Injectable 0.1 milliGRAM(s) IV Push every 3 minutes PRN For ANY of the following changes in patient status:  A. RR LESS THAN 10 breaths per minute, B. Oxygen saturation LESS THAN 90%, C. Sedation score of 6  polyethylene glycol 3350 17 Gram(s) Oral daily PRN Constipation          PHYSICAL EXAM:  GENERAL: NAD, well-groomed, well-developed  HEAD:  Atraumatic, Normocephalic  CHEST/LUNG: Clear to percussion bilaterally; No rales, rhonchi, wheezing, or rubs  HEART: Regular rate and rhythm; No murmurs, rubs, or gallops  ABDOMEN: Soft, Nontender, Nondistended; Bowel sounds present  EXTREMITIES:  2+ Peripheral Pulses, No clubbing, cyanosis, or edema  LYMPH: No lymphadenopathy noted  SKIN: No rashes or lesions    Care Discussed with Consultants/Other Providers [ ] YES  [ ] NO

## 2024-11-13 NOTE — PROGRESS NOTE ADULT - ASSESSMENT
Impression: 63yo Male with metastatic prostate cancer presenting with back pain. IR was consulted for kyphoplasty for pre-radiation therapy. Pt is now s/p vertebral body augmentation of T5 and L1.       Plan:  - monitor sites  - c/w pain management     T3/T4 vertebral augmentation planned for Friday  - Please obtain updated CBC, BMP, & coags  - Make NPO at midnight prior to procedure  - Write IR pre-procedure note    [id number]

## 2024-11-13 NOTE — PROGRESS NOTE ADULT - ASSESSMENT
64-year-old male with metastatic prostate cancer, sent in by hematologist from [facility] for uncontrolled pain, nausea, vomiting.   Patient reports diffuse pain starting 6 months ago significantly worsening for the past 2 weeks.  Currently the worst pain is located around the lower back.   No loss of bladder and bowel function, no saddle paresthesia.  Able to walk.  patient is oxycodone 5 every 4-6 hour.  Yesterday they started adding OxyContin 10.  However patient continued to have pain.  Family also reports significant nausea and vomiting, inability to tolerate p.o. for the last 2 days.  Last bowel movement 2 days ago.   No known allergy.  Diagnosed with high risk high volume metastatic prostate cancer with bone, liver lymph node mets and presacral mass. Liver biopsy confirmed disease with . Started lupron, loly/pred with plans to initiate taxotere.    (12 Oct 2024 15:40)  pt seems very frustrated:   he is on 2 L of oxygen : he has no underlying lung disease:  but he is requiring 2 L of oxygen      Hypoxic resp failure  Metastatic prostate cancer  Back pain     Hypoxic resp failure  -He has no underlying lung disease:  but he is requiring 2 L of oxygen :   -CTA showed; No pulmonary embolism to the level of the segmental branches bilaterally. Limited evaluation of the subsegmental branches secondary to incomplete contrast opacification.  Multilevel vertebral body lytic lesions and loss of vertebral body height, better evaluated on CT thoracic spine 10/17/2024. Metastatic prostate cancer  -Patent central airways. Bibasilar atelectasis. No pleural effusion. MEDIASTINUM AND KATTY: No lymphadenopathy.  PULMONARY ANGIOGRAM: No pulmonary embolism to the level of the segmental   branches bilaterally. Limited evaluation of the subsegmental branches secondary to incomplete contrast opacification.    10/19:  -seems pretty tired;  on 2 L of oxygen :  -cta chest showed: No pulmonary embolism to the level of the segmental branches bilaterally. Limited evaluation of the subsegmental branches secondary to incomplete   contrast opacification. Multilevel vertebral body lytic lesions and loss of vertebral body height, better evaluated on CT thoracic spine 10/17/2024.  -still with fever:  no pe  on zosyn  and leukopenic hemonc following;  has metastatic prostate ca:   -VBG seems OK:     10/20:  pulm wise he seems to be stable:   -using 2 L of oxygen    -yesterdays VBG with minimal met acidosis  -cta again noted    10/21:  on 4 L of oxygen  today    cxr is size    e coli sepsis: Gram Negative Rods and Gram Negative Coccobacilli    10/22: heis doing poorly today  : he is very agitated and uncomfortable  on mittens: ? sun downing   10/23: quiet today  : sleeping:  oxygen requirement has not increased: on rooo ha 95% as documented    WBC is slowly increasing:   no fever:   -on antibiotics   10/24: pt is not doing well : his repeat blood cultures are positive with same organism :  would defer to ID;   10/25: seems to be doing  ok ; no sob:  no cough :  no phlegm   : on room air   10/26: resp failure has resolved:  is septic  : on ceftriaxone     10/27:  he seems OK:  he is on room air:  on morphine drip   remans on ceftriaxone still     10/28: she seems to be doing  ok : no sob: no cough ; no phlegm  confused:  on morphine drip    10/29; seems comfortable on room air;   cont current rx:   10/30: seems to be doing  ok ; on morphine drip : : plan to decrease morphine dose:   -cont current supportive care   10/31:  seems same tome:   no sob:  on room air:   seems comfortable at this ti me: off morphine  11/1:  he has been on room air for days;  looks comfortable:  coughs when he eats;   need pt ot  ; ? oob to chair:  dw wife:   11/3:  he seems stable:   on room air:  responding to questions pretty well : has bleeding lower lip : not Much through ? secondary to dryness:   -resp wise seems pretty good:  no sob:    114:  -seems pretty good:  no sob:  on room air  -has iral ulceration:  bleeding:  not much though : cont magic mouth wash   11/5:  -he seems  pretty good:   on room air:   no sob:  no cough :  no phlegm : difficulty in eating secondary to mucositis:  cont current rx:     11/6: seems OK:   no sob:   on room air:  now again kyphoplasty being considered    11/7:  -he seems same:  no sob:  on room air:   for possible kyphoplasty      11/8: now kyphoplasty next Wednesday   he is weak but looks good:  on room air     11/9"  seems to be doing  ok :  no sob:  no cough ; no phlegm     11/10: seems prettty good resp wise;   bed bound:   on room air:  for kyphoplasty :  encouraged to do IS   11/11: pulm wise he seems optimized to get kyphoplasty tomorrow:   wife at bedside:  no events overnight    11/13: s/p kyphoplasty  now for vertebral augmentation on Friday  :     New finding:  SMA dissection : neutropenic colitis/ #E Coli and H influenzae bacteremia/ #Neutropenic fever  -/f ischemic colitis on R colon   Diagnosed  on ct abd:  defer to surgery and primary team:  probably no intervention:   -cont antibiotics  -not doing very well with above new findings    10/22; no intervention per surgery    -cont antibiotics  -has fever:  ID following :  -Last chest xray on 20th  ; normal     id following   10/23  IR was consulted for vertebral augmentation of T3-T5 prior to XRT.   Patient was seen bedside. Initially, patient kept requesting no exam and was not willing to participate in the discussion. Son was bedside at time of conversation.   Per discussion with the medical attending, given the concern for SMA dissection and concern for bowel ischemia, will hold off on vertebral augmentation evaluation at this time.   Please reach out to IR when patient is medically stable for vertebral augmentation.  10/24:  remains septic:  above cancelled:   ? for palliative care now  10/26: he seems same to me:  no overnight events  on room air:   check VBG  : cont antibiotics   hemonc following   10/27:  -still on antibiotics for e coli sepsis  -id following s  10/28: cont antibiotics   10/230: cont ceftriaxone   10/31: on ceftriaxone and flagyl   11/1: antibiotics per ID   11/3: cont antibiotics : IR note reviewed:  no kyphoplasty at this time    11/4: cont antibiotics per ID   11/5: on cefepime:  id following   11/6: on cefepime and flagyl!  11/7: off antibiotics now:   11/8: Completed antibiotics   11/9: -antibiotics  ; have been on  low dose steroids ; not from pulm side   11/11: seems to be doing  ok : no sob:  no cough : on room air   11/13: seems OK:  no sob:  no cough : no phlegm    Back pain   -likely due to metastatic disease:  adm here for severe pain :  -pain control per primary team   -venous ph is ok   11/1: resolved for now on meds  11/3: pain control : no kyphoplasty at this ti me per ir note:   11/4: as above  11/6: for possible kyphoplasty   now   11/7: for kyphoplasty now:   11/13: s/p kyphoplasty and now for vertebral augmentation:  on friday followed by outpt RT     dw acp; GOC as per primary  team

## 2024-11-13 NOTE — PROGRESS NOTE ADULT - ASSESSMENT
1. Metastatic prostate cancer    - Follows with Dr Andrew Mendoza at [facility]   - PSMA 10/11/24 showed hypermetabolic vera foci above and below the diaphragm, foci in the liver and extensive foci involving the axial and appendicular skeleton compatible with metastatic disease  - --> 374--> 426 on 10/8/24   - Liver biopsy 9/30/24 consistent with prostate adenocarcinoma   - High risk high volume disease -> started on triplet therapy w/ ADT/lupron, abiraterone/prednisone + taxotere. (back pain after Lupron likely tumor flare).  - Continue abiraterone 1000mg daily w Prednisone 5mg PO QD   - s/p taxotere 60mg/m2 on 10/13/24. Next dose was planned for 11/4 , held for weakness, clinical progress  -Bacteremia cleared  - s/p Kyphoplasty T5/l1 with plans for Plan for T3 and T4 vertebral augmentation on Friday. Plan is for consideration for radiation therapy (SBRT) for durable pain control with Dr Bruno, IR and RT recs appreciated    - Palliative following for pain control and given hospital course would have further GOC discussions. Treatment will be offered but he has metastatic disease with visceral involvement and complications as outlined below.     2. Pancolitis, E coli and H influenzae bacteremia  - completed Cefepime+ Metronidazole  - ID following  - BCx from 10/24 neg to date  - Rectal tube and lau catheter removed    3. Anemia/Thrombocytopenia   - Transfuse for Hgb < 7.0  - Multifactorial sec to likely marrow infiltration by CaP, Chemo effect, consumption from acute illness, poss ITP  - Transfuse for plt <15 or < 50K if bleeding/for procedure      4. SMA Dissection  - seen by vasc sx  - no plan for intervention      Umm Aponte NP  Hematology/Oncology  [facility]  [phone] (Office)  [phone] (Alt office)  Evenings and weekends please call MD on call or office

## 2024-11-13 NOTE — PROGRESS NOTE ADULT - SUBJECTIVE AND OBJECTIVE BOX
Patient seen today, clinically improved  Wfie at bedside, s/p partial Kypho 11/12, second sx planned for 11/15      MEDICATIONS  (STANDING):  abiraterone 500 mg tablet 2 Tablet(s) 2 Tablet(s) Oral daily  acetaminophen     Tablet .. 650 milliGRAM(s) Oral once  atorvastatin 20 milliGRAM(s) Oral at bedtime  chlorhexidine 2% Cloths 1 Application(s) Topical daily  dextrose 5% 1000 milliLiter(s) (75 mL/Hr) IV Continuous <Continuous>  dextrose 5%. 1000 milliLiter(s) (50 mL/Hr) IV Continuous <Continuous>  dextrose 5%. 1000 milliLiter(s) (100 mL/Hr) IV Continuous <Continuous>  dextrose 50% Injectable 12.5 Gram(s) IV Push once  dextrose 50% Injectable 25 Gram(s) IV Push once  dextrose 50% Injectable 25 Gram(s) IV Push once  diphenhydrAMINE 25 milliGRAM(s) Oral once  enoxaparin Injectable 40 milliGRAM(s) SubCutaneous every 24 hours  fenofibrate Tablet 145 milliGRAM(s) Oral daily  FIRST- Mouthwash  BLM 30 milliLiter(s) Swish and Spit three times a day  glucagon  Injectable 1 milliGRAM(s) IntraMuscular once  haloperidol     Tablet 1 milliGRAM(s) Oral <User Schedule>  influenza  Vaccine (HIGH DOSE) 0.5 milliLiter(s) IntraMuscular once  insulin lispro (ADMELOG) corrective regimen sliding scale   SubCutaneous at bedtime  insulin lispro (ADMELOG) corrective regimen sliding scale   SubCutaneous three times a day before meals  lactated ringers 1000 milliLiter(s) (75 mL/Hr) IV Continuous <Continuous>  lactobacillus acidophilus 1 Tablet(s) Oral daily  lidocaine   4% Patch 1 Patch Transdermal every 24 hours  melatonin 6 milliGRAM(s) Oral at bedtime  multivitamin 1 Tablet(s) Oral daily  Nephro-nancy 1 Tablet(s) Oral daily  nystatin    Suspension 083016 Unit(s) Oral three times a day  pantoprazole  Injectable 40 milliGRAM(s) IV Push daily  predniSONE   Tablet 5 milliGRAM(s) Oral daily  tamsulosin 0.8 milliGRAM(s) Oral at bedtime    MEDICATIONS  (PRN):  acetaminophen     Tablet .. 650 milliGRAM(s) Oral every 6 hours PRN Temp greater or equal to 38C (100.4F), Mild Pain (1 - 3), Moderate Pain (4 - 6)  aluminum hydroxide/magnesium hydroxide/simethicone Suspension 30 milliLiter(s) Oral every 4 hours PRN Dyspepsia  artificial tears (preservative free) Ophthalmic Solution 1 Drop(s) Both EYES four times a day PRN Dry Eyes  dextrose Oral Gel 15 Gram(s) Oral once PRN Blood Glucose LESS THAN 70 milliGRAM(s)/deciliter  morphine   Solution 2.5 milliGRAM(s) Oral every 4 hours PRN Moderate Pain (4 - 6)  morphine   Solution 5 milliGRAM(s) Oral every 4 hours PRN Severe Pain (7 - 10)  naloxone Injectable 0.1 milliGRAM(s) IV Push every 3 minutes PRN For ANY of the following changes in patient status:  A. RR LESS THAN 10 breaths per minute, B. Oxygen saturation LESS THAN 90%, C. Sedation score of 6  polyethylene glycol 3350 17 Gram(s) Oral daily PRN Constipation        Vital Signs Last 24 Hrs  T(C): 36.6 (13 Nov 2024 05:00), Max: 36.6 (12 Nov 2024 11:45)  T(F): 97.9 (13 Nov 2024 05:00), Max: 97.9 (12 Nov 2024 16:30)  HR: 89 (13 Nov 2024 05:00) (89 - 103)  BP: 109/79 (13 Nov 2024 05:00) (97/72 - 126/87)  BP(mean): --  RR: 18 (13 Nov 2024 05:00) (15 - 19)  SpO2: 98% (13 Nov 2024 05:00) (97% - 100%)    Parameters below as of 13 Nov 2024 05:00  Patient On (Oxygen Delivery Method): room air        PE  NAD  Awake, alert  Anicteric, MMM  RRR  CTAB  Abd soft, NT, ND  No c/c/e  No rash grossly                            7.2    5.06  )-----------( 239      ( 13 Nov 2024 04:30 )             23.0       11-13    136  |  105  |  9   ----------------------------<  143[H]  3.7   |  24  |  0.30[L]    Ca    7.7[L]      13 Nov 2024 04:30  Phos  2.7     11-13  Mg     1.90     11-13    TPro  4.6[L]  /  Alb  2.0[L]  /  TBili  0.5  /  DBili  x   /  AST  24  /  ALT  7   /  AlkPhos  199[H]  11-12

## 2024-11-13 NOTE — PROGRESS NOTE ADULT - SUBJECTIVE AND OBJECTIVE BOX
65yo Male s/p T5 and L1 vertebral body augmentation on 11/12 in Interventional Radiology.     Patient seen and examined at bedside w/ family member, resting comfortably. No complaints offered.    T(F): 97.9 (11-13-24 @ 05:00), Max: 97.9 (11-12-24 @ 16:30)  HR: 89 (11-13-24 @ 05:00) (89 - 103)  BP: 109/79 (11-13-24 @ 05:00) (97/72 - 126/87)  RR: 18 (11-13-24 @ 05:00) (15 - 19)  SpO2: 98% (11-13-24 @ 05:00) (97% - 100%)    LABS:                     7.2    5.06  )-----------( 239      ( 13 Nov 2024 04:30 )             23.0     11-13    136  |  105  |  9   ----------------------------<  143[H]  3.7   |  24  |  0.30[L]    Ca    7.7[L]      13 Nov 2024 04:30  Phos  2.7     11-13  Mg     1.90     11-13    TPro  4.6[L]  /  Alb  2.0[L]  /  TBili  0.5  /  DBili  x   /  AST  24  /  ALT  7   /  AlkPhos  199[H]  11-12    PT/INR - ( 12 Nov 2024 04:22 )   PT: 14.7 sec;   INR: 1.27 ratio    PTT - ( 12 Nov 2024 04:22 )  PTT:35.5 sec    I&O's Detail    12 Nov 2024 07:01  -  13 Nov 2024 07:00  --------------------------------------------------------  IN:    Oral Fluid: 480 mL  Total IN: 480 mL    OUT:    Voided (mL): 750 mL  Total OUT: 750 mL    Total NET: -270 mL      PHYSICAL EXAM:  General: Nontoxic, resting comfortably in NAD.  Back: Dressing C/D/I x2.

## 2024-11-13 NOTE — PROGRESS NOTE ADULT - ASSESSMENT
64-year-old male with metastatic prostate cancer, sent in by hematologist from [facility] and cancer for uncontrolled pain, nausea, vomiting.       Problem/Plan - 1:  ·  Problem: Cancer related pain.   ·  Plan: - appreciate pall care recommendations:  - pain control as per palliative crae   - RT consult appreciated ,  awaiting kyphoplasty   - repeat IR  kyphoplasty friday   - RT outpt     Problem/Plan - 2:·  Problem: Nausea & vomiting., Diarrhea, colitis , SMA dissection   ·  Plan: -  vascular fu appreciated  - GI and surgery consult appreciated   - ID fu  - finished abx  - ni intervention as per vascular     Problem/Plan - 3:  ·  Problem: CA of prostate.   ·  Plan: Metastatic prostate cancer  - f/u heme/onc recommendations  - Rad Onc fu   - Palliative for symptom control.    Problem/Plan - 4:  ·  Problem: Anemia , Thrombocytopenia, unspecified.   ·  Plan: -monitor cbc   transfuse PRN    Problem/Plan - 5:  ·  Problem: Bacteremia   Plan: - ID fu - finished  ABX as per ID     dw wife at bedside   dw care cordination

## 2024-11-14 LAB
ANION GAP SERPL CALC-SCNC: 7 MMOL/L — SIGNIFICANT CHANGE UP (ref 7–14)
BUN SERPL-MCNC: 6 MG/DL — LOW (ref 7–23)
CALCIUM SERPL-MCNC: 7.7 MG/DL — LOW (ref 8.4–10.5)
CHLORIDE SERPL-SCNC: 106 MMOL/L — SIGNIFICANT CHANGE UP (ref 98–107)
CO2 SERPL-SCNC: 25 MMOL/L — SIGNIFICANT CHANGE UP (ref 22–31)
CREAT SERPL-MCNC: 0.24 MG/DL — LOW (ref 0.5–1.3)
EGFR: 141 ML/MIN/1.73M2 — SIGNIFICANT CHANGE UP
GLUCOSE BLDC GLUCOMTR-MCNC: 102 MG/DL — HIGH (ref 70–99)
GLUCOSE BLDC GLUCOMTR-MCNC: 112 MG/DL — HIGH (ref 70–99)
GLUCOSE BLDC GLUCOMTR-MCNC: 122 MG/DL — HIGH (ref 70–99)
GLUCOSE BLDC GLUCOMTR-MCNC: 91 MG/DL — SIGNIFICANT CHANGE UP (ref 70–99)
GLUCOSE SERPL-MCNC: 90 MG/DL — SIGNIFICANT CHANGE UP (ref 70–99)
HCT VFR BLD CALC: 23.2 % — LOW (ref 39–50)
HGB BLD-MCNC: 7.4 G/DL — LOW (ref 13–17)
MAGNESIUM SERPL-MCNC: 1.7 MG/DL — SIGNIFICANT CHANGE UP (ref 1.6–2.6)
MCHC RBC-ENTMCNC: 31.1 PG — SIGNIFICANT CHANGE UP (ref 27–34)
MCHC RBC-ENTMCNC: 31.9 G/DL — LOW (ref 32–36)
MCV RBC AUTO: 97.5 FL — SIGNIFICANT CHANGE UP (ref 80–100)
NRBC # BLD: 0 /100 WBCS — SIGNIFICANT CHANGE UP (ref 0–0)
NRBC # FLD: 0 K/UL — SIGNIFICANT CHANGE UP (ref 0–0)
PHOSPHATE SERPL-MCNC: 2.8 MG/DL — SIGNIFICANT CHANGE UP (ref 2.5–4.5)
PLATELET # BLD AUTO: 241 K/UL — SIGNIFICANT CHANGE UP (ref 150–400)
POTASSIUM SERPL-MCNC: 3.2 MMOL/L — LOW (ref 3.5–5.3)
POTASSIUM SERPL-SCNC: 3.2 MMOL/L — LOW (ref 3.5–5.3)
RBC # BLD: 2.38 M/UL — LOW (ref 4.2–5.8)
RBC # FLD: 20.1 % — HIGH (ref 10.3–14.5)
SODIUM SERPL-SCNC: 138 MMOL/L — SIGNIFICANT CHANGE UP (ref 135–145)
WBC # BLD: 6.07 K/UL — SIGNIFICANT CHANGE UP (ref 3.8–10.5)
WBC # FLD AUTO: 6.07 K/UL — SIGNIFICANT CHANGE UP (ref 3.8–10.5)

## 2024-11-14 RX ORDER — POTASSIUM CHLORIDE 600 MG/1
10 TABLET, EXTENDED RELEASE ORAL
Refills: 0 | Status: COMPLETED | OUTPATIENT
Start: 2024-11-14 | End: 2024-11-14

## 2024-11-14 RX ADMIN — PANTOPRAZOLE SODIUM 40 MILLIGRAM(S): 40 TABLET, DELAYED RELEASE ORAL at 11:29

## 2024-11-14 RX ADMIN — Medication 1 TABLET(S): at 11:30

## 2024-11-14 RX ADMIN — POTASSIUM CHLORIDE 100 MILLIEQUIVALENT(S): 600 TABLET, EXTENDED RELEASE ORAL at 10:22

## 2024-11-14 RX ADMIN — ACETAMINOPHEN, DIPHENHYDRAMINE HCL, PHENYLEPHRINE HCL 6 MILLIGRAM(S): 325; 25; 5 TABLET ORAL at 21:14

## 2024-11-14 RX ADMIN — POTASSIUM CHLORIDE 100 MILLIEQUIVALENT(S): 600 TABLET, EXTENDED RELEASE ORAL at 12:23

## 2024-11-14 RX ADMIN — DIPHENHYDRAMINE HYDROCHLORIDE AND LIDOCAINE HYDROCHLORIDE AND ALUMINUM HYDROXIDE AND MAGNESIUM HYDRO 30 MILLILITER(S): KIT at 06:24

## 2024-11-14 RX ADMIN — ENOXAPARIN SODIUM 40 MILLIGRAM(S): 30 INJECTION SUBCUTANEOUS at 17:10

## 2024-11-14 RX ADMIN — NYSTATIN 500000 UNIT(S): 500000 TABLET, FILM COATED ORAL at 21:14

## 2024-11-14 RX ADMIN — LIDOCAINE 1 PATCH: 40 CREAM TOPICAL at 03:21

## 2024-11-14 RX ADMIN — CHLORHEXIDINE GLUCONATE 1 APPLICATION(S): 1.2 RINSE ORAL at 11:29

## 2024-11-14 RX ADMIN — PREDNISONE 5 MILLIGRAM(S): 20 TABLET ORAL at 05:28

## 2024-11-14 RX ADMIN — TAMSULOSIN HYDROCHLORIDE 0.8 MILLIGRAM(S): 0.4 CAPSULE ORAL at 21:13

## 2024-11-14 RX ADMIN — NYSTATIN 500000 UNIT(S): 500000 TABLET, FILM COATED ORAL at 05:29

## 2024-11-14 RX ADMIN — POTASSIUM CHLORIDE 100 MILLIEQUIVALENT(S): 600 TABLET, EXTENDED RELEASE ORAL at 11:29

## 2024-11-14 RX ADMIN — Medication 20 MILLIGRAM(S): at 21:14

## 2024-11-14 RX ADMIN — Medication 145 MILLIGRAM(S): at 11:30

## 2024-11-14 RX ADMIN — Medication 1 MILLIGRAM(S): at 21:13

## 2024-11-14 NOTE — PROGRESS NOTE ADULT - NS ATTEND AMEND GEN_ALL_CORE FT
Agree with above assessment and plan.   Metastatic prostate cancer s/p taxotere and now partial kyphoplasty, on abiraterone and prednisone to continue. Further plan for outpatient taxotere. Follow up rad onc recs.

## 2024-11-14 NOTE — PROGRESS NOTE ADULT - ASSESSMENT
1. Metastatic prostate cancer    - Follows with Dr Andrew Mendoza at [facility]   - PSMA 10/11/24 showed hypermetabolic vera foci above and below the diaphragm, foci in the liver and extensive foci involving the axial and appendicular skeleton compatible with metastatic disease  - --> 374--> 426 on 10/8/24   - Liver biopsy 9/30/24 consistent with prostate adenocarcinoma   - High risk high volume disease -> started on triplet therapy w/ ADT/lupron, abiraterone/prednisone + taxotere. (back pain after Lupron likely tumor flare).  - Continue abiraterone 1000mg daily w Prednisone 5mg PO QD   - s/p taxotere 60mg/m2 on 10/13/24. Next dose was planned for 11/4 , held for weakness, clinical progress  -Bacteremia cleared  - s/p Kyphoplasty T5/l1 with plans for Plan for T3 and T4 vertebral augmentation on Friday. Plan is for consideration for radiation therapy (SBRT) for durable pain control with Dr Bruno, IR and RT recs appreciated    - Palliative following for pain control and given hospital course would have further GOC discussions. Treatment will be offered but he has metastatic disease with visceral involvement and complications as outlined below.     2. Pancolitis, E coli and H influenzae bacteremia  - completed Cefepime+ Metronidazole  - ID following  - BCx from 10/24 neg to date  - Rectal tube and lau catheter removed    3. Anemia/Thrombocytopenia   - Transfuse for Hgb < 7.0  - Multifactorial sec to likely marrow infiltration by CaP, Chemo effect, consumption from acute illness, poss ITP  - Transfuse for plt <15 or < 50K if bleeding/for procedure      4. SMA Dissection  - seen by vasc sx  - no plan for intervention      Umm Aponte NP  Hematology/Oncology  [facility]  [phone] (Office)  [phone] (Alt office)  Evenings and weekends please call MD on call or office

## 2024-11-14 NOTE — PROGRESS NOTE ADULT - SUBJECTIVE AND OBJECTIVE BOX
[facility] [place]   MD MARKO EASTMAN MD ANGELA WONG, PA    TEL:  OFFICE: [phone]  From 5pm-7am Answering Service [phone]    -- RENAL FOLLOW UP NOTE ---Date of Service 11-14-24 @ 11:29    Patient is a 64y old  Male who presents with a chief complaint of Pain (14 Nov 2024 08:28)      Patient seen and examined at bedside. No chest pain/sob    VITALS:  T(F): 97.7 (11-14-24 @ 05:03), Max: 98 (11-13-24 @ 20:35)  HR: 99 (11-14-24 @ 05:03)  BP: 124/84 (11-14-24 @ 05:03)  RR: 18 (11-14-24 @ 05:03)  SpO2: 96% (11-14-24 @ 05:03)  Wt(kg): --    11-13 @ 07:01  -  11-14 @ 07:00  --------------------------------------------------------  IN: 720 mL / OUT: 1600 mL / NET: -880 mL    11-14 @ 07:01  -  11-14 @ 11:29  --------------------------------------------------------  IN: 500 mL / OUT: 360 mL / NET: 140 mL          PHYSICAL EXAM:  General: NAD  Neck: No JVD  Respiratory: CTAB, no wheezes, rales or rhonchi  Cardiovascular: S1, S2, RRR  Gastrointestinal: BS+, soft, NT/ND  Extremities: No peripheral edema    Hospital Medications:   MEDICATIONS  (STANDING):  abiraterone 500 mg tablet 2 Tablet(s) 2 Tablet(s) Oral daily  acetaminophen     Tablet .. 650 milliGRAM(s) Oral once  atorvastatin 20 milliGRAM(s) Oral at bedtime  chlorhexidine 2% Cloths 1 Application(s) Topical daily  dextrose 5% 1000 milliLiter(s) (75 mL/Hr) IV Continuous <Continuous>  dextrose 5%. 1000 milliLiter(s) (50 mL/Hr) IV Continuous <Continuous>  dextrose 5%. 1000 milliLiter(s) (100 mL/Hr) IV Continuous <Continuous>  dextrose 50% Injectable 25 Gram(s) IV Push once  dextrose 50% Injectable 25 Gram(s) IV Push once  dextrose 50% Injectable 12.5 Gram(s) IV Push once  diphenhydrAMINE 25 milliGRAM(s) Oral once  enoxaparin Injectable 40 milliGRAM(s) SubCutaneous every 24 hours  fenofibrate Tablet 145 milliGRAM(s) Oral daily  FIRST- Mouthwash  BLM 30 milliLiter(s) Swish and Spit three times a day  glucagon  Injectable 1 milliGRAM(s) IntraMuscular once  haloperidol     Tablet 1 milliGRAM(s) Oral <User Schedule>  influenza  Vaccine (HIGH DOSE) 0.5 milliLiter(s) IntraMuscular once  insulin lispro (ADMELOG) corrective regimen sliding scale   SubCutaneous three times a day before meals  insulin lispro (ADMELOG) corrective regimen sliding scale   SubCutaneous at bedtime  lactated ringers 1000 milliLiter(s) (75 mL/Hr) IV Continuous <Continuous>  lactobacillus acidophilus 1 Tablet(s) Oral daily  lidocaine   4% Patch 1 Patch Transdermal every 24 hours  melatonin 6 milliGRAM(s) Oral at bedtime  multivitamin 1 Tablet(s) Oral daily  Nephro-nancy 1 Tablet(s) Oral daily  nystatin    Suspension 174073 Unit(s) Oral three times a day  pantoprazole  Injectable 40 milliGRAM(s) IV Push daily  potassium chloride  10 mEq/100 mL IVPB 10 milliEquivalent(s) IV Intermittent every 1 hour  predniSONE   Tablet 5 milliGRAM(s) Oral daily  tamsulosin 0.8 milliGRAM(s) Oral at bedtime      LABS:  11-14    138  |  106  |  6[L]  ----------------------------<  90  3.2[L]   |  25  |  0.24[L]    Ca    7.7[L]      14 Nov 2024 05:11  Phos  2.8     11-14  Mg     1.70     11-14      Creatinine Trend: 0.24 <--, 0.30 <--, 0.27 <--, 0.26 <--, 0.28 <--, 0.28 <--, 0.28 <--    Phosphorus: 2.8 mg/dL (11-14 @ 05:11)                              7.4    6.07  )-----------( 241      ( 14 Nov 2024 05:11 )             23.2     Urine Studies:  Urinalysis - [11-14-24 @ 05:11]      Color  / Appearance  / SG  / pH       Gluc 90 / Ketone   / Bili  / Urobili        Blood  / Protein  / Leuk Est  / Nitrite       RBC  / WBC  / Hyaline  / Gran  / Sq Epi  / Non Sq Epi  / Bacteria       TSH 2.62      [10-13-24 @ 05:30]  Lipid: chol 135, , HDL 39, LDL --      [10-13-24 @ 05:30]        RADIOLOGY & ADDITIONAL STUDIES:

## 2024-11-14 NOTE — PROGRESS NOTE ADULT - ASSESSMENT
64-year-old male with metastatic prostate cancer, sent in by hematologist from [facility] for uncontrolled pain, nausea, vomiting.   Patient reports diffuse pain starting 6 months ago significantly worsening for the past 2 weeks.  Currently the worst pain is located around the lower back.   No loss of bladder and bowel function, no saddle paresthesia.  Able to walk.  patient is oxycodone 5 every 4-6 hour.  Yesterday they started adding OxyContin 10.  However patient continued to have pain.  Family also reports significant nausea and vomiting, inability to tolerate p.o. for the last 2 days.  Last bowel movement 2 days ago.   No known allergy.  Diagnosed with high risk high volume metastatic prostate cancer with bone, liver lymph node mets and presacral mass. Liver biopsy confirmed disease with . Started lupron, loly/pred with plans to initiate taxotere.    (12 Oct 2024 15:40)  pt seems very frustrated:   he is on 2 L of oxygen : he has no underlying lung disease:  but he is requiring 2 L of oxygen      Hypoxic resp failure  Metastatic prostate cancer  Back pain     Hypoxic resp failure  -He has no underlying lung disease:  but he is requiring 2 L of oxygen :   -CTA showed; No pulmonary embolism to the level of the segmental branches bilaterally. Limited evaluation of the subsegmental branches secondary to incomplete contrast opacification.  Multilevel vertebral body lytic lesions and loss of vertebral body height, better evaluated on CT thoracic spine 10/17/2024. Metastatic prostate cancer  -Patent central airways. Bibasilar atelectasis. No pleural effusion. MEDIASTINUM AND KATTY: No lymphadenopathy.  PULMONARY ANGIOGRAM: No pulmonary embolism to the level of the segmental   branches bilaterally. Limited evaluation of the subsegmental branches secondary to incomplete contrast opacification.    10/19:  -seems pretty tired;  on 2 L of oxygen :  -cta chest showed: No pulmonary embolism to the level of the segmental branches bilaterally. Limited evaluation of the subsegmental branches secondary to incomplete   contrast opacification. Multilevel vertebral body lytic lesions and loss of vertebral body height, better evaluated on CT thoracic spine 10/17/2024.  -still with fever:  no pe  on zosyn  and leukopenic hemonc following;  has metastatic prostate ca:   -VBG seems OK:     10/20:  pulm wise he seems to be stable:   -using 2 L of oxygen    -yesterdays VBG with minimal met acidosis  -cta again noted    10/21:  on 4 L of oxygen  today    cxr is size    e coli sepsis: Gram Negative Rods and Gram Negative Coccobacilli    10/22: heis doing poorly today  : he is very agitated and uncomfortable  on mittens: ? sun downing   10/23: quiet today  : sleeping:  oxygen requirement has not increased: on rooo ha 95% as documented    WBC is slowly increasing:   no fever:   -on antibiotics   10/24: pt is not doing well : his repeat blood cultures are positive with same organism :  would defer to ID;   10/25: seems to be doing  ok ; no sob:  no cough :  no phlegm   : on room air   10/26: resp failure has resolved:  is septic  : on ceftriaxone     10/27:  he seems OK:  he is on room air:  on morphine drip   remans on ceftriaxone still     10/28: she seems to be doing  ok : no sob: no cough ; no phlegm  confused:  on morphine drip    10/29; seems comfortable on room air;   cont current rx:   10/30: seems to be doing  ok ; on morphine drip : : plan to decrease morphine dose:   -cont current supportive care   10/31:  seems same tome:   no sob:  on room air:   seems comfortable at this ti me: off morphine  11/1:  he has been on room air for days;  looks comfortable:  coughs when he eats;   need pt ot  ; ? oob to chair:  dw wife:   11/3:  he seems stable:   on room air:  responding to questions pretty well : has bleeding lower lip : not Much through ? secondary to dryness:   -resp wise seems pretty good:  no sob:    114:  -seems pretty good:  no sob:  on room air  -has iral ulceration:  bleeding:  not much though : cont magic mouth wash   11/5:  -he seems  pretty good:   on room air:   no sob:  no cough :  no phlegm : difficulty in eating secondary to mucositis:  cont current rx:     11/6: seems OK:   no sob:   on room air:  now again kyphoplasty being considered    11/7:  -he seems same:  no sob:  on room air:   for possible kyphoplasty      11/8: now kyphoplasty next Wednesday   he is weak but looks good:  on room air     11/9"  seems to be doing  ok :  no sob:  no cough ; no phlegm     11/10: seems prettty good resp wise;   bed bound:   on room air:  for kyphoplasty :  encouraged to do IS   11/11: pulm wise he seems optimized to get kyphoplasty tomorrow:   wife at bedside:  no events overnight    11/13: s/p kyphoplasty  now for vertebral augmentation on Friday  :  11/14: seems to be doing  ok ; no sob:  no cough : no phlegm   ; on room air:  pulm wise very stable:       New finding:  SMA dissection : neutropenic colitis/ #E Coli and H influenzae bacteremia/ #Neutropenic fever  -/f ischemic colitis on R colon   Diagnosed  on ct abd:  defer to surgery and primary team:  probably no intervention:   -cont antibiotics  -not doing very well with above new findings    10/22; no intervention per surgery    -cont antibiotics  -has fever:  ID following :  -Last chest xray on 20th  ; normal     id following   10/23  IR was consulted for vertebral augmentation of T3-T5 prior to XRT.   Patient was seen bedside. Initially, patient kept requesting no exam and was not willing to participate in the discussion. Son was bedside at time of conversation.   Per discussion with the medical attending, given the concern for SMA dissection and concern for bowel ischemia, will hold off on vertebral augmentation evaluation at this time.   Please reach out to IR when patient is medically stable for vertebral augmentation.  10/24:  remains septic:  above cancelled:   ? for palliative care now  10/26: he seems same to me:  no overnight events  on room air:   check VBG  : cont antibiotics   hemonc following   10/27:  -still on antibiotics for e coli sepsis  -id following s  10/28: cont antibiotics   10/230: cont ceftriaxone   10/31: on ceftriaxone and flagyl   11/1: antibiotics per ID   11/3: cont antibiotics : IR note reviewed:  no kyphoplasty at this time    11/4: cont antibiotics per ID   11/5: on cefepime:  id following   11/6: on cefepime and flagyl!  11/7: off antibiotics now:   11/8: Completed antibiotics   11/9: -antibiotics  ; have been on  low dose steroids ; not from pulm side   11/11: seems to be doing  ok : no sob:  no cough : on room air   11/13: seems OK:  no sob:  no cough : no phlegm  11/14: no new symtpoms     Back pain   -likely due to metastatic disease:  adm here for severe pain :  -pain control per primary team   -venous ph is ok   11/1: resolved for now on meds  11/3: pain control : no kyphoplasty at this ti me per ir note:   11/4: as above  11/6: for possible kyphoplasty   now   11/7: for kyphoplasty now:   11/13: s/p kyphoplasty and now for vertebral augmentation:  on friday followed by outpt RT     dw acp; GOC as per primary  team and wife

## 2024-11-14 NOTE — PROGRESS NOTE ADULT - ASSESSMENT
64-year-old male with metastatic prostate cancer, sent in by hematologist from [place] [facility] for uncontrolled pain, nausea, vomiting.   Patient reports diffuse pain starting 6 months ago significantly worsening for the past 2 weeks. Diagnosed with high risk high volume metastatic prostate cancer with bone, liver lymph node mets and presacral mass. Liver biopsy confirmed disease with . Started lupron, loly/pred with plans to initiate taxotere.   nephrology consulted for electrolyte abnormalities.    Hypernatremia  Poor free water intake    He now has diet however not eating much per family  Na is stable currently off ivf.   encourage free water as tolerated.  monitor Na.  Avoid overcorrection >8mEq in 24hrs.    Hypokalemia  unable to tolerate PO kcl  supplement iv kclx3 today  monitor closely.  continue supplement as needed    Acidosis   non AG  likely GI lost.  Hyperchloremic.  better  monitor CO2.    hypophosphatemia  supplemented   monitor    hypocalcemia   sec to low albumin  corrected ca 8.5  monitor    fever  GNR bacteremia   work up and tx per team    anemia  metastatic prostate cancer  f/u heme/onc

## 2024-11-14 NOTE — CHART NOTE - NSCHARTNOTEFT_GEN_A_CORE
Patient planned for kyphoplasty on 11/15/24. Plan for discharge to [facility]. Per discussion with Radiation Oncology team, patient will receive radiation therapy at the [facility] on [address] after subacute rehab. Patient planned for kyphoplasty on 11/15/24. Plan for discharge to [facility]. Per discussion with Radiation/Oncology and Hematology/Oncology team, no radiation therapy or chemotherapy while in subacute rehab. Patient will receive radiation therapy at the [facility] on [address] after subacute rehab.

## 2024-11-14 NOTE — PROGRESS NOTE ADULT - SUBJECTIVE AND OBJECTIVE BOX
Patient is a 64y old  Male who presents with a chief complaint of Pain (14 Nov 2024 12:00)    Date of servie : 11-14-24 @ 17:27  INTERVAL HPI/OVERNIGHT EVENTS:  T(C): 36.6 (11-14-24 @ 11:48), Max: 36.7 (11-13-24 @ 20:35)  HR: 98 (11-14-24 @ 11:48) (90 - 99)  BP: 129/86 (11-14-24 @ 11:48) (117/82 - 129/86)  RR: 18 (11-14-24 @ 11:48) (18 - 18)  SpO2: 97% (11-14-24 @ 11:48) (95% - 97%)  Wt(kg): --  I&O's Summary    13 Nov 2024 07:01  -  14 Nov 2024 07:00  --------------------------------------------------------  IN: 720 mL / OUT: 1600 mL / NET: -880 mL    14 Nov 2024 07:01  -  14 Nov 2024 17:27  --------------------------------------------------------  IN: 500 mL / OUT: 940 mL / NET: -440 mL        LABS:                        7.4    6.07  )-----------( 241      ( 14 Nov 2024 05:11 )             23.2     11-14    138  |  106  |  6[L]  ----------------------------<  90  3.2[L]   |  25  |  0.24[L]    Ca    7.7[L]      14 Nov 2024 05:11  Phos  2.8     11-14  Mg     1.70     11-14        Urinalysis Basic - ( 14 Nov 2024 05:11 )    Color: x / Appearance: x / SG: x / pH: x  Gluc: 90 mg/dL / Ketone: x  / Bili: x / Urobili: x   Blood: x / Protein: x / Nitrite: x   Leuk Esterase: x / RBC: x / WBC x   Sq Epi: x / Non Sq Epi: x / Bacteria: x      CAPILLARY BLOOD GLUCOSE      POCT Blood Glucose.: 112 mg/dL (14 Nov 2024 12:25)  POCT Blood Glucose.: 102 mg/dL (14 Nov 2024 08:11)  POCT Blood Glucose.: 129 mg/dL (13 Nov 2024 20:49)  POCT Blood Glucose.: 136 mg/dL (13 Nov 2024 17:51)        Urinalysis Basic - ( 14 Nov 2024 05:11 )    Color: x / Appearance: x / SG: x / pH: x  Gluc: 90 mg/dL / Ketone: x  / Bili: x / Urobili: x   Blood: x / Protein: x / Nitrite: x   Leuk Esterase: x / RBC: x / WBC x   Sq Epi: x / Non Sq Epi: x / Bacteria: x        MEDICATIONS  (STANDING):  abiraterone 500 mg tablet 2 Tablet(s) 2 Tablet(s) Oral daily  acetaminophen     Tablet .. 650 milliGRAM(s) Oral once  atorvastatin 20 milliGRAM(s) Oral at bedtime  chlorhexidine 2% Cloths 1 Application(s) Topical daily  dextrose 5% 1000 milliLiter(s) (75 mL/Hr) IV Continuous <Continuous>  dextrose 5%. 1000 milliLiter(s) (50 mL/Hr) IV Continuous <Continuous>  dextrose 5%. 1000 milliLiter(s) (100 mL/Hr) IV Continuous <Continuous>  dextrose 50% Injectable 25 Gram(s) IV Push once  dextrose 50% Injectable 25 Gram(s) IV Push once  dextrose 50% Injectable 12.5 Gram(s) IV Push once  diphenhydrAMINE 25 milliGRAM(s) Oral once  enoxaparin Injectable 40 milliGRAM(s) SubCutaneous every 24 hours  fenofibrate Tablet 145 milliGRAM(s) Oral daily  FIRST- Mouthwash  BLM 30 milliLiter(s) Swish and Spit three times a day  glucagon  Injectable 1 milliGRAM(s) IntraMuscular once  haloperidol     Tablet 1 milliGRAM(s) Oral <User Schedule>  influenza  Vaccine (HIGH DOSE) 0.5 milliLiter(s) IntraMuscular once  insulin lispro (ADMELOG) corrective regimen sliding scale   SubCutaneous at bedtime  insulin lispro (ADMELOG) corrective regimen sliding scale   SubCutaneous three times a day before meals  lactated ringers 1000 milliLiter(s) (75 mL/Hr) IV Continuous <Continuous>  lactobacillus acidophilus 1 Tablet(s) Oral daily  lidocaine   4% Patch 1 Patch Transdermal every 24 hours  melatonin 6 milliGRAM(s) Oral at bedtime  multivitamin 1 Tablet(s) Oral daily  Nephro-nancy 1 Tablet(s) Oral daily  nystatin    Suspension 258155 Unit(s) Oral three times a day  pantoprazole  Injectable 40 milliGRAM(s) IV Push daily  predniSONE   Tablet 5 milliGRAM(s) Oral daily  tamsulosin 0.8 milliGRAM(s) Oral at bedtime    MEDICATIONS  (PRN):  acetaminophen     Tablet .. 650 milliGRAM(s) Oral every 6 hours PRN Temp greater or equal to 38C (100.4F), Mild Pain (1 - 3), Moderate Pain (4 - 6)  aluminum hydroxide/magnesium hydroxide/simethicone Suspension 30 milliLiter(s) Oral every 4 hours PRN Dyspepsia  artificial tears (preservative free) Ophthalmic Solution 1 Drop(s) Both EYES four times a day PRN Dry Eyes  dextrose Oral Gel 15 Gram(s) Oral once PRN Blood Glucose LESS THAN 70 milliGRAM(s)/deciliter  morphine   Solution 2.5 milliGRAM(s) Oral every 4 hours PRN Moderate Pain (4 - 6)  morphine   Solution 5 milliGRAM(s) Oral every 4 hours PRN Severe Pain (7 - 10)  naloxone Injectable 0.1 milliGRAM(s) IV Push every 3 minutes PRN For ANY of the following changes in patient status:  A. RR LESS THAN 10 breaths per minute, B. Oxygen saturation LESS THAN 90%, C. Sedation score of 6  polyethylene glycol 3350 17 Gram(s) Oral daily PRN Constipation          PHYSICAL EXAM:  GENERAL: NAD, well-groomed, well-developed  HEAD:  Atraumatic, Normocephalic  CHEST/LUNG: Clear to percussion bilaterally; No rales, rhonchi, wheezing, or rubs  HEART: Regular rate and rhythm; No murmurs, rubs, or gallops  ABDOMEN: Soft, Nontender, Nondistended; Bowel sounds present  EXTREMITIES:  2+ Peripheral Pulses, No clubbing, cyanosis, or edema  LYMPH: No lymphadenopathy noted  SKIN: No rashes or lesions    Care Discussed with Consultants/Other Providers [ ] YES  [ ] NO

## 2024-11-14 NOTE — PROGRESS NOTE ADULT - ASSESSMENT
64-year-old male with metastatic prostate cancer, sent in by hematologist from [facility] for uncontrolled pain, nausea, vomiting.       Problem/Plan - 1:  ·  Problem: Cancer related pain.   ·  Plan: - appreciate pall care recommendations:  - pain control as per palliative crae   - RT consult appreciated ,  awaiting kyphoplasty   - repeat IR  kyphoplasty friday   - RT outpt     Problem/Plan - 2:·  Problem: Nausea & vomiting., Diarrhea, colitis , SMA dissection   ·  Plan: -  vascular fu appreciated  - GI and surgery consult appreciated   - ID fu  - finished abx  - ni intervention as per vascular     Problem/Plan - 3:  ·  Problem: CA of prostate.   ·  Plan: Metastatic prostate cancer  - f/u heme/onc recommendations  - Rad Onc fu   - Palliative for symptom control.    Problem/Plan - 4:  ·  Problem: Anemia , Thrombocytopenia, unspecified.   ·  Plan: -monitor cbc transfuse PRN    Problem/Plan - 5:  ·  Problem: Bacteremia   Plan: - ID fu - finished  ABX as per ID     dw wife at bedside   dw care cordination

## 2024-11-14 NOTE — PROGRESS NOTE ADULT - SUBJECTIVE AND OBJECTIVE BOX
Date of Service: 11-14-24 @ 12:00    Patient is a 64y old  Male who presents with a chief complaint of Pain (14 Nov 2024 11:29)      Any change in ROS: seems to be doing  ok : no sob: no cough : no phlegm      MEDICATIONS  (STANDING):  abiraterone 500 mg tablet 2 Tablet(s) 2 Tablet(s) Oral daily  acetaminophen     Tablet .. 650 milliGRAM(s) Oral once  atorvastatin 20 milliGRAM(s) Oral at bedtime  chlorhexidine 2% Cloths 1 Application(s) Topical daily  dextrose 5% 1000 milliLiter(s) (75 mL/Hr) IV Continuous <Continuous>  dextrose 5%. 1000 milliLiter(s) (50 mL/Hr) IV Continuous <Continuous>  dextrose 5%. 1000 milliLiter(s) (100 mL/Hr) IV Continuous <Continuous>  dextrose 50% Injectable 25 Gram(s) IV Push once  dextrose 50% Injectable 12.5 Gram(s) IV Push once  dextrose 50% Injectable 25 Gram(s) IV Push once  diphenhydrAMINE 25 milliGRAM(s) Oral once  enoxaparin Injectable 40 milliGRAM(s) SubCutaneous every 24 hours  fenofibrate Tablet 145 milliGRAM(s) Oral daily  FIRST- Mouthwash  BLM 30 milliLiter(s) Swish and Spit three times a day  glucagon  Injectable 1 milliGRAM(s) IntraMuscular once  haloperidol     Tablet 1 milliGRAM(s) Oral <User Schedule>  influenza  Vaccine (HIGH DOSE) 0.5 milliLiter(s) IntraMuscular once  insulin lispro (ADMELOG) corrective regimen sliding scale   SubCutaneous at bedtime  insulin lispro (ADMELOG) corrective regimen sliding scale   SubCutaneous three times a day before meals  lactated ringers 1000 milliLiter(s) (75 mL/Hr) IV Continuous <Continuous>  lactobacillus acidophilus 1 Tablet(s) Oral daily  lidocaine   4% Patch 1 Patch Transdermal every 24 hours  melatonin 6 milliGRAM(s) Oral at bedtime  multivitamin 1 Tablet(s) Oral daily  Nephro-nancy 1 Tablet(s) Oral daily  nystatin    Suspension 750066 Unit(s) Oral three times a day  pantoprazole  Injectable 40 milliGRAM(s) IV Push daily  potassium chloride  10 mEq/100 mL IVPB 10 milliEquivalent(s) IV Intermittent every 1 hour  predniSONE   Tablet 5 milliGRAM(s) Oral daily  tamsulosin 0.8 milliGRAM(s) Oral at bedtime    MEDICATIONS  (PRN):  acetaminophen     Tablet .. 650 milliGRAM(s) Oral every 6 hours PRN Temp greater or equal to 38C (100.4F), Mild Pain (1 - 3), Moderate Pain (4 - 6)  aluminum hydroxide/magnesium hydroxide/simethicone Suspension 30 milliLiter(s) Oral every 4 hours PRN Dyspepsia  artificial tears (preservative free) Ophthalmic Solution 1 Drop(s) Both EYES four times a day PRN Dry Eyes  dextrose Oral Gel 15 Gram(s) Oral once PRN Blood Glucose LESS THAN 70 milliGRAM(s)/deciliter  morphine   Solution 2.5 milliGRAM(s) Oral every 4 hours PRN Moderate Pain (4 - 6)  morphine   Solution 5 milliGRAM(s) Oral every 4 hours PRN Severe Pain (7 - 10)  naloxone Injectable 0.1 milliGRAM(s) IV Push every 3 minutes PRN For ANY of the following changes in patient status:  A. RR LESS THAN 10 breaths per minute, B. Oxygen saturation LESS THAN 90%, C. Sedation score of 6  polyethylene glycol 3350 17 Gram(s) Oral daily PRN Constipation    Vital Signs Last 24 Hrs  T(C): 36.6 (14 Nov 2024 11:48), Max: 36.7 (13 Nov 2024 20:35)  T(F): 97.9 (14 Nov 2024 11:48), Max: 98 (13 Nov 2024 20:35)  HR: 98 (14 Nov 2024 11:48) (90 - 100)  BP: 129/86 (14 Nov 2024 11:48) (102/73 - 129/86)  BP(mean): --  RR: 18 (14 Nov 2024 11:48) (18 - 18)  SpO2: 97% (14 Nov 2024 11:48) (95% - 97%)    Parameters below as of 14 Nov 2024 11:48  Patient On (Oxygen Delivery Method): room air        I&O's Summary    13 Nov 2024 07:01  -  14 Nov 2024 07:00  --------------------------------------------------------  IN: 720 mL / OUT: 1600 mL / NET: -880 mL    14 Nov 2024 07:01  -  14 Nov 2024 12:00  --------------------------------------------------------  IN: 500 mL / OUT: 360 mL / NET: 140 mL          Physical Exam:   GENERAL: cacechtic  HEENT: CHAVA/   Atraumatic, Normocephalic  ENMT: No tonsillar erythema, exudates, or enlargement; Moist mucous membranes, Good dentition, No lesions  NECK: Supple, No JVD, Normal thyroid  CHEST/LUNG: Clear to auscultaion-  CVS: Regular rate and rhythm; No murmurs, rubs, or gallops  GI: : Soft, Nontender, Nondistended; Bowel sounds present  NERVOUS SYSTEM:  Alert & Oriented X3  EXTREMITIES:  2+ Peripheral Pulses, No clubbing, cyanosis, or edema  LYMPH: No lymphadenopathy noted  SKIN: No rashes or lesions  ENDOCRINOLOGY: No Thyromegaly  PSYCH: Appropriate    Labs:                              7.4    6.07  )-----------( 241      ( 14 Nov 2024 05:11 )             23.2                         7.2    5.06  )-----------( 239      ( 13 Nov 2024 04:30 )             23.0                         7.3    5.32  )-----------( 208      ( 12 Nov 2024 04:22 )             23.1                         7.6    5.27  )-----------( 199      ( 11 Nov 2024 06:29 )             23.7     11-14    138  |  106  |  6[L]  ----------------------------<  90  3.2[L]   |  25  |  0.24[L]  11-13    136  |  105  |  9   ----------------------------<  143[H]  3.7   |  24  |  0.30[L]  11-12    136  |  104  |  5[L]  ----------------------------<  88  3.4[L]   |  22  |  0.27[L]  11-11    139  |  105  |  5[L]  ----------------------------<  94  3.3[L]   |  25  |  0.26[L]    Ca    7.7[L]      14 Nov 2024 05:11  Ca    7.7[L]      13 Nov 2024 04:30  Phos  2.8     11-14  Phos  2.7     11-13  Mg     1.70     11-14  Mg     1.90     11-13    TPro  4.6[L]  /  Alb  2.0[L]  /  TBili  0.5  /  DBili  x   /  AST  24  /  ALT  7   /  AlkPhos  199[H]  11-12  TPro  4.6[L]  /  Alb  1.9[L]  /  TBili  0.4  /  DBili  x   /  AST  24  /  ALT  12  /  AlkPhos  199[H]  11-11    CAPILLARY BLOOD GLUCOSE      POCT Blood Glucose.: 102 mg/dL (14 Nov 2024 08:11)  POCT Blood Glucose.: 129 mg/dL (13 Nov 2024 20:49)  POCT Blood Glucose.: 136 mg/dL (13 Nov 2024 17:51)  POCT Blood Glucose.: 126 mg/dL (13 Nov 2024 12:26)          Urinalysis Basic - ( 14 Nov 2024 05:11 )    Color: x / Appearance: x / SG: x / pH: x  Gluc: 90 mg/dL / Ketone: x  / Bili: x / Urobili: x   Blood: x / Protein: x / Nitrite: x   Leuk Esterase: x / RBC: x / WBC x   Sq Epi: x / Non Sq Epi: x / Bacteria: x    rad  · Note Type	Event Note  RadOnc    Reference Recent Physical Exam:   Reference Recent Physical Exam:  · In accordance with current standards limiting patient contact please refer to the recent:	Inpatient Physical Exam  · Inpatient Physical Exam Document Referenced	Hospitalist Attending      Florina Morocho is a 64-year-old male with metastatic prostate cancer known to our service,  admitted for lower back pain, nausea, vomiting found to have E.coli bacteremia on Iv antibiotics. His MRi spine shows Diffuse osseous metastases. No cervical pathologic fracture. Multiple mild endplate deformities in the lower thoracic spine are likely pathologic. Widespread epidural disease is predominantly low grade, high-grade at T3.  L1 and L5 pathologic fractures are associated with moderate epidural disease at L1, more mild at L5. At least mild epidural disease at the left S1-S2 neural foramen.     He is s/p Kyphoplasty T5/l1 with plans for Plan for T3 and T4 vertebral augmentation on Friday. Plan is for consideration for radiation therapy (SBRT) for durable pain control with Dr Bruno  as originally planned. Inpatient service will continue to follow.      Electronic Signatures:  Nabil Baez)  (Signed 12-Nov-2024 19:02)  	Authored: Note Type, Reference Recent Physical Exam, Note        RECENT CULTURES:        RESPIRATORY CULTURES:          Studies  Chest X-RAY  CT SCAN Chest   Venous Dopplers: LE:   CT Abdomen  Others

## 2024-11-14 NOTE — PROGRESS NOTE ADULT - SUBJECTIVE AND OBJECTIVE BOX
Patient seen today, continues to clinically improve, oral candida improved  Plan for repeat Kyphoplasty 11/15  wife at bedside      MEDICATIONS  (STANDING):  abiraterone 500 mg tablet 2 Tablet(s) 2 Tablet(s) Oral daily  acetaminophen     Tablet .. 650 milliGRAM(s) Oral once  atorvastatin 20 milliGRAM(s) Oral at bedtime  chlorhexidine 2% Cloths 1 Application(s) Topical daily  dextrose 5% 1000 milliLiter(s) (75 mL/Hr) IV Continuous <Continuous>  dextrose 5%. 1000 milliLiter(s) (50 mL/Hr) IV Continuous <Continuous>  dextrose 5%. 1000 milliLiter(s) (100 mL/Hr) IV Continuous <Continuous>  dextrose 50% Injectable 25 Gram(s) IV Push once  dextrose 50% Injectable 12.5 Gram(s) IV Push once  dextrose 50% Injectable 25 Gram(s) IV Push once  diphenhydrAMINE 25 milliGRAM(s) Oral once  enoxaparin Injectable 40 milliGRAM(s) SubCutaneous every 24 hours  fenofibrate Tablet 145 milliGRAM(s) Oral daily  FIRST- Mouthwash  BLM 30 milliLiter(s) Swish and Spit three times a day  glucagon  Injectable 1 milliGRAM(s) IntraMuscular once  haloperidol     Tablet 1 milliGRAM(s) Oral <User Schedule>  influenza  Vaccine (HIGH DOSE) 0.5 milliLiter(s) IntraMuscular once  insulin lispro (ADMELOG) corrective regimen sliding scale   SubCutaneous at bedtime  insulin lispro (ADMELOG) corrective regimen sliding scale   SubCutaneous three times a day before meals  lactated ringers 1000 milliLiter(s) (75 mL/Hr) IV Continuous <Continuous>  lactobacillus acidophilus 1 Tablet(s) Oral daily  lidocaine   4% Patch 1 Patch Transdermal every 24 hours  melatonin 6 milliGRAM(s) Oral at bedtime  multivitamin 1 Tablet(s) Oral daily  Nephro-nancy 1 Tablet(s) Oral daily  nystatin    Suspension 156205 Unit(s) Oral three times a day  pantoprazole  Injectable 40 milliGRAM(s) IV Push daily  predniSONE   Tablet 5 milliGRAM(s) Oral daily  tamsulosin 0.8 milliGRAM(s) Oral at bedtime    MEDICATIONS  (PRN):  acetaminophen     Tablet .. 650 milliGRAM(s) Oral every 6 hours PRN Temp greater or equal to 38C (100.4F), Mild Pain (1 - 3), Moderate Pain (4 - 6)  aluminum hydroxide/magnesium hydroxide/simethicone Suspension 30 milliLiter(s) Oral every 4 hours PRN Dyspepsia  artificial tears (preservative free) Ophthalmic Solution 1 Drop(s) Both EYES four times a day PRN Dry Eyes  dextrose Oral Gel 15 Gram(s) Oral once PRN Blood Glucose LESS THAN 70 milliGRAM(s)/deciliter  morphine   Solution 2.5 milliGRAM(s) Oral every 4 hours PRN Moderate Pain (4 - 6)  morphine   Solution 5 milliGRAM(s) Oral every 4 hours PRN Severe Pain (7 - 10)  naloxone Injectable 0.1 milliGRAM(s) IV Push every 3 minutes PRN For ANY of the following changes in patient status:  A. RR LESS THAN 10 breaths per minute, B. Oxygen saturation LESS THAN 90%, C. Sedation score of 6  polyethylene glycol 3350 17 Gram(s) Oral daily PRN Constipation      Vital Signs Last 24 Hrs  T(C): 36.5 (14 Nov 2024 05:03), Max: 36.7 (13 Nov 2024 20:35)  T(F): 97.7 (14 Nov 2024 05:03), Max: 98 (13 Nov 2024 20:35)  HR: 99 (14 Nov 2024 05:03) (90 - 100)  BP: 124/84 (14 Nov 2024 05:03) (99/72 - 124/84)  BP(mean): --  RR: 18 (14 Nov 2024 05:03) (18 - 18)  SpO2: 96% (14 Nov 2024 05:03) (95% - 100%)    Parameters below as of 14 Nov 2024 05:03  Patient On (Oxygen Delivery Method): room air        PE  NAD  Awake, alert  Anicteric, MMM  RRR  CTAB  Abd soft, NT, ND  No c/c/e  No rash grossly                            7.4    6.07  )-----------( 241      ( 14 Nov 2024 05:11 )             23.2       11-14    138  |  106  |  6[L]  ----------------------------<  90  3.2[L]   |  25  |  0.24[L]    Ca    7.7[L]      14 Nov 2024 05:11  Phos  2.8     11-14  Mg     1.70     11-14

## 2024-11-15 LAB
ANION GAP SERPL CALC-SCNC: 9 MMOL/L — SIGNIFICANT CHANGE UP (ref 7–14)
APTT BLD: 31.9 SEC — SIGNIFICANT CHANGE UP (ref 24.5–35.6)
BLD GP AB SCN SERPL QL: NEGATIVE — SIGNIFICANT CHANGE UP
BUN SERPL-MCNC: 4 MG/DL — LOW (ref 7–23)
CALCIUM SERPL-MCNC: 7.8 MG/DL — LOW (ref 8.4–10.5)
CHLORIDE SERPL-SCNC: 104 MMOL/L — SIGNIFICANT CHANGE UP (ref 98–107)
CO2 SERPL-SCNC: 24 MMOL/L — SIGNIFICANT CHANGE UP (ref 22–31)
CREAT SERPL-MCNC: 0.21 MG/DL — LOW (ref 0.5–1.3)
EGFR: 147 ML/MIN/1.73M2 — SIGNIFICANT CHANGE UP
GLUCOSE BLDC GLUCOMTR-MCNC: 134 MG/DL — HIGH (ref 70–99)
GLUCOSE BLDC GLUCOMTR-MCNC: 82 MG/DL — SIGNIFICANT CHANGE UP (ref 70–99)
GLUCOSE BLDC GLUCOMTR-MCNC: 92 MG/DL — SIGNIFICANT CHANGE UP (ref 70–99)
GLUCOSE BLDC GLUCOMTR-MCNC: 96 MG/DL — SIGNIFICANT CHANGE UP (ref 70–99)
GLUCOSE BLDC GLUCOMTR-MCNC: 98 MG/DL — SIGNIFICANT CHANGE UP (ref 70–99)
GLUCOSE SERPL-MCNC: 93 MG/DL — SIGNIFICANT CHANGE UP (ref 70–99)
HCT VFR BLD CALC: 25 % — LOW (ref 39–50)
HGB BLD-MCNC: 8.1 G/DL — LOW (ref 13–17)
INR BLD: 1.18 RATIO — HIGH (ref 0.85–1.16)
MAGNESIUM SERPL-MCNC: 1.7 MG/DL — SIGNIFICANT CHANGE UP (ref 1.6–2.6)
MCHC RBC-ENTMCNC: 31.4 PG — SIGNIFICANT CHANGE UP (ref 27–34)
MCHC RBC-ENTMCNC: 32.4 G/DL — SIGNIFICANT CHANGE UP (ref 32–36)
MCV RBC AUTO: 96.9 FL — SIGNIFICANT CHANGE UP (ref 80–100)
NRBC # BLD: 0 /100 WBCS — SIGNIFICANT CHANGE UP (ref 0–0)
NRBC # FLD: 0 K/UL — SIGNIFICANT CHANGE UP (ref 0–0)
PHOSPHATE SERPL-MCNC: 3.2 MG/DL — SIGNIFICANT CHANGE UP (ref 2.5–4.5)
PLATELET # BLD AUTO: 258 K/UL — SIGNIFICANT CHANGE UP (ref 150–400)
POTASSIUM SERPL-MCNC: 3.3 MMOL/L — LOW (ref 3.5–5.3)
POTASSIUM SERPL-SCNC: 3.3 MMOL/L — LOW (ref 3.5–5.3)
PROTHROM AB SERPL-ACNC: 14 SEC — HIGH (ref 9.9–13.4)
RBC # BLD: 2.58 M/UL — LOW (ref 4.2–5.8)
RBC # FLD: 19.9 % — HIGH (ref 10.3–14.5)
RH IG SCN BLD-IMP: POSITIVE — SIGNIFICANT CHANGE UP
SODIUM SERPL-SCNC: 137 MMOL/L — SIGNIFICANT CHANGE UP (ref 135–145)
WBC # BLD: 5.46 K/UL — SIGNIFICANT CHANGE UP (ref 3.8–10.5)
WBC # FLD AUTO: 5.46 K/UL — SIGNIFICANT CHANGE UP (ref 3.8–10.5)

## 2024-11-15 PROCEDURE — 22515 PERQ VERTEBRAL AUGMENTATION: CPT | Mod: 78

## 2024-11-15 PROCEDURE — 22513 PERQ VERTEBRAL AUGMENTATION: CPT | Mod: 78

## 2024-11-15 RX ORDER — POTASSIUM CHLORIDE 600 MG/1
10 TABLET, EXTENDED RELEASE ORAL
Refills: 0 | Status: DISCONTINUED | OUTPATIENT
Start: 2024-11-15 | End: 2024-11-15

## 2024-11-15 RX ORDER — POTASSIUM CHLORIDE 600 MG/1
20 TABLET, EXTENDED RELEASE ORAL ONCE
Refills: 0 | Status: COMPLETED | OUTPATIENT
Start: 2024-11-15 | End: 2024-11-15

## 2024-11-15 RX ADMIN — ACETAMINOPHEN, DIPHENHYDRAMINE HCL, PHENYLEPHRINE HCL 6 MILLIGRAM(S): 325; 25; 5 TABLET ORAL at 21:25

## 2024-11-15 RX ADMIN — Medication 145 MILLIGRAM(S): at 12:39

## 2024-11-15 RX ADMIN — PANTOPRAZOLE SODIUM 40 MILLIGRAM(S): 40 TABLET, DELAYED RELEASE ORAL at 12:40

## 2024-11-15 RX ADMIN — Medication 20 MILLIGRAM(S): at 21:25

## 2024-11-15 RX ADMIN — Medication 1 TABLET(S): at 12:39

## 2024-11-15 RX ADMIN — TAMSULOSIN HYDROCHLORIDE 0.8 MILLIGRAM(S): 0.4 CAPSULE ORAL at 21:25

## 2024-11-15 RX ADMIN — NYSTATIN 500000 UNIT(S): 500000 TABLET, FILM COATED ORAL at 21:25

## 2024-11-15 RX ADMIN — POTASSIUM CHLORIDE 20 MILLIEQUIVALENT(S): 600 TABLET, EXTENDED RELEASE ORAL at 13:59

## 2024-11-15 RX ADMIN — POTASSIUM CHLORIDE 100 MILLIEQUIVALENT(S): 600 TABLET, EXTENDED RELEASE ORAL at 12:38

## 2024-11-15 RX ADMIN — ENOXAPARIN SODIUM 40 MILLIGRAM(S): 30 INJECTION SUBCUTANEOUS at 18:03

## 2024-11-15 RX ADMIN — CHLORHEXIDINE GLUCONATE 1 APPLICATION(S): 1.2 RINSE ORAL at 12:40

## 2024-11-15 RX ADMIN — Medication 1 MILLIGRAM(S): at 21:25

## 2024-11-15 RX ADMIN — Medication 1 TABLET(S): at 12:40

## 2024-11-15 RX ADMIN — DIPHENHYDRAMINE HYDROCHLORIDE AND LIDOCAINE HYDROCHLORIDE AND ALUMINUM HYDROXIDE AND MAGNESIUM HYDRO 30 MILLILITER(S): KIT at 12:38

## 2024-11-15 RX ADMIN — NYSTATIN 500000 UNIT(S): 500000 TABLET, FILM COATED ORAL at 12:38

## 2024-11-15 NOTE — PROCEDURE NOTE - PROCEDURE FINDINGS AND DETAILS
Successful T3 and T4 kyphoplasty with 1cc of  PMMA cement in each level. Vpad was applied at each level and dry sterile dressing was applied.
Status post successful T5 and L1 vertebral augmentation with injection of approximately 1cc of HV cement and 2.5cc of HV cement respectively. Dry sterile dressing was placed.

## 2024-11-15 NOTE — PROGRESS NOTE ADULT - ASSESSMENT
64-year-old male with metastatic prostate cancer, sent in by hematologist from [facility] and cancer for uncontrolled pain, nausea, vomiting.       Problem/Plan - 1:  ·  Problem: Cancer related pain.   ·  Plan: - appreciate pall care recommendations:  - pain control as per palliative crae   - RT consult appreciated ,  awaiting kyphoplasty   - repeat IR  kyphoplasty today   - RT outpt     Problem/Plan - 2:·  Problem: Nausea & vomiting., Diarrhea, colitis , SMA dissection   ·  Plan: -  vascular fu appreciated  - GI and surgery consult appreciated   - ID fu  - finished abx  - no intervention as per vascular     Problem/Plan - 3:  ·  Problem: CA of prostate.   ·  Plan: Metastatic prostate cancer  - f/u heme/onc recommendations  - Rad Onc fu   - Palliative for symptom control.    Problem/Plan - 4:  ·  Problem: Anemia , Thrombocytopenia, unspecified.   ·  Plan: -monitor cbc transfuse PRN    Problem/Plan - 5:  ·  Problem: Bacteremia   Plan: - ID fu - finished  ABX as per ID     dc planning to rehab  dw care coordinator

## 2024-11-15 NOTE — PROGRESS NOTE ADULT - SUBJECTIVE AND OBJECTIVE BOX
INTERVAL HPI/OVERNIGHT EVENTS:  Patient S&E at bedside. No o/n events. States not in pain. Had 2nd kyphoplasty today. Awake, alert. Participating with PT when able. Overall much improved.     VITAL SIGNS:  T(F): 98.4 (11-15-24 @ 17:55)  HR: 90 (11-15-24 @ 17:55)  BP: 123/87 (11-15-24 @ 17:55)  RR: 18 (11-15-24 @ 17:55)  SpO2: 97% (11-15-24 @ 17:55)  Wt(kg): --    PHYSICAL EXAM:    Constitutional: NAD  Eyes: EOMI, sclera non-icteric  Neck: supple  Respiratory: CTAB, no wheezes or crackles   Cardiovascular: RRR  Gastrointestinal: soft, NTND, + BS  Extremities: no cyanosis, clubbing or edema   Neurological: awake and alert      MEDICATIONS  (STANDING):  abiraterone 500 mg tablet 2 Tablet(s) 2 Tablet(s) Oral daily  acetaminophen     Tablet .. 650 milliGRAM(s) Oral once  atorvastatin 20 milliGRAM(s) Oral at bedtime  chlorhexidine 2% Cloths 1 Application(s) Topical daily  dextrose 5% 1000 milliLiter(s) (75 mL/Hr) IV Continuous <Continuous>  dextrose 5%. 1000 milliLiter(s) (50 mL/Hr) IV Continuous <Continuous>  dextrose 5%. 1000 milliLiter(s) (100 mL/Hr) IV Continuous <Continuous>  dextrose 50% Injectable 25 Gram(s) IV Push once  dextrose 50% Injectable 25 Gram(s) IV Push once  dextrose 50% Injectable 12.5 Gram(s) IV Push once  diphenhydrAMINE 25 milliGRAM(s) Oral once  enoxaparin Injectable 40 milliGRAM(s) SubCutaneous every 24 hours  fenofibrate Tablet 145 milliGRAM(s) Oral daily  FIRST- Mouthwash  BLM 30 milliLiter(s) Swish and Spit three times a day  glucagon  Injectable 1 milliGRAM(s) IntraMuscular once  haloperidol     Tablet 1 milliGRAM(s) Oral <User Schedule>  influenza  Vaccine (HIGH DOSE) 0.5 milliLiter(s) IntraMuscular once  insulin lispro (ADMELOG) corrective regimen sliding scale   SubCutaneous three times a day before meals  insulin lispro (ADMELOG) corrective regimen sliding scale   SubCutaneous at bedtime  lactated ringers 1000 milliLiter(s) (75 mL/Hr) IV Continuous <Continuous>  lactobacillus acidophilus 1 Tablet(s) Oral daily  lidocaine   4% Patch 1 Patch Transdermal every 24 hours  melatonin 6 milliGRAM(s) Oral at bedtime  multivitamin 1 Tablet(s) Oral daily  Nephro-nancy 1 Tablet(s) Oral daily  nystatin    Suspension 369675 Unit(s) Oral three times a day  pantoprazole  Injectable 40 milliGRAM(s) IV Push daily  predniSONE   Tablet 5 milliGRAM(s) Oral daily  tamsulosin 0.8 milliGRAM(s) Oral at bedtime    MEDICATIONS  (PRN):  acetaminophen     Tablet .. 650 milliGRAM(s) Oral every 6 hours PRN Temp greater or equal to 38C (100.4F), Mild Pain (1 - 3), Moderate Pain (4 - 6)  aluminum hydroxide/magnesium hydroxide/simethicone Suspension 30 milliLiter(s) Oral every 4 hours PRN Dyspepsia  artificial tears (preservative free) Ophthalmic Solution 1 Drop(s) Both EYES four times a day PRN Dry Eyes  dextrose Oral Gel 15 Gram(s) Oral once PRN Blood Glucose LESS THAN 70 milliGRAM(s)/deciliter  naloxone Injectable 0.1 milliGRAM(s) IV Push every 3 minutes PRN For ANY of the following changes in patient status:  A. RR LESS THAN 10 breaths per minute, B. Oxygen saturation LESS THAN 90%, C. Sedation score of 6  polyethylene glycol 3350 17 Gram(s) Oral daily PRN Constipation      Allergies    No Known Allergies    Intolerances        LABS:                        8.1    5.46  )-----------( 258      ( 15 Nov 2024 05:48 )             25.0     11-15    137  |  104  |  4[L]  ----------------------------<  93  3.3[L]   |  24  |  0.21[L]    Ca    7.8[L]      15 Nov 2024 05:48  Phos  3.2     11-15  Mg     1.70     11-15      PT/INR - ( 15 Nov 2024 05:48 )   PT: 14.0 sec;   INR: 1.18 ratio         PTT - ( 15 Nov 2024 05:48 )  PTT:31.9 sec  Urinalysis Basic - ( 15 Nov 2024 05:48 )    Color: x / Appearance: x / SG: x / pH: x  Gluc: 93 mg/dL / Ketone: x  / Bili: x / Urobili: x   Blood: x / Protein: x / Nitrite: x   Leuk Esterase: x / RBC: x / WBC x   Sq Epi: x / Non Sq Epi: x / Bacteria: x        RADIOLOGY & ADDITIONAL TESTS:  Studies reviewed.

## 2024-11-15 NOTE — PRE PROCEDURE NOTE - PRE PROCEDURE EVALUATION
------------------------------------------------------------  Interventional Radiology Pre-Procedure Note  ------------------------------------------------------------    Indication: 64y Male with history of metastatic prostate cancer s/p T5 and L1 kyphoplasty who presents for T3 and T4 vertebral augmentation.     Past Medical History:  Benign essential HTN    HLD (hyperlipidemia)    CA of prostate    Basal cell carcinoma    Allergies: No Known Allergies      Medications:    enoxaparin Injectable: 40 milliGRAM(s) SubCutaneous (11-14-24 @ 17:10)  nystatin    Suspension: 412899 Unit(s) Oral (11-14-24 @ 21:14)      Vital Signs:   T(F): 97.5 (06:00), Max: 98.2 (20:04)  HR: 101 (06:00)  BP: 117/87 (06:00)  RR: 17 (06:00)  SpO2: 98% (06:00)    Labs:           8.1  5.46)-----(258     (11-15-24 @ 05:48)         25.0     137 | 104 | 4  --------------------< 93     (11-15-24 @ 05:48)  3.3 | 24 | 0.21       PT: 14.0[H] 11-15-24 @ 05:48  aPTT: 31.9 11-15-24 @ 05:48   INR: 1.18[H] 11-15-24 @ 05:48    Consent: Risks/benefits/alternatives were explained and informed written consent was obtained.     Procedure Plan: Plan for T3 and T4 vertebral augmentation today.

## 2024-11-15 NOTE — PROGRESS NOTE ADULT - SUBJECTIVE AND OBJECTIVE BOX
[facility] [place] [facility]   MD MARKO EASTMAN MD ANGELA WONG, PA    TEL:  OFFICE: [phone]  From 5pm-7am Answering Service [phone]    -- RENAL FOLLOW UP NOTE ---Date of Service 11-15-24 @ 13:24    Patient is a 64y old  Male who presents with a chief complaint of Pain (15 Nov 2024 13:17)      Patient seen and examined at bedside. No chest pain/sob    VITALS:  T(F): 97.9 (11-15-24 @ 13:11), Max: 98.2 (11-14-24 @ 20:04)  HR: 92 (11-15-24 @ 13:11)  BP: 122/83 (11-15-24 @ 13:11)  RR: 18 (11-15-24 @ 13:11)  SpO2: 98% (11-15-24 @ 13:11)  Wt(kg): --    11-14 @ 07:01  -  11-15 @ 07:00  --------------------------------------------------------  IN: 500 mL / OUT: 1740 mL / NET: -1240 mL    11-15 @ 07:01  -  11-15 @ 13:24  --------------------------------------------------------  IN: 240 mL / OUT: 200 mL / NET: 40 mL          PHYSICAL EXAM:  General: NAD  Neck: No JVD  Respiratory: CTAB, no wheezes, rales or rhonchi  Cardiovascular: S1, S2, RRR  Gastrointestinal: BS+, soft, NT/ND  Extremities: No peripheral edema    Hospital Medications:   MEDICATIONS  (STANDING):  abiraterone 500 mg tablet 2 Tablet(s) 2 Tablet(s) Oral daily  acetaminophen     Tablet .. 650 milliGRAM(s) Oral once  atorvastatin 20 milliGRAM(s) Oral at bedtime  chlorhexidine 2% Cloths 1 Application(s) Topical daily  dextrose 5% 1000 milliLiter(s) (75 mL/Hr) IV Continuous <Continuous>  dextrose 5%. 1000 milliLiter(s) (50 mL/Hr) IV Continuous <Continuous>  dextrose 5%. 1000 milliLiter(s) (100 mL/Hr) IV Continuous <Continuous>  dextrose 50% Injectable 12.5 Gram(s) IV Push once  dextrose 50% Injectable 25 Gram(s) IV Push once  dextrose 50% Injectable 25 Gram(s) IV Push once  diphenhydrAMINE 25 milliGRAM(s) Oral once  enoxaparin Injectable 40 milliGRAM(s) SubCutaneous every 24 hours  fenofibrate Tablet 145 milliGRAM(s) Oral daily  FIRST- Mouthwash  BLM 30 milliLiter(s) Swish and Spit three times a day  glucagon  Injectable 1 milliGRAM(s) IntraMuscular once  haloperidol     Tablet 1 milliGRAM(s) Oral <User Schedule>  influenza  Vaccine (HIGH DOSE) 0.5 milliLiter(s) IntraMuscular once  insulin lispro (ADMELOG) corrective regimen sliding scale   SubCutaneous three times a day before meals  insulin lispro (ADMELOG) corrective regimen sliding scale   SubCutaneous at bedtime  lactated ringers 1000 milliLiter(s) (75 mL/Hr) IV Continuous <Continuous>  lactobacillus acidophilus 1 Tablet(s) Oral daily  lidocaine   4% Patch 1 Patch Transdermal every 24 hours  melatonin 6 milliGRAM(s) Oral at bedtime  multivitamin 1 Tablet(s) Oral daily  Nephro-nancy 1 Tablet(s) Oral daily  nystatin    Suspension 221539 Unit(s) Oral three times a day  pantoprazole  Injectable 40 milliGRAM(s) IV Push daily  potassium chloride  10 mEq/100 mL IVPB 10 milliEquivalent(s) IV Intermittent every 1 hour  predniSONE   Tablet 5 milliGRAM(s) Oral daily  tamsulosin 0.8 milliGRAM(s) Oral at bedtime      LABS:  11-15    137  |  104  |  4[L]  ----------------------------<  93  3.3[L]   |  24  |  0.21[L]    Ca    7.8[L]      15 Nov 2024 05:48  Phos  3.2     11-15  Mg     1.70     11-15      Creatinine Trend: 0.21 <--, 0.24 <--, 0.30 <--, 0.27 <--, 0.26 <--, 0.28 <--, 0.28 <--    Phosphorus: 3.2 mg/dL (11-15 @ 05:48)                              8.1    5.46  )-----------( 258      ( 15 Nov 2024 05:48 )             25.0     Urine Studies:  Urinalysis - [11-15-24 @ 05:48]      Color  / Appearance  / SG  / pH       Gluc 93 / Ketone   / Bili  / Urobili        Blood  / Protein  / Leuk Est  / Nitrite       RBC  / WBC  / Hyaline  / Gran  / Sq Epi  / Non Sq Epi  / Bacteria       TSH 2.62      [10-13-24 @ 05:30]  Lipid: chol 135, , HDL 39, LDL --      [10-13-24 @ 05:30]        RADIOLOGY & ADDITIONAL STUDIES:

## 2024-11-15 NOTE — PROGRESS NOTE ADULT - ASSESSMENT
1. Metastatic prostate cancer    - Follows with Dr Andrew Mendoza at [facility]   - PSMA 10/11/24 showed hypermetabolic vera foci above and below the diaphragm, foci in the liver and extensive foci involving the axial and appendicular skeleton compatible with metastatic disease  - --> 374--> 426 on 10/8/24   - Liver biopsy 9/30/24 consistent with prostate adenocarcinoma   - High risk high volume disease -> started on triplet therapy w/ ADT/lupron, abiraterone/prednisone + taxotere. (back pain after Lupron likely tumor flare).  - Continue abiraterone 1000mg daily w Prednisone 5mg PO QD   - s/p taxotere 60mg/m2 on 10/13/24. Next dose was planned for 11/4 , held for weakness, clinical progress  -Bacteremia cleared  - s/p Kyphoplasty T5/l1   - s/p Kyphoplasty to T3/T4 on 11/15/24   - Plan is for consideration for radiation therapy (SBRT) for durable pain control with Dr Bruno   - IR and RT recs appreciated    - Palliative following for pain control and given hospital course would have further GOC discussions. Treatment will be offered but he has metastatic disease with visceral involvement and complications as outlined below.     2. Pancolitis, E coli and H influenzae bacteremia  - completed Cefepime+ Metronidazole  - ID following  - BCx from 10/24 neg to date  - Rectal tube and lau catheter removed    3. Anemia/Thrombocytopenia   - Transfuse for Hgb < 7.0  - Multifactorial sec to likely marrow infiltration by CaP, Chemo effect, consumption from acute illness, poss ITP (less likely now that thrombocytopenia resolved)   - Transfuse for plt <15 or < 50K if bleeding/for procedure      4. SMA Dissection  - seen by vasc sx  - no plan for intervention      Marlon Tate MD   Hematology/Oncology  [facility]  [phone] (Office)  [phone] (Alt office)  Evenings and weekends please call MD on call or office

## 2024-11-15 NOTE — CHART NOTE - NSCHARTNOTEFT_GEN_A_CORE
Pre-Interventional Radiology Procedure Note    64y    Male    Procedure: T3/T4 vertebral augmentation    Diagnosis/Indication: Patient is a 64y old  Male who presents with a chief complaint of Pain (14 Nov 2024 17:27)      Interventional Radiology Attending Physician: Inocente Moss    Ordering Attending Physician: Ganesh aHll    PAST MEDICAL & SURGICAL HISTORY:  Benign essential HTN      HLD (hyperlipidemia)      CA of prostate      Basal cell carcinoma      History of transurethral prostatectomy      S/P inguinal hernia repair      History of basal cell carcinoma (BCC) excision           CBC Full  -  ( 15 Nov 2024 05:48 )  WBC Count : 5.46 K/uL  RBC Count : 2.58 M/uL  Hemoglobin : 8.1 g/dL  Hematocrit : 25.0 %  Platelet Count - Automated : 258 K/uL  Mean Cell Volume : 96.9 fL  Mean Cell Hemoglobin : 31.4 pg  Mean Cell Hemoglobin Concentration : 32.4 g/dL  Auto Neutrophil # : x  Auto Lymphocyte # : x  Auto Monocyte # : x  Auto Eosinophil # : x  Auto Basophil # : x  Auto Neutrophil % : x  Auto Lymphocyte % : x  Auto Monocyte % : x  Auto Eosinophil % : x  Auto Basophil % : x    11-15    137  |  104  |  4[L]  ----------------------------<  93  3.3[L]   |  24  |  0.21[L]    Ca    7.8[L]      15 Nov 2024 05:48  Phos  3.2     11-15  Mg     1.70     11-15      PT/INR - ( 15 Nov 2024 05:48 )   PT: 14.0 sec;   INR: 1.18 ratio         PTT - ( 15 Nov 2024 05:48 )  PTT:31.9 sec

## 2024-11-15 NOTE — PROGRESS NOTE ADULT - SUBJECTIVE AND OBJECTIVE BOX
Patient is a 64y old  Male who presents with a chief complaint of Pain (14 Nov 2024 17:27)    Date of servie : 11-15-24 @ 13:20  INTERVAL HPI/OVERNIGHT EVENTS:  T(C): 36.6 (11-15-24 @ 13:11), Max: 36.8 (11-14-24 @ 20:04)  HR: 92 (11-15-24 @ 13:11) (91 - 101)  BP: 122/83 (11-15-24 @ 13:11) (94/74 - 122/83)  RR: 18 (11-15-24 @ 13:11) (17 - 20)  SpO2: 98% (11-15-24 @ 13:11) (97% - 100%)  Wt(kg): --  I&O's Summary    14 Nov 2024 07:01  -  15 Nov 2024 07:00  --------------------------------------------------------  IN: 500 mL / OUT: 1740 mL / NET: -1240 mL    15 Nov 2024 07:01  -  15 Nov 2024 13:20  --------------------------------------------------------  IN: 240 mL / OUT: 200 mL / NET: 40 mL        LABS:                        8.1    5.46  )-----------( 258      ( 15 Nov 2024 05:48 )             25.0     11-15    137  |  104  |  4[L]  ----------------------------<  93  3.3[L]   |  24  |  0.21[L]    Ca    7.8[L]      15 Nov 2024 05:48  Phos  3.2     11-15  Mg     1.70     11-15      PT/INR - ( 15 Nov 2024 05:48 )   PT: 14.0 sec;   INR: 1.18 ratio         PTT - ( 15 Nov 2024 05:48 )  PTT:31.9 sec  Urinalysis Basic - ( 15 Nov 2024 05:48 )    Color: x / Appearance: x / SG: x / pH: x  Gluc: 93 mg/dL / Ketone: x  / Bili: x / Urobili: x   Blood: x / Protein: x / Nitrite: x   Leuk Esterase: x / RBC: x / WBC x   Sq Epi: x / Non Sq Epi: x / Bacteria: x      CAPILLARY BLOOD GLUCOSE      POCT Blood Glucose.: 82 mg/dL (15 Nov 2024 12:29)  POCT Blood Glucose.: 96 mg/dL (15 Nov 2024 11:45)  POCT Blood Glucose.: 98 mg/dL (15 Nov 2024 07:18)  POCT Blood Glucose.: 122 mg/dL (14 Nov 2024 20:44)  POCT Blood Glucose.: 91 mg/dL (14 Nov 2024 18:02)        Urinalysis Basic - ( 15 Nov 2024 05:48 )    Color: x / Appearance: x / SG: x / pH: x  Gluc: 93 mg/dL / Ketone: x  / Bili: x / Urobili: x   Blood: x / Protein: x / Nitrite: x   Leuk Esterase: x / RBC: x / WBC x   Sq Epi: x / Non Sq Epi: x / Bacteria: x        MEDICATIONS  (STANDING):  abiraterone 500 mg tablet 2 Tablet(s) 2 Tablet(s) Oral daily  acetaminophen     Tablet .. 650 milliGRAM(s) Oral once  atorvastatin 20 milliGRAM(s) Oral at bedtime  chlorhexidine 2% Cloths 1 Application(s) Topical daily  dextrose 5% 1000 milliLiter(s) (75 mL/Hr) IV Continuous <Continuous>  dextrose 5%. 1000 milliLiter(s) (50 mL/Hr) IV Continuous <Continuous>  dextrose 5%. 1000 milliLiter(s) (100 mL/Hr) IV Continuous <Continuous>  dextrose 50% Injectable 25 Gram(s) IV Push once  dextrose 50% Injectable 12.5 Gram(s) IV Push once  dextrose 50% Injectable 25 Gram(s) IV Push once  diphenhydrAMINE 25 milliGRAM(s) Oral once  enoxaparin Injectable 40 milliGRAM(s) SubCutaneous every 24 hours  fenofibrate Tablet 145 milliGRAM(s) Oral daily  FIRST- Mouthwash  BLM 30 milliLiter(s) Swish and Spit three times a day  glucagon  Injectable 1 milliGRAM(s) IntraMuscular once  haloperidol     Tablet 1 milliGRAM(s) Oral <User Schedule>  influenza  Vaccine (HIGH DOSE) 0.5 milliLiter(s) IntraMuscular once  insulin lispro (ADMELOG) corrective regimen sliding scale   SubCutaneous three times a day before meals  insulin lispro (ADMELOG) corrective regimen sliding scale   SubCutaneous at bedtime  lactated ringers 1000 milliLiter(s) (75 mL/Hr) IV Continuous <Continuous>  lactobacillus acidophilus 1 Tablet(s) Oral daily  lidocaine   4% Patch 1 Patch Transdermal every 24 hours  melatonin 6 milliGRAM(s) Oral at bedtime  multivitamin 1 Tablet(s) Oral daily  Nephro-nancy 1 Tablet(s) Oral daily  nystatin    Suspension 357190 Unit(s) Oral three times a day  pantoprazole  Injectable 40 milliGRAM(s) IV Push daily  potassium chloride  10 mEq/100 mL IVPB 10 milliEquivalent(s) IV Intermittent every 1 hour  predniSONE   Tablet 5 milliGRAM(s) Oral daily  tamsulosin 0.8 milliGRAM(s) Oral at bedtime    MEDICATIONS  (PRN):  acetaminophen     Tablet .. 650 milliGRAM(s) Oral every 6 hours PRN Temp greater or equal to 38C (100.4F), Mild Pain (1 - 3), Moderate Pain (4 - 6)  aluminum hydroxide/magnesium hydroxide/simethicone Suspension 30 milliLiter(s) Oral every 4 hours PRN Dyspepsia  artificial tears (preservative free) Ophthalmic Solution 1 Drop(s) Both EYES four times a day PRN Dry Eyes  dextrose Oral Gel 15 Gram(s) Oral once PRN Blood Glucose LESS THAN 70 milliGRAM(s)/deciliter  naloxone Injectable 0.1 milliGRAM(s) IV Push every 3 minutes PRN For ANY of the following changes in patient status:  A. RR LESS THAN 10 breaths per minute, B. Oxygen saturation LESS THAN 90%, C. Sedation score of 6  polyethylene glycol 3350 17 Gram(s) Oral daily PRN Constipation          PHYSICAL EXAM:  GENERAL: NAD, well-groomed, well-developed  HEAD:  Atraumatic, Normocephalic  CHEST/LUNG: Clear to percussion bilaterally; No rales, rhonchi, wheezing, or rubs  HEART: Regular rate and rhythm; No murmurs, rubs, or gallops  ABDOMEN: Soft, Nontender, Nondistended; Bowel sounds present  EXTREMITIES:  2+ Peripheral Pulses, No clubbing, cyanosis, or edema  LYMPH: No lymphadenopathy noted  SKIN: No rashes or lesions    Care Discussed with Consultants/Other Providers [ ] YES  [ ] NO

## 2024-11-15 NOTE — PROGRESS NOTE ADULT - ASSESSMENT
64-year-old male with metastatic prostate cancer, sent in by hematologist from [facility] for uncontrolled pain, nausea, vomiting.   Patient reports diffuse pain starting 6 months ago significantly worsening for the past 2 weeks.  Currently the worst pain is located around the lower back.   No loss of bladder and bowel function, no saddle paresthesia.  Able to walk.  patient is oxycodone 5 every 4-6 hour.  Yesterday they started adding OxyContin 10.  However patient continued to have pain.  Family also reports significant nausea and vomiting, inability to tolerate p.o. for the last 2 days.  Last bowel movement 2 days ago.   No known allergy.  Diagnosed with high risk high volume metastatic prostate cancer with bone, liver lymph node mets and presacral mass. Liver biopsy confirmed disease with . Started lupron, loly/pred with plans to initiate taxotere.    (12 Oct 2024 15:40)  pt seems very frustrated:   he is on 2 L of oxygen : he has no underlying lung disease:  but he is requiring 2 L of oxygen      Hypoxic resp failure  Metastatic prostate cancer  Back pain     Hypoxic resp failure  -He has no underlying lung disease:  but he is requiring 2 L of oxygen :   -CTA showed; No pulmonary embolism to the level of the segmental branches bilaterally. Limited evaluation of the subsegmental branches secondary to incomplete contrast opacification.  Multilevel vertebral body lytic lesions and loss of vertebral body height, better evaluated on CT thoracic spine 10/17/2024. Metastatic prostate cancer  -Patent central airways. Bibasilar atelectasis. No pleural effusion. MEDIASTINUM AND KATTY: No lymphadenopathy.  PULMONARY ANGIOGRAM: No pulmonary embolism to the level of the segmental   branches bilaterally. Limited evaluation of the subsegmental branches secondary to incomplete contrast opacification.    10/19:  -seems pretty tired;  on 2 L of oxygen :  -cta chest showed: No pulmonary embolism to the level of the segmental branches bilaterally. Limited evaluation of the subsegmental branches secondary to incomplete   contrast opacification. Multilevel vertebral body lytic lesions and loss of vertebral body height, better evaluated on CT thoracic spine 10/17/2024.  -still with fever:  no pe  on zosyn  and leukopenic hemonc following;  has metastatic prostate ca:   -VBG seems OK:     10/20:  pulm wise he seems to be stable:   -using 2 L of oxygen    -yesterdays VBG with minimal met acidosis  -cta again noted    10/21:  on 4 L of oxygen  today    cxr is size    e coli sepsis: Gram Negative Rods and Gram Negative Coccobacilli    10/22: heis doing poorly today  : he is very agitated and uncomfortable  on mittens: ? sun downing   10/23: quiet today  : sleeping:  oxygen requirement has not increased: on rooo ha 95% as documented    WBC is slowly increasing:   no fever:   -on antibiotics   10/24: pt is not doing well : his repeat blood cultures are positive with same organism :  would defer to ID;   10/25: seems to be doing  ok ; no sob:  no cough :  no phlegm   : on room air   10/26: resp failure has resolved:  is septic  : on ceftriaxone     10/27:  he seems OK:  he is on room air:  on morphine drip   remans on ceftriaxone still     10/28: she seems to be doing  ok : no sob: no cough ; no phlegm  confused:  on morphine drip    10/29; seems comfortable on room air;   cont current rx:   10/30: seems to be doing  ok ; on morphine drip : : plan to decrease morphine dose:   -cont current supportive care   10/31:  seems same tome:   no sob:  on room air:   seems comfortable at this ti me: off morphine  11/1:  he has been on room air for days;  looks comfortable:  coughs when he eats;   need pt ot  ; ? oob to chair:  dw wife:   11/3:  he seems stable:   on room air:  responding to questions pretty well : has bleeding lower lip : not Much through ? secondary to dryness:   -resp wise seems pretty good:  no sob:    114:  -seems pretty good:  no sob:  on room air  -has iral ulceration:  bleeding:  not much though : cont magic mouth wash   11/5:  -he seems  pretty good:   on room air:   no sob:  no cough :  no phlegm : difficulty in eating secondary to mucositis:  cont current rx:     11/6: seems OK:   no sob:   on room air:  now again kyphoplasty being considered    11/7:  -he seems same:  no sob:  on room air:   for possible kyphoplasty      11/8: now kyphoplasty next Wednesday   he is weak but looks good:  on room air     11/9"  seems to be doing  ok :  no sob:  no cough ; no phlegm     11/10: seems prettty good resp wise;   bed bound:   on room air:  for kyphoplasty :  encouraged to do IS   11/11: pulm wise he seems optimized to get kyphoplasty tomorrow:   wife at bedside:  no events overnight    11/13: s/p kyphoplasty  now for vertebral augmentation on Friday  :  11/14: seems to be doing  ok ; no sob:  no cough : no phlegm   ; on room air:  pulm wise very stable:     11/15: seems pretty good after the procedure no pin: no resp distress:  on room air     New finding:  SMA dissection : neutropenic colitis/ #E Coli and H influenzae bacteremia/ #Neutropenic fever  -/f ischemic colitis on R colon   Diagnosed  on ct abd:  defer to surgery and primary team:  probably no intervention:   -cont antibiotics  -not doing very well with above new findings    10/22; no intervention per surgery    -cont antibiotics  -has fever:  ID following :  -Last chest xray on 20th  ; normal     id following   10/23  IR was consulted for vertebral augmentation of T3-T5 prior to XRT.   Patient was seen bedside. Initially, patient kept requesting no exam and was not willing to participate in the discussion. Son was bedside at time of conversation.   Per discussion with the medical attending, given the concern for SMA dissection and concern for bowel ischemia, will hold off on vertebral augmentation evaluation at this time.   Please reach out to IR when patient is medically stable for vertebral augmentation.  10/24:  remains septic:  above cancelled:   ? for palliative care now  10/26: he seems same to me:  no overnight events  on room air:   check VBG  : cont antibiotics   hemonc following   10/27:  -still on antibiotics for e coli sepsis  -id following s  10/28: cont antibiotics   10/230: cont ceftriaxone   10/31: on ceftriaxone and flagyl   11/1: antibiotics per ID   11/3: cont antibiotics : IR note reviewed:  no kyphoplasty at this time    11/4: cont antibiotics per ID   11/5: on cefepime:  id following   11/6: on cefepime and flagyl!  11/7: off antibiotics now:   11/8: Completed antibiotics   11/9: -antibiotics  ; have been on  low dose steroids ; not from pulm side   11/11: seems to be doing  ok : no sob:  no cough : on room air   11/13: seems OK:  no sob:  no cough : no phlegm  11/14: no new symptoms   11/15rxed:  doing ok     Back pain   -likely due to metastatic disease:  adm here for severe pain :  -pain control per primary team   -venous ph is ok   11/1: resolved for now on meds  11/3: pain control : no kyphoplasty at this ti me per ir note:   11/4: as above  11/6: for possible kyphoplasty   now   11/7: for kyphoplasty now:   11/13: s/p kyphoplasty and now for vertebral augmentation:  on friday followed by outpt RT   11/15 s/p vertebral augmentation:  for dc now to rehab     dw acp    dw acp; GOC as per primary  team and wife No

## 2024-11-16 LAB
ADD ON TEST-SPECIMEN IN LAB: SIGNIFICANT CHANGE UP
ANION GAP SERPL CALC-SCNC: 8 MMOL/L — SIGNIFICANT CHANGE UP (ref 7–14)
BUN SERPL-MCNC: 6 MG/DL — LOW (ref 7–23)
CALCIUM SERPL-MCNC: 7.9 MG/DL — LOW (ref 8.4–10.5)
CHLORIDE SERPL-SCNC: 105 MMOL/L — SIGNIFICANT CHANGE UP (ref 98–107)
CO2 SERPL-SCNC: 24 MMOL/L — SIGNIFICANT CHANGE UP (ref 22–31)
CREAT SERPL-MCNC: 0.2 MG/DL — LOW (ref 0.5–1.3)
EGFR: 149 ML/MIN/1.73M2 — SIGNIFICANT CHANGE UP
FERRITIN SERPL-MCNC: 1763 NG/ML — HIGH (ref 30–400)
GLUCOSE BLDC GLUCOMTR-MCNC: 108 MG/DL — HIGH (ref 70–99)
GLUCOSE BLDC GLUCOMTR-MCNC: 121 MG/DL — HIGH (ref 70–99)
GLUCOSE BLDC GLUCOMTR-MCNC: 122 MG/DL — HIGH (ref 70–99)
GLUCOSE BLDC GLUCOMTR-MCNC: 152 MG/DL — HIGH (ref 70–99)
GLUCOSE SERPL-MCNC: 94 MG/DL — SIGNIFICANT CHANGE UP (ref 70–99)
HCT VFR BLD CALC: 25.1 % — LOW (ref 39–50)
HGB BLD-MCNC: 8.2 G/DL — LOW (ref 13–17)
IRON SATN MFR SERPL: 25 % — SIGNIFICANT CHANGE UP (ref 14–50)
IRON SATN MFR SERPL: 51 UG/DL — SIGNIFICANT CHANGE UP (ref 45–165)
MAGNESIUM SERPL-MCNC: 1.7 MG/DL — SIGNIFICANT CHANGE UP (ref 1.6–2.6)
MCHC RBC-ENTMCNC: 31.9 PG — SIGNIFICANT CHANGE UP (ref 27–34)
MCHC RBC-ENTMCNC: 32.7 G/DL — SIGNIFICANT CHANGE UP (ref 32–36)
MCV RBC AUTO: 97.7 FL — SIGNIFICANT CHANGE UP (ref 80–100)
NRBC # BLD: 0 /100 WBCS — SIGNIFICANT CHANGE UP (ref 0–0)
NRBC # FLD: 0 K/UL — SIGNIFICANT CHANGE UP (ref 0–0)
PHOSPHATE SERPL-MCNC: 3.1 MG/DL — SIGNIFICANT CHANGE UP (ref 2.5–4.5)
PLATELET # BLD AUTO: 242 K/UL — SIGNIFICANT CHANGE UP (ref 150–400)
POTASSIUM SERPL-MCNC: 3.4 MMOL/L — LOW (ref 3.5–5.3)
POTASSIUM SERPL-SCNC: 3.4 MMOL/L — LOW (ref 3.5–5.3)
PSA FLD-MCNC: 75.4 NG/ML — HIGH (ref 0–4)
RBC # BLD: 2.57 M/UL — LOW (ref 4.2–5.8)
RBC # FLD: 20.2 % — HIGH (ref 10.3–14.5)
SODIUM SERPL-SCNC: 137 MMOL/L — SIGNIFICANT CHANGE UP (ref 135–145)
TIBC SERPL-MCNC: 206 UG/DL — LOW (ref 220–430)
UIBC SERPL-MCNC: 155 UG/DL — SIGNIFICANT CHANGE UP (ref 110–370)
WBC # BLD: 5.94 K/UL — SIGNIFICANT CHANGE UP (ref 3.8–10.5)
WBC # FLD AUTO: 5.94 K/UL — SIGNIFICANT CHANGE UP (ref 3.8–10.5)

## 2024-11-16 RX ORDER — POTASSIUM CHLORIDE 600 MG/1
20 TABLET, EXTENDED RELEASE ORAL ONCE
Refills: 0 | Status: DISCONTINUED | OUTPATIENT
Start: 2024-11-16 | End: 2024-11-16

## 2024-11-16 RX ORDER — SODIUM CHLORIDE 9 MG/ML
500 INJECTION, SOLUTION INTRAMUSCULAR; INTRAVENOUS; SUBCUTANEOUS ONCE
Refills: 0 | Status: COMPLETED | OUTPATIENT
Start: 2024-11-16 | End: 2024-11-16

## 2024-11-16 RX ORDER — POTASSIUM CHLORIDE 600 MG/1
10 TABLET, EXTENDED RELEASE ORAL
Refills: 0 | Status: COMPLETED | OUTPATIENT
Start: 2024-11-16 | End: 2024-11-16

## 2024-11-16 RX ORDER — ACETAMINOPHEN 500MG 500 MG/1
1000 TABLET, COATED ORAL ONCE
Refills: 0 | Status: COMPLETED | OUTPATIENT
Start: 2024-11-16 | End: 2024-11-16

## 2024-11-16 RX ADMIN — DIPHENHYDRAMINE HYDROCHLORIDE AND LIDOCAINE HYDROCHLORIDE AND ALUMINUM HYDROXIDE AND MAGNESIUM HYDRO 30 MILLILITER(S): KIT at 14:38

## 2024-11-16 RX ADMIN — Medication 1 TABLET(S): at 14:38

## 2024-11-16 RX ADMIN — ACETAMINOPHEN 500MG 1000 MILLIGRAM(S): 500 TABLET, COATED ORAL at 12:45

## 2024-11-16 RX ADMIN — SODIUM CHLORIDE 1000 MILLILITER(S): 9 INJECTION, SOLUTION INTRAMUSCULAR; INTRAVENOUS; SUBCUTANEOUS at 12:18

## 2024-11-16 RX ADMIN — POTASSIUM CHLORIDE 100 MILLIEQUIVALENT(S): 600 TABLET, EXTENDED RELEASE ORAL at 16:28

## 2024-11-16 RX ADMIN — ENOXAPARIN SODIUM 40 MILLIGRAM(S): 30 INJECTION SUBCUTANEOUS at 17:47

## 2024-11-16 RX ADMIN — Medication 1: at 13:20

## 2024-11-16 RX ADMIN — Medication 1 MILLIGRAM(S): at 21:36

## 2024-11-16 RX ADMIN — ACETAMINOPHEN 500MG 400 MILLIGRAM(S): 500 TABLET, COATED ORAL at 12:19

## 2024-11-16 RX ADMIN — PANTOPRAZOLE SODIUM 40 MILLIGRAM(S): 40 TABLET, DELAYED RELEASE ORAL at 14:38

## 2024-11-16 RX ADMIN — Medication 145 MILLIGRAM(S): at 14:38

## 2024-11-16 RX ADMIN — PREDNISONE 5 MILLIGRAM(S): 20 TABLET ORAL at 05:26

## 2024-11-16 RX ADMIN — NYSTATIN 500000 UNIT(S): 500000 TABLET, FILM COATED ORAL at 21:38

## 2024-11-16 RX ADMIN — POTASSIUM CHLORIDE 100 MILLIEQUIVALENT(S): 600 TABLET, EXTENDED RELEASE ORAL at 14:45

## 2024-11-16 RX ADMIN — NYSTATIN 500000 UNIT(S): 500000 TABLET, FILM COATED ORAL at 14:37

## 2024-11-16 RX ADMIN — TAMSULOSIN HYDROCHLORIDE 0.8 MILLIGRAM(S): 0.4 CAPSULE ORAL at 21:36

## 2024-11-16 RX ADMIN — DIPHENHYDRAMINE HYDROCHLORIDE AND LIDOCAINE HYDROCHLORIDE AND ALUMINUM HYDROXIDE AND MAGNESIUM HYDRO 30 MILLILITER(S): KIT at 21:38

## 2024-11-16 RX ADMIN — Medication 1 TABLET(S): at 14:39

## 2024-11-16 RX ADMIN — ACETAMINOPHEN, DIPHENHYDRAMINE HCL, PHENYLEPHRINE HCL 6 MILLIGRAM(S): 325; 25; 5 TABLET ORAL at 21:37

## 2024-11-16 RX ADMIN — DIPHENHYDRAMINE HYDROCHLORIDE AND LIDOCAINE HYDROCHLORIDE AND ALUMINUM HYDROXIDE AND MAGNESIUM HYDRO 30 MILLILITER(S): KIT at 05:26

## 2024-11-16 RX ADMIN — CHLORHEXIDINE GLUCONATE 1 APPLICATION(S): 1.2 RINSE ORAL at 14:37

## 2024-11-16 RX ADMIN — NYSTATIN 500000 UNIT(S): 500000 TABLET, FILM COATED ORAL at 05:26

## 2024-11-16 NOTE — PROVIDER CONTACT NOTE (OTHER) - SITUATION
patient was put on recliner for first timE in 37days - patient c/o dizziness and BP upon assessment 88/65

## 2024-11-16 NOTE — PROVIDER CONTACT NOTE (OTHER) - ASSESSMENT
350 cc on bladder scan; 2 RN utilized 14 Welsh straight cath kit; unable to pass catheter into tip of penis (less than 3mm); RN did not advance catheter secondary to prostate CA.
Pt A&Ox4, v/s stable except BP 93/61. asymptomatic, denies chest pain, sob, dizziness. denies respiratory distress.
10cc of fluids removed from balloon however Farah is only partially coming out of the penis
Pt A&Ox4, v/s stable except blood pressure 97/65. Pt denies respiratory distress. denies chest pain, sob, dizziness.
hr 127, oral temp 98.1, bp 131/78, O2 92% on NC 4L
pt asymptomatic. no s/s of distress noted. kyphoplasty site asymptomatic - no s/s of bleeding noted.
Pt unable to swallow PO intake, including his potassium medications
patient was put on recliner for first timE in 37days - patient c/o dizziness and BP upon assessment 88/65
Patient blood pressure is 162/71. Patient afebrile. No signs of SOB. Patient denies chest pain.
Pt A&Ox4, confused at times. Pt tachycardic. Pt denies chest pain, sob, and dizziness. denies respiratory distress.
Pt A&Ox4, v/s stable except HR elevated. Denies chest pain, sob, dizziness. Denies respiratory distress.
agitated, vital signs stable, pt. safety maintained, does not want anyone near him
hr 121, bp 137/84, temp 98.6,O2 90
manual /78. All other VS stable, afebrile. Patient is currently frustrated at this time and stressed. verbal desclation and TLC provided.  Denies pain, chest pain, dizziness, headache at this time.
AOx2 (disoriented to situation and time)
AOx3 disoriented to place, hypertensive at baseline, denies current pain or distress
Patient blood pressure is 166/96. Patient afebrile. Patient has no signs of SOB. Patient denies chest pain.
distress, agitated, vitals stable, pt. safety maintained
A&O x2 can be confused at times. Pt's breathing is nonlabored on 4L NC. No s/s of distress noted
Pt A&Ox4, v/s stable except HR elevated. Denies chest pain, sob, dizziness. Denies respiratory distress
patient A&Ox4 no complaint of chest pain or respiratory distress. patient complains of pain in back.
patient remains confused and agitated; family at bedside.
Pt A&Ox4. Wife at bedside. Bowel movements liquid and consistent. Pt denies chest pain, sob, and dizziness. Denies respiratory distress.
patient A&Ox4 no complaint of chest pain or respiratory distress. patient complains of pain in back.
No distress, vitals stable, pt. safety maintained

## 2024-11-16 NOTE — PHYSICAL THERAPY INITIAL EVALUATION ADULT - MANUAL MUSCLE TESTING RESULTS, REHAB EVAL
bilateral UE and LE at least 3+/5 grossly throughout
right shoulder 3-/5, right elbow/hand/wrist 3/5, left upper extremity 3/5, bilateral lower extremities 3-/5
Bilateral UE grossly >/= 3/5, bilateral LE grossly >/= 3+/5/grossly assessed due to

## 2024-11-16 NOTE — PROVIDER CONTACT NOTE (OTHER) - RECOMMENDATIONS
see patient
notify provider.
notify provider.
ACP Franco Art notified
leave pt. alone and if he gets too agitated then will order ativan IM, Okay to keep morphine off till morning time when he calms down,
pt. ordered to be on bilateral secured mittens, 12 lead EKG ordered, and Labs drawn early, New IV placement
to order blood work and reorder IVPB potassium based on lab results
Keep monitoring pt.
MEJIA Huizar notified
Place cooling blanket and ice packs on the patient. Will reevaluate pt's temperature.
patient given moment to adjust to new position, upon reassume of other arm BP improved; see VS flow sheet
Notify provider
Per provider, " I ordered iV tylenol in case he has an underlying fever."
contact urology for evaluation/straight cath for retention.
contact urology for pediatric size catheter/difficult straight cath?

## 2024-11-16 NOTE — PHYSICAL THERAPY INITIAL EVALUATION ADULT - NSPTDISCHREC_GEN_A_CORE
Sub-acute Rehab
to improve strength and balance for return to prior level of function./Sub-acute Rehab
pending functional mobility assessment (Transfers, Gait, and Stairs), with improved pain control/Outpatient PT

## 2024-11-16 NOTE — PHYSICAL THERAPY INITIAL EVALUATION ADULT - DIAGNOSIS, PT EVAL
Decreased functional mobility; Decreased balance; Generalized weakness
generalized weakness
Pt presents with decreased strength and decreased balance.

## 2024-11-16 NOTE — PHYSICAL THERAPY INITIAL EVALUATION ADULT - IMPAIRMENTS FOUND, PT EVAL
gait, locomotion, and balance/muscle strength
aerobic capacity/endurance/gait, locomotion, and balance/muscle strength
gait, locomotion, and balance/muscle strength

## 2024-11-16 NOTE — PHYSICAL THERAPY INITIAL EVALUATION ADULT - PLANNED THERAPY INTERVENTIONS, PT EVAL
balance training/bed mobility training/gait training/postural re-education/strengthening/transfer training
balance training/bed mobility training/gait training/strengthening/transfer training
balance training/bed mobility training/gait training/strengthening/transfer training

## 2024-11-16 NOTE — PHYSICAL THERAPY INITIAL EVALUATION ADULT - SITTING BALANCE: DYNAMIC
unable to assess; pt in 10/10 pain upon sitting with writhing movements unable to sit comfortably
fair minus
good minus

## 2024-11-16 NOTE — PROGRESS NOTE ADULT - SUBJECTIVE AND OBJECTIVE BOX
[facility]   MD MARKO EASTMAN MD MARIA SANTIAGO, NP    TEL:  OFFICE: [phone]  From 5pm-7am Answering Service [phone]    -- RENAL FOLLOW UP NOTE ---Date of Service 11-16-24 @ 14:19    Patient is a 64y old  Male who presents with a chief complaint of Pain       Patient seen and examined at bedside. No chest pain/sob    VITALS:  T(F): 97.5 (11-16-24 @ 11:30), Max: 98.8 (11-15-24 @ 21:20)  HR: 119 (11-16-24 @ 11:30)  BP: 105/77 (11-16-24 @ 11:30)  RR: 18 (11-16-24 @ 11:30)  SpO2: 100% (11-16-24 @ 11:30)  Wt(kg): --    11-15 @ 07:01  -  11-16 @ 07:00  --------------------------------------------------------  IN: 480 mL / OUT: 1200 mL / NET: -720 mL    11-16 @ 07:01  -  11-16 @ 14:19  --------------------------------------------------------  IN: 250 mL / OUT: 250 mL / NET: 0 mL          PHYSICAL EXAM:  General: NAD  Neck: No JVD  Respiratory: CTAB, no wheezes, rales or rhonchi  Cardiovascular: S1, S2, RRR  Gastrointestinal: BS+, soft, NT/ND  Extremities: No peripheral edema    Hospital Medications:   MEDICATIONS  (STANDING):  abiraterone 500 mg tablet 2 Tablet(s) 2 Tablet(s) Oral daily  acetaminophen     Tablet .. 650 milliGRAM(s) Oral once  atorvastatin 20 milliGRAM(s) Oral at bedtime  chlorhexidine 2% Cloths 1 Application(s) Topical daily  dextrose 5% 1000 milliLiter(s) (75 mL/Hr) IV Continuous <Continuous>  dextrose 5%. 1000 milliLiter(s) (50 mL/Hr) IV Continuous <Continuous>  dextrose 5%. 1000 milliLiter(s) (100 mL/Hr) IV Continuous <Continuous>  dextrose 50% Injectable 25 Gram(s) IV Push once  dextrose 50% Injectable 12.5 Gram(s) IV Push once  dextrose 50% Injectable 25 Gram(s) IV Push once  diphenhydrAMINE 25 milliGRAM(s) Oral once  enoxaparin Injectable 40 milliGRAM(s) SubCutaneous every 24 hours  fenofibrate Tablet 145 milliGRAM(s) Oral daily  FIRST- Mouthwash  BLM 30 milliLiter(s) Swish and Spit three times a day  glucagon  Injectable 1 milliGRAM(s) IntraMuscular once  haloperidol     Tablet 1 milliGRAM(s) Oral <User Schedule>  influenza  Vaccine (HIGH DOSE) 0.5 milliLiter(s) IntraMuscular once  insulin lispro (ADMELOG) corrective regimen sliding scale   SubCutaneous three times a day before meals  insulin lispro (ADMELOG) corrective regimen sliding scale   SubCutaneous at bedtime  lactated ringers 1000 milliLiter(s) (75 mL/Hr) IV Continuous <Continuous>  lactobacillus acidophilus 1 Tablet(s) Oral daily  lidocaine   4% Patch 1 Patch Transdermal every 24 hours  melatonin 6 milliGRAM(s) Oral at bedtime  multivitamin 1 Tablet(s) Oral daily  Nephro-nnacy 1 Tablet(s) Oral daily  nystatin    Suspension 313991 Unit(s) Oral three times a day  pantoprazole  Injectable 40 milliGRAM(s) IV Push daily  potassium chloride  10 mEq/100 mL IVPB 10 milliEquivalent(s) IV Intermittent every 1 hour  predniSONE   Tablet 5 milliGRAM(s) Oral daily  tamsulosin 0.8 milliGRAM(s) Oral at bedtime      LABS:  11-16    137  |  105  |  6[L]  ----------------------------<  94  3.4[L]   |  24  |  0.20[L]    Ca    7.9[L]      16 Nov 2024 05:14  Phos  3.1     11-16  Mg     1.70     11-16      Creatinine Trend: 0.20 <--, 0.21 <--, 0.24 <--, 0.30 <--, 0.27 <--, 0.26 <--, 0.28 <--    Phosphorus: 3.1 mg/dL (11-16 @ 05:14)  Ferritin: 1763 ng/mL (11-16 @ 05:14)  Iron Total: 51 ug/dL (11-16 @ 05:14)  Iron - Total Binding Capacity.: 206 ug/dL (11-16 @ 05:14)                              8.2    5.94  )-----------( 242      ( 16 Nov 2024 05:14 )             25.1     Urine Studies:  Urinalysis - [11-16-24 @ 05:14]      Color  / Appearance  / SG  / pH       Gluc 94 / Ketone   / Bili  / Urobili        Blood  / Protein  / Leuk Est  / Nitrite       RBC  / WBC  / Hyaline  / Gran  / Sq Epi  / Non Sq Epi  / Bacteria       Iron 51, TIBC 206, %sat 25      [11-16-24 @ 05:14]  Ferritin 1763      [11-16-24 @ 05:14]  TSH 2.62      [10-13-24 @ 05:30]  Lipid: chol 135, , HDL 39, LDL --      [10-13-24 @ 05:30]        RADIOLOGY & ADDITIONAL STUDIES:

## 2024-11-16 NOTE — PHYSICAL THERAPY INITIAL EVALUATION ADULT - PRECAUTIONS/LIMITATIONS, REHAB EVAL
fall precautions
fall precautions
aspiration precautions/cardiac precautions/fall precautions/spinal precautions

## 2024-11-16 NOTE — PROGRESS NOTE ADULT - ASSESSMENT
1. Metastatic prostate cancer    - Follows with Dr Andrew Mendoza at [facility]   - PSMA 10/11/24 showed hypermetabolic vera foci above and below the diaphragm, foci in the liver and extensive foci involving the axial and appendicular skeleton compatible with metastatic disease  - --> 374--> 426 on 10/8/24, sending repeat PSA    - Liver biopsy 9/30/24 consistent with prostate adenocarcinoma   - High risk high volume disease -> started on triplet therapy w/ ADT/lupron, abiraterone/prednisone + taxotere. (back pain after Lupron likely tumor flare).  - Continue abiraterone 1000mg daily w Prednisone 5mg PO QD   - s/p taxotere 60mg/m2 on 10/13/24. Next dose was planned for 11/4 , held for weakness, clinical progress  -Bacteremia cleared  - s/p Kyphoplasty T5/l1   - s/p Kyphoplasty to T3/T4 on 11/15/24   - Plan is for consideration for radiation therapy (SBRT) for durable pain control with Dr Bruno   - IR and RT recs appreciated    - Palliative following for pain control and given hospital course would have further GOC discussions. Treatment will be offered but he has metastatic disease with visceral involvement and complications as outlined below.     2. Pancolitis, E coli and H influenzae bacteremia  - completed Cefepime+ Metronidazole  - ID following  - BCx from 10/24 neg to date  - Rectal tube and lau catheter removed    3. Anemia/Thrombocytopenia   - sending repeat iron studies as remains anemic. Platelet count has since normalized   - Transfuse for Hgb < 7.0  - Multifactorial sec to likely marrow infiltration by CaP, Chemo effect, consumption from acute illness, poss ITP (less likely now that thrombocytopenia resolved)   - Transfuse for plt <15 or < 50K if bleeding/for procedure      4. SMA Dissection  - seen by [facility] sx  - no plan for intervention      Marlon Tate MD   Hematology/Oncology  [facility]  [phone] (Office)  [phone] (Alt office)  Evenings and weekends please call MD on call or office

## 2024-11-16 NOTE — PROGRESS NOTE ADULT - ASSESSMENT
64-year-old male with metastatic prostate cancer, sent in by hematologist from [facility] and cancer for uncontrolled pain, nausea, vomiting.       Problem/Plan - 1:  ·  Problem: Cancer related pain.   ·  Plan: - appreciate pall care recommendations:  - pain control as per palliative crae   - RT consult appreciated ,  awaiting kyphoplasty   - repeat IR  kyphoplasty today   - RT outpt     Problem/Plan - 2:·  Problem: Nausea & vomiting., Diarrhea, colitis , SMA dissection   ·  Plan: -  vascular fu appreciated  - GI and surgery consult appreciated   - ID fu  - finished abx  - no intervention as per vascular     Problem/Plan - 3:  ·  Problem: CA of prostate.   ·  Plan: Metastatic prostate cancer  - f/u heme/onc recommendations  - Rad Onc fu   - Palliative for symptom control.    Problem/Plan - 4:  ·  Problem: Anemia , Thrombocytopenia, unspecified.   ·  Plan: -monitor cbc transfuse PRN  Problem/Plan - 5:  ·  Problem: Bacteremia   Plan: - ID fu - finished  ABX as per ID     dc planning to rehab  dw care coordinator

## 2024-11-16 NOTE — PHYSICAL THERAPY INITIAL EVALUATION ADULT - SITTING BALANCE: STATIC
fair balance
unable to assess; pt in 10/10 pain upon sitting with writhing movements unable to sit comfortably
good minus

## 2024-11-16 NOTE — PHYSICAL THERAPY INITIAL EVALUATION ADULT - PERTINENT HX OF CURRENT PROBLEM, REHAB EVAL
64 year old male, with a past medical history stated below, sent in by hematologist from [facility] and cancer for uncontrolled pain, nausea, vomiting. . CERVICAL SPINE:   Diffuse osseous metastases. No cervical pathologic fracture. No significant cervical epidural disease. Mild to moderate cervical degenerative disc disease. THORACIC SPINE:   Diffuse osseous metastases. Multiple mild endplate deformities in the lower thoracic spine are likely pathologic. Widespread epidural disease is predominantly low grade, high-grade at T3. No significant thoracic degenerative disc disease. LUMBAR SPINE: Diffuse osseous metastases. L1 and L5 pathologic fractures are associated with moderate epidural disease at L1, more mild at L5. At least mild epidural disease at the left S1-S2 neural foramen. No significant lumbar degenerative disc disease.
65 y/o male, with a PmHx of Metastatic Prostate Cancer (s/p radical prostatectomy), HLD, HTN, sent in by hematologist from [facility] [place] [facility] for uncontrolled pain, nausea, vomiting.
64 year old male, with a past medical history stated below, sent in by hematologist from [place] [facility] for uncontrolled pain, nausea, vomiting.

## 2024-11-16 NOTE — PROVIDER CONTACT NOTE (OTHER) - BACKGROUND
64 y Male, PMH metastatic prostate cancer s/p prostatectomy, HLD, HTN
64 y Male, PMH metastatic prostate cancer s/p prostatectomy, HLD, HTN.
pt admitted with metastatic prostate CA; confused/aggitated on bilateral mitten restraints
63 y/o m admitted for neoplasm related pain. Hx of metastatic prostate cancer, hld, htn
64 year old male with a past medical history of Metastatic prostate cancer. Sent in by hematologist for uncontrolled pain, nausea, and vomiting.
Neoplasm related pain, Basal cell carcinoma, CA of prostate, HLD (hyperlipidemia), and Benign essential HTN.
pt admit dx of neoplasm related pain. PMHx of basal cell carcinoma, CA of prostate, HLD...
64 y Male, PMH metastatic prostate cancer s/p prostatectomy, HLD, HTN
64 y Male, PMH metastatic prostate cancer s/p prostatectomy, HLD, HTN
nursing unable to pass straight cath due to small urethra; hx prostate CA.
patient admitted for neoplasm related pain
63 y/o male, with a PmHx of Metastatic Prostate Cancer came in with neoplasm related pain.
64 y Male, PMH metastatic prostate cancer s/p prostatectomy, HLD, HTN
64 y Male, PMH metastatic prostate cancer s/p prostatectomy, HLD, HTN
Basel cell carcinoma, Agitation, Ischemic colitis, Anxiety, Cancer related pain
Neoplasm related pain, Basal cell carcinoma, Diarrhea, Anxiety, Troubling swallowing
64 y Male, PMH metastatic prostate cancer s/p prostatectomy, HLD, HTN
64 year old male with past medical history of metastatic prostate cancer and hypertension sent in by hematologist for uncontrolled pain, nausea, and vomiting.
Basel cell carcioma, Agitation, Anxiety, Trouble swallowing.
patient admitted for neoplasm related pain
63 y/o male, with a PmHx of Metastatic Prostate Cancer (s/p radical prostatectomy), HLD, HTN, sent in by hematologist from [facility] [place] [facility] for uncontrolled pain, nausea, vomiting.
64 y Male, PMH metastatic prostate cancer s/p prostatectomy, HLD, HTN
patient with metastatic prostate CA s/p chemotherapy, pain regimen adjusted today
pt admit dx of neoplasm related pain. PMHx of basal cell carcinoma, CA of prostate, HLD...
pt admitted for pain

## 2024-11-16 NOTE — PHYSICAL THERAPY INITIAL EVALUATION ADULT - PATIENT PROFILE REVIEW, REHAB EVAL
Activity - Ambulate as Tolerated/yes
ACTIVITY ORDER: Ambulate as Tolerated; Spoke with RN Katherine Tavarez-->Pt OK for PT consult./yes
yes

## 2024-11-16 NOTE — PROGRESS NOTE ADULT - SUBJECTIVE AND OBJECTIVE BOX
Patient is a 64y old  Male who presents with a chief complaint of Pain (16 Nov 2024 14:19)    Date of servie : 11-16-24 @ 17:27  INTERVAL HPI/OVERNIGHT EVENTS:  T(C): 36.4 (11-16-24 @ 11:30), Max: 37.1 (11-15-24 @ 21:20)  HR: 119 (11-16-24 @ 11:30) (90 - 119)  BP: 105/77 (11-16-24 @ 11:30) (105/77 - 123/87)  RR: 18 (11-16-24 @ 11:30) (18 - 18)  SpO2: 100% (11-16-24 @ 11:30) (96% - 100%)  Wt(kg): --  I&O's Summary    15 Nov 2024 07:01  -  16 Nov 2024 07:00  --------------------------------------------------------  IN: 480 mL / OUT: 1200 mL / NET: -720 mL    16 Nov 2024 07:01  -  16 Nov 2024 17:27  --------------------------------------------------------  IN: 250 mL / OUT: 250 mL / NET: 0 mL        LABS:                        8.2    5.94  )-----------( 242      ( 16 Nov 2024 05:14 )             25.1     11-16    137  |  105  |  6[L]  ----------------------------<  94  3.4[L]   |  24  |  0.20[L]    Ca    7.9[L]      16 Nov 2024 05:14  Phos  3.1     11-16  Mg     1.70     11-16      PT/INR - ( 15 Nov 2024 05:48 )   PT: 14.0 sec;   INR: 1.18 ratio         PTT - ( 15 Nov 2024 05:48 )  PTT:31.9 sec  Urinalysis Basic - ( 16 Nov 2024 05:14 )    Color: x / Appearance: x / SG: x / pH: x  Gluc: 94 mg/dL / Ketone: x  / Bili: x / Urobili: x   Blood: x / Protein: x / Nitrite: x   Leuk Esterase: x / RBC: x / WBC x   Sq Epi: x / Non Sq Epi: x / Bacteria: x      CAPILLARY BLOOD GLUCOSE      POCT Blood Glucose.: 152 mg/dL (16 Nov 2024 12:43)  POCT Blood Glucose.: 122 mg/dL (16 Nov 2024 08:39)  POCT Blood Glucose.: 134 mg/dL (15 Nov 2024 21:31)  POCT Blood Glucose.: 92 mg/dL (15 Nov 2024 17:54)        Urinalysis Basic - ( 16 Nov 2024 05:14 )    Color: x / Appearance: x / SG: x / pH: x  Gluc: 94 mg/dL / Ketone: x  / Bili: x / Urobili: x   Blood: x / Protein: x / Nitrite: x   Leuk Esterase: x / RBC: x / WBC x   Sq Epi: x / Non Sq Epi: x / Bacteria: x        MEDICATIONS  (STANDING):  abiraterone 500 mg tablet 2 Tablet(s) 2 Tablet(s) Oral daily  acetaminophen     Tablet .. 650 milliGRAM(s) Oral once  atorvastatin 20 milliGRAM(s) Oral at bedtime  chlorhexidine 2% Cloths 1 Application(s) Topical daily  dextrose 5% 1000 milliLiter(s) (75 mL/Hr) IV Continuous <Continuous>  dextrose 5%. 1000 milliLiter(s) (50 mL/Hr) IV Continuous <Continuous>  dextrose 5%. 1000 milliLiter(s) (100 mL/Hr) IV Continuous <Continuous>  dextrose 50% Injectable 25 Gram(s) IV Push once  dextrose 50% Injectable 12.5 Gram(s) IV Push once  dextrose 50% Injectable 25 Gram(s) IV Push once  diphenhydrAMINE 25 milliGRAM(s) Oral once  enoxaparin Injectable 40 milliGRAM(s) SubCutaneous every 24 hours  fenofibrate Tablet 145 milliGRAM(s) Oral daily  FIRST- Mouthwash  BLM 30 milliLiter(s) Swish and Spit three times a day  glucagon  Injectable 1 milliGRAM(s) IntraMuscular once  haloperidol     Tablet 1 milliGRAM(s) Oral <User Schedule>  influenza  Vaccine (HIGH DOSE) 0.5 milliLiter(s) IntraMuscular once  insulin lispro (ADMELOG) corrective regimen sliding scale   SubCutaneous three times a day before meals  insulin lispro (ADMELOG) corrective regimen sliding scale   SubCutaneous at bedtime  lactated ringers 1000 milliLiter(s) (75 mL/Hr) IV Continuous <Continuous>  lactobacillus acidophilus 1 Tablet(s) Oral daily  lidocaine   4% Patch 1 Patch Transdermal every 24 hours  melatonin 6 milliGRAM(s) Oral at bedtime  multivitamin 1 Tablet(s) Oral daily  Nephro-nancy 1 Tablet(s) Oral daily  nystatin    Suspension 712854 Unit(s) Oral three times a day  pantoprazole  Injectable 40 milliGRAM(s) IV Push daily  predniSONE   Tablet 5 milliGRAM(s) Oral daily  tamsulosin 0.8 milliGRAM(s) Oral at bedtime    MEDICATIONS  (PRN):  acetaminophen     Tablet .. 650 milliGRAM(s) Oral every 6 hours PRN Temp greater or equal to 38C (100.4F), Mild Pain (1 - 3), Moderate Pain (4 - 6)  aluminum hydroxide/magnesium hydroxide/simethicone Suspension 30 milliLiter(s) Oral every 4 hours PRN Dyspepsia  artificial tears (preservative free) Ophthalmic Solution 1 Drop(s) Both EYES four times a day PRN Dry Eyes  dextrose Oral Gel 15 Gram(s) Oral once PRN Blood Glucose LESS THAN 70 milliGRAM(s)/deciliter  naloxone Injectable 0.1 milliGRAM(s) IV Push every 3 minutes PRN For ANY of the following changes in patient status:  A. RR LESS THAN 10 breaths per minute, B. Oxygen saturation LESS THAN 90%, C. Sedation score of 6  polyethylene glycol 3350 17 Gram(s) Oral daily PRN Constipation          PHYSICAL EXAM:  GENERAL: NAD, well-groomed, well-developed  HEAD:  Atraumatic, Normocephalic  CHEST/LUNG: Clear to percussion bilaterally; No rales, rhonchi, wheezing, or rubs  HEART: Regular rate and rhythm; No murmurs, rubs, or gallops  ABDOMEN: Soft, Nontender, Nondistended; Bowel sounds present  EXTREMITIES:  2+ Peripheral Pulses, No clubbing, cyanosis, or edema  LYMPH: No lymphadenopathy noted  SKIN: No rashes or lesions    Care Discussed with Consultants/Other Providers [ ] YES  [ ] NO

## 2024-11-16 NOTE — PROVIDER CONTACT NOTE (OTHER) - REASON
Patient blood pressure is 166/96.
Pt refusing tele
pt. removed IV, is extremely agitated, removing tele monitor and , pushing people away and saying he wants to go home
Pt unable to swallow PO intake
Pt's temp was 100.3 following blood transfusion.
hypotension & dizziness upon standing
pt is tachycardic
Ting of Pink sputum noted
BP
Blood pressure 93/61
Watery stool
do we hold lovenox s/p kyphoplasty
Blood pressure 97/65
Patient blood pressure is 162/71.
Pt HR is sustaining high 120s to low 130s
unable to pass straight cath
BP
Heart rate elevated
Pt refusing oral meds
Tachycardic
continued retention; bladder scan showing 844 cc
disorientation
manual /78
unable to fully remove lau
pt. had a moment of restlessness, pulling out IV lines, Pulling EKGs off, and refusing to comply with medical treatment.

## 2024-11-16 NOTE — PROVIDER CONTACT NOTE (OTHER) - NAME OF MD/NP/PA/DO NOTIFIED:
Al Ledesma
Berna peterson
jyotsna Gardner
ACP Cheri Tinoco and f/u with night MEJIA Ha at [address]
Sylvester Mulligan
Al Ledesma
Angelo silva
Della Cooney
Gisele Galicia
MEJIA Tinoco
Melanie Stephens
ACP Franco Art
Ervin Rolon
MEJIA Sharif
Raya Hayes
Rosa Elena Hwang
Susan Carbajal
Wing Malcolm
Gisele Galicia
Berna peterson
Della Cooney
MEJIA Huizar
Raya Hayes
Susan Carbajal
Cheri Michelle

## 2024-11-16 NOTE — PHYSICAL THERAPY INITIAL EVALUATION ADULT - ORIENTATION, REHAB EVAL
oriented to person, place, time and situation
oriented to person, place, time and situation
person/place/situation

## 2024-11-16 NOTE — PHYSICAL THERAPY INITIAL EVALUATION ADULT - CRITERIA FOR SKILLED THERAPEUTIC INTERVENTIONS
impairments found/functional limitations in following categories/risk reduction/prevention/rehab potential
impairments found/functional limitations in following categories/risk reduction/prevention/rehab potential/therapy frequency/predicted duration of therapy intervention/anticipated discharge recommendation
impairments found/anticipated discharge recommendation

## 2024-11-16 NOTE — PHYSICAL THERAPY INITIAL EVALUATION ADULT - ADDITIONAL COMMENTS
Pt lives in a private house with wife, +3-4 steps to enter, +3-4 steps inside, was independent with all functional mobility and ADL performance without an assistive device, prior to admission.    Pt left semi-supine in bed, all lines intact, all needs in reach, bed alarm set, in NAD. RN Camele aware. Heart rate 71 beats per minute.
Pt lives in a private house with wife, +3-4 steps to enter, +3-4 steps inside, was independent with all functional mobility and ADL performance without an assistive device, prior to admission.    Pt left comfortable in bed, NAD, all lines intact, all precautions maintained, with call bell in reach, bed alarm on, wife at bedside, and RN Katherine aware.
As per care coordination notes, pt lives with his family in a house with 4 steps to enter and 6 steps to bedroom. Prior to admission, pt was ambulating independently.   Post PT evaluation, pt left seated in recliner, call bell and remote within reach, all precautions maintained, NAD. ABILIO Leon aware and at bedside.

## 2024-11-16 NOTE — PROGRESS NOTE ADULT - ASSESSMENT
64-year-old male with metastatic prostate cancer, sent in by hematologist from [facility] for uncontrolled pain, nausea, vomiting.   Patient reports diffuse pain starting 6 months ago significantly worsening for the past 2 weeks. Diagnosed with high risk high volume metastatic prostate cancer with bone, liver lymph node mets and presacral mass. Liver biopsy confirmed disease with . Started lupron, loly/pred with plans to initiate taxotere.   nephrology consulted for electrolyte abnormalities.    Hypernatremia  Poor free water intake    He now has diet however not eating much per family  Na is stable currently off ivf.   encourage free water as tolerated.  monitor Na.  Avoid overcorrection >8mEq in 24hrs.    Hypokalemia  unable to tolerate PO kcl  s/p iv kcl x3 11/15  low again today - supplemented   monitor closely.  continue supplement as needed    Acidosis   non AG  likely GI lost.  Hyperchloremic.  better  monitor CO2.    hypophosphatemia  supplemented   monitor    hypocalcemia   sec to low albumin  corrected ca 8.5  monitor    fever  GNR bacteremia   work up and tx per team    anemia  metastatic prostate cancer  f/u heme/onc       64-year-old male with metastatic prostate cancer, sent in by hematologist from [facility] for uncontrolled pain, nausea, vomiting.   Patient reports diffuse pain starting 6 months ago significantly worsening for the past 2 weeks. Diagnosed with high risk high volume metastatic prostate cancer with bone, liver lymph node mets and presacral mass. Liver biopsy confirmed disease with . Started lupron, loly/pred with plans to initiate taxotere.   nephrology consulted for electrolyte abnormalities.    Hypernatremia  Poor free water intake    He now has diet however not eating much per family  Na is stable currently off ivf.   encourage free water as tolerated.  monitor Na.  Avoid overcorrection >8mEq in 24hrs.    Hypokalemia  unable to tolerate PO kcl  s/p iv kcl x3 11/15  low again today - supplemented by team kcl iv x2  monitor closely.  continue supplement as needed    Acidosis   non AG  likely GI lost.  Hyperchloremic.  better  monitor CO2.    hypophosphatemia  supplemented 11/15  better today  monitor    hypocalcemia   sec to low albumin  corrected ca 8.5  monitor    fever  GNR bacteremia   work up and tx per team    anemia  metastatic prostate cancer  f/u heme/onc

## 2024-11-16 NOTE — PHYSICAL THERAPY INITIAL EVALUATION ADULT - GROSSLY INTACT, SENSORY
Take one at bedtime, it is a 90 day supply 
Toñito- Pharmacist needs clarification on Keppra  It just states "at bedtime"  Is it 1 at bedtime? Please call him @ [phone]   Thank you    3:30pm-  Gave info to pharmacist
Grossly Intact

## 2024-11-16 NOTE — PROGRESS NOTE ADULT - SUBJECTIVE AND OBJECTIVE BOX
INTERVAL HPI/OVERNIGHT EVENTS:  Patient S&E at bedside. No o/n events. Resting comfortably     VITAL SIGNS:  T(F): 98.8 (11-15-24 @ 21:20)  HR: 100 (11-15-24 @ 21:20)  BP: 120/88 (11-15-24 @ 21:20)  RR: 18 (11-15-24 @ 21:20)  SpO2: 100% (11-15-24 @ 21:20)  Wt(kg): --    PHYSICAL EXAM:    Constitutional: NAD  Eyes: EOMI, sclera non-icteric  Neck: supple  Respiratory: CTAB, no wheezes or crackles   Cardiovascular: RRR  Gastrointestinal: soft, NTND, + BS  Extremities: no cyanosis, clubbing or edema   Neurological: awake and alert      MEDICATIONS  (STANDING):  abiraterone 500 mg tablet 2 Tablet(s) 2 Tablet(s) Oral daily  acetaminophen     Tablet .. 650 milliGRAM(s) Oral once  atorvastatin 20 milliGRAM(s) Oral at bedtime  chlorhexidine 2% Cloths 1 Application(s) Topical daily  dextrose 5% 1000 milliLiter(s) (75 mL/Hr) IV Continuous <Continuous>  dextrose 5%. 1000 milliLiter(s) (50 mL/Hr) IV Continuous <Continuous>  dextrose 5%. 1000 milliLiter(s) (100 mL/Hr) IV Continuous <Continuous>  dextrose 50% Injectable 25 Gram(s) IV Push once  dextrose 50% Injectable 12.5 Gram(s) IV Push once  dextrose 50% Injectable 25 Gram(s) IV Push once  diphenhydrAMINE 25 milliGRAM(s) Oral once  enoxaparin Injectable 40 milliGRAM(s) SubCutaneous every 24 hours  fenofibrate Tablet 145 milliGRAM(s) Oral daily  FIRST- Mouthwash  BLM 30 milliLiter(s) Swish and Spit three times a day  glucagon  Injectable 1 milliGRAM(s) IntraMuscular once  haloperidol     Tablet 1 milliGRAM(s) Oral <User Schedule>  influenza  Vaccine (HIGH DOSE) 0.5 milliLiter(s) IntraMuscular once  insulin lispro (ADMELOG) corrective regimen sliding scale   SubCutaneous three times a day before meals  insulin lispro (ADMELOG) corrective regimen sliding scale   SubCutaneous at bedtime  lactated ringers 1000 milliLiter(s) (75 mL/Hr) IV Continuous <Continuous>  lactobacillus acidophilus 1 Tablet(s) Oral daily  lidocaine   4% Patch 1 Patch Transdermal every 24 hours  melatonin 6 milliGRAM(s) Oral at bedtime  multivitamin 1 Tablet(s) Oral daily  Nephro-nancy 1 Tablet(s) Oral daily  nystatin    Suspension 048171 Unit(s) Oral three times a day  pantoprazole  Injectable 40 milliGRAM(s) IV Push daily  predniSONE   Tablet 5 milliGRAM(s) Oral daily  tamsulosin 0.8 milliGRAM(s) Oral at bedtime    MEDICATIONS  (PRN):  acetaminophen     Tablet .. 650 milliGRAM(s) Oral every 6 hours PRN Temp greater or equal to 38C (100.4F), Mild Pain (1 - 3), Moderate Pain (4 - 6)  aluminum hydroxide/magnesium hydroxide/simethicone Suspension 30 milliLiter(s) Oral every 4 hours PRN Dyspepsia  artificial tears (preservative free) Ophthalmic Solution 1 Drop(s) Both EYES four times a day PRN Dry Eyes  dextrose Oral Gel 15 Gram(s) Oral once PRN Blood Glucose LESS THAN 70 milliGRAM(s)/deciliter  naloxone Injectable 0.1 milliGRAM(s) IV Push every 3 minutes PRN For ANY of the following changes in patient status:  A. RR LESS THAN 10 breaths per minute, B. Oxygen saturation LESS THAN 90%, C. Sedation score of 6  polyethylene glycol 3350 17 Gram(s) Oral daily PRN Constipation      Allergies    No Known Allergies    Intolerances        LABS:                        8.2    5.94  )-----------( 242      ( 16 Nov 2024 05:14 )             25.1     11-16    137  |  105  |  6[L]  ----------------------------<  94  3.4[L]   |  24  |  0.20[L]    Ca    7.9[L]      16 Nov 2024 05:14  Phos  3.1     11-16  Mg     1.70     11-16      PT/INR - ( 15 Nov 2024 05:48 )   PT: 14.0 sec;   INR: 1.18 ratio         PTT - ( 15 Nov 2024 05:48 )  PTT:31.9 sec  Urinalysis Basic - ( 16 Nov 2024 05:14 )    Color: x / Appearance: x / SG: x / pH: x  Gluc: 94 mg/dL / Ketone: x  / Bili: x / Urobili: x   Blood: x / Protein: x / Nitrite: x   Leuk Esterase: x / RBC: x / WBC x   Sq Epi: x / Non Sq Epi: x / Bacteria: x        RADIOLOGY & ADDITIONAL TESTS:  Studies reviewed.

## 2024-11-16 NOTE — PHYSICAL THERAPY INITIAL EVALUATION ADULT - GENERAL OBSERVATIONS, REHAB EVAL
Pt received semi-supine in bed, with all lines intact, NAD, all precautions maintained.
Pt received semi-supine in bed, all lines intact, in NAD, wife and family at bedside. Pt agreeable to PT evaluation.
Pt encountered in semisupine position, no distress, AxOx3, with +IV, +cardiac monitor, +lau, and wife at bedside. Heart rate 99bpm.

## 2024-11-16 NOTE — PHYSICAL THERAPY INITIAL EVALUATION ADULT - LEVEL OF INDEPENDENCE: SIT/STAND, REHAB EVAL
Pt deferred
unable to tolerate sitting due to pain/unable to perform
Bed height increased/minimum assist (75% patients effort)

## 2024-11-17 LAB
ANION GAP SERPL CALC-SCNC: 8 MMOL/L — SIGNIFICANT CHANGE UP (ref 7–14)
BUN SERPL-MCNC: 6 MG/DL — LOW (ref 7–23)
CALCIUM SERPL-MCNC: 8 MG/DL — LOW (ref 8.4–10.5)
CHLORIDE SERPL-SCNC: 104 MMOL/L — SIGNIFICANT CHANGE UP (ref 98–107)
CO2 SERPL-SCNC: 23 MMOL/L — SIGNIFICANT CHANGE UP (ref 22–31)
CREAT SERPL-MCNC: 0.24 MG/DL — LOW (ref 0.5–1.3)
EGFR: 141 ML/MIN/1.73M2 — SIGNIFICANT CHANGE UP
GLUCOSE BLDC GLUCOMTR-MCNC: 103 MG/DL — HIGH (ref 70–99)
GLUCOSE BLDC GLUCOMTR-MCNC: 118 MG/DL — HIGH (ref 70–99)
GLUCOSE BLDC GLUCOMTR-MCNC: 122 MG/DL — HIGH (ref 70–99)
GLUCOSE BLDC GLUCOMTR-MCNC: 131 MG/DL — HIGH (ref 70–99)
GLUCOSE SERPL-MCNC: 96 MG/DL — SIGNIFICANT CHANGE UP (ref 70–99)
HCT VFR BLD CALC: 25.2 % — LOW (ref 39–50)
HGB BLD-MCNC: 8.1 G/DL — LOW (ref 13–17)
MAGNESIUM SERPL-MCNC: 1.8 MG/DL — SIGNIFICANT CHANGE UP (ref 1.6–2.6)
MCHC RBC-ENTMCNC: 31.3 PG — SIGNIFICANT CHANGE UP (ref 27–34)
MCHC RBC-ENTMCNC: 32.1 G/DL — SIGNIFICANT CHANGE UP (ref 32–36)
MCV RBC AUTO: 97.3 FL — SIGNIFICANT CHANGE UP (ref 80–100)
NRBC # BLD: 0 /100 WBCS — SIGNIFICANT CHANGE UP (ref 0–0)
NRBC # FLD: 0 K/UL — SIGNIFICANT CHANGE UP (ref 0–0)
PHOSPHATE SERPL-MCNC: 3 MG/DL — SIGNIFICANT CHANGE UP (ref 2.5–4.5)
PLATELET # BLD AUTO: 257 K/UL — SIGNIFICANT CHANGE UP (ref 150–400)
POTASSIUM SERPL-MCNC: 3.5 MMOL/L — SIGNIFICANT CHANGE UP (ref 3.5–5.3)
POTASSIUM SERPL-SCNC: 3.5 MMOL/L — SIGNIFICANT CHANGE UP (ref 3.5–5.3)
RBC # BLD: 2.59 M/UL — LOW (ref 4.2–5.8)
RBC # FLD: 20.1 % — HIGH (ref 10.3–14.5)
SODIUM SERPL-SCNC: 135 MMOL/L — SIGNIFICANT CHANGE UP (ref 135–145)
WBC # BLD: 5.06 K/UL — SIGNIFICANT CHANGE UP (ref 3.8–10.5)
WBC # FLD AUTO: 5.06 K/UL — SIGNIFICANT CHANGE UP (ref 3.8–10.5)

## 2024-11-17 RX ORDER — POTASSIUM CHLORIDE 600 MG/1
10 TABLET, EXTENDED RELEASE ORAL
Refills: 0 | Status: COMPLETED | OUTPATIENT
Start: 2024-11-17 | End: 2024-11-17

## 2024-11-17 RX ORDER — POTASSIUM CHLORIDE 600 MG/1
10 TABLET, EXTENDED RELEASE ORAL
Refills: 0 | Status: DISCONTINUED | OUTPATIENT
Start: 2024-11-17 | End: 2024-11-17

## 2024-11-17 RX ORDER — ACETAMINOPHEN 500MG 500 MG/1
1000 TABLET, COATED ORAL ONCE
Refills: 0 | Status: COMPLETED | OUTPATIENT
Start: 2024-11-17 | End: 2024-11-17

## 2024-11-17 RX ADMIN — ENOXAPARIN SODIUM 40 MILLIGRAM(S): 30 INJECTION SUBCUTANEOUS at 17:56

## 2024-11-17 RX ADMIN — ACETAMINOPHEN 500MG 1000 MILLIGRAM(S): 500 TABLET, COATED ORAL at 12:00

## 2024-11-17 RX ADMIN — ACETAMINOPHEN, DIPHENHYDRAMINE HCL, PHENYLEPHRINE HCL 6 MILLIGRAM(S): 325; 25; 5 TABLET ORAL at 21:58

## 2024-11-17 RX ADMIN — DIPHENHYDRAMINE HYDROCHLORIDE AND LIDOCAINE HYDROCHLORIDE AND ALUMINUM HYDROXIDE AND MAGNESIUM HYDRO 30 MILLILITER(S): KIT at 05:12

## 2024-11-17 RX ADMIN — POTASSIUM CHLORIDE 100 MILLIEQUIVALENT(S): 600 TABLET, EXTENDED RELEASE ORAL at 13:36

## 2024-11-17 RX ADMIN — PREDNISONE 5 MILLIGRAM(S): 20 TABLET ORAL at 05:11

## 2024-11-17 RX ADMIN — CHLORHEXIDINE GLUCONATE 1 APPLICATION(S): 1.2 RINSE ORAL at 15:42

## 2024-11-17 RX ADMIN — Medication 20 MILLIGRAM(S): at 05:11

## 2024-11-17 RX ADMIN — ACETAMINOPHEN 500MG 400 MILLIGRAM(S): 500 TABLET, COATED ORAL at 11:25

## 2024-11-17 RX ADMIN — Medication 1 TABLET(S): at 15:43

## 2024-11-17 RX ADMIN — NYSTATIN 500000 UNIT(S): 500000 TABLET, FILM COATED ORAL at 15:42

## 2024-11-17 RX ADMIN — PANTOPRAZOLE SODIUM 40 MILLIGRAM(S): 40 TABLET, DELAYED RELEASE ORAL at 15:43

## 2024-11-17 RX ADMIN — TAMSULOSIN HYDROCHLORIDE 0.8 MILLIGRAM(S): 0.4 CAPSULE ORAL at 21:58

## 2024-11-17 RX ADMIN — Medication 145 MILLIGRAM(S): at 15:43

## 2024-11-17 RX ADMIN — Medication 1 TABLET(S): at 15:42

## 2024-11-17 RX ADMIN — NYSTATIN 500000 UNIT(S): 500000 TABLET, FILM COATED ORAL at 21:58

## 2024-11-17 RX ADMIN — Medication 1 MILLIGRAM(S): at 21:58

## 2024-11-17 RX ADMIN — NYSTATIN 500000 UNIT(S): 500000 TABLET, FILM COATED ORAL at 05:11

## 2024-11-17 RX ADMIN — POTASSIUM CHLORIDE 100 MILLIEQUIVALENT(S): 600 TABLET, EXTENDED RELEASE ORAL at 11:25

## 2024-11-17 RX ADMIN — DIPHENHYDRAMINE HYDROCHLORIDE AND LIDOCAINE HYDROCHLORIDE AND ALUMINUM HYDROXIDE AND MAGNESIUM HYDRO 30 MILLILITER(S): KIT at 21:59

## 2024-11-17 RX ADMIN — DIPHENHYDRAMINE HYDROCHLORIDE AND LIDOCAINE HYDROCHLORIDE AND ALUMINUM HYDROXIDE AND MAGNESIUM HYDRO 30 MILLILITER(S): KIT at 15:44

## 2024-11-17 NOTE — PROGRESS NOTE ADULT - SUBJECTIVE AND OBJECTIVE BOX
INTERVAL HPI/OVERNIGHT EVENTS:  Patient S&E at bedside. No o/n events,     VITAL SIGNS:  T(F): 98.6 (11-17-24 @ 05:51)  HR: 100 (11-17-24 @ 05:51)  BP: 110/77 (11-17-24 @ 05:51)  RR: 19 (11-17-24 @ 05:51)  SpO2: 97% (11-17-24 @ 05:51)  Wt(kg): --    PHYSICAL EXAM:    Constitutional: NAD  Eyes: EOMI, sclera non-icteric  Neck: supple  Respiratory: CTAB, no wheezes or crackles   Cardiovascular: RRR  Gastrointestinal: soft, NTND, + BS  Extremities: no cyanosis, clubbing or edema   Neurological: awake and alert      MEDICATIONS  (STANDING):  abiraterone 500 mg tablet 2 Tablet(s) 2 Tablet(s) Oral daily  acetaminophen     Tablet .. 650 milliGRAM(s) Oral once  atorvastatin 20 milliGRAM(s) Oral at bedtime  chlorhexidine 2% Cloths 1 Application(s) Topical daily  dextrose 5% 1000 milliLiter(s) (75 mL/Hr) IV Continuous <Continuous>  dextrose 5%. 1000 milliLiter(s) (50 mL/Hr) IV Continuous <Continuous>  dextrose 5%. 1000 milliLiter(s) (100 mL/Hr) IV Continuous <Continuous>  dextrose 50% Injectable 25 Gram(s) IV Push once  dextrose 50% Injectable 12.5 Gram(s) IV Push once  dextrose 50% Injectable 25 Gram(s) IV Push once  diphenhydrAMINE 25 milliGRAM(s) Oral once  enoxaparin Injectable 40 milliGRAM(s) SubCutaneous every 24 hours  fenofibrate Tablet 145 milliGRAM(s) Oral daily  FIRST- Mouthwash  BLM 30 milliLiter(s) Swish and Spit three times a day  glucagon  Injectable 1 milliGRAM(s) IntraMuscular once  haloperidol     Tablet 1 milliGRAM(s) Oral <User Schedule>  influenza  Vaccine (HIGH DOSE) 0.5 milliLiter(s) IntraMuscular once  insulin lispro (ADMELOG) corrective regimen sliding scale   SubCutaneous three times a day before meals  insulin lispro (ADMELOG) corrective regimen sliding scale   SubCutaneous at bedtime  lactated ringers 1000 milliLiter(s) (75 mL/Hr) IV Continuous <Continuous>  lactobacillus acidophilus 1 Tablet(s) Oral daily  lidocaine   4% Patch 1 Patch Transdermal every 24 hours  melatonin 6 milliGRAM(s) Oral at bedtime  multivitamin 1 Tablet(s) Oral daily  Nephro-nancy 1 Tablet(s) Oral daily  nystatin    Suspension 106275 Unit(s) Oral three times a day  pantoprazole  Injectable 40 milliGRAM(s) IV Push daily  predniSONE   Tablet 5 milliGRAM(s) Oral daily  tamsulosin 0.8 milliGRAM(s) Oral at bedtime    MEDICATIONS  (PRN):  acetaminophen     Tablet .. 650 milliGRAM(s) Oral every 6 hours PRN Temp greater or equal to 38C (100.4F), Mild Pain (1 - 3), Moderate Pain (4 - 6)  aluminum hydroxide/magnesium hydroxide/simethicone Suspension 30 milliLiter(s) Oral every 4 hours PRN Dyspepsia  artificial tears (preservative free) Ophthalmic Solution 1 Drop(s) Both EYES four times a day PRN Dry Eyes  dextrose Oral Gel 15 Gram(s) Oral once PRN Blood Glucose LESS THAN 70 milliGRAM(s)/deciliter  naloxone Injectable 0.1 milliGRAM(s) IV Push every 3 minutes PRN For ANY of the following changes in patient status:  A. RR LESS THAN 10 breaths per minute, B. Oxygen saturation LESS THAN 90%, C. Sedation score of 6  polyethylene glycol 3350 17 Gram(s) Oral daily PRN Constipation      Allergies    No Known Allergies    Intolerances        LABS:                        8.1    5.06  )-----------( 257      ( 17 Nov 2024 04:08 )             25.2     11-17    135  |  104  |  6[L]  ----------------------------<  96  3.5   |  23  |  0.24[L]    Ca    8.0[L]      17 Nov 2024 04:08  Phos  3.0     11-17  Mg     1.80     11-17        Urinalysis Basic - ( 17 Nov 2024 04:08 )    Color: x / Appearance: x / SG: x / pH: x  Gluc: 96 mg/dL / Ketone: x  / Bili: x / Urobili: x   Blood: x / Protein: x / Nitrite: x   Leuk Esterase: x / RBC: x / WBC x   Sq Epi: x / Non Sq Epi: x / Bacteria: x        RADIOLOGY & ADDITIONAL TESTS:  Studies reviewed.

## 2024-11-17 NOTE — PROGRESS NOTE ADULT - SUBJECTIVE AND OBJECTIVE BOX
[facility]   MD MARKO EASTMAN MD RUORU WONG, PA    TEL:  OFFICE: [phone]      RENAL FOLLOW UP NOTE-- Date of Service ;; 11-17-24 @ 07:56  --------------------------------------------------------------------------------  HPI:  Pt seen and examined at bedside          PAST HISTORY  --------------------------------------------------------------------------------  No significant changes to PMH, PSH, FHx, SHx, unless otherwise noted    ALLERGIES & MEDICATIONS  --------------------------------------------------------------------------------  Allergies    No Known Allergies    Intolerances      Standing Inpatient Medications  abiraterone 500 mg tablet 2 Tablet(s) 2 Tablet(s) Oral daily  acetaminophen     Tablet .. 650 milliGRAM(s) Oral once  atorvastatin 20 milliGRAM(s) Oral at bedtime  chlorhexidine 2% Cloths 1 Application(s) Topical daily  dextrose 5% 1000 milliLiter(s) IV Continuous <Continuous>  dextrose 5%. 1000 milliLiter(s) IV Continuous <Continuous>  dextrose 5%. 1000 milliLiter(s) IV Continuous <Continuous>  dextrose 50% Injectable 25 Gram(s) IV Push once  dextrose 50% Injectable 12.5 Gram(s) IV Push once  dextrose 50% Injectable 25 Gram(s) IV Push once  diphenhydrAMINE 25 milliGRAM(s) Oral once  enoxaparin Injectable 40 milliGRAM(s) SubCutaneous every 24 hours  fenofibrate Tablet 145 milliGRAM(s) Oral daily  FIRST- Mouthwash  BLM 30 milliLiter(s) Swish and Spit three times a day  glucagon  Injectable 1 milliGRAM(s) IntraMuscular once  haloperidol     Tablet 1 milliGRAM(s) Oral <User Schedule>  influenza  Vaccine (HIGH DOSE) 0.5 milliLiter(s) IntraMuscular once  insulin lispro (ADMELOG) corrective regimen sliding scale   SubCutaneous three times a day before meals  insulin lispro (ADMELOG) corrective regimen sliding scale   SubCutaneous at bedtime  lactated ringers 1000 milliLiter(s) IV Continuous <Continuous>  lactobacillus acidophilus 1 Tablet(s) Oral daily  lidocaine   4% Patch 1 Patch Transdermal every 24 hours  melatonin 6 milliGRAM(s) Oral at bedtime  multivitamin 1 Tablet(s) Oral daily  Nephro-nancy 1 Tablet(s) Oral daily  nystatin    Suspension 877813 Unit(s) Oral three times a day  pantoprazole  Injectable 40 milliGRAM(s) IV Push daily  predniSONE   Tablet 5 milliGRAM(s) Oral daily  tamsulosin 0.8 milliGRAM(s) Oral at bedtime    PRN Inpatient Medications  acetaminophen     Tablet .. 650 milliGRAM(s) Oral every 6 hours PRN  aluminum hydroxide/magnesium hydroxide/simethicone Suspension 30 milliLiter(s) Oral every 4 hours PRN  artificial tears (preservative free) Ophthalmic Solution 1 Drop(s) Both EYES four times a day PRN  dextrose Oral Gel 15 Gram(s) Oral once PRN  naloxone Injectable 0.1 milliGRAM(s) IV Push every 3 minutes PRN  polyethylene glycol 3350 17 Gram(s) Oral daily PRN      REVIEW OF SYSTEMS  --------------------------------------------------------------------------------  General: no fever  CVS: no chest pain  RESP: no sob, no cough  ABD: no abdominal pain  : no dysuria,  MSK: no edema     VITALS/PHYSICAL EXAM  --------------------------------------------------------------------------------  T(C): 37 (11-17-24 @ 05:51), Max: 37 (11-17-24 @ 05:51)  HR: 100 (11-17-24 @ 05:51) (100 - 119)  BP: 110/77 (11-17-24 @ 05:51) (105/77 - 118/81)  RR: 19 (11-17-24 @ 05:51) (18 - 19)  SpO2: 97% (11-17-24 @ 05:51) (97% - 100%)  Wt(kg): --        11-16-24 @ 07:01  -  11-17-24 @ 07:00  --------------------------------------------------------  IN: 250 mL / OUT: 1100 mL / NET: -850 mL      Physical Exam:  	Gen: NAD  	HEENT: MMM  	Pulm: CTA B/L  	CV: S1S2  	Abd: Soft, +BS  	Ext: No LE edema B/L                      Neuro: Awake , alert  	Skin: Warm and Dry   	Vascular access: None           LABS/STUDIES  --------------------------------------------------------------------------------              8.1    5.06  >-----------<  257      [11-17-24 @ 04:08]              25.2     135  |  104  |  6   ----------------------------<  96      [11-17-24 @ 04:08]  3.5   |  23  |  0.24        Ca     8.0     [11-17-24 @ 04:08]      Mg     1.80     [11-17-24 @ 04:08]      Phos  3.0     [11-17-24 @ 04:08]            Creatinine Trend:  SCr 0.24 [11-17 @ 04:08]  SCr 0.20 [11-16 @ 05:14]  SCr 0.21 [11-15 @ 05:48]  SCr 0.24 [11-14 @ 05:11]  SCr 0.30 [11-13 @ 04:30]    Urinalysis - [11-17-24 @ 04:08]      Color  / Appearance  / SG  / pH       Gluc 96 / Ketone   / Bili  / Urobili        Blood  / Protein  / Leuk Est  / Nitrite       RBC  / WBC  / Hyaline  / Gran  / Sq Epi  / Non Sq Epi  / Bacteria       Iron 51, TIBC 206, %sat 25      [11-16-24 @ 05:14]  Ferritin 1763      [11-16-24 @ 05:14]  TSH 2.62      [10-13-24 @ 05:30]  Lipid: chol 135, , HDL 39, LDL --      [10-13-24 @ 05:30]

## 2024-11-17 NOTE — PROGRESS NOTE ADULT - SUBJECTIVE AND OBJECTIVE BOX
Patient is a 64y old  Male who presents with a chief complaint of Pain (17 Nov 2024 11:24)    Date of servie : 11-17-24 @ 14:31  INTERVAL HPI/OVERNIGHT EVENTS:  T(C): 36.7 (11-17-24 @ 11:30), Max: 37 (11-17-24 @ 05:51)  HR: 98 (11-17-24 @ 11:30) (98 - 100)  BP: 109/65 (11-17-24 @ 11:30) (109/65 - 118/81)  RR: 18 (11-17-24 @ 11:30) (18 - 19)  SpO2: 95% (11-17-24 @ 11:30) (95% - 97%)  Wt(kg): --  I&O's Summary    16 Nov 2024 07:01  -  17 Nov 2024 07:00  --------------------------------------------------------  IN: 250 mL / OUT: 1100 mL / NET: -850 mL        LABS:                        8.1    5.06  )-----------( 257      ( 17 Nov 2024 04:08 )             25.2     11-17    135  |  104  |  6[L]  ----------------------------<  96  3.5   |  23  |  0.24[L]    Ca    8.0[L]      17 Nov 2024 04:08  Phos  3.0     11-17  Mg     1.80     11-17        Urinalysis Basic - ( 17 Nov 2024 04:08 )    Color: x / Appearance: x / SG: x / pH: x  Gluc: 96 mg/dL / Ketone: x  / Bili: x / Urobili: x   Blood: x / Protein: x / Nitrite: x   Leuk Esterase: x / RBC: x / WBC x   Sq Epi: x / Non Sq Epi: x / Bacteria: x      CAPILLARY BLOOD GLUCOSE      POCT Blood Glucose.: 131 mg/dL (17 Nov 2024 12:38)  POCT Blood Glucose.: 118 mg/dL (17 Nov 2024 08:31)  POCT Blood Glucose.: 121 mg/dL (16 Nov 2024 21:36)  POCT Blood Glucose.: 108 mg/dL (16 Nov 2024 17:54)        Urinalysis Basic - ( 17 Nov 2024 04:08 )    Color: x / Appearance: x / SG: x / pH: x  Gluc: 96 mg/dL / Ketone: x  / Bili: x / Urobili: x   Blood: x / Protein: x / Nitrite: x   Leuk Esterase: x / RBC: x / WBC x   Sq Epi: x / Non Sq Epi: x / Bacteria: x        MEDICATIONS  (STANDING):  abiraterone 500 mg tablet 2 Tablet(s) 2 Tablet(s) Oral daily  acetaminophen     Tablet .. 650 milliGRAM(s) Oral once  atorvastatin 20 milliGRAM(s) Oral at bedtime  chlorhexidine 2% Cloths 1 Application(s) Topical daily  dextrose 5% 1000 milliLiter(s) (75 mL/Hr) IV Continuous <Continuous>  dextrose 5%. 1000 milliLiter(s) (50 mL/Hr) IV Continuous <Continuous>  dextrose 5%. 1000 milliLiter(s) (100 mL/Hr) IV Continuous <Continuous>  dextrose 50% Injectable 25 Gram(s) IV Push once  dextrose 50% Injectable 12.5 Gram(s) IV Push once  dextrose 50% Injectable 25 Gram(s) IV Push once  diphenhydrAMINE 25 milliGRAM(s) Oral once  enoxaparin Injectable 40 milliGRAM(s) SubCutaneous every 24 hours  fenofibrate Tablet 145 milliGRAM(s) Oral daily  FIRST- Mouthwash  BLM 30 milliLiter(s) Swish and Spit three times a day  glucagon  Injectable 1 milliGRAM(s) IntraMuscular once  haloperidol     Tablet 1 milliGRAM(s) Oral <User Schedule>  influenza  Vaccine (HIGH DOSE) 0.5 milliLiter(s) IntraMuscular once  insulin lispro (ADMELOG) corrective regimen sliding scale   SubCutaneous three times a day before meals  insulin lispro (ADMELOG) corrective regimen sliding scale   SubCutaneous at bedtime  lactated ringers 1000 milliLiter(s) (75 mL/Hr) IV Continuous <Continuous>  lactobacillus acidophilus 1 Tablet(s) Oral daily  lidocaine   4% Patch 1 Patch Transdermal every 24 hours  melatonin 6 milliGRAM(s) Oral at bedtime  multivitamin 1 Tablet(s) Oral daily  Nephro-nancy 1 Tablet(s) Oral daily  nystatin    Suspension 002412 Unit(s) Oral three times a day  pantoprazole  Injectable 40 milliGRAM(s) IV Push daily  predniSONE   Tablet 5 milliGRAM(s) Oral daily  tamsulosin 0.8 milliGRAM(s) Oral at bedtime    MEDICATIONS  (PRN):  acetaminophen     Tablet .. 650 milliGRAM(s) Oral every 6 hours PRN Temp greater or equal to 38C (100.4F), Mild Pain (1 - 3), Moderate Pain (4 - 6)  aluminum hydroxide/magnesium hydroxide/simethicone Suspension 30 milliLiter(s) Oral every 4 hours PRN Dyspepsia  artificial tears (preservative free) Ophthalmic Solution 1 Drop(s) Both EYES four times a day PRN Dry Eyes  dextrose Oral Gel 15 Gram(s) Oral once PRN Blood Glucose LESS THAN 70 milliGRAM(s)/deciliter  naloxone Injectable 0.1 milliGRAM(s) IV Push every 3 minutes PRN For ANY of the following changes in patient status:  A. RR LESS THAN 10 breaths per minute, B. Oxygen saturation LESS THAN 90%, C. Sedation score of 6  polyethylene glycol 3350 17 Gram(s) Oral daily PRN Constipation          PHYSICAL EXAM:  GENERAL: NAD, well-groomed, well-developed  HEAD:  Atraumatic, Normocephalic  CHEST/LUNG: Clear to percussion bilaterally; No rales, rhonchi, wheezing, or rubs  HEART: Regular rate and rhythm; No murmurs, rubs, or gallops  ABDOMEN: Soft, Nontender, Nondistended; Bowel sounds present  EXTREMITIES:  2+ Peripheral Pulses, No clubbing, cyanosis, or edema  LYMPH: No lymphadenopathy noted  SKIN: No rashes or lesions    Care Discussed with Consultants/Other Providers [ ] YES  [ ] NO

## 2024-11-17 NOTE — PROGRESS NOTE ADULT - ASSESSMENT
64-year-old male with metastatic prostate cancer, sent in by hematologist from [facility] for uncontrolled pain, nausea, vomiting.   Patient reports diffuse pain starting 6 months ago significantly worsening for the past 2 weeks.  Currently the worst pain is located around the lower back.   No loss of bladder and bowel function, no saddle paresthesia.  Able to walk.  patient is oxycodone 5 every 4-6 hour.  Yesterday they started adding OxyContin 10.  However patient continued to have pain.  Family also reports significant nausea and vomiting, inability to tolerate p.o. for the last 2 days.  Last bowel movement 2 days ago.   No known allergy.  Diagnosed with high risk high volume metastatic prostate cancer with bone, liver lymph node mets and presacral mass. Liver biopsy confirmed disease with . Started lupron, loly/pred with plans to initiate taxotere.    (12 Oct 2024 15:40)  pt seems very frustrated:   he is on 2 L of oxygen : he has no underlying lung disease:  but he is requiring 2 L of oxygen      Hypoxic resp failure  Metastatic prostate cancer  Back pain     Hypoxic resp failure  -He has no underlying lung disease:  but he is requiring 2 L of oxygen :   -CTA showed; No pulmonary embolism to the level of the segmental branches bilaterally. Limited evaluation of the subsegmental branches secondary to incomplete contrast opacification.  Multilevel vertebral body lytic lesions and loss of vertebral body height, better evaluated on CT thoracic spine 10/17/2024. Metastatic prostate cancer  -Patent central airways. Bibasilar atelectasis. No pleural effusion. MEDIASTINUM AND KATTY: No lymphadenopathy.  PULMONARY ANGIOGRAM: No pulmonary embolism to the level of the segmental   branches bilaterally. Limited evaluation of the subsegmental branches secondary to incomplete contrast opacification.    10/19:  -seems pretty tired;  on 2 L of oxygen :  -cta chest showed: No pulmonary embolism to the level of the segmental branches bilaterally. Limited evaluation of the subsegmental branches secondary to incomplete   contrast opacification. Multilevel vertebral body lytic lesions and loss of vertebral body height, better evaluated on CT thoracic spine 10/17/2024.  -still with fever:  no pe  on zosyn  and leukopenic hemonc following;  has metastatic prostate ca:   -VBG seems OK:     10/20:  pulm wise he seems to be stable:   -using 2 L of oxygen    -yesterdays VBG with minimal met acidosis  -cta again noted    10/21:  on 4 L of oxygen  today    cxr is size    e coli sepsis: Gram Negative Rods and Gram Negative Coccobacilli    10/22: heis doing poorly today  : he is very agitated and uncomfortable  on mittens: ? sun downing   10/23: quiet today  : sleeping:  oxygen requirement has not increased: on rooo ha 95% as documented    WBC is slowly increasing:   no fever:   -on antibiotics   10/24: pt is not doing well : his repeat blood cultures are positive with same organism :  would defer to ID;   10/25: seems to be doing  ok ; no sob:  no cough :  no phlegm   : on room air   10/26: resp failure has resolved:  is septic  : on ceftriaxone     10/27:  he seems OK:  he is on room air:  on morphine drip   remans on ceftriaxone still     10/28: she seems to be doing  ok : no sob: no cough ; no phlegm  confused:  on morphine drip    10/29; seems comfortable on room air;   cont current rx:   10/30: seems to be doing  ok ; on morphine drip : : plan to decrease morphine dose:   -cont current supportive care   10/31:  seems same tome:   no sob:  on room air:   seems comfortable at this ti me: off morphine  11/1:  he has been on room air for days;  looks comfortable:  coughs when he eats;   need pt ot  ; ? oob to chair:  dw wife:   11/3:  he seems stable:   on room air:  responding to questions pretty well : has bleeding lower lip : not Much through ? secondary to dryness:   -resp wise seems pretty good:  no sob:    114:  -seems pretty good:  no sob:  on room air  -has iral ulceration:  bleeding:  not much though : cont magic mouth wash   11/5:  -he seems  pretty good:   on room air:   no sob:  no cough :  no phlegm : difficulty in eating secondary to mucositis:  cont current rx:     11/6: seems OK:   no sob:   on room air:  now again kyphoplasty being considered    11/7:  -he seems same:  no sob:  on room air:   for possible kyphoplasty      11/8: now kyphoplasty next Wednesday   he is weak but looks good:  on room air     11/9"  seems to be doing  ok :  no sob:  no cough ; no phlegm     11/10: seems prettty good resp wise;   bed bound:   on room air:  for kyphoplasty :  encouraged to do IS   11/11: pulm wise he seems optimized to get kyphoplasty tomorrow:   wife at bedside:  no events overnight    11/13: s/p kyphoplasty  now for vertebral augmentation on Friday  :  11/14: seems to be doing  ok ; no sob:  no cough : no phlegm   ; on room air:  pulm wise very stable:     11/15: seems pretty good after the procedure no pin: no resp distress:  on room air  11/17: he seems pretty good pulm wise:   no sob:  no cough : no phlegm on room air      New finding:  SMA dissection : neutropenic colitis/ #E Coli and H influenzae bacteremia/ #Neutropenic fever  -/f ischemic colitis on R colon   Diagnosed  on ct abd:  defer to surgery and primary team:  probably no intervention:   -cont antibiotics  -not doing very well with above new findings    10/22; no intervention per surgery    -cont antibiotics  -has fever:  ID following :  -Last chest xray on 20th  ; normal     id following   10/23  IR was consulted for vertebral augmentation of T3-T5 prior to XRT.   Patient was seen bedside. Initially, patient kept requesting no exam and was not willing to participate in the discussion. Son was bedside at time of conversation.   Per discussion with the medical attending, given the concern for SMA dissection and concern for bowel ischemia, will hold off on vertebral augmentation evaluation at this time.   Please reach out to IR when patient is medically stable for vertebral augmentation.  10/24:  remains septic:  above cancelled:   ? for palliative care now  10/26: he seems same to me:  no overnight events  on room air:   check VBG  : cont antibiotics   hemonc following   10/27:  -still on antibiotics for e coli sepsis  -id following s  10/28: cont antibiotics   10/230: cont ceftriaxone   10/31: on ceftriaxone and flagyl   11/1: antibiotics per ID   11/3: cont antibiotics : IR note reviewed:  no kyphoplasty at this time    11/4: cont antibiotics per ID   11/5: on cefepime:  id following   11/6: on cefepime and flagyl!  11/7: off antibiotics now:   11/8: Completed antibiotics   11/9: -antibiotics  ; have been on  low dose steroids ; not from pulm side   11/11: seems to be doing  ok : no sob:  no cough : on room air   11/13: seems OK:  no sob:  no cough : no phlegm  11/14: no new symptoms   11/15rxed:  doing ok   11/17: no new symptoms  her looks pretty comfortable     Back pain   -likely due to metastatic disease:  adm here for severe pain :  -pain control per primary team   -venous ph is ok   11/1: resolved for now on meds  11/3: pain control : no kyphoplasty at this ti me per ir note:   11/4: as above  11/6: for possible kyphoplasty   now   11/7: for kyphoplasty now:   11/13: s/p kyphoplasty and now for vertebral augmentation:  on friday followed by outpt RT   11/15 s/p vertebral augmentation:  for dc now to rehab   11/17: control back pain      dw acp    dw acp; GOC as per primary  team and wife

## 2024-11-17 NOTE — PROGRESS NOTE ADULT - ASSESSMENT
64-year-old male with metastatic prostate cancer, sent in by hematologist from [facility] and cancer for uncontrolled pain, nausea, vomiting.       Problem/Plan - 1:  ·  Problem: Cancer related pain.   ·  Plan: - appreciate pall care recommendations:  - pain control as per palliative crae   - RT consult appreciated ,  awaiting kyphoplasty   - sp IR  kyphoplasty  - RT outpt     Problem/Plan - 2:·  Problem: Nausea & vomiting., Diarrhea, colitis , SMA dissection   ·  Plan: -  vascular fu appreciated  - GI and surgery consult appreciated   - ID fu  - finished abx  - no intervention as per vascular     Problem/Plan - 3:  ·  Problem: CA of prostate.   ·  Plan: Metastatic prostate cancer  - f/u heme/onc recommendations  - Rad Onc fu   - Palliative for symptom control.    Problem/Plan - 4:  ·  Problem: Anemia , Thrombocytopenia, unspecified.   ·  Plan: -monitor cbc transfuse PRN    Problem/Plan - 5:  ·  Problem: Bacteremia   Plan: - ID fu   - finished  ABX as per ID     dw pt and wife at bedside

## 2024-11-17 NOTE — PROGRESS NOTE ADULT - ASSESSMENT
64-year-old male with metastatic prostate cancer, sent in by hematologist from [place] [facility] for uncontrolled pain, nausea, vomiting.   Patient reports diffuse pain starting 6 months ago significantly worsening for the past 2 weeks. Diagnosed with high risk high volume metastatic prostate cancer with bone, liver lymph node mets and presacral mass. Liver biopsy confirmed disease with . Started lupron, loly/pred with plans to initiate taxotere.   nephrology consulted for electrolyte abnormalities.    Hypernatremia  Poor free water intake    He now has diet however not eating much per family  Na is stable currently off ivf.   encourage free water as tolerated.  monitor Na.  Avoid overcorrection >8mEq in 24hrs.    Hypokalemia  unable to tolerate PO kcl  s/p kcl iv x2 on 11/16  Slight improvement  Give kcl 10 meq IV x 2 today  monitor closely.  continue supplement as needed    Acidosis   non AG  likely GI lost.  Hyperchloremic.  better  monitor CO2.    hypophosphatemia  supplemented 11/15  better   monitor    hypocalcemia   sec to low albumin  corrected ca 8.5  monitor    fever  GNR bacteremia   work up and tx per team    anemia  metastatic prostate cancer  f/u heme/onc

## 2024-11-17 NOTE — PROGRESS NOTE ADULT - SUBJECTIVE AND OBJECTIVE BOX
Date of Service: 11-17-24 @ 11:25    Patient is a 64y old  Male who presents with a chief complaint of Pain (17 Nov 2024 07:55)      Any change in ROS: she seems to be doing  ok ;  no sob:  no cough : no phlegm     MEDICATIONS  (STANDING):  abiraterone 500 mg tablet 2 Tablet(s) 2 Tablet(s) Oral daily  acetaminophen     Tablet .. 650 milliGRAM(s) Oral once  acetaminophen   IVPB .. 1000 milliGRAM(s) IV Intermittent once  atorvastatin 20 milliGRAM(s) Oral at bedtime  chlorhexidine 2% Cloths 1 Application(s) Topical daily  dextrose 5% 1000 milliLiter(s) (75 mL/Hr) IV Continuous <Continuous>  dextrose 5%. 1000 milliLiter(s) (50 mL/Hr) IV Continuous <Continuous>  dextrose 5%. 1000 milliLiter(s) (100 mL/Hr) IV Continuous <Continuous>  dextrose 50% Injectable 25 Gram(s) IV Push once  dextrose 50% Injectable 12.5 Gram(s) IV Push once  dextrose 50% Injectable 25 Gram(s) IV Push once  diphenhydrAMINE 25 milliGRAM(s) Oral once  enoxaparin Injectable 40 milliGRAM(s) SubCutaneous every 24 hours  fenofibrate Tablet 145 milliGRAM(s) Oral daily  FIRST- Mouthwash  BLM 30 milliLiter(s) Swish and Spit three times a day  glucagon  Injectable 1 milliGRAM(s) IntraMuscular once  haloperidol     Tablet 1 milliGRAM(s) Oral <User Schedule>  influenza  Vaccine (HIGH DOSE) 0.5 milliLiter(s) IntraMuscular once  insulin lispro (ADMELOG) corrective regimen sliding scale   SubCutaneous three times a day before meals  insulin lispro (ADMELOG) corrective regimen sliding scale   SubCutaneous at bedtime  lactated ringers 1000 milliLiter(s) (75 mL/Hr) IV Continuous <Continuous>  lactobacillus acidophilus 1 Tablet(s) Oral daily  lidocaine   4% Patch 1 Patch Transdermal every 24 hours  melatonin 6 milliGRAM(s) Oral at bedtime  multivitamin 1 Tablet(s) Oral daily  Nephro-nancy 1 Tablet(s) Oral daily  nystatin    Suspension 158456 Unit(s) Oral three times a day  pantoprazole  Injectable 40 milliGRAM(s) IV Push daily  potassium chloride  10 mEq/100 mL IVPB 10 milliEquivalent(s) IV Intermittent every 1 hour  predniSONE   Tablet 5 milliGRAM(s) Oral daily  tamsulosin 0.8 milliGRAM(s) Oral at bedtime    MEDICATIONS  (PRN):  acetaminophen     Tablet .. 650 milliGRAM(s) Oral every 6 hours PRN Temp greater or equal to 38C (100.4F), Mild Pain (1 - 3), Moderate Pain (4 - 6)  aluminum hydroxide/magnesium hydroxide/simethicone Suspension 30 milliLiter(s) Oral every 4 hours PRN Dyspepsia  artificial tears (preservative free) Ophthalmic Solution 1 Drop(s) Both EYES four times a day PRN Dry Eyes  dextrose Oral Gel 15 Gram(s) Oral once PRN Blood Glucose LESS THAN 70 milliGRAM(s)/deciliter  naloxone Injectable 0.1 milliGRAM(s) IV Push every 3 minutes PRN For ANY of the following changes in patient status:  A. RR LESS THAN 10 breaths per minute, B. Oxygen saturation LESS THAN 90%, C. Sedation score of 6  polyethylene glycol 3350 17 Gram(s) Oral daily PRN Constipation    Vital Signs Last 24 Hrs  T(C): 37 (17 Nov 2024 05:51), Max: 37 (17 Nov 2024 05:51)  T(F): 98.6 (17 Nov 2024 05:51), Max: 98.6 (17 Nov 2024 05:51)  HR: 100 (17 Nov 2024 05:51) (100 - 119)  BP: 110/77 (17 Nov 2024 05:51) (105/77 - 118/81)  BP(mean): --  RR: 19 (17 Nov 2024 05:51) (18 - 19)  SpO2: 97% (17 Nov 2024 05:51) (97% - 100%)    Parameters below as of 17 Nov 2024 05:51  Patient On (Oxygen Delivery Method): room air        I&O's Summary    16 Nov 2024 07:01  -  17 Nov 2024 07:00  --------------------------------------------------------  IN: 250 mL / OUT: 1100 mL / NET: -850 mL          Physical Exam:   GENERAL: cachectic  HEENT: CHAVA/   Atraumatic, Normocephalic  ENMT: No tonsillar erythema, exudates, or enlargement; Moist mucous membranes, Good dentition, No lesions  NECK: Supple, No JVD, Normal thyroid  CHEST/LUNG: Clear to auscultaion  CVS: Regular rate and rhythm; No murmurs, rubs, or gallops  GI: : Soft, Nontender, Nondistended; Bowel sounds present  NERVOUS SYSTEM:  Alert & Oriented X3  EXTREMITIES: -edema  LYMPH: No lymphadenopathy noted  SKIN: No rashes or lesions  ENDOCRINOLOGY: No Thyromegaly  PSYCH: Appropriate    Labs:                              8.1    5.06  )-----------( 257      ( 17 Nov 2024 04:08 )             25.2                         8.2    5.94  )-----------( 242      ( 16 Nov 2024 05:14 )             25.1                         8.1    5.46  )-----------( 258      ( 15 Nov 2024 05:48 )             25.0                         7.4    6.07  )-----------( 241      ( 14 Nov 2024 05:11 )             23.2     11-17    135  |  104  |  6[L]  ----------------------------<  96  3.5   |  23  |  0.24[L]  11-16    137  |  105  |  6[L]  ----------------------------<  94  3.4[L]   |  24  |  0.20[L]  11-15    137  |  104  |  4[L]  ----------------------------<  93  3.3[L]   |  24  |  0.21[L]  11-14    138  |  106  |  6[L]  ----------------------------<  90  3.2[L]   |  25  |  0.24[L]    Ca    8.0[L]      17 Nov 2024 04:08  Ca    7.9[L]      16 Nov 2024 05:14  Phos  3.0     11-17  Phos  3.1     11-16  Mg     1.80     11-17  Mg     1.70     11-16      CAPILLARY BLOOD GLUCOSE      POCT Blood Glucose.: 118 mg/dL (17 Nov 2024 08:31)  POCT Blood Glucose.: 121 mg/dL (16 Nov 2024 21:36)  POCT Blood Glucose.: 108 mg/dL (16 Nov 2024 17:54)  POCT Blood Glucose.: 152 mg/dL (16 Nov 2024 12:43)          Urinalysis Basic - ( 17 Nov 2024 04:08 )    Color: x / Appearance: x / SG: x / pH: x  Gluc: 96 mg/dL / Ketone: x  / Bili: x / Urobili: x   Blood: x / Protein: x / Nitrite: x   Leuk Esterase: x / RBC: x / WBC x   Sq Epi: x / Non Sq Epi: x / Bacteria: x        · Inpatient Physical Exam Document Referenced	Hospitalist Attending      Florina Rashawn is a 64-year-old male with metastatic prostate cancer known to our service,  admitted for lower back pain, nausea, vomiting found to have E.coli bacteremia on Iv antibiotics. His MRi spine shows Diffuse osseous metastases. No cervical pathologic fracture. Multiple mild endplate deformities in the lower thoracic spine are likely pathologic. Widespread epidural disease is predominantly low grade, high-grade at T3.  L1 and L5 pathologic fractures are associated with moderate epidural disease at L1, more mild at L5. At least mild epidural disease at the left S1-S2 neural foramen.     He is s/p Kyphoplasty T5/l1 with plans for Plan for T3 and T4 vertebral augmentation on Friday. Plan is for consideration for radiation therapy (SBRT) for durable pain control with Dr Bruno  as originally planned. Inpatient service will continue to follow.    RECENT CULTURES:        RESPIRATORY CULTURES:          Studies  Chest X-RAY  CT SCAN Chest   Venous Dopplers: LE:   CT Abdomen  Others

## 2024-11-17 NOTE — PROGRESS NOTE ADULT - ASSESSMENT
1. Metastatic prostate cancer    - Follows with Dr Andrew Mendoza at [facility]   - PSMA 10/11/24 showed hypermetabolic vera foci above and below the diaphragm, foci in the liver and extensive foci involving the axial and appendicular skeleton compatible with metastatic disease  - --> 374--> 426 on 10/8/24, sending repeat PSA    - Liver biopsy 9/30/24 consistent with prostate adenocarcinoma   - High risk high volume disease -> started on triplet therapy w/ ADT/lupron, abiraterone/prednisone + taxotere. (back pain after Lupron likely tumor flare).  - Continue abiraterone 1000mg daily w Prednisone 5mg PO QD   - s/p taxotere 60mg/m2 on 10/13/24. Next dose was planned for 11/4 , held for weakness, clinical progress  -Bacteremia cleared  - s/p Kyphoplasty T5/l1   - s/p Kyphoplasty to T3/T4 on 11/15/24   - Plan is for consideration for radiation therapy (SBRT) for durable pain control with Dr Bruno   - IR and RT recs appreciated    - Palliative following for pain control.     2. Pancolitis, E coli and H influenzae bacteremia  - completed Cefepime+ Metronidazole  - ID following  - BCx from 10/24 neg to date  - Rectal tube and lau catheter removed    3. Anemia/Thrombocytopenia   - sending repeat iron studies as remains anemic. Platelet count has since normalized   - Transfuse for Hgb < 7.0  - Multifactorial sec to likely marrow infiltration by CaP, Chemo effect, consumption from acute illness, poss ITP (less likely now that thrombocytopenia resolved)   - Transfuse for plt <15 or < 50K if bleeding/for procedure      4. SMA Dissection  - seen by vasc sx  - no plan for intervention    Plan for DESTINY. Continue PT inpatient.       Marlon Tate MD   Hematology/Oncology  [facility]  [phone] (Office)  [phone] (Alt office)  Evenings and weekends please call MD on call or office

## 2024-11-18 ENCOUNTER — TRANSCRIPTION ENCOUNTER (OUTPATIENT)
Age: 64
End: 2024-11-18

## 2024-11-18 ENCOUNTER — NON-APPOINTMENT (OUTPATIENT)
Age: 64
End: 2024-11-18

## 2024-11-18 VITALS
SYSTOLIC BLOOD PRESSURE: 108 MMHG | HEART RATE: 94 BPM | OXYGEN SATURATION: 96 % | RESPIRATION RATE: 18 BRPM | TEMPERATURE: 98 F | DIASTOLIC BLOOD PRESSURE: 77 MMHG

## 2024-11-18 LAB
ANION GAP SERPL CALC-SCNC: 10 MMOL/L — SIGNIFICANT CHANGE UP (ref 7–14)
BUN SERPL-MCNC: 7 MG/DL — SIGNIFICANT CHANGE UP (ref 7–23)
CALCIUM SERPL-MCNC: 8 MG/DL — LOW (ref 8.4–10.5)
CHLORIDE SERPL-SCNC: 106 MMOL/L — SIGNIFICANT CHANGE UP (ref 98–107)
CO2 SERPL-SCNC: 22 MMOL/L — SIGNIFICANT CHANGE UP (ref 22–31)
CREAT SERPL-MCNC: 0.24 MG/DL — LOW (ref 0.5–1.3)
EGFR: 141 ML/MIN/1.73M2 — SIGNIFICANT CHANGE UP
GLUCOSE BLDC GLUCOMTR-MCNC: 114 MG/DL — HIGH (ref 70–99)
GLUCOSE BLDC GLUCOMTR-MCNC: 123 MG/DL — HIGH (ref 70–99)
GLUCOSE SERPL-MCNC: 98 MG/DL — SIGNIFICANT CHANGE UP (ref 70–99)
HCT VFR BLD CALC: 26.2 % — LOW (ref 39–50)
HGB BLD-MCNC: 8.1 G/DL — LOW (ref 13–17)
MAGNESIUM SERPL-MCNC: 1.8 MG/DL — SIGNIFICANT CHANGE UP (ref 1.6–2.6)
MCHC RBC-ENTMCNC: 30.9 G/DL — LOW (ref 32–36)
MCHC RBC-ENTMCNC: 31.3 PG — SIGNIFICANT CHANGE UP (ref 27–34)
MCV RBC AUTO: 101.2 FL — HIGH (ref 80–100)
NRBC # BLD: 0 /100 WBCS — SIGNIFICANT CHANGE UP (ref 0–0)
NRBC # FLD: 0 K/UL — SIGNIFICANT CHANGE UP (ref 0–0)
PHOSPHATE SERPL-MCNC: 2.9 MG/DL — SIGNIFICANT CHANGE UP (ref 2.5–4.5)
PLATELET # BLD AUTO: 256 K/UL — SIGNIFICANT CHANGE UP (ref 150–400)
POTASSIUM SERPL-MCNC: 3.3 MMOL/L — LOW (ref 3.5–5.3)
POTASSIUM SERPL-SCNC: 3.3 MMOL/L — LOW (ref 3.5–5.3)
RBC # BLD: 2.59 M/UL — LOW (ref 4.2–5.8)
RBC # FLD: 20.7 % — HIGH (ref 10.3–14.5)
SODIUM SERPL-SCNC: 138 MMOL/L — SIGNIFICANT CHANGE UP (ref 135–145)
WBC # BLD: 4.68 K/UL — SIGNIFICANT CHANGE UP (ref 3.8–10.5)
WBC # FLD AUTO: 4.68 K/UL — SIGNIFICANT CHANGE UP (ref 3.8–10.5)

## 2024-11-18 RX ORDER — LIDOCAINE 40 MG/G
1 CREAM TOPICAL
Qty: 0 | Refills: 0 | DISCHARGE
Start: 2024-11-18

## 2024-11-18 RX ORDER — ABIRATERONE ACETATE 250 MG/1
2 TABLET ORAL
Qty: 0 | Refills: 0 | DISCHARGE

## 2024-11-18 RX ORDER — CYANOCOBALAMIN/FOLIC AC/VIT B6 1-2.2-25MG
1 TABLET ORAL
Qty: 0 | Refills: 0 | DISCHARGE
Start: 2024-11-18

## 2024-11-18 RX ORDER — POLYETHYLENE GLYCOL 3350 17 G/17G
17 POWDER, FOR SOLUTION ORAL
Qty: 0 | Refills: 0 | DISCHARGE
Start: 2024-11-18

## 2024-11-18 RX ORDER — NYSTATIN 500000 [USP'U]/1
5 TABLET, FILM COATED ORAL
Qty: 0 | Refills: 0 | DISCHARGE
Start: 2024-11-18

## 2024-11-18 RX ORDER — OXYCODONE HYDROCHLORIDE 30 MG/1
1 TABLET ORAL
Refills: 0 | DISCHARGE

## 2024-11-18 RX ORDER — TAMSULOSIN HYDROCHLORIDE 0.4 MG/1
2 CAPSULE ORAL
Qty: 0 | Refills: 0 | DISCHARGE
Start: 2024-11-18

## 2024-11-18 RX ORDER — ACETAMINOPHEN, DIPHENHYDRAMINE HCL, PHENYLEPHRINE HCL 325; 25; 5 MG/1; MG/1; MG/1
2 TABLET ORAL
Qty: 0 | Refills: 0 | DISCHARGE
Start: 2024-11-18

## 2024-11-18 RX ORDER — POTASSIUM CHLORIDE 600 MG/1
40 TABLET, EXTENDED RELEASE ORAL ONCE
Refills: 0 | Status: DISCONTINUED | OUTPATIENT
Start: 2024-11-18 | End: 2024-11-18

## 2024-11-18 RX ORDER — ACETAMINOPHEN 500MG 500 MG/1
2 TABLET, COATED ORAL
Qty: 0 | Refills: 0 | DISCHARGE
Start: 2024-11-18

## 2024-11-18 RX ORDER — PANTOPRAZOLE SODIUM 40 MG/1
1 TABLET, DELAYED RELEASE ORAL
Qty: 0 | Refills: 0 | DISCHARGE
Start: 2024-11-18

## 2024-11-18 RX ORDER — POVIDONE, POLYVINYL ALCOHOL 20; 27 G/1000ML; G/1000ML
1 SOLUTION OPHTHALMIC
Qty: 0 | Refills: 0 | DISCHARGE
Start: 2024-11-18

## 2024-11-18 RX ORDER — MAGNESIUM, ALUMINUM HYDROXIDE 200-225/5
30 SUSPENSION, ORAL (FINAL DOSE FORM) ORAL
Qty: 0 | Refills: 0 | DISCHARGE
Start: 2024-11-18

## 2024-11-18 RX ORDER — DIPHENHYDRAMINE HYDROCHLORIDE AND LIDOCAINE HYDROCHLORIDE AND ALUMINUM HYDROXIDE AND MAGNESIUM HYDRO
30 KIT
Qty: 0 | Refills: 0 | DISCHARGE
Start: 2024-11-18

## 2024-11-18 RX ORDER — HALOPERIDOL 2 MG
1 TABLET ORAL
Qty: 0 | Refills: 0 | DISCHARGE
Start: 2024-11-18

## 2024-11-18 RX ORDER — PREDNISONE 20 MG/1
1 TABLET ORAL
Qty: 0 | Refills: 0 | DISCHARGE
Start: 2024-11-18

## 2024-11-18 RX ORDER — POTASSIUM CHLORIDE 600 MG/1
10 TABLET, EXTENDED RELEASE ORAL
Refills: 0 | Status: DISCONTINUED | OUTPATIENT
Start: 2024-11-18 | End: 2024-11-18

## 2024-11-18 RX ADMIN — CHLORHEXIDINE GLUCONATE 1 APPLICATION(S): 1.2 RINSE ORAL at 12:35

## 2024-11-18 RX ADMIN — PANTOPRAZOLE SODIUM 40 MILLIGRAM(S): 40 TABLET, DELAYED RELEASE ORAL at 12:26

## 2024-11-18 RX ADMIN — NYSTATIN 500000 UNIT(S): 500000 TABLET, FILM COATED ORAL at 12:27

## 2024-11-18 RX ADMIN — Medication 1 TABLET(S): at 12:27

## 2024-11-18 RX ADMIN — DIPHENHYDRAMINE HYDROCHLORIDE AND LIDOCAINE HYDROCHLORIDE AND ALUMINUM HYDROXIDE AND MAGNESIUM HYDRO 30 MILLILITER(S): KIT at 05:23

## 2024-11-18 RX ADMIN — NYSTATIN 500000 UNIT(S): 500000 TABLET, FILM COATED ORAL at 05:23

## 2024-11-18 RX ADMIN — Medication 145 MILLIGRAM(S): at 12:26

## 2024-11-18 RX ADMIN — PREDNISONE 5 MILLIGRAM(S): 20 TABLET ORAL at 05:23

## 2024-11-18 RX ADMIN — Medication 1 TABLET(S): at 12:26

## 2024-11-18 RX ADMIN — DIPHENHYDRAMINE HYDROCHLORIDE AND LIDOCAINE HYDROCHLORIDE AND ALUMINUM HYDROXIDE AND MAGNESIUM HYDRO 30 MILLILITER(S): KIT at 12:29

## 2024-11-18 RX ADMIN — Medication 20 MILLIGRAM(S): at 05:23

## 2024-11-18 NOTE — PROGRESS NOTE ADULT - SUBJECTIVE AND OBJECTIVE BOX
Patient is a 64y old  Male who presents with a chief complaint of Pain (17 Nov 2024 14:31)  Patient s/p Kyphoplasty, clinically improving      MEDICATIONS  (STANDING):  abiraterone 500 mg tablet 2 Tablet(s) 2 Tablet(s) Oral daily  acetaminophen     Tablet .. 650 milliGRAM(s) Oral once  atorvastatin 20 milliGRAM(s) Oral at bedtime  chlorhexidine 2% Cloths 1 Application(s) Topical daily  dextrose 5% 1000 milliLiter(s) (75 mL/Hr) IV Continuous <Continuous>  dextrose 5%. 1000 milliLiter(s) (50 mL/Hr) IV Continuous <Continuous>  dextrose 5%. 1000 milliLiter(s) (100 mL/Hr) IV Continuous <Continuous>  dextrose 50% Injectable 25 Gram(s) IV Push once  dextrose 50% Injectable 12.5 Gram(s) IV Push once  dextrose 50% Injectable 25 Gram(s) IV Push once  diphenhydrAMINE 25 milliGRAM(s) Oral once  enoxaparin Injectable 40 milliGRAM(s) SubCutaneous every 24 hours  fenofibrate Tablet 145 milliGRAM(s) Oral daily  FIRST- Mouthwash  BLM 30 milliLiter(s) Swish and Spit three times a day  glucagon  Injectable 1 milliGRAM(s) IntraMuscular once  haloperidol     Tablet 1 milliGRAM(s) Oral <User Schedule>  influenza  Vaccine (HIGH DOSE) 0.5 milliLiter(s) IntraMuscular once  insulin lispro (ADMELOG) corrective regimen sliding scale   SubCutaneous three times a day before meals  insulin lispro (ADMELOG) corrective regimen sliding scale   SubCutaneous at bedtime  lactated ringers 1000 milliLiter(s) (75 mL/Hr) IV Continuous <Continuous>  lactobacillus acidophilus 1 Tablet(s) Oral daily  lidocaine   4% Patch 1 Patch Transdermal every 24 hours  melatonin 6 milliGRAM(s) Oral at bedtime  multivitamin 1 Tablet(s) Oral daily  Nephro-nancy 1 Tablet(s) Oral daily  nystatin    Suspension 587284 Unit(s) Oral three times a day  pantoprazole  Injectable 40 milliGRAM(s) IV Push daily  predniSONE   Tablet 5 milliGRAM(s) Oral daily  tamsulosin 0.8 milliGRAM(s) Oral at bedtime    MEDICATIONS  (PRN):  acetaminophen     Tablet .. 650 milliGRAM(s) Oral every 6 hours PRN Temp greater or equal to 38C (100.4F), Mild Pain (1 - 3), Moderate Pain (4 - 6)  aluminum hydroxide/magnesium hydroxide/simethicone Suspension 30 milliLiter(s) Oral every 4 hours PRN Dyspepsia  artificial tears (preservative free) Ophthalmic Solution 1 Drop(s) Both EYES four times a day PRN Dry Eyes  dextrose Oral Gel 15 Gram(s) Oral once PRN Blood Glucose LESS THAN 70 milliGRAM(s)/deciliter  naloxone Injectable 0.1 milliGRAM(s) IV Push every 3 minutes PRN For ANY of the following changes in patient status:  A. RR LESS THAN 10 breaths per minute, B. Oxygen saturation LESS THAN 90%, C. Sedation score of 6  polyethylene glycol 3350 17 Gram(s) Oral daily PRN Constipation      Vital Signs Last 24 Hrs  T(C): 36.8 (18 Nov 2024 05:00), Max: 36.8 (18 Nov 2024 05:00)  T(F): 98.2 (18 Nov 2024 05:00), Max: 98.2 (18 Nov 2024 05:00)  HR: 100 (18 Nov 2024 05:00) (90 - 100)  BP: 114/76 (18 Nov 2024 05:00) (109/65 - 126/83)  BP(mean): --  RR: 17 (18 Nov 2024 05:00) (17 - 18)  SpO2: 97% (18 Nov 2024 05:00) (95% - 97%)    Parameters below as of 18 Nov 2024 05:00  Patient On (Oxygen Delivery Method): room air        PE  NAD  Awake, alert  Anicteric, MMM  RRR  CTAB  Abd soft, NT, ND  No c/c/e  No rash grossly                            8.1    4.68  )-----------( 256      ( 18 Nov 2024 06:27 )             26.2       11-17    135  |  104  |  6[L]  ----------------------------<  96  3.5   |  23  |  0.24[L]    Ca    8.0[L]      17 Nov 2024 04:08  Phos  3.0     11-17  Mg     1.80     11-17

## 2024-11-18 NOTE — PROGRESS NOTE ADULT - REASON FOR ADMISSION
Pain

## 2024-11-18 NOTE — PROGRESS NOTE ADULT - SUBJECTIVE AND OBJECTIVE BOX
[facility]   MD MARKO EASTMAN MD MARIA SANTIAGO, NP    TEL:  OFFICE: [phone]  From 5pm-7am Answering Service [phone]    -- RENAL FOLLOW UP NOTE ---Date of Service 11-18-24 @ 14:34    Patient is a 64y old  Male who presents with a chief complaint of Pain      Patient seen and examined at bedside. No chest pain/sob    VITALS:  T(F): 97.8 (11-18-24 @ 11:00), Max: 98.2 (11-18-24 @ 05:00)  HR: 94 (11-18-24 @ 11:00)  BP: 108/77 (11-18-24 @ 11:00)  RR: 18 (11-18-24 @ 11:00)  SpO2: 96% (11-18-24 @ 11:00)  Wt(kg): --    11-17 @ 07:01  -  11-18 @ 07:00  --------------------------------------------------------  IN: 0 mL / OUT: 600 mL / NET: -600 mL    11-18 @ 07:01  -  11-18 @ 14:34  --------------------------------------------------------  IN: 0 mL / OUT: 200 mL / NET: -200 mL          PHYSICAL EXAM:  General: NAD  Neck: No JVD  Respiratory: CTAB, no wheezes, rales or rhonchi  Cardiovascular: S1, S2, RRR  Gastrointestinal: BS+, soft, NT/ND  Extremities: No peripheral edema    Hospital Medications:   MEDICATIONS  (STANDING):  abiraterone 500 mg tablet 2 Tablet(s) 2 Tablet(s) Oral daily  acetaminophen     Tablet .. 650 milliGRAM(s) Oral once  atorvastatin 20 milliGRAM(s) Oral at bedtime  chlorhexidine 2% Cloths 1 Application(s) Topical daily  dextrose 5% 1000 milliLiter(s) (75 mL/Hr) IV Continuous <Continuous>  dextrose 5%. 1000 milliLiter(s) (100 mL/Hr) IV Continuous <Continuous>  dextrose 5%. 1000 milliLiter(s) (50 mL/Hr) IV Continuous <Continuous>  dextrose 50% Injectable 25 Gram(s) IV Push once  dextrose 50% Injectable 12.5 Gram(s) IV Push once  dextrose 50% Injectable 25 Gram(s) IV Push once  diphenhydrAMINE 25 milliGRAM(s) Oral once  enoxaparin Injectable 40 milliGRAM(s) SubCutaneous every 24 hours  fenofibrate Tablet 145 milliGRAM(s) Oral daily  FIRST- Mouthwash  BLM 30 milliLiter(s) Swish and Spit three times a day  glucagon  Injectable 1 milliGRAM(s) IntraMuscular once  haloperidol     Tablet 1 milliGRAM(s) Oral <User Schedule>  influenza  Vaccine (HIGH DOSE) 0.5 milliLiter(s) IntraMuscular once  insulin lispro (ADMELOG) corrective regimen sliding scale   SubCutaneous three times a day before meals  insulin lispro (ADMELOG) corrective regimen sliding scale   SubCutaneous at bedtime  lactated ringers 1000 milliLiter(s) (75 mL/Hr) IV Continuous <Continuous>  lactobacillus acidophilus 1 Tablet(s) Oral daily  lidocaine   4% Patch 1 Patch Transdermal every 24 hours  melatonin 6 milliGRAM(s) Oral at bedtime  multivitamin 1 Tablet(s) Oral daily  Nephro-nancy 1 Tablet(s) Oral daily  pantoprazole  Injectable 40 milliGRAM(s) IV Push daily  predniSONE   Tablet 5 milliGRAM(s) Oral daily  tamsulosin 0.8 milliGRAM(s) Oral at bedtime      LABS:  11-18    138  |  106  |  7   ----------------------------<  98  3.3[L]   |  22  |  0.24[L]    Ca    8.0[L]      18 Nov 2024 06:27  Phos  2.9     11-18  Mg     1.80     11-18      Creatinine Trend: 0.24 <--, 0.24 <--, 0.20 <--, 0.21 <--, 0.24 <--, 0.30 <--, 0.27 <--    Phosphorus: 2.9 mg/dL (11-18 @ 06:27)                              8.1    4.68  )-----------( 256      ( 18 Nov 2024 06:27 )             26.2     Urine Studies:  Urinalysis - [11-18-24 @ 06:27]      Color  / Appearance  / SG  / pH       Gluc 98 / Ketone   / Bili  / Urobili        Blood  / Protein  / Leuk Est  / Nitrite       RBC  / WBC  / Hyaline  / Gran  / Sq Epi  / Non Sq Epi  / Bacteria       Iron 51, TIBC 206, %sat 25      [11-16-24 @ 05:14]  Ferritin 1763      [11-16-24 @ 05:14]  TSH 2.62      [10-13-24 @ 05:30]  Lipid: chol 135, , HDL 39, LDL --      [10-13-24 @ 05:30]        RADIOLOGY & ADDITIONAL STUDIES:

## 2024-11-18 NOTE — PROGRESS NOTE ADULT - ASSESSMENT
64-year-old male with metastatic prostate cancer, sent in by hematologist from [facility] for uncontrolled pain, nausea, vomiting.   Patient reports diffuse pain starting 6 months ago significantly worsening for the past 2 weeks. Diagnosed with high risk high volume metastatic prostate cancer with bone, liver lymph node mets and presacral mass. Liver biopsy confirmed disease with . Started lupron, loly/pred with plans to initiate taxotere.   nephrology consulted for electrolyte abnormalities.    Hypernatremia  Poor free water intake    He now has diet however not eating much per family  Na is stable currently off ivf.   encourage free water as tolerated.  monitor Na.  Avoid overcorrection >8mEq in 24hrs.    Hypokalemia  unable to tolerate PO kcl  s/p kcl iv x2 on 11/16  Slight improvement  s/p kcl 10 meq IV x 2 11/17  give kcl 10 meq x3 today  monitor closely.  continue supplement as needed    Acidosis   non AG  likely GI lost.  Hyperchloremic.  better  monitor CO2.    hypophosphatemia  supplemented 11/15  better   monitor    hypocalcemia   sec to low albumin  corrected ca 8.5  monitor    fever  GNR bacteremia   work up and tx per team    anemia  metastatic prostate cancer  f/u heme/onc

## 2024-11-18 NOTE — PROGRESS NOTE ADULT - PROVIDER SPECIALTY LIST ADULT
Colorectal Surgery
Heme/Onc
Hospitalist
Infectious Disease
Intervent Radiology
Nephrology
Pulmonology
Colorectal Surgery
Heme/Onc
Hospitalist
Infectious Disease
Infectious Disease
Nephrology
Pulmonology
Heme/Onc
Hospitalist
Nephrology
Pulmonology
Heme/Onc
Hospitalist
Infectious Disease
Nephrology
Pulmonology
Vascular Surgery
Hospitalist
Infectious Disease
Palliative Care

## 2024-11-18 NOTE — DISCHARGE NOTE NURSING/CASE MANAGEMENT/SOCIAL WORK - PATIENT PORTAL LINK FT
You can access the FollowMyHealth Patient Portal offered by [facility] by registering at the following website: [email or web address]. By joining [employer]’s FollowMyHealth portal, you will also be able to view your health information using other applications (apps) compatible with our system.

## 2024-11-18 NOTE — PROGRESS NOTE ADULT - ASSESSMENT
64-year-old male with metastatic prostate cancer, sent in by hematologist from [facility] for uncontrolled pain, nausea, vomiting.       Problem/Plan - 1:  ·  Problem: Cancer related pain.   ·  Plan: - appreciate pall care recommendations:  - pain control as per palliative crae   - RT consult appreciated ,  awaiting kyphoplasty   - sp IR  kyphoplasty  - RT outpt     Problem/Plan - 2:·  Problem: Nausea & vomiting., Diarrhea, colitis , SMA dissection   ·  Plan: -  vascular fu appreciated  - GI and surgery consult appreciated   - ID fu  - finished abx  - no intervention as per vascular     Problem/Plan - 3:  ·  Problem: CA of prostate.   ·  Plan: Metastatic prostate cancer  - f/u heme/onc recommendations  - Rad Onc fu   - Palliative for symptom control.    Problem/Plan - 4:  ·  Problem: Anemia , Thrombocytopenia, unspecified.   ·  Plan: -monitor cbc transfuse PRN    Problem/Plan - 5:  ·  Problem: Bacteremia   Plan: - ID fu   - finished  ABX as per ID     dw pt and wife at bedside     stable for dc

## 2024-11-18 NOTE — PROGRESS NOTE ADULT - NS ATTEND OPT1 GEN_ALL_CORE
I attest my time as attending is greater than 50% of the total combined time spent on qualifying patient care activities by the PA/NP and attending.

## 2024-11-18 NOTE — DISCHARGE NOTE PROVIDER - PROVIDER TOKENS
PROVIDER:[TOKEN:[[id number]]],PROVIDER:[TOKEN:[[id number]]],PROVIDER:[TOKEN:[[id number]]],PROVIDER:[TOKEN:[[id number]]]

## 2024-11-18 NOTE — PROGRESS NOTE ADULT - ASSESSMENT
1. Metastatic prostate cancer    - Follows with Dr Andrew Mendoza at [facility]   - PSMA 10/11/24 showed hypermetabolic vera foci above and below the diaphragm, foci in the liver and extensive foci involving the axial and appendicular skeleton compatible with metastatic disease  - --> 374--> 426 on 10/8/24,  repeat PSA  75 11/16  - Liver biopsy 9/30/24 consistent with prostate adenocarcinoma   - High risk high volume disease -> started on triplet therapy w/ ADT/lupron, abiraterone/prednisone + taxotere. (back pain after Lupron likely tumor flare).  - Continue abiraterone 1000mg daily w Prednisone 5mg PO QD   - s/p taxotere 60mg/m2 on 10/13/24. Next dose was planned for 11/4 , held for weakness, clinical progress  -Bacteremia cleared  - s/p Kyphoplasty T5/l1   - s/p Kyphoplasty to T3/T4 on 11/15/24   - Plan is for consideration for radiation therapy (SBRT) for durable pain control with Dr Bruno   - IR and RT recs appreciated    - Palliative following for pain control.     2. Pancolitis, E coli and H influenzae bacteremia  - completed Cefepime+ Metronidazole  - ID following  - BCx from 10/24 neg to date  - Rectal tube and lau catheter removed    3. Anemia/Thrombocytopenia   - repeat iron studies 11/16 without evidence of Iron deficiency anemia.  Platelet count has since normalized   - Transfuse for Hgb < 7.0  - Multifactorial sec to likely marrow infiltration by CaP, Chemo effect, consumption from acute illness, poss ITP (less likely now that thrombocytopenia resolved)   - Transfuse for plt <15 or < 50K if bleeding/for procedure      4. SMA Dissection  - seen by vasc sx  - no plan for intervention    Plan for DESTINY. Continue PT inpatient.       Umm Aponte NP  Hematology/Oncology  [facility]  [phone] (Office)  [phone] (Alt office)  Evenings and weekends please call MD on call or office

## 2024-11-18 NOTE — DISCHARGE NOTE PROVIDER - CARE PROVIDER_API CALL
Edgar Bruno  Radiation Oncology  [address] [facility], [facility]- [place] [address]  Phone: [phone]  Fax: [phone]  Follow Up Time:     Andrew Mendoza  Hematology  [address] [place] [address]  Phone: [phone]  Fax: [phone]  Follow Up Time:     Serg Perez  Vascular Surgery  [address] [place] [address], [address]  [place] [address]  Phone: [phone]  Fax: [phone]  Follow Up Time:     Katherine Nolen  Infectious Disease  [address] [place] [address]  Phone: [phone]  Fax: [phone]  Follow Up Time:

## 2024-11-18 NOTE — PROGRESS NOTE ADULT - NS ATTEND BILL GEN_ALL_CORE
Attending to bill

## 2024-11-18 NOTE — DISCHARGE NOTE NURSING/CASE MANAGEMENT/SOCIAL WORK - NSDCCRNAME_GEN_ALL_CORE_FT
Jose address: [address] [place] [phone], 2:30pm  via [facility]

## 2024-11-18 NOTE — PROGRESS NOTE ADULT - SUBJECTIVE AND OBJECTIVE BOX
Patient is a 64y old  Male who presents with a chief complaint of Pain (18 Nov 2024 12:08)    Date of servie : 11-18-24 @ 13:42  INTERVAL HPI/OVERNIGHT EVENTS:  T(C): 36.6 (11-18-24 @ 11:00), Max: 36.8 (11-18-24 @ 05:00)  HR: 94 (11-18-24 @ 11:00) (90 - 100)  BP: 108/77 (11-18-24 @ 11:00) (108/77 - 126/83)  RR: 18 (11-18-24 @ 11:00) (17 - 18)  SpO2: 96% (11-18-24 @ 11:00) (96% - 97%)  Wt(kg): --  I&O's Summary    17 Nov 2024 07:01  -  18 Nov 2024 07:00  --------------------------------------------------------  IN: 0 mL / OUT: 600 mL / NET: -600 mL        LABS:                        8.1    4.68  )-----------( 256      ( 18 Nov 2024 06:27 )             26.2     11-18    138  |  106  |  7   ----------------------------<  98  3.3[L]   |  22  |  0.24[L]    Ca    8.0[L]      18 Nov 2024 06:27  Phos  2.9     11-18  Mg     1.80     11-18        Urinalysis Basic - ( 18 Nov 2024 06:27 )    Color: x / Appearance: x / SG: x / pH: x  Gluc: 98 mg/dL / Ketone: x  / Bili: x / Urobili: x   Blood: x / Protein: x / Nitrite: x   Leuk Esterase: x / RBC: x / WBC x   Sq Epi: x / Non Sq Epi: x / Bacteria: x      CAPILLARY BLOOD GLUCOSE      POCT Blood Glucose.: 114 mg/dL (18 Nov 2024 12:40)  POCT Blood Glucose.: 123 mg/dL (18 Nov 2024 08:33)  POCT Blood Glucose.: 122 mg/dL (17 Nov 2024 20:34)  POCT Blood Glucose.: 103 mg/dL (17 Nov 2024 18:07)        Urinalysis Basic - ( 18 Nov 2024 06:27 )    Color: x / Appearance: x / SG: x / pH: x  Gluc: 98 mg/dL / Ketone: x  / Bili: x / Urobili: x   Blood: x / Protein: x / Nitrite: x   Leuk Esterase: x / RBC: x / WBC x   Sq Epi: x / Non Sq Epi: x / Bacteria: x        MEDICATIONS  (STANDING):  abiraterone 500 mg tablet 2 Tablet(s) 2 Tablet(s) Oral daily  acetaminophen     Tablet .. 650 milliGRAM(s) Oral once  atorvastatin 20 milliGRAM(s) Oral at bedtime  chlorhexidine 2% Cloths 1 Application(s) Topical daily  dextrose 5% 1000 milliLiter(s) (75 mL/Hr) IV Continuous <Continuous>  dextrose 5%. 1000 milliLiter(s) (100 mL/Hr) IV Continuous <Continuous>  dextrose 5%. 1000 milliLiter(s) (50 mL/Hr) IV Continuous <Continuous>  dextrose 50% Injectable 25 Gram(s) IV Push once  dextrose 50% Injectable 12.5 Gram(s) IV Push once  dextrose 50% Injectable 25 Gram(s) IV Push once  diphenhydrAMINE 25 milliGRAM(s) Oral once  enoxaparin Injectable 40 milliGRAM(s) SubCutaneous every 24 hours  fenofibrate Tablet 145 milliGRAM(s) Oral daily  FIRST- Mouthwash  BLM 30 milliLiter(s) Swish and Spit three times a day  glucagon  Injectable 1 milliGRAM(s) IntraMuscular once  haloperidol     Tablet 1 milliGRAM(s) Oral <User Schedule>  influenza  Vaccine (HIGH DOSE) 0.5 milliLiter(s) IntraMuscular once  insulin lispro (ADMELOG) corrective regimen sliding scale   SubCutaneous three times a day before meals  insulin lispro (ADMELOG) corrective regimen sliding scale   SubCutaneous at bedtime  lactated ringers 1000 milliLiter(s) (75 mL/Hr) IV Continuous <Continuous>  lactobacillus acidophilus 1 Tablet(s) Oral daily  lidocaine   4% Patch 1 Patch Transdermal every 24 hours  melatonin 6 milliGRAM(s) Oral at bedtime  multivitamin 1 Tablet(s) Oral daily  Nephro-nancy 1 Tablet(s) Oral daily  nystatin    Suspension 214114 Unit(s) Oral three times a day  pantoprazole  Injectable 40 milliGRAM(s) IV Push daily  predniSONE   Tablet 5 milliGRAM(s) Oral daily  tamsulosin 0.8 milliGRAM(s) Oral at bedtime    MEDICATIONS  (PRN):  acetaminophen     Tablet .. 650 milliGRAM(s) Oral every 6 hours PRN Temp greater or equal to 38C (100.4F), Mild Pain (1 - 3), Moderate Pain (4 - 6)  aluminum hydroxide/magnesium hydroxide/simethicone Suspension 30 milliLiter(s) Oral every 4 hours PRN Dyspepsia  artificial tears (preservative free) Ophthalmic Solution 1 Drop(s) Both EYES four times a day PRN Dry Eyes  dextrose Oral Gel 15 Gram(s) Oral once PRN Blood Glucose LESS THAN 70 milliGRAM(s)/deciliter  naloxone Injectable 0.1 milliGRAM(s) IV Push every 3 minutes PRN For ANY of the following changes in patient status:  A. RR LESS THAN 10 breaths per minute, B. Oxygen saturation LESS THAN 90%, C. Sedation score of 6  polyethylene glycol 3350 17 Gram(s) Oral daily PRN Constipation          PHYSICAL EXAM:  GENERAL: NAD, well-groomed, well-developed  HEAD:  Atraumatic, Normocephalic  CHEST/LUNG: Clear to percussion bilaterally; No rales, rhonchi, wheezing, or rubs  HEART: Regular rate and rhythm; No murmurs, rubs, or gallops  ABDOMEN: Soft, Nontender, Nondistended; Bowel sounds present  EXTREMITIES:  2+ Peripheral Pulses, No clubbing, cyanosis, or edema  LYMPH: No lymphadenopathy noted  SKIN: No rashes or lesions    Care Discussed with Consultants/Other Providers [ ] YES  [ ] NO

## 2024-11-18 NOTE — DISCHARGE NOTE PROVIDER - NSDCCPCAREPLAN_GEN_ALL_CORE_FT
PRINCIPAL DISCHARGE DIAGNOSIS  Diagnosis: Prostate cancer metastatic to bone  Assessment and Plan of Treatment: Follow up with Dr. Andrew Mendoza at [facility]. Please call to arrange an appointment as outpt in 2 weeks or upon discharge from rehab. Plan is for consideration for radiation therapy (SBRT) for durable pain control with Dr Bruno. Please call to arrange an appt as outpt upon discharge from rehab.      SECONDARY DISCHARGE DIAGNOSES  Diagnosis: Hyperlipidemia  Assessment and Plan of Treatment: Follow up with your primary care physician for further monitoring in 1-2 weeks. Please call to arrange appointment. Ensure compliance with your medications at home.    Diagnosis: Benign essential HTN  Assessment and Plan of Treatment: Follow up with your primary care physician for further monitoring in 1-2 weeks. Please call to arrange appointment. Ensure compliance with your medications at home. Low salt diet.    Diagnosis: Colitis  Assessment and Plan of Treatment: You were treated with antibiotics and infection resolved. You were followed by the infecitous disease team while admitted (Dr. Nolen). You were also seen by GI and surgery teams, no interventions recommended at this time.    Diagnosis: Bacteremia  Assessment and Plan of Treatment: You were treated with antibiotics and infection resolved. You were followed by the infecitous disease team while admitted (Dr. 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 You were seen by vascular surgery, no intervention recommended at this time. Follow up with vascular surgery team for further monitoring, Dr. Perez.    Diagnosis: Electrolyte imbalance  Assessment and Plan of Treatment: Improved.     PRINCIPAL DISCHARGE DIAGNOSIS  Diagnosis: Prostate cancer metastatic to bone  Assessment and Plan of Treatment: Follow up with Dr. Mendoza in [facility]. Please call to arrange an appt in 2 weeks or upon discharge fro rehab. You had  Kyphoplasty T5/l1 and T3/T4 by interventional radiologist. Plan is to consider radiation therapy for durable pain control with Dr. Bruno upon discharge from rehab. Please follow up with your oncologist as directed for further management.        SECONDARY DISCHARGE DIAGNOSES  Diagnosis: Hyperlipidemia  Assessment and Plan of Treatment: Follow up with your primary care physician for further monitoring in 1-2 weeks. Please call to arrange appointment. Ensure compliance with your medications at home.    Diagnosis: Benign essential HTN  Assessment and Plan of Treatment: Follow up with your primary care physician for further monitoring in 1-2 weeks. Please call to arrange appointment. Ensure compliance with your medications at home. Low salt diet.    Diagnosis: Colitis  Assessment and Plan of Treatment: You were treated with antibiotics and infection resolved. You were followed by the infecitous disease team while admitted (Dr. Nolen). You were also seen by GI and surgery teams, no interventions recommended at this time.    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 You were seen by vascular surgery, no intervention recommended at this time. Follow up with vascular surgery team for further monitoring, Dr. Perez.    Diagnosis: Electrolyte imbalance  Assessment and Plan of Treatment: Improved.    Diagnosis: Bacteremia  Assessment and Plan of Treatment: You were treated with antibiotics and infection resolved. You were followed by the infecitous disease team while admitted (Dr. Nolen)

## 2024-11-18 NOTE — DISCHARGE NOTE PROVIDER - NSDCMRMEDTOKEN_GEN_ALL_CORE_FT
atorvastatin 20 mg oral tablet: 1 tab(s) orally once a day  lisinopril 40 mg oral tablet: 1 tab(s) orally once a day  oxyCODONE 5 mg oral tablet: 1 tab(s) orally every 6 hours, As Needed MDD:4   OxyCONTIN 10 mg oral tablet, extended release: 1 tab(s) orally 2 times a day  TriCor 200 mg oral capsule: 1 cap(s) orally once a day   abiraterone 500 mg oral tablet: 2 tab(s) orally once a day  acetaminophen 325 mg oral tablet: 2 tab(s) orally every 6 hours As needed Temp greater or equal to 38C (100.4F), Mild Pain (1 - 3), Moderate Pain (4 - 6)  aluminum hydroxide-magnesium hydroxide 200 mg-200 mg/5 mL oral suspension: 30 milliliter(s) orally every 4 hours As needed Dyspepsia  atorvastatin 20 mg oral tablet: 1 tab(s) orally once a day (at bedtime)  diphenhydramine/lidocaine/aluminum hydroxide/magnesium hydroxide/simethicone mucous membrane suspension: 30 milliliter(s) mucous membrane 3 times a day  haloperidol 1 mg oral tablet: 1 tab(s) orally once a day (at bedtime) Given at 21:00  lactobacillus acidophilus oral capsule: 1 cap(s) orally once a day  lidocaine 4% topical film: Apply topically to affected area once a day  melatonin 3 mg oral tablet: 2 tab(s) orally once a day (at bedtime)  Multiple Vitamins oral tablet: 1 tab(s) orally once a day  nystatin 100,000 units/mL oral suspension: 5 milliliter(s) orally 3 times a day  ocular lubricant ophthalmic solution: 1 drop(s) to each affected eye 4 times a day As needed Dry Eyes  pantoprazole 40 mg oral delayed release tablet: 1 tab(s) orally once a day before breakfast  polyethylene glycol 3350 oral powder for reconstitution: 17 gram(s) orally once a day As needed Constipation  predniSONE 5 mg oral tablet: 1 tab(s) orally once a day  tamsulosin 0.4 mg oral capsule: 2 cap(s) orally once a day (at bedtime)  TriCor 200 mg oral capsule: 1 cap(s) orally once a day   abiraterone 500 mg oral tablet: 2 tab(s) orally once a day  acetaminophen 325 mg oral tablet: 2 tab(s) orally every 6 hours As needed Temp greater or equal to 38C (100.4F), Mild Pain (1 - 3), Moderate Pain (4 - 6)  aluminum hydroxide-magnesium hydroxide 200 mg-200 mg/5 mL oral suspension: 30 milliliter(s) orally every 4 hours As needed Dyspepsia  atorvastatin 20 mg oral tablet: 1 tab(s) orally once a day (at bedtime)  diphenhydramine/lidocaine/aluminum hydroxide/magnesium hydroxide/simethicone mucous membrane suspension: 30 milliliter(s) mucous membrane 3 times a day  haloperidol 1 mg oral tablet: 1 tab(s) orally once a day (at bedtime) Given at 21:00  lactobacillus acidophilus oral capsule: 1 cap(s) orally once a day  lidocaine 4% topical film: Apply topically to affected area once a day  melatonin 3 mg oral tablet: 2 tab(s) orally once a day (at bedtime)  Multiple Vitamins oral tablet: 1 tab(s) orally once a day  ocular lubricant ophthalmic solution: 1 drop(s) to each affected eye 4 times a day As needed Dry Eyes  pantoprazole 40 mg oral delayed release tablet: 1 tab(s) orally once a day before breakfast  polyethylene glycol 3350 oral powder for reconstitution: 17 gram(s) orally once a day As needed Constipation  predniSONE 5 mg oral tablet: 1 tab(s) orally once a day  tamsulosin 0.4 mg oral capsule: 2 cap(s) orally once a day (at bedtime)  TriCor 200 mg oral capsule: 1 cap(s) orally once a day

## 2024-11-18 NOTE — PROGRESS NOTE ADULT - SUBJECTIVE AND OBJECTIVE BOX
Date of Service: 11-18-24 @ 11:01    Patient is a 64y old  Male who presents with a chief complaint of Pain (18 Nov 2024 07:42)      Any change in ROS: seems to be doing  ok : no sob:      MEDICATIONS  (STANDING):  abiraterone 500 mg tablet 2 Tablet(s) 2 Tablet(s) Oral daily  acetaminophen     Tablet .. 650 milliGRAM(s) Oral once  atorvastatin 20 milliGRAM(s) Oral at bedtime  chlorhexidine 2% Cloths 1 Application(s) Topical daily  dextrose 5% 1000 milliLiter(s) (75 mL/Hr) IV Continuous <Continuous>  dextrose 5%. 1000 milliLiter(s) (100 mL/Hr) IV Continuous <Continuous>  dextrose 5%. 1000 milliLiter(s) (50 mL/Hr) IV Continuous <Continuous>  dextrose 50% Injectable 25 Gram(s) IV Push once  dextrose 50% Injectable 12.5 Gram(s) IV Push once  dextrose 50% Injectable 25 Gram(s) IV Push once  diphenhydrAMINE 25 milliGRAM(s) Oral once  enoxaparin Injectable 40 milliGRAM(s) SubCutaneous every 24 hours  fenofibrate Tablet 145 milliGRAM(s) Oral daily  FIRST- Mouthwash  BLM 30 milliLiter(s) Swish and Spit three times a day  glucagon  Injectable 1 milliGRAM(s) IntraMuscular once  haloperidol     Tablet 1 milliGRAM(s) Oral <User Schedule>  influenza  Vaccine (HIGH DOSE) 0.5 milliLiter(s) IntraMuscular once  insulin lispro (ADMELOG) corrective regimen sliding scale   SubCutaneous three times a day before meals  insulin lispro (ADMELOG) corrective regimen sliding scale   SubCutaneous at bedtime  lactated ringers 1000 milliLiter(s) (75 mL/Hr) IV Continuous <Continuous>  lactobacillus acidophilus 1 Tablet(s) Oral daily  lidocaine   4% Patch 1 Patch Transdermal every 24 hours  melatonin 6 milliGRAM(s) Oral at bedtime  multivitamin 1 Tablet(s) Oral daily  Nephro-nancy 1 Tablet(s) Oral daily  nystatin    Suspension 224813 Unit(s) Oral three times a day  pantoprazole  Injectable 40 milliGRAM(s) IV Push daily  potassium chloride   Powder 40 milliEquivalent(s) Oral once  predniSONE   Tablet 5 milliGRAM(s) Oral daily  tamsulosin 0.8 milliGRAM(s) Oral at bedtime    MEDICATIONS  (PRN):  acetaminophen     Tablet .. 650 milliGRAM(s) Oral every 6 hours PRN Temp greater or equal to 38C (100.4F), Mild Pain (1 - 3), Moderate Pain (4 - 6)  aluminum hydroxide/magnesium hydroxide/simethicone Suspension 30 milliLiter(s) Oral every 4 hours PRN Dyspepsia  artificial tears (preservative free) Ophthalmic Solution 1 Drop(s) Both EYES four times a day PRN Dry Eyes  dextrose Oral Gel 15 Gram(s) Oral once PRN Blood Glucose LESS THAN 70 milliGRAM(s)/deciliter  naloxone Injectable 0.1 milliGRAM(s) IV Push every 3 minutes PRN For ANY of the following changes in patient status:  A. RR LESS THAN 10 breaths per minute, B. Oxygen saturation LESS THAN 90%, C. Sedation score of 6  polyethylene glycol 3350 17 Gram(s) Oral daily PRN Constipation    Vital Signs Last 24 Hrs  T(C): 36.8 (18 Nov 2024 05:00), Max: 36.8 (18 Nov 2024 05:00)  T(F): 98.2 (18 Nov 2024 05:00), Max: 98.2 (18 Nov 2024 05:00)  HR: 100 (18 Nov 2024 05:00) (90 - 100)  BP: 114/76 (18 Nov 2024 05:00) (109/65 - 126/83)  BP(mean): --  RR: 17 (18 Nov 2024 05:00) (17 - 18)  SpO2: 97% (18 Nov 2024 05:00) (95% - 97%)    Parameters below as of 18 Nov 2024 05:00  Patient On (Oxygen Delivery Method): room air        I&O's Summary    17 Nov 2024 07:01  -  18 Nov 2024 07:00  --------------------------------------------------------  IN: 0 mL / OUT: 600 mL / NET: -600 mL          Physical Exam:   GENERAL: NAD, well-groomed, well-developed  HEENT: CHAVA/   Atraumatic, Normocephalic  ENMT: No tonsillar erythema, exudates, or enlargement; Moist mucous membranes, Good dentition, No lesions  NECK: Supple, No JVD, Normal thyroid  CHEST/LUNG: Clear to auscultaion  CVS: Regular rate and rhythm; No murmurs, rubs, or gallops  GI: : Soft, Nontender, Nondistended; Bowel sounds present  NERVOUS SYSTEM:  Alert & Oriented X3  EXTREMITIES:  2+ Peripheral Pulses, No clubbing, cyanosis, or edema  LYMPH: No lymphadenopathy noted  SKIN: No rashes or lesions  ENDOCRINOLOGY: No Thyromegaly  PSYCH: Appropriate    Labs:                              8.1    4.68  )-----------( 256      ( 18 Nov 2024 06:27 )             26.2                         8.1    5.06  )-----------( 257      ( 17 Nov 2024 04:08 )             25.2                         8.2    5.94  )-----------( 242      ( 16 Nov 2024 05:14 )             25.1                         8.1    5.46  )-----------( 258      ( 15 Nov 2024 05:48 )             25.0     11-18    138  |  106  |  7   ----------------------------<  98  3.3[L]   |  22  |  0.24[L]  11-17    135  |  104  |  6[L]  ----------------------------<  96  3.5   |  23  |  0.24[L]  11-16    137  |  105  |  6[L]  ----------------------------<  94  3.4[L]   |  24  |  0.20[L]  11-15    137  |  104  |  4[L]  ----------------------------<  93  3.3[L]   |  24  |  0.21[L]    Ca    8.0[L]      18 Nov 2024 06:27  Ca    8.0[L]      17 Nov 2024 04:08  Phos  2.9     11-18  Phos  3.0     11-17  Mg     1.80     11-18  Mg     1.80     11-17      CAPILLARY BLOOD GLUCOSE      POCT Blood Glucose.: 123 mg/dL (18 Nov 2024 08:33)  POCT Blood Glucose.: 122 mg/dL (17 Nov 2024 20:34)  POCT Blood Glucose.: 103 mg/dL (17 Nov 2024 18:07)  POCT Blood Glucose.: 131 mg/dL (17 Nov 2024 12:38)          Urinalysis Basic - ( 18 Nov 2024 06:27 )    Color: x / Appearance: x / SG: x / pH: x  Gluc: 98 mg/dL / Ketone: x  / Bili: x / Urobili: x   Blood: x / Protein: x / Nitrite: x   Leuk Esterase: x / RBC: x / WBC x   Sq Epi: x / Non Sq Epi: x / Bacteria: x      rad< from: IR Procedure (11.15.24 @ 10:14) >  Attestation  Signer name: Dr. Inocente Case Attending, IR  I attest that I was present for the entire procedure. I reviewed the   stored images and agree with the report as written.  ___________________________________________________________________________  ____________    IMPRESSION:    Vertebral augmentation with cavity creation of T3 and T4 vertebral bodies.    A bone biopsy was not performed.    Plan:    Physical therapy.    --- End of Report ---    < end of copied text >        RECENT CULTURES:        RESPIRATORY CULTURES:          Studies  Chest X-RAY  CT SCAN Chest   Venous Dopplers: LE:   CT Abdomen  Others

## 2024-11-18 NOTE — DISCHARGE NOTE PROVIDER - CARE PROVIDERS DIRECT ADDRESSES
[email or web address]

## 2024-11-18 NOTE — PROGRESS NOTE ADULT - ASSESSMENT
64-year-old male with metastatic prostate cancer, sent in by hematologist from [facility] for uncontrolled pain, nausea, vomiting.   Patient reports diffuse pain starting 6 months ago significantly worsening for the past 2 weeks.  Currently the worst pain is located around the lower back.   No loss of bladder and bowel function, no saddle paresthesia.  Able to walk.  patient is oxycodone 5 every 4-6 hour.  Yesterday they started adding OxyContin 10.  However patient continued to have pain.  Family also reports significant nausea and vomiting, inability to tolerate p.o. for the last 2 days.  Last bowel movement 2 days ago.   No known allergy.  Diagnosed with high risk high volume metastatic prostate cancer with bone, liver lymph node mets and presacral mass. Liver biopsy confirmed disease with . Started lupron, loly/pred with plans to initiate taxotere.    (12 Oct 2024 15:40)  pt seems very frustrated:   he is on 2 L of oxygen : he has no underlying lung disease:  but he is requiring 2 L of oxygen      Hypoxic resp failure  Metastatic prostate cancer  Back pain     Hypoxic resp failure  -He has no underlying lung disease:  but he is requiring 2 L of oxygen :   -CTA showed; No pulmonary embolism to the level of the segmental branches bilaterally. Limited evaluation of the subsegmental branches secondary to incomplete contrast opacification.  Multilevel vertebral body lytic lesions and loss of vertebral body height, better evaluated on CT thoracic spine 10/17/2024. Metastatic prostate cancer  -Patent central airways. Bibasilar atelectasis. No pleural effusion. MEDIASTINUM AND KATTY: No lymphadenopathy.  PULMONARY ANGIOGRAM: No pulmonary embolism to the level of the segmental   branches bilaterally. Limited evaluation of the subsegmental branches secondary to incomplete contrast opacification.    10/19:  -seems pretty tired;  on 2 L of oxygen :  -cta chest showed: No pulmonary embolism to the level of the segmental branches bilaterally. Limited evaluation of the subsegmental branches secondary to incomplete   contrast opacification. Multilevel vertebral body lytic lesions and loss of vertebral body height, better evaluated on CT thoracic spine 10/17/2024.  -still with fever:  no pe  on zosyn  and leukopenic hemonc following;  has metastatic prostate ca:   -VBG seems OK:     10/20:  pulm wise he seems to be stable:   -using 2 L of oxygen    -yesterdays VBG with minimal met acidosis  -cta again noted    10/21:  on 4 L of oxygen  today    cxr is size    e coli sepsis: Gram Negative Rods and Gram Negative Coccobacilli    10/22: heis doing poorly today  : he is very agitated and uncomfortable  on mittens: ? sun downing   10/23: quiet today  : sleeping:  oxygen requirement has not increased: on rooo ha 95% as documented    WBC is slowly increasing:   no fever:   -on antibiotics   10/24: pt is not doing well : his repeat blood cultures are positive with same organism :  would defer to ID;   10/25: seems to be doing  ok ; no sob:  no cough :  no phlegm   : on room air   10/26: resp failure has resolved:  is septic  : on ceftriaxone     10/27:  he seems OK:  he is on room air:  on morphine drip   remans on ceftriaxone still     10/28: she seems to be doing  ok : no sob: no cough ; no phlegm  confused:  on morphine drip    10/29; seems comfortable on room air;   cont current rx:   10/30: seems to be doing  ok ; on morphine drip : : plan to decrease morphine dose:   -cont current supportive care   10/31:  seems same tome:   no sob:  on room air:   seems comfortable at this ti me: off morphine  11/1:  he has been on room air for days;  looks comfortable:  coughs when he eats;   need pt ot  ; ? oob to chair:  dw wife:   11/3:  he seems stable:   on room air:  responding to questions pretty well : has bleeding lower lip : not Much through ? secondary to dryness:   -resp wise seems pretty good:  no sob:    114:  -seems pretty good:  no sob:  on room air  -has iral ulceration:  bleeding:  not much though : cont magic mouth wash   11/5:  -he seems  pretty good:   on room air:   no sob:  no cough :  no phlegm : difficulty in eating secondary to mucositis:  cont current rx:     11/6: seems OK:   no sob:   on room air:  now again kyphoplasty being considered    11/7:  -he seems same:  no sob:  on room air:   for possible kyphoplasty      11/8: now kyphoplasty next Wednesday   he is weak but looks good:  on room air     11/9"  seems to be doing  ok :  no sob:  no cough ; no phlegm     11/10: seems prettty good resp wise;   bed bound:   on room air:  for kyphoplasty :  encouraged to do IS   11/11: pulm wise he seems optimized to get kyphoplasty tomorrow:   wife at bedside:  no events overnight    11/13: s/p kyphoplasty  now for vertebral augmentation on Friday  :  11/14: seems to be doing  ok ; no sob:  no cough : no phlegm   ; on room air:  pulm wise very stable:     11/15: seems pretty good after the procedure no pin: no resp distress:  on room air  11/17: he seems pretty good pulm wise:   no sob:  no cough : no phlegm on room air   11/18: seems to be doing  ok : no sob:  noc ugh : no phlegm  on room air:  now being  planned to be dced to rehab       New finding:  SMA dissection : neutropenic colitis/ #E Coli and H influenzae bacteremia/ #Neutropenic fever  -/f ischemic colitis on R colon   Diagnosed  on ct abd:  defer to surgery and primary team:  probably no intervention:   -cont antibiotics  -not doing very well with above new findings    10/22; no intervention per surgery    -cont antibiotics  -has fever:  ID following :  -Last chest xray on 20th  ; normal     id following   10/23  IR was consulted for vertebral augmentation of T3-T5 prior to XRT.   Patient was seen bedside. Initially, patient kept requesting no exam and was not willing to participate in the discussion. Son was bedside at time of conversation.   Per discussion with the medical attending, given the concern for SMA dissection and concern for bowel ischemia, will hold off on vertebral augmentation evaluation at this time.   Please reach out to IR when patient is medically stable for vertebral augmentation.  10/24:  remains septic:  above cancelled:   ? for palliative care now  10/26: he seems same to me:  no overnight events  on room air:   check VBG  : cont antibiotics   hemonc following   10/27:  -still on antibiotics for e coli sepsis  -id following s  10/28: cont antibiotics   10/230: cont ceftriaxone   10/31: on ceftriaxone and flagyl   11/1: antibiotics per ID   11/3: cont antibiotics : IR note reviewed:  no kyphoplasty at this time    11/4: cont antibiotics per ID   11/5: on cefepime:  id following   11/6: on cefepime and flagyl!  11/7: off antibiotics now:   11/8: Completed antibiotics   11/9: -antibiotics  ; have been on  low dose steroids ; not from pulm side   11/11: seems to be doing  ok : no sob:  no cough : on room air   11/13: seems OK:  no sob:  no cough : no phlegm  11/14: no new symptoms   11/15rxed:  doing ok   11/17: no new symptoms  her looks pretty comfortable     Back pain   -likely due to metastatic disease:  adm here for severe pain :  -pain control per primary team   -venous ph is ok   11/1: resolved for now on meds  11/3: pain control : no kyphoplasty at this ti me per ir note:   11/4: as above  11/6: for possible kyphoplasty   now   11/7: for kyphoplasty now:   11/13: s/p kyphoplasty and now for vertebral augmentation:  on friday followed by outpt RT   11/15 s/p vertebral augmentation:  for dc now to rehab   11/17: control back pain    11/18 : no pain     dw acp    dw acp; GOC as per primary  team and wife

## 2024-11-18 NOTE — PROGRESS NOTE ADULT - NS_MD_PANP_GEN_ALL_CORE

## 2024-11-18 NOTE — PROGRESS NOTE ADULT - NUTRITIONAL ASSESSMENT
This patient has been assessed with a concern for Malnutrition and has been determined to have a diagnosis/diagnoses of Severe protein-calorie malnutrition.    This patient is being managed with:   Diet NPO-  Except Medications  Entered: Oct 19 2024  8:27AM  
This patient has been assessed with a concern for Malnutrition and has been determined to have a diagnosis/diagnoses of Severe protein-calorie malnutrition.    This patient is being managed with:   Diet Regular-  Mildly Thick Liquids (MILDTHICKLIQS)  Supplement Feeding Modality:  Oral  Ensure Plus High Protein Cans or Servings Per Day:  1       Frequency:  Two Times a day  Entered: Nov 15 2024  1:32PM  
This patient has been assessed with a concern for Malnutrition and has been determined to have a diagnosis/diagnoses of Severe protein-calorie malnutrition.    This patient is being managed with:   Diet Regular-  Pureed (PUREED)  Mildly Thick Liquids (MILDTHICKLIQS)  Entered: Oct 25 2024 11:01AM  
This patient has been assessed with a concern for Malnutrition and has been determined to have a diagnosis/diagnoses of Severe protein-calorie malnutrition.    This patient is being managed with:   Diet Regular-  Pureed (PUREED)  Mildly Thick Liquids (MILDTHICKLIQS)  Entered: Oct 25 2024 11:01AM  
This patient has been assessed with a concern for Malnutrition and has been determined to have a diagnosis/diagnoses of Severe protein-calorie malnutrition.    This patient is being managed with:   Diet Regular-  Pureed (PUREED)  Mildly Thick Liquids (MILDTHICKLIQS)  Supplement Feeding Modality:  Oral  Ensure Plus High Protein Cans or Servings Per Day:  1       Frequency:  Two Times a day  Entered: Oct 29 2024  1:11PM  
This patient has been assessed with a concern for Malnutrition and has been determined to have a diagnosis/diagnoses of Severe protein-calorie malnutrition.    This patient is being managed with:   Diet NPO-  Except Medications  Entered: Oct 19 2024  8:27AM  
This patient has been assessed with a concern for Malnutrition and has been determined to have a diagnosis/diagnoses of Severe protein-calorie malnutrition.    This patient is being managed with:   Diet NPO-  Except Medications  Entered: Oct 19 2024  8:27AM  
This patient has been assessed with a concern for Malnutrition and has been determined to have a diagnosis/diagnoses of Severe protein-calorie malnutrition.    This patient is being managed with:   Diet Regular-  Pureed (PUREED)  Mildly Thick Liquids (MILDTHICKLIQS)  Entered: Oct 25 2024 11:01AM  
This patient has been assessed with a concern for Malnutrition and has been determined to have a diagnosis/diagnoses of Severe protein-calorie malnutrition.    This patient is being managed with:   Diet Regular-  Pureed (PUREED)  Mildly Thick Liquids (MILDTHICKLIQS)  Supplement Feeding Modality:  Oral  Ensure Plus High Protein Cans or Servings Per Day:  1       Frequency:  Two Times a day  Entered: Oct 29 2024  1:11PM  
This patient has been assessed with a concern for Malnutrition and has been determined to have a diagnosis/diagnoses of Severe protein-calorie malnutrition.    This patient is being managed with:   Diet NPO after Midnight-     NPO Start Date: 14-Nov-2024   NPO Start Time: 23:59  Entered: Nov 14 2024  7:13AM    Diet Regular-  Pureed (PUREED)  Mildly Thick Liquids (MILDTHICKLIQS)  Supplement Feeding Modality:  Oral  Ensure Plus High Protein Cans or Servings Per Day:  1       Frequency:  Two Times a day  Entered: Oct 29 2024  1:11PM  
This patient has been assessed with a concern for Malnutrition and has been determined to have a diagnosis/diagnoses of Severe protein-calorie malnutrition.    This patient is being managed with:   Diet NPO-  Except Medications  Entered: Oct 19 2024  8:27AM  
This patient has been assessed with a concern for Malnutrition and has been determined to have a diagnosis/diagnoses of Severe protein-calorie malnutrition.    This patient is being managed with:   Diet NPO-  Except Medications  Entered: Oct 19 2024  8:27AM  
This patient has been assessed with a concern for Malnutrition and has been determined to have a diagnosis/diagnoses of Severe protein-calorie malnutrition.    This patient is being managed with:   Diet Regular-  Mildly Thick Liquids (MILDTHICKLIQS)  Supplement Feeding Modality:  Oral  Ensure Plus High Protein Cans or Servings Per Day:  1       Frequency:  Two Times a day  Entered: Nov 15 2024  1:32PM  
This patient has been assessed with a concern for Malnutrition and has been determined to have a diagnosis/diagnoses of Severe protein-calorie malnutrition.    This patient is being managed with:   Diet Regular-  Mildly Thick Liquids (MILDTHICKLIQS)  Supplement Feeding Modality:  Oral  Ensure Plus High Protein Cans or Servings Per Day:  1       Frequency:  Two Times a day  Entered: Nov 15 2024  1:32PM  
This patient has been assessed with a concern for Malnutrition and has been determined to have a diagnosis/diagnoses of Severe protein-calorie malnutrition.    This patient is being managed with:   Diet Regular-  Pureed (PUREED)  Mildly Thick Liquids (MILDTHICKLIQS)  Supplement Feeding Modality:  Oral  Ensure Plus High Protein Cans or Servings Per Day:  1       Frequency:  Two Times a day  Entered: Oct 29 2024  1:11PM  
This patient has been assessed with a concern for Malnutrition and has been determined to have a diagnosis/diagnoses of Severe protein-calorie malnutrition.    This patient is being managed with:   Diet NPO after Midnight-     NPO Start Date: 11-Nov-2024   NPO Start Time: 23:59  Entered: Nov 11 2024  7:14AM    Diet Regular-  Pureed (PUREED)  Mildly Thick Liquids (MILDTHICKLIQS)  Supplement Feeding Modality:  Oral  Ensure Plus High Protein Cans or Servings Per Day:  1       Frequency:  Two Times a day  Entered: Oct 29 2024  1:11PM  
This patient has been assessed with a concern for Malnutrition and has been determined to have a diagnosis/diagnoses of Severe protein-calorie malnutrition.    This patient is being managed with:   Diet NPO-  Except Medications  Entered: Oct 19 2024  8:27AM  
This patient has been assessed with a concern for Malnutrition and has been determined to have a diagnosis/diagnoses of Severe protein-calorie malnutrition.    This patient is being managed with:   Diet Regular-  Mildly Thick Liquids (MILDTHICKLIQS)  Supplement Feeding Modality:  Oral  Ensure Plus High Protein Cans or Servings Per Day:  1       Frequency:  Two Times a day  Entered: Nov 15 2024  1:32PM  
This patient has been assessed with a concern for Malnutrition and has been determined to have a diagnosis/diagnoses of Severe protein-calorie malnutrition.    This patient is being managed with:   Diet Regular-  Pureed (PUREED)  Mildly Thick Liquids (MILDTHICKLIQS)  Supplement Feeding Modality:  Oral  Ensure Plus High Protein Cans or Servings Per Day:  1       Frequency:  Two Times a day  Entered: Oct 29 2024  1:11PM  
This patient has been assessed with a concern for Malnutrition and has been determined to have a diagnosis/diagnoses of Severe protein-calorie malnutrition.    This patient is being managed with:   Diet NPO-  Except Medications  Entered: Oct 19 2024  8:27AM  
This patient has been assessed with a concern for Malnutrition and has been determined to have a diagnosis/diagnoses of Severe protein-calorie malnutrition.    This patient is being managed with:   Diet Regular-  Mildly Thick Liquids (MILDTHICKLIQS)  Supplement Feeding Modality:  Oral  Ensure Plus High Protein Cans or Servings Per Day:  1       Frequency:  Two Times a day  Entered: Nov 15 2024  1:32PM  
This patient has been assessed with a concern for Malnutrition and has been determined to have a diagnosis/diagnoses of Severe protein-calorie malnutrition.    This patient is being managed with:   Diet Regular-  Pureed (PUREED)  Mildly Thick Liquids (MILDTHICKLIQS)  Entered: Oct 25 2024 11:01AM  
This patient has been assessed with a concern for Malnutrition and has been determined to have a diagnosis/diagnoses of Severe protein-calorie malnutrition.    This patient is being managed with:   Diet Regular-  Pureed (PUREED)  Mildly Thick Liquids (MILDTHICKLIQS)  Supplement Feeding Modality:  Oral  Ensure Plus High Protein Cans or Servings Per Day:  1       Frequency:  Two Times a day  Entered: Oct 29 2024  1:11PM  

## 2024-11-18 NOTE — DISCHARGE NOTE PROVIDER - HOSPITAL COURSE
63yo M with metastatic prostate cancer, sent in by hematologist from [facility] for uncontrolled pain, nausea, vomiting.     +Metastatic prostate cancer: [facility] following, outpt follows with Dr Andrew Mendoza. PSMA 10/11/24 showed hypermetabolic vera foci above and below the diaphragm, foci in the liver and extensive foci involving the axial and appendicular skeleton compatible with metastatic disease. --> 374--> 426 on 10/8/24,  repeat PSA  75 11/16.   - Liver biopsy 9/30/24 consistent with prostate adenocarcinoma   - High risk high volume disease -> started on triplet therapy w/ ADT/lupron, abiraterone/prednisone + taxotere. (back pain after Lupron likely tumor flare).  - Continue abiraterone 1000mg daily w Prednisone 5mg PO QD. s/p taxotere 60mg/m2 on 10/13/24. Next dose was planned for 11/4 , held for weakness, clinical progress  -Bacteremia cleared  - s/p Kyphoplasty T5/l1   - s/p Kyphoplasty to T3/T4 on 11/15/24   - Plan is for consideration for radiation therapy (SBRT) for durable pain control with Dr Bruno   - IR and RT recs appreciated  - Palliative following for pain control.     +Neutropenic fever, E Coli and H influenzae bacteremia likely due to neutropenic enterocolitis  + Nausea, vomiting, diarrhea, colitis with SMA dissection.   - Vascular c/s: no intervention  - GI: bowel rest, c/w ABx.   - Surgery consult: Likely diagnosis of neutropenic enterocolitis vs. Ischemic colitis. ID, bowel rest, GOC, No acute plan for surgical intervention at this time  - ID following: Neutropenic fever resolved, now afebrile. Pt treated with metronidazole and cefepime, E Coli and H influenzae bacteremia 10/16  likely due to neutropenic enterocolitis, cleared 10/24  SMA dissection, c/f ischemic colitis   - Diarrhea GI PCR 11/1 not detected, CDiff negative, improving.      Anemia/Thrombocytopenia; heme/onc following, monitor CBC, transfuse PRN       65yo M with metastatic prostate cancer, sent in by hematologist from [facility] for uncontrolled pain, nausea, vomiting.     + Metastatic prostate cancer: [facility] following, outpt follows with Dr Andrew Mendoza. PSMA 10/11/24 showed hypermetabolic vera foci above and below the diaphragm, foci in the liver and extensive foci involving the axial and appendicular skeleton compatible with metastatic disease. --> 374--> 426 on 10/8/24,  repeat PSA  75 11/16.   - Liver biopsy 9/30/24 consistent with prostate adenocarcinoma   - High risk high volume disease -> started on triplet therapy w/ ADT/lupron, abiraterone/prednisone + taxotere. (back pain after Lupron likely tumor flare).  - Continue abiraterone 1000mg daily w Prednisone 5mg PO QD. s/p taxotere 60mg/m2 on 10/13/24. Next dose was planned for 11/4 , held for weakness, clinical progress  - s/p Kyphoplasty T5/l1   - s/p Kyphoplasty to T3/T4 on 11/15/24   - Plan is for consideration for radiation therapy (SBRT) for durable pain control with Dr Bruno   - IR and RT recs appreciated  - Palliative following for pain control.     + Neutropenic fever, E Coli and H influenzae bacteremia likely due to neutropenic enterocolitis  - Nausea, vomiting, diarrhea, colitis with SMA dissection.   - Vascular c/s: no intervention  - GI: bowel rest, c/w ABx.   - Surgery consult: Likely diagnosis of neutropenic enterocolitis vs. Ischemic colitis. ID, bowel rest, GOC, No acute plan for surgical intervention at this time  - ID following: Neutropenic fever resolved, now afebrile. Pt treated with metronidazole and cefepime, E Coli and H influenzae bacteremia 10/16  likely due to neutropenic enterocolitis, cleared 10/24  SMA dissection, c/f ischemic colitis   - Diarrhea GI PCR 11/1 not detected, CDiff negative, improving.      Anemia/Thrombocytopenia  -Heme/onc following, monitor CBC, transfuse PRN    Hypernatremia, Hypophosphatemia, hypocalcemia  - Nephrology following, improved.     Case discussed with Dr. Valle, labs/vitals/medications reviewed, Pt medically cleared for discharge to rehab with outpt follow up as directed.

## 2024-11-18 NOTE — DISCHARGE NOTE PROVIDER - DETAILS OF MALNUTRITION DIAGNOSIS/DIAGNOSES
This patient has been assessed with a concern for Malnutrition and was treated during this hospitalization for the following Nutrition diagnosis/diagnoses:     -  10/19/2024: Severe protein-calorie malnutrition

## 2024-12-13 ENCOUNTER — APPOINTMENT (OUTPATIENT)
Dept: INTERVENTIONAL RADIOLOGY/VASCULAR | Facility: CLINIC | Age: 64
End: 2024-12-13
Payer: MEDICARE

## 2024-12-13 ENCOUNTER — OUTPATIENT (OUTPATIENT)
Dept: OUTPATIENT SERVICES | Facility: HOSPITAL | Age: 64
LOS: 1 days | End: 2024-12-13
Payer: MEDICARE

## 2024-12-13 ENCOUNTER — APPOINTMENT (OUTPATIENT)
Dept: RADIOLOGY | Facility: HOSPITAL | Age: 64
End: 2024-12-13

## 2024-12-13 VITALS
WEIGHT: 175 LBS | BODY MASS INDEX: 25.92 KG/M2 | HEART RATE: 98 BPM | DIASTOLIC BLOOD PRESSURE: 59 MMHG | OXYGEN SATURATION: 98 % | SYSTOLIC BLOOD PRESSURE: 90 MMHG | HEIGHT: 69 IN

## 2024-12-13 DIAGNOSIS — Z98.890 OTHER SPECIFIED POSTPROCEDURAL STATES: Chronic | ICD-10-CM

## 2024-12-13 DIAGNOSIS — M84.40XA PATHOLOGICAL FRACTURE, UNSPECIFIED SITE, INITIAL ENCOUNTER FOR FRACTURE: ICD-10-CM

## 2024-12-13 PROCEDURE — 72020 X-RAY EXAM OF SPINE 1 VIEW: CPT | Mod: 26

## 2024-12-13 PROCEDURE — 99204 OFFICE O/P NEW MOD 45 MIN: CPT

## 2024-12-13 RX ORDER — OXYCODONE 5 MG/1
5 TABLET ORAL
Qty: 30 | Refills: 0 | Status: ACTIVE | COMMUNITY
Start: 2024-12-13 | End: 1900-01-01

## 2024-12-21 NOTE — BH CONSULTATION LIAISON PROGRESS NOTE - NSICDXBHPRIMARYDX_PSY_ALL_CORE
NEPHROLOGY MEDICAL CARE, [facility] - Dr. Domenic Griffiths/ Dr. Bishnu Amador/ Dr. Fili Smiley/ Dr. Naomie Crowder    Date of Service: 12-21-24    Patient was seen and examined at bedside.    CC: patient feels better.    Vital Signs Last 24 Hrs  T(C): 37 (21 Dec 2024 10:35), Max: 37 (20 Dec 2024 20:00)  T(F): 98.6 (21 Dec 2024 10:35), Max: 98.6 (20 Dec 2024 20:00)  HR: 75 (21 Dec 2024 10:35) (67 - 82)  BP: 152/72 (21 Dec 2024 10:35) (139/90 - 168/80)  BP(mean): --  RR: 18 (21 Dec 2024 10:35) (16 - 18)  SpO2: 93% (21 Dec 2024 10:35) (90% - 100%)    Parameters below as of 21 Dec 2024 07:59  Patient On (Oxygen Delivery Method): nasal cannula  O2 Flow (L/min): 2      12-20 @ 07:01  -  12-21 @ 07:00  --------------------------------------------------------  IN: 600 mL / OUT: 3100 mL / NET: -2500 mL        PHYSICAL EXAM:  General: No acute respiratory distress.  Eyes: conjunctiva and sclera clear  ENMT: Atraumatic, Normocephalic, supple, No JVD present. Moist mucous membranes  Respiratory:   Bilaterally rales; no rhonchi, wheezing  Cardiovascular: S1S2+; no m/r/g  Gastrointestinal: Soft, no tenderness, Nondistended; Bowel sounds present  Neuro:  Awake, Alert & Oriented X3,   Ext:  1+ pedal edema, No Cyanosis  Skin: No rashes  Dialysis Access::   Left upper arm AVF thrill/bruit+     MEDICATIONS:  MEDICATIONS  (STANDING):  aspirin  chewable 81 milliGRAM(s) Oral daily  atorvastatin 10 milliGRAM(s) Oral at bedtime  calcium acetate 667 milliGRAM(s) Oral three times a day with meals  carvedilol 12.5 milliGRAM(s) Oral every 12 hours  clopidogrel Tablet 75 milliGRAM(s) Oral daily  epoetin ignacia-epbx (RETACRIT) Injectable 44246 Unit(s) IV Push <User Schedule>  lisinopril 40 milliGRAM(s) Oral daily  pantoprazole    Tablet 40 milliGRAM(s) Oral before breakfast  traZODone 100 milliGRAM(s) Oral at bedtime    MEDICATIONS  (PRN):  acetaminophen     Tablet .. 650 milliGRAM(s) Oral every 6 hours PRN Temp greater or equal to 38C (100.4F), Mild Pain (1 - 3)          LABS:                        8.2    7.67  )-----------( 194      ( 20 Dec 2024 05:20 )             24.0     12-20    134[L]  |  99  |  50[H]  ----------------------------<  122[H]  4.8   |  27  |  6.44[H]    Ca    8.4      20 Dec 2024 05:20  Phos  4.1     12-20  Mg     2.3     12-20      PTT - ( 20 Dec 2024 05:20 )  PTT:39.8 sec  Urinalysis Basic - ( 20 Dec 2024 05:20 )    Color: x / Appearance: x / SG: x / pH: x  Gluc: 122 mg/dL / Ketone: x  / Bili: x / Urobili: x   Blood: x / Protein: x / Nitrite: x   Leuk Esterase: x / RBC: x / WBC x   Sq Epi: x / Non Sq Epi: x / Bacteria: x        Urine studies    PTH and Vit D:         Delirium due to another medical condition   F05

## 2024-12-27 ENCOUNTER — NON-APPOINTMENT (OUTPATIENT)
Age: 64
End: 2024-12-27

## 2024-12-30 ENCOUNTER — APPOINTMENT (OUTPATIENT)
Dept: RADIATION ONCOLOGY | Facility: CLINIC | Age: 64
End: 2024-12-30
Payer: MEDICARE

## 2024-12-30 VITALS
TEMPERATURE: 96.98 F | OXYGEN SATURATION: 100 % | DIASTOLIC BLOOD PRESSURE: 87 MMHG | WEIGHT: 138 LBS | BODY MASS INDEX: 20.44 KG/M2 | RESPIRATION RATE: 16 BRPM | SYSTOLIC BLOOD PRESSURE: 136 MMHG | HEIGHT: 69 IN | HEART RATE: 86 BPM

## 2024-12-30 DIAGNOSIS — M54.9 DORSALGIA, UNSPECIFIED: ICD-10-CM

## 2024-12-30 PROCEDURE — 99215 OFFICE O/P EST HI 40 MIN: CPT

## 2024-12-30 RX ORDER — DEXAMETHASONE 4 MG/1
4 TABLET ORAL
Qty: 25 | Refills: 0 | Status: ACTIVE | COMMUNITY
Start: 2024-12-30 | End: 1900-01-01

## 2024-12-30 RX ORDER — HYDROMORPHONE HYDROCHLORIDE 2 MG/1
2 TABLET ORAL
Qty: 0 | Refills: 0 | Status: COMPLETED | OUTPATIENT
Start: 2024-12-30

## 2024-12-30 RX ADMIN — HYDROMORPHONE HYDROCHLORIDE 0 MG: 2 TABLET ORAL at 00:00

## 2025-02-11 ENCOUNTER — APPOINTMENT (OUTPATIENT)
Dept: INFECTIOUS DISEASE | Facility: CLINIC | Age: 65
End: 2025-02-11

## (undated) DEVICE — GOWN SMARTGOWN XL/XLONG

## (undated) DEVICE — TROCAR COVIDIEN VERSAPORT BLADELESS OPTICAL 5MM STANDARD

## (undated) DEVICE — RETRIEVER ROTH NET PLATINUM-UNIVERSAL

## (undated) DEVICE — SENS OXI DGT OXISENSOR II ADLN

## (undated) DEVICE — SPONGE ENDO PEANUT 5MM

## (undated) DEVICE — ELCTR GROUNDING PAD ADULT COVIDIEN

## (undated) DEVICE — MASK OXYGEN PANORAMIC

## (undated) DEVICE — ENDOCUFF VISION SZ 3 SM PRPL

## (undated) DEVICE — SUT POLYSORB 4-0 P-12 UNDYED

## (undated) DEVICE — TUBING IV SET GRAVITY 3Y 100" MACRO

## (undated) DEVICE — BRUSH COLONOSCOPY CYTOLOGY

## (undated) DEVICE — VENODYNE/SCD SLEEVE CALF MEDIUM

## (undated) DEVICE — FORMALIN CUPS 10% BUFFERED

## (undated) DEVICE — TUBING IV SET SECONDARY 34"

## (undated) DEVICE — PLV/PSP-SUCTION IRRIGATOR STRYKER: Type: DURABLE MEDICAL EQUIPMENT

## (undated) DEVICE — SNARE LRG

## (undated) DEVICE — SUCTION YANKAUER TAPERED BULBOUS NO VENT

## (undated) DEVICE — ENDOCUFF VISION SZ 2 LG GRN

## (undated) DEVICE — SOL IRR NS 0.9% 250ML

## (undated) DEVICE — SNARE POLYP SENS 27MM 240CM

## (undated) DEVICE — TUBING SUCTION CONN 6FT STERILE

## (undated) DEVICE — VALVE ENDOSCOPE DEFENDO SINGLE USE

## (undated) DEVICE — TUBING ENDO EXT OLYMPUS 160 24HR USE

## (undated) DEVICE — FORCEP RADIAL JAW 4 W NDL 2.4MM 2.8MM 240CM ORANGE DISP

## (undated) DEVICE — FORCEP RADIAL JAW 4 JUMBO 2.8MM 3.2MM 240CM ORANGE DISP

## (undated) DEVICE — TROCAR COVIDIEN VERSAONE BLUNT TIP HASSAN 12MM

## (undated) DEVICE — TROCAR COVIDIEN VERSAPORT BLADELESS OPTICAL 11MM STANDARD

## (undated) DEVICE — TRAP QUICK CATCH  SINGL CHAMBER

## (undated) DEVICE — NDL HYPO REGULAR BEVEL 25G X 1.5" (BLUE)

## (undated) DEVICE — TUBING CANNULA SALTER LABS NASAL ADULT 7FT

## (undated) DEVICE — TUBING STRYKER PNEUMOCLEAR HIGH FLOW HEATED

## (undated) DEVICE — SUT POLYSORB 3-0 30" V-20 UNDYED

## (undated) DEVICE — GLV 7.5 PROTEXIS (WHITE)

## (undated) DEVICE — SYR SLIP TIP 60CC

## (undated) DEVICE — DRAPE 3/4 SHEET W REINFORCEMENT 56X77"

## (undated) DEVICE — Device

## (undated) DEVICE — STAPLER COVIDIEN ENDO GIA STANDARD HANDLE

## (undated) DEVICE — VENODYNE/SCD SLEEVE CALF LARGE

## (undated) DEVICE — CANISTER SUCTION 1200CC 10/SL

## (undated) DEVICE — MASK O2 NON REBREATH 3IN1 ADULT

## (undated) DEVICE — PACK IV START WITH CHG

## (undated) DEVICE — TRAP SPECIMEN SPUTUM 40CC

## (undated) DEVICE — SOL IRR POUR H2O 1000ML

## (undated) DEVICE — ENDOCATCH 10MM SPECIMEN POUCH

## (undated) DEVICE — PACK GENERAL LAPAROSCOPY

## (undated) DEVICE — CATH ELECHMSTAT  INJ 7FR 210CM

## (undated) DEVICE — SYR IV POSIFLUSH NS 3ML 30/TY

## (undated) DEVICE — SOL IRR BAG NS 0.9% 3000ML

## (undated) DEVICE — SUT HEWSON RETRIEVER

## (undated) DEVICE — CATH ELCTR GLIDE PRB 7FR

## (undated) DEVICE — VALVE BIOPSY

## (undated) DEVICE — TUBE O2 SUPL CRUSH RESIS CONN SOUTHSIDE ONLY

## (undated) DEVICE — CATH IV SAFE BC BLU 22GAX1.0"

## (undated) DEVICE — PLV-STRYKER LAPAROSCOPE 10MM 0 DEGREE: Type: DURABLE MEDICAL EQUIPMENT

## (undated) DEVICE — SET IV PUMP BLOOD 1VALVE 180FILTER NON-DEHP

## (undated) DEVICE — TROCAR COVIDIEN VERSAONE FIXATION CANNULA 5MM

## (undated) DEVICE — GLV 8 PROTEXIS (BLUE)

## (undated) DEVICE — DRSG STERISTRIPS 0.5 X 4"

## (undated) DEVICE — SOL IRR POUR H2O 500ML

## (undated) DEVICE — TUBE RECTAL 24FR

## (undated) DEVICE — SHEARS COVIDIEN ENDO SHEAR 5MM X 31CM W UNIPOLAR CAUTERY

## (undated) DEVICE — SYR SLIP LOK 30CC

## (undated) DEVICE — WARMING BLANKET UPPER ADULT

## (undated) DEVICE — SUT POLYSORB 0 30" GU-46

## (undated) DEVICE — TRAP E POLY

## (undated) DEVICE — CLAMP BX HOT RAD JAW 3

## (undated) DEVICE — PLV-SCD MACHINE: Type: DURABLE MEDICAL EQUIPMENT

## (undated) DEVICE — MARKER ENDO SPOT EX

## (undated) DEVICE — NDL INJ SCLERO INTERJECT 23G

## (undated) DEVICE — CATH IV SAFE BC PINK 20GA X 1.16"

## (undated) DEVICE — DRAPE TOWEL BLUE 17" X 24"

## (undated) DEVICE — BLADE SCALPEL SAFETYLOCK #15